# Patient Record
Sex: MALE | Race: BLACK OR AFRICAN AMERICAN | NOT HISPANIC OR LATINO | Employment: OTHER | URBAN - METROPOLITAN AREA
[De-identification: names, ages, dates, MRNs, and addresses within clinical notes are randomized per-mention and may not be internally consistent; named-entity substitution may affect disease eponyms.]

---

## 2017-02-07 ENCOUNTER — OFFICE VISIT (OUTPATIENT)
Dept: INTERNAL MEDICINE | Facility: CLINIC | Age: 62
End: 2017-02-07

## 2017-02-07 VITALS
DIASTOLIC BLOOD PRESSURE: 80 MMHG | SYSTOLIC BLOOD PRESSURE: 148 MMHG | HEART RATE: 65 BPM | HEIGHT: 72 IN | BODY MASS INDEX: 25.87 KG/M2 | WEIGHT: 191 LBS | OXYGEN SATURATION: 100 %

## 2017-02-07 DIAGNOSIS — I10 ESSENTIAL HYPERTENSION: Primary | ICD-10-CM

## 2017-02-07 DIAGNOSIS — Z00.00 ANNUAL PHYSICAL EXAM: ICD-10-CM

## 2017-02-07 DIAGNOSIS — F43.21 REACTION, ADJUSTMENT, WITH DEPRESSED MOOD, PROLONGED: ICD-10-CM

## 2017-02-07 PROCEDURE — 99214 OFFICE O/P EST MOD 30 MIN: CPT | Performed by: HOSPITALIST

## 2017-02-07 NOTE — PROGRESS NOTES
Subjective   Sandro Junior is a 61 y.o. male. Essential hypertension; Chronic pain syndrome; Primary osteoarthritis of knee, unspecified laterality; Reaction, adjustment, with depressed mood, prolonged; H/O colon cancer, stage II; and Med Refill         HPI Comments: He is doing well since his right knee replacement. He has plans to do the left knee in the near future. He has gained 1 0lbs and is slightly overweight. He had a salty breakfast right before his visit today. His BP is up as a result. He is taking Celebrex for arthritis pain. He reports he has been compliant with his blood pressure meds. His mood is good. He wants to continue Lexapro.      The following portions of the patient's history were reviewed and updated as appropriate: allergies, current medications, past family history, past medical history, past social history, past surgical history and problem list.    Review of Systems   Constitutional: Negative for activity change and appetite change.   HENT: Negative for congestion and dental problem.    Respiratory: Negative for chest tightness and shortness of breath.    Cardiovascular: Negative for chest pain.   Gastrointestinal: Negative for abdominal distention and abdominal pain.   Endocrine: Negative for polydipsia and polyphagia.   Genitourinary: Negative for difficulty urinating and dysuria.   Musculoskeletal: Positive for arthralgias and joint swelling.        Knee replacement on the right with mild swelling, the left knee hurts   Skin: Negative for color change and pallor.   Neurological: Negative for dizziness and facial asymmetry.   Hematological: Negative for adenopathy. Does not bruise/bleed easily.   Psychiatric/Behavioral: Negative for agitation and behavioral problems.       Objective   Vitals:    02/07/17 1120   BP: 148/80   Pulse: 65   SpO2: 100%       Physical Exam   Constitutional: He is oriented to person, place, and time. He appears well-developed and well-nourished.   HENT:    Head: Normocephalic and atraumatic.   Eyes: Conjunctivae and EOM are normal. Pupils are equal, round, and reactive to light.   Neck: Normal range of motion. Neck supple.   Cardiovascular: Normal rate, regular rhythm and normal heart sounds.    Pulmonary/Chest: Effort normal and breath sounds normal.   Abdominal: Soft. Bowel sounds are normal.   Musculoskeletal: Normal range of motion.   Neurological: He is alert and oriented to person, place, and time. He has normal reflexes.   Skin: Skin is warm and dry.   Psychiatric: He has a normal mood and affect. His behavior is normal. Thought content normal.       Assessment/Plan   Sandro was seen today for essential hypertension, chronic pain syndrome, primary osteoarthritis of knee, unspecified laterality, reaction, adjustment, with depressed mood, prolonged, h/o colon cancer, stage ii and med refill.    Diagnoses and all orders for this visit:    Essential hypertension  -     Comprehensive Metabolic Panel; Future  -     Lipid Panel; Future    Reaction, adjustment, with depressed mood, prolonged    Annual physical exam  -     TSH; Future  -     CBC (No Diff); Future    he was advised to decrease salt intake. Increase exercise activity and lose 10 lbs. He will repeat his BP in 2 weeks. F/u in 3 mo for a physical.    Results for orders placed or performed in visit on 12/06/16   Tissue Exam   Result Value Ref Range    Case Report       Surgical Pathology Report                         Case: [id number]                                  Authorizing Provider:  Mario Allen MD    Collected:           12/06/2016 07:40 AM          Pathologist:           Virgilio Montoya MD          Received:            12/06/2016 09:36 AM          Specimen:    Large Intestine, Transverse Colon                                                          Clinical Information       The working history is colon cancer.       Final Diagnosis       TRANSVERSE COLON POLYP:  Tubular adenoma; no high  grade dysplasia identified.     DGD/mbc       Gross Description       Received in formalin labeled transverse colon polyp consists of two 2 mm tan fragments with additional yellow flecks of material; submitted in toto. B/[facility] [place]       Microscopic Description       Sections of the colonic fragments display tightly packed tubular crypt profiles in which the epithelium shows adenomatous change consisting of nuclear basophilic hyperchromasia and pseudostratification with mild dysplasia; high-grade epithelial dysplasia is absent. DGD/mbc       Embedded Images

## 2017-08-24 ENCOUNTER — OFFICE VISIT (OUTPATIENT)
Dept: INTERNAL MEDICINE | Facility: CLINIC | Age: 62
End: 2017-08-24

## 2017-08-24 VITALS
OXYGEN SATURATION: 99 % | WEIGHT: 178 LBS | HEART RATE: 68 BPM | DIASTOLIC BLOOD PRESSURE: 70 MMHG | SYSTOLIC BLOOD PRESSURE: 142 MMHG | BODY MASS INDEX: 24.14 KG/M2

## 2017-08-24 DIAGNOSIS — I10 ESSENTIAL HYPERTENSION: Primary | ICD-10-CM

## 2017-08-24 DIAGNOSIS — F43.21 REACTION, ADJUSTMENT, WITH DEPRESSED MOOD, PROLONGED: ICD-10-CM

## 2017-08-24 DIAGNOSIS — B35.1 ONYCHOMYCOSIS OF TOENAIL: ICD-10-CM

## 2017-08-24 PROCEDURE — 90471 IMMUNIZATION ADMIN: CPT | Performed by: PHYSICIAN ASSISTANT

## 2017-08-24 PROCEDURE — 99213 OFFICE O/P EST LOW 20 MIN: CPT | Performed by: PHYSICIAN ASSISTANT

## 2017-08-24 PROCEDURE — 90686 IIV4 VACC NO PRSV 0.5 ML IM: CPT | Performed by: PHYSICIAN ASSISTANT

## 2017-08-24 RX ORDER — ESCITALOPRAM OXALATE 10 MG/1
10 TABLET ORAL DAILY
Qty: 30 TABLET | Refills: 5 | Status: SHIPPED | OUTPATIENT
Start: 2017-08-24 | End: 2018-01-25 | Stop reason: SDUPTHER

## 2017-08-24 RX ORDER — DILTIAZEM HYDROCHLORIDE 240 MG/1
240 CAPSULE, EXTENDED RELEASE ORAL DAILY
Qty: 90 CAPSULE | Refills: 3 | Status: SHIPPED | OUTPATIENT
Start: 2017-08-24 | End: 2018-01-25 | Stop reason: SDUPTHER

## 2017-08-24 NOTE — PROGRESS NOTES
Chief Complaint   Patient presents with   • Follow-up     hypertension       Subjective   Sandro Junior is a 62 y.o. male.       History of Present Illness     Pt was previously pt of Dr Cruz's. He has several specialists, Dr Prado, orthopedist; Dr Allen, gastroenterology; Dr Chaney did his colon resection- for benign colon growth.    He is due for follow up with Dr Allen due to some abnormalities on his colonoscopy 7-8 months ago.    Dr Vieyra follows him for Hodgkin's lymphoma that is now in remission. Diagnosed in 1979.    Dr Cruz managed his hypertension. Does check his BP occasionally at home. Runs around 135-140 systolic typically.    He has had TKR on right and planning to have his left knee done soon. He is planning to have it done this fall.     Takes Lexapro 10 mg daily to help manage his depression. He has been out of it for 2-3 weeks.     Has had recurrence of toenail fungus, thinks he took a pill to treat it in the past- not sure if it worked.      Current Outpatient Prescriptions:   •  celecoxib (CeleBREX) 200 MG capsule, , Disp: , Rfl: 0  •  cyclobenzaprine (FLEXERIL) 10 MG tablet, Take  by mouth., Disp: , Rfl:   •  dexlansoprazole (DEXILANT) 60 MG capsule, Take  by mouth., Disp: , Rfl:   •  diclofenac (VOLTAREN) 1 % gel gel, Place  on the skin., Disp: , Rfl:   •  diltiazem XR (DILT-XR) 240 MG 24 hr capsule, Take 1 capsule by mouth Daily., Disp: 90 capsule, Rfl: 3  •  escitalopram (LEXAPRO) 10 MG tablet, Take 1 tablet by mouth Daily., Disp: 30 tablet, Rfl: 5  •  gabapentin (NEURONTIN) 100 MG capsule, take 1 capsule by mouth twice a day, Disp: , Rfl: 0  •  latanoprost (XALATAN) 0.005 % ophthalmic solution, , Disp: , Rfl: 0  •  traMADol (ULTRAM) 50 MG tablet, take 1 tablet three times a day, Disp: , Rfl: 0     PMFSH  The following portions of the patient's history were reviewed and updated as appropriate: allergies, current medications, past family history, past medical history, past social  history, past surgical history and problem list.    Review of Systems   Constitutional: Negative for chills, fever and unexpected weight change.   HENT: Negative.    Eyes: Negative for pain and visual disturbance.   Respiratory: Negative for chest tightness and shortness of breath.    Cardiovascular: Negative for chest pain.   Gastrointestinal: Negative for abdominal pain and blood in stool.   Endocrine: Negative.    Genitourinary: Negative.    Musculoskeletal: Negative for joint swelling.   Skin: Negative for color change, rash and wound.   Allergic/Immunologic: Negative.    Neurological: Negative for syncope and speech difficulty.   Hematological: Negative for adenopathy.   Psychiatric/Behavioral: Positive for decreased concentration. Negative for confusion, hallucinations and suicidal ideas. The patient is nervous/anxious.        Objective   /70  Pulse 68  Wt 178 lb (80.7 kg)  SpO2 99%  BMI 24.14 kg/m2    Physical Exam   Constitutional: He appears well-developed and well-nourished.   HENT:   Head: Normocephalic.   Right Ear: Hearing, tympanic membrane, external ear and ear canal normal.   Left Ear: Hearing, tympanic membrane, external ear and ear canal normal.   Nose: Nose normal.   Mouth/Throat: Oropharynx is clear and moist.   Eyes: Conjunctivae are normal. Pupils are equal, round, and reactive to light.   Neck: Normal range of motion.   Cardiovascular: Normal rate, regular rhythm and normal heart sounds.    Pulmonary/Chest: Effort normal and breath sounds normal. He has no decreased breath sounds. He has no wheezes. He has no rhonchi. He has no rales.   Musculoskeletal: Normal range of motion.   Neurological: He is alert.   Skin: Skin is warm and dry.   Bilateral thickened, yellowed nails   Psychiatric: He has a normal mood and affect. His behavior is normal.   Nursing note and vitals reviewed.      ASSESSMENT/PLAN    Problem List Items Addressed This Visit        Cardiovascular and Mediastinum     Hypertension - Primary     Hypertension is unchanged.  Continue current treatment regimen.  Blood pressure will be reassessed at the next regular appointment.         Relevant Medications    diltiazem XR (DILT-XR) 240 MG 24 hr capsule       Musculoskeletal and Integument    Onychomycosis of toenail    Relevant Orders    Ambulatory Referral to Podiatry       Other    Reaction, adjustment, with depressed mood, prolonged     Psychological condition is unchanged.  Continue current treatment regimen.  Psychological condition  will be reassessed at the next regular appointment.         Relevant Medications    escitalopram (LEXAPRO) 10 MG tablet               Return for Annual physical.

## 2017-12-05 ENCOUNTER — LAB REQUISITION (OUTPATIENT)
Dept: LAB | Facility: HOSPITAL | Age: 62
End: 2017-12-05

## 2017-12-05 DIAGNOSIS — Z86.010 HISTORY OF COLONIC POLYPS: ICD-10-CM

## 2017-12-05 DIAGNOSIS — Z12.11 ENCOUNTER FOR SCREENING FOR MALIGNANT NEOPLASM OF COLON: ICD-10-CM

## 2017-12-05 DIAGNOSIS — Z85.038 PERSONAL HISTORY OF OTHER MALIGNANT NEOPLASM OF LARGE INTESTINE (CODE): ICD-10-CM

## 2017-12-05 PROCEDURE — 88305 TISSUE EXAM BY PATHOLOGIST: CPT | Performed by: INTERNAL MEDICINE

## 2017-12-07 LAB
CYTO UR: NORMAL
LAB AP CASE REPORT: NORMAL
LAB AP CLINICAL INFORMATION: NORMAL
Lab: NORMAL
PATH REPORT.FINAL DX SPEC: NORMAL
PATH REPORT.GROSS SPEC: NORMAL

## 2018-01-25 ENCOUNTER — OFFICE VISIT (OUTPATIENT)
Dept: INTERNAL MEDICINE | Facility: CLINIC | Age: 63
End: 2018-01-25

## 2018-01-25 VITALS
DIASTOLIC BLOOD PRESSURE: 82 MMHG | BODY MASS INDEX: 25.06 KG/M2 | SYSTOLIC BLOOD PRESSURE: 146 MMHG | HEIGHT: 72 IN | WEIGHT: 185 LBS | HEART RATE: 78 BPM | OXYGEN SATURATION: 98 %

## 2018-01-25 DIAGNOSIS — Z11.59 NEED FOR HEPATITIS C SCREENING TEST: ICD-10-CM

## 2018-01-25 DIAGNOSIS — Z00.00 HEALTHCARE MAINTENANCE: ICD-10-CM

## 2018-01-25 DIAGNOSIS — F43.21 REACTION, ADJUSTMENT, WITH DEPRESSED MOOD, PROLONGED: ICD-10-CM

## 2018-01-25 DIAGNOSIS — I10 ESSENTIAL HYPERTENSION: Primary | ICD-10-CM

## 2018-01-25 PROCEDURE — 99214 OFFICE O/P EST MOD 30 MIN: CPT | Performed by: NURSE PRACTITIONER

## 2018-01-25 RX ORDER — DILTIAZEM HYDROCHLORIDE 240 MG/1
240 CAPSULE, EXTENDED RELEASE ORAL DAILY
Qty: 90 CAPSULE | Refills: 3 | Status: SHIPPED | OUTPATIENT
Start: 2018-01-25 | End: 2018-01-25 | Stop reason: DRUGHIGH

## 2018-01-25 RX ORDER — ESCITALOPRAM OXALATE 10 MG/1
10 TABLET ORAL DAILY
Qty: 30 TABLET | Refills: 5 | Status: SHIPPED | OUTPATIENT
Start: 2018-01-25 | End: 2018-01-25 | Stop reason: SDUPTHER

## 2018-01-25 RX ORDER — ESCITALOPRAM OXALATE 10 MG/1
10 TABLET ORAL DAILY
Qty: 30 TABLET | Refills: 5 | Status: SHIPPED | OUTPATIENT
Start: 2018-01-25 | End: 2018-04-26 | Stop reason: SDUPTHER

## 2018-01-25 RX ORDER — DILTIAZEM HYDROCHLORIDE 300 MG/1
300 CAPSULE, EXTENDED RELEASE ORAL DAILY
Qty: 30 CAPSULE | Refills: 5 | Status: SHIPPED | OUTPATIENT
Start: 2018-01-25 | End: 2018-04-26 | Stop reason: SDUPTHER

## 2018-04-26 ENCOUNTER — OFFICE VISIT (OUTPATIENT)
Dept: INTERNAL MEDICINE | Facility: CLINIC | Age: 63
End: 2018-04-26

## 2018-04-26 VITALS
WEIGHT: 186 LBS | DIASTOLIC BLOOD PRESSURE: 70 MMHG | OXYGEN SATURATION: 98 % | SYSTOLIC BLOOD PRESSURE: 124 MMHG | BODY MASS INDEX: 25.23 KG/M2 | HEART RATE: 86 BPM

## 2018-04-26 DIAGNOSIS — I10 ESSENTIAL HYPERTENSION: ICD-10-CM

## 2018-04-26 DIAGNOSIS — F43.21 REACTION, ADJUSTMENT, WITH DEPRESSED MOOD, PROLONGED: ICD-10-CM

## 2018-04-26 DIAGNOSIS — Z11.59 NEED FOR HEPATITIS C SCREENING TEST: Primary | ICD-10-CM

## 2018-04-26 LAB
ALBUMIN SERPL-MCNC: 4.1 G/DL (ref 3.2–4.8)
ALBUMIN/GLOB SERPL: 1.4 G/DL (ref 1.5–2.5)
ALP SERPL-CCNC: 74 U/L (ref 25–100)
ALT SERPL W P-5'-P-CCNC: 41 U/L (ref 7–40)
ANION GAP SERPL CALCULATED.3IONS-SCNC: 4 MMOL/L (ref 3–11)
AST SERPL-CCNC: 26 U/L (ref 0–33)
BILIRUB SERPL-MCNC: 0.8 MG/DL (ref 0.3–1.2)
BUN BLD-MCNC: 12 MG/DL (ref 9–23)
BUN/CREAT SERPL: 15 (ref 7–25)
CALCIUM SPEC-SCNC: 9.4 MG/DL (ref 8.7–10.4)
CHLORIDE SERPL-SCNC: 103 MMOL/L (ref 99–109)
CO2 SERPL-SCNC: 32 MMOL/L (ref 20–31)
CREAT BLD-MCNC: 0.8 MG/DL (ref 0.6–1.3)
GFR SERPL CREATININE-BSD FRML MDRD: 119 ML/MIN/1.73
GLOBULIN UR ELPH-MCNC: 2.9 GM/DL
GLUCOSE BLD-MCNC: 92 MG/DL (ref 70–100)
HCV AB SER DONR QL: NORMAL
POTASSIUM BLD-SCNC: 4.8 MMOL/L (ref 3.5–5.5)
PROT SERPL-MCNC: 7 G/DL (ref 5.7–8.2)
SODIUM BLD-SCNC: 139 MMOL/L (ref 132–146)

## 2018-04-26 PROCEDURE — 86803 HEPATITIS C AB TEST: CPT | Performed by: PHYSICIAN ASSISTANT

## 2018-04-26 PROCEDURE — 90471 IMMUNIZATION ADMIN: CPT | Performed by: PHYSICIAN ASSISTANT

## 2018-04-26 PROCEDURE — 90715 TDAP VACCINE 7 YRS/> IM: CPT | Performed by: PHYSICIAN ASSISTANT

## 2018-04-26 PROCEDURE — 99214 OFFICE O/P EST MOD 30 MIN: CPT | Performed by: PHYSICIAN ASSISTANT

## 2018-04-26 PROCEDURE — 80053 COMPREHEN METABOLIC PANEL: CPT | Performed by: PHYSICIAN ASSISTANT

## 2018-04-26 RX ORDER — DILTIAZEM HYDROCHLORIDE 300 MG/1
300 CAPSULE, EXTENDED RELEASE ORAL DAILY
Qty: 30 CAPSULE | Refills: 5 | Status: SHIPPED | OUTPATIENT
Start: 2018-04-26 | End: 2018-12-03 | Stop reason: SDUPTHER

## 2018-04-26 RX ORDER — ESCITALOPRAM OXALATE 10 MG/1
10 TABLET ORAL DAILY
Qty: 30 TABLET | Refills: 5 | Status: SHIPPED | OUTPATIENT
Start: 2018-04-26 | End: 2019-07-16 | Stop reason: SDUPTHER

## 2018-04-26 RX ORDER — CYCLOBENZAPRINE HCL 10 MG
10 TABLET ORAL 3 TIMES DAILY PRN
Qty: 30 TABLET | Refills: 3 | Status: SHIPPED | OUTPATIENT
Start: 2018-04-26 | End: 2019-07-16

## 2018-04-27 NOTE — ASSESSMENT & PLAN NOTE
Hypertension is improving with treatment.  Continue current treatment regimen.  Dietary sodium restriction.  Regular aerobic exercise.  Continue current medications.  Ambulatory blood pressure monitoring.  Blood pressure will be reassessed at the next regular appointment.

## 2018-11-26 ENCOUNTER — FLU SHOT (OUTPATIENT)
Dept: INTERNAL MEDICINE | Facility: CLINIC | Age: 63
End: 2018-11-26

## 2018-11-26 PROCEDURE — G0008 ADMIN INFLUENZA VIRUS VAC: HCPCS | Performed by: PHYSICIAN ASSISTANT

## 2018-11-26 PROCEDURE — 90674 CCIIV4 VAC NO PRSV 0.5 ML IM: CPT | Performed by: PHYSICIAN ASSISTANT

## 2018-12-03 ENCOUNTER — OFFICE VISIT (OUTPATIENT)
Dept: INTERNAL MEDICINE | Facility: CLINIC | Age: 63
End: 2018-12-03

## 2018-12-03 VITALS
WEIGHT: 183.8 LBS | HEART RATE: 68 BPM | SYSTOLIC BLOOD PRESSURE: 130 MMHG | OXYGEN SATURATION: 98 % | BODY MASS INDEX: 24.93 KG/M2 | DIASTOLIC BLOOD PRESSURE: 78 MMHG

## 2018-12-03 DIAGNOSIS — I10 ESSENTIAL HYPERTENSION: ICD-10-CM

## 2018-12-03 DIAGNOSIS — R21 RASH OF FACE: Primary | ICD-10-CM

## 2018-12-03 LAB
CRP SERPL-MCNC: 0.38 MG/DL (ref 0–1)
ERYTHROCYTE [SEDIMENTATION RATE] IN BLOOD: 9 MM/HR (ref 0–20)
RHEUMATOID FACT SERPL-ACNC: NEGATIVE [IU]/ML

## 2018-12-03 PROCEDURE — 90632 HEPA VACCINE ADULT IM: CPT | Performed by: PHYSICIAN ASSISTANT

## 2018-12-03 PROCEDURE — 86430 RHEUMATOID FACTOR TEST QUAL: CPT | Performed by: PHYSICIAN ASSISTANT

## 2018-12-03 PROCEDURE — 90471 IMMUNIZATION ADMIN: CPT | Performed by: PHYSICIAN ASSISTANT

## 2018-12-03 PROCEDURE — 86038 ANTINUCLEAR ANTIBODIES: CPT | Performed by: PHYSICIAN ASSISTANT

## 2018-12-03 PROCEDURE — 86140 C-REACTIVE PROTEIN: CPT | Performed by: PHYSICIAN ASSISTANT

## 2018-12-03 PROCEDURE — 99213 OFFICE O/P EST LOW 20 MIN: CPT | Performed by: PHYSICIAN ASSISTANT

## 2018-12-03 RX ORDER — DILTIAZEM HYDROCHLORIDE 300 MG/1
300 CAPSULE, EXTENDED RELEASE ORAL DAILY
Qty: 30 CAPSULE | Refills: 5 | Status: SHIPPED | OUTPATIENT
Start: 2018-12-03 | End: 2019-02-20 | Stop reason: SDUPTHER

## 2018-12-03 NOTE — PROGRESS NOTES
Chief Complaint   Patient presents with   • Rash     On face patient states it comes and go        Subjective   Sandro Junior is a 63 y.o. male.       History of Present Illness     Over the last couple months, ptt has started getting a recurrent rash on his face. Has some redness that occurs along his forehead, both sides of his nose, around his mouth. No itching. Does get flaky. No palpable bumps. Has been using aquaphor to keep skin moisturized.    Also request refill of gabapentin-has not had rx in several months. Not sure why he was taking it. Does not think it was helping his pain.      Current Outpatient Medications:   •  cyclobenzaprine (FLEXERIL) 10 MG tablet, Take 1 tablet by mouth 3 (Three) Times a Day As Needed for Muscle Spasms., Disp: 30 tablet, Rfl: 3  •  diltiaZEM (TIAZAC) 300 MG 24 hr capsule, Take 1 capsule by mouth Daily., Disp: 30 capsule, Rfl: 5  •  escitalopram (LEXAPRO) 10 MG tablet, Take 1 tablet by mouth Daily., Disp: 30 tablet, Rfl: 5  •  gabapentin (NEURONTIN) 100 MG capsule, take 1 capsule by mouth twice a day, Disp: , Rfl: 0  •  latanoprost (XALATAN) 0.005 % ophthalmic solution, , Disp: , Rfl: 0     PMFSH  The following portions of the patient's history were reviewed and updated as appropriate: allergies, current medications, past family history, past medical history, past social history, past surgical history and problem list.    Review of Systems   Constitutional: Negative for activity change, appetite change, fatigue, fever and unexpected weight change.   HENT: Negative for facial swelling, mouth sores and trouble swallowing.    Eyes: Negative for pain, discharge, itching and visual disturbance.   Respiratory: Negative for chest tightness, shortness of breath and wheezing.    Cardiovascular: Negative for chest pain and palpitations.   Gastrointestinal: Negative for abdominal pain, diarrhea, nausea and vomiting.   Endocrine: Negative.    Genitourinary: Negative.    Musculoskeletal:  Negative for joint swelling.   Skin: Positive for rash.   Allergic/Immunologic: Negative.    Neurological: Negative for seizures and syncope.   Hematological: Does not bruise/bleed easily.   Psychiatric/Behavioral: Negative.        Objective   /78   Pulse 68   Wt 83.4 kg (183 lb 12.8 oz)   SpO2 98%   BMI 24.93 kg/m²     Physical Exam   Constitutional: He is oriented to person, place, and time. He appears well-developed and well-nourished. No distress.   HENT:   Head: Normocephalic and atraumatic.   Right Ear: External ear normal.   Left Ear: External ear normal.   Eyes: Conjunctivae and EOM are normal. Pupils are equal, round, and reactive to light. Right eye exhibits no discharge. Left eye exhibits no discharge. No scleral icterus.   Neck: Normal range of motion. Neck supple.   Pulmonary/Chest: Effort normal.   Musculoskeletal: Normal range of motion.   Neurological: He is alert and oriented to person, place, and time. He exhibits normal muscle tone.   Skin: Skin is warm and dry. Rash noted. Rash is macular (scattered across face, erythematous, flaky around nose). He is not diaphoretic.   Psychiatric: He has a normal mood and affect. His behavior is normal. Judgment and thought content normal.   Nursing note and vitals reviewed.      Results for orders placed or performed in visit on 12/03/18   C-reactive Protein   Result Value Ref Range    C-Reactive Protein 0.38 0.00 - 1.00 mg/dL   Rheumatoid Factor   Result Value Ref Range    Rheumatoid Factor Qualitative Negative Negative   Sedimentation Rate   Result Value Ref Range    Sed Rate 9 0 - 20 mm/hr        ASSESSMENT/PLAN    Problem List Items Addressed This Visit        Cardiovascular and Mediastinum    Hypertension    Relevant Medications    diltiaZEM (TIAZAC) 300 MG 24 hr capsule      Other Visit Diagnoses     Rash of face    -  Primary    Check labs as ordered. Trial of eucrisa, gave pt sample. Refer to derm for further recommendations.    Relevant  Orders    Ambulatory Referral to Dermatology    Nuclear Antigen Antibody, IFA    C-reactive Protein (Completed)    Rheumatoid Factor (Completed)    Sedimentation Rate (Completed)               Return for Next scheduled follow up.

## 2018-12-04 LAB
ANA SER QL IA: POSITIVE
ANA SPECKLED TITR SER: NORMAL {TITER}
Lab: NORMAL

## 2019-01-15 ENCOUNTER — OFFICE VISIT (OUTPATIENT)
Dept: INTERNAL MEDICINE | Facility: CLINIC | Age: 64
End: 2019-01-15

## 2019-01-15 VITALS
WEIGHT: 185 LBS | OXYGEN SATURATION: 99 % | SYSTOLIC BLOOD PRESSURE: 154 MMHG | HEART RATE: 72 BPM | DIASTOLIC BLOOD PRESSURE: 88 MMHG | HEIGHT: 72 IN | BODY MASS INDEX: 25.06 KG/M2

## 2019-01-15 DIAGNOSIS — F52.21 ERECTILE DYSFUNCTION OF NONORGANIC ORIGIN: ICD-10-CM

## 2019-01-15 DIAGNOSIS — I10 ESSENTIAL HYPERTENSION: ICD-10-CM

## 2019-01-15 DIAGNOSIS — Z12.5 SPECIAL SCREENING FOR MALIGNANT NEOPLASM OF PROSTATE: ICD-10-CM

## 2019-01-15 DIAGNOSIS — Z00.00 MEDICARE ANNUAL WELLNESS VISIT, INITIAL: Primary | ICD-10-CM

## 2019-01-15 DIAGNOSIS — Z12.11 COLON CANCER SCREENING: ICD-10-CM

## 2019-01-15 PROBLEM — L21.9 SEBORRHEIC ECZEMA: Status: ACTIVE | Noted: 2019-01-15

## 2019-01-15 PROBLEM — C20 MALIGNANT NEOPLASM OF RECTUM: Status: ACTIVE | Noted: 2019-01-15

## 2019-01-15 PROBLEM — L91.0 KELOID SCAR: Status: ACTIVE | Noted: 2019-01-15

## 2019-01-15 PROBLEM — M79.604 PAIN OF RIGHT LOWER EXTREMITY: Status: ACTIVE | Noted: 2019-01-15

## 2019-01-15 LAB
ALBUMIN SERPL-MCNC: 4.46 G/DL (ref 3.2–4.8)
ALBUMIN/GLOB SERPL: 1.5 G/DL (ref 1.5–2.5)
ALP SERPL-CCNC: 67 U/L (ref 25–100)
ALT SERPL W P-5'-P-CCNC: 42 U/L (ref 7–40)
ANION GAP SERPL CALCULATED.3IONS-SCNC: 4 MMOL/L (ref 3–11)
ARTICHOKE IGE QN: 76 MG/DL (ref 0–130)
AST SERPL-CCNC: 36 U/L (ref 0–33)
BILIRUB SERPL-MCNC: 1.2 MG/DL (ref 0.3–1.2)
BUN BLD-MCNC: 10 MG/DL (ref 9–23)
BUN/CREAT SERPL: 11.1 (ref 7–25)
CALCIUM SPEC-SCNC: 9.3 MG/DL (ref 8.7–10.4)
CHLORIDE SERPL-SCNC: 106 MMOL/L (ref 99–109)
CHOLEST SERPL-MCNC: 181 MG/DL (ref 0–200)
CO2 SERPL-SCNC: 30 MMOL/L (ref 20–31)
CREAT BLD-MCNC: 0.9 MG/DL (ref 0.6–1.3)
GFR SERPL CREATININE-BSD FRML MDRD: 104 ML/MIN/1.73
GLOBULIN UR ELPH-MCNC: 2.9 GM/DL
GLUCOSE BLD-MCNC: 107 MG/DL (ref 70–100)
HDLC SERPL-MCNC: 87 MG/DL (ref 40–60)
POTASSIUM BLD-SCNC: 4.1 MMOL/L (ref 3.5–5.5)
PROT SERPL-MCNC: 7.4 G/DL (ref 5.7–8.2)
PSA SERPL-MCNC: 0.4 NG/ML (ref 0–4)
SODIUM BLD-SCNC: 140 MMOL/L (ref 132–146)
TRIGL SERPL-MCNC: 110 MG/DL (ref 0–150)

## 2019-01-15 PROCEDURE — 80061 LIPID PANEL: CPT | Performed by: PHYSICIAN ASSISTANT

## 2019-01-15 PROCEDURE — G0103 PSA SCREENING: HCPCS | Performed by: PHYSICIAN ASSISTANT

## 2019-01-15 PROCEDURE — 80053 COMPREHEN METABOLIC PANEL: CPT | Performed by: PHYSICIAN ASSISTANT

## 2019-01-15 PROCEDURE — G0403 EKG FOR INITIAL PREVENT EXAM: HCPCS | Performed by: PHYSICIAN ASSISTANT

## 2019-01-15 PROCEDURE — G0402 INITIAL PREVENTIVE EXAM: HCPCS | Performed by: PHYSICIAN ASSISTANT

## 2019-01-15 RX ORDER — TRIAMCINOLONE ACETONIDE 0.25 MG/G
CREAM TOPICAL 2 TIMES DAILY
Status: ON HOLD | COMMUNITY
End: 2023-03-17 | Stop reason: SDUPTHER

## 2019-01-15 RX ORDER — SILDENAFIL 50 MG/1
50 TABLET, FILM COATED ORAL DAILY PRN
Qty: 10 TABLET | Refills: 1 | Status: SHIPPED | OUTPATIENT
Start: 2019-01-15 | End: 2019-07-16 | Stop reason: SDUPTHER

## 2019-01-15 RX ORDER — KETOCONAZOLE 20 MG/G
CREAM TOPICAL DAILY
COMMUNITY
End: 2019-12-28 | Stop reason: SDUPTHER

## 2019-01-15 NOTE — PROGRESS NOTES
QUICK REFERENCE INFORMATION:  The ABCs of the Annual Wellness Visit    [facility] to Medicare Visit    HEALTH RISK ASSESSMENT    1955    Recent Hospitalizations:  No hospitalization(s) within the last year..      Current Medical Providers:  Patient Care Team:  Dora Loya PA as PCP - General (Internal Medicine)      Smoking Status:  Social History     Tobacco Use   Smoking Status Former Smoker   Smokeless Tobacco Never Used       Alcohol Consumption:  Social History     Substance and Sexual Activity   Alcohol Use Yes    Comment: occasionally       Depression Screen:   PHQ-2/PHQ-9 Depression Screening 1/15/2019   Little interest or pleasure in doing things 0   Feeling down, depressed, or hopeless 0   Total Score 0       Health Habits and Functional and Cognitive Screening:  Functional & Cognitive Status 1/15/2019   Do you have difficulty preparing food and eating? Yes   Do you have difficulty bathing yourself, getting dressed or grooming yourself? No   Do you have difficulty using the toilet? No   Do you have difficulty moving around from place to place? No   Do you have trouble with steps or getting out of a bed or a chair? No   In the past year have you fallen or experienced a near fall? No   Current Diet Well Balanced Diet   Dental Exam Not up to date   Eye Exam Not up to date   Exercise (times per week) 3 times per week   Current Exercise Activities Include Weightlifting   Do you need help using the phone?  No   Are you deaf or do you have serious difficulty hearing?  No   Do you need help with transportation? No   Do you need help shopping? No   Do you need help preparing meals?  No   Do you need help with housework?  No   Do you need help with laundry? No   Do you need help taking your medications? No   Do you need help managing money? No   Do you ever drive or ride in a car without wearing a seat belt? No   Have you felt unusual stress, anger or loneliness in the last month? No   Who do you live with?  Spouse   If you need help, do you have trouble finding someone available to you? No   Have you been bothered in the last four weeks by sexual problems? Yes   Do you have difficulty concentrating, remembering or making decisions? No           Does the patient have evidence of cognitive impairment? No    Aspirin use counseling? Does not need ASA (and currently is not on it)      Recent Lab Results:  CMP:  Lab Results   Component Value Date    BUN 12 04/26/2018    CREATININE 0.80 04/26/2018    EGFRIFAFRI 119 04/26/2018    BCR 15.0 04/26/2018     04/26/2018    K 4.8 04/26/2018    CO2 32.0 (H) 04/26/2018    CALCIUM 9.4 04/26/2018    ALBUMIN 4.10 04/26/2018    BILITOT 0.8 04/26/2018    ALKPHOS 74 04/26/2018    AST 26 04/26/2018    ALT 41 (H) 04/26/2018     Lipid Panel:     HbA1c:       Visual Acuity:  No exam data present    Age-appropriate Screening Schedule:  Refer to the list below for future screening recommendations based on patient's age, sex and/or medical conditions. Orders for these recommended tests are listed in the plan section. The patient has been provided with a written plan.    Health Maintenance   Topic Date Due   • ZOSTER VACCINE (1 of 2) 04/26/2019 (Originally 2/11/2007)   • COLONOSCOPY  01/25/2028   • TDAP/TD VACCINES (2 - Td) 04/26/2028   • INFLUENZA VACCINE  Completed        Subjective   History of Present Illness    Sandro Junior is a 63 y.o. male an established patient presenting for a Welcome to Medicare Visit.     The following portions of the patient's history were reviewed and updated as appropriate: allergies, current medications, past family history, past medical history, past social history, past surgical history and problem list.    Outpatient Medications Prior to Visit   Medication Sig Dispense Refill   • cyclobenzaprine (FLEXERIL) 10 MG tablet Take 1 tablet by mouth 3 (Three) Times a Day As Needed for Muscle Spasms. 30 tablet 3   • diltiaZEM (TIAZAC) 300 MG 24 hr capsule Take 1  capsule by mouth Daily. 30 capsule 5   • gabapentin (NEURONTIN) 100 MG capsule take 1 capsule by mouth twice a day  0   • ketoconazole (NIZORAL) 2 % cream Apply  topically to the appropriate area as directed Daily.     • triamcinolone (KENALOG) 0.025 % cream Apply  topically to the appropriate area as directed 2 (Two) Times a Day.     • escitalopram (LEXAPRO) 10 MG tablet Take 1 tablet by mouth Daily. 30 tablet 5   • latanoprost (XALATAN) 0.005 % ophthalmic solution   0     No facility-administered medications prior to visit.        Patient Active Problem List   Diagnosis   • Hypertension   • Chronic pain   • Gastroesophageal reflux disease   • Osteoarthritis of knee   • Prolonged depressive adjustment reaction   • Acute prostatitis   • Atopic rhinitis   • Erectile dysfunction of nonorganic origin   • Glaucoma suspect   • Onychomycosis of toenail   • Malignant neoplasm of rectum (CMS/HCC)   • Seborrheic eczema   • Keloid scar   • Pain of right lower extremity       Advance Care Planning:  has an advance directive - a copy HAS NOT been provided    Identification of Risk Factors:  Risk factors include: tobacco use and chronic pain.    Review of Systems   Constitutional: Negative for appetite change, fever and unexpected weight change.   HENT: Negative for ear pain, facial swelling and sore throat.    Eyes: Negative for pain and visual disturbance.   Respiratory: Negative for chest tightness, shortness of breath and wheezing.    Cardiovascular: Negative for chest pain and palpitations.   Gastrointestinal: Negative for abdominal pain and blood in stool.   Endocrine: Negative.    Genitourinary: Negative for difficulty urinating and hematuria.   Musculoskeletal: Negative for joint swelling.   Neurological: Negative for tremors, seizures and syncope.   Hematological: Negative for adenopathy.   Psychiatric/Behavioral: Negative.        Compared to one year ago, the patient feels his physical health is the same.  Compared to  one year ago, the patient feels his mental health is the same.    Objective    Physical Exam   Constitutional: He is oriented to person, place, and time. He appears well-developed and well-nourished. No distress.   HENT:   Head: Normocephalic and atraumatic. Hair is normal.   Right Ear: Hearing, tympanic membrane, external ear and ear canal normal.   Left Ear: Hearing, tympanic membrane, external ear and ear canal normal.   Nose: No sinus tenderness or nasal deformity.   Mouth/Throat: Uvula is midline, oropharynx is clear and moist and mucous membranes are normal. No oral lesions. No uvula swelling.   Eyes: Conjunctivae, EOM and lids are normal. Pupils are equal, round, and reactive to light. Right eye exhibits no discharge. Left eye exhibits no discharge. No scleral icterus. Right eye exhibits normal extraocular motion and no nystagmus. Left eye exhibits normal extraocular motion and no nystagmus.   Fundoscopic exam:       The right eye shows red reflex.        The left eye shows red reflex.   Neck: Normal range of motion. Neck supple. No JVD present. No tracheal deviation present. No thyromegaly present.   Cardiovascular: Normal rate, regular rhythm, normal heart sounds, intact distal pulses and normal pulses. Exam reveals no gallop.   No murmur heard.  Pulmonary/Chest: Effort normal and breath sounds normal. No respiratory distress. He has no wheezes. He has no rales. He exhibits no tenderness.   Abdominal: Soft. Bowel sounds are normal. He exhibits no distension and no mass. There is no tenderness. There is no guarding. No hernia.   Genitourinary: Rectum normal and prostate normal. Rectal exam shows no mass, no tenderness and guaiac negative stool.   Musculoskeletal: Normal range of motion. He exhibits no edema, tenderness or deformity.   Lymphadenopathy:     He has no cervical adenopathy.   Neurological: He is alert and oriented to person, place, and time. He has normal reflexes. He displays normal reflexes. No  "cranial nerve deficit. He exhibits normal muscle tone. Coordination normal.   Skin: Skin is warm and dry. No rash noted. He is not diaphoretic.   Psychiatric: He has a normal mood and affect. His behavior is normal. Judgment and thought content normal.   Nursing note and vitals reviewed.      Vitals:    01/15/19 0808   BP: 154/88   Pulse: 72   SpO2: 99%   Weight: 83.9 kg (185 lb)   Height: 182.9 cm (72\")   PainSc:   4       Patient's Body mass index is 25.09 kg/m². BMI is within normal parameters. No follow-up required.      Procedure     ECG 12 Lead  Date/Time: 1/15/2019 8:31 AM  Performed by: Dora Loya PA  Authorized by: Dora Loya PA   Comparison: not compared with previous ECG   Rhythm: sinus rhythm and sinus bradycardia  Rate: bradycardic  BPM: 55  Conduction: conduction normal  ST Segments: ST segments normal  T Waves: T waves normal  QRS axis: normal  Other: no other findings  Clinical impression: normal ECG               Assessment/Plan   Patient Self-Management and Personalized Health Advice  The patient has been provided with information about: diet and exercise and preventive services including:   · Colorectal cancer screening, colonoscopy referral placed, Glaucoma screening recommended, Prostate cancer screening discussed, Screening electrocardiogram, Zostavax vaccine (Herpes Zoster).    Visit Diagnoses:    ICD-10-CM ICD-9-CM   1. Medicare annual wellness visit, initial Z00.00 V70.0   2. Colon cancer screening Z12.11 V76.51   3. Essential hypertension I10 401.9   4. Special screening for malignant neoplasm of prostate Z12.5 V76.44   5. Erectile dysfunction of nonorganic origin F52.21 302.72       Orders Placed This Encounter   Procedures   • PSA Screen   • Comprehensive Metabolic Panel   • Lipid Panel   • Ambulatory Referral For Screening Colonoscopy     Referral Priority:   Routine     Referral Type:   Diagnostic Medical     Referral Reason:   Specialty Services Required     Referred to " Provider:   Mario Allen MD     Number of Visits Requested:   1     Expiration Date:   12/31/2019   • ECG 12 Lead     This order was created via procedure documentation       Outpatient Encounter Medications as of 1/15/2019   Medication Sig Dispense Refill   • cyclobenzaprine (FLEXERIL) 10 MG tablet Take 1 tablet by mouth 3 (Three) Times a Day As Needed for Muscle Spasms. 30 tablet 3   • diltiaZEM (TIAZAC) 300 MG 24 hr capsule Take 1 capsule by mouth Daily. 30 capsule 5   • gabapentin (NEURONTIN) 100 MG capsule take 1 capsule by mouth twice a day  0   • ketoconazole (NIZORAL) 2 % cream Apply  topically to the appropriate area as directed Daily.     • triamcinolone (KENALOG) 0.025 % cream Apply  topically to the appropriate area as directed 2 (Two) Times a Day.     • escitalopram (LEXAPRO) 10 MG tablet Take 1 tablet by mouth Daily. 30 tablet 5   • latanoprost (XALATAN) 0.005 % ophthalmic solution   0   • sildenafil (VIAGRA) 50 MG tablet Take 1 tablet by mouth Daily As Needed for erectile dysfunction. 10 tablet 1     No facility-administered encounter medications on file as of 1/15/2019.        Reviewed use of high risk medication in the elderly: yes  Reviewed for potential of harmful drug interactions in the elderly: yes    Follow Up:  Return in about 6 months (around 7/15/2019) for Recheck, Medicare Wellness in 1 year.     An After Visit Summary and PPPS with all of these plans were given to the patient.

## 2019-01-15 NOTE — PROGRESS NOTES
"QUICK REFERENCE INFORMATION:  The ABCs of the Annual Wellness Visit    Initial Medicare Wellness Visit    HEALTH RISK ASSESSMENT    1955    Recent Hospitalizations:  {Hospitalization history:0805694227::\"No hospitalization(s) within the last year.\"}.        Current Medical Providers:  Patient Care Team:  Dora Loya PA as PCP - General (Internal Medicine)        Smoking Status:  Social History     Tobacco Use   Smoking Status Former Smoker   Smokeless Tobacco Never Used       Alcohol Consumption:  Social History     Substance and Sexual Activity   Alcohol Use Yes    Comment: occasionally       Depression Screen:   PHQ-2/PHQ-9 Depression Screening 1/15/2019   Little interest or pleasure in doing things 0   Feeling down, depressed, or hopeless 0   Total Score 0       Health Habits and Functional and Cognitive Screening:  Functional & Cognitive Status 1/15/2019   Do you have difficulty preparing food and eating? Yes   Do you have difficulty bathing yourself, getting dressed or grooming yourself? No   Do you have difficulty using the toilet? No   Do you have difficulty moving around from place to place? No   Do you have trouble with steps or getting out of a bed or a chair? No   In the past year have you fallen or experienced a near fall? No   Current Diet Well Balanced Diet   Dental Exam Not up to date   Eye Exam Not up to date   Exercise (times per week) 3 times per week   Current Exercise Activities Include Weightlifting   Do you need help using the phone?  No   Are you deaf or do you have serious difficulty hearing?  No   Do you need help with transportation? No   Do you need help shopping? No   Do you need help preparing meals?  No   Do you need help with housework?  No   Do you need help with laundry? No   Do you need help taking your medications? No   Do you need help managing money? No   Do you ever drive or ride in a car without wearing a seat belt? No   Have you felt unusual stress, anger or " "loneliness in the last month? No   Who do you live with? Spouse   If you need help, do you have trouble finding someone available to you? No   Have you been bothered in the last four weeks by sexual problems? Yes   Do you have difficulty concentrating, remembering or making decisions? No           Does the patient have evidence of cognitive impairment? {Yes/No w/ pre-defaulted No:25546::\"No\"}    Asiprin use counseling: {Aspirin :58445}      Recent Lab Results:    Visual Acuity:  No exam data present    Age-appropriate Screening Schedule:  Refer to the list below for future screening recommendations based on patient's age, sex and/or medical conditions. Orders for these recommended tests are listed in the plan section. The patient has been provided with a written plan.    Health Maintenance   Topic Date Due   • ZOSTER VACCINE (1 of 2) 04/26/2019 (Originally 2/11/2007)   • COLONOSCOPY  01/25/2028   • TDAP/TD VACCINES (2 - Td) 04/26/2028   • INFLUENZA VACCINE  Completed        Subjective   History of Present Illness    Sandro Junior is a 63 y.o. male who presents for an Annual Wellness Visit.    The following portions of the patient's history were reviewed and updated as appropriate: {history reviewed:20406::\"allergies\",\"current medications\",\"past family history\",\"past medical history\",\"past social history\",\"past surgical history\",\"problem list\"}.    Outpatient Medications Prior to Visit   Medication Sig Dispense Refill   • cyclobenzaprine (FLEXERIL) 10 MG tablet Take 1 tablet by mouth 3 (Three) Times a Day As Needed for Muscle Spasms. 30 tablet 3   • diltiaZEM (TIAZAC) 300 MG 24 hr capsule Take 1 capsule by mouth Daily. 30 capsule 5   • gabapentin (NEURONTIN) 100 MG capsule take 1 capsule by mouth twice a day  0   • ketoconazole (NIZORAL) 2 % cream Apply  topically to the appropriate area as directed Daily.     • triamcinolone (KENALOG) 0.025 % cream Apply  topically to the appropriate area as directed 2 " "(Two) Times a Day.     • escitalopram (LEXAPRO) 10 MG tablet Take 1 tablet by mouth Daily. 30 tablet 5   • latanoprost (XALATAN) 0.005 % ophthalmic solution   0     No facility-administered medications prior to visit.        Patient Active Problem List   Diagnosis   • Hypertension   • Chronic pain   • Gastroesophageal reflux disease   • Osteoarthritis of knee   • Prolonged depressive adjustment reaction   • Acute prostatitis   • Atopic rhinitis   • Erectile dysfunction of nonorganic origin   • Glaucoma suspect   • Onychomycosis of toenail   • Malignant neoplasm of rectum (CMS/HCC)   • Seborrheic eczema   • Keloid scar   • Pain of right lower extremity       Advance Care Planning:  {Advance Directive Status:09634}    Identification of Risk Factors:  Risk factors include: {MC; WELLNESS RISK FACTORS:42895}.    Review of Systems    Compared to one year ago, the patient feels his physical health is {better worse same:26878}.  Compared to one year ago, the patient feels his mental health is {better worse same:24698}.    Objective     Physical Exam    Vitals:    01/15/19 0808   BP: 154/88   Pulse: 72   SpO2: 99%   Weight: 83.9 kg (185 lb)   Height: 182.9 cm (72\")   PainSc:   4       Patient's Body mass index is 25.09 kg/m². BMI is {BMI range:24880}.      Assessment/Plan   Patient Self-Management and Personalized Health Advice  The patient has been provided with information about: {; PERSONALIZED HEALTH ADVICE:26157} and preventive services including:   · {plan:08742}.    Visit Diagnoses:  No diagnosis found.    No orders of the defined types were placed in this encounter.      Outpatient Encounter Medications as of 1/15/2019   Medication Sig Dispense Refill   • cyclobenzaprine (FLEXERIL) 10 MG tablet Take 1 tablet by mouth 3 (Three) Times a Day As Needed for Muscle Spasms. 30 tablet 3   • diltiaZEM (TIAZAC) 300 MG 24 hr capsule Take 1 capsule by mouth Daily. 30 capsule 5   • gabapentin (NEURONTIN) 100 MG capsule take 1 " capsule by mouth twice a day  0   • ketoconazole (NIZORAL) 2 % cream Apply  topically to the appropriate area as directed Daily.     • triamcinolone (KENALOG) 0.025 % cream Apply  topically to the appropriate area as directed 2 (Two) Times a Day.     • escitalopram (LEXAPRO) 10 MG tablet Take 1 tablet by mouth Daily. 30 tablet 5   • latanoprost (XALATAN) 0.005 % ophthalmic solution   0     No facility-administered encounter medications on file as of 1/15/2019.        Reviewed use of high risk medication in the elderly: {Response; yes/no/na:63}  Reviewed for potential of harmful drug interactions in the elderly: {Response; yes/no/na:63}    Follow Up:  No Follow-up on file.     An After Visit Summary and PPPS with all of these plans were given to the patient.

## 2019-01-30 RX ORDER — SODIUM, POTASSIUM,MAG SULFATES 17.5-3.13G
2 SOLUTION, RECONSTITUTED, ORAL ORAL TAKE AS DIRECTED
Qty: 2 BOTTLE | Refills: 0 | Status: SHIPPED | OUTPATIENT
Start: 2019-01-30 | End: 2019-07-16

## 2019-02-05 ENCOUNTER — OUTSIDE FACILITY SERVICE (OUTPATIENT)
Dept: GASTROENTEROLOGY | Facility: CLINIC | Age: 64
End: 2019-02-05

## 2019-02-05 ENCOUNTER — LAB REQUISITION (OUTPATIENT)
Dept: LAB | Facility: HOSPITAL | Age: 64
End: 2019-02-05

## 2019-02-05 DIAGNOSIS — Z86.010 HISTORY OF COLONIC POLYPS: ICD-10-CM

## 2019-02-05 DIAGNOSIS — Z12.11 ENCOUNTER FOR SCREENING FOR MALIGNANT NEOPLASM OF COLON: ICD-10-CM

## 2019-02-05 DIAGNOSIS — Z85.038 PERSONAL HISTORY OF OTHER MALIGNANT NEOPLASM OF LARGE INTESTINE: ICD-10-CM

## 2019-02-05 PROCEDURE — 88305 TISSUE EXAM BY PATHOLOGIST: CPT | Performed by: INTERNAL MEDICINE

## 2019-02-05 PROCEDURE — 45380 COLONOSCOPY AND BIOPSY: CPT | Performed by: INTERNAL MEDICINE

## 2019-02-06 LAB
CYTO UR: NORMAL
LAB AP CASE REPORT: NORMAL
LAB AP CLINICAL INFORMATION: NORMAL
PATH REPORT.FINAL DX SPEC: NORMAL
PATH REPORT.GROSS SPEC: NORMAL

## 2019-02-20 DIAGNOSIS — I10 ESSENTIAL HYPERTENSION: ICD-10-CM

## 2019-02-20 RX ORDER — DILTIAZEM HYDROCHLORIDE 300 MG/1
300 CAPSULE, EXTENDED RELEASE ORAL DAILY
Qty: 30 CAPSULE | Refills: 5 | Status: SHIPPED | OUTPATIENT
Start: 2019-02-20 | End: 2019-07-16 | Stop reason: SDUPTHER

## 2019-07-16 ENCOUNTER — OFFICE VISIT (OUTPATIENT)
Dept: INTERNAL MEDICINE | Facility: CLINIC | Age: 64
End: 2019-07-16

## 2019-07-16 VITALS
HEART RATE: 58 BPM | SYSTOLIC BLOOD PRESSURE: 150 MMHG | RESPIRATION RATE: 18 BRPM | DIASTOLIC BLOOD PRESSURE: 84 MMHG | WEIGHT: 180.2 LBS | TEMPERATURE: 98 F | OXYGEN SATURATION: 98 % | BODY MASS INDEX: 24.41 KG/M2 | HEIGHT: 72 IN

## 2019-07-16 DIAGNOSIS — I10 ESSENTIAL HYPERTENSION: ICD-10-CM

## 2019-07-16 DIAGNOSIS — F43.21 REACTION, ADJUSTMENT, WITH DEPRESSED MOOD, PROLONGED: ICD-10-CM

## 2019-07-16 DIAGNOSIS — Z00.00 HEALTHCARE MAINTENANCE: Primary | ICD-10-CM

## 2019-07-16 DIAGNOSIS — F52.21 ERECTILE DYSFUNCTION OF NONORGANIC ORIGIN: ICD-10-CM

## 2019-07-16 PROCEDURE — 90632 HEPA VACCINE ADULT IM: CPT | Performed by: INTERNAL MEDICINE

## 2019-07-16 PROCEDURE — 99214 OFFICE O/P EST MOD 30 MIN: CPT | Performed by: INTERNAL MEDICINE

## 2019-07-16 PROCEDURE — 90471 IMMUNIZATION ADMIN: CPT | Performed by: INTERNAL MEDICINE

## 2019-07-16 RX ORDER — DILTIAZEM HYDROCHLORIDE 300 MG/1
300 CAPSULE, EXTENDED RELEASE ORAL DAILY
Qty: 90 CAPSULE | Refills: 3 | Status: SHIPPED | OUTPATIENT
Start: 2019-07-16 | End: 2020-02-07 | Stop reason: SDUPTHER

## 2019-07-16 RX ORDER — ESCITALOPRAM OXALATE 10 MG/1
10 TABLET ORAL DAILY
Qty: 90 TABLET | Refills: 3 | Status: SHIPPED | OUTPATIENT
Start: 2019-07-16 | End: 2020-02-07

## 2019-07-16 RX ORDER — CALCIUM POLYCARBOPHIL 625 MG 625 MG/1
625 TABLET ORAL DAILY
COMMUNITY
End: 2020-02-07

## 2019-07-16 RX ORDER — SILDENAFIL 50 MG/1
50 TABLET, FILM COATED ORAL DAILY PRN
Qty: 10 TABLET | Refills: 1 | Status: SHIPPED | OUTPATIENT
Start: 2019-07-16 | End: 2020-02-07

## 2019-07-16 NOTE — PROGRESS NOTES
Chief Complaint   Patient presents with   • Hypertension     follow up       Subjective   Sandro Junior is a 64 y.o. male.       History of Present Illness     Pt is doing well. He ran out of medication due to a switch of pharmacy, has been out for a few days. Does not monitor BP at home but has cuff to do so.     Needs all of his maintenance meds sent to new pharmacy.     Feeling well. Exercising regularly and eating well. No concerns today.        Current Outpatient Medications:   •  calcium polycarbophil (FIBERCON) 625 MG tablet, Take 625 mg by mouth Daily., Disp: , Rfl:   •  diltiaZEM (TIAZAC) 300 MG 24 hr capsule, Take 1 capsule by mouth Daily., Disp: 90 capsule, Rfl: 3  •  escitalopram (LEXAPRO) 10 MG tablet, Take 1 tablet by mouth Daily., Disp: 90 tablet, Rfl: 3  •  ketoconazole (NIZORAL) 2 % cream, Apply  topically to the appropriate area as directed Daily., Disp: , Rfl:   •  Multiple Vitamin (MULTI-VITAMIN DAILY PO), Take  by mouth., Disp: , Rfl:   •  latanoprost (XALATAN) 0.005 % ophthalmic solution, , Disp: , Rfl: 0  •  sildenafil (VIAGRA) 50 MG tablet, Take 1 tablet by mouth Daily As Needed for erectile dysfunction., Disp: 10 tablet, Rfl: 1  •  triamcinolone (KENALOG) 0.025 % cream, Apply  topically to the appropriate area as directed 2 (Two) Times a Day., Disp: , Rfl:      PMFSH  The following portions of the patient's history were reviewed and updated as appropriate: allergies, current medications, past family history, past medical history, past social history, past surgical history and problem list.    Review of Systems   Constitutional: Negative for activity change, appetite change and fatigue.   HENT: Negative for congestion and rhinorrhea.    Respiratory: Negative for chest tightness and shortness of breath.    Cardiovascular: Negative for chest pain and palpitations.   Gastrointestinal: Negative for abdominal pain.   Genitourinary: Negative for dysuria.   Musculoskeletal: Negative for  "arthralgias and myalgias.   Neurological: Negative for dizziness, weakness, light-headedness and headaches.   Psychiatric/Behavioral: Negative for dysphoric mood. The patient is not nervous/anxious.        Objective   /84   Pulse 58   Temp 98 °F (36.7 °C) (Oral)   Resp 18   Ht 182.9 cm (72\")   Wt 81.7 kg (180 lb 3.2 oz)   SpO2 98%   BMI 24.44 kg/m²     Physical Exam   Constitutional: He appears well-developed and well-nourished.   HENT:   Head: Normocephalic.   Right Ear: Hearing, tympanic membrane, external ear and ear canal normal.   Left Ear: Hearing, tympanic membrane, external ear and ear canal normal.   Nose: Nose normal.   Mouth/Throat: Oropharynx is clear and moist.   Eyes: Conjunctivae are normal. Pupils are equal, round, and reactive to light.   Neck: Normal range of motion.   Cardiovascular: Normal rate, regular rhythm and normal heart sounds.   Pulmonary/Chest: Effort normal and breath sounds normal. He has no decreased breath sounds. He has no wheezes. He has no rhonchi. He has no rales.   Musculoskeletal: Normal range of motion.   Neurological: He is alert.   Skin: Skin is warm and dry.   Psychiatric: He has a normal mood and affect. His behavior is normal.   Nursing note and vitals reviewed.           ASSESSMENT/PLAN    Problem List Items Addressed This Visit        Cardiovascular and Mediastinum    Hypertension     Hypertension is worsening.  Continue current treatment regimen.  Dietary sodium restriction.  Regular aerobic exercise.  Continue current medications.  Ambulatory blood pressure monitoring. Pt will restart regular medication and monitor BP at home. If remaining elevated, call the office and will add medication.  Blood pressure will be reassessed at the next regular appointment.         Relevant Medications    diltiaZEM (TIAZAC) 300 MG 24 hr capsule       Other    Erectile dysfunction of nonorganic origin    Relevant Medications    sildenafil (VIAGRA) 50 MG tablet    " escitalopram (LEXAPRO) 10 MG tablet      Other Visit Diagnoses     Healthcare maintenance    -  Primary    Relevant Orders    Hepatitis A Vaccine Adult IM (Completed)    Reaction, adjustment, with depressed mood, prolonged        Relevant Medications    escitalopram (LEXAPRO) 10 MG tablet               Return in about 6 months (around 1/16/2020) for Medicare Wellness.

## 2019-07-16 NOTE — PROGRESS NOTES
"The ABCs of the Annual Wellness Visit  [place] to Medicare Visit    Chief Complaint   Patient presents with   • Welcome To Medicare       Subjective   History of Present Illness:  Sandro Junior is a 64 y.o. male who presents for a  Welcome to Medicare Visit.    HEALTH RISK ASSESSMENT    Recent Hospitalizations:  {Hospitalization history:8077779706::\"No hospitalization(s) within the last year.\"}    Current Medical Providers:  Patient Care Team:  Dora Loya PA as PCP - General (Internal Medicine)    Smoking Status:  Social History     Tobacco Use   Smoking Status Former Smoker   Smokeless Tobacco Never Used       Alcohol Consumption:  Social History     Substance and Sexual Activity   Alcohol Use Yes   • Alcohol/week: 2.4 - 3.0 oz   • Types: 4 - 5 Glasses of wine per week       Depression Screen:   PHQ-2/PHQ-9 Depression Screening 1/15/2019   Little interest or pleasure in doing things 0   Feeling down, depressed, or hopeless 0   Total Score 0       Fall Risk Screen:  HÉCTOR Fall Risk Assessment was completed, and patient is at LOW risk for falls.Assessment completed on:7/16/2019    Health Habits and Functional and Cognitive Screening:  Functional & Cognitive Status 7/16/2019   Do you have difficulty preparing food and eating? No   Do you have difficulty bathing yourself, getting dressed or grooming yourself? No   Do you have difficulty using the toilet? No   Do you have difficulty moving around from place to place? No   Do you have trouble with steps or getting out of a bed or a chair? No   Current Diet -   Dental Exam -   Eye Exam -   Exercise (times per week) -   Current Exercise Activities Include -   Do you need help using the phone?  No   Are you deaf or do you have serious difficulty hearing?  No   Do you need help with transportation? No   Do you need help shopping? No   Do you need help preparing meals?  No   Do you need help with housework?  No   Do you need help with laundry? No   Do you need help " "taking your medications? No   Do you need help managing money? No   Do you ever drive or ride in a car without wearing a seat belt? No   Have you felt unusual stress, anger or loneliness in the last month? No   Who do you live with? Spouse   If you need help, do you have trouble finding someone available to you? No   Have you been bothered in the last four weeks by sexual problems? No   Do you have difficulty concentrating, remembering or making decisions? No         Does the patient have evidence of cognitive impairment? {Yes/No w/ pre-defaulted No:02964::\"No\"}    Asprin use counseling:{Aspirin :09009}    Visual Acuity:    No exam data present    Age-appropriate Screening Schedule:  Refer to the list below for future screening recommendations based on patient's age, sex and/or medical conditions. Orders for these recommended tests are listed in the plan section. The patient has been provided with a written plan.    Health Maintenance   Topic Date Due   • ZOSTER VACCINE (1 of 2) 07/24/2020 (Originally 2/11/2007)   • INFLUENZA VACCINE  08/01/2019   • TDAP/TD VACCINES (2 - Td) 04/26/2028   • COLONOSCOPY  02/05/2029          The following portions of the patient's history were reviewed and updated as appropriate: {history reviewed:20406::\"allergies\",\"current medications\",\"past family history\",\"past medical history\",\"past social history\",\"past surgical history\",\"problem list\"}.    Outpatient Medications Prior to Visit   Medication Sig Dispense Refill   • calcium polycarbophil (FIBERCON) 625 MG tablet Take 625 mg by mouth Daily.     • diltiaZEM (TIAZAC) 300 MG 24 hr capsule Take 1 capsule by mouth Daily. 30 capsule 5   • escitalopram (LEXAPRO) 10 MG tablet Take 1 tablet by mouth Daily. 30 tablet 5   • ketoconazole (NIZORAL) 2 % cream Apply  topically to the appropriate area as directed Daily.     • Multiple Vitamin (MULTI-VITAMIN DAILY PO) Take  by mouth.     • cyclobenzaprine (FLEXERIL) 10 MG tablet Take 1 tablet " "by mouth 3 (Three) Times a Day As Needed for Muscle Spasms. 30 tablet 3   • gabapentin (NEURONTIN) 100 MG capsule take 1 capsule by mouth twice a day  0   • latanoprost (XALATAN) 0.005 % ophthalmic solution   0   • sildenafil (VIAGRA) 50 MG tablet Take 1 tablet by mouth Daily As Needed for erectile dysfunction. 10 tablet 1   • triamcinolone (KENALOG) 0.025 % cream Apply  topically to the appropriate area as directed 2 (Two) Times a Day.     • sodium-potassium-magnesium sulfates (SUPREP BOWEL PREP KIT) 17.5-3.13-1.6 GM/177ML solution oral solution Take 2 bottles by mouth Take As Directed. Do not eat the day before your procedure. If you didn't receive instructions call [phone]. 2 bottle 0     No facility-administered medications prior to visit.        Patient Active Problem List   Diagnosis   • Hypertension   • Chronic pain   • Gastroesophageal reflux disease   • Osteoarthritis of knee   • Prolonged depressive adjustment reaction   • Acute prostatitis   • Atopic rhinitis   • Erectile dysfunction of nonorganic origin   • Glaucoma suspect   • Onychomycosis of toenail   • Malignant neoplasm of rectum (CMS/HCC)   • Seborrheic eczema   • Keloid scar   • Pain of right lower extremity       Advanced Care Planning:  {Advanced Directive Status:16884}    Review of Systems    Compared to one year ago, the patient feels his physical health is {better worse same:45946}.  Compared to one year ago, the patient feels his mental health is {better worse same:17804}.    Reviewed chart for potential of high risk medication in the elderly: {Response;Yes/No/NA:2248887180::\"yes\"}  Reviewed chart for potential of harmful drug interactions in the elderly:{Response;Yes/No/NA:4302900582::\"yes\"}    Objective         Vitals:    07/16/19 1011   BP: 150/84   Pulse: 58   Resp: 18   Temp: 98 °F (36.7 °C)   TempSrc: Oral   SpO2: 98%   Weight: 81.7 kg (180 lb 3.2 oz)   Height: 182.9 cm (72\")   PainSc: 0-No pain       Body mass index is 24.44 " kg/m².  Discussed the patient's BMI with him. The BMI {[facility]66222}.    Physical Exam          Procedures    Assessment/Plan   Medicare Risks and Personalized Health Plan  CMS Preventative Services Quick Reference  {Medicare Wellness Risk Factors and Personalized Health Plan:69111}    The above risks/problems have been discussed with the patient.  Pertinent information has been shared with the patient in the After Visit Summary.  Follow up plans and orders are seen below in the Assessment/Plan Section.    There are no diagnoses linked to this encounter.    Follow Up:  No Follow-up on file.     An After Visit Summary and PPPS were given to the patient.

## 2019-07-17 NOTE — ASSESSMENT & PLAN NOTE
Hypertension is worsening.  Continue current treatment regimen.  Dietary sodium restriction.  Regular aerobic exercise.  Continue current medications.  Ambulatory blood pressure monitoring. Pt will restart regular medication and monitor BP at home. If remaining elevated, call the office and will add medication.  Blood pressure will be reassessed at the next regular appointment.

## 2019-12-27 ENCOUNTER — CLINICAL SUPPORT (OUTPATIENT)
Dept: INTERNAL MEDICINE | Facility: CLINIC | Age: 64
End: 2019-12-27

## 2019-12-27 DIAGNOSIS — Z23 NEED FOR VACCINATION: Primary | ICD-10-CM

## 2019-12-27 PROCEDURE — G0008 ADMIN INFLUENZA VIRUS VAC: HCPCS | Performed by: PHYSICIAN ASSISTANT

## 2019-12-27 PROCEDURE — 90674 CCIIV4 VAC NO PRSV 0.5 ML IM: CPT | Performed by: PHYSICIAN ASSISTANT

## 2019-12-27 RX ORDER — KETOCONAZOLE 20 MG/G
CREAM TOPICAL DAILY
OUTPATIENT
Start: 2019-12-27

## 2019-12-29 RX ORDER — KETOCONAZOLE 20 MG/G
CREAM TOPICAL DAILY
Qty: 30 G | Refills: 0 | Status: SHIPPED | OUTPATIENT
Start: 2019-12-29 | End: 2020-02-07 | Stop reason: SDUPTHER

## 2020-01-01 NOTE — PROGRESS NOTES
Appointment made for Thursday Chief Complaint   Patient presents with   • Follow-up     hypertension, med refills, discuss hep c screening       Subjective   Sandro Junior is a 63 y.o. male.       History of Present Illness     Pt's diltiazem increase has helped his blood pressure, now better controlled. Needs refill of medications.    He would like to get hepatitis C screening, has been ordered but he has not done it yet.    Has a bothersome lesion on his face, pending dermatology appt.    Notes that he had a colonoscopy since his last appt, had some benign polyps.        Current Outpatient Prescriptions:   •  cyclobenzaprine (FLEXERIL) 10 MG tablet, Take 1 tablet by mouth 3 (Three) Times a Day As Needed for Muscle Spasms., Disp: 30 tablet, Rfl: 3  •  diltiaZEM (TIAZAC) 300 MG 24 hr capsule, Take 1 capsule by mouth Daily., Disp: 30 capsule, Rfl: 5  •  escitalopram (LEXAPRO) 10 MG tablet, Take 1 tablet by mouth Daily., Disp: 30 tablet, Rfl: 5  •  gabapentin (NEURONTIN) 100 MG capsule, take 1 capsule by mouth twice a day, Disp: , Rfl: 0  •  latanoprost (XALATAN) 0.005 % ophthalmic solution, , Disp: , Rfl: 0     PMFSH  The following portions of the patient's history were reviewed and updated as appropriate: allergies, current medications, past family history, past medical history, past social history, past surgical history and problem list.    Review of Systems   Constitutional: Negative for activity change, appetite change and fatigue.   HENT: Negative for congestion and rhinorrhea.    Respiratory: Negative for chest tightness and shortness of breath.    Cardiovascular: Negative for chest pain and palpitations.   Gastrointestinal: Negative for abdominal pain.   Genitourinary: Negative for dysuria.   Musculoskeletal: Negative for arthralgias and myalgias.   Neurological: Negative for dizziness, weakness, light-headedness and headaches.   Psychiatric/Behavioral: Negative for dysphoric mood. The patient is not nervous/anxious.        Objective    /70   Pulse 86   Wt 84.4 kg (186 lb)   SpO2 98%   BMI 25.23 kg/m²     Physical Exam   Constitutional: He appears well-developed and well-nourished.   HENT:   Head: Normocephalic.   Right Ear: Hearing, tympanic membrane, external ear and ear canal normal.   Left Ear: Hearing, tympanic membrane, external ear and ear canal normal.   Nose: Nose normal.   Mouth/Throat: Oropharynx is clear and moist.   Eyes: Conjunctivae are normal. Pupils are equal, round, and reactive to light.   Neck: Normal range of motion.   Cardiovascular: Normal rate, regular rhythm and normal heart sounds.    Pulmonary/Chest: Effort normal and breath sounds normal. He has no decreased breath sounds. He has no wheezes. He has no rhonchi. He has no rales.   Musculoskeletal: Normal range of motion.   Neurological: He is alert.   Skin: Skin is warm and dry.   Psychiatric: He has a normal mood and affect. His behavior is normal.   Nursing note and vitals reviewed.           ASSESSMENT/PLAN    Problem List Items Addressed This Visit        Cardiovascular and Mediastinum    Hypertension     Hypertension is improving with treatment.  Continue current treatment regimen.  Dietary sodium restriction.  Regular aerobic exercise.  Continue current medications.  Ambulatory blood pressure monitoring.  Blood pressure will be reassessed at the next regular appointment.         Relevant Medications    diltiaZEM (TIAZAC) 300 MG 24 hr capsule    Other Relevant Orders    Comprehensive Metabolic Panel (Completed)       Other    Reaction, adjustment, with depressed mood, prolonged     Psychological condition is unchanged.  Continue current treatment regimen.  Psychological condition  will be reassessed at the next regular appointment.         Relevant Medications    escitalopram (LEXAPRO) 10 MG tablet      Other Visit Diagnoses     Need for hepatitis C screening test    -  Primary    Relevant Orders    Hepatitis C Antibody (Completed)               Return in  about 6 months (around 10/26/2018) for Annual physical in 6 months.

## 2020-02-07 ENCOUNTER — OFFICE VISIT (OUTPATIENT)
Dept: INTERNAL MEDICINE | Facility: CLINIC | Age: 65
End: 2020-02-07

## 2020-02-07 VITALS
HEIGHT: 73 IN | BODY MASS INDEX: 24.78 KG/M2 | SYSTOLIC BLOOD PRESSURE: 158 MMHG | HEART RATE: 74 BPM | DIASTOLIC BLOOD PRESSURE: 98 MMHG | OXYGEN SATURATION: 98 % | WEIGHT: 187 LBS

## 2020-02-07 DIAGNOSIS — L21.9 SEBORRHEIC ECZEMA: ICD-10-CM

## 2020-02-07 DIAGNOSIS — Z00.00 ENCOUNTER FOR MEDICARE ANNUAL WELLNESS EXAM: Primary | ICD-10-CM

## 2020-02-07 DIAGNOSIS — Z12.5 PROSTATE CANCER SCREENING: ICD-10-CM

## 2020-02-07 DIAGNOSIS — R13.19 ESOPHAGEAL DYSPHAGIA: ICD-10-CM

## 2020-02-07 DIAGNOSIS — I10 ESSENTIAL HYPERTENSION: ICD-10-CM

## 2020-02-07 PROCEDURE — G0439 PPPS, SUBSEQ VISIT: HCPCS | Performed by: PHYSICIAN ASSISTANT

## 2020-02-07 PROCEDURE — 99213 OFFICE O/P EST LOW 20 MIN: CPT | Performed by: PHYSICIAN ASSISTANT

## 2020-02-07 RX ORDER — DILTIAZEM HYDROCHLORIDE 300 MG/1
300 CAPSULE, EXTENDED RELEASE ORAL DAILY
Qty: 90 CAPSULE | Refills: 3 | Status: SHIPPED | OUTPATIENT
Start: 2020-02-07 | End: 2020-10-20 | Stop reason: SDUPTHER

## 2020-02-07 RX ORDER — KETOCONAZOLE 20 MG/G
CREAM TOPICAL DAILY
Qty: 30 G | Refills: 4 | Status: SHIPPED | OUTPATIENT
Start: 2020-02-07 | End: 2021-02-25 | Stop reason: SDUPTHER

## 2020-02-07 NOTE — ASSESSMENT & PLAN NOTE
Hypertension is unchanged.  Dietary sodium restriction.  Medication changes per orders.  Ambulatory blood pressure monitoring. Restart daily diltiazem. Bring blood pressure cuff to next appointment.  Blood pressure will be reassessed in 4 weeks.

## 2020-02-07 NOTE — PROGRESS NOTES
"The ABCs of the Annual Wellness Visit  Subsequent Medicare Wellness Visit    Chief Complaint   Patient presents with   • Medicare Wellness-subsequent       Subjective   History of Present Illness:  Sandro Junior is a 64 y.o. male who presents for a Subsequent Medicare Wellness Visit.    HEALTH RISK ASSESSMENT    Recent Hospitalizations:  {Hospitalization history:9089153413::\"No hospitalization(s) within the last year.\"}    Current Medical Providers:  Patient Care Team:  Dora Loya PA as PCP - General (Internal Medicine)    Smoking Status:  Social History     Tobacco Use   Smoking Status Former Smoker   Smokeless Tobacco Never Used       Alcohol Consumption:  Social History     Substance and Sexual Activity   Alcohol Use Yes   • Alcohol/week: 4.0 - 5.0 standard drinks   • Types: 4 - 5 Glasses of wine per week       Depression Screen:   PHQ-2/PHQ-9 Depression Screening 2/7/2020   Little interest or pleasure in doing things 0   Feeling down, depressed, or hopeless 0   Total Score 0       Fall Risk Screen:  HÉCTOR Fall Risk Assessment was completed, and patient is at LOW risk for falls.Assessment completed on:2/7/2020    Health Habits and Functional and Cognitive Screening:  Functional & Cognitive Status 2/7/2020   Do you have difficulty preparing food and eating? No   Do you have difficulty bathing yourself, getting dressed or grooming yourself? No   Do you have difficulty using the toilet? No   Do you have difficulty moving around from place to place? No   Do you have trouble with steps or getting out of a bed or a chair? Yes   Current Diet Well Balanced Diet   Dental Exam Up to date   Eye Exam Up to date   Exercise (times per week) 3 times per week   Current Exercise Activities Include Walking   Do you need help using the phone?  No   Are you deaf or do you have serious difficulty hearing?  No   Do you need help with transportation? No   Do you need help shopping? No   Do you need help preparing meals?  No " "  Do you need help with housework?  No   Do you need help with laundry? No   Do you need help taking your medications? No   Do you need help managing money? No   Do you ever drive or ride in a car without wearing a seat belt? No   Have you felt unusual stress, anger or loneliness in the last month? No   Who do you live with? Spouse   If you need help, do you have trouble finding someone available to you? No   Have you been bothered in the last four weeks by sexual problems? Yes   Do you have difficulty concentrating, remembering or making decisions? No         Does the patient have evidence of cognitive impairment? {Yes/No w/ pre-defaulted No:72511::\"No\"}    Asprin use counseling:{Aspirin :04356}    Age-appropriate Screening Schedule:  Refer to the list below for future screening recommendations based on patient's age, sex and/or medical conditions. Orders for these recommended tests are listed in the plan section. The patient has been provided with a written plan.    Health Maintenance   Topic Date Due   • ZOSTER VACCINE (1 of 2) 07/24/2020 (Originally 2/11/2007)   • TDAP/TD VACCINES (2 - Td) 04/26/2028   • COLONOSCOPY  02/05/2029   • INFLUENZA VACCINE  Completed          The following portions of the patient's history were reviewed and updated as appropriate: {history reviewed:20406::\"allergies\",\"current medications\",\"past family history\",\"past medical history\",\"past social history\",\"past surgical history\",\"problem list\"}.    Outpatient Medications Prior to Visit   Medication Sig Dispense Refill   • diltiaZEM (TIAZAC) 300 MG 24 hr capsule Take 1 capsule by mouth Daily. 90 capsule 3   • ketoconazole (NIZORAL) 2 % cream Apply  topically to the appropriate area as directed Daily. 30 g 0   • latanoprost (XALATAN) 0.005 % ophthalmic solution   0   • triamcinolone (KENALOG) 0.025 % cream Apply  topically to the appropriate area as directed 2 (Two) Times a Day.     • calcium polycarbophil (FIBERCON) 625 MG tablet " "Take 625 mg by mouth Daily.     • escitalopram (LEXAPRO) 10 MG tablet Take 1 tablet by mouth Daily. 90 tablet 3   • Multiple Vitamin (MULTI-VITAMIN DAILY PO) Take  by mouth.     • sildenafil (VIAGRA) 50 MG tablet Take 1 tablet by mouth Daily As Needed for erectile dysfunction. 10 tablet 1     No facility-administered medications prior to visit.        Patient Active Problem List   Diagnosis   • Hypertension   • Chronic pain   • Gastroesophageal reflux disease   • Osteoarthritis of knee   • Prolonged depressive adjustment reaction   • Acute prostatitis   • Atopic rhinitis   • Erectile dysfunction of nonorganic origin   • Glaucoma suspect   • Onychomycosis of toenail   • Malignant neoplasm of rectum (CMS/HCC)   • Seborrheic eczema   • Keloid scar   • Pain of right lower extremity       Advanced Care Planning:  {Advanced Directive Status:67820::\"ACP discussion was held with the patient during this visit.\"}    Review of Systems    Compared to one year ago, the patient feels his physical health is {better worse same:49716}.  Compared to one year ago, the patient feels his mental health is {better worse same:37621}.    Reviewed chart for potential of high risk medication in the elderly: {Response;Yes/No/NA:9980062438::\"yes\"}  Reviewed chart for potential of harmful drug interactions in the elderly:{Response;Yes/No/NA:2809159682::\"yes\"}    Objective         Vitals:    02/07/20 0819   BP: 158/98   Pulse: 74   SpO2: 98%   Weight: 84.8 kg (187 lb)   Height: 185.4 cm (73\")   PainSc: 0-No pain       Body mass index is 24.67 kg/m².  Discussed the patient's BMI with him. The BMI {BMI plan ([facility] measure 421):81956}.    Physical Exam          Assessment/Plan   Medicare Risks and Personalized Health Plan  CMS Preventative Services Quick Reference  {Medicare Wellness Risk Factors and Personalized Health Plan:49935}    The above risks/problems have been discussed with the patient.  Pertinent information has been shared with the " patient in the After Visit Summary.  Follow up plans and orders are seen below in the Assessment/Plan Section.    Diagnoses and all orders for this visit:    1. Essential hypertension  -     dilTIAZem (TIAZAC) 300 MG 24 hr capsule; Take 1 capsule by mouth Daily.  Dispense: 90 capsule; Refill: 3    Other orders  -     ketoconazole (NIZORAL) 2 % cream; Apply  topically to the appropriate area as directed Daily.  Dispense: 30 g; Refill: 4      Follow Up:  No follow-ups on file.     An After Visit Summary and PPPS were given to the patient.

## 2020-02-07 NOTE — PROGRESS NOTES
The ABCs of the Annual Wellness Visit  Subsequent Medicare Wellness Visit    Chief Complaint   Patient presents with   • Medicare Wellness-subsequent       Subjective   History of Present Illness:  Sandro Junior is a 65 y.o. male who presents for a Subsequent Medicare Wellness Visit.    Pt checks his blood pressure at home and it usually runs 110/80. He was working to clean off his car with the weather this morning. Also has not been taking his diltiazem    Notes that he had bad headaches with lisinopril in the past.     Pt notes that he is having some issues with dysphagia, sometimes has to put his fingers down his throat to bring the food back up. Seems to happen with meat or bread.    Needs refill of ketoconazole he uses for facial rash. Still works well.        HEALTH RISK ASSESSMENT    Recent Hospitalizations:  No hospitalization(s) within the last year.    Current Medical Providers:  Patient Care Team:  Dora Loya PA as PCP - General (Internal Medicine)    Smoking Status:  Social History     Tobacco Use   Smoking Status Former Smoker   Smokeless Tobacco Never Used       Alcohol Consumption:  Social History     Substance and Sexual Activity   Alcohol Use Yes   • Alcohol/week: 4.0 - 5.0 standard drinks   • Types: 4 - 5 Glasses of wine per week       Depression Screen:   PHQ-2/PHQ-9 Depression Screening 2/7/2020   Little interest or pleasure in doing things 0   Feeling down, depressed, or hopeless 0   Total Score 0       Fall Risk Screen:  HÉCTOR Fall Risk Assessment was completed, and patient is at LOW risk for falls.Assessment completed on:2/7/2020    Health Habits and Functional and Cognitive Screening:  Functional & Cognitive Status 2/7/2020   Do you have difficulty preparing food and eating? No   Do you have difficulty bathing yourself, getting dressed or grooming yourself? No   Do you have difficulty using the toilet? No   Do you have difficulty moving around from place to place? No   Do you have  trouble with steps or getting out of a bed or a chair? Yes   Current Diet Well Balanced Diet   Dental Exam Up to date   Eye Exam Up to date   Exercise (times per week) 3 times per week   Current Exercise Activities Include Walking   Do you need help using the phone?  No   Are you deaf or do you have serious difficulty hearing?  No   Do you need help with transportation? No   Do you need help shopping? No   Do you need help preparing meals?  No   Do you need help with housework?  No   Do you need help with laundry? No   Do you need help taking your medications? No   Do you need help managing money? No   Do you ever drive or ride in a car without wearing a seat belt? No   Have you felt unusual stress, anger or loneliness in the last month? No   Who do you live with? Spouse   If you need help, do you have trouble finding someone available to you? No   Have you been bothered in the last four weeks by sexual problems? Yes   Do you have difficulty concentrating, remembering or making decisions? No         Does the patient have evidence of cognitive impairment? No    Asprin use counseling:Does not need ASA (and currently is not on it)    Age-appropriate Screening Schedule:  Refer to the list below for future screening recommendations based on patient's age, sex and/or medical conditions. Orders for these recommended tests are listed in the plan section. The patient has been provided with a written plan.    Health Maintenance   Topic Date Due   • ZOSTER VACCINE (1 of 2) 07/24/2020 (Originally 2/11/2007)   • COLONOSCOPY  02/05/2024   • TDAP/TD VACCINES (2 - Td) 04/26/2028   • INFLUENZA VACCINE  Completed          The following portions of the patient's history were reviewed and updated as appropriate: allergies, current medications, past family history, past medical history, past social history, past surgical history and problem list.    Outpatient Medications Prior to Visit   Medication Sig Dispense Refill   • latanoprost  (XALATAN) 0.005 % ophthalmic solution   0   • triamcinolone (KENALOG) 0.025 % cream Apply  topically to the appropriate area as directed 2 (Two) Times a Day.     • diltiaZEM (TIAZAC) 300 MG 24 hr capsule Take 1 capsule by mouth Daily. 90 capsule 3   • ketoconazole (NIZORAL) 2 % cream Apply  topically to the appropriate area as directed Daily. 30 g 0   • calcium polycarbophil (FIBERCON) 625 MG tablet Take 625 mg by mouth Daily.     • escitalopram (LEXAPRO) 10 MG tablet Take 1 tablet by mouth Daily. 90 tablet 3   • Multiple Vitamin (MULTI-VITAMIN DAILY PO) Take  by mouth.     • sildenafil (VIAGRA) 50 MG tablet Take 1 tablet by mouth Daily As Needed for erectile dysfunction. 10 tablet 1     No facility-administered medications prior to visit.        Patient Active Problem List   Diagnosis   • Hypertension   • Chronic pain   • Gastroesophageal reflux disease   • Osteoarthritis of knee   • Prolonged depressive adjustment reaction   • Acute prostatitis   • Atopic rhinitis   • Erectile dysfunction of nonorganic origin   • Glaucoma suspect   • Onychomycosis of toenail   • Malignant neoplasm of rectum (CMS/HCC)   • Seborrheic eczema   • Keloid scar   • Pain of right lower extremity       Advanced Care Planning:  ACP discussion was held with the patient during this visit. Patient has an advance directive, copy requested.    Review of Systems   Constitutional: Negative for activity change, appetite change and fatigue.   HENT: Negative for congestion and rhinorrhea.    Respiratory: Negative for chest tightness and shortness of breath.    Cardiovascular: Negative for chest pain and palpitations.   Gastrointestinal: Positive for vomiting. Negative for abdominal pain, constipation, diarrhea and nausea.   Genitourinary: Negative for dysuria.   Musculoskeletal: Negative for arthralgias and myalgias.   Neurological: Negative for dizziness, weakness, light-headedness and headaches.   Psychiatric/Behavioral: Negative for dysphoric  "mood. The patient is not nervous/anxious.        Compared to one year ago, the patient feels his physical health is better.  Compared to one year ago, the patient feels his mental health is the same.    Reviewed chart for potential of high risk medication in the elderly: yes  Reviewed chart for potential of harmful drug interactions in the elderly:yes    Objective         Vitals:    02/07/20 0819   BP: 158/98   Pulse: 74   SpO2: 98%   Weight: 84.8 kg (187 lb)   Height: 185.4 cm (73\")   PainSc: 0-No pain       Body mass index is 24.67 kg/m².  Discussed the patient's BMI with him. The BMI is in the acceptable range.    Physical Exam   Constitutional: He appears well-developed and well-nourished.   HENT:   Head: Normocephalic.   Right Ear: Hearing, tympanic membrane, external ear and ear canal normal.   Left Ear: Hearing, tympanic membrane, external ear and ear canal normal.   Nose: Nose normal.   Mouth/Throat: Oropharynx is clear and moist.   Eyes: Pupils are equal, round, and reactive to light. Conjunctivae are normal.   Neck: Normal range of motion.   Cardiovascular: Normal rate, regular rhythm and normal heart sounds.   Pulmonary/Chest: Effort normal and breath sounds normal. He has no decreased breath sounds. He has no wheezes. He has no rhonchi. He has no rales.   Genitourinary: Rectum normal and prostate normal. Rectal exam shows no external hemorrhoid, no internal hemorrhoid, no mass, no tenderness and guaiac negative stool.   Musculoskeletal: Normal range of motion.   Neurological: He is alert.   Skin: Skin is warm and dry.   Psychiatric: He has a normal mood and affect. His behavior is normal.   Nursing note and vitals reviewed.            Assessment/Plan   Medicare Risks and Personalized Health Plan  CMS Preventative Services Quick Reference  Advance Directive Discussion  Colon Cancer Screening  Prostate Cancer Screening     The above risks/problems have been discussed with the patient.  Pertinent " information has been shared with the patient in the After Visit Summary.  Follow up plans and orders are seen below in the Assessment/Plan Section.    Diagnoses and all orders for this visit:    1. Encounter for Medicare annual wellness exam (Primary)    2. Essential hypertension  Assessment & Plan:  Hypertension is unchanged.  Dietary sodium restriction.  Medication changes per orders.  Ambulatory blood pressure monitoring. Restart daily diltiazem. Bring blood pressure cuff to next appointment.  Blood pressure will be reassessed in 4 weeks.    Orders:  -     dilTIAZem (TIAZAC) 300 MG 24 hr capsule; Take 1 capsule by mouth Daily.  Dispense: 90 capsule; Refill: 3  -     Comprehensive Metabolic Panel; Future  -     Lipid Panel; Future    3. Esophageal dysphagia  Comments:  Refer for EGD.  Orders:  -     Ambulatory referral for Screening EGD    4. Prostate cancer screening  -     PSA Screen; Future    5. Seborrheic eczema  Assessment & Plan:  Refill ketoconazole.      Other orders  -     ketoconazole (NIZORAL) 2 % cream; Apply  topically to the appropriate area as directed Daily.  Dispense: 30 g; Refill: 4    Follow Up:  Return in about 4 weeks (around 3/6/2020) for Recheck.     An After Visit Summary and PPPS were given to the patient.

## 2020-02-07 NOTE — PROGRESS NOTES
"Chief Complaint   Patient presents with   • Medicare Wellness-subsequent       Subjective   Sandro Junior is a 64 y.o. male.       History of Present Illness     Current Outpatient Medications:   •  diltiaZEM (TIAZAC) 300 MG 24 hr capsule, Take 1 capsule by mouth Daily., Disp: 90 capsule, Rfl: 3  •  ketoconazole (NIZORAL) 2 % cream, Apply  topically to the appropriate area as directed Daily., Disp: 30 g, Rfl: 0  •  latanoprost (XALATAN) 0.005 % ophthalmic solution, , Disp: , Rfl: 0  •  triamcinolone (KENALOG) 0.025 % cream, Apply  topically to the appropriate area as directed 2 (Two) Times a Day., Disp: , Rfl:      PMFSH  {Common H&P Review Areas:50464}    Review of Systems    Objective   /98   Pulse 74   Ht 185.4 cm (73\")   Wt 84.8 kg (187 lb)   SpO2 98%   BMI 24.67 kg/m²     Physical Exam    Results for orders placed or performed in visit on 02/05/19   Tissue Pathology Exam   Result Value Ref Range    Case Report       Surgical Pathology Report                         Case: [id number]                                  Authorizing Provider:  Mario Allen MD   Collected:           02/05/2019 09:23 AM          Pathologist:           Virgilio Montoya MD         Received:            02/05/2019 09:23 AM          Specimens:   1) - Colon                                                                                          2) - Large Intestine, Rectum                                                               Clinical Information       The working history is colon cancer screening, personal history of colon polyps, personal history of colon cancer, benign neoplasm of sigmoid and transverse colon and rectal anastomosis.      Final Diagnosis        1. POLYP AT 70 CM:  Colonic mucosa, no adenomatous, hyperplastic or serrated epithelial change identified.   2. RECTOSIGMOID POLYP:  Polyps x2    Adenomatous polyp x1, no high grade dysplasia.    Hyperplastic polyp x1, no dysplasia " identified.    DGD/dlb            Gross Description       Specimen 1 received in formalin labeled as polyp at 70.0 cm is a 1.0 x 0.1 x 0.1 cm aggregate of tan tissue fragments submitted in block 1-A.      Specimen 2 received in formalin labeled as rectal/sigmoid polyp is a 1.3 x 0.3 x 0.2 cm aggregate of pink tissue fragment submitted in block 2-A.  LED/dlb        Microscopic Description       Sections of the tissue submitted as colonic polyp at 70 cm show no evidence of adenomatous, hyperplastic or serrated epithelial changes and no evidence of lymphoid lamina propria nodules that might elevate the mucosa.    Sections of specimen 2 from rectosigmoid, show fragments of colonic tissue in which there are two differing polyp types observed. One of the polyps is a tubular adenoma showing no high grade dysplasia and the second polyp is a hyperplastic polyp with the mucosal surface epithelium displaying a sawtooth configuration; the subjacent crypts lined by the hyperplastic epithelium appear stellate when cut in cross section. No dysplasia is seen.            ASSESSMENT/PLAN    Problem List Items Addressed This Visit        Cardiovascular and Mediastinum    Hypertension               No follow-ups on file.

## 2020-02-19 ENCOUNTER — LAB REQUISITION (OUTPATIENT)
Dept: LAB | Facility: HOSPITAL | Age: 65
End: 2020-02-19

## 2020-02-19 ENCOUNTER — OUTSIDE FACILITY SERVICE (OUTPATIENT)
Dept: GASTROENTEROLOGY | Facility: CLINIC | Age: 65
End: 2020-02-19

## 2020-02-19 DIAGNOSIS — R13.10 DYSPHAGIA, UNSPECIFIED: ICD-10-CM

## 2020-02-19 PROCEDURE — 43239 EGD BIOPSY SINGLE/MULTIPLE: CPT | Performed by: INTERNAL MEDICINE

## 2020-02-19 PROCEDURE — 88305 TISSUE EXAM BY PATHOLOGIST: CPT | Performed by: INTERNAL MEDICINE

## 2020-02-19 PROCEDURE — 43249 ESOPH EGD DILATION <30 MM: CPT | Performed by: INTERNAL MEDICINE

## 2020-03-16 ENCOUNTER — OFFICE VISIT (OUTPATIENT)
Dept: INTERNAL MEDICINE | Facility: CLINIC | Age: 65
End: 2020-03-16

## 2020-03-16 ENCOUNTER — LAB (OUTPATIENT)
Dept: LAB | Facility: HOSPITAL | Age: 65
End: 2020-03-16

## 2020-03-16 VITALS
BODY MASS INDEX: 24.91 KG/M2 | HEART RATE: 75 BPM | DIASTOLIC BLOOD PRESSURE: 94 MMHG | WEIGHT: 188.8 LBS | OXYGEN SATURATION: 98 % | SYSTOLIC BLOOD PRESSURE: 140 MMHG

## 2020-03-16 DIAGNOSIS — I10 ESSENTIAL HYPERTENSION: ICD-10-CM

## 2020-03-16 DIAGNOSIS — K21.00 GASTROESOPHAGEAL REFLUX DISEASE WITH ESOPHAGITIS: Primary | ICD-10-CM

## 2020-03-16 DIAGNOSIS — Z12.5 PROSTATE CANCER SCREENING: ICD-10-CM

## 2020-03-16 LAB
ALBUMIN SERPL-MCNC: 4.3 G/DL (ref 3.5–5.2)
ALBUMIN/GLOB SERPL: 1.5 G/DL
ALP SERPL-CCNC: 60 U/L (ref 39–117)
ALT SERPL W P-5'-P-CCNC: 31 U/L (ref 1–41)
ANION GAP SERPL CALCULATED.3IONS-SCNC: 11.1 MMOL/L (ref 5–15)
AST SERPL-CCNC: 33 U/L (ref 1–40)
BILIRUB SERPL-MCNC: 0.9 MG/DL (ref 0.2–1.2)
BUN BLD-MCNC: 9 MG/DL (ref 8–23)
BUN/CREAT SERPL: 9.6 (ref 7–25)
CALCIUM SPEC-SCNC: 9.5 MG/DL (ref 8.6–10.5)
CHLORIDE SERPL-SCNC: 102 MMOL/L (ref 98–107)
CHOLEST SERPL-MCNC: 176 MG/DL (ref 0–200)
CO2 SERPL-SCNC: 26.9 MMOL/L (ref 22–29)
CREAT BLD-MCNC: 0.94 MG/DL (ref 0.76–1.27)
GFR SERPL CREATININE-BSD FRML MDRD: 98 ML/MIN/1.73
GLOBULIN UR ELPH-MCNC: 2.8 GM/DL
GLUCOSE BLD-MCNC: 128 MG/DL (ref 65–99)
HDLC SERPL-MCNC: 68 MG/DL (ref 40–60)
LDLC SERPL CALC-MCNC: 83 MG/DL (ref 0–100)
LDLC/HDLC SERPL: 1.22 {RATIO}
POTASSIUM BLD-SCNC: 4.4 MMOL/L (ref 3.5–5.2)
PROT SERPL-MCNC: 7.1 G/DL (ref 6–8.5)
PSA SERPL-MCNC: 0.55 NG/ML (ref 0–4)
SODIUM BLD-SCNC: 140 MMOL/L (ref 136–145)
TRIGL SERPL-MCNC: 126 MG/DL (ref 0–150)
VLDLC SERPL-MCNC: 25.2 MG/DL (ref 5–40)

## 2020-03-16 PROCEDURE — G0103 PSA SCREENING: HCPCS | Performed by: PHYSICIAN ASSISTANT

## 2020-03-16 PROCEDURE — 99213 OFFICE O/P EST LOW 20 MIN: CPT | Performed by: PHYSICIAN ASSISTANT

## 2020-03-16 PROCEDURE — 80061 LIPID PANEL: CPT | Performed by: PHYSICIAN ASSISTANT

## 2020-03-16 PROCEDURE — 80053 COMPREHEN METABOLIC PANEL: CPT | Performed by: PHYSICIAN ASSISTANT

## 2020-03-16 RX ORDER — OMEPRAZOLE 40 MG/1
40 CAPSULE, DELAYED RELEASE ORAL DAILY
COMMUNITY
End: 2020-09-21 | Stop reason: SDUPTHER

## 2020-03-16 NOTE — PROGRESS NOTES
Chief Complaint   Patient presents with   • Hypertension     Follow Up       Subjective   Sandro Junior is a 65 y.o. male.       History of Present Illness     Pt has restarted his diltiazem and his blood pressure has been controlled at home when he checks it. Forgot to bring in his cuff to make sure it is reading correctly.     No more swallowing issues since his endoscope with dilation of esophageal stricture. He is taking the omeprazole daily and has tolerated it without problem.      Current Outpatient Medications:   •  dilTIAZem (TIAZAC) 300 MG 24 hr capsule, Take 1 capsule by mouth Daily., Disp: 90 capsule, Rfl: 3  •  ketoconazole (NIZORAL) 2 % cream, Apply  topically to the appropriate area as directed Daily., Disp: 30 g, Rfl: 4  •  latanoprost (XALATAN) 0.005 % ophthalmic solution, , Disp: , Rfl: 0  •  omeprazole (priLOSEC) 40 MG capsule, Take 40 mg by mouth Daily., Disp: , Rfl:   •  triamcinolone (KENALOG) 0.025 % cream, Apply  topically to the appropriate area as directed 2 (Two) Times a Day., Disp: , Rfl:      PMFSH  The following portions of the patient's history were reviewed and updated as appropriate: allergies, current medications, past family history, past medical history, past social history, past surgical history and problem list.    Review of Systems   Constitutional: Negative for activity change, appetite change and fatigue.   HENT: Negative for congestion and rhinorrhea.    Respiratory: Negative for chest tightness and shortness of breath.    Cardiovascular: Negative for chest pain and palpitations.   Gastrointestinal: Negative for abdominal pain.   Genitourinary: Negative for dysuria.   Musculoskeletal: Negative for arthralgias and myalgias.   Neurological: Negative for dizziness, weakness, light-headedness and headaches.   Psychiatric/Behavioral: Negative for dysphoric mood. The patient is not nervous/anxious.        Objective   /94   Pulse 75   Wt 85.6 kg (188 lb 12.8 oz)   SpO2  98%   BMI 24.91 kg/m²     Physical Exam   Constitutional: He appears well-developed and well-nourished.   HENT:   Head: Normocephalic.   Right Ear: Hearing, tympanic membrane, external ear and ear canal normal.   Left Ear: Hearing, tympanic membrane, external ear and ear canal normal.   Nose: Nose normal.   Mouth/Throat: Oropharynx is clear and moist.   Eyes: Pupils are equal, round, and reactive to light. Conjunctivae are normal.   Neck: Normal range of motion.   Cardiovascular: Normal rate, regular rhythm and normal heart sounds.   Pulmonary/Chest: Effort normal and breath sounds normal. He has no decreased breath sounds. He has no wheezes. He has no rhonchi. He has no rales.   Musculoskeletal: Normal range of motion.   Neurological: He is alert.   Skin: Skin is warm and dry.   Psychiatric: He has a normal mood and affect. His behavior is normal.   Nursing note and vitals reviewed.           ASSESSMENT/PLAN    Problem List Items Addressed This Visit        Cardiovascular and Mediastinum    Hypertension     Hypertension is improving with treatment.  Continue current treatment regimen.  Dietary sodium restriction.  Weight loss.  Regular aerobic exercise.  Continue current medications.  Blood pressure will be reassessed at the next regular appointment.            Digestive    Gastroesophageal reflux disease - Primary     Symptomatically improving with omeprazole.         Relevant Medications    omeprazole (priLOSEC) 40 MG capsule               Return in about 6 months (around 9/16/2020) for Follow up, Medicare Wellness in 1 year.

## 2020-09-21 ENCOUNTER — OFFICE VISIT (OUTPATIENT)
Dept: INTERNAL MEDICINE | Facility: CLINIC | Age: 65
End: 2020-09-21

## 2020-09-21 VITALS
TEMPERATURE: 97.2 F | HEART RATE: 81 BPM | WEIGHT: 185.6 LBS | OXYGEN SATURATION: 96 % | SYSTOLIC BLOOD PRESSURE: 150 MMHG | BODY MASS INDEX: 24.49 KG/M2 | DIASTOLIC BLOOD PRESSURE: 90 MMHG

## 2020-09-21 DIAGNOSIS — Z23 FLU VACCINE NEED: Primary | ICD-10-CM

## 2020-09-21 DIAGNOSIS — I10 ESSENTIAL HYPERTENSION: ICD-10-CM

## 2020-09-21 DIAGNOSIS — K21.00 GASTROESOPHAGEAL REFLUX DISEASE WITH ESOPHAGITIS: ICD-10-CM

## 2020-09-21 PROCEDURE — 99213 OFFICE O/P EST LOW 20 MIN: CPT | Performed by: PHYSICIAN ASSISTANT

## 2020-09-21 PROCEDURE — 90686 IIV4 VACC NO PRSV 0.5 ML IM: CPT | Performed by: PHYSICIAN ASSISTANT

## 2020-09-21 PROCEDURE — G0008 ADMIN INFLUENZA VIRUS VAC: HCPCS | Performed by: PHYSICIAN ASSISTANT

## 2020-09-21 RX ORDER — VALSARTAN 40 MG/1
40 TABLET ORAL DAILY
Qty: 30 TABLET | Refills: 3 | Status: SHIPPED | OUTPATIENT
Start: 2020-09-21 | End: 2021-02-18

## 2020-09-21 RX ORDER — OMEPRAZOLE 40 MG/1
40 CAPSULE, DELAYED RELEASE ORAL DAILY
Qty: 30 CAPSULE | Refills: 5 | Status: SHIPPED | OUTPATIENT
Start: 2020-09-21 | End: 2020-10-20 | Stop reason: SDUPTHER

## 2020-09-21 NOTE — PROGRESS NOTES
Chief Complaint   Patient presents with   • Hypertension     Follow Up   • Heartburn       Subjective     Sandro Junior is a 65 y.o. male.        History of Present Illness     Pt is feeling well. He denies headaches, chest pain, shortness of breath. At home his blood pressure is a little high, not has high as it is today. Taking diltiazem regularly.    He has had some return of the dysphagia he experienced prior to esophageal dilation. Never started omeprazole after the procedure. He was instructed to call Dr Allen's office if symptoms returned. Currently not to the degree they were previously- only occurs occasionally.        Current Outpatient Medications:   •  dilTIAZem (TIAZAC) 300 MG 24 hr capsule, Take 1 capsule by mouth Daily., Disp: 90 capsule, Rfl: 3  •  ketoconazole (NIZORAL) 2 % cream, Apply  topically to the appropriate area as directed Daily., Disp: 30 g, Rfl: 4  •  latanoprost (XALATAN) 0.005 % ophthalmic solution, , Disp: , Rfl: 0  •  omeprazole (priLOSEC) 40 MG capsule, Take 1 capsule by mouth Daily., Disp: 30 capsule, Rfl: 5  •  triamcinolone (KENALOG) 0.025 % cream, Apply  topically to the appropriate area as directed 2 (Two) Times a Day., Disp: , Rfl:   •  valsartan (Diovan) 40 MG tablet, Take 1 tablet by mouth Daily., Disp: 30 tablet, Rfl: 3     PMFSH  The following portions of the patient's history were reviewed and updated as appropriate: allergies, current medications, past family history, past medical history, past social history, past surgical history and problem list.    Review of Systems   Constitutional: Negative for activity change, appetite change and fatigue.   HENT: Positive for trouble swallowing. Negative for congestion and rhinorrhea.    Respiratory: Negative for chest tightness and shortness of breath.    Cardiovascular: Negative for chest pain and palpitations.   Gastrointestinal: Negative for abdominal distention, abdominal pain, constipation, nausea and vomiting.    Genitourinary: Negative for dysuria.   Musculoskeletal: Negative for arthralgias and myalgias.   Neurological: Negative for dizziness, weakness, light-headedness and headaches.   Psychiatric/Behavioral: Negative for dysphoric mood. The patient is not nervous/anxious.        Objective   /90   Pulse 81   Temp 97.2 °F (36.2 °C)   Wt 84.2 kg (185 lb 9.6 oz)   SpO2 96%   BMI 24.49 kg/m²     Physical Exam  Vitals signs and nursing note reviewed.   Constitutional:       Appearance: He is well-developed.   HENT:      Head: Normocephalic.      Right Ear: Hearing, tympanic membrane, ear canal and external ear normal.      Left Ear: Hearing, tympanic membrane, ear canal and external ear normal.      Nose: Nose normal.   Eyes:      Conjunctiva/sclera: Conjunctivae normal.      Pupils: Pupils are equal, round, and reactive to light.   Neck:      Musculoskeletal: Normal range of motion.   Cardiovascular:      Rate and Rhythm: Normal rate and regular rhythm.      Heart sounds: Normal heart sounds.   Pulmonary:      Effort: Pulmonary effort is normal.      Breath sounds: Normal breath sounds. No decreased breath sounds, wheezing, rhonchi or rales.   Musculoskeletal: Normal range of motion.   Skin:     General: Skin is warm and dry.   Neurological:      Mental Status: He is alert.   Psychiatric:         Behavior: Behavior normal.              ASSESSMENT/PLAN    Problem List Items Addressed This Visit        Cardiovascular and Mediastinum    Hypertension     Hypertension is worsening.  Dietary sodium restriction.  Weight loss.  Regular aerobic exercise.  Medication changes per orders.  Ambulatory blood pressure monitoring. Start valsartan 40 mg daily.  Blood pressure will be reassessed in 4 weeks.         Relevant Medications    valsartan (Diovan) 40 MG tablet       Digestive    Gastroesophageal reflux disease     Start omeprazole daily. If symptoms progress, he will contact Dr Allen's office.         Relevant  Medications    omeprazole (priLOSEC) 40 MG capsule      Other Visit Diagnoses     Flu vaccine need    -  Primary    Relevant Orders    FluLaval Quad >6 Months (5543-9010) (Completed)               Return in about 4 weeks (around 10/19/2020) for Follow up.

## 2020-09-21 NOTE — ASSESSMENT & PLAN NOTE
Hypertension is worsening.  Dietary sodium restriction.  Weight loss.  Regular aerobic exercise.  Medication changes per orders.  Ambulatory blood pressure monitoring. Start valsartan 40 mg daily.  Blood pressure will be reassessed in 4 weeks.

## 2020-10-20 ENCOUNTER — OFFICE VISIT (OUTPATIENT)
Dept: INTERNAL MEDICINE | Facility: CLINIC | Age: 65
End: 2020-10-20

## 2020-10-20 VITALS
BODY MASS INDEX: 24.12 KG/M2 | HEART RATE: 84 BPM | DIASTOLIC BLOOD PRESSURE: 100 MMHG | OXYGEN SATURATION: 96 % | WEIGHT: 182.8 LBS | SYSTOLIC BLOOD PRESSURE: 158 MMHG

## 2020-10-20 DIAGNOSIS — R73.9 HYPERGLYCEMIA: ICD-10-CM

## 2020-10-20 DIAGNOSIS — I10 ESSENTIAL HYPERTENSION: ICD-10-CM

## 2020-10-20 DIAGNOSIS — R13.14 PHARYNGOESOPHAGEAL DYSPHAGIA: Primary | ICD-10-CM

## 2020-10-20 DIAGNOSIS — K21.00 GASTROESOPHAGEAL REFLUX DISEASE WITH ESOPHAGITIS: ICD-10-CM

## 2020-10-20 PROBLEM — K21.9 ESOPHAGEAL REFLUX: Status: ACTIVE | Noted: 2020-10-20

## 2020-10-20 LAB — HBA1C MFR BLD: 5.2 %

## 2020-10-20 PROCEDURE — 99214 OFFICE O/P EST MOD 30 MIN: CPT | Performed by: PHYSICIAN ASSISTANT

## 2020-10-20 PROCEDURE — 83036 HEMOGLOBIN GLYCOSYLATED A1C: CPT | Performed by: PHYSICIAN ASSISTANT

## 2020-10-20 RX ORDER — OMEPRAZOLE 40 MG/1
40 CAPSULE, DELAYED RELEASE ORAL DAILY
Qty: 30 CAPSULE | Refills: 5 | Status: SHIPPED | OUTPATIENT
Start: 2020-10-20 | End: 2022-01-10

## 2020-10-20 RX ORDER — DILTIAZEM HYDROCHLORIDE 300 MG/1
300 CAPSULE, EXTENDED RELEASE ORAL DAILY
Qty: 90 CAPSULE | Refills: 3 | Status: SHIPPED | OUTPATIENT
Start: 2020-10-20 | End: 2022-01-10

## 2020-10-20 NOTE — ASSESSMENT & PLAN NOTE
Hypertension is unchanged.  Dietary sodium restriction.  Weight loss.  Regular aerobic exercise.  Medication changes per orders.  Ambulatory blood pressure monitoring. Retry valsartan, start with 1/2 tablet daily and increase to whole tablet if tolerated. Continue current dose of diltiazem. Call with any SE from starting new med.  Blood pressure will be reassessed in 4 weeks.

## 2020-10-20 NOTE — ASSESSMENT & PLAN NOTE
Continue omeprazole. Refer back to GI for office visit to discuss whether he needs another esophageal dilation.

## 2020-10-20 NOTE — PROGRESS NOTES
Chief Complaint   Patient presents with   • Heartburn     Follow Up   • Hypertension       Subjective     Sandro Junior is a 65 y.o. male.        History of Present Illness     After last visit pt stopped the diltiazem and started the valsartan and developed a headache. He had a continuous headache. When he stopped the valsartan and went back to diltiazem, the headache went away. He does check his blood pressure at home and it runs about the same as it is here.    Pt restarted the omeprazole but still had days of having to regurgitate when he eats meats and other thick foods. Not as bad as it was prior to dilation but enough that he is primarily eating soft or liquid foods.      Current Outpatient Medications:   •  dilTIAZem (TIAZAC) 300 MG 24 hr capsule, Take 1 capsule by mouth Daily., Disp: 90 capsule, Rfl: 3  •  ketoconazole (NIZORAL) 2 % cream, Apply  topically to the appropriate area as directed Daily., Disp: 30 g, Rfl: 4  •  latanoprost (XALATAN) 0.005 % ophthalmic solution, , Disp: , Rfl: 0  •  omeprazole (priLOSEC) 40 MG capsule, Take 1 capsule by mouth Daily., Disp: 30 capsule, Rfl: 5  •  triamcinolone (KENALOG) 0.025 % cream, Apply  topically to the appropriate area as directed 2 (Two) Times a Day., Disp: , Rfl:   •  valsartan (Diovan) 40 MG tablet, Take 1 tablet by mouth Daily., Disp: 30 tablet, Rfl: 3     PMFSH  The following portions of the patient's history were reviewed and updated as appropriate: allergies, current medications, past family history, past medical history, past social history, past surgical history and problem list.    Review of Systems   Constitutional: Negative for activity change, appetite change and fatigue.   HENT: Positive for trouble swallowing. Negative for congestion and rhinorrhea.    Respiratory: Negative for chest tightness and shortness of breath.    Cardiovascular: Negative for chest pain and palpitations.   Gastrointestinal: Negative for abdominal pain, constipation,  diarrhea and nausea.   Genitourinary: Negative for dysuria.   Musculoskeletal: Negative for arthralgias and myalgias.   Neurological: Negative for dizziness, weakness, light-headedness and headaches.   Psychiatric/Behavioral: Negative for dysphoric mood. The patient is not nervous/anxious.        Objective   /100   Pulse 84   Wt 82.9 kg (182 lb 12.8 oz)   SpO2 96%   BMI 24.12 kg/m²     Physical Exam  Vitals signs and nursing note reviewed.   Constitutional:       Appearance: He is well-developed.   HENT:      Head: Normocephalic.      Right Ear: Hearing, tympanic membrane, ear canal and external ear normal.      Left Ear: Hearing, tympanic membrane, ear canal and external ear normal.      Nose: Nose normal.   Eyes:      Conjunctiva/sclera: Conjunctivae normal.      Pupils: Pupils are equal, round, and reactive to light.   Neck:      Musculoskeletal: Normal range of motion.   Cardiovascular:      Rate and Rhythm: Normal rate and regular rhythm.      Heart sounds: Normal heart sounds.   Pulmonary:      Effort: Pulmonary effort is normal.      Breath sounds: Normal breath sounds. No decreased breath sounds, wheezing, rhonchi or rales.   Musculoskeletal: Normal range of motion.   Skin:     General: Skin is warm and dry.   Neurological:      Mental Status: He is alert.   Psychiatric:         Behavior: Behavior normal.         Results for orders placed or performed in visit on 10/20/20   POC Glycosylated Hemoglobin (Hb A1C)    Specimen: Blood   Result Value Ref Range    Hemoglobin A1C 5.2 %        ASSESSMENT/PLAN    Diagnoses and all orders for this visit:    1. Pharyngoesophageal dysphagia (Primary)  -     Ambulatory Referral to Gastroenterology    2. Essential hypertension  Assessment & Plan:  Hypertension is unchanged.  Dietary sodium restriction.  Weight loss.  Regular aerobic exercise.  Medication changes per orders.  Ambulatory blood pressure monitoring. Retry valsartan, start with 1/2 tablet daily and  increase to whole tablet if tolerated. Continue current dose of diltiazem. Call with any SE from starting new med.  Blood pressure will be reassessed in 4 weeks.    Orders:  -     dilTIAZem (TIAZAC) 300 MG 24 hr capsule; Take 1 capsule by mouth Daily.  Dispense: 90 capsule; Refill: 3    3. Gastroesophageal reflux disease with esophagitis  Assessment & Plan:  Continue omeprazole. Refer back to Dr Allen for office visit to discuss whether it is time for another esophageal dilation.    Orders:  -     omeprazole (priLOSEC) 40 MG capsule; Take 1 capsule by mouth Daily.  Dispense: 30 capsule; Refill: 5  -     Ambulatory Referral to Gastroenterology    4. Hyperglycemia  Comments:  Hgba1c is normal.  Orders:  -     POC Glycosylated Hemoglobin (Hb A1C)           Return in about 4 weeks (around 11/17/2020) for Follow up.

## 2020-10-20 NOTE — ASSESSMENT & PLAN NOTE
Continue omeprazole. Refer back to Dr Allen for office visit to discuss whether it is time for another esophageal dilation.

## 2020-11-01 ENCOUNTER — APPOINTMENT (OUTPATIENT)
Dept: PREADMISSION TESTING | Facility: HOSPITAL | Age: 65
End: 2020-11-01

## 2020-11-01 PROCEDURE — U0004 COV-19 TEST NON-CDC HGH THRU: HCPCS

## 2020-11-01 PROCEDURE — C9803 HOPD COVID-19 SPEC COLLECT: HCPCS

## 2020-11-02 LAB — SARS-COV-2 RNA RESP QL NAA+PROBE: NOT DETECTED

## 2020-11-04 ENCOUNTER — OUTSIDE FACILITY SERVICE (OUTPATIENT)
Dept: GASTROENTEROLOGY | Facility: CLINIC | Age: 65
End: 2020-11-04

## 2020-11-04 PROCEDURE — 43249 ESOPH EGD DILATION <30 MM: CPT | Performed by: INTERNAL MEDICINE

## 2020-11-19 ENCOUNTER — OFFICE VISIT (OUTPATIENT)
Dept: INTERNAL MEDICINE | Facility: CLINIC | Age: 65
End: 2020-11-19

## 2020-11-19 ENCOUNTER — LAB (OUTPATIENT)
Dept: LAB | Facility: HOSPITAL | Age: 65
End: 2020-11-19

## 2020-11-19 VITALS
BODY MASS INDEX: 24.33 KG/M2 | SYSTOLIC BLOOD PRESSURE: 120 MMHG | HEART RATE: 69 BPM | OXYGEN SATURATION: 98 % | WEIGHT: 184.4 LBS | DIASTOLIC BLOOD PRESSURE: 70 MMHG

## 2020-11-19 DIAGNOSIS — I10 ESSENTIAL HYPERTENSION: ICD-10-CM

## 2020-11-19 DIAGNOSIS — I10 ESSENTIAL HYPERTENSION: Primary | ICD-10-CM

## 2020-11-19 LAB
ANION GAP SERPL CALCULATED.3IONS-SCNC: 9.8 MMOL/L (ref 5–15)
BUN SERPL-MCNC: 10 MG/DL (ref 8–23)
BUN/CREAT SERPL: 12.8 (ref 7–25)
CALCIUM SPEC-SCNC: 9 MG/DL (ref 8.6–10.5)
CHLORIDE SERPL-SCNC: 104 MMOL/L (ref 98–107)
CO2 SERPL-SCNC: 26.2 MMOL/L (ref 22–29)
CREAT SERPL-MCNC: 0.78 MG/DL (ref 0.76–1.27)
GFR SERPL CREATININE-BSD FRML MDRD: 122 ML/MIN/1.73
GLUCOSE SERPL-MCNC: 85 MG/DL (ref 65–99)
POTASSIUM SERPL-SCNC: 4.3 MMOL/L (ref 3.5–5.2)
SODIUM SERPL-SCNC: 140 MMOL/L (ref 136–145)

## 2020-11-19 PROCEDURE — 80048 BASIC METABOLIC PNL TOTAL CA: CPT

## 2020-11-19 PROCEDURE — 99213 OFFICE O/P EST LOW 20 MIN: CPT | Performed by: PHYSICIAN ASSISTANT

## 2020-11-19 NOTE — PROGRESS NOTES
Chief Complaint   Patient presents with   • Hypertension     Follow Up       Subjective     Sandro Junior is a 65 y.o. male.        History of Present Illness     Pt has started the valsartan and has done well with it. Blood pressure is well controlled at home. Denies chest pain, shortness of breath or palpitations.    He saw Dr Allen and is going to have another dilation. Symptoms are relatively well controlled.        Current Outpatient Medications:   •  dilTIAZem (TIAZAC) 300 MG 24 hr capsule, Take 1 capsule by mouth Daily., Disp: 90 capsule, Rfl: 3  •  ketoconazole (NIZORAL) 2 % cream, Apply  topically to the appropriate area as directed Daily., Disp: 30 g, Rfl: 4  •  latanoprost (XALATAN) 0.005 % ophthalmic solution, , Disp: , Rfl: 0  •  omeprazole (priLOSEC) 40 MG capsule, Take 1 capsule by mouth Daily., Disp: 30 capsule, Rfl: 5  •  triamcinolone (KENALOG) 0.025 % cream, Apply  topically to the appropriate area as directed 2 (Two) Times a Day., Disp: , Rfl:   •  valsartan (Diovan) 40 MG tablet, Take 1 tablet by mouth Daily., Disp: 30 tablet, Rfl: 3     PMFSH  The following portions of the patient's history were reviewed and updated as appropriate: allergies, current medications, past family history, past medical history, past social history, past surgical history and problem list.    Review of Systems   Constitutional: Negative for activity change, appetite change and fatigue.   HENT: Negative for congestion and rhinorrhea.    Respiratory: Negative for chest tightness and shortness of breath.    Cardiovascular: Negative for chest pain and palpitations.   Gastrointestinal: Negative for abdominal pain.   Genitourinary: Negative for dysuria.   Musculoskeletal: Negative for arthralgias and myalgias.   Neurological: Negative for dizziness, weakness, light-headedness and headaches.   Psychiatric/Behavioral: Negative for dysphoric mood. The patient is not nervous/anxious.        Objective   /70   Pulse 69    Wt 83.6 kg (184 lb 6.4 oz)   SpO2 98%   BMI 24.33 kg/m²     Physical Exam  Constitutional:       Appearance: He is well-developed.   HENT:      Head: Normocephalic and atraumatic.      Right Ear: External ear normal.      Left Ear: External ear normal.   Eyes:      Conjunctiva/sclera: Conjunctivae normal.   Neck:      Musculoskeletal: Normal range of motion.   Cardiovascular:      Rate and Rhythm: Normal rate and regular rhythm.   Pulmonary:      Effort: Pulmonary effort is normal.      Breath sounds: Normal breath sounds.   Musculoskeletal: Normal range of motion.   Skin:     General: Skin is warm and dry.   Psychiatric:         Behavior: Behavior normal.              ASSESSMENT/PLAN    Diagnoses and all orders for this visit:    1. Essential hypertension (Primary)  Assessment & Plan:  Hypertension is improving with treatment.  Continue current treatment regimen.  Dietary sodium restriction.  Weight loss.  Regular aerobic exercise.  Continue current medications.  Blood pressure will be reassessed at the next regular appointment.    Orders:  -     Basic Metabolic Panel; Future           Return for Next scheduled follow up.

## 2020-11-29 ENCOUNTER — APPOINTMENT (OUTPATIENT)
Dept: PREADMISSION TESTING | Facility: HOSPITAL | Age: 65
End: 2020-11-29

## 2020-11-29 PROCEDURE — C9803 HOPD COVID-19 SPEC COLLECT: HCPCS

## 2020-11-29 PROCEDURE — U0004 COV-19 TEST NON-CDC HGH THRU: HCPCS

## 2020-11-30 LAB — SARS-COV-2 RNA RESP QL NAA+PROBE: NOT DETECTED

## 2020-12-01 ENCOUNTER — OUTSIDE FACILITY SERVICE (OUTPATIENT)
Dept: GASTROENTEROLOGY | Facility: CLINIC | Age: 65
End: 2020-12-01

## 2020-12-01 PROCEDURE — 43239 EGD BIOPSY SINGLE/MULTIPLE: CPT | Performed by: INTERNAL MEDICINE

## 2020-12-01 PROCEDURE — 43249 ESOPH EGD DILATION <30 MM: CPT | Performed by: INTERNAL MEDICINE

## 2020-12-01 PROCEDURE — 88305 TISSUE EXAM BY PATHOLOGIST: CPT | Performed by: INTERNAL MEDICINE

## 2020-12-02 ENCOUNTER — LAB REQUISITION (OUTPATIENT)
Dept: LAB | Facility: HOSPITAL | Age: 65
End: 2020-12-02

## 2020-12-02 DIAGNOSIS — R13.10 DYSPHAGIA, UNSPECIFIED: ICD-10-CM

## 2020-12-13 ENCOUNTER — APPOINTMENT (OUTPATIENT)
Dept: PREADMISSION TESTING | Facility: HOSPITAL | Age: 65
End: 2020-12-13

## 2020-12-13 LAB — SARS-COV-2 RNA RESP QL NAA+PROBE: NOT DETECTED

## 2020-12-13 PROCEDURE — C9803 HOPD COVID-19 SPEC COLLECT: HCPCS

## 2020-12-13 PROCEDURE — U0004 COV-19 TEST NON-CDC HGH THRU: HCPCS

## 2020-12-15 ENCOUNTER — OUTSIDE FACILITY SERVICE (OUTPATIENT)
Dept: GASTROENTEROLOGY | Facility: CLINIC | Age: 65
End: 2020-12-15

## 2020-12-15 PROCEDURE — 43249 ESOPH EGD DILATION <30 MM: CPT | Performed by: INTERNAL MEDICINE

## 2021-02-18 DIAGNOSIS — I10 ESSENTIAL HYPERTENSION: ICD-10-CM

## 2021-02-18 RX ORDER — VALSARTAN 40 MG/1
TABLET ORAL
Qty: 30 TABLET | Refills: 1 | Status: SHIPPED | OUTPATIENT
Start: 2021-02-18 | End: 2021-03-15

## 2021-02-18 NOTE — TELEPHONE ENCOUNTER
Last Office Visit: 11/19/20  Next Office Visit:3/22/21    Labs completed in past 6 months? yes  Labs completed in past year? yes    Last Refill Date:9/21/20  Quantity:30  Refills:3    Pharmacy:

## 2021-02-25 RX ORDER — KETOCONAZOLE 20 MG/G
CREAM TOPICAL DAILY
Qty: 30 G | Refills: 4 | Status: SHIPPED | OUTPATIENT
Start: 2021-02-25 | End: 2022-06-01

## 2021-02-25 RX ORDER — LATANOPROST 50 UG/ML
1 SOLUTION/ DROPS OPHTHALMIC DAILY
Qty: 7.5 ML | Refills: 5 | OUTPATIENT
Start: 2021-02-25

## 2021-03-15 DIAGNOSIS — I10 ESSENTIAL HYPERTENSION: ICD-10-CM

## 2021-03-15 RX ORDER — VALSARTAN 40 MG/1
TABLET ORAL
Qty: 90 TABLET | Refills: 3 | Status: SHIPPED | OUTPATIENT
Start: 2021-03-15 | End: 2021-11-05 | Stop reason: ALTCHOICE

## 2021-03-22 ENCOUNTER — LAB (OUTPATIENT)
Dept: LAB | Facility: HOSPITAL | Age: 66
End: 2021-03-22

## 2021-03-22 ENCOUNTER — DOCUMENTATION (OUTPATIENT)
Dept: GASTROENTEROLOGY | Facility: CLINIC | Age: 66
End: 2021-03-22

## 2021-03-22 ENCOUNTER — OFFICE VISIT (OUTPATIENT)
Dept: INTERNAL MEDICINE | Facility: CLINIC | Age: 66
End: 2021-03-22

## 2021-03-22 VITALS
BODY MASS INDEX: 24.14 KG/M2 | HEART RATE: 66 BPM | OXYGEN SATURATION: 98 % | TEMPERATURE: 97.7 F | WEIGHT: 183 LBS | SYSTOLIC BLOOD PRESSURE: 138 MMHG | DIASTOLIC BLOOD PRESSURE: 82 MMHG

## 2021-03-22 DIAGNOSIS — C20 MALIGNANT NEOPLASM OF RECTUM (HCC): ICD-10-CM

## 2021-03-22 DIAGNOSIS — K21.9 GASTROESOPHAGEAL REFLUX DISEASE, UNSPECIFIED WHETHER ESOPHAGITIS PRESENT: ICD-10-CM

## 2021-03-22 DIAGNOSIS — Z00.00 ENCOUNTER FOR MEDICARE ANNUAL WELLNESS EXAM: Primary | ICD-10-CM

## 2021-03-22 DIAGNOSIS — Z12.5 PROSTATE CANCER SCREENING: ICD-10-CM

## 2021-03-22 DIAGNOSIS — I10 ESSENTIAL HYPERTENSION: ICD-10-CM

## 2021-03-22 LAB
ALBUMIN SERPL-MCNC: 4.4 G/DL (ref 3.5–5.2)
ALBUMIN/GLOB SERPL: 1.3 G/DL
ALP SERPL-CCNC: 68 U/L (ref 39–117)
ALT SERPL W P-5'-P-CCNC: 70 U/L (ref 1–41)
ANION GAP SERPL CALCULATED.3IONS-SCNC: 8.6 MMOL/L (ref 5–15)
AST SERPL-CCNC: 55 U/L (ref 1–40)
BASOPHILS # BLD AUTO: 0.02 10*3/MM3 (ref 0–0.2)
BASOPHILS NFR BLD AUTO: 0.6 % (ref 0–1.5)
BILIRUB SERPL-MCNC: 0.9 MG/DL (ref 0–1.2)
BUN SERPL-MCNC: 10 MG/DL (ref 8–23)
BUN/CREAT SERPL: 10.6 (ref 7–25)
CALCIUM SPEC-SCNC: 9.7 MG/DL (ref 8.6–10.5)
CHLORIDE SERPL-SCNC: 102 MMOL/L (ref 98–107)
CHOLEST SERPL-MCNC: 181 MG/DL (ref 0–200)
CO2 SERPL-SCNC: 28.4 MMOL/L (ref 22–29)
CREAT SERPL-MCNC: 0.94 MG/DL (ref 0.76–1.27)
DEPRECATED RDW RBC AUTO: 45.7 FL (ref 37–54)
EOSINOPHIL # BLD AUTO: 0.05 10*3/MM3 (ref 0–0.4)
EOSINOPHIL NFR BLD AUTO: 1.6 % (ref 0.3–6.2)
ERYTHROCYTE [DISTWIDTH] IN BLOOD BY AUTOMATED COUNT: 13 % (ref 12.3–15.4)
GFR SERPL CREATININE-BSD FRML MDRD: 98 ML/MIN/1.73
GLOBULIN UR ELPH-MCNC: 3.3 GM/DL
GLUCOSE SERPL-MCNC: 110 MG/DL (ref 65–99)
HCT VFR BLD AUTO: 43.1 % (ref 37.5–51)
HDLC SERPL-MCNC: 89 MG/DL (ref 40–60)
HGB BLD-MCNC: 15 G/DL (ref 13–17.7)
IMM GRANULOCYTES # BLD AUTO: 0.01 10*3/MM3 (ref 0–0.05)
IMM GRANULOCYTES NFR BLD AUTO: 0.3 % (ref 0–0.5)
LDLC SERPL CALC-MCNC: 79 MG/DL (ref 0–100)
LDLC/HDLC SERPL: 0.87 {RATIO}
LYMPHOCYTES # BLD AUTO: 1.45 10*3/MM3 (ref 0.7–3.1)
LYMPHOCYTES NFR BLD AUTO: 45.3 % (ref 19.6–45.3)
MCH RBC QN AUTO: 33 PG (ref 26.6–33)
MCHC RBC AUTO-ENTMCNC: 34.8 G/DL (ref 31.5–35.7)
MCV RBC AUTO: 94.9 FL (ref 79–97)
MONOCYTES # BLD AUTO: 0.53 10*3/MM3 (ref 0.1–0.9)
MONOCYTES NFR BLD AUTO: 16.6 % (ref 5–12)
NEUTROPHILS NFR BLD AUTO: 1.14 10*3/MM3 (ref 1.7–7)
NEUTROPHILS NFR BLD AUTO: 35.6 % (ref 42.7–76)
NRBC BLD AUTO-RTO: 0.3 /100 WBC (ref 0–0.2)
PLATELET # BLD AUTO: 175 10*3/MM3 (ref 140–450)
PMV BLD AUTO: 10.4 FL (ref 6–12)
POTASSIUM SERPL-SCNC: 4.7 MMOL/L (ref 3.5–5.2)
PROT SERPL-MCNC: 7.7 G/DL (ref 6–8.5)
PSA SERPL-MCNC: 0.61 NG/ML (ref 0–4)
RBC # BLD AUTO: 4.54 10*6/MM3 (ref 4.14–5.8)
SODIUM SERPL-SCNC: 139 MMOL/L (ref 136–145)
TRIGL SERPL-MCNC: 71 MG/DL (ref 0–150)
VLDLC SERPL-MCNC: 13 MG/DL (ref 5–40)
WBC # BLD AUTO: 3.2 10*3/MM3 (ref 3.4–10.8)

## 2021-03-22 PROCEDURE — 36415 COLL VENOUS BLD VENIPUNCTURE: CPT

## 2021-03-22 PROCEDURE — 80053 COMPREHEN METABOLIC PANEL: CPT

## 2021-03-22 PROCEDURE — 80061 LIPID PANEL: CPT

## 2021-03-22 PROCEDURE — 85025 COMPLETE CBC W/AUTO DIFF WBC: CPT

## 2021-03-22 PROCEDURE — G0103 PSA SCREENING: HCPCS

## 2021-03-22 PROCEDURE — G0439 PPPS, SUBSEQ VISIT: HCPCS | Performed by: PHYSICIAN ASSISTANT

## 2021-03-22 NOTE — PROGRESS NOTES
The ABCs of the Annual Wellness Visit  Subsequent Medicare Wellness Visit    Chief Complaint   Patient presents with   • Medicare Wellness-subsequent       Subjective   History of Present Illness:  Sandro Junior is a 66 y.o. male who presents for a Subsequent Medicare Wellness Visit.    HEALTH RISK ASSESSMENT    Recent Hospitalizations:  No hospitalization(s) within the last year.    Current Medical Providers:  Patient Care Team:  Dora Loya PA as PCP - General (Internal Medicine)    Smoking Status:  Social History     Tobacco Use   Smoking Status Former Smoker   Smokeless Tobacco Never Used       Alcohol Consumption:  Social History     Substance and Sexual Activity   Alcohol Use Yes   • Alcohol/week: 4.0 - 5.0 standard drinks   • Types: 4 - 5 Glasses of wine per week       Depression Screen:   PHQ-2/PHQ-9 Depression Screening 3/22/2021   Little interest or pleasure in doing things 0   Feeling down, depressed, or hopeless 0   Total Score 0       Fall Risk Screen:  HÉCTOR Fall Risk Assessment was completed, and patient is at LOW risk for falls.Assessment completed on:3/22/2021    Health Habits and Functional and Cognitive Screening:  Functional & Cognitive Status 3/22/2021   Do you have difficulty preparing food and eating? No   Do you have difficulty bathing yourself, getting dressed or grooming yourself? No   Do you have difficulty using the toilet? No   Do you have difficulty moving around from place to place? No   Do you have trouble with steps or getting out of a bed or a chair? No   Current Diet Well Balanced Diet   Dental Exam Up to date   Eye Exam Up to date   Exercise (times per week) 7 times per week   Current Exercise Activities Include Walking   Do you need help using the phone?  No   Are you deaf or do you have serious difficulty hearing?  No   Do you need help with transportation? No   Do you need help shopping? No   Do you need help preparing meals?  No   Do you need help with housework?  No    Do you need help with laundry? No   Do you need help taking your medications? No   Do you need help managing money? No   Do you ever drive or ride in a car without wearing a seat belt? No   Have you felt unusual stress, anger or loneliness in the last month? No   Who do you live with? Spouse   If you need help, do you have trouble finding someone available to you? No   Have you been bothered in the last four weeks by sexual problems? No   Do you have difficulty concentrating, remembering or making decisions? No         Does the patient have evidence of cognitive impairment? No    Asprin use counseling:Does not need ASA (and currently is not on it)    Age-appropriate Screening Schedule:  Refer to the list below for future screening recommendations based on patient's age, sex and/or medical conditions. Orders for these recommended tests are listed in the plan section. The patient has been provided with a written plan.    Health Maintenance   Topic Date Due   • ZOSTER VACCINE (1 of 2) Never done   • COLONOSCOPY  02/05/2024   • TDAP/TD VACCINES (2 - Td) 04/26/2028   • INFLUENZA VACCINE  Completed          The following portions of the patient's history were reviewed and updated as appropriate: allergies, current medications, past family history, past medical history, past social history, past surgical history and problem list.    Outpatient Medications Prior to Visit   Medication Sig Dispense Refill   • dilTIAZem (TIAZAC) 300 MG 24 hr capsule Take 1 capsule by mouth Daily. 90 capsule 3   • ketoconazole (NIZORAL) 2 % cream Apply  topically to the appropriate area as directed Daily. 30 g 4   • latanoprost (XALATAN) 0.005 % ophthalmic solution   0   • omeprazole (priLOSEC) 40 MG capsule Take 1 capsule by mouth Daily. 30 capsule 5   • triamcinolone (KENALOG) 0.025 % cream Apply  topically to the appropriate area as directed 2 (Two) Times a Day.     • valsartan (DIOVAN) 40 MG tablet TAKE 1 TABLET BY MOUTH EVERY DAY 90  tablet 3     No facility-administered medications prior to visit.       Patient Active Problem List   Diagnosis   • Hypertension   • Chronic pain   • Osteoarthritis of knee   • Prolonged depressive adjustment reaction   • Acute prostatitis   • Atopic rhinitis   • Erectile dysfunction of nonorganic origin   • Glaucoma suspect   • Onychomycosis of toenail   • Malignant neoplasm of rectum (CMS/HCC)   • Seborrheic eczema   • Keloid scar   • Pain of right lower extremity   • Esophageal reflux       Advanced Care Planning:  ACP discussion was held with the patient during this visit. Patient does not have an advance directive, information provided.    Review of Systems   Constitutional: Negative for appetite change, fever and unexpected weight change.   HENT: Negative for ear pain, facial swelling and sore throat.    Eyes: Negative for pain and visual disturbance.   Respiratory: Negative for chest tightness, shortness of breath and wheezing.    Cardiovascular: Negative for chest pain and palpitations.   Gastrointestinal: Negative for abdominal pain and blood in stool.   Endocrine: Negative.    Genitourinary: Negative for difficulty urinating and hematuria.   Musculoskeletal: Negative for joint swelling.   Neurological: Negative for dizziness, tremors, seizures, syncope and headaches.   Hematological: Negative for adenopathy.   Psychiatric/Behavioral: Negative.        Compared to one year ago, the patient feels his physical health is the same.  Compared to one year ago, the patient feels his mental health is the same.    Reviewed chart for potential of high risk medication in the elderly: yes  Reviewed chart for potential of harmful drug interactions in the elderly:yes    Objective         Vitals:    03/22/21 0829   BP: 138/82   Pulse: 66   Temp: 97.7 °F (36.5 °C)   SpO2: 98%   Weight: 83 kg (183 lb)   PainSc: 0-No pain       Body mass index is 24.14 kg/m².  Discussed the patient's BMI with him. The BMI is above average;  BMI management plan is completed.    Physical Exam  Vitals and nursing note reviewed.   Constitutional:       General: He is not in acute distress.     Appearance: He is well-developed. He is not diaphoretic.   HENT:      Head: Normocephalic and atraumatic. Hair is normal.      Right Ear: Hearing, tympanic membrane, ear canal and external ear normal.      Left Ear: Hearing, tympanic membrane, ear canal and external ear normal.      Nose: No nasal deformity.      Mouth/Throat:      Mouth: No oral lesions.      Pharynx: Uvula midline. No uvula swelling.   Eyes:      General: Lids are normal. No scleral icterus.        Right eye: No discharge.         Left eye: No discharge.      Extraocular Movements:      Right eye: Normal extraocular motion and no nystagmus.      Left eye: Normal extraocular motion and no nystagmus.      Conjunctiva/sclera: Conjunctivae normal.      Pupils: Pupils are equal, round, and reactive to light.   Neck:      Thyroid: No thyromegaly.      Vascular: No JVD.      Trachea: No tracheal deviation.   Cardiovascular:      Rate and Rhythm: Normal rate and regular rhythm.      Pulses: Normal pulses.      Heart sounds: Normal heart sounds. No murmur heard.   No gallop.    Pulmonary:      Effort: Pulmonary effort is normal. No respiratory distress.      Breath sounds: Normal breath sounds. No wheezing or rales.   Chest:      Chest wall: No tenderness.   Abdominal:      General: Bowel sounds are normal. There is no distension.      Palpations: Abdomen is soft. There is no mass.      Tenderness: There is no abdominal tenderness. There is no guarding.      Hernia: No hernia is present.   Genitourinary:     Penis: Normal.       Testes: Normal.      Prostate: Normal.      Rectum: Normal. Guaiac result negative.      Comments: Declined chaperone for  exam  Musculoskeletal:         General: No tenderness or deformity. Normal range of motion.      Cervical back: Normal range of motion and neck supple.    Lymphadenopathy:      Cervical: No cervical adenopathy.   Skin:     General: Skin is warm and dry.      Findings: No rash.   Neurological:      Mental Status: He is alert and oriented to person, place, and time.      Cranial Nerves: No cranial nerve deficit.      Motor: No abnormal muscle tone.      Coordination: Coordination normal.      Deep Tendon Reflexes: Reflexes are normal and symmetric. Reflexes normal.   Psychiatric:         Behavior: Behavior normal.         Thought Content: Thought content normal.         Judgment: Judgment normal.               Assessment/Plan   Medicare Risks and Personalized Health Plan  CMS Preventative Services Quick Reference  Advance Directive Discussion  Colon Cancer Screening  Immunizations Discussed/Encouraged (specific immunizations; Shingrix )  Prostate Cancer Screening     The above risks/problems have been discussed with the patient.  Pertinent information has been shared with the patient in the After Visit Summary.  Follow up plans and orders are seen below in the Assessment/Plan Section.    Diagnoses and all orders for this visit:    1. Encounter for Medicare annual wellness exam (Primary)    2. Essential hypertension  Assessment & Plan:  Hypertension is improving with treatment.  Continue current treatment regimen.  Dietary sodium restriction.  Regular aerobic exercise.  Continue current medications.  Blood pressure will be reassessed at the next regular appointment.    Orders:  -     Comprehensive Metabolic Panel; Future  -     Lipid Panel; Future    3. Prostate cancer screening  -     Cancel: PSA Screen  -     PSA Screen; Future    4. Gastroesophageal reflux disease, unspecified whether esophagitis present  -     CBC & Differential; Future    5. Malignant neoplasm of rectum (CMS/HCC)  Assessment & Plan:  Colonoscopy records indicate pt is due for repeat screening 2420-0698. Pt would like to have it done sooner and will contact Dr Allen to discuss this.      Other  orders  -     Cancel: POC Glucose  -     Cancel: POC Glycosylated Hemoglobin (Hb A1C)  -     POC Glucose  -     POC Glycosylated Hemoglobin (Hb A1C)    Follow Up:  Return in about 1 year (around 3/22/2022) for Medicare Wellness.     An After Visit Summary and PPPS were given to the patient.

## 2021-03-22 NOTE — ASSESSMENT & PLAN NOTE
Colonoscopy records indicate pt is due for repeat screening 6520-1262. Pt would like to have it done sooner and will contact Dr Allen to discuss this.

## 2021-10-25 ENCOUNTER — CLINICAL SUPPORT (OUTPATIENT)
Dept: INTERNAL MEDICINE | Facility: CLINIC | Age: 66
End: 2021-10-25

## 2021-10-25 DIAGNOSIS — Z23 NEED FOR INFLUENZA VACCINATION: Primary | ICD-10-CM

## 2021-10-25 PROCEDURE — 90686 IIV4 VACC NO PRSV 0.5 ML IM: CPT | Performed by: PHYSICIAN ASSISTANT

## 2021-10-25 PROCEDURE — G0008 ADMIN INFLUENZA VIRUS VAC: HCPCS | Performed by: PHYSICIAN ASSISTANT

## 2021-11-04 ENCOUNTER — TELEPHONE (OUTPATIENT)
Dept: INTERNAL MEDICINE | Facility: CLINIC | Age: 66
End: 2021-11-04

## 2021-11-04 DIAGNOSIS — I10 ESSENTIAL HYPERTENSION: ICD-10-CM

## 2021-11-04 NOTE — TELEPHONE ENCOUNTER
PT STOPPED BY THE OFFICE WITH THE FOLLOWING MESSAGE    FOR ANNUAL VISIT:  1. CALCIUM CHANNEL BLOCKER IS CAUSING GINGIVAL OVERGROWTH.  SWITCHING DRUG CLASS WILL BE BENEFICIAL FOR PATIENT DENTAL TREATMENT.    2. HIGH BP READINGS 156/100 CONSISTENTLY

## 2021-11-05 RX ORDER — VALSARTAN AND HYDROCHLOROTHIAZIDE 160; 12.5 MG/1; MG/1
1 TABLET, FILM COATED ORAL DAILY
Qty: 30 TABLET | Refills: 1 | Status: SHIPPED | OUTPATIENT
Start: 2021-11-05 | End: 2021-11-28

## 2021-11-05 NOTE — TELEPHONE ENCOUNTER
He can stop the diltiazem and I will send in a higher dose of valsartan combined with a small water pill: valsartan hydrochlorothiazide 160/12.5 mg. He should follow up in about 4 weeks.

## 2021-11-07 DIAGNOSIS — I10 ESSENTIAL HYPERTENSION: ICD-10-CM

## 2021-11-07 RX ORDER — DILTIAZEM HYDROCHLORIDE 300 MG/1
CAPSULE, EXTENDED RELEASE ORAL
Qty: 90 CAPSULE | Refills: 3 | OUTPATIENT
Start: 2021-11-07

## 2021-11-07 NOTE — TELEPHONE ENCOUNTER
Last Office Visit: 03/22/21  Next Office Visit:12/0+9/21    Labs completed in past 6 months? no  Labs completed in past year? yes    Last Refill Date:   Quantity:  Refills:    Pharmacy:     Please review pended refill request for any changes needed on refills or quantities. Thank you!

## 2021-11-13 ENCOUNTER — APPOINTMENT (OUTPATIENT)
Dept: CT IMAGING | Facility: HOSPITAL | Age: 66
End: 2021-11-13

## 2021-11-13 ENCOUNTER — HOSPITAL ENCOUNTER (EMERGENCY)
Facility: HOSPITAL | Age: 66
Discharge: HOME | End: 2021-11-14
Attending: STUDENT IN AN ORGANIZED HEALTH CARE EDUCATION/TRAINING PROGRAM | Admitting: STUDENT IN AN ORGANIZED HEALTH CARE EDUCATION/TRAINING PROGRAM

## 2021-11-13 DIAGNOSIS — S01.01XA LACERATION OF SCALP, INITIAL ENCOUNTER: Primary | ICD-10-CM

## 2021-11-13 DIAGNOSIS — F10.920 ALCOHOLIC INTOXICATION WITHOUT COMPLICATION (HCC): ICD-10-CM

## 2021-11-13 DIAGNOSIS — S00.03XA CONTUSION OF SCALP, INITIAL ENCOUNTER: ICD-10-CM

## 2021-11-13 PROCEDURE — 99283 EMERGENCY DEPT VISIT LOW MDM: CPT

## 2021-11-13 PROCEDURE — 70450 CT HEAD/BRAIN W/O DYE: CPT

## 2021-11-14 VITALS
HEART RATE: 68 BPM | RESPIRATION RATE: 18 BRPM | DIASTOLIC BLOOD PRESSURE: 64 MMHG | TEMPERATURE: 97.5 F | HEIGHT: 73 IN | OXYGEN SATURATION: 97 % | WEIGHT: 187 LBS | BODY MASS INDEX: 24.78 KG/M2 | SYSTOLIC BLOOD PRESSURE: 104 MMHG

## 2021-11-14 LAB
ALBUMIN SERPL-MCNC: 4.1 G/DL (ref 3.5–5.2)
ALBUMIN/GLOB SERPL: 1.3 G/DL
ALP SERPL-CCNC: 87 U/L (ref 39–117)
ALT SERPL W P-5'-P-CCNC: 43 U/L (ref 1–41)
ANION GAP SERPL CALCULATED.3IONS-SCNC: 16 MMOL/L (ref 5–15)
AST SERPL-CCNC: 55 U/L (ref 1–40)
BASOPHILS # BLD AUTO: 0.03 10*3/MM3 (ref 0–0.2)
BASOPHILS NFR BLD AUTO: 0.8 % (ref 0–1.5)
BILIRUB SERPL-MCNC: 0.5 MG/DL (ref 0–1.2)
BILIRUB UR QL STRIP: NEGATIVE
BUN SERPL-MCNC: 10 MG/DL (ref 8–23)
BUN/CREAT SERPL: 13.3 (ref 7–25)
CALCIUM SPEC-SCNC: 8.8 MG/DL (ref 8.6–10.5)
CHLORIDE SERPL-SCNC: 104 MMOL/L (ref 98–107)
CLARITY UR: CLEAR
CO2 SERPL-SCNC: 20 MMOL/L (ref 22–29)
COLOR UR: YELLOW
CREAT SERPL-MCNC: 0.75 MG/DL (ref 0.76–1.27)
DEPRECATED RDW RBC AUTO: 50.9 FL (ref 37–54)
EOSINOPHIL # BLD AUTO: 0.04 10*3/MM3 (ref 0–0.4)
EOSINOPHIL NFR BLD AUTO: 1 % (ref 0.3–6.2)
ERYTHROCYTE [DISTWIDTH] IN BLOOD BY AUTOMATED COUNT: 13.6 % (ref 12.3–15.4)
GFR SERPL CREATININE-BSD FRML MDRD: 127 ML/MIN/1.73
GLOBULIN UR ELPH-MCNC: 3.2 GM/DL
GLUCOSE SERPL-MCNC: 98 MG/DL (ref 65–99)
GLUCOSE UR STRIP-MCNC: NEGATIVE MG/DL
HCT VFR BLD AUTO: 44.5 % (ref 37.5–51)
HGB BLD-MCNC: 15.4 G/DL (ref 13–17.7)
HGB UR QL STRIP.AUTO: NEGATIVE
IMM GRANULOCYTES # BLD AUTO: 0.01 10*3/MM3 (ref 0–0.05)
IMM GRANULOCYTES NFR BLD AUTO: 0.3 % (ref 0–0.5)
KETONES UR QL STRIP: NEGATIVE
LEUKOCYTE ESTERASE UR QL STRIP.AUTO: NEGATIVE
LIPASE SERPL-CCNC: 36 U/L (ref 13–60)
LYMPHOCYTES # BLD AUTO: 1.94 10*3/MM3 (ref 0.7–3.1)
LYMPHOCYTES NFR BLD AUTO: 48.6 % (ref 19.6–45.3)
MCH RBC QN AUTO: 34.7 PG (ref 26.6–33)
MCHC RBC AUTO-ENTMCNC: 34.6 G/DL (ref 31.5–35.7)
MCV RBC AUTO: 100.2 FL (ref 79–97)
MONOCYTES # BLD AUTO: 0.68 10*3/MM3 (ref 0.1–0.9)
MONOCYTES NFR BLD AUTO: 17 % (ref 5–12)
NEUTROPHILS NFR BLD AUTO: 1.29 10*3/MM3 (ref 1.7–7)
NEUTROPHILS NFR BLD AUTO: 32.3 % (ref 42.7–76)
NITRITE UR QL STRIP: NEGATIVE
NRBC BLD AUTO-RTO: 0 /100 WBC (ref 0–0.2)
PH UR STRIP.AUTO: <=5 [PH] (ref 5–8)
PLATELET # BLD AUTO: 189 10*3/MM3 (ref 140–450)
PMV BLD AUTO: 9.1 FL (ref 6–12)
POTASSIUM SERPL-SCNC: 3.5 MMOL/L (ref 3.5–5.2)
PROT SERPL-MCNC: 7.3 G/DL (ref 6–8.5)
PROT UR QL STRIP: NEGATIVE
RBC # BLD AUTO: 4.44 10*6/MM3 (ref 4.14–5.8)
SODIUM SERPL-SCNC: 140 MMOL/L (ref 136–145)
SP GR UR STRIP: <=1.005 (ref 1–1.03)
UROBILINOGEN UR QL STRIP: NORMAL
WBC # BLD AUTO: 3.99 10*3/MM3 (ref 3.4–10.8)

## 2021-11-14 PROCEDURE — 81003 URINALYSIS AUTO W/O SCOPE: CPT | Performed by: NURSE PRACTITIONER

## 2021-11-14 PROCEDURE — 83690 ASSAY OF LIPASE: CPT | Performed by: NURSE PRACTITIONER

## 2021-11-14 PROCEDURE — 36415 COLL VENOUS BLD VENIPUNCTURE: CPT

## 2021-11-14 PROCEDURE — 85025 COMPLETE CBC W/AUTO DIFF WBC: CPT | Performed by: NURSE PRACTITIONER

## 2021-11-14 PROCEDURE — 80053 COMPREHEN METABOLIC PANEL: CPT | Performed by: NURSE PRACTITIONER

## 2021-11-14 RX ORDER — LIDOCAINE HYDROCHLORIDE AND EPINEPHRINE 10; 10 MG/ML; UG/ML
10 INJECTION, SOLUTION INFILTRATION; PERINEURAL ONCE
Status: COMPLETED | OUTPATIENT
Start: 2021-11-14 | End: 2021-11-14

## 2021-11-14 RX ADMIN — LIDOCAINE HYDROCHLORIDE,EPINEPHRINE BITARTRATE 10 ML: 10; .01 INJECTION, SOLUTION INFILTRATION; PERINEURAL at 00:19

## 2021-11-14 NOTE — DISCHARGE INSTRUCTIONS
Home to rest.  Maintain your very best hydration and nutrition.  You can cleanse your hair as usual but be gentle around the 5 staples.  Avoid aspirin-containing products for the next 3 days.  Cool compresses can be helpful for any additional swelling.  Tylenol 1000 mg every 4 hours as needed for discomfort.  See your primary care provider in 7 days for staple removal.

## 2021-11-14 NOTE — ED PROVIDER NOTES
EMERGENCY DEPARTMENT ENCOUNTER    Pt Name: Sandro Junior  MRN: [id number]  Pt :   1955  Room Number:    Date of encounter:  2021  PCP: Dora Loya PA  ED Provider: DENIS Maurer    Historian: patient      HPI:  Chief Complaint: patient and Wife, Mirela        Context: Sandro Junior is a 66 y.o. male who presents to the ED after sustaining a fall while going up some stairs just prior to arrival.  Patient and patient's family agree that Mr. Junior had an excessive amount of alcohol this evening while watching football with friends.  His gait was unsteady associated with alcohol intoxication.  He had no loss of consciousness.  He has had no altered mental status or vomiting since sustaining this head contusion with subsequent scalp laceration.  Bleeding is controlled prior to arrival.  Mr. Junior denies any additional injury.    Review of systems is negative for fever chills or recent illness.  Negative for chest pain or cough or shortness of breath.  Negative for vomiting or diarrhea or abdominal pain.  No additional injury is suspected musculoskeletal complaints of been made.      PAST MEDICAL HISTORY  Past Medical History:   Diagnosis Date   • Epidermal inclusion cyst    • Esophageal candidiasis (CMS/HCC)    • Keloid scar    • Rectal cancer (CMS/HCC)    • Seborrheic dermatitis          PAST SURGICAL HISTORY  Past Surgical History:   Procedure Laterality Date   • COLECTOMY PARTIAL / TOTAL     • KNEE SURGERY      Left knee meniscal tear repair   • TOOTH EXTRACTION  2016 teeth   • TOTAL KNEE ARTHROPLASTY Right 2016         FAMILY HISTORY  No family history on file.      SOCIAL HISTORY  Social History     Socioeconomic History   • Marital status:    Tobacco Use   • Smoking status: Former Smoker   • Smokeless tobacco: Never Used   Vaping Use   • Vaping Use: Never used   Substance and Sexual Activity   • Alcohol use: Yes     Alcohol/week: 4.0 - 5.0 standard drinks      Types: 4 - 5 Glasses of wine per week   • Drug use: Defer   • Sexual activity: Defer         ALLERGIES  Patient has no known allergies.        REVIEW OF SYSTEMS  Review of Systems     All systems reviewed and negative except for those discussed in HPI.       PHYSICAL EXAM    I have reviewed the triage vital signs and nursing notes.    ED Triage Vitals [11/13/21 2322]   Temp Heart Rate Resp BP SpO2   97.5 °F (36.4 °C) 83 18 154/99 100 %      Temp src Heart Rate Source Patient Position BP Location FiO2 (%)   Oral Monitor Sitting Right arm --       Physical Exam  GENERAL:   Appears asleep but easily arouses with verbal stimuli.  His wife, Mirlea is a better historian on his behalf.  He is cooperative but intoxicated.  During his ED course, he has become more conversational and socially appropriate.  His vital signs are normal.  He is oxygenating well.  HENT: Nares patent.  In the back of his scalp there is a stellate laceration total 4 cm in length and partial-thickness to his scalp.  There is no local hematoma.  Neck: Patient demonstrates full range of motion without limitation or tenderness to palpation of the cervical spine.  EYES: No scleral icterus.  CV: Regular rhythm, regular rate.  No tachycardia.  No peripheral edema  RESPIRATORY: Normal effort.  No audible wheezes, rales or rhonchi.  Chest wall is nontender   ABDOMEN: Soft, nontender  MUSCULOSKELETAL: No deformities.  Pelvis is stable.  Moves all 4 extremities well.  Passive range of motion to weightbearing joints does not elicit a pain response.  NEURO: Alert, moves all extremities, follows commands.  SKIN: Warm, dry, no rash visualized.        LAB RESULTS  Recent Results (from the past 24 hour(s))   CBC Auto Differential    Collection Time: 11/14/21 12:20 AM    Specimen: Blood   Result Value Ref Range    WBC 3.99 3.40 - 10.80 10*3/mm3    RBC 4.44 4.14 - 5.80 10*6/mm3    Hemoglobin 15.4 13.0 - 17.7 g/dL    Hematocrit 44.5 37.5 - 51.0 %    .2 (H)  79.0 - 97.0 fL    MCH 34.7 (H) 26.6 - 33.0 pg    MCHC 34.6 31.5 - 35.7 g/dL    RDW 13.6 12.3 - 15.4 %    RDW-SD 50.9 37.0 - 54.0 fl    MPV 9.1 6.0 - 12.0 fL    Platelets 189 140 - 450 10*3/mm3    Neutrophil % 32.3 (L) 42.7 - 76.0 %    Lymphocyte % 48.6 (H) 19.6 - 45.3 %    Monocyte % 17.0 (H) 5.0 - 12.0 %    Eosinophil % 1.0 0.3 - 6.2 %    Basophil % 0.8 0.0 - 1.5 %    Immature Grans % 0.3 0.0 - 0.5 %    Neutrophils, Absolute 1.29 (L) 1.70 - 7.00 10*3/mm3    Lymphocytes, Absolute 1.94 0.70 - 3.10 10*3/mm3    Monocytes, Absolute 0.68 0.10 - 0.90 10*3/mm3    Eosinophils, Absolute 0.04 0.00 - 0.40 10*3/mm3    Basophils, Absolute 0.03 0.00 - 0.20 10*3/mm3    Immature Grans, Absolute 0.01 0.00 - 0.05 10*3/mm3    nRBC 0.0 0.0 - 0.2 /100 WBC   Urinalysis With Microscopic If Indicated (No Culture) - Urine, Clean Catch    Collection Time: 11/14/21 12:51 AM    Specimen: Urine, Clean Catch   Result Value Ref Range    Color, UA Yellow Yellow, Straw    Appearance, UA Clear Clear    pH, UA <=5.0 5.0 - 8.0    Specific Gravity, UA <=1.005 1.001 - 1.030    Glucose, UA Negative Negative    Ketones, UA Negative Negative    Bilirubin, UA Negative Negative    Blood, UA Negative Negative    Protein, UA Negative Negative    Leuk Esterase, UA Negative Negative    Nitrite, UA Negative Negative    Urobilinogen, UA 0.2 E.U./dL 0.2 - 1.0 E.U./dL   Comprehensive Metabolic Panel    Collection Time: 11/14/21 12:55 AM    Specimen: Blood   Result Value Ref Range    Glucose 98 65 - 99 mg/dL    BUN 10 8 - 23 mg/dL    Creatinine 0.75 (L) 0.76 - 1.27 mg/dL    Sodium 140 136 - 145 mmol/L    Potassium 3.5 3.5 - 5.2 mmol/L    Chloride 104 98 - 107 mmol/L    CO2 20.0 (L) 22.0 - 29.0 mmol/L    Calcium 8.8 8.6 - 10.5 mg/dL    Total Protein 7.3 6.0 - 8.5 g/dL    Albumin 4.10 3.50 - 5.20 g/dL    ALT (SGPT) 43 (H) 1 - 41 U/L    AST (SGOT) 55 (H) 1 - 40 U/L    Alkaline Phosphatase 87 39 - 117 U/L    Total Bilirubin 0.5 0.0 - 1.2 mg/dL    eGFR  African Amer 127  >60 mL/min/1.73    Globulin 3.2 gm/dL    A/G Ratio 1.3 g/dL    BUN/Creatinine Ratio 13.3 7.0 - 25.0    Anion Gap 16.0 (H) 5.0 - 15.0 mmol/L   Lipase    Collection Time: 11/14/21 12:55 AM    Specimen: Blood   Result Value Ref Range    Lipase 36 13 - 60 U/L       If labs were ordered, I independently reviewed the results.        RADIOLOGY  CT Head Without Contrast    Result Date: 11/13/2021  CT Head WO HISTORY: Fell and hit back of head. Laceration and dizziness. TECHNIQUE: Axial unenhanced head CT with multiplanar reformats. Radiation dose reduction techniques included automated exposure control or exposure modulation based on body size. Count of known CT and cardiac nuc med studies performed in previous 12 months: 0. COMPARISON: None. FINDINGS: Ventricular size and configuration are normal. No acute infarct or hemorrhage is identified. There are no masses. There is no skull fracture.  Posterior scalp laceration is noted.     Posterior scalp laceration, otherwise negative. Signer Name: Mingo Rush MD  Signed: 11/13/2021 11:50 PM  Workstation Name: [facility]  [facility]      PROCEDURES    Laceration Repair    Date/Time: 11/14/2021 1:23 AM  Performed by: Naila Colon APRN  Authorized by: Anand Eaton MD     Consent:     Consent obtained:  Verbal    Consent given by:  Patient and spouse    Risks discussed:  Poor cosmetic result and infection  Anesthesia (see MAR for exact dosages):     Anesthesia method:  Local infiltration    Local anesthetic:  Lidocaine 1% WITH epi  Laceration details:     Location:  Scalp    Scalp location:  Crown    Wound length (cm): 4.  Repair type:     Repair type:  Simple  Exploration:     Hemostasis achieved with:  Direct pressure    Contaminated: no    Treatment:     Wound cleansed with: Chlorhexidine and saline.    Amount of cleaning:  Extensive    Irrigation solution:  Sterile saline    Visualized foreign bodies/material removed: no    Skin  repair:     Repair method:  Staples    Number of staples:  5  Approximation:     Approximation:  Close  Post-procedure details:     Dressing:  Open (no dressing)    Patient tolerance of procedure:  Tolerated well, no immediate complications        No orders to display       MEDICATIONS GIVEN IN ER    Medications   lidocaine 1% - EPINEPHrine 1:751505 (XYLOCAINE W/EPI) 1 %-1:498412 injection 10 mL (10 mL Injection Given 11/14/21 0019)           ED Course as of 11/14/21 0136   Sun Nov 14, 2021   0135 Throughout his ED course, Mr. Junior has become more socially appropriate and more verbose and conversational.  His wife is comfortable taking him home.  His vital signs been stable throughout his ED course.  He tolerated suture repair very well.  We discussed parameters for concern that would warrant return to the emergency department.  Patient and his wife Mirela understand and concur with outpatient plan of care close follow-up [MS]      ED Course User Index  [MS] Naila Colon APRN             AS OF 01:36 EST VITALS:    BP - 103/72  HR - 66  TEMP - 97.5 °F (36.4 °C) (Oral)  O2 SATS - 96%        DIAGNOSIS  Final diagnoses:   Laceration of scalp, initial encounter   Contusion of scalp, initial encounter   Alcoholic intoxication without complication (HCC)         DISPOSITION    DISCHARGE    Patient discharged in stable condition.    Reviewed implications of results, diagnosis, meds, responsibility to follow up, warning signs and symptoms of possible worsening, potential complications and reasons to return to ER.    Patient/Family voiced understanding of above instructions.    Discussed plan for discharge, as there is no emergent indication for admission.  Pt/family is agreeable and understands need for follow up and possible repeat testing.  Pt/family is aware that discharge does not mean that nothing is wrong but that it indicates no emergency is currently present that requires admission and they must continue  care with follow-up as given below or with a physician of their choice.     FOLLOW-UP  Dora Loya PA  [address] [place] [id number] [phone]    Go in 1 week  For suture removal         Medication List      No changes were made to your prescriptions during this visit.                  Naila Colon, APRN  11/14/21 0136

## 2021-11-24 ENCOUNTER — CLINICAL SUPPORT (OUTPATIENT)
Dept: INTERNAL MEDICINE | Facility: CLINIC | Age: 66
End: 2021-11-24

## 2021-11-24 DIAGNOSIS — Z48.02 ENCOUNTER FOR STAPLE REMOVAL: ICD-10-CM

## 2021-11-28 RX ORDER — VALSARTAN AND HYDROCHLOROTHIAZIDE 160; 12.5 MG/1; MG/1
TABLET, FILM COATED ORAL
Qty: 30 TABLET | Refills: 1 | Status: SHIPPED | OUTPATIENT
Start: 2021-11-28 | End: 2021-12-09

## 2021-12-09 ENCOUNTER — OFFICE VISIT (OUTPATIENT)
Dept: INTERNAL MEDICINE | Facility: CLINIC | Age: 66
End: 2021-12-09

## 2021-12-09 VITALS
BODY MASS INDEX: 23.17 KG/M2 | HEART RATE: 72 BPM | DIASTOLIC BLOOD PRESSURE: 62 MMHG | WEIGHT: 175.6 LBS | OXYGEN SATURATION: 98 % | SYSTOLIC BLOOD PRESSURE: 108 MMHG

## 2021-12-09 DIAGNOSIS — I10 ESSENTIAL HYPERTENSION: Primary | ICD-10-CM

## 2021-12-09 PROCEDURE — 99213 OFFICE O/P EST LOW 20 MIN: CPT | Performed by: PHYSICIAN ASSISTANT

## 2021-12-09 RX ORDER — VALSARTAN 80 MG/1
80 TABLET ORAL DAILY
Qty: 30 TABLET | Refills: 1 | Status: SHIPPED | OUTPATIENT
Start: 2021-12-09 | End: 2022-01-04

## 2021-12-09 NOTE — PROGRESS NOTES
Chief Complaint   Patient presents with   • Hypertension     Follow Up   • Heartburn       Subjective     Sandro Junior is a 66 y.o. male.        History of Present Illness     Pt called to say that his dentist thought he needed to go off of the diltiazem. He was switched to valsartan/hctz 160/12.5. He did not like the way he felt and was going to bathroom frequently. He switched to taking valsartan 160 mg, using his old 40 mg tablets. It is working well and well tolerated.        Current Outpatient Medications:   •  dilTIAZem (TIAZAC) 300 MG 24 hr capsule, Take 1 capsule by mouth Daily., Disp: 90 capsule, Rfl: 3  •  ketoconazole (NIZORAL) 2 % cream, Apply  topically to the appropriate area as directed Daily., Disp: 30 g, Rfl: 4  •  latanoprost (XALATAN) 0.005 % ophthalmic solution, , Disp: , Rfl: 0  •  omeprazole (priLOSEC) 40 MG capsule, Take 1 capsule by mouth Daily., Disp: 30 capsule, Rfl: 5  •  triamcinolone (KENALOG) 0.025 % cream, Apply  topically to the appropriate area as directed 2 (Two) Times a Day., Disp: , Rfl:   •  valsartan (DIOVAN) 80 MG tablet, Take 1 tablet by mouth Daily., Disp: 30 tablet, Rfl: 1     PMFSH  The following portions of the patient's history were reviewed and updated as appropriate: allergies, current medications, past family history, past medical history, past social history, past surgical history and problem list.    Review of Systems   Constitutional: Negative for activity change, appetite change and fatigue.   HENT: Negative for congestion and rhinorrhea.    Respiratory: Negative for chest tightness and shortness of breath.    Cardiovascular: Negative for chest pain and palpitations.   Gastrointestinal: Negative for abdominal pain.   Genitourinary: Negative for dysuria.   Musculoskeletal: Negative for arthralgias and myalgias.   Neurological: Negative for dizziness, weakness, light-headedness and headaches.   Psychiatric/Behavioral: Negative for dysphoric mood. The patient is  not nervous/anxious.        Objective   /62   Pulse 72   Wt 79.7 kg (175 lb 9.6 oz)   SpO2 98%   BMI 23.17 kg/m²     Physical Exam  Constitutional:       Appearance: He is well-developed.   HENT:      Head: Normocephalic and atraumatic.      Right Ear: External ear normal.      Left Ear: External ear normal.   Eyes:      Conjunctiva/sclera: Conjunctivae normal.   Cardiovascular:      Rate and Rhythm: Normal rate and regular rhythm.   Pulmonary:      Effort: Pulmonary effort is normal.      Breath sounds: Normal breath sounds.   Musculoskeletal:         General: Normal range of motion.      Cervical back: Normal range of motion.   Skin:     General: Skin is warm and dry.   Psychiatric:         Behavior: Behavior normal.              ASSESSMENT/PLAN    Diagnoses and all orders for this visit:    1. Essential hypertension (Primary)  Assessment & Plan:  Hypertension is improving with treatment.  Dietary sodium restriction.  Regular aerobic exercise.  Medication changes per orders.  Ambulatory blood pressure monitoring. Start 80 mg of valsartan daily.   Blood pressure will be reassessed in 4 weeks.    Orders:  -     valsartan (DIOVAN) 80 MG tablet; Take 1 tablet by mouth Daily.  Dispense: 30 tablet; Refill: 1           Return in about 4 weeks (around 1/6/2022) for Follow up.

## 2021-12-11 NOTE — ASSESSMENT & PLAN NOTE
Hypertension is improving with treatment.  Dietary sodium restriction.  Regular aerobic exercise.  Medication changes per orders.  Ambulatory blood pressure monitoring. Start 80 mg of valsartan daily.   Blood pressure will be reassessed in 4 weeks.

## 2021-12-20 RX ORDER — VALSARTAN AND HYDROCHLOROTHIAZIDE 160; 12.5 MG/1; MG/1
TABLET, FILM COATED ORAL
Qty: 30 TABLET | Refills: 1 | OUTPATIENT
Start: 2021-12-20

## 2022-01-02 DIAGNOSIS — I10 ESSENTIAL HYPERTENSION: ICD-10-CM

## 2022-01-04 RX ORDER — VALSARTAN 80 MG/1
TABLET ORAL
Qty: 90 TABLET | Refills: 1 | Status: SHIPPED | OUTPATIENT
Start: 2022-01-04 | End: 2022-01-10 | Stop reason: SDUPTHER

## 2022-01-10 ENCOUNTER — OFFICE VISIT (OUTPATIENT)
Dept: INTERNAL MEDICINE | Facility: CLINIC | Age: 67
End: 2022-01-10

## 2022-01-10 ENCOUNTER — LAB (OUTPATIENT)
Dept: LAB | Facility: HOSPITAL | Age: 67
End: 2022-01-10

## 2022-01-10 VITALS
SYSTOLIC BLOOD PRESSURE: 148 MMHG | OXYGEN SATURATION: 97 % | DIASTOLIC BLOOD PRESSURE: 82 MMHG | HEART RATE: 77 BPM | BODY MASS INDEX: 23.33 KG/M2 | WEIGHT: 176.8 LBS

## 2022-01-10 DIAGNOSIS — I10 ESSENTIAL HYPERTENSION: ICD-10-CM

## 2022-01-10 LAB
ANION GAP SERPL CALCULATED.3IONS-SCNC: 6.1 MMOL/L (ref 5–15)
BUN SERPL-MCNC: 7 MG/DL (ref 8–23)
BUN/CREAT SERPL: 8.1 (ref 7–25)
CALCIUM SPEC-SCNC: 10.3 MG/DL (ref 8.6–10.5)
CHLORIDE SERPL-SCNC: 103 MMOL/L (ref 98–107)
CO2 SERPL-SCNC: 27.9 MMOL/L (ref 22–29)
CREAT SERPL-MCNC: 0.86 MG/DL (ref 0.76–1.27)
GFR SERPL CREATININE-BSD FRML MDRD: 109 ML/MIN/1.73
GLUCOSE SERPL-MCNC: 93 MG/DL (ref 65–99)
POTASSIUM SERPL-SCNC: 5 MMOL/L (ref 3.5–5.2)
SODIUM SERPL-SCNC: 137 MMOL/L (ref 136–145)

## 2022-01-10 PROCEDURE — 99213 OFFICE O/P EST LOW 20 MIN: CPT | Performed by: PHYSICIAN ASSISTANT

## 2022-01-10 PROCEDURE — 80048 BASIC METABOLIC PNL TOTAL CA: CPT

## 2022-01-10 RX ORDER — VALSARTAN 80 MG/1
80 TABLET ORAL DAILY
Qty: 90 TABLET | Refills: 1 | Status: SHIPPED | OUTPATIENT
Start: 2022-01-10 | End: 2022-10-03

## 2022-01-10 NOTE — PROGRESS NOTES
Chief Complaint   Patient presents with   • Hypertension     Follow Up       Subjective     Sandro Junior is a 66 y.o. male.        History of Present Illness     Pt has been monitoring his BP at home and has running 120-130/70-80. No dizziness or lightheadedness. He is feeling well.     No longer taking omeprazole, does not have any reflux symptoms.      Current Outpatient Medications:   •  ketoconazole (NIZORAL) 2 % cream, Apply  topically to the appropriate area as directed Daily., Disp: 30 g, Rfl: 4  •  latanoprost (XALATAN) 0.005 % ophthalmic solution, , Disp: , Rfl: 0  •  triamcinolone (KENALOG) 0.025 % cream, Apply  topically to the appropriate area as directed 2 (Two) Times a Day., Disp: , Rfl:   •  valsartan (DIOVAN) 80 MG tablet, Take 1 tablet by mouth Daily., Disp: 90 tablet, Rfl: 1     PMFSH  The following portions of the patient's history were reviewed and updated as appropriate: allergies, current medications, past family history, past medical history, past social history, past surgical history and problem list.    Review of Systems   Constitutional: Negative for activity change, appetite change and fatigue.   HENT: Negative for congestion and rhinorrhea.    Respiratory: Negative for chest tightness and shortness of breath.    Cardiovascular: Negative for chest pain and palpitations.   Gastrointestinal: Negative for abdominal pain.   Genitourinary: Negative for dysuria.   Musculoskeletal: Negative for arthralgias and myalgias.   Neurological: Negative for dizziness, weakness, light-headedness and headaches.   Psychiatric/Behavioral: Negative for dysphoric mood. The patient is not nervous/anxious.        Objective   /82   Pulse 77   Wt 80.2 kg (176 lb 12.8 oz)   SpO2 97%   BMI 23.33 kg/m²     Physical Exam  Constitutional:       Appearance: He is well-developed.   HENT:      Head: Normocephalic and atraumatic.      Right Ear: External ear normal.      Left Ear: External ear normal.   Eyes:       Conjunctiva/sclera: Conjunctivae normal.   Cardiovascular:      Rate and Rhythm: Normal rate and regular rhythm.   Pulmonary:      Effort: Pulmonary effort is normal.      Breath sounds: Normal breath sounds.   Musculoskeletal:         General: Normal range of motion.      Cervical back: Normal range of motion.   Skin:     General: Skin is warm and dry.   Psychiatric:         Behavior: Behavior normal.              ASSESSMENT/PLAN    Diagnoses and all orders for this visit:    1. Essential hypertension  Assessment & Plan:  Hypertension is improving with treatment.  Continue current treatment regimen.  Dietary sodium restriction.  Weight loss.  Regular aerobic exercise.  Continue current medications.  Ambulatory blood pressure monitoring.  Blood pressure will be reassessed at the next regular appointment.    Orders:  -     valsartan (DIOVAN) 80 MG tablet; Take 1 tablet by mouth Daily.  Dispense: 90 tablet; Refill: 1  -     Basic Metabolic Panel; Future           Return for Next scheduled follow up.

## 2022-04-05 ENCOUNTER — LAB (OUTPATIENT)
Dept: LAB | Facility: HOSPITAL | Age: 67
End: 2022-04-05

## 2022-04-05 ENCOUNTER — OFFICE VISIT (OUTPATIENT)
Dept: INTERNAL MEDICINE | Facility: CLINIC | Age: 67
End: 2022-04-05

## 2022-04-05 VITALS
OXYGEN SATURATION: 98 % | BODY MASS INDEX: 23.43 KG/M2 | HEART RATE: 66 BPM | WEIGHT: 177.6 LBS | DIASTOLIC BLOOD PRESSURE: 86 MMHG | SYSTOLIC BLOOD PRESSURE: 144 MMHG

## 2022-04-05 DIAGNOSIS — K21.9 GASTROESOPHAGEAL REFLUX DISEASE, UNSPECIFIED WHETHER ESOPHAGITIS PRESENT: ICD-10-CM

## 2022-04-05 DIAGNOSIS — N52.9 ERECTILE DYSFUNCTION, UNSPECIFIED ERECTILE DYSFUNCTION TYPE: ICD-10-CM

## 2022-04-05 DIAGNOSIS — L98.9 FACIAL LESION: ICD-10-CM

## 2022-04-05 DIAGNOSIS — I10 ESSENTIAL HYPERTENSION: ICD-10-CM

## 2022-04-05 DIAGNOSIS — Z00.00 ENCOUNTER FOR MEDICARE ANNUAL WELLNESS EXAM: Primary | ICD-10-CM

## 2022-04-05 DIAGNOSIS — Z23 NEED FOR PNEUMOCOCCAL VACCINATION: ICD-10-CM

## 2022-04-05 DIAGNOSIS — Z12.5 PROSTATE CANCER SCREENING: ICD-10-CM

## 2022-04-05 LAB
ALBUMIN SERPL-MCNC: 4.5 G/DL (ref 3.5–5.2)
ALBUMIN/GLOB SERPL: 1.6 G/DL
ALP SERPL-CCNC: 73 U/L (ref 39–117)
ALT SERPL W P-5'-P-CCNC: 17 U/L (ref 1–41)
ANION GAP SERPL CALCULATED.3IONS-SCNC: 10.2 MMOL/L (ref 5–15)
AST SERPL-CCNC: 26 U/L (ref 1–40)
BASOPHILS # BLD AUTO: 0.02 10*3/MM3 (ref 0–0.2)
BASOPHILS NFR BLD AUTO: 0.6 % (ref 0–1.5)
BILIRUB SERPL-MCNC: 0.9 MG/DL (ref 0–1.2)
BUN SERPL-MCNC: 8 MG/DL (ref 8–23)
BUN/CREAT SERPL: 9.3 (ref 7–25)
CALCIUM SPEC-SCNC: 9.8 MG/DL (ref 8.6–10.5)
CHLORIDE SERPL-SCNC: 102 MMOL/L (ref 98–107)
CHOLEST SERPL-MCNC: 161 MG/DL (ref 0–200)
CO2 SERPL-SCNC: 26.8 MMOL/L (ref 22–29)
CREAT SERPL-MCNC: 0.86 MG/DL (ref 0.76–1.27)
DEPRECATED RDW RBC AUTO: 51.8 FL (ref 37–54)
EGFRCR SERPLBLD CKD-EPI 2021: 96.1 ML/MIN/1.73
EOSINOPHIL # BLD AUTO: 0.06 10*3/MM3 (ref 0–0.4)
EOSINOPHIL NFR BLD AUTO: 1.7 % (ref 0.3–6.2)
ERYTHROCYTE [DISTWIDTH] IN BLOOD BY AUTOMATED COUNT: 14.9 % (ref 12.3–15.4)
GLOBULIN UR ELPH-MCNC: 2.8 GM/DL
GLUCOSE SERPL-MCNC: 110 MG/DL (ref 65–99)
HCT VFR BLD AUTO: 45 % (ref 37.5–51)
HDLC SERPL-MCNC: 81 MG/DL (ref 40–60)
HGB BLD-MCNC: 14.8 G/DL (ref 13–17.7)
IMM GRANULOCYTES # BLD AUTO: 0 10*3/MM3 (ref 0–0.05)
IMM GRANULOCYTES NFR BLD AUTO: 0 % (ref 0–0.5)
LDLC SERPL CALC-MCNC: 65 MG/DL (ref 0–100)
LDLC/HDLC SERPL: 0.79 {RATIO}
LYMPHOCYTES # BLD AUTO: 1.51 10*3/MM3 (ref 0.7–3.1)
LYMPHOCYTES NFR BLD AUTO: 41.6 % (ref 19.6–45.3)
MCH RBC QN AUTO: 31.1 PG (ref 26.6–33)
MCHC RBC AUTO-ENTMCNC: 32.9 G/DL (ref 31.5–35.7)
MCV RBC AUTO: 94.5 FL (ref 79–97)
MONOCYTES # BLD AUTO: 0.59 10*3/MM3 (ref 0.1–0.9)
MONOCYTES NFR BLD AUTO: 16.3 % (ref 5–12)
NEUTROPHILS NFR BLD AUTO: 1.45 10*3/MM3 (ref 1.7–7)
NEUTROPHILS NFR BLD AUTO: 39.8 % (ref 42.7–76)
NRBC BLD AUTO-RTO: 0 /100 WBC (ref 0–0.2)
PLATELET # BLD AUTO: 213 10*3/MM3 (ref 140–450)
PMV BLD AUTO: 10.2 FL (ref 6–12)
POTASSIUM SERPL-SCNC: 4.2 MMOL/L (ref 3.5–5.2)
PROT SERPL-MCNC: 7.3 G/DL (ref 6–8.5)
PSA SERPL-MCNC: 0.67 NG/ML (ref 0–4)
RBC # BLD AUTO: 4.76 10*6/MM3 (ref 4.14–5.8)
SODIUM SERPL-SCNC: 139 MMOL/L (ref 136–145)
TESTOST SERPL-MCNC: 764 NG/DL (ref 193–740)
TRIGL SERPL-MCNC: 80 MG/DL (ref 0–150)
VLDLC SERPL-MCNC: 15 MG/DL (ref 5–40)
WBC NRBC COR # BLD: 3.63 10*3/MM3 (ref 3.4–10.8)

## 2022-04-05 PROCEDURE — 99213 OFFICE O/P EST LOW 20 MIN: CPT | Performed by: PHYSICIAN ASSISTANT

## 2022-04-05 PROCEDURE — 1160F RVW MEDS BY RX/DR IN RCRD: CPT | Performed by: PHYSICIAN ASSISTANT

## 2022-04-05 PROCEDURE — G0009 ADMIN PNEUMOCOCCAL VACCINE: HCPCS | Performed by: PHYSICIAN ASSISTANT

## 2022-04-05 PROCEDURE — 36415 COLL VENOUS BLD VENIPUNCTURE: CPT

## 2022-04-05 PROCEDURE — 80053 COMPREHEN METABOLIC PANEL: CPT

## 2022-04-05 PROCEDURE — 1170F FXNL STATUS ASSESSED: CPT | Performed by: PHYSICIAN ASSISTANT

## 2022-04-05 PROCEDURE — 85025 COMPLETE CBC W/AUTO DIFF WBC: CPT

## 2022-04-05 PROCEDURE — G0439 PPPS, SUBSEQ VISIT: HCPCS | Performed by: PHYSICIAN ASSISTANT

## 2022-04-05 PROCEDURE — 84403 ASSAY OF TOTAL TESTOSTERONE: CPT

## 2022-04-05 PROCEDURE — 80061 LIPID PANEL: CPT

## 2022-04-05 PROCEDURE — G0103 PSA SCREENING: HCPCS

## 2022-04-05 PROCEDURE — 90732 PPSV23 VACC 2 YRS+ SUBQ/IM: CPT | Performed by: PHYSICIAN ASSISTANT

## 2022-04-05 PROCEDURE — 1126F AMNT PAIN NOTED NONE PRSNT: CPT | Performed by: PHYSICIAN ASSISTANT

## 2022-04-05 RX ORDER — SILDENAFIL 50 MG/1
50 TABLET, FILM COATED ORAL DAILY PRN
Qty: 30 TABLET | Refills: 1 | Status: ON HOLD | OUTPATIENT
Start: 2022-04-05

## 2022-04-05 NOTE — PROGRESS NOTES
Immunization  Immunization performed in left deltoid by Tahira Martin MA. Patient tolerated the procedure well without complications.  04/05/22   Tahira Martin MA

## 2022-04-05 NOTE — PROGRESS NOTES
The ABCs of the Annual Wellness Visit  Subsequent Medicare Wellness Visit    Chief Complaint   Patient presents with   • Medicare Wellness-subsequent   • Heartburn   • Hypertension      Subjective    History of Present Illness:  Sandro Junior is a 67 y.o. male who presents for a Subsequent Medicare Wellness Visit.    Pt has a lesion on the right side of his face that he would like to have removed. It is bothersome to him and is irritated when he wears his mask.    His blood pressure has been well controlled when he checks it at home. Also normal when he has been going for dental procedures recently.    The following portions of the patient's history were reviewed and   updated as appropriate: allergies, current medications, past family history, past medical history, past social history, past surgical history and problem list.    Compared to one year ago, the patient feels his physical   health is better.    Compared to one year ago, the patient feels his mental   health is the same.    Recent Hospitalizations:  He was not admitted to the hospital during the last year.       Current Medical Providers:  Patient Care Team:  Dora Loya PA as PCP - General (Internal Medicine)    Outpatient Medications Prior to Visit   Medication Sig Dispense Refill   • ketoconazole (NIZORAL) 2 % cream Apply  topically to the appropriate area as directed Daily. 30 g 4   • latanoprost (XALATAN) 0.005 % ophthalmic solution   0   • triamcinolone (KENALOG) 0.025 % cream Apply  topically to the appropriate area as directed 2 (Two) Times a Day.     • valsartan (DIOVAN) 80 MG tablet Take 1 tablet by mouth Daily. 90 tablet 1     No facility-administered medications prior to visit.       No opioid medication identified on active medication list. I have reviewed chart for other potential  high risk medication/s and harmful drug interactions in the elderly.          Aspirin is not on active medication list.  Aspirin use is not indicated  based on review of current medical condition/s. Risk of harm outweighs potential benefits.  .    Patient Active Problem List   Diagnosis   • Essential hypertension   • Chronic pain   • Osteoarthritis of knee   • Prolonged depressive adjustment reaction   • Acute prostatitis   • Atopic rhinitis   • Erectile dysfunction of nonorganic origin   • Glaucoma suspect   • Onychomycosis of toenail   • Malignant neoplasm of rectum (HCC)   • Seborrheic eczema   • Keloid scar   • Pain of right lower extremity   • Esophageal reflux     Advance Care Planning  Advance Directive is not on file.  ACP discussion was held with the patient during this visit. Patient does not have an advance directive, information provided.    Review of Systems   Constitutional: Negative for activity change, appetite change, diaphoresis and fatigue.   HENT: Negative for congestion, postnasal drip, rhinorrhea and sneezing.    Respiratory: Negative for chest tightness, shortness of breath and wheezing.    Cardiovascular: Negative for chest pain and palpitations.   Gastrointestinal: Negative for constipation, diarrhea and nausea.   Genitourinary: Negative for dysuria.   Musculoskeletal: Negative for arthralgias and myalgias.   Neurological: Negative for dizziness, syncope, weakness and light-headedness.   Psychiatric/Behavioral: Negative for dysphoric mood. The patient is not nervous/anxious.         Objective    Vitals:    04/05/22 0833   BP: 144/86   Pulse: 66   SpO2: 98%   Weight: 80.6 kg (177 lb 9.6 oz)   PainSc: 0-No pain     BMI Readings from Last 1 Encounters:   04/05/22 23.43 kg/m²   BMI is within normal parameters. No follow-up required.    Does the patient have evidence of cognitive impairment? No    Physical Exam  Vitals and nursing note reviewed.   Constitutional:       Appearance: He is well-developed.   HENT:      Head: Normocephalic.      Right Ear: Hearing, tympanic membrane, ear canal and external ear normal.      Left Ear: Hearing,  tympanic membrane, ear canal and external ear normal.      Nose: Nose normal.   Eyes:      Conjunctiva/sclera: Conjunctivae normal.      Pupils: Pupils are equal, round, and reactive to light.   Cardiovascular:      Rate and Rhythm: Normal rate and regular rhythm.      Heart sounds: Normal heart sounds.   Pulmonary:      Effort: Pulmonary effort is normal.      Breath sounds: Normal breath sounds. No decreased breath sounds, wheezing, rhonchi or rales.   Musculoskeletal:         General: Normal range of motion.      Cervical back: Normal range of motion.   Skin:     General: Skin is warm and dry.      Findings: Lesion (protruding dark lesion on right cheek) present.   Neurological:      Mental Status: He is alert.   Psychiatric:         Behavior: Behavior normal.       Lab Results   Component Value Date    TRIG 80 04/05/2022    HDL 81 (H) 04/05/2022    LDL 65 04/05/2022    VLDL 15 04/05/2022            HEALTH RISK ASSESSMENT    Smoking Status:  Social History     Tobacco Use   Smoking Status Former Smoker   • Types: Cigars   • Quit date: 1/1/2019   • Years since quitting: 3.2   Smokeless Tobacco Never Used   Tobacco Comment    2 cigars a week     Alcohol Consumption:  Social History     Substance and Sexual Activity   Alcohol Use Yes   • Alcohol/week: 4.0 - 5.0 standard drinks   • Types: 4 - 5 Glasses of wine per week     Fall Risk Screen:    AYANNAADI Fall Risk Assessment was completed, and patient is at HIGH risk for falls. Assessment completed on:4/5/2022    Depression Screening:  PHQ-2/PHQ-9 Depression Screening 4/5/2022   Retired Total Score -   Little Interest or Pleasure in Doing Things 0-->not at all   Feeling Down, Depressed or Hopeless 0-->not at all   PHQ-9: Brief Depression Severity Measure Score 0       Health Habits and Functional and Cognitive Screening:  Functional & Cognitive Status 4/5/2022   Do you have difficulty preparing food and eating? No   Do you have difficulty bathing yourself, getting  dressed or grooming yourself? No   Do you have difficulty using the toilet? No   Do you have difficulty moving around from place to place? No   Do you have trouble with steps or getting out of a bed or a chair? No   Current Diet Well Balanced Diet   Dental Exam Up to date        Dental Exam Comment last seen 3/24/22   Eye Exam Up to date   Exercise (times per week) 4 times per week   Current Exercises Include Walking   Current Exercise Activities Include -   Do you need help using the phone?  No   Are you deaf or do you have serious difficulty hearing?  No   Do you need help with transportation? No   Do you need help shopping? No   Do you need help preparing meals?  No   Do you need help with housework?  No   Do you need help with laundry? No   Do you need help taking your medications? No   Do you need help managing money? No   Do you ever drive or ride in a car without wearing a seat belt? No   Have you felt unusual stress, anger or loneliness in the last month? No   Who do you live with? Spouse   If you need help, do you have trouble finding someone available to you? No   Have you been bothered in the last four weeks by sexual problems? Yes   Do you have difficulty concentrating, remembering or making decisions? No       Age-appropriate Screening Schedule:  Refer to the list below for future screening recommendations based on patient's age, sex and/or medical conditions. Orders for these recommended tests are listed in the plan section. The patient has been provided with a written plan.    Health Maintenance   Topic Date Due   • ZOSTER VACCINE (1 of 2) Never done   • INFLUENZA VACCINE  08/01/2022   • COLONOSCOPY  02/05/2024   • TDAP/TD VACCINES (2 - Td or Tdap) 04/26/2028              Assessment/Plan   CMS Preventative Services Quick Reference  Risk Factors Identified During Encounter  Immunizations Discussed/Encouraged (specific Immunizations; Pneumococcal 23 and Shingrix  The above risks/problems have been  discussed with the patient.  Follow up actions/plans if indicated are seen below in the Assessment/Plan Section.  Pertinent information has been shared with the patient in the After Visit Summary.    Diagnoses and all orders for this visit:    1. Encounter for Medicare annual wellness exam (Primary)    2. Facial lesion  -     Ambulatory Referral to Dermatology    3. Gastroesophageal reflux disease, unspecified whether esophagitis present  -     CBC & Differential; Future    4. Essential hypertension  Assessment & Plan:  Hypertension is improving with treatment.  Continue current treatment regimen.  Dietary sodium restriction.  Weight loss.  Regular aerobic exercise.  Continue current medications.  Ambulatory blood pressure monitoring.  Blood pressure will be reassessed at the next regular appointment.    Orders:  -     Comprehensive Metabolic Panel; Future  -     Lipid Panel; Future    5. Prostate cancer screening  -     PSA Screen; Future    6. Erectile dysfunction, unspecified erectile dysfunction type  Comments:  Check testosterone level. Trial of viagra as ordered.  Orders:  -     Testosterone; Future  -     sildenafil (VIAGRA) 50 MG tablet; Take 1 tablet by mouth Daily As Needed for Erectile Dysfunction.  Dispense: 30 tablet; Refill: 1    7. Need for pneumococcal vaccination  -     Pneumococcal Polysaccharide Vaccine 23-Valent (PPSV23) Greater Than or Equal To 3yo Subcutaneous / IM      Follow Up:   Return in about 1 year (around 4/5/2023) for Medicare Wellness.     An After Visit Summary and PPPS were made available to the patient.

## 2022-04-15 RX ORDER — PEG-3350, SODIUM SULFATE, SODIUM CHLORIDE, POTASSIUM CHLORIDE, SODIUM ASCORBATE AND ASCORBIC ACID 7.5-2.691G
KIT ORAL
Qty: 1 EACH | Refills: 0 | Status: ON HOLD | OUTPATIENT
Start: 2022-04-15 | End: 2023-03-17

## 2022-04-25 ENCOUNTER — TELEPHONE (OUTPATIENT)
Dept: GASTROENTEROLOGY | Facility: CLINIC | Age: 67
End: 2022-04-25

## 2022-04-25 NOTE — TELEPHONE ENCOUNTER
PATIENT CALLED THE HOSPITAL AND TOLD THEM HE WAS HAVING ISSUES WITH HIS PREP AT THE PHARMACY. I SPOKE TO SOMEONE AND THEY STATED IT WASN'T COVERED SO WE'RE CHANGING THE PRESCRIPTION TO [facility].     SPOKE TO : [facility]   [phone] SHE CONFIRMED AND HAS CHANGED THE PRESCRIPTION FOR HIM. SHE STATED IT WOULD BE A $2 COPAY FOR THE PATIENT AND THEY COULD PICK IT UP TOMORROW.    CALLED PATIENT BACK, INFORMED HIM THAT I HAD CHANGED HIS PRESCRIPTION AND THEY WILL HAVE IT READY SOMETIME TOMORROW, IF HE HAS ANY FURTHER ISSUES TO CALL US BACK. I ALSO INFORMED HIM THAT STONE SAID IT WOULD BE $2 COPAY. HE IS OKAY WITH THIS PRESCRIPTION.

## 2022-04-26 NOTE — TELEPHONE ENCOUNTER
"PATIENT CALLED ME, AND SAID THAT HE IS THERE TO  HIS PREP AND THE PHARMACY SAYS IT WILL BE $50.     I CALLED THE PHARMACY MULTIPLE TIMES, NO ANSWER ON THE PROVIDER LINE TO \"SPEAK TO A PHARMACY STAFF MEMBER\" IT WENT TO VOICEMAIL.     I SWITCHED BACK OVER TO MR. PIERCE AND ASKED HIM TO LET ME SPEAK WITH THE PHARMACIST, SHE TOLD HIM THAT SHE CANNOT TAKE WORD OVER THE PHONE LIKE THAT.     I CALLED THREE MORE TIMES TO THE [facility] BEFORE MARYAN ELIZONDO THE PHARMACIST ON TODAY'S SHIFT ANSWERED. I ASKED HER WHY THE PATIENT'S COPAY WAS $50 AND WHAT PREP THAT THEY WERE TRYING TO GIVE HIM. SHE INFORMED ME THAT THEY'RE GIVING HIM SUPREP.     I TOLD HER ABOUT MY ENCOUNTER WITH STONE YESTERDAY- STONE HAD STATED THAT THE [facility] WOULD BE IN FOR TODAY AND THAT HIS COPAY WOULD BE $2.     MARYAN- TOLD ME THAT [facility] WAS ON BACK ORDER RIGHT NOW AND HAD BEEN FOR OVER A YEAR. ALSO, [facility] WAS A PHARMACY FLOAT, AND SHE DOESN'T USUALLY WORK THERE AT THAT STORE SO, SHE SHOULDN'T HAVE TOLD ME THAT.     I ASKED MARYAN WHAT SHE WAS GOING TO DO TO FIX THIS PROBLEM BECAUSE OUR PATIENT'S COLONOSCOPY IS TOMORROW AND SHE TOLD ME THERE IS NOTHING SHE CAN DO. I INFORMED HER IT WAS VERY UNETHICAL FOR OUR OFFICE AND OUR PATIENT TO BE TOLD HE WOULD HAVE A COPAY OF $2 AND THEN HE GETS THERE AND THE COPAY IS $50, THAT HE HAS NOT ANTICIPATED FOR.     SHE TOLD ME THERE IS NOTHING SHE CAN DO- I ASKED HER IF THERE WAS SOMEONE HIGHER UP IN MANAGEMENT THAT I COULD SPEAK WITH AND SHE TOLD ME NO ONE IS THERE THAT'S A HIGHER RANK THAN HER.     I OFFERED OUR PATIENT, MR. PIERCE A SAMPLE PREP OF SUTAB HERE IN THE OFFICE, HE SAID HE CAN'T KEEP RUNNING ALL OVER [place]. HE WAS JUST GOING TO PAY THE PRICE AND SEE US TOMORROW.     I APOLOGIZED NUMEROUSLY TO MR. PIERCE, HE SAID IT WAS OKAY HE WOULD SEE US TOMORROW.   "

## 2022-04-27 ENCOUNTER — OUTSIDE FACILITY SERVICE (OUTPATIENT)
Dept: GASTROENTEROLOGY | Facility: CLINIC | Age: 67
End: 2022-04-27

## 2022-04-27 PROCEDURE — 88305 TISSUE EXAM BY PATHOLOGIST: CPT | Performed by: INTERNAL MEDICINE

## 2022-04-27 PROCEDURE — 45380 COLONOSCOPY AND BIOPSY: CPT | Performed by: INTERNAL MEDICINE

## 2022-04-27 PROCEDURE — 99213 OFFICE O/P EST LOW 20 MIN: CPT | Performed by: INTERNAL MEDICINE

## 2022-04-27 PROCEDURE — 45385 COLONOSCOPY W/LESION REMOVAL: CPT | Performed by: INTERNAL MEDICINE

## 2022-04-28 ENCOUNTER — LAB REQUISITION (OUTPATIENT)
Dept: LAB | Facility: HOSPITAL | Age: 67
End: 2022-04-28

## 2022-04-28 DIAGNOSIS — Z86.010 PERSONAL HISTORY OF COLONIC POLYPS: ICD-10-CM

## 2022-04-28 DIAGNOSIS — D12.3 BENIGN NEOPLASM OF TRANSVERSE COLON: ICD-10-CM

## 2022-04-28 DIAGNOSIS — D12.2 BENIGN NEOPLASM OF ASCENDING COLON: ICD-10-CM

## 2022-05-02 DIAGNOSIS — Z01.812 ENCOUNTER FOR PREPROCEDURE SCREENING LABORATORY TESTING FOR COVID-19: Primary | ICD-10-CM

## 2022-05-02 DIAGNOSIS — Z20.822 ENCOUNTER FOR PREPROCEDURE SCREENING LABORATORY TESTING FOR COVID-19: Primary | ICD-10-CM

## 2022-05-08 ENCOUNTER — CLINICAL SUPPORT NO REQUIREMENTS (OUTPATIENT)
Dept: PREADMISSION TESTING | Facility: HOSPITAL | Age: 67
End: 2022-05-08

## 2022-05-08 DIAGNOSIS — Z01.812 ENCOUNTER FOR PREPROCEDURE SCREENING LABORATORY TESTING FOR COVID-19: ICD-10-CM

## 2022-05-08 DIAGNOSIS — Z20.822 ENCOUNTER FOR PREPROCEDURE SCREENING LABORATORY TESTING FOR COVID-19: ICD-10-CM

## 2022-05-08 LAB — SARS-COV-2 RNA PNL SPEC NAA+PROBE: NOT DETECTED

## 2022-05-08 PROCEDURE — U0004 COV-19 TEST NON-CDC HGH THRU: HCPCS

## 2022-05-08 PROCEDURE — C9803 HOPD COVID-19 SPEC COLLECT: HCPCS

## 2022-05-11 ENCOUNTER — OUTSIDE FACILITY SERVICE (OUTPATIENT)
Dept: GASTROENTEROLOGY | Facility: CLINIC | Age: 67
End: 2022-05-11

## 2022-05-11 PROCEDURE — 43202 ESOPHAGOSCOPY FLEX BIOPSY: CPT | Performed by: INTERNAL MEDICINE

## 2022-05-11 PROCEDURE — 88305 TISSUE EXAM BY PATHOLOGIST: CPT | Performed by: INTERNAL MEDICINE

## 2022-05-11 PROCEDURE — 43220 ESOPHAGOSCOPY BALLOON <30MM: CPT | Performed by: INTERNAL MEDICINE

## 2022-05-12 ENCOUNTER — TELEPHONE (OUTPATIENT)
Dept: GASTROENTEROLOGY | Facility: CLINIC | Age: 67
End: 2022-05-12

## 2022-05-12 ENCOUNTER — LAB REQUISITION (OUTPATIENT)
Dept: LAB | Facility: HOSPITAL | Age: 67
End: 2022-05-12

## 2022-05-12 DIAGNOSIS — K20.90 ESOPHAGITIS, UNSPECIFIED WITHOUT BLEEDING: ICD-10-CM

## 2022-05-12 DIAGNOSIS — K21.9 GASTRO-ESOPHAGEAL REFLUX DISEASE WITHOUT ESOPHAGITIS: ICD-10-CM

## 2022-05-12 DIAGNOSIS — R13.10 DYSPHAGIA, UNSPECIFIED: ICD-10-CM

## 2022-05-12 DIAGNOSIS — K22.2 ESOPHAGEAL OBSTRUCTION: ICD-10-CM

## 2022-05-12 NOTE — TELEPHONE ENCOUNTER
CALLED PATIENT TO SET UP A REPEAT EGD PROCEDURE 2-3 WEEKS FROM DATE OF SERVICE 5/11/2022. HE DID NOT ANSWER AND HIS MAILBOX IS FULL SO I COULD NOT LEAVE A MESSAGE. WILL TRY TO CALL AGAIN TOMORROW.

## 2022-05-16 NOTE — TELEPHONE ENCOUNTER
CALLED PATIENT TO SET UP A REPEAT EGD PROCEDURE 2-3 WEEKS FROM DATE OF SERVICE 5/11/2022. HE DID NOT ANSWER, BUT I LEFT HIM A VOICEMAIL TO CALL US BACK TO SCHEDULE

## 2022-05-19 DIAGNOSIS — Z20.822 ENCOUNTER FOR PREPROCEDURE SCREENING LABORATORY TESTING FOR COVID-19: Primary | ICD-10-CM

## 2022-05-19 DIAGNOSIS — Z01.812 ENCOUNTER FOR PREPROCEDURE SCREENING LABORATORY TESTING FOR COVID-19: Primary | ICD-10-CM

## 2022-05-29 ENCOUNTER — CLINICAL SUPPORT NO REQUIREMENTS (OUTPATIENT)
Dept: PREADMISSION TESTING | Facility: HOSPITAL | Age: 67
End: 2022-05-29

## 2022-05-29 DIAGNOSIS — Z01.812 ENCOUNTER FOR PREPROCEDURE SCREENING LABORATORY TESTING FOR COVID-19: ICD-10-CM

## 2022-05-29 DIAGNOSIS — Z20.822 ENCOUNTER FOR PREPROCEDURE SCREENING LABORATORY TESTING FOR COVID-19: ICD-10-CM

## 2022-05-29 LAB — SARS-COV-2 RNA PNL SPEC NAA+PROBE: NOT DETECTED

## 2022-05-29 PROCEDURE — U0004 COV-19 TEST NON-CDC HGH THRU: HCPCS

## 2022-05-29 PROCEDURE — C9803 HOPD COVID-19 SPEC COLLECT: HCPCS

## 2022-06-01 ENCOUNTER — OUTSIDE FACILITY SERVICE (OUTPATIENT)
Dept: GASTROENTEROLOGY | Facility: CLINIC | Age: 67
End: 2022-06-01

## 2022-06-01 PROCEDURE — 43249 ESOPH EGD DILATION <30 MM: CPT | Performed by: INTERNAL MEDICINE

## 2022-06-01 RX ORDER — KETOCONAZOLE 20 MG/G
CREAM TOPICAL DAILY
Qty: 30 G | Refills: 4 | Status: SHIPPED | OUTPATIENT
Start: 2022-06-01 | End: 2023-02-27 | Stop reason: SDUPTHER

## 2022-06-07 ENCOUNTER — TELEPHONE (OUTPATIENT)
Dept: GASTROENTEROLOGY | Facility: CLINIC | Age: 67
End: 2022-06-07

## 2022-06-07 DIAGNOSIS — Z01.812 ENCOUNTER FOR PREPROCEDURE SCREENING LABORATORY TESTING FOR COVID-19: Primary | ICD-10-CM

## 2022-06-07 DIAGNOSIS — Z20.822 ENCOUNTER FOR PREPROCEDURE SCREENING LABORATORY TESTING FOR COVID-19: Primary | ICD-10-CM

## 2022-06-07 NOTE — TELEPHONE ENCOUNTER
PATIENT NEEDS ANOTHER EGD REPEAT IN 2-3 WEEKS FROM 6/1/2022. CALLED HIM TO SET THIS UP, BUT THERE WAS NO ANSWER AND THE MAILBOX IS FULL. WILL TRY AGAIN LATER ON.     WENT AHEAD AND PLACED HIS PROCEDURE FOR 6/15/2022 AS A PLACE HERNANDEZ UNTIL I SPEAK WITH HIM.

## 2022-06-12 ENCOUNTER — CLINICAL SUPPORT NO REQUIREMENTS (OUTPATIENT)
Dept: PREADMISSION TESTING | Facility: HOSPITAL | Age: 67
End: 2022-06-12

## 2022-06-12 DIAGNOSIS — Z01.812 ENCOUNTER FOR PREPROCEDURE SCREENING LABORATORY TESTING FOR COVID-19: ICD-10-CM

## 2022-06-12 DIAGNOSIS — Z20.822 ENCOUNTER FOR PREPROCEDURE SCREENING LABORATORY TESTING FOR COVID-19: ICD-10-CM

## 2022-06-12 LAB — SARS-COV-2 RNA PNL SPEC NAA+PROBE: NOT DETECTED

## 2022-06-12 PROCEDURE — U0004 COV-19 TEST NON-CDC HGH THRU: HCPCS

## 2022-06-12 PROCEDURE — C9803 HOPD COVID-19 SPEC COLLECT: HCPCS

## 2022-06-15 ENCOUNTER — OUTSIDE FACILITY SERVICE (OUTPATIENT)
Dept: GASTROENTEROLOGY | Facility: CLINIC | Age: 67
End: 2022-06-15

## 2022-06-15 PROCEDURE — 43249 ESOPH EGD DILATION <30 MM: CPT | Performed by: INTERNAL MEDICINE

## 2022-10-03 DIAGNOSIS — I10 ESSENTIAL HYPERTENSION: ICD-10-CM

## 2022-10-03 RX ORDER — VALSARTAN 80 MG/1
TABLET ORAL
Qty: 90 TABLET | Refills: 1 | Status: ON HOLD | OUTPATIENT
Start: 2022-10-03 | End: 2023-04-03

## 2022-11-03 ENCOUNTER — FLU SHOT (OUTPATIENT)
Dept: INTERNAL MEDICINE | Facility: CLINIC | Age: 67
End: 2022-11-03

## 2022-11-03 DIAGNOSIS — Z23 NEED FOR INFLUENZA VACCINATION: Primary | ICD-10-CM

## 2022-11-03 PROCEDURE — 90662 IIV NO PRSV INCREASED AG IM: CPT | Performed by: PHYSICIAN ASSISTANT

## 2022-11-03 PROCEDURE — G0008 ADMIN INFLUENZA VIRUS VAC: HCPCS | Performed by: PHYSICIAN ASSISTANT

## 2022-12-07 RX ORDER — OMEPRAZOLE 40 MG/1
CAPSULE, DELAYED RELEASE ORAL
Qty: 180 CAPSULE | Refills: 1 | Status: ON HOLD | OUTPATIENT
Start: 2022-12-07

## 2023-02-23 NOTE — TELEPHONE ENCOUNTER
Caller: TauntonSandro    Relationship: Self    Best call back number: [phone]  Requested Prescriptions:   Requested Prescriptions     Pending Prescriptions Disp Refills   • latanoprost (XALATAN) 0.005 % ophthalmic solution  0        Pharmacy where request should be sent: [facility]/PHARMACY #[id number] - [place], [place] - [address] - [phone]  - [phone] FX     Does the patient have less than a 3 day supply:  [x] Yes  [] No    Would you like a call back once the refill request has been completed: [x] Yes [] No    If the office needs to give you a call back, can they leave a voicemail: [x] Yes [] No    Esequiel Baker Rep   02/23/23 15:03 EST

## 2023-02-24 RX ORDER — LATANOPROST 50 UG/ML
SOLUTION/ DROPS OPHTHALMIC
Refills: 0 | OUTPATIENT
Start: 2023-02-24

## 2023-02-27 RX ORDER — KETOCONAZOLE 20 MG/G
CREAM TOPICAL DAILY
Qty: 30 G | Refills: 4 | Status: ON HOLD | OUTPATIENT
Start: 2023-02-27 | End: 2023-04-04 | Stop reason: SDUPTHER

## 2023-02-27 NOTE — TELEPHONE ENCOUNTER
Caller: Michael Junioriram BARRIENTOS    Relationship: Self    Best call back number:[phone]    Requested Prescriptions:   Requested Prescriptions     Pending Prescriptions Disp Refills   • ketoconazole (NIZORAL) 2 % cream 30 g 4     Sig: Apply  topically to the appropriate area as directed Daily.        Pharmacy where request should be sent: [facility]/PHARMACY #[id number] - [place], [place] - [address] - [phone]  - [phone] FX     Additional details provided by patient: PATIENT IS COMPLETELY OUT . REQUEST TODAY    Does the patient have less than a 3 day supply:  [x] Yes  [] No    Would you like a call back once the refill request has been completed: [] Yes [x] No    If the office needs to give you a call back, can they leave a voicemail: [] Yes [x] No    Esequiel Robertson Rep   02/27/23 14:31 EST

## 2023-03-16 ENCOUNTER — HOSPITAL ENCOUNTER (INPATIENT)
Facility: HOSPITAL | Age: 68
LOS: 26 days | Discharge: HOME | DRG: 4 | End: 2023-04-11
Attending: STUDENT IN AN ORGANIZED HEALTH CARE EDUCATION/TRAINING PROGRAM | Admitting: HOSPITALIST
Payer: MEDICARE

## 2023-03-16 ENCOUNTER — APPOINTMENT (OUTPATIENT)
Dept: GENERAL RADIOLOGY | Facility: HOSPITAL | Age: 68
DRG: 4 | End: 2023-03-16
Payer: MEDICARE

## 2023-03-16 ENCOUNTER — APPOINTMENT (OUTPATIENT)
Dept: CT IMAGING | Facility: HOSPITAL | Age: 68
DRG: 4 | End: 2023-03-16
Payer: MEDICARE

## 2023-03-16 DIAGNOSIS — J05.11 ACUTE EPIGLOTTITIS WITH AIRWAY OBSTRUCTION: ICD-10-CM

## 2023-03-16 DIAGNOSIS — R49.9 VOICE DISTURBANCE: ICD-10-CM

## 2023-03-16 DIAGNOSIS — R13.12 OROPHARYNGEAL DYSPHAGIA: ICD-10-CM

## 2023-03-16 DIAGNOSIS — Z43.0 TRACHEOSTOMY CARE: ICD-10-CM

## 2023-03-16 DIAGNOSIS — R06.03 RESPIRATORY DISTRESS: Primary | ICD-10-CM

## 2023-03-16 DIAGNOSIS — R00.0 TACHYCARDIA: ICD-10-CM

## 2023-03-16 DIAGNOSIS — I10 ESSENTIAL HYPERTENSION: ICD-10-CM

## 2023-03-16 DIAGNOSIS — J98.8 AIRWAY COMPROMISE: ICD-10-CM

## 2023-03-16 DIAGNOSIS — N52.9 ERECTILE DYSFUNCTION, UNSPECIFIED ERECTILE DYSFUNCTION TYPE: ICD-10-CM

## 2023-03-16 PROBLEM — J96.00 ACUTE RESPIRATORY FAILURE: Status: ACTIVE | Noted: 2023-03-16

## 2023-03-16 LAB
ALBUMIN SERPL-MCNC: 4.3 G/DL (ref 3.5–5.2)
ALBUMIN/GLOB SERPL: 1.1 G/DL
ALP SERPL-CCNC: 70 U/L (ref 39–117)
ALT SERPL W P-5'-P-CCNC: 14 U/L (ref 1–41)
ANION GAP SERPL CALCULATED.3IONS-SCNC: 15 MMOL/L (ref 5–15)
ARTERIAL PATENCY WRIST A: ABNORMAL
AST SERPL-CCNC: 26 U/L (ref 1–40)
ATMOSPHERIC PRESS: ABNORMAL MM[HG]
B PARAPERT DNA SPEC QL NAA+PROBE: NOT DETECTED
B PERT DNA SPEC QL NAA+PROBE: NOT DETECTED
BACTERIA UR QL AUTO: NORMAL /HPF
BASE EXCESS BLDA CALC-SCNC: 0.4 MMOL/L (ref 0–2)
BASOPHILS # BLD AUTO: 0.04 10*3/MM3 (ref 0–0.2)
BASOPHILS NFR BLD AUTO: 0.3 % (ref 0–1.5)
BDY SITE: ABNORMAL
BILIRUB SERPL-MCNC: 1.3 MG/DL (ref 0–1.2)
BILIRUB UR QL STRIP: NEGATIVE
BODY TEMPERATURE: 37 C
BUN SERPL-MCNC: 10 MG/DL (ref 8–23)
BUN/CREAT SERPL: 12.7 (ref 7–25)
C PNEUM DNA NPH QL NAA+NON-PROBE: NOT DETECTED
CALCIUM SPEC-SCNC: 9.7 MG/DL (ref 8.6–10.5)
CHLORIDE SERPL-SCNC: 100 MMOL/L (ref 98–107)
CLARITY UR: CLEAR
CO2 BLDA-SCNC: 26.4 MMOL/L (ref 22–33)
CO2 SERPL-SCNC: 24 MMOL/L (ref 22–29)
COHGB MFR BLD: 1 % (ref 0–2)
COLOR UR: YELLOW
CREAT SERPL-MCNC: 0.79 MG/DL (ref 0.76–1.27)
CRP SERPL-MCNC: 2.26 MG/DL (ref 0–0.5)
D DIMER PPP FEU-MCNC: 1.19 MCGFEU/ML (ref 0–0.66)
D-LACTATE SERPL-SCNC: 1.8 MMOL/L (ref 0.5–2)
D-LACTATE SERPL-SCNC: 3.1 MMOL/L (ref 0.5–2)
DEPRECATED RDW RBC AUTO: 50 FL (ref 37–54)
EGFRCR SERPLBLD CKD-EPI 2021: 98 ML/MIN/1.73
EOSINOPHIL # BLD AUTO: 0 10*3/MM3 (ref 0–0.4)
EOSINOPHIL NFR BLD AUTO: 0 % (ref 0.3–6.2)
EPAP: 0
ERYTHROCYTE [DISTWIDTH] IN BLOOD BY AUTOMATED COUNT: 13.5 % (ref 12.3–15.4)
FLUAV SUBTYP SPEC NAA+PROBE: NOT DETECTED
FLUBV RNA ISLT QL NAA+PROBE: NOT DETECTED
GLOBULIN UR ELPH-MCNC: 3.8 GM/DL
GLUCOSE SERPL-MCNC: 146 MG/DL (ref 65–99)
GLUCOSE UR STRIP-MCNC: NEGATIVE MG/DL
HADV DNA SPEC NAA+PROBE: NOT DETECTED
HCO3 BLDA-SCNC: 25.2 MMOL/L (ref 20–26)
HCOV 229E RNA SPEC QL NAA+PROBE: NOT DETECTED
HCOV HKU1 RNA SPEC QL NAA+PROBE: NOT DETECTED
HCOV NL63 RNA SPEC QL NAA+PROBE: NOT DETECTED
HCOV OC43 RNA SPEC QL NAA+PROBE: NOT DETECTED
HCT VFR BLD AUTO: 46.6 % (ref 37.5–51)
HCT VFR BLD CALC: 43.6 % (ref 38–51)
HGB BLD-MCNC: 15.4 G/DL (ref 13–17.7)
HGB BLDA-MCNC: 14.2 G/DL (ref 13.5–17.5)
HGB UR QL STRIP.AUTO: NEGATIVE
HMPV RNA NPH QL NAA+NON-PROBE: NOT DETECTED
HOLD SPECIMEN: NORMAL
HOLD SPECIMEN: NORMAL
HPIV1 RNA ISLT QL NAA+PROBE: NOT DETECTED
HPIV2 RNA SPEC QL NAA+PROBE: NOT DETECTED
HPIV3 RNA NPH QL NAA+PROBE: NOT DETECTED
HPIV4 P GENE NPH QL NAA+PROBE: NOT DETECTED
HYALINE CASTS UR QL AUTO: NORMAL /LPF
IMM GRANULOCYTES # BLD AUTO: 0.09 10*3/MM3 (ref 0–0.05)
IMM GRANULOCYTES NFR BLD AUTO: 0.6 % (ref 0–0.5)
INHALED O2 CONCENTRATION: 40 %
IPAP: 0
KETONES UR QL STRIP: ABNORMAL
LEUKOCYTE ESTERASE UR QL STRIP.AUTO: NEGATIVE
LIPASE SERPL-CCNC: 17 U/L (ref 13–60)
LYMPHOCYTES # BLD AUTO: 1.5 10*3/MM3 (ref 0.7–3.1)
LYMPHOCYTES NFR BLD AUTO: 10.1 % (ref 19.6–45.3)
M PNEUMO IGG SER IA-ACNC: NOT DETECTED
MAGNESIUM SERPL-MCNC: 1.4 MG/DL (ref 1.6–2.4)
MCH RBC QN AUTO: 33.1 PG (ref 26.6–33)
MCHC RBC AUTO-ENTMCNC: 33 G/DL (ref 31.5–35.7)
MCV RBC AUTO: 100.2 FL (ref 79–97)
METHGB BLD QL: 0.5 % (ref 0–1.5)
MODALITY: ABNORMAL
MONOCYTES # BLD AUTO: 1.39 10*3/MM3 (ref 0.1–0.9)
MONOCYTES NFR BLD AUTO: 9.4 % (ref 5–12)
NEUTROPHILS NFR BLD AUTO: 11.84 10*3/MM3 (ref 1.7–7)
NEUTROPHILS NFR BLD AUTO: 79.6 % (ref 42.7–76)
NITRITE UR QL STRIP: NEGATIVE
NOTE: ABNORMAL
NRBC BLD AUTO-RTO: 0 /100 WBC (ref 0–0.2)
NT-PROBNP SERPL-MCNC: 165.7 PG/ML (ref 0–900)
OXYHGB MFR BLDV: 99 % (ref 94–99)
PAW @ PEAK INSP FLOW SETTING VENT: 0 CMH2O
PCO2 BLDA: 40.3 MM HG (ref 35–45)
PCO2 TEMP ADJ BLD: 40.3 MM HG (ref 35–48)
PH BLDA: 7.41 PH UNITS (ref 7.35–7.45)
PH UR STRIP.AUTO: 7 [PH] (ref 5–8)
PH, TEMP CORRECTED: 7.41 PH UNITS
PHOSPHATE SERPL-MCNC: 3.7 MG/DL (ref 2.5–4.5)
PLATELET # BLD AUTO: 175 10*3/MM3 (ref 140–450)
PMV BLD AUTO: 9.3 FL (ref 6–12)
PO2 BLDA: 360 MM HG (ref 83–108)
PO2 TEMP ADJ BLD: 360 MM HG (ref 83–108)
POTASSIUM SERPL-SCNC: 3.6 MMOL/L (ref 3.5–5.2)
PROT SERPL-MCNC: 8.1 G/DL (ref 6–8.5)
PROT UR QL STRIP: ABNORMAL
RBC # BLD AUTO: 4.65 10*6/MM3 (ref 4.14–5.8)
RBC # UR STRIP: NORMAL /HPF
REF LAB TEST METHOD: NORMAL
RHINOVIRUS RNA SPEC NAA+PROBE: NOT DETECTED
RSV RNA NPH QL NAA+NON-PROBE: NOT DETECTED
SARS-COV-2 RNA NPH QL NAA+NON-PROBE: NOT DETECTED
SODIUM SERPL-SCNC: 139 MMOL/L (ref 136–145)
SP GR UR STRIP: 1.04 (ref 1–1.03)
SQUAMOUS #/AREA URNS HPF: NORMAL /HPF
T4 FREE SERPL-MCNC: 1.11 NG/DL (ref 0.93–1.7)
TOTAL RATE: 0 BREATHS/MINUTE
TROPONIN T SERPL HS-MCNC: 7 NG/L
TSH SERPL DL<=0.05 MIU/L-ACNC: 0.26 UIU/ML (ref 0.27–4.2)
UROBILINOGEN UR QL STRIP: ABNORMAL
WBC # UR STRIP: NORMAL /HPF
WBC NRBC COR # BLD: 14.86 10*3/MM3 (ref 3.4–10.8)
WHOLE BLOOD HOLD COAG: NORMAL
WHOLE BLOOD HOLD SPECIMEN: NORMAL

## 2023-03-16 PROCEDURE — 85379 FIBRIN DEGRADATION QUANT: CPT | Performed by: STUDENT IN AN ORGANIZED HEALTH CARE EDUCATION/TRAINING PROGRAM

## 2023-03-16 PROCEDURE — 81001 URINALYSIS AUTO W/SCOPE: CPT | Performed by: STUDENT IN AN ORGANIZED HEALTH CARE EDUCATION/TRAINING PROGRAM

## 2023-03-16 PROCEDURE — 36415 COLL VENOUS BLD VENIPUNCTURE: CPT

## 2023-03-16 PROCEDURE — 84443 ASSAY THYROID STIM HORMONE: CPT | Performed by: STUDENT IN AN ORGANIZED HEALTH CARE EDUCATION/TRAINING PROGRAM

## 2023-03-16 PROCEDURE — 5A1955Z RESPIRATORY VENTILATION, GREATER THAN 96 CONSECUTIVE HOURS: ICD-10-PCS | Performed by: STUDENT IN AN ORGANIZED HEALTH CARE EDUCATION/TRAINING PROGRAM

## 2023-03-16 PROCEDURE — 71275 CT ANGIOGRAPHY CHEST: CPT

## 2023-03-16 PROCEDURE — 82375 ASSAY CARBOXYHB QUANT: CPT

## 2023-03-16 PROCEDURE — 0DH63UZ INSERTION OF FEEDING DEVICE INTO STOMACH, PERCUTANEOUS APPROACH: ICD-10-PCS | Performed by: SURGERY

## 2023-03-16 PROCEDURE — 25010000002 EPINEPHRINE (ANAPHYLAXIS) 1 MG/ML SOLUTION: Performed by: STUDENT IN AN ORGANIZED HEALTH CARE EDUCATION/TRAINING PROGRAM

## 2023-03-16 PROCEDURE — 84484 ASSAY OF TROPONIN QUANT: CPT

## 2023-03-16 PROCEDURE — 94799 UNLISTED PULMONARY SVC/PX: CPT

## 2023-03-16 PROCEDURE — 83735 ASSAY OF MAGNESIUM: CPT | Performed by: STUDENT IN AN ORGANIZED HEALTH CARE EDUCATION/TRAINING PROGRAM

## 2023-03-16 PROCEDURE — 87040 BLOOD CULTURE FOR BACTERIA: CPT | Performed by: STUDENT IN AN ORGANIZED HEALTH CARE EDUCATION/TRAINING PROGRAM

## 2023-03-16 PROCEDURE — 25010000002 ONDANSETRON PER 1 MG

## 2023-03-16 PROCEDURE — 85025 COMPLETE CBC W/AUTO DIFF WBC: CPT

## 2023-03-16 PROCEDURE — 84100 ASSAY OF PHOSPHORUS: CPT | Performed by: STUDENT IN AN ORGANIZED HEALTH CARE EDUCATION/TRAINING PROGRAM

## 2023-03-16 PROCEDURE — 99291 CRITICAL CARE FIRST HOUR: CPT | Performed by: INTERNAL MEDICINE

## 2023-03-16 PROCEDURE — 71045 X-RAY EXAM CHEST 1 VIEW: CPT

## 2023-03-16 PROCEDURE — 0BH17EZ INSERTION OF ENDOTRACHEAL AIRWAY INTO TRACHEA, VIA NATURAL OR ARTIFICIAL OPENING: ICD-10-PCS | Performed by: STUDENT IN AN ORGANIZED HEALTH CARE EDUCATION/TRAINING PROGRAM

## 2023-03-16 PROCEDURE — 74018 RADEX ABDOMEN 1 VIEW: CPT

## 2023-03-16 PROCEDURE — 70491 CT SOFT TISSUE NECK W/DYE: CPT

## 2023-03-16 PROCEDURE — 87186 SC STD MICRODIL/AGAR DIL: CPT | Performed by: STUDENT IN AN ORGANIZED HEALTH CARE EDUCATION/TRAINING PROGRAM

## 2023-03-16 PROCEDURE — 25010000002 VANCOMYCIN 10 G RECONSTITUTED SOLUTION: Performed by: STUDENT IN AN ORGANIZED HEALTH CARE EDUCATION/TRAINING PROGRAM

## 2023-03-16 PROCEDURE — 83605 ASSAY OF LACTIC ACID: CPT | Performed by: STUDENT IN AN ORGANIZED HEALTH CARE EDUCATION/TRAINING PROGRAM

## 2023-03-16 PROCEDURE — 83880 ASSAY OF NATRIURETIC PEPTIDE: CPT

## 2023-03-16 PROCEDURE — 25010000002 PROPOFOL 1000 MG/100ML EMULSION

## 2023-03-16 PROCEDURE — 0B110F4 BYPASS TRACHEA TO CUTANEOUS WITH TRACHEOSTOMY DEVICE, OPEN APPROACH: ICD-10-PCS | Performed by: SURGERY

## 2023-03-16 PROCEDURE — 84439 ASSAY OF FREE THYROXINE: CPT | Performed by: STUDENT IN AN ORGANIZED HEALTH CARE EDUCATION/TRAINING PROGRAM

## 2023-03-16 PROCEDURE — 82805 BLOOD GASES W/O2 SATURATION: CPT

## 2023-03-16 PROCEDURE — 25510000001 IOPAMIDOL PER 1 ML: Performed by: STUDENT IN AN ORGANIZED HEALTH CARE EDUCATION/TRAINING PROGRAM

## 2023-03-16 PROCEDURE — 0202U NFCT DS 22 TRGT SARS-COV-2: CPT | Performed by: STUDENT IN AN ORGANIZED HEALTH CARE EDUCATION/TRAINING PROGRAM

## 2023-03-16 PROCEDURE — 83690 ASSAY OF LIPASE: CPT | Performed by: STUDENT IN AN ORGANIZED HEALTH CARE EDUCATION/TRAINING PROGRAM

## 2023-03-16 PROCEDURE — P9612 CATHETERIZE FOR URINE SPEC: HCPCS

## 2023-03-16 PROCEDURE — 99285 EMERGENCY DEPT VISIT HI MDM: CPT

## 2023-03-16 PROCEDURE — 86140 C-REACTIVE PROTEIN: CPT | Performed by: STUDENT IN AN ORGANIZED HEALTH CARE EDUCATION/TRAINING PROGRAM

## 2023-03-16 PROCEDURE — 80053 COMPREHEN METABOLIC PANEL: CPT

## 2023-03-16 PROCEDURE — 87147 CULTURE TYPE IMMUNOLOGIC: CPT | Performed by: STUDENT IN AN ORGANIZED HEALTH CARE EDUCATION/TRAINING PROGRAM

## 2023-03-16 PROCEDURE — 83050 HGB METHEMOGLOBIN QUAN: CPT

## 2023-03-16 PROCEDURE — 87150 DNA/RNA AMPLIFIED PROBE: CPT | Performed by: STUDENT IN AN ORGANIZED HEALTH CARE EDUCATION/TRAINING PROGRAM

## 2023-03-16 PROCEDURE — 25010000002 METHYLPREDNISOLONE PER 125 MG: Performed by: STUDENT IN AN ORGANIZED HEALTH CARE EDUCATION/TRAINING PROGRAM

## 2023-03-16 PROCEDURE — 94002 VENT MGMT INPAT INIT DAY: CPT

## 2023-03-16 PROCEDURE — 36600 WITHDRAWAL OF ARTERIAL BLOOD: CPT

## 2023-03-16 PROCEDURE — 93005 ELECTROCARDIOGRAM TRACING: CPT

## 2023-03-16 PROCEDURE — 25010000002 DIPHENHYDRAMINE PER 50 MG: Performed by: STUDENT IN AN ORGANIZED HEALTH CARE EDUCATION/TRAINING PROGRAM

## 2023-03-16 RX ORDER — ACETAMINOPHEN 650 MG/1
650 SUPPOSITORY RECTAL EVERY 4 HOURS PRN
Status: DISCONTINUED | OUTPATIENT
Start: 2023-03-16 | End: 2023-03-20

## 2023-03-16 RX ORDER — CHLORHEXIDINE GLUCONATE 0.12 MG/ML
15 RINSE ORAL EVERY 12 HOURS SCHEDULED
Status: DISCONTINUED | OUTPATIENT
Start: 2023-03-17 | End: 2023-04-11 | Stop reason: HOSPADM

## 2023-03-16 RX ORDER — FAMOTIDINE 10 MG/ML
20 INJECTION, SOLUTION INTRAVENOUS 2 TIMES DAILY
Status: DISCONTINUED | OUTPATIENT
Start: 2023-03-17 | End: 2023-03-17

## 2023-03-16 RX ORDER — IPRATROPIUM BROMIDE AND ALBUTEROL SULFATE 2.5; .5 MG/3ML; MG/3ML
3 SOLUTION RESPIRATORY (INHALATION) ONCE
Status: DISCONTINUED | OUTPATIENT
Start: 2023-03-16 | End: 2023-03-16

## 2023-03-16 RX ORDER — SODIUM CHLORIDE 0.9 % (FLUSH) 0.9 %
10 SYRINGE (ML) INJECTION AS NEEDED
Status: DISCONTINUED | OUTPATIENT
Start: 2023-03-16 | End: 2023-03-29

## 2023-03-16 RX ORDER — DIPHENHYDRAMINE HYDROCHLORIDE 50 MG/ML
50 INJECTION INTRAMUSCULAR; INTRAVENOUS ONCE
Status: COMPLETED | OUTPATIENT
Start: 2023-03-16 | End: 2023-03-16

## 2023-03-16 RX ORDER — SODIUM CHLORIDE 9 MG/ML
125 INJECTION, SOLUTION INTRAVENOUS CONTINUOUS
Status: DISCONTINUED | OUTPATIENT
Start: 2023-03-17 | End: 2023-03-21

## 2023-03-16 RX ORDER — ACETAMINOPHEN 325 MG/1
650 TABLET ORAL EVERY 4 HOURS PRN
Status: DISCONTINUED | OUTPATIENT
Start: 2023-03-16 | End: 2023-03-20

## 2023-03-16 RX ORDER — HEPARIN SODIUM 5000 [USP'U]/ML
5000 INJECTION, SOLUTION INTRAVENOUS; SUBCUTANEOUS EVERY 8 HOURS SCHEDULED
Status: DISCONTINUED | OUTPATIENT
Start: 2023-03-17 | End: 2023-04-11 | Stop reason: HOSPADM

## 2023-03-16 RX ORDER — PROPOFOL 10 MG/ML
INJECTION, EMULSION INTRAVENOUS
Status: COMPLETED
Start: 2023-03-16 | End: 2023-03-16

## 2023-03-16 RX ORDER — EPINEPHRINE 1 MG/ML
0.3 INJECTION, SOLUTION INTRAMUSCULAR; SUBCUTANEOUS ONCE
Status: COMPLETED | OUTPATIENT
Start: 2023-03-16 | End: 2023-03-16

## 2023-03-16 RX ORDER — ONDANSETRON 2 MG/ML
4 INJECTION INTRAMUSCULAR; INTRAVENOUS ONCE
Status: COMPLETED | OUTPATIENT
Start: 2023-03-16 | End: 2023-03-16

## 2023-03-16 RX ORDER — METHYLPREDNISOLONE SODIUM SUCCINATE 125 MG/2ML
125 INJECTION, POWDER, LYOPHILIZED, FOR SOLUTION INTRAMUSCULAR; INTRAVENOUS ONCE
Status: COMPLETED | OUTPATIENT
Start: 2023-03-16 | End: 2023-03-16

## 2023-03-16 RX ORDER — ONDANSETRON 2 MG/ML
INJECTION INTRAMUSCULAR; INTRAVENOUS
Status: COMPLETED
Start: 2023-03-16 | End: 2023-03-16

## 2023-03-16 RX ORDER — KETAMINE HYDROCHLORIDE 100 MG/ML
INJECTION INTRAMUSCULAR; INTRAVENOUS
Status: COMPLETED | OUTPATIENT
Start: 2023-03-16 | End: 2023-03-16

## 2023-03-16 RX ORDER — ROCURONIUM BROMIDE 10 MG/ML
INJECTION, SOLUTION INTRAVENOUS
Status: COMPLETED | OUTPATIENT
Start: 2023-03-16 | End: 2023-03-16

## 2023-03-16 RX ORDER — LATANOPROST 50 UG/ML
1 SOLUTION/ DROPS OPHTHALMIC DAILY
Status: DISCONTINUED | OUTPATIENT
Start: 2023-03-17 | End: 2023-04-11 | Stop reason: HOSPADM

## 2023-03-16 RX ADMIN — ONDANSETRON 4 MG: 2 INJECTION INTRAMUSCULAR; INTRAVENOUS at 21:12

## 2023-03-16 RX ADMIN — PROPOFOL 5 MCG/KG/MIN: 10 INJECTION, EMULSION INTRAVENOUS at 21:28

## 2023-03-16 RX ADMIN — EPINEPHRINE 0.3 MG: 1 INJECTION INTRAMUSCULAR; INTRAVENOUS; SUBCUTANEOUS at 20:32

## 2023-03-16 RX ADMIN — ROCURONIUM BROMIDE 100 MG: 10 INJECTION, SOLUTION INTRAVENOUS at 21:20

## 2023-03-16 RX ADMIN — DILTIAZEM HYDROCHLORIDE 5 MG/HR: 5 INJECTION INTRAVENOUS at 22:10

## 2023-03-16 RX ADMIN — METHYLPREDNISOLONE SODIUM SUCCINATE 125 MG: 125 INJECTION, POWDER, FOR SOLUTION INTRAMUSCULAR; INTRAVENOUS at 20:33

## 2023-03-16 RX ADMIN — DIPHENHYDRAMINE HYDROCHLORIDE 50 MG: 50 INJECTION INTRAMUSCULAR; INTRAVENOUS at 20:35

## 2023-03-16 RX ADMIN — SODIUM CHLORIDE 500 ML: 9 INJECTION, SOLUTION INTRAVENOUS at 23:59

## 2023-03-16 RX ADMIN — IOPAMIDOL 75 ML: 755 INJECTION, SOLUTION INTRAVENOUS at 22:01

## 2023-03-16 RX ADMIN — VANCOMYCIN HYDROCHLORIDE 1500 MG: 10 INJECTION, POWDER, LYOPHILIZED, FOR SOLUTION INTRAVENOUS at 23:23

## 2023-03-16 RX ADMIN — SODIUM CHLORIDE 125 ML/HR: 9 INJECTION, SOLUTION INTRAVENOUS at 23:59

## 2023-03-16 RX ADMIN — SODIUM CHLORIDE, POTASSIUM CHLORIDE, SODIUM LACTATE AND CALCIUM CHLORIDE 1000 ML: 600; 310; 30; 20 INJECTION, SOLUTION INTRAVENOUS at 20:35

## 2023-03-16 RX ADMIN — KETAMINE HYDROCHLORIDE 150 MG: 100 INJECTION, SOLUTION, CONCENTRATE INTRAMUSCULAR; INTRAVENOUS at 21:18

## 2023-03-17 ENCOUNTER — APPOINTMENT (OUTPATIENT)
Dept: GENERAL RADIOLOGY | Facility: HOSPITAL | Age: 68
DRG: 4 | End: 2023-03-17
Payer: MEDICARE

## 2023-03-17 LAB
ALBUMIN SERPL-MCNC: 3.2 G/DL (ref 3.5–5.2)
ALBUMIN/GLOB SERPL: 1 G/DL
ALP SERPL-CCNC: 65 U/L (ref 39–117)
ALT SERPL W P-5'-P-CCNC: 12 U/L (ref 1–41)
ANION GAP SERPL CALCULATED.3IONS-SCNC: 9 MMOL/L (ref 5–15)
ARTERIAL PATENCY WRIST A: ABNORMAL
ARTERIAL PATENCY WRIST A: ABNORMAL
AST SERPL-CCNC: 22 U/L (ref 1–40)
ATMOSPHERIC PRESS: ABNORMAL MM[HG]
ATMOSPHERIC PRESS: ABNORMAL MM[HG]
BACTERIA BLD CULT: ABNORMAL
BASE EXCESS BLDA CALC-SCNC: 0.9 MMOL/L (ref 0–2)
BASE EXCESS BLDA CALC-SCNC: 1.6 MMOL/L (ref 0–2)
BASOPHILS # BLD MANUAL: 0 10*3/MM3 (ref 0–0.2)
BASOPHILS NFR BLD MANUAL: 0 % (ref 0–1.5)
BDY SITE: ABNORMAL
BDY SITE: ABNORMAL
BILIRUB SERPL-MCNC: 0.6 MG/DL (ref 0–1.2)
BODY TEMPERATURE: 37 C
BODY TEMPERATURE: 37 C
BOTTLE TYPE: ABNORMAL
BUN SERPL-MCNC: 9 MG/DL (ref 8–23)
BUN/CREAT SERPL: 11.8 (ref 7–25)
CALCIUM SPEC-SCNC: 8 MG/DL (ref 8.6–10.5)
CHLORIDE SERPL-SCNC: 102 MMOL/L (ref 98–107)
CO2 BLDA-SCNC: 27.3 MMOL/L (ref 22–33)
CO2 BLDA-SCNC: 27.7 MMOL/L (ref 22–33)
CO2 SERPL-SCNC: 24 MMOL/L (ref 22–29)
COHGB MFR BLD: 1.1 % (ref 0–2)
COHGB MFR BLD: 1.1 % (ref 0–2)
CREAT SERPL-MCNC: 0.76 MG/DL (ref 0.76–1.27)
DEPRECATED RDW RBC AUTO: 50.5 FL (ref 37–54)
EGFRCR SERPLBLD CKD-EPI 2021: 99.1 ML/MIN/1.73
EOSINOPHIL # BLD MANUAL: 0.16 10*3/MM3 (ref 0–0.4)
EOSINOPHIL NFR BLD MANUAL: 1 % (ref 0.3–6.2)
EPAP: 0
EPAP: 0
ERYTHROCYTE [DISTWIDTH] IN BLOOD BY AUTOMATED COUNT: 13.6 % (ref 12.3–15.4)
GEN 5 2HR TROPONIN T REFLEX: 15 NG/L
GLOBULIN UR ELPH-MCNC: 3.2 GM/DL
GLUCOSE BLDC GLUCOMTR-MCNC: 104 MG/DL (ref 70–130)
GLUCOSE SERPL-MCNC: 159 MG/DL (ref 65–99)
HCO3 BLDA-SCNC: 26 MMOL/L (ref 20–26)
HCO3 BLDA-SCNC: 26.5 MMOL/L (ref 20–26)
HCT VFR BLD AUTO: 38.4 % (ref 37.5–51)
HCT VFR BLD CALC: 38.9 % (ref 38–51)
HCT VFR BLD CALC: 42.4 % (ref 38–51)
HGB BLD-MCNC: 12.5 G/DL (ref 13–17.7)
HGB BLDA-MCNC: 12.7 G/DL (ref 13.5–17.5)
HGB BLDA-MCNC: 13.8 G/DL (ref 13.5–17.5)
HOLD SPECIMEN: NORMAL
INHALED O2 CONCENTRATION: 40 %
INHALED O2 CONCENTRATION: 40 %
IPAP: 0
IPAP: 0
LYMPHOCYTES # BLD MANUAL: 0.16 10*3/MM3 (ref 0.7–3.1)
LYMPHOCYTES NFR BLD MANUAL: 2 % (ref 5–12)
MACROCYTES BLD QL SMEAR: ABNORMAL
MAGNESIUM SERPL-MCNC: 1.4 MG/DL (ref 1.6–2.4)
MCH RBC QN AUTO: 32.8 PG (ref 26.6–33)
MCHC RBC AUTO-ENTMCNC: 32.6 G/DL (ref 31.5–35.7)
MCV RBC AUTO: 100.8 FL (ref 79–97)
METAMYELOCYTES NFR BLD MANUAL: 1 % (ref 0–0)
METHGB BLD QL: 0.2 % (ref 0–1.5)
METHGB BLD QL: 0.3 % (ref 0–1.5)
MODALITY: ABNORMAL
MODALITY: ABNORMAL
MONOCYTES # BLD: 0.32 10*3/MM3 (ref 0.1–0.9)
MRSA DNA SPEC QL NAA+PROBE: NEGATIVE
NEUTROPHILS # BLD AUTO: 15.14 10*3/MM3 (ref 1.7–7)
NEUTROPHILS NFR BLD MANUAL: 52 % (ref 42.7–76)
NEUTS BAND NFR BLD MANUAL: 43 % (ref 0–5)
NOTE: ABNORMAL
NOTE: ABNORMAL
OXYHGB MFR BLDV: 98.4 % (ref 94–99)
OXYHGB MFR BLDV: 98.4 % (ref 94–99)
PAW @ PEAK INSP FLOW SETTING VENT: 0 CMH2O
PAW @ PEAK INSP FLOW SETTING VENT: 0 CMH2O
PCO2 BLDA: 41.8 MM HG (ref 35–45)
PCO2 BLDA: 42.4 MM HG (ref 35–45)
PCO2 TEMP ADJ BLD: 41.8 MM HG (ref 35–48)
PCO2 TEMP ADJ BLD: 42.4 MM HG (ref 35–48)
PEEP RESPIRATORY: 5 CM[H2O]
PEEP RESPIRATORY: 5 CM[H2O]
PH BLDA: 7.39 PH UNITS (ref 7.35–7.45)
PH BLDA: 7.41 PH UNITS (ref 7.35–7.45)
PH, TEMP CORRECTED: 7.39 PH UNITS
PH, TEMP CORRECTED: 7.41 PH UNITS
PHOSPHATE SERPL-MCNC: 2.6 MG/DL (ref 2.5–4.5)
PLAT MORPH BLD: NORMAL
PLATELET # BLD AUTO: 149 10*3/MM3 (ref 140–450)
PMV BLD AUTO: 9.1 FL (ref 6–12)
PO2 BLDA: 158 MM HG (ref 83–108)
PO2 BLDA: 159 MM HG (ref 83–108)
PO2 TEMP ADJ BLD: 158 MM HG (ref 83–108)
PO2 TEMP ADJ BLD: 159 MM HG (ref 83–108)
POTASSIUM SERPL-SCNC: 3.6 MMOL/L (ref 3.5–5.2)
POTASSIUM SERPL-SCNC: 4.5 MMOL/L (ref 3.5–5.2)
PROT SERPL-MCNC: 6.4 G/DL (ref 6–8.5)
RBC # BLD AUTO: 3.81 10*6/MM3 (ref 4.14–5.8)
SODIUM SERPL-SCNC: 135 MMOL/L (ref 136–145)
TOTAL RATE: 18 BREATHS/MINUTE
TOTAL RATE: 21 BREATHS/MINUTE
TOXIC GRANULATION: ABNORMAL
TROPONIN T DELTA: 1 NG/L
TROPONIN T SERPL HS-MCNC: 14 NG/L
VARIANT LYMPHS NFR BLD MANUAL: 1 % (ref 19.6–45.3)
VENTILATOR MODE: ABNORMAL
VENTILATOR MODE: ABNORMAL
VT ON VENT VENT: 0.4 ML
VT ON VENT VENT: 0.54 ML
WBC NRBC COR # BLD: 15.94 10*3/MM3 (ref 3.4–10.8)

## 2023-03-17 PROCEDURE — 94003 VENT MGMT INPAT SUBQ DAY: CPT

## 2023-03-17 PROCEDURE — 84484 ASSAY OF TROPONIN QUANT: CPT | Performed by: STUDENT IN AN ORGANIZED HEALTH CARE EDUCATION/TRAINING PROGRAM

## 2023-03-17 PROCEDURE — 36600 WITHDRAWAL OF ARTERIAL BLOOD: CPT

## 2023-03-17 PROCEDURE — 83735 ASSAY OF MAGNESIUM: CPT | Performed by: INTERNAL MEDICINE

## 2023-03-17 PROCEDURE — 85025 COMPLETE CBC W/AUTO DIFF WBC: CPT | Performed by: INTERNAL MEDICINE

## 2023-03-17 PROCEDURE — 25010000002 HEPARIN (PORCINE) PER 1000 UNITS: Performed by: INTERNAL MEDICINE

## 2023-03-17 PROCEDURE — 25010000002 VANCOMYCIN PER 500 MG

## 2023-03-17 PROCEDURE — 99291 CRITICAL CARE FIRST HOUR: CPT | Performed by: INTERNAL MEDICINE

## 2023-03-17 PROCEDURE — 25010000002 FENTANYL 10 MCG/1 ML NS: Performed by: NURSE PRACTITIONER

## 2023-03-17 PROCEDURE — 80053 COMPREHEN METABOLIC PANEL: CPT | Performed by: INTERNAL MEDICINE

## 2023-03-17 PROCEDURE — 84100 ASSAY OF PHOSPHORUS: CPT | Performed by: INTERNAL MEDICINE

## 2023-03-17 PROCEDURE — 25010000002 CEFTRIAXONE PER 250 MG: Performed by: INTERNAL MEDICINE

## 2023-03-17 PROCEDURE — 82375 ASSAY CARBOXYHB QUANT: CPT

## 2023-03-17 PROCEDURE — 71045 X-RAY EXAM CHEST 1 VIEW: CPT

## 2023-03-17 PROCEDURE — 25010000002 PHENYLEPHRINE 10 MG/ML SOLUTION 5 ML VIAL: Performed by: INTERNAL MEDICINE

## 2023-03-17 PROCEDURE — 82962 GLUCOSE BLOOD TEST: CPT

## 2023-03-17 PROCEDURE — 83050 HGB METHEMOGLOBIN QUAN: CPT

## 2023-03-17 PROCEDURE — 87641 MR-STAPH DNA AMP PROBE: CPT

## 2023-03-17 PROCEDURE — 94799 UNLISTED PULMONARY SVC/PX: CPT

## 2023-03-17 PROCEDURE — 25010000002 PROPOFOL 10 MG/ML EMULSION: Performed by: STUDENT IN AN ORGANIZED HEALTH CARE EDUCATION/TRAINING PROGRAM

## 2023-03-17 PROCEDURE — 85007 BL SMEAR W/DIFF WBC COUNT: CPT | Performed by: INTERNAL MEDICINE

## 2023-03-17 PROCEDURE — 84132 ASSAY OF SERUM POTASSIUM: CPT | Performed by: INTERNAL MEDICINE

## 2023-03-17 PROCEDURE — 25010000002 MAGNESIUM SULFATE 2 GM/50ML SOLUTION: Performed by: INTERNAL MEDICINE

## 2023-03-17 PROCEDURE — 82805 BLOOD GASES W/O2 SATURATION: CPT

## 2023-03-17 RX ORDER — MAGNESIUM SULFATE HEPTAHYDRATE 40 MG/ML
2 INJECTION, SOLUTION INTRAVENOUS
Status: COMPLETED | OUTPATIENT
Start: 2023-03-17 | End: 2023-03-17

## 2023-03-17 RX ORDER — PANTOPRAZOLE SODIUM 40 MG/10ML
40 INJECTION, POWDER, LYOPHILIZED, FOR SOLUTION INTRAVENOUS
Status: DISCONTINUED | OUTPATIENT
Start: 2023-03-17 | End: 2023-03-30

## 2023-03-17 RX ORDER — VANCOMYCIN HYDROCHLORIDE 1 G/200ML
15 INJECTION, SOLUTION INTRAVENOUS EVERY 12 HOURS
Status: DISCONTINUED | OUTPATIENT
Start: 2023-03-17 | End: 2023-03-18

## 2023-03-17 RX ORDER — POTASSIUM CHLORIDE 1.5 G/1.77G
40 POWDER, FOR SOLUTION ORAL EVERY 4 HOURS
Status: COMPLETED | OUTPATIENT
Start: 2023-03-17 | End: 2023-03-17

## 2023-03-17 RX ORDER — LIDOCAINE HYDROCHLORIDE 20 MG/ML
JELLY TOPICAL AS NEEDED
Status: COMPLETED | OUTPATIENT
Start: 2023-03-17 | End: 2023-03-17

## 2023-03-17 RX ADMIN — CHLORHEXIDINE GLUCONATE 0.12% ORAL RINSE 15 ML: 1.2 LIQUID ORAL at 08:03

## 2023-03-17 RX ADMIN — Medication 200 MCG/HR: at 23:08

## 2023-03-17 RX ADMIN — HEPARIN SODIUM 5000 UNITS: 5000 INJECTION, SOLUTION INTRAVENOUS; SUBCUTANEOUS at 22:52

## 2023-03-17 RX ADMIN — FAMOTIDINE 20 MG: 10 INJECTION INTRAVENOUS at 00:18

## 2023-03-17 RX ADMIN — PROPOFOL 30 MCG/KG/MIN: 10 INJECTION, EMULSION INTRAVENOUS at 17:19

## 2023-03-17 RX ADMIN — FAMOTIDINE 20 MG: 10 INJECTION INTRAVENOUS at 08:13

## 2023-03-17 RX ADMIN — PROPOFOL 30 MCG/KG/MIN: 10 INJECTION, EMULSION INTRAVENOUS at 22:46

## 2023-03-17 RX ADMIN — MAGNESIUM SULFATE IN WATER 2 G: 40 INJECTION, SOLUTION INTRAVENOUS at 10:13

## 2023-03-17 RX ADMIN — SODIUM CHLORIDE 125 ML/HR: 9 INJECTION, SOLUTION INTRAVENOUS at 12:14

## 2023-03-17 RX ADMIN — PHENYLEPHRINE HYDROCHLORIDE 0.5 MCG/KG/MIN: 10 INJECTION INTRAVENOUS at 13:46

## 2023-03-17 RX ADMIN — Medication 50 MCG/HR: at 01:23

## 2023-03-17 RX ADMIN — PROPOFOL 40 MCG/KG/MIN: 10 INJECTION, EMULSION INTRAVENOUS at 02:50

## 2023-03-17 RX ADMIN — MAGNESIUM SULFATE IN WATER 2 G: 40 INJECTION, SOLUTION INTRAVENOUS at 06:07

## 2023-03-17 RX ADMIN — POTASSIUM CHLORIDE 40 MEQ: 1.5 POWDER, FOR SOLUTION ORAL at 10:14

## 2023-03-17 RX ADMIN — LIDOCAINE HYDROCHLORIDE: 20 JELLY TOPICAL at 09:15

## 2023-03-17 RX ADMIN — SODIUM CHLORIDE 2 G: 900 INJECTION INTRAVENOUS at 20:06

## 2023-03-17 RX ADMIN — CHLORHEXIDINE GLUCONATE 0.12% ORAL RINSE 15 ML: 1.2 LIQUID ORAL at 00:18

## 2023-03-17 RX ADMIN — POTASSIUM CHLORIDE 40 MEQ: 1.5 POWDER, FOR SOLUTION ORAL at 06:07

## 2023-03-17 RX ADMIN — SODIUM CHLORIDE 125 ML/HR: 9 INJECTION, SOLUTION INTRAVENOUS at 04:36

## 2023-03-17 RX ADMIN — HEPARIN SODIUM 5000 UNITS: 5000 INJECTION, SOLUTION INTRAVENOUS; SUBCUTANEOUS at 06:07

## 2023-03-17 RX ADMIN — SODIUM CHLORIDE 2 G: 900 INJECTION INTRAVENOUS at 00:29

## 2023-03-17 RX ADMIN — VANCOMYCIN HYDROCHLORIDE 1000 MG: 1 INJECTION, SOLUTION INTRAVENOUS at 12:09

## 2023-03-17 RX ADMIN — PROPOFOL 30 MCG/KG/MIN: 10 INJECTION, EMULSION INTRAVENOUS at 08:10

## 2023-03-17 RX ADMIN — CHLORHEXIDINE GLUCONATE 0.12% ORAL RINSE 15 ML: 1.2 LIQUID ORAL at 20:06

## 2023-03-17 RX ADMIN — MAGNESIUM SULFATE IN WATER 2 G: 40 INJECTION, SOLUTION INTRAVENOUS at 08:11

## 2023-03-17 RX ADMIN — HEPARIN SODIUM 5000 UNITS: 5000 INJECTION, SOLUTION INTRAVENOUS; SUBCUTANEOUS at 14:04

## 2023-03-17 RX ADMIN — SODIUM CHLORIDE 125 ML/HR: 9 INJECTION, SOLUTION INTRAVENOUS at 20:22

## 2023-03-17 NOTE — PROGRESS NOTES
"Pharmacy Consult-Vancomycin Dosing  Sandro Junior is a  68 y.o. male receiving vancomycin therapy.     Indication: URTI  Consulting Provider: intensivist  ID Consult:     Goal AUC: 400 - 600 mg/L*hr    Current Antimicrobial Therapy  Anti-Infectives (From admission, onward)      Ordered     Dose/Rate Route Frequency Start Stop    03/17/23 0131  vancomycin (VANCOCIN) 1000 mg/200 mL dextrose 5% IVPB        Ordering Provider: Mario Ruiz, PharmD    15 mg/kg × 74.5 kg  over 60 Minutes Intravenous Every 12 Hours 03/17/23 1200 03/22/23 1159    03/16/23 2347  cefTRIAXone (ROCEPHIN) 2 g/100 mL 0.9% NS IVPB (MBP)        Ordering Provider: Vikram Zamora MD    2 g  over 30 Minutes Intravenous Every 24 Hours Scheduled 03/17/23 0000 03/21/23 2059 03/16/23 2347  Pharmacy to dose vancomycin        Ordering Provider: Vikram Zamora MD     Does not apply Continuous PRN 03/16/23 2346 03/21/23 2345    03/16/23 2151  vancomycin 1500 mg/500 mL 0.9% NS IVPB ([facility])        Ordering Provider: Anand Eaton MD    20 mg/kg × 79.4 kg Intravenous Once 03/16/23 2153 03/16/23 2323            Allergies  Allergies as of 03/16/2023    (No Known Allergies)       Labs    Results from last 7 days   Lab Units 03/16/23 2023   BUN mg/dL 10   CREATININE mg/dL 0.79       Results from last 7 days   Lab Units 03/16/23 2023   WBC 10*3/mm3 14.86*       Evaluation of Dosing     Last Dose Received in the ED/Outside Facility: 1500mg  Is Patient on Dialysis or Renal Replacement: no    Ht - 185.4 cm (72.99\")  Wt - 74.5 kg (164 lb 3.9 oz)    Estimated Creatinine Clearance: 96.9 mL/min (by C-G formula based on SCr of 0.79 mg/dL).    Intake & Output (last 3 days)         03/14 0701  03/15 0700 03/15 0701  03/16 0700 03/16 0701 03/17 0700    IV Piggyback   1000    Total Intake(mL/kg)   1000 (13.4)    Net   +1000                   Microbiology and Radiology  Microbiology Results (last 10 days)       Procedure Component Value - " Date/Time    COVID PRE-OP / PRE-PROCEDURE SCREENING ORDER (NO ISOLATION) - Swab, Nasopharynx [[id number]]  (Normal) Collected: 03/16/23 2108    Lab Status: Final result Specimen: Swab from Nasopharynx Updated: 03/16/23 2234    Narrative:      The following orders were created for panel order COVID PRE-OP / PRE-PROCEDURE SCREENING ORDER (NO ISOLATION) - Swab, Nasopharynx.  Procedure                               Abnormality         Status                     ---------                               -----------         ------                     Respiratory Panel PCR w/...[[id number]]  Normal              Final result                 Please view results for these tests on the individual orders.    Respiratory Panel PCR w/COVID-19(SARS-CoV-2) [facility]/WILLIAM/RAMONITA/[facility] In-House, NP Swab in UTM/VTM, 3-4 HR TAT - Swab, Nasopharynx [[id number]]  (Normal) Collected: 03/16/23 2108    Lab Status: Final result Specimen: Swab from Nasopharynx Updated: 03/16/23 2234     ADENOVIRUS, PCR Not Detected     Coronavirus 229E Not Detected     Coronavirus HKU1 Not Detected     Coronavirus NL63 Not Detected     Coronavirus OC43 Not Detected     COVID19 Not Detected     Human Metapneumovirus Not Detected     Human Rhinovirus/Enterovirus Not Detected     Influenza A PCR Not Detected     Influenza B PCR Not Detected     Parainfluenza Virus 1 Not Detected     Parainfluenza Virus 2 Not Detected     Parainfluenza Virus 3 Not Detected     Parainfluenza Virus 4 Not Detected     RSV, PCR Not Detected     Bordetella pertussis pcr Not Detected     Bordetella parapertussis PCR Not Detected     Chlamydophila pneumoniae PCR Not Detected     Mycoplasma pneumo by PCR Not Detected    Narrative:      In the setting of a positive respiratory panel with a viral infection PLUS a negative procalcitonin without other underlying concern for bacterial infection, consider observing off antibiotics or discontinuation of antibiotics and continue supportive care. If  the respiratory panel is positive for atypical bacterial infection (Bordetella pertussis, Chlamydophila pneumoniae, or Mycoplasma pneumoniae), consider antibiotic de-escalation to target atypical bacterial infection.            Reported Vancomycin Levels                         [facility] AUC Calculation:    Current AUC:   0 mg/L*hr    Predicted Steady State AUC :   519 mg/L*hr    Assessment/Plan: The patient will be started on a bolus of 20mg/kg for a dose of 1500mg . Will initiate maintenance dose at 1000 mg IV every 12 hours.  Plan for trough as patient approaches steady state, prior to the 4th dose.  Due to infection severity, will target an AUC of 400-600.      Pharmacy will continue to follow the patient’s culture results and clinical progress daily.    Mario Ruiz, LosD

## 2023-03-17 NOTE — PLAN OF CARE
Goal Outcome Evaluation:  Plan of Care Reviewed With: patient, family        Progress: no change  Outcome Evaluation: .    Pt admitted to the unit from ED around 2315 last night.   - Propofol and Fentanyl for sedation and pain control   - Pt opening eyes to voice and following all commands. Resting comfortably between care  - NSR replaced ST around 0000 after fluid bolus and infusion . Cardizem titrated off.   - BP remains stable   - Remains on vent, PEEP 5 FiO2 40%  - No UOP since arrival to floor, bladder scan at 0420 showed 330mL - continuing to monitor   - Mg and K replacement protocol initiated this AM

## 2023-03-17 NOTE — ED PROVIDER NOTES
EMERGENCY DEPARTMENT ENCOUNTER    Pt Name: Sandro Junior  MRN: [id number]  Pt :   1955  Room Number:  N209/1  Date of encounter:  3/16/2023  PCP: Dora Loya PA  ED Provider: Anand Eaton MD    Historian: Patient, wife      HPI:  Chief Complaint: Difficulty breathing, sore throat        Context: Sandro Junior is a 68-year-old man who presents the emergency department for evaluation of shortness of breath and sore throat that has been progressively worsening throughout the day.  He says he first noticed it around noon it feels similar to when he has an esophageal stricture that Dr. Montanez he has to do a balloon dilation for but he feels like it is also causing him to be short of breath.  He became very short of breath prior to arrival he feels like since arriving here his shortness of breath has improved somewhat but he still feels very short of breath.  No appreciated fevers or systemic symptoms.  No other complaints at this time.       PAST MEDICAL HISTORY  Past Medical History:   Diagnosis Date   • Epidermal inclusion cyst    • Esophageal candidiasis (HCC)    • Keloid scar    • Rectal cancer (HCC)    • Seborrheic dermatitis          PAST SURGICAL HISTORY  Past Surgical History:   Procedure Laterality Date   • COLECTOMY PARTIAL / TOTAL     • KNEE SURGERY      Left knee meniscal tear repair   • TOOTH EXTRACTION  2016 teeth   • TOTAL KNEE ARTHROPLASTY Right 2016         FAMILY HISTORY  History reviewed. No pertinent family history.      SOCIAL HISTORY  Social History     Socioeconomic History   • Marital status:    Tobacco Use   • Smoking status: Former     Types: Cigars     Quit date: 2019     Years since quittin.2   • Smokeless tobacco: Never   • Tobacco comments:     2 cigars a week   Vaping Use   • Vaping Use: Never used   Substance and Sexual Activity   • Alcohol use: Yes     Alcohol/week: 4.0 - 5.0 standard drinks     Types: 4 - 5 Glasses of wine per week   •  Drug use: Defer   • Sexual activity: Defer         ALLERGIES  Patient has no known allergies.        REVIEW OF SYSTEMS  Review of Systems       All systems reviewed and negative except for those discussed in HPI.       PHYSICAL EXAM    I have reviewed the triage vital signs and nursing notes.    ED Triage Vitals   Temp Heart Rate Resp BP SpO2   03/16/23 2139 03/16/23 2023 03/16/23 2028 03/16/23 2023 03/16/23 2023   99.6 °F (37.6 °C) (!) 134 24 (!) 179/117 96 %      Temp src Heart Rate Source Patient Position BP Location FiO2 (%)   03/16/23 2139 03/16/23 2028 03/16/23 2028 03/17/23 0000 03/16/23 2125   Axillary Monitor Sitting Left arm 40 %       Physical Exam  GENERAL:   Appears ill, uncomfortable, obvious increased work of breathing  HENT: Nares patent.  Clear oropharynx with mild cobblestoning but no significant swelling or edema  EYES: No scleral icterus.  CV: Regular tachycardia without appreciated murmurs rubs or gallops  RESPIRATORY: Tachypnea and rhonchorous breath sounds worse on the right than left concerning for possible aspiration, no stridor  ABDOMEN: Soft, nontender  MUSCULOSKELETAL: No deformities.   NEURO: Alert, moves all extremities, follows commands.  SKIN: Warm, dry, no rash visualized.      LAB RESULTS  Recent Results (from the past 24 hour(s))   Comprehensive Metabolic Panel    Collection Time: 03/16/23  8:23 PM    Specimen: Blood   Result Value Ref Range    Glucose 146 (H) 65 - 99 mg/dL    BUN 10 8 - 23 mg/dL    Creatinine 0.79 0.76 - 1.27 mg/dL    Sodium 139 136 - 145 mmol/L    Potassium 3.6 3.5 - 5.2 mmol/L    Chloride 100 98 - 107 mmol/L    CO2 24.0 22.0 - 29.0 mmol/L    Calcium 9.7 8.6 - 10.5 mg/dL    Total Protein 8.1 6.0 - 8.5 g/dL    Albumin 4.3 3.5 - 5.2 g/dL    ALT (SGPT) 14 1 - 41 U/L    AST (SGOT) 26 1 - 40 U/L    Alkaline Phosphatase 70 39 - 117 U/L    Total Bilirubin 1.3 (H) 0.0 - 1.2 mg/dL    Globulin 3.8 gm/dL    A/G Ratio 1.1 g/dL    BUN/Creatinine Ratio 12.7 7.0 - 25.0    Anion  Gap 15.0 5.0 - 15.0 mmol/L    eGFR 98.0 >60.0 mL/min/1.73   BNP    Collection Time: 03/16/23  8:23 PM    Specimen: Blood   Result Value Ref Range    proBNP 165.7 0.0 - 900.0 pg/mL   Single High Sensitivity Troponin T    Collection Time: 03/16/23  8:23 PM    Specimen: Blood   Result Value Ref Range    HS Troponin T 7 <15 ng/L   Green Top (Gel)    Collection Time: 03/16/23  8:23 PM   Result Value Ref Range    Extra Tube Hold for add-ons.    Lavender Top    Collection Time: 03/16/23  8:23 PM   Result Value Ref Range    Extra Tube hold for add-on    Gold Top - SST    Collection Time: 03/16/23  8:23 PM   Result Value Ref Range    Extra Tube Hold for add-ons.    Gray Top    Collection Time: 03/16/23  8:23 PM   Result Value Ref Range    Extra Tube Hold for add-ons.    Light Blue Top    Collection Time: 03/16/23  8:23 PM   Result Value Ref Range    Extra Tube Hold for add-ons.    CBC Auto Differential    Collection Time: 03/16/23  8:23 PM    Specimen: Blood   Result Value Ref Range    WBC 14.86 (H) 3.40 - 10.80 10*3/mm3    RBC 4.65 4.14 - 5.80 10*6/mm3    Hemoglobin 15.4 13.0 - 17.7 g/dL    Hematocrit 46.6 37.5 - 51.0 %    .2 (H) 79.0 - 97.0 fL    MCH 33.1 (H) 26.6 - 33.0 pg    MCHC 33.0 31.5 - 35.7 g/dL    RDW 13.5 12.3 - 15.4 %    RDW-SD 50.0 37.0 - 54.0 fl    MPV 9.3 6.0 - 12.0 fL    Platelets 175 140 - 450 10*3/mm3    Neutrophil % 79.6 (H) 42.7 - 76.0 %    Lymphocyte % 10.1 (L) 19.6 - 45.3 %    Monocyte % 9.4 5.0 - 12.0 %    Eosinophil % 0.0 (L) 0.3 - 6.2 %    Basophil % 0.3 0.0 - 1.5 %    Immature Grans % 0.6 (H) 0.0 - 0.5 %    Neutrophils, Absolute 11.84 (H) 1.70 - 7.00 10*3/mm3    Lymphocytes, Absolute 1.50 0.70 - 3.10 10*3/mm3    Monocytes, Absolute 1.39 (H) 0.10 - 0.90 10*3/mm3    Eosinophils, Absolute 0.00 0.00 - 0.40 10*3/mm3    Basophils, Absolute 0.04 0.00 - 0.20 10*3/mm3    Immature Grans, Absolute 0.09 (H) 0.00 - 0.05 10*3/mm3    nRBC 0.0 0.0 - 0.2 /100 WBC   Lipase    Collection Time: 03/16/23  8:23  PM    Specimen: Blood   Result Value Ref Range    Lipase 17 13 - 60 U/L   D-dimer, Quantitative    Collection Time: 03/16/23  8:23 PM    Specimen: Blood   Result Value Ref Range    D-Dimer, Quantitative 1.19 (H) 0.00 - 0.66 MCGFEU/mL   Lactic Acid, Plasma    Collection Time: 03/16/23  8:23 PM    Specimen: Blood   Result Value Ref Range    Lactate 3.1 (C) 0.5 - 2.0 mmol/L   C-reactive Protein    Collection Time: 03/16/23  8:23 PM    Specimen: Blood   Result Value Ref Range    C-Reactive Protein 2.26 (H) 0.00 - 0.50 mg/dL   Magnesium    Collection Time: 03/16/23  8:23 PM    Specimen: Blood   Result Value Ref Range    Magnesium 1.4 (L) 1.6 - 2.4 mg/dL   Phosphorus    Collection Time: 03/16/23  8:23 PM    Specimen: Blood   Result Value Ref Range    Phosphorus 3.7 2.5 - 4.5 mg/dL   TSH    Collection Time: 03/16/23  8:23 PM    Specimen: Blood   Result Value Ref Range    TSH 0.262 (L) 0.270 - 4.200 uIU/mL   T4, Free    Collection Time: 03/16/23  8:23 PM    Specimen: Blood   Result Value Ref Range    Free T4 1.11 0.93 - 1.70 ng/dL   ECG 12 Lead ED Triage Standing Order; SOA    Collection Time: 03/16/23  8:23 PM   Result Value Ref Range    QT Interval 354 ms    QTC Interval 524 ms   Blood Gas, Arterial With Co-Ox    Collection Time: 03/16/23  8:47 PM    Specimen: Arterial Blood   Result Value Ref Range    Site Right Radial     Jordan's Test N/A     pH, Arterial 7.405 7.350 - 7.450 pH units    pCO2, Arterial 40.3 35.0 - 45.0 mm Hg    pO2, Arterial 360.0 (H) 83.0 - 108.0 mm Hg    HCO3, Arterial 25.2 20.0 - 26.0 mmol/L    Base Excess, Arterial 0.4 0.0 - 2.0 mmol/L    Hemoglobin, Blood Gas 14.2 13.5 - 17.5 g/dL    Hematocrit, Blood Gas 43.6 38.0 - 51.0 %    Oxyhemoglobin 99.0 94 - 99 %    Methemoglobin 0.50 0.00 - 1.50 %    Carboxyhemoglobin 1.0 0 - 2 %    CO2 Content 26.4 22 - 33 mmol/L    Temperature 37.0 C    Barometric Pressure for Blood Gas      Modality NRB     FIO2 40 %    Rate 0 Breaths/minute    PIP 0 cmH2O    IPAP 0      EPAP 0     Note      pH, Temp Corrected 7.405 pH Units    pCO2, Temperature Corrected 40.3 35 - 48 mm Hg    pO2, Temperature Corrected 360 (H) 83 - 108 mm Hg   Respiratory Panel PCR w/COVID-19(SARS-CoV-2) [facility] In-House, NP Swab in UTM/VTM, 3-4 HR TAT - Swab, Nasopharynx    Collection Time: 03/16/23  9:08 PM    Specimen: Nasopharynx; Swab   Result Value Ref Range    ADENOVIRUS, PCR Not Detected Not Detected    Coronavirus 229E Not Detected Not Detected    Coronavirus HKU1 Not Detected Not Detected    Coronavirus NL63 Not Detected Not Detected    Coronavirus OC43 Not Detected Not Detected    COVID19 Not Detected Not Detected - Ref. Range    Human Metapneumovirus Not Detected Not Detected    Human Rhinovirus/Enterovirus Not Detected Not Detected    Influenza A PCR Not Detected Not Detected    Influenza B PCR Not Detected Not Detected    Parainfluenza Virus 1 Not Detected Not Detected    Parainfluenza Virus 2 Not Detected Not Detected    Parainfluenza Virus 3 Not Detected Not Detected    Parainfluenza Virus 4 Not Detected Not Detected    RSV, PCR Not Detected Not Detected    Bordetella pertussis pcr Not Detected Not Detected    Bordetella parapertussis PCR Not Detected Not Detected    Chlamydophila pneumoniae PCR Not Detected Not Detected    Mycoplasma pneumo by PCR Not Detected Not Detected   Urinalysis With Microscopic If Indicated (No Culture) - Straight Cath    Collection Time: 03/16/23 10:27 PM    Specimen: Straight Cath; Urine   Result Value Ref Range    Color, UA Yellow Yellow, Straw    Appearance, UA Clear Clear    pH, UA 7.0 5.0 - 8.0    Specific Gravity, UA 1.036 (H) 1.001 - 1.030    Glucose, UA Negative Negative    Ketones, UA Trace (A) Negative    Bilirubin, UA Negative Negative    Blood, UA Negative Negative    Protein, UA 30 mg/dL (1+) (A) Negative    Leuk Esterase, UA Negative Negative    Nitrite, UA Negative Negative    Urobilinogen, UA 1.0 E.U./dL 0.2 - 1.0 E.U./dL   Urinalysis,  Microscopic Only - Straight Cath    Collection Time: 03/16/23 10:27 PM    Specimen: Straight Cath; Urine   Result Value Ref Range    RBC, UA 0-2 None Seen, 0-2 /HPF    WBC, UA None Seen None Seen, 0-2 /HPF    Bacteria, UA None Seen None Seen, Trace /HPF    Squamous Epithelial Cells, UA 0-2 None Seen, 0-2 /HPF    Hyaline Casts, UA 0-6 0 - 6 /LPF    Methodology Automated Microscopy    STAT Lactic Acid, Reflex    Collection Time: 03/16/23 11:28 PM    Specimen: Blood   Result Value Ref Range    Lactate 1.8 0.5 - 2.0 mmol/L   Blood Gas, Arterial With Co-Ox    Collection Time: 03/17/23 12:05 AM    Specimen: Arterial Blood   Result Value Ref Range    Site Left Radial     Jordan's Test N/A     pH, Arterial 7.410 7.350 - 7.450 pH units    pCO2, Arterial 41.8 35.0 - 45.0 mm Hg    pO2, Arterial 158.0 (H) 83.0 - 108.0 mm Hg    HCO3, Arterial 26.5 (H) 20.0 - 26.0 mmol/L    Base Excess, Arterial 1.6 0.0 - 2.0 mmol/L    Hemoglobin, Blood Gas 13.8 13.5 - 17.5 g/dL    Hematocrit, Blood Gas 42.4 38.0 - 51.0 %    Oxyhemoglobin 98.4 94 - 99 %    Methemoglobin 0.30 0.00 - 1.50 %    Carboxyhemoglobin 1.1 0 - 2 %    CO2 Content 27.7 22 - 33 mmol/L    Temperature 37.0 C    Barometric Pressure for Blood Gas      Modality Ventilator     FIO2 40 %    Ventilator Mode VC+/AC     Set Tidal Volume 0.40     Rate 18 Breaths/minute    PEEP 5.0     PIP 0 cmH2O    IPAP 0     EPAP 0     Note      pH, Temp Corrected 7.410 pH Units    pCO2, Temperature Corrected 41.8 35 - 48 mm Hg    pO2, Temperature Corrected 158 (H) 83 - 108 mm Hg   High Sensitivity Troponin T    Collection Time: 03/17/23 12:28 AM    Specimen: Arm, Right; Blood   Result Value Ref Range    HS Troponin T 14 <15 ng/L       If labs were ordered, I independently reviewed the results and considered them in treating the patient.        RADIOLOGY  CT Soft Tissue Neck With Contrast    Result Date: 3/16/2023  CT SOFT TISSUE NECK W CONTRAST, CT ANGIOGRAM CHEST Date of Exam: 3/16/2023 9:56 PM EDT  Indication: acute respiratory distress, possible aspiration event, sense of throat swelling. Comparison: None available. Technique: Axial CT images were obtained of the neck and chest after the uneventful intravenous administration of 75 cc of Isovue-370 .  Reconstructed coronal and sagittal images were also obtained. Automated exposure control and iterative construction methods were used. Findings: CT neck: The patient is intubated. There is debris within the nasopharynx and oropharynx which could relate to the intubation. The soft tissues around the supraglottic laryngeal airway appear boggy and somewhat edematous. This may be due to an inflammatory process within this area. These changes extend out to the paralaryngeal soft tissues and to the thyroid cartilage.There are no discrete pathologic-appearing lymph nodes. The thyroid does not appear unusual. The uvula appears thickened. It may be swollen. CT CHEST: The tracheal tube extends to above the radha. There is some debris adjacent to the distal tracheal tube in the airway. The pulmonary arteries and thoracic aorta do not appear unusual. There are bibasilar areas of atelectasis versus infiltrates. Is there any concern the patient may have aspirated?     Impression: 1.Moderate amount of debris is noted in the nasopharynx and oropharynx that may relate to intubation. 2.Abnormal appearance to the soft tissues of the laryngeal area that appear boggy/edematous and could relate to nonspecific inflammation in this area. 3.There is thickening of the uvula may be swollen. 4.Bibasilar areas of density that may relate to atelectasis or infiltrates in possibly could be secondary to aspiration 5.There is some debris within the tracheal airway adjacent to the distal tracheal tube. Electronically Signed: Boris Clayton  3/16/2023 10:27 PM EDT  Workstation ID: [id number]    XR Chest 1 View    Result Date: 3/16/2023  XR CHEST 1 VW Date of Exam: 3/16/2023 10:16 PM EDT Indication:  Post Intubation . Comparison: 3/16/2023. Findings: Endotracheal tube tip is 5.2 cm above the radha. There is no pneumothorax, pleural effusion or focal airspace consolidation. Heart size and pulmonary vasculature appear within normal limits. Regional bones appear intact.     Impression: ET tube tip is 5.2 cm above the radha. No acute findings in the lungs. Electronically Signed: Mark James  3/16/2023 10:24 PM EDT  Workstation ID: [id number]    XR Chest 1 View    Result Date: 3/16/2023  XR CHEST 1 VW Date of Exam: 3/16/2023 8:35 PM EDT Indication: SOA triage protocol. Comparison: CT chest November 9, 2015 Findings: The heart is not enlarged. The lungs seem clear. There are no pleural effusions. The visualized osseous structures do not appear unusual.     Impression: 1.An acute pulmonary process is not apparent. Electronically Signed: Boris Clayton  3/16/2023 8:46 PM EDT  Workstation ID: [id number]    CT Angiogram Chest    Result Date: 3/16/2023  CT SOFT TISSUE NECK W CONTRAST, CT ANGIOGRAM CHEST Date of Exam: 3/16/2023 9:56 PM EDT Indication: acute respiratory distress, possible aspiration event, sense of throat swelling. Comparison: None available. Technique: Axial CT images were obtained of the neck and chest after the uneventful intravenous administration of 75 cc of Isovue-370 .  Reconstructed coronal and sagittal images were also obtained. Automated exposure control and iterative construction methods were used. Findings: CT neck: The patient is intubated. There is debris within the nasopharynx and oropharynx which could relate to the intubation. The soft tissues around the supraglottic laryngeal airway appear boggy and somewhat edematous. This may be due to an inflammatory process within this area. These changes extend out to the paralaryngeal soft tissues and to the thyroid cartilage.There are no discrete pathologic-appearing lymph nodes. The thyroid does not appear unusual. The uvula appears thickened. It may  be swollen. CT CHEST: The tracheal tube extends to above the radha. There is some debris adjacent to the distal tracheal tube in the airway. The pulmonary arteries and thoracic aorta do not appear unusual. There are bibasilar areas of atelectasis versus infiltrates. Is there any concern the patient may have aspirated?     Impression: 1.Moderate amount of debris is noted in the nasopharynx and oropharynx that may relate to intubation. 2.Abnormal appearance to the soft tissues of the laryngeal area that appear boggy/edematous and could relate to nonspecific inflammation in this area. 3.There is thickening of the uvula may be swollen. 4.Bibasilar areas of density that may relate to atelectasis or infiltrates in possibly could be secondary to aspiration 5.There is some debris within the tracheal airway adjacent to the distal tracheal tube. Electronically Signed: Boris Clayton  3/16/2023 10:27 PM EDT  Workstation ID: [id number]    XR Abdomen KUB    Result Date: 3/17/2023  EXAM:  XR ABDOMEN KUB   DATE: 3/16/2023 11:23 PM HISTORY:   OG placement COMPARISON:  None FINDINGS and     1.  Single  enteric tube looped in the upper stomach. 2.  Evaluation limited due to overlying tubes and lines. 3.  Gas-filled bowel loops, prominent stool volume. Query constipation. Electronically signed by:  Donna Baez M.D.  3/16/2023 10:23 PM Mountain Time      I ordered and independently reviewed the above noted radiographic studies.      I viewed images of initial chest x-ray which is clear.  Repeat chest x-ray which shows endotracheal tube in place.  CTA of the chest and CT soft tissue neck which does show inflammation around the epiglottis and arytenoids no appreciated deep infection or other abnormality.    See radiologist's dictation for official interpretation.        PROCEDURES    Intubation    Date/Time: 3/17/2023 3:50 AM  Performed by: Anand Eaton MD  Authorized by: Vikram Zamora MD     Consent:     Consent  obtained:  Verbal and emergent situation    Consent given by:  Patient and spouse    Risks discussed:  Death and hypoxia  Universal protocol:     Patient identity confirmed:  Verbally with patient and hospital-assigned identification number  Pre-procedure details:     Indication: failure to protect airway, failure to ventilate and predicted clinical deterioration      Patient status:  Awake    Look externally: no concerns      Obstruction: edema      Neck mobility: normal      Pharmacologic strategy: RSI      Induction agents:  Ketamine    Paralytics:  Rocuronium  Procedure details:     Preoxygenation:  Nonrebreather mask    CPR in progress: no      Intubation method:  Oral    Intubation technique: video assisted      Laryngoscope blade:  Hypercurved    Grade view: II      Tube size (mm):  7.5    Tube type:  Cuffed    Number of attempts:  1    Tube visualized through cords: yes    Placement assessment:     ETT at teeth/gumline (cm):  24    Tube secured with:  ETT hansen    Breath sounds:  Equal    Placement verification: chest rise, CXR verification, esophageal detector, tube exhalation and waveform ETCO2      CXR findings:  Appropriate position  Post-procedure details:     Procedure completion:  Tolerated well, no immediate complications        ECG 12 Lead ED Triage Standing Order; SOA   Preliminary Result   Test Reason : ED Triage Standing Order~   Blood Pressure :   */*   mmHG   Vent. Rate : 132 BPM     Atrial Rate : 133 BPM      P-R Int :   * ms          QRS Dur :  92 ms       QT Int : 354 ms       P-R-T Axes :   *  26  46 degrees      QTc Int : 524 ms      Supraventricular tachycardia   Nonspecific ST abnormality   Abnormal ECG   When compared with ECG of 02-MAR-2013 05:44,   Vent. rate has increased BY  54 BPM   Criteria for Inferior infarct are no longer present      Referred By: EDMD           Confirmed By:       ECG 12 Lead Chest Pain    (Results Pending)       MEDICATIONS GIVEN IN ER    Medications    sodium chloride 0.9 % flush 10 mL (has no administration in time range)   propofol (DIPRIVAN) infusion 10 mg/mL 100 mL (40 mcg/kg/min × 79.4 kg Intravenous New Bag 3/17/23 0250)   dilTIAZem (CARDIZEM) 125 mg in sodium chloride 0.9 % 125 mL infusion (0 mg/hr Intravenous Stopped 3/17/23 0051)   chlorhexidine (PERIDEX) 0.12 % solution 15 mL (15 mL Mouth/Throat Given 3/17/23 0018)   famotidine (PEPCID) injection 20 mg (20 mg Intravenous Given 3/17/23 0018)   heparin (porcine) 5000 UNIT/ML injection 5,000 Units (has no administration in time range)   sodium chloride 0.9 % infusion (125 mL/hr Intravenous New Bag 3/16/23 2359)   acetaminophen (TYLENOL) tablet 650 mg (has no administration in time range)     Or   acetaminophen (TYLENOL) suppository 650 mg (has no administration in time range)   Pharmacy to dose vancomycin (has no administration in time range)   cefTRIAXone (ROCEPHIN) 2 g/100 mL 0.9% NS IVPB (MBP) (2 g Intravenous New Bag 3/17/23 0029)   latanoprost (XALATAN) 0.005 % ophthalmic solution 1 drop (has no administration in time range)   fentaNYL 2500 mcg/250 mL NS infusion ( Intravenous Canceled Entry 3/17/23 0136)   vancomycin (VANCOCIN) 1000 mg/200 mL dextrose 5% IVPB (has no administration in time range)   !vanc trough 3/18@1130, do not hang dose until lab drawn and pharmacy reviewed (has no administration in time range)   EPINEPHrine (Anaphylaxis) (ADRENALIN) injection 0.3 mg (0.3 mg Intramuscular Given 3/16/23 2032)   lactated ringers bolus 1,000 mL (0 mL Intravenous Stopped 3/16/23 2212)   methylPREDNISolone sodium succinate (SOLU-Medrol) injection 125 mg (125 mg Intravenous Given 3/16/23 2033)   diphenhydrAMINE (BENADRYL) injection 50 mg (50 mg Intravenous Given 3/16/23 2035)   ondansetron (ZOFRAN) injection 4 mg (4 mg Intravenous Given 3/16/23 2112)   ketamine (KETALAR) injection (150 mg Intravenous Given 3/16/23 2118)   rocuronium (ZEMURON) injection (100 mg Intravenous Given 3/16/23 2120)    vancomycin 1500 mg/500 mL 0.9% NS IVPB ([facility]) (1,500 mg Intravenous New Bag 3/16/23 2323)   iopamidol (ISOVUE-370) 76 % injection 100 mL (75 mL Intravenous Given 3/16/23 2201)   sodium chloride 0.9 % bolus 500 mL (500 mL Intravenous New Bag 3/16/23 3469)         MEDICAL DECISION MAKING, PROGRESS, and CONSULTS    All labs have been independently reviewed by me.  All radiology studies have been reviewed by me and the radiologist dictating the report.  All EKG's have been independently viewed and interpreted by me.      Discussion below represents my analysis of pertinent findings related to patient's condition, differential diagnosis, treatment plan and final disposition.      Differential diagnosis:    Anaphylaxis, pneumonia, aspiration, pneumothorax, pulmonary embolism, angioedema epiglottitis, retropharyngeal abscess, sepsis, anemia, electrolyte abnormality      Additional sources:    - Discussed/ obtained information from independent historians: Wife    - External (non-ED) record review: PCP notes with Dora Ceballos    - Chronic or social conditions impacting care:      - Shared decision making: Patient is having worsening respiratory distress I discussed with both the patient and his wife that I think we need to proceed with endotracheal intubation for airway protection which they are agreeable with they want me to talk with her daughter over the phone who I also spoke with and is agreeable to intubation.      Orders placed during this visit:  Orders Placed This Encounter   Procedures   • COVID PRE-OP / PRE-PROCEDURE SCREENING ORDER (NO ISOLATION) - Swab, Nasopharynx   • Respiratory Panel PCR w/COVID-19(SARS-CoV-2) [facility] In-House, NP Swab in UTM/VTM, 3-4 HR TAT - Swab, Nasopharynx   • Blood Culture - Blood,   • Blood Culture - Blood,   • XR Chest 1 View   • CT Angiogram Chest   • CT Soft Tissue Neck With Contrast   • XR Chest 1 View   • XR Abdomen KUB   • XR Chest 1 View   • [place] Draw   •  Comprehensive Metabolic Panel   • BNP   • Single High Sensitivity Troponin T   • CBC Auto Differential   • Lipase   • Urinalysis With Microscopic If Indicated (No Culture) - Urine, Clean Catch   • Blood Gas, Arterial -With Co-Ox Panel: Yes   • D-dimer, Quantitative   • High Sensitivity Troponin T   • Lactic Acid, Plasma   • C-reactive Protein   • Magnesium   • Phosphorus   • TSH   • T4, Free   • Blood Gas, Arterial With Co-Ox   • STAT Lactic Acid, Reflex   • Urinalysis, Microscopic Only - Urine, Clean Catch   • Blood Gas, Arterial -Obtain on: Current Settings; With Co-Ox Panel: Yes   • Blood Gas, Arterial -With Co-Ox Panel: Yes   • Comprehensive Metabolic Panel   • Magnesium   • Phosphorus   • CBC Auto Differential   • Blood Gas, Arterial With Co-Ox   • High Sensitivity Troponin T 2Hr   • Vancomycin, Trough   • NPO Diet NPO Type: Strict NPO   • Undress & Gown   • Vital Signs   • Orogastric Tube Insertion   • Elevate HOB   • Oral care & teeth brushing for intubated patients   • Subglottic Suctioning Must Be Done Every 6 Hours   • Spontaneous Awakening Trial   • RN May Place Order For ABG As Needed for Respiratory Distress   • Vital Signs Every Hour and Per Hospital Policy Based on Patient Condition   • Cardiac Monitoring   • Continuous Pulse Oximetry   • Height & Weight   • Daily Weights   • Intake & Output   • Strict Bed Rest   • Code Status and Medical Interventions:   • Ventilator - AC/VC+; Other; 16; SpO2 >/= 90%; 5; mL; 400   • ECG 12 Lead ED Triage Standing Order; SOA   • ECG 12 Lead Chest Pain   • Insert Peripheral IV   • Inpatient Admission   • ICU / CCU Bed Request ([facility] / [facility] ONLY)   • Restraints Non-Violent or Non-Self Destructive   • CBC & Differential   • Green Top (Gel)   • Lavender Top   • Gold Top - SST   • Gray Top   • Light Blue Top   • CBC & Differential         Additional orders considered but not ordered:      ED Course:    Consultants:      ED Course as of 03/17/23 0350   Thu Mar 16, 2023   2035  This is a 68-year-old man who presents the emergency department for evaluation of shortness of breath and sore throat that has been progressively worsening throughout the day.  He says he first noticed it around noon it feels similar to when he has an esophageal stricture that Dr. Montanez he has to do a balloon dilation for but he feels like it is also causing him to be short of breath.  He became very short of breath prior to arrival he feels like since arriving here his shortness of breath has improved somewhat but he still feels very short of breath.  No appreciated fevers or systemic symptoms.  No other complaints at this time. [CC]   2049 Spoke with his wife she said this all started after he did ask for some chicken broth for his sore throat she is concerned he may have had a choking episode. [CC]      ED Course User Index  [CC] Anand Eaton MD     Patient arrived tachypneic, tachycardic I was not immediately asked by the triage nurse to evaluate the patient.  He is complaining of sore throat but oropharynx shows may be trace cobblestoning but no significant edema.  He is soft underneath the tongue.  No tongue or lip swelling that I can appreciate.  He does have rhonchorous breath sounds worse on the right than the left concern for possible aspiration.  He does not have a rash and denies abdominal pain nausea vomiting but I am concerned this may be an allergic reaction treated empirically with intramuscular epinephrine, Solu-Medrol, IV fluids, diphenhydramine, and initially after medications he felt like his breathing was improving somewhat.  He is on a nonrebreather mask right now.  Have ordered CT scan of the chest and neck to evaluate for deep space infection.  Starting antibiotics.  Labs show leukocytosis.  He has an elevated D-dimer CTA is already pending.  CMP only positive for hyperglycemia.  He does have lactic acidosis at 3.1 and hypomagnesemia.  I was notified by nursing that patient's  breathing was worsening upon reevaluation he is tripoding, drooling, stridorous.  At this point I discussed with both the patient and his wife that I think we need to secure his airway I am concerned for worsening airway edema.  They were agreeable to this.  He was intubated fortunately on the first attempt but he does have significant inflammation around the arytenoids and epiglottis, that was later confirmed on CT scan.  I used ketamine as my induction agent which caused significant tachycardia starting on a diltiazem drip he is hypertensive and using propofol for sedation.  This point I still do not know what triggered his airway edema or inflammation but I think he needs further work-up in the intensive care unit.  Discussed with Dr. Zamora in the ICU.    60 minutes of critical care provided. This time excludes other billable procedures. Time does include preparation of documents, medical consultations, review of old records, and direct bedside care. Patient is at high risk for life-threatening deterioration due to acute respiratory distress with airway edema requiring endotracheal intubation.              AS OF 03:50 EDT VITALS:    BP - 116/74  HR - 70  TEMP - 99.2 °F (37.3 °C)  O2 SATS - 99%                  DIAGNOSIS  Final diagnoses:   Respiratory distress   Tachycardia   Airway compromise   Acute epiglottitis with airway obstruction - probable   Essential hypertension         DISPOSITION  Intensive care unit      Please note that portions of this document were completed with voice recognition software.        Anand Eaton MD  03/17/23 0352

## 2023-03-17 NOTE — PLAN OF CARE
Pt remains sedated on Fentanyl & Propofol & intubated, Peep 5, Fi02 40%.   FC, [place]; unable to maintain adequate sedation and maintain MAP >65.  Jamaal gtt started. Mag and K+ replaced. Bladder scanned, I/O cath - 675. BM - none. TF started. Family at bedside and updated throughout shift.      Problem: Adult Inpatient Plan of Care  Goal: Plan of Care Review  Outcome: Ongoing, Progressing   Goal Outcome Evaluation:

## 2023-03-17 NOTE — PROGRESS NOTES
Clinical Nutrition     Nutrition Support Assessment  Reason for Visit: ERICK, EDEN      Patient Name: Sandro Junior  YOB: 1955  MRN: [id number]  Date of Encounter: 03/17/23 13:23 EDT  Admission date: 3/16/2023    Comments:    Initiate Peptamen AF @ 25 ml/hr and advance by 20 ml/hr Q 12 hours to goal rate 65 ml/hr. Water flush @ 30 ml Q 2 hours.  At goal rate this regimen will provide 1300 ml formula, 1560 calories (87% est needs), 99 g protein (94% est needs), 8 g fiber, 1054 ml free water from formula, 1354 ml total water from formula/flushes.    With current propofol rate providing 252 calories, pt will receive total of 1812 calories (100% est needs).      Nutrition Assessment   Admission Diagnosis:  Acute respiratory failure (HCC) [J96.00]      Problem List:    Acute epiglottitis with airway obstruction - probable    Essential hypertension    Acute respiratory failure (HCC)        PMH:   He  has a past medical history of Epidermal inclusion cyst, Esophageal candidiasis (HCC), Keloid scar, Rectal cancer (HCC), and Seborrheic dermatitis.    PSH:  He  has a past surgical history that includes Knee surgery; Colectomy partial / total; Total knee arthroplasty (Right, 04/11/2016); and Tooth extraction (2016).      Applicable Nutrition Concerns:   Skin:  Oral:  GI:      Applicable Interval History:   (3/16) intubated, large bore NG tube placed      Reported/Observed/Food/Nutrition Related History:   Patient intubated and has propofol running. No pressor support at this time. RN reports intensivist would like to begin EN today. Wife at bedside reports patient has NKFA. Patient awake and moving around at time of visit.      Labs    Labs Reviewed: Yes     Results from last 7 days   Lab Units 03/17/23  0350 03/16/23 2023   GLUCOSE mg/dL 159* 146*   BUN mg/dL 9 10   CREATININE mg/dL 0.76 0.79   SODIUM mmol/L 135* 139   CHLORIDE mmol/L 102 100   POTASSIUM mmol/L 3.6 3.6   PHOSPHORUS mg/dL 2.6  "3.7   MAGNESIUM mg/dL 1.4* 1.4*   ALT (SGPT) U/L 12 14       Results from last 7 days   Lab Units 03/17/23  0350 03/16/23 2023   ALBUMIN g/dL 3.2* 4.3   CRP mg/dL  --  2.26*           Lab Results   Lab Value Date/Time    HGBA1C 5.2 10/20/2020 0841         Results from last 7 days   Lab Units 03/16/23 2023   PROBNP pg/mL 165.7         Medications    Medications Reviewed: Yes  Pertinent: antibiotic, protonix, KCl  Infusion: fentanyl, propofol (@ 9.53 ml/hr = 252 calories from lipid)  PRN:     Intake/Ouptut 24 hrs (0701 - 0700)   I&O's Reviewed: Yes     No BM this admission    Anthropometrics     Flowsheet Rows    Flowsheet Row First Filed Value   Admission Height 185.4 cm (73\") Documented at 03/16/2023 2104   Admission Weight 79.4 kg (175 lb) Documented at 03/16/2023 2104          Height: Height: 185.4 cm (72.99\")  Last Filed Weight: Weight: 77.9 kg (171 lb 11.8 oz) (03/17/23 0600)  Method: Weight Method: Bed scale  BMI: BMI (Calculated): 22.7  BMI classification: Normal: 18.5-24.9kg/m2  IBW:  184 lbs    UBW:  Last 15 Recorded Weights  View Complete Flowsheet  Weight Weight (kg) Weight (lbs) Weight Method VISIT REPORT   3/17/2023 77.9 kg 171 lb 11.8 oz - -   3/16/2023 74.5 kg 164 lb 3.9 oz Bed scale -   3/16/2023 79.379 kg 175 lb Estimated -   4/5/2022 80.559 kg 177 lb 9.6 oz - Report   1/10/2022 80.196 kg 176 lb 12.8 oz - Report   12/9/2021 79.652 kg 175 lb 9.6 oz - Report   11/13/2021 84.823 kg 187 lb Stated -   3/22/2021 83.008 kg 183 lb - Report   11/19/2020 83.643 kg 184 lb 6.4 oz - Report   10/20/2020 82.918 kg 182 lb 12.8 oz - Report   9/21/2020 84.188 kg 185 lb 9.6 oz - Report   3/16/2020 85.639 kg 188 lb 12.8 oz - Report   2/7/2020 84.823 kg 187 lb - Report   7/16/2019 81.738 kg 180 lb 3.2 oz - Report   1/15/2019 83.915 kg 185 lb - Report       Nutrition Focused Physical Exam     Date:   Unable to perform exam due to: Defer pending indication    Needs Assessment   Date: 3/17    Height used:Height: 185.4 cm " "(72.99\")  Weights used: 171 lbs/77.7 kg (actual wt)      Estimated Calorie needs: ~1800 calories daily (initially)  Method:  PSU (Ve: 6.31, Tmax: 37.6) = 1809  Method: 25-27 Kcals/KG actual wt = 3790-9355    Estimated Protein needs: ~105 g protein daily  Method: 1.2-1.5 g/Kg actual wt =     Estimated Fluid needs:  Per clinical status      Current Nutrition Prescription     PO: NPO Diet NPO Type: Strict NPO    Intake: N/A      Nutrition Diagnosis     Date:  Updated:   Problem Inadequate oral intake   Etiology Mechanical ventilation   Signs/Symptoms NPO   Status:     Goal:   General: Nutrition support treatment  PO: Advace diet as medically feasible/appropriate  EN/PN: Initiate EN, Establish EN tolerance, Tolerate EN at goal    Nutrition Intervention      Follow treatment progress, Care plan reviewed, Supplement offered/refused    Initiate Peptamen AF @ 25 ml/hr and advance by 20 ml/hr Q 12 hours to goal rate 65 ml/hr. Water flush @ 30 ml Q 2 hours.  At goal rate this regimen will provide 1300 ml formula, 1560 calories (87% est needs), 99 g protein (94% est needs), 8 g fiber, 1054 ml free water from formula, 1354 ml total water from formula/flushes.    With current propofol rate providing 252 calories, pt will receive total of 1812 calories (100% est needs).      Monitoring/Evaluation:   Per protocol, I&O, Pertinent labs, EN delivery/tolerance, Weight, GI status, Symptoms, Swallow function, Hemodynamic stability      Beatrice Brewster RD  Time Spent: 45 min  "

## 2023-03-17 NOTE — H&P
Chief complaint: Shortness of breath    Subjective     68-year-old male who presented to the ER this evening with shortness of breath and sore throat.  History obtained is from the chart and my discussion with Dr. Eaton patient is now intubated and sedated.  Apparently, he first noticed a sore throat around noon.  He became increasingly symptomatic throughout the day before presenting to the ER.  He was given epinephrine, steroids, and H2 blockers.  Despite this he began tripoding and drooling and had audible stridor.  Per Dr. Eaton, the intubation was difficult due to edema and erythema.    Imaging reveals abnormal appearance of the soft tissues of the laryngeal area with some edema and swelling of the uvula.  There is no evidence of a fluid collection or abscess.  Bibasilar infiltrates were present possibly related to atelectasis or retained secretions but there was no evidence of consolidative pneumonia.  He has been given Zosyn and vancomycin empirically and, as stated, has received steroids.  Because of tachycardia he was started on a Cardizem drip and a propofol drip was initiated.      History  Past Medical History:   Diagnosis Date   • Epidermal inclusion cyst    • Esophageal candidiasis (HCC)    • Keloid scar    • Rectal cancer (HCC)    • Seborrheic dermatitis      Past Surgical History:   Procedure Laterality Date   • COLECTOMY PARTIAL / TOTAL     • KNEE SURGERY      Left knee meniscal tear repair   • TOOTH EXTRACTION      7 teeth   • TOTAL KNEE ARTHROPLASTY Right 2016     History reviewed. No pertinent family history.  Social History     Tobacco Use   • Smoking status: Former     Types: Cigars     Quit date: 2019     Years since quittin.2   • Smokeless tobacco: Never   • Tobacco comments:     2 cigars a week   Vaping Use   • Vaping Use: Never used   Substance Use Topics   • Alcohol use: Yes     Alcohol/week: 4.0 - 5.0 standard drinks     Types: 4 - 5 Glasses of wine per week   •  "Drug use: Defer       Review of Systems   Review of systems could not be obtained due to   patient intubated.      Objective     Vital Signs  Temp:  [99.6 °F (37.6 °C)-100.1 °F (37.8 °C)] 100.1 °F (37.8 °C)  Heart Rate:  [120-172] 120  Resp:  [24] 24  BP: (133-217)/() 144/92  FiO2 (%):  [40 %] 40 %    Physical Exam:    Objective:  General Appearance:  Ill-appearing.    Vital signs: (most recent): Blood pressure 144/92, pulse 120, temperature 100.1 °F (37.8 °C), temperature source Axillary, resp. rate 24, height 185.4 cm (73\"), weight 74.5 kg (164 lb 3.9 oz), SpO2 97 %.    HEENT: (Oral ET tube)    Lungs:  No rales, wheezes or rhonchi.    Heart: Normal rate.  Regular rhythm.  S1 normal and S2 normal.  No murmur, gallop or friction rub.   Chest: Symmetric chest wall expansion.   Abdomen: Abdomen is soft and non-distended.  Bowel sounds are normal.   There is no abdominal tenderness.   There is no mass. There is no splenomegaly. There is no hepatomegaly.   Extremities: There is no deformity or dependent edema.    Neurological: (Sedated).    Pupils:  Pupils are equal, round, and reactive to light.    Skin:  Warm and dry.              Results Review:    I reviewed the patient's new clinical results.  I reviewed the patient's new imaging results and agree with the interpretation.  I reviewed the patient's other test results and agree with the interpretation    Assessment & Plan     Assessment:    Active Hospital Problems    Diagnosis    • **Acute epiglottitis with airway obstruction - probable    • Acute respiratory failure (HCC)    • Essential hypertension        68-year-old male who presents with what appears to be acute epiglottitis.  He developed a sore throat around noon and symptoms progressed to the day.  Despite appropriate treatment in the ER his stridor and respiratory distress became worse and he underwent intubation which revealed erythematous and swollen airways.  CT of the neck reveals no fluid " collection/abscess but swollen soft tissues.  He has arrived in the ICU intubated with acceptable oxygen saturations and blood pressure and with sinus tachycardia    Plan:    1. ICU admission  2. Maintain intubation with sedation and physical restraints if needed so his airway is not dislodged  3. Empiric antimicrobial therapy with vancomycin and Rocephin per standard recommendations for epiglottitis  4. Additional fluid resuscitation due to tachycardia but this could be related to infection and administration of IM epinephrine in the ER.  Wean Cardizem.  5. Viral respiratory panel is negative  6. Will obtain swabs for Streptococcus  7. Anticipate the need for mechanical ventilation for several days until airway swelling improves  8. No indication for ongoing steroids for what is likely acute epiglottitis    I discussed the patients findings and my recommendations with nursing staff and Dr. Eaton.     Critical Care time spent in direct patient care: 45 minutes (excluding procedure time, if applicable) including high complexity decision making to assess, manipulate, and support vital organ system failure in this individual who has impairment of one or more vital organ systems such that there is a high probability of imminent or life threatening deterioration in the patient’s condition.      Vikram Zamora MD  Pulmonary and Critical Care Medicine  03/16/23  23:30 EDT

## 2023-03-17 NOTE — CASE MANAGEMENT/SOCIAL WORK
Discharge Planning Assessment  [facility]     Patient Name: Sandro Junior  MRN: [id number]  Today's Date: 3/17/2023    Admit Date: 3/16/2023    Plan: ongoing   Discharge Needs Assessment     Row Name 03/17/23 1128       Living Environment    People in Home spouse    Current Living Arrangements home    Potentially Unsafe Housing Conditions none    Primary Care Provided by self    Provides Primary Care For no one    Family Caregiver if Needed none    Quality of Family Relationships supportive       Resource/Environmental Concerns    Resource/Environmental Concerns none    Transportation Concerns none       Transition Planning    Patient/Family Anticipates Transition to home with family    Transportation Anticipated family or friend will provide       Discharge Needs Assessment    Readmission Within the Last 30 Days no previous admission in last 30 days    Equipment Currently Used at Home none    Concerns to be Addressed no discharge needs identified    Anticipated Changes Related to Illness none               Discharge Plan     Row Name 03/17/23 1131       Plan    Plan ongoing    Patient/Family in Agreement with Plan yes    Plan Comments Spoke with family at the bedside. Patient currently sedated on vent. Patient lives in [facility] with [place]. Patient able to perform ADL's independently prior to this admission. He does not have any medical equipment at home and does not utilize home health. PCP is Dora Loya and has [employer] Medicare replacement insurance. Family denies any needs at this time. CM will continue to follow.              Continued Care and Services - Admitted Since 3/16/2023    Coordination has not been started for this encounter.       Expected Discharge Date and Time     Expected Discharge Date Expected Discharge Time    Mar 24, 2023          Demographic Summary     Row Name 03/17/23 1128       General Information    Admission Type inpatient    Arrived From home    Reason for Consult discharge  planning    Preferred Language English       Contact Information    Permission Granted to Share Info With                Functional Status    No documentation.                Psychosocial    No documentation.                Abuse/Neglect    No documentation.                Legal    No documentation.                Substance Abuse    No documentation.                Patient Forms    No documentation.                   Pamela Dyer RN

## 2023-03-17 NOTE — PROGRESS NOTES
Intensive Care Follow-up     Hospital:  LOS: 1 day   Mr. Sandro Junior, 68 y.o. male is followed for:   Acute epiglottitis with airway obstruction            History of present illness:   68-year-old male who presented to the ER this evening with shortness of breath and sore throat.  History obtained is from the chart and my discussion with Dr. Eaton patient is now intubated and sedated.  Apparently, he first noticed a sore throat around noon.  He became increasingly symptomatic throughout the day before presenting to the ER.  He was given epinephrine, steroids, and H2 blockers.  Despite this he began tripoding and drooling and had audible stridor.  Per Dr. Eaton, the intubation was difficult due to edema and erythema.     Imaging reveals abnormal appearance of the soft tissues of the laryngeal area with some edema and swelling of the uvula.  There is no evidence of a fluid collection or abscess.  Bibasilar infiltrates were present possibly related to atelectasis or retained secretions but there was no evidence of consolidative pneumonia.  He has been given Zosyn and vancomycin empirically and, as stated, has received steroids.  Because of tachycardia he was started on a Cardizem drip and a propofol drip was initiated.      Subjective   Interval History:  Patient is stable this morning on mechanical ventilation.  Blood pressure is low although likely sedation effect.  CT of the neck reviewed, there is signs of epiglottitis with likely food in the posterior pharynx.  CT of the chest was unremarkable.  No other acute issues.             The patient's past medical, surgical and social history were reviewed and updated in Epic as appropriate.       Objective     Infusions:  fentanyl 10 mcg/mL,  mcg/hr, Last Rate: 50 mcg/hr (03/17/23 1101)  Pharmacy to dose vancomycin,   phenylephrine, 0.5-3 mcg/kg/min  propofol, 5-50 mcg/kg/min, Last Rate: 20 mcg/kg/min (03/17/23 1046)  sodium chloride, 125 mL/hr, Last Rate:  125 mL/hr (03/17/23 1214)      Medications:  [START ON 3/18/2023] Pharmacy Consult, , Does not apply, Once  cefTRIAXone, 2 g, Intravenous, Q24H  chlorhexidine, 15 mL, Mouth/Throat, Q12H  heparin (porcine), 5,000 Units, Subcutaneous, Q8H  latanoprost, 1 drop, Both Eyes, Daily  pantoprazole, 40 mg, Intravenous, Q AM  vancomycin, 15 mg/kg, Intravenous, Q12H      I reviewed the patient's medications.    Vital Sign Min/Max for last 24 hours  Temp  Min: 97.3 °F (36.3 °C)  Max: 100.1 °F (37.8 °C)   BP  Min: 85/59  Max: 217/133   Pulse  Min: 55  Max: 172   Resp  Min: 16  Max: 25   SpO2  Min: 96 %  Max: 100 %   Flow (L/min)  Min: 15  Max: 40       Input/Output for last 24 hour shift  03/16 0701 - 03/17 0700  In: 3098.6 [I.V.:1374.2]  Out: -    FiO2 (%):  [40 %] 40 %  S RR:  [16] 16  PEEP/CPAP (cm H2O):  [5 cm H20] 5 cm H20  MAP (cm H2O):  [7.4] 7.4  GENERAL : Sedated, lying in bed, NAD  RESPIRATORY/THORAX : ET tube in place, Coarse breath sounds bilaterally  CARDIOVASCULAR : Normal S1/S2, RRR. 1+ lower ext edema.  GASTROINTESTINAL : Soft, NT/ND. BS x 4 normoactive. No hepatosplenomegaly.  MUSCULOSKELETAL : No cyanosis, clubbing, or ischemia  NEUROLOGICAL: Sedated, Moving all ext.    Results from last 7 days   Lab Units 03/17/23  0350 03/16/23 2023   WBC 10*3/mm3 15.94* 14.86*   HEMOGLOBIN g/dL 12.5* 15.4   PLATELETS 10*3/mm3 149 175     Results from last 7 days   Lab Units 03/17/23  0350 03/16/23 2023   SODIUM mmol/L 135* 139   POTASSIUM mmol/L 3.6 3.6   CO2 mmol/L 24.0 24.0   BUN mg/dL 9 10   CREATININE mg/dL 0.76 0.79   MAGNESIUM mg/dL 1.4* 1.4*   PHOSPHORUS mg/dL 2.6 3.7   GLUCOSE mg/dL 159* 146*     Estimated Creatinine Clearance: 105.3 mL/min (by C-G formula based on SCr of 0.76 mg/dL).    Results from last 7 days   Lab Units 03/17/23  0412   PH, ARTERIAL pH units 7.395   PCO2, ARTERIAL mm Hg 42.4   PO2 ART mm Hg 159.0*       I reviewed the patient's new clinical results.  I reviewed the patient's new imaging  results/reports including actual images and agree with reports.       Imaging Results (Last 24 Hours)     Procedure Component Value Units Date/Time    XR Chest 1 View [[id number]] Collected: 03/17/23 0938     Updated: 03/17/23 0942    Narrative:      XR CHEST 1 VW    Date of Exam: 3/17/2023 4:59 AM EDT    Indication: Respiratory failure.    Comparison: CT One day prior    Findings:  A nasogastric tube is noted entering the stomach. Endotracheal tube projects unchanged. There is no new focal airspace opacity. No effusion or pneumothorax. Unchanged heart and mediastinal contours.      Impression:      Impression:  A nasogastric tube is noted entering the stomach. Endotracheal tube projects unchanged. There is no new focal airspace opacity. No effusion or pneumothorax. Unchanged heart and mediastinal contours.    Electronically Signed: Jason Carlin    3/17/2023 9:39 AM EDT    Workstation ID: [id number]    XR Abdomen KUB [[id number]] Collected: 03/16/23 2221     Updated: 03/17/23 0024    Narrative:      EXAM:  XR ABDOMEN KUB     DATE: 3/16/2023 11:23 PM    HISTORY:   OG placement     COMPARISON:  None    FINDINGS and     Impression:      1.  Single  enteric tube looped in the upper stomach.  2.  Evaluation limited due to overlying tubes and lines.  3.  Gas-filled bowel loops, prominent stool volume. Query constipation.    Electronically signed by:  Donna Baez M.D.    3/16/2023 10:23 PM Mountain Time    CT Angiogram Chest [[id number]] Collected: 03/16/23 2215     Updated: 03/16/23 2230    Narrative:        CT SOFT TISSUE NECK W CONTRAST, CT ANGIOGRAM CHEST    Date of Exam: 3/16/2023 9:56 PM EDT    Indication: acute respiratory distress, possible aspiration event, sense of throat swelling.    Comparison: None available.    Technique: Axial CT images were obtained of the neck and chest after the uneventful intravenous administration of 75 cc of Isovue-370 .  Reconstructed coronal and sagittal images were also obtained.  Automated exposure control and iterative construction   methods were used.    Findings:  CT neck: The patient is intubated. There is debris within the nasopharynx and oropharynx which could relate to the intubation. The soft tissues around the supraglottic laryngeal airway appear boggy and somewhat edematous. This may be due to an   inflammatory process within this area. These changes extend out to the paralaryngeal soft tissues and to the thyroid cartilage.There are no discrete pathologic-appearing lymph nodes. The thyroid does not appear unusual. The uvula appears thickened. It   may be swollen.    CT CHEST: The tracheal tube extends to above the radha. There is some debris adjacent to the distal tracheal tube in the airway. The pulmonary arteries and thoracic aorta do not appear unusual. There are bibasilar areas of atelectasis versus   infiltrates. Is there any concern the patient may have aspirated?      Impression:      Impression:  1.Moderate amount of debris is noted in the nasopharynx and oropharynx that may relate to intubation.  2.Abnormal appearance to the soft tissues of the laryngeal area that appear boggy/edematous and could relate to nonspecific inflammation in this area.  3.There is thickening of the uvula may be swollen.  4.Bibasilar areas of density that may relate to atelectasis or infiltrates in possibly could be secondary to aspiration  5.There is some debris within the tracheal airway adjacent to the distal tracheal tube.    Electronically Signed: Boris Clayton    3/16/2023 10:27 PM EDT    Workstation ID: [id number]    CT Soft Tissue Neck With Contrast [[id number]] Collected: 03/16/23 2215     Updated: 03/16/23 2230    Narrative:        CT SOFT TISSUE NECK W CONTRAST, CT ANGIOGRAM CHEST    Date of Exam: 3/16/2023 9:56 PM EDT    Indication: acute respiratory distress, possible aspiration event, sense of throat swelling.    Comparison: None available.    Technique: Axial CT images were obtained of  the neck and chest after the uneventful intravenous administration of 75 cc of Isovue-370 .  Reconstructed coronal and sagittal images were also obtained. Automated exposure control and iterative construction   methods were used.    Findings:  CT neck: The patient is intubated. There is debris within the nasopharynx and oropharynx which could relate to the intubation. The soft tissues around the supraglottic laryngeal airway appear boggy and somewhat edematous. This may be due to an   inflammatory process within this area. These changes extend out to the paralaryngeal soft tissues and to the thyroid cartilage.There are no discrete pathologic-appearing lymph nodes. The thyroid does not appear unusual. The uvula appears thickened. It   may be swollen.    CT CHEST: The tracheal tube extends to above the radha. There is some debris adjacent to the distal tracheal tube in the airway. The pulmonary arteries and thoracic aorta do not appear unusual. There are bibasilar areas of atelectasis versus   infiltrates. Is there any concern the patient may have aspirated?      Impression:      Impression:  1.Moderate amount of debris is noted in the nasopharynx and oropharynx that may relate to intubation.  2.Abnormal appearance to the soft tissues of the laryngeal area that appear boggy/edematous and could relate to nonspecific inflammation in this area.  3.There is thickening of the uvula may be swollen.  4.Bibasilar areas of density that may relate to atelectasis or infiltrates in possibly could be secondary to aspiration  5.There is some debris within the tracheal airway adjacent to the distal tracheal tube.    Electronically Signed: Boris Clayton    3/16/2023 10:27 PM EDT    Workstation ID: [id number]    XR Chest 1 View [[id number]] Collected: 03/16/23 2222     Updated: 03/16/23 2227    Narrative:      XR CHEST 1 VW    Date of Exam: 3/16/2023 10:16 PM EDT    Indication: Post Intubation .    Comparison:  3/16/2023.    Findings:  Endotracheal tube tip is 5.2 cm above the radha. There is no pneumothorax, pleural effusion or focal airspace consolidation. Heart size and pulmonary vasculature appear within normal limits. Regional bones appear intact.      Impression:      Impression:  ET tube tip is 5.2 cm above the radha. No acute findings in the lungs.    Electronically Signed: Mark James    3/16/2023 10:24 PM EDT    Workstation ID: [id number]    XR Chest 1 View [[id number]] Collected: 03/16/23 2045     Updated: 03/16/23 2049    Narrative:      XR CHEST 1 VW    Date of Exam: 3/16/2023 8:35 PM EDT    Indication: SOA triage protocol.    Comparison: CT chest November 9, 2015    Findings:  The heart is not enlarged. The lungs seem clear. There are no pleural effusions. The visualized osseous structures do not appear unusual.      Impression:      Impression:  1.An acute pulmonary process is not apparent.    Electronically Signed: Boris Clayton    3/16/2023 8:46 PM EDT    Workstation ID: [id number]          Assessment & Plan   Impression        Acute epiglottitis with airway obstruction - probable    Essential hypertension    Acute respiratory failure (HCC)       Plan        68-year-old male with a past medical history significant for dysphagia requiring multiple dilations by Dr. Allen, hypertension, and GERD.  Who presents to [facility] ICU on 3/17/2023 with acute hypoxic respiratory failure secondary to what appears to be epiglottitis, possibly complicated by some level of aspiration.  CT of the neck on admission showed a moderate amount of debris in the naso and oropharynx, with abnormal appearance of the soft tissue in the laryngeal area concerning for epiglottitis.  CT of the chest showed no obvious signs of pneumonia, although there was some debris in the upper trachea above the endotracheal balloon.    -Continue mechanical ventilation  -Propofol and fentanyl for sedation  -Patient will need repeat  evaluation prior to extubation to be sure that the epiglottitis and postpharyngeal swelling has improved.  -Patient has a history of esophageal strictures, not clear that this is the cause of his current set of issues though I informed the family that we would try to address this after he has recovered from his current state.  -NG is in place with plans to start tube feeds  -Continue IV hydration  -Continue antibiotics with vancomycin and ceftriaxone and follow-up cultures  -Pressors as needed to maintain a MAP greater than 65  -Protonix for history of GERD  -DVT prophylaxis  -AM labs    I conducted multidisciplinary rounds in the plan of care was discussed with the multidisciplinary team at that time. In attendance at multidisciplinary rounds was clinical pharmacist, dietitian, nursing staff, and case management.    I discussed the patient's findings and my recommendations with family and nursing staff     Critical Care time spent in direct patient care: 35 minutes (excluding procedure time, if applicable) including high complexity decision making to assess, manipulate, and support vital organ system failure in this individual who has impairment of one or more vital organ systems such that there is a high probability of imminent or life threatening deterioration in the patient's condition.      Kristine Frank, DO  Pulmonary, Critical care and Sleep Medicine

## 2023-03-18 LAB
ALBUMIN SERPL-MCNC: 3.2 G/DL (ref 3.5–5.2)
ALP SERPL-CCNC: 46 U/L (ref 39–117)
ALT SERPL W P-5'-P-CCNC: 13 U/L (ref 1–41)
ANION GAP SERPL CALCULATED.3IONS-SCNC: 5 MMOL/L (ref 5–15)
ARTERIAL PATENCY WRIST A: ABNORMAL
AST SERPL-CCNC: 20 U/L (ref 1–40)
ATMOSPHERIC PRESS: ABNORMAL MM[HG]
BASE EXCESS BLDA CALC-SCNC: -0.6 MMOL/L (ref 0–2)
BASOPHILS # BLD AUTO: 0.03 10*3/MM3 (ref 0–0.2)
BASOPHILS NFR BLD AUTO: 0.2 % (ref 0–1.5)
BDY SITE: ABNORMAL
BILIRUB CONJ SERPL-MCNC: 0.2 MG/DL (ref 0–0.3)
BILIRUB INDIRECT SERPL-MCNC: 0.2 MG/DL
BILIRUB SERPL-MCNC: 0.4 MG/DL (ref 0–1.2)
BODY TEMPERATURE: 37 C
BUN SERPL-MCNC: 12 MG/DL (ref 8–23)
BUN/CREAT SERPL: 17.4 (ref 7–25)
CALCIUM SPEC-SCNC: 7.9 MG/DL (ref 8.6–10.5)
CHLORIDE SERPL-SCNC: 108 MMOL/L (ref 98–107)
CO2 BLDA-SCNC: 26.2 MMOL/L (ref 22–33)
CO2 SERPL-SCNC: 24 MMOL/L (ref 22–29)
COHGB MFR BLD: 1.2 % (ref 0–2)
CREAT SERPL-MCNC: 0.69 MG/DL (ref 0.76–1.27)
DEPRECATED RDW RBC AUTO: 55.3 FL (ref 37–54)
EGFRCR SERPLBLD CKD-EPI 2021: 102.1 ML/MIN/1.73
EOSINOPHIL # BLD AUTO: 0.01 10*3/MM3 (ref 0–0.4)
EOSINOPHIL NFR BLD AUTO: 0.1 % (ref 0.3–6.2)
EPAP: 0
ERYTHROCYTE [DISTWIDTH] IN BLOOD BY AUTOMATED COUNT: 14.3 % (ref 12.3–15.4)
GLUCOSE BLDC GLUCOMTR-MCNC: 105 MG/DL (ref 70–130)
GLUCOSE BLDC GLUCOMTR-MCNC: 106 MG/DL (ref 70–130)
GLUCOSE BLDC GLUCOMTR-MCNC: 110 MG/DL (ref 70–130)
GLUCOSE BLDC GLUCOMTR-MCNC: 125 MG/DL (ref 70–130)
GLUCOSE BLDC GLUCOMTR-MCNC: 94 MG/DL (ref 70–130)
GLUCOSE SERPL-MCNC: 110 MG/DL (ref 65–99)
HCO3 BLDA-SCNC: 24.9 MMOL/L (ref 20–26)
HCT VFR BLD AUTO: 40.2 % (ref 37.5–51)
HCT VFR BLD CALC: 37 % (ref 38–51)
HGB BLD-MCNC: 12.4 G/DL (ref 13–17.7)
HGB BLDA-MCNC: 12.1 G/DL (ref 13.5–17.5)
IMM GRANULOCYTES # BLD AUTO: 0.72 10*3/MM3 (ref 0–0.05)
IMM GRANULOCYTES NFR BLD AUTO: 4.8 % (ref 0–0.5)
INHALED O2 CONCENTRATION: 30 %
IPAP: 0
LYMPHOCYTES # BLD AUTO: 1.4 10*3/MM3 (ref 0.7–3.1)
LYMPHOCYTES NFR BLD AUTO: 9.3 % (ref 19.6–45.3)
MAGNESIUM SERPL-MCNC: 3 MG/DL (ref 1.6–2.4)
MCH RBC QN AUTO: 32.3 PG (ref 26.6–33)
MCHC RBC AUTO-ENTMCNC: 30.8 G/DL (ref 31.5–35.7)
MCV RBC AUTO: 104.7 FL (ref 79–97)
METHGB BLD QL: 0 % (ref 0–1.5)
MODALITY: ABNORMAL
MONOCYTES # BLD AUTO: 0.78 10*3/MM3 (ref 0.1–0.9)
MONOCYTES NFR BLD AUTO: 5.2 % (ref 5–12)
NEUTROPHILS NFR BLD AUTO: 12.08 10*3/MM3 (ref 1.7–7)
NEUTROPHILS NFR BLD AUTO: 80.4 % (ref 42.7–76)
NOTE: ABNORMAL
NRBC BLD AUTO-RTO: 0 /100 WBC (ref 0–0.2)
OXYHGB MFR BLDV: 96.9 % (ref 94–99)
PAW @ PEAK INSP FLOW SETTING VENT: 0 CMH2O
PCO2 BLDA: 43.3 MM HG (ref 35–45)
PCO2 TEMP ADJ BLD: 43.3 MM HG (ref 35–48)
PEEP RESPIRATORY: 5 CM[H2O]
PH BLDA: 7.37 PH UNITS (ref 7.35–7.45)
PH, TEMP CORRECTED: 7.37 PH UNITS
PHOSPHATE SERPL-MCNC: 1.4 MG/DL (ref 2.5–4.5)
PHOSPHATE SERPL-MCNC: 1.6 MG/DL (ref 2.5–4.5)
PHOSPHATE SERPL-MCNC: 2.1 MG/DL (ref 2.5–4.5)
PLATELET # BLD AUTO: 153 10*3/MM3 (ref 140–450)
PMV BLD AUTO: 9.6 FL (ref 6–12)
PO2 BLDA: 99.3 MM HG (ref 83–108)
PO2 TEMP ADJ BLD: 99.3 MM HG (ref 83–108)
POTASSIUM SERPL-SCNC: 4.5 MMOL/L (ref 3.5–5.2)
PROT SERPL-MCNC: 6.5 G/DL (ref 6–8.5)
RBC # BLD AUTO: 3.84 10*6/MM3 (ref 4.14–5.8)
SODIUM SERPL-SCNC: 137 MMOL/L (ref 136–145)
TOTAL RATE: 17 BREATHS/MINUTE
VANCOMYCIN TROUGH SERPL-MCNC: 7.6 MCG/ML (ref 5–20)
VENTILATOR MODE: ABNORMAL
VT ON VENT VENT: 0.4 ML
WBC NRBC COR # BLD: 15.02 10*3/MM3 (ref 3.4–10.8)

## 2023-03-18 PROCEDURE — 25010000002 PROPOFOL 10 MG/ML EMULSION: Performed by: STUDENT IN AN ORGANIZED HEALTH CARE EDUCATION/TRAINING PROGRAM

## 2023-03-18 PROCEDURE — 25010000002 HEPARIN (PORCINE) PER 1000 UNITS: Performed by: INTERNAL MEDICINE

## 2023-03-18 PROCEDURE — 82962 GLUCOSE BLOOD TEST: CPT

## 2023-03-18 PROCEDURE — 82375 ASSAY CARBOXYHB QUANT: CPT

## 2023-03-18 PROCEDURE — 99291 CRITICAL CARE FIRST HOUR: CPT | Performed by: INTERNAL MEDICINE

## 2023-03-18 PROCEDURE — 84100 ASSAY OF PHOSPHORUS: CPT | Performed by: INTERNAL MEDICINE

## 2023-03-18 PROCEDURE — 94799 UNLISTED PULMONARY SVC/PX: CPT

## 2023-03-18 PROCEDURE — 25010000002 CEFTRIAXONE PER 250 MG: Performed by: INTERNAL MEDICINE

## 2023-03-18 PROCEDURE — 36600 WITHDRAWAL OF ARTERIAL BLOOD: CPT

## 2023-03-18 PROCEDURE — 84100 ASSAY OF PHOSPHORUS: CPT | Performed by: NURSE PRACTITIONER

## 2023-03-18 PROCEDURE — 25010000002 MIDAZOLAM PER 1 MG: Performed by: INTERNAL MEDICINE

## 2023-03-18 PROCEDURE — 80048 BASIC METABOLIC PNL TOTAL CA: CPT | Performed by: INTERNAL MEDICINE

## 2023-03-18 PROCEDURE — 80076 HEPATIC FUNCTION PANEL: CPT | Performed by: INTERNAL MEDICINE

## 2023-03-18 PROCEDURE — 25010000002 FENTANYL 10 MCG/1 ML NS: Performed by: NURSE PRACTITIONER

## 2023-03-18 PROCEDURE — 83735 ASSAY OF MAGNESIUM: CPT | Performed by: INTERNAL MEDICINE

## 2023-03-18 PROCEDURE — 94003 VENT MGMT INPAT SUBQ DAY: CPT

## 2023-03-18 PROCEDURE — 83050 HGB METHEMOGLOBIN QUAN: CPT

## 2023-03-18 PROCEDURE — 80202 ASSAY OF VANCOMYCIN: CPT

## 2023-03-18 PROCEDURE — 85025 COMPLETE CBC W/AUTO DIFF WBC: CPT | Performed by: INTERNAL MEDICINE

## 2023-03-18 PROCEDURE — 94761 N-INVAS EAR/PLS OXIMETRY MLT: CPT

## 2023-03-18 PROCEDURE — 82805 BLOOD GASES W/O2 SATURATION: CPT

## 2023-03-18 PROCEDURE — 25010000002 VANCOMYCIN PER 500 MG

## 2023-03-18 RX ORDER — MIDAZOLAM HYDROCHLORIDE 1 MG/ML
2 INJECTION INTRAMUSCULAR; INTRAVENOUS
Status: DISCONTINUED | OUTPATIENT
Start: 2023-03-18 | End: 2023-03-28

## 2023-03-18 RX ADMIN — CHLORHEXIDINE GLUCONATE 0.12% ORAL RINSE 15 ML: 1.2 LIQUID ORAL at 20:03

## 2023-03-18 RX ADMIN — HEPARIN SODIUM 5000 UNITS: 5000 INJECTION, SOLUTION INTRAVENOUS; SUBCUTANEOUS at 14:42

## 2023-03-18 RX ADMIN — MIDAZOLAM HYDROCHLORIDE 2 MG: 1 INJECTION, SOLUTION INTRAMUSCULAR; INTRAVENOUS at 18:29

## 2023-03-18 RX ADMIN — SODIUM PHOSPHATE, MONOBASIC, MONOHYDRATE AND SODIUM PHOSPHATE, DIBASIC, ANHYDROUS 15 MMOL: 142; 276 INJECTION, SOLUTION INTRAVENOUS at 06:47

## 2023-03-18 RX ADMIN — SODIUM CHLORIDE 2 G: 900 INJECTION INTRAVENOUS at 20:03

## 2023-03-18 RX ADMIN — SODIUM PHOSPHATE, MONOBASIC, MONOHYDRATE AND SODIUM PHOSPHATE, DIBASIC, ANHYDROUS 15 MMOL: 276; 142 INJECTION, SOLUTION INTRAVENOUS at 22:09

## 2023-03-18 RX ADMIN — PROPOFOL 20 MCG/KG/MIN: 10 INJECTION, EMULSION INTRAVENOUS at 16:57

## 2023-03-18 RX ADMIN — Medication 300 MCG/HR: at 18:25

## 2023-03-18 RX ADMIN — MIDAZOLAM HYDROCHLORIDE 2 MG: 1 INJECTION, SOLUTION INTRAMUSCULAR; INTRAVENOUS at 14:43

## 2023-03-18 RX ADMIN — MIDAZOLAM HYDROCHLORIDE 2 MG: 1 INJECTION, SOLUTION INTRAMUSCULAR; INTRAVENOUS at 16:34

## 2023-03-18 RX ADMIN — SODIUM CHLORIDE 125 ML/HR: 9 INJECTION, SOLUTION INTRAVENOUS at 21:00

## 2023-03-18 RX ADMIN — PROPOFOL 30 MCG/KG/MIN: 10 INJECTION, EMULSION INTRAVENOUS at 06:47

## 2023-03-18 RX ADMIN — PANTOPRAZOLE SODIUM 40 MG: 40 INJECTION, POWDER, LYOPHILIZED, FOR SOLUTION INTRAVENOUS at 05:22

## 2023-03-18 RX ADMIN — MIDAZOLAM HYDROCHLORIDE 2 MG: 1 INJECTION, SOLUTION INTRAMUSCULAR; INTRAVENOUS at 10:18

## 2023-03-18 RX ADMIN — MIDAZOLAM HYDROCHLORIDE 2 MG: 1 INJECTION, SOLUTION INTRAMUSCULAR; INTRAVENOUS at 12:16

## 2023-03-18 RX ADMIN — LATANOPROST 1 DROP: 50 SOLUTION OPHTHALMIC at 10:27

## 2023-03-18 RX ADMIN — SODIUM CHLORIDE 125 ML/HR: 9 INJECTION, SOLUTION INTRAVENOUS at 13:20

## 2023-03-18 RX ADMIN — SODIUM PHOSPHATE, MONOBASIC, MONOHYDRATE AND SODIUM PHOSPHATE, DIBASIC, ANHYDROUS 15 MMOL: 142; 276 INJECTION, SOLUTION INTRAVENOUS at 09:53

## 2023-03-18 RX ADMIN — VANCOMYCIN HYDROCHLORIDE 1000 MG: 1 INJECTION, SOLUTION INTRAVENOUS at 00:02

## 2023-03-18 RX ADMIN — HEPARIN SODIUM 5000 UNITS: 5000 INJECTION, SOLUTION INTRAVENOUS; SUBCUTANEOUS at 05:22

## 2023-03-18 RX ADMIN — CHLORHEXIDINE GLUCONATE 0.12% ORAL RINSE 15 ML: 1.2 LIQUID ORAL at 08:46

## 2023-03-18 RX ADMIN — HEPARIN SODIUM 5000 UNITS: 5000 INJECTION, SOLUTION INTRAVENOUS; SUBCUTANEOUS at 22:09

## 2023-03-18 RX ADMIN — VANCOMYCIN HYDROCHLORIDE 1000 MG: 1 INJECTION, SOLUTION INTRAVENOUS at 13:20

## 2023-03-18 NOTE — PROGRESS NOTES
"Pharmacy Consult-Vancomycin Dosing  Sandro Junior is a  68 y.o. male receiving vancomycin therapy.     Indication: URTI  Consulting Provider: intensivist  ID Consult:     Goal AUC: 400 - 600 mg/L*hr    Current Antimicrobial Therapy  Anti-Infectives (From admission, onward)      Ordered     Dose/Rate Route Frequency Start Stop    03/18/23 1359  vancomycin 1250 mg/250 mL 0.9% NS IVPB ([facility])        Ordering Provider: Yinka Hill PharmD    1,250 mg  over 60 Minutes Intravenous Every 12 Hours 03/19/23 0000 03/22/23 1159    03/16/23 2347  cefTRIAXone (ROCEPHIN) 2 g/100 mL 0.9% NS IVPB ([facility])        Ordering Provider: Vikram Zamora MD    2 g  over 30 Minutes Intravenous Every 24 Hours Scheduled 03/17/23 0000 03/21/23 2059 03/16/23 2347  Pharmacy to dose vancomycin        Ordering Provider: Vikram Zamora MD     Does not apply Continuous PRN 03/16/23 2346 03/21/23 2345    03/16/23 2151  vancomycin 1500 mg/500 mL 0.9% NS IVPB ([facility])        Ordering Provider: Anand Eaton MD    20 mg/kg × 79.4 kg Intravenous Once 03/16/23 2153 03/16/23 2323            Allergies  Allergies as of 03/16/2023    (No Known Allergies)       Labs    Results from last 7 days   Lab Units 03/18/23  0412 03/17/23  0350 03/16/23 2023   BUN mg/dL 12 9 10   CREATININE mg/dL 0.69* 0.76 0.79       Results from last 7 days   Lab Units 03/18/23  0412 03/17/23  0350 03/16/23 2023   WBC 10*3/mm3 15.02* 15.94* 14.86*       Evaluation of Dosing     Last Dose Received in the ED/Outside Facility: 1500mg  Is Patient on Dialysis or Renal Replacement: no    Ht - 185.4 cm (72.99\")  Wt - 77.9 kg (171 lb 11.8 oz)    Estimated Creatinine Clearance: 116 mL/min (A) (by C-G formula based on SCr of 0.69 mg/dL (L)).    Intake & Output (last 3 days)         03/15 0701 03/16 0700 03/16 0701  03/17 0700 03/17 0701 03/18 0700 03/18 0701 03/19 0700    I.V. (mL/kg)  1374.2 (17.6) 3949.7 (50.7) 1155.1 (14.8)    Other   30 60    NG/GT  50 " 409 280    IV Piggyback  1674.4 499.8 469    Total Intake(mL/kg)  3098.6 (39.8) 4888.5 (62.8) 1964.1 (25.2)    Urine (mL/kg/hr)   1920 (1) 1150 (2.1)    Total Output   1920 1150    Net  +3098.6 +2968.5 +814.1                    Microbiology and Radiology  Microbiology Results (last 10 days)       Procedure Component Value - Date/Time    MRSA Screen, PCR (Inpatient) - Swab, Nares [[id number]]  (Normal) Collected: 03/17/23 0933    Lab Status: Final result Specimen: Swab from Nares Updated: 03/17/23 1200     MRSA PCR Negative    Narrative:      The negative predictive value of this diagnostic test is high and should only be used to consider de-escalating anti-MRSA therapy. A positive result may indicate colonization with MRSA and must be correlated clinically.  MRSA Negative    Blood Culture - Blood, Arm, Left [[id number]]  (Abnormal) Collected: 03/16/23 2230    Lab Status: Preliminary result Specimen: Blood from Arm, Left Updated: 03/18/23 0712     Blood Culture Streptococcus pyogenes (Group A)     Isolated from Anaerobic Bottle     Gram Stain Anaerobic Bottle Gram positive cocci in pairs and chains    Blood Culture ID, PCR - Blood, Arm, Left [[id number]]  (Abnormal) Collected: 03/16/23 2230    Lab Status: Final result Specimen: Blood from Arm, Left Updated: 03/17/23 1439     BCID, PCR Streptococcus pyogenes (Group A). Identification by BCID2 PCR.     BOTTLE TYPE Anaerobic Bottle    Blood Culture - Blood, Hand, Right [[id number]]  (Normal) Collected: 03/16/23 2225    Lab Status: Preliminary result Specimen: Blood from Hand, Right Updated: 03/17/23 2246     Blood Culture No growth at 24 hours    COVID PRE-OP / PRE-PROCEDURE SCREENING ORDER (NO ISOLATION) - Swab, Nasopharynx [[id number]]  (Normal) Collected: 03/16/23 2108    Lab Status: Final result Specimen: Swab from Nasopharynx Updated: 03/16/23 2234    Narrative:      The following orders were created for panel order COVID PRE-OP / PRE-PROCEDURE SCREENING ORDER (NO  ISOLATION) - Swab, Nasopharynx.  Procedure                               Abnormality         Status                     ---------                               -----------         ------                     Respiratory Panel PCR w/...[[id number]]  Normal              Final result                 Please view results for these tests on the individual orders.    Respiratory Panel PCR w/COVID-19(SARS-CoV-2) [facility]ON[facility] In-House, NP Swab in UTM/VTM, 3-4 HR TAT - Swab, Nasopharynx [[id number]]  (Normal) Collected: 03/16/23 2108    Lab Status: Final result Specimen: Swab from Nasopharynx Updated: 03/16/23 2234     ADENOVIRUS, PCR Not Detected     Coronavirus 229E Not Detected     Coronavirus HKU1 Not Detected     Coronavirus NL63 Not Detected     Coronavirus OC43 Not Detected     COVID19 Not Detected     Human Metapneumovirus Not Detected     Human Rhinovirus/Enterovirus Not Detected     Influenza A PCR Not Detected     Influenza B PCR Not Detected     Parainfluenza Virus 1 Not Detected     Parainfluenza Virus 2 Not Detected     Parainfluenza Virus 3 Not Detected     Parainfluenza Virus 4 Not Detected     RSV, PCR Not Detected     Bordetella pertussis pcr Not Detected     Bordetella parapertussis PCR Not Detected     Chlamydophila pneumoniae PCR Not Detected     Mycoplasma pneumo by PCR Not Detected    Narrative:      In the setting of a positive respiratory panel with a viral infection PLUS a negative procalcitonin without other underlying concern for bacterial infection, consider observing off antibiotics or discontinuation of antibiotics and continue supportive care. If the respiratory panel is positive for atypical bacterial infection (Bordetella pertussis, Chlamydophila pneumoniae, or Mycoplasma pneumoniae), consider antibiotic de-escalation to target atypical bacterial infection.            Reported Vancomycin Levels                Results from last 7 days   Lab Units 03/18/23  1226   VANCOMYCIN TR  mcg/mL 7.60          [employer] AUC Calculation:    Current AUC:   371 mg/L*hr    Predicted Steady State AUC :   463 mg/L*hr (with change in dose)    Assessment/Plan:   Increase vancomycin slightly to 1250mg IV q12h.  This will obtain goal AUC.  Level check in 3-5 days if still on therapy.  Pharmacy will continue to follow and adjust dose based on renal function, drug levels, and clinical status.    Thanks,  Yinka Hill, PharmD, BCPS, BCCCP  03/18/23  14:00 EDT

## 2023-03-18 NOTE — PROGRESS NOTES
INTENSIVIST   PROGRESS NOTE     Hospital:  LOS: 2 days     Mr. Sandro Junior, 68 y.o. male is followed for a Chief Complaint of: Acute epiglottitis with airway obstruction      Subjective   S     Interval History:  No acute events overnight. Cuff leak present this AM.        The patient's relevant past medical, surgical and social history were reviewed and updated in Epic as appropriate.      ROS: Unable to obtain secondary to sedation and mechanical ventilation.     Objective   O     Vitals:  Temp  Min: 97.3 °F (36.3 °C)  Max: 98.6 °F (37 °C)  BP  Min: 82/59  Max: 149/83  Pulse  Min: 49  Max: 75  Resp  Min: 16  Max: 24  SpO2  Min: 93 %  Max: 100 % Flow (L/min)  Min: 30  Max: 30    Intake/Ouptut 24 hrs (7:00AM - 6:59 AM)  Intake & Output (last 3 days)       03/15 0701  03/16 0700 03/16 0701  03/17 0700 03/17 0701  03/18 0700 03/18 0701  03/19 0700    I.V. (mL/kg)  1374.2 (17.6) 3949.7 (50.7) 380.1 (4.9)    Other   30 0    NG/GT  50 409 94    IV Piggyback  1674.4 499.8 121    Total Intake(mL/kg)  3098.6 (39.8) 4888.5 (62.8) 595.1 (7.6)    Urine (mL/kg/hr)   1920 (1) 800 (2.3)    Total Output   1920 800    Net  +3098.6 +2968.5 -205                  Medications (drips):  fentanyl 10 mcg/mL, Last Rate: 300 mcg/hr (03/18/23 0957)  Pharmacy to dose vancomycin  phenylephrine, Last Rate: 0.5 mcg/kg/min (03/18/23 1035)  propofol, Last Rate: 20 mcg/kg/min (03/18/23 1019)  sodium chloride, Last Rate: 125 mL/hr (03/17/23 2022)        Mechanical Ventilator Settings:          Resp Rate (Set): 16     FiO2 (%): 30 %  PEEP/CPAP (cm H2O): 5 cm H20    Minute Ventilation (L/min) (Obs): 6.22 L/min  Resp Rate (Observed) Vent: 16  I:E Ratio (Set): 1:3.93  I:E Ratio (Obs): 1:3.9    PIP Observed (cm H2O): 16 cm H2O  Plateau Pressure (cm H2O): (S) 14 cm H2O    Physical Examination  Telemetry:  Normal sinus rhythm.    Constitutional:  No acute distress.  ETT in place on mechanical ventilation.    Eyes: No scleral icterus.   PERRL, EOM  intact.    Neck:  Supple, FROM   Cardiovascular: Normal rate, regular and rhythm. Normal heart sounds.  No murmurs, gallop or rub.   Respiratory: No respiratory distress. Normal respiratory effort.  Diminished bilaterally. No wheezing.    Abdominal:  Soft. No masses. Nontender. No distension. No HSM.   Extremities: No digital cyanosis. No clubbing.  No peripheral edema.   Skin: No rashes, lesions or ulcers   Neurological:   Sedated. Opens eyes to stimulation.               Results from last 7 days   Lab Units 03/18/23 0412 03/17/23 0350 03/16/23 2023   WBC 10*3/mm3 15.02* 15.94* 14.86*   HEMOGLOBIN g/dL 12.4* 12.5* 15.4   MCV fL 104.7* 100.8* 100.2*   PLATELETS 10*3/mm3 153 149 175     Results from last 7 days   Lab Units 03/18/23 0412 03/17/23  1558 03/17/23 0350 03/16/23 2023   SODIUM mmol/L 137  --  135* 139   POTASSIUM mmol/L 4.5 4.5 3.6 3.6   CO2 mmol/L 24.0  --  24.0 24.0   CREATININE mg/dL 0.69*  --  0.76 0.79   GLUCOSE mg/dL 110*  --  159* 146*   MAGNESIUM mg/dL 3.0*  --  1.4* 1.4*   PHOSPHORUS mg/dL 1.6*  --  2.6 3.7     Estimated Creatinine Clearance: 116 mL/min (A) (by C-G formula based on SCr of 0.69 mg/dL (L)).  Results from last 7 days   Lab Units 03/18/23 0412 03/17/23 0350 03/16/23 2023   ALK PHOS U/L 46 65 70   BILIRUBIN mg/dL 0.4 0.6 1.3*   BILIRUBIN DIRECT mg/dL 0.2  --   --    ALT (SGPT) U/L 13 12 14   AST (SGOT) U/L 20 22 26       Results from last 7 days   Lab Units 03/18/23  0359 03/17/23 0412 03/17/23  0005 03/16/23 2047   PH, ARTERIAL pH units 7.368 7.395 7.410 7.405   PCO2, ARTERIAL mm Hg 43.3 42.4 41.8 40.3   PO2 ART mm Hg 99.3 159.0* 158.0* 360.0*   FIO2 % 30 40 40 40       Images:  Imaging Results (Last 24 Hours)     ** No results found for the last 24 hours. **            Results: Reviewed.  I reviewed the patient's new laboratory and imaging results.  I independently reviewed the patient's new images.    Medications: Reviewed.    Assessment & Plan   A / P     68-year-old  male who presented to the ER this evening with shortness of breath and sore throat.  History obtained is from the chart and my discussion with Dr. Eaton patient is now intubated and sedated.  Apparently, he first noticed a sore throat around noon.  He became increasingly symptomatic throughout the day before presenting to the ER.  He was given epinephrine, steroids, and H2 blockers.  Despite this he began tripoding and drooling and had audible stridor.  Per Dr. Eaton, the intubation was difficult due to edema and erythema.     Imaging reveals abnormal appearance of the soft tissues of the laryngeal area with some edema and swelling of the uvula.  There is no evidence of a fluid collection or abscess.  Bibasilar infiltrates were present possibly related to atelectasis or retained secretions but there was no evidence of consolidative pneumonia.  He has been given Zosyn and vancomycin empirically and, as stated, has received steroids.       Nutrition:   NPO Diet NPO Type: Strict NPO  Advance Directives:   Code Status and Medical Interventions:   Ordered at: 03/16/23 3700     Code Status (Patient has no pulse and is not breathing):    CPR (Attempt to Resuscitate)     Medical Interventions (Patient has pulse or is breathing):    Full Support       Active Hospital Problems    Diagnosis    • **Acute epiglottitis with airway obstruction - probable    • Acute respiratory failure (HCC)    • Essential hypertension        Assessment / Plan:    Continue mechanical ventilation. He does have a cuff leak present.   Continue Propofol and Fentanyl. Add Versed pushes.   Continue IV fluids  Titrate neosynephrine.   Continue empiric antibiotics. Blood cultures with Group A strep.   DVT ppx.  AM labs and CXR    Remains critically ill secondary to mechanical ventilation and vasopressor titration.     High level of risk due to:  severe exacerbation of chronic illness and illness with threat to life or bodily function.    Plan of care and  goals reviewed during interdisciplinary rounds.  I discussed the patient's findings and my recommendations with family and nursing staff    Critical Care Time: was greater than 32 minutes.(This excludes time spent performing separately reportable procedures and services). including high complexity decision making to assess, manipulate, and support vital organ system failure in this individual who has impairment of one or more vital organ systems such that there is a high probability of imminent or life threatening deterioration in the patient’s condition.      Keila Case, DO    Intensive Care Medicine and Pulmonary Medicine

## 2023-03-18 NOTE — PLAN OF CARE
Goal Outcome Evaluation:   Patient remains intubated. FIO2 30% peep 5. Sats >95%. Jamaal off this evening.  Propofol remains, but at low dose as it seems to makes patient bradycardic. Fentanyl is at 300 and Versed pushes ordered. UOP adequate. No BM. Tmax 100.3. Family sems to stimulate the patient, so family has been encouraged to decrease stimulation while at bedside to improve patient comfort. Fluids continue. Phos replaced, recheck in.

## 2023-03-18 NOTE — PLAN OF CARE
Goal Outcome Evaluation:  Plan of Care Reviewed With: patient        Progress: improving  Outcome Evaluation: .    - Neuro status remains unchanged. Pt easily stimulated. Follows all commands and moves all extremities.   - Sinus bradycardia in the mid to high 50s for most of the shift  - BP remains stable on low dose Jamaal gtt. Jamaal was able to be titrated off, but d/t increase sedation needs, jamaal was restarted this AM  - Remains intubated. FiO2 30%, PEEP 5  - Bladder scan yesterday evening showed 600mL, per nursing communication order, wahsington was placed with immediate 800mL output. About 1.5L total UOP this shift  - Tube feed increased by 20mL/hr per orders this AM, pt tolerating well and showing no signs of intolerance  - Fentanyl and Prop continued for sedation and pain control, titrated to pt condition. Pt restless and easily stimulated. Family at bedside educated on importance of decreased stimulation and rest for the pt. Pt currently resting well between care.

## 2023-03-19 ENCOUNTER — APPOINTMENT (OUTPATIENT)
Dept: CARDIOLOGY | Facility: HOSPITAL | Age: 68
DRG: 4 | End: 2023-03-19
Payer: MEDICARE

## 2023-03-19 LAB
ANION GAP SERPL CALCULATED.3IONS-SCNC: 8 MMOL/L (ref 5–15)
ARTERIAL PATENCY WRIST A: ABNORMAL
ATMOSPHERIC PRESS: ABNORMAL MM[HG]
BACTERIA SPEC AEROBE CULT: ABNORMAL
BASE EXCESS BLDA CALC-SCNC: -1.9 MMOL/L (ref 0–2)
BDY SITE: ABNORMAL
BH CV ECHO MEAS - AO MAX PG: 8.2 MMHG
BH CV ECHO MEAS - AO MEAN PG: 3.5 MMHG
BH CV ECHO MEAS - AO ROOT DIAM: 3.2 CM
BH CV ECHO MEAS - AO V2 MAX: 143.3 CM/SEC
BH CV ECHO MEAS - AO V2 VTI: 37.1 CM
BH CV ECHO MEAS - AVA(I,D): 2.2 CM2
BH CV ECHO MEAS - EDV(CUBED): 116.8 ML
BH CV ECHO MEAS - EDV(MOD-SP2): 69.8 ML
BH CV ECHO MEAS - EDV(MOD-SP4): 124 ML
BH CV ECHO MEAS - EF(MOD-BP): 54.5 %
BH CV ECHO MEAS - EF(MOD-SP2): 53.3 %
BH CV ECHO MEAS - EF(MOD-SP4): 57.6 %
BH CV ECHO MEAS - EF_3D-VOL: 56 %
BH CV ECHO MEAS - ESV(CUBED): 47 ML
BH CV ECHO MEAS - ESV(MOD-SP2): 32.6 ML
BH CV ECHO MEAS - ESV(MOD-SP4): 52.6 ML
BH CV ECHO MEAS - FS: 26.2 %
BH CV ECHO MEAS - IVS/LVPW: 0.84 CM
BH CV ECHO MEAS - IVSD: 0.9 CM
BH CV ECHO MEAS - LA DIMENSION: 3.3 CM
BH CV ECHO MEAS - LAT PEAK E' VEL: 12.6 CM/SEC
BH CV ECHO MEAS - LV MASS(C)D: 171.4 GRAMS
BH CV ECHO MEAS - LV MAX PG: 4.7 MMHG
BH CV ECHO MEAS - LV MEAN PG: 2.6 MMHG
BH CV ECHO MEAS - LV V1 MAX: 108.8 CM/SEC
BH CV ECHO MEAS - LV V1 VTI: 27.2 CM
BH CV ECHO MEAS - LVIDD: 4.9 CM
BH CV ECHO MEAS - LVIDS: 3.6 CM
BH CV ECHO MEAS - LVOT AREA: 3 CM2
BH CV ECHO MEAS - LVOT DIAM: 1.96 CM
BH CV ECHO MEAS - LVPWD: 1.07 CM
BH CV ECHO MEAS - MED PEAK E' VEL: 9.7 CM/SEC
BH CV ECHO MEAS - MR MAX PG: 102.4 MMHG
BH CV ECHO MEAS - MR MAX VEL: 506 CM/SEC
BH CV ECHO MEAS - MR MEAN PG: 60.5 MMHG
BH CV ECHO MEAS - MR MEAN VEL: 350.9 CM/SEC
BH CV ECHO MEAS - MR VTI: 182.9 CM
BH CV ECHO MEAS - MV A MAX VEL: 51 CM/SEC
BH CV ECHO MEAS - MV DEC SLOPE: 246 CM/SEC2
BH CV ECHO MEAS - MV DEC TIME: 0.33 MSEC
BH CV ECHO MEAS - MV E MAX VEL: 81.4 CM/SEC
BH CV ECHO MEAS - MV E/A: 1.6
BH CV ECHO MEAS - MV MAX PG: 3.1 MMHG
BH CV ECHO MEAS - MV MEAN PG: 0.91 MMHG
BH CV ECHO MEAS - MV V2 VTI: 37.2 CM
BH CV ECHO MEAS - MVA(VTI): 2.2 CM2
BH CV ECHO MEAS - PA ACC TIME: 0.14 SEC
BH CV ECHO MEAS - PA PR(ACCEL): 15.6 MMHG
BH CV ECHO MEAS - PA V2 MAX: 75.5 CM/SEC
BH CV ECHO MEAS - RAP SYSTOLE: 8 MMHG
BH CV ECHO MEAS - RVSP: 31 MMHG
BH CV ECHO MEAS - SV(LVOT): 81.7 ML
BH CV ECHO MEAS - SV(MOD-SP2): 37.2 ML
BH CV ECHO MEAS - SV(MOD-SP4): 71.4 ML
BH CV ECHO MEAS - TAPSE (>1.6): 2.8 CM
BH CV ECHO MEAS - TR MAX PG: 23 MMHG
BH CV ECHO MEAS - TR MAX VEL: 242 CM/SEC
BH CV ECHO MEASUREMENTS AVERAGE E/E' RATIO: 7.3
BH CV XLRA - RV BASE: 3.2 CM
BH CV XLRA - RV LENGTH: 6.8 CM
BH CV XLRA - RV MID: 3.1 CM
BH CV XLRA - TDI S': 9.5 CM/SEC
BODY TEMPERATURE: 37 C
BUN SERPL-MCNC: 13 MG/DL (ref 8–23)
BUN/CREAT SERPL: 22.4 (ref 7–25)
CALCIUM SPEC-SCNC: 7.4 MG/DL (ref 8.6–10.5)
CHLORIDE SERPL-SCNC: 114 MMOL/L (ref 98–107)
CO2 BLDA-SCNC: 24.1 MMOL/L (ref 22–33)
CO2 SERPL-SCNC: 21 MMOL/L (ref 22–29)
COHGB MFR BLD: 1.3 % (ref 0–2)
CREAT SERPL-MCNC: 0.58 MG/DL (ref 0.76–1.27)
DEPRECATED RDW RBC AUTO: 57.1 FL (ref 37–54)
EGFRCR SERPLBLD CKD-EPI 2021: 107.6 ML/MIN/1.73
EPAP: 0
ERYTHROCYTE [DISTWIDTH] IN BLOOD BY AUTOMATED COUNT: 14.5 % (ref 12.3–15.4)
GLUCOSE BLDC GLUCOMTR-MCNC: 100 MG/DL (ref 70–130)
GLUCOSE BLDC GLUCOMTR-MCNC: 111 MG/DL (ref 70–130)
GLUCOSE BLDC GLUCOMTR-MCNC: 82 MG/DL (ref 70–130)
GLUCOSE BLDC GLUCOMTR-MCNC: 95 MG/DL (ref 70–130)
GLUCOSE SERPL-MCNC: 106 MG/DL (ref 65–99)
GRAM STN SPEC: ABNORMAL
HCO3 BLDA-SCNC: 23 MMOL/L (ref 20–26)
HCT VFR BLD AUTO: 34.2 % (ref 37.5–51)
HCT VFR BLD CALC: 32.6 % (ref 38–51)
HGB BLD-MCNC: 10.6 G/DL (ref 13–17.7)
HGB BLDA-MCNC: 10.6 G/DL (ref 13.5–17.5)
INHALED O2 CONCENTRATION: 30 %
IPAP: 0
ISOLATED FROM: ABNORMAL
LEFT ATRIUM VOLUME INDEX: 23.7 ML/M2
MAGNESIUM SERPL-MCNC: 2.5 MG/DL (ref 1.6–2.4)
MAXIMAL PREDICTED HEART RATE: 154 BPM
MCH RBC QN AUTO: 32.9 PG (ref 26.6–33)
MCHC RBC AUTO-ENTMCNC: 31 G/DL (ref 31.5–35.7)
MCV RBC AUTO: 106.2 FL (ref 79–97)
METHGB BLD QL: 0.1 % (ref 0–1.5)
MODALITY: ABNORMAL
NOTE: ABNORMAL
OXYHGB MFR BLDV: 98.1 % (ref 94–99)
PAW @ PEAK INSP FLOW SETTING VENT: 0 CMH2O
PCO2 BLDA: 38.4 MM HG (ref 35–45)
PCO2 TEMP ADJ BLD: 38.4 MM HG (ref 35–48)
PEEP RESPIRATORY: 5 CM[H2O]
PH BLDA: 7.38 PH UNITS (ref 7.35–7.45)
PH, TEMP CORRECTED: 7.38 PH UNITS
PHOSPHATE SERPL-MCNC: 1.9 MG/DL (ref 2.5–4.5)
PHOSPHATE SERPL-MCNC: 2.1 MG/DL (ref 2.5–4.5)
PLATELET # BLD AUTO: 143 10*3/MM3 (ref 140–450)
PMV BLD AUTO: 9.8 FL (ref 6–12)
PO2 BLDA: 121 MM HG (ref 83–108)
PO2 TEMP ADJ BLD: 121 MM HG (ref 83–108)
POTASSIUM SERPL-SCNC: 3.9 MMOL/L (ref 3.5–5.2)
RBC # BLD AUTO: 3.22 10*6/MM3 (ref 4.14–5.8)
SODIUM SERPL-SCNC: 143 MMOL/L (ref 136–145)
STRESS TARGET HR: 131 BPM
TOTAL RATE: 16 BREATHS/MINUTE
VENTILATOR MODE: ABNORMAL
VT ON VENT VENT: 0.39 ML
WBC NRBC COR # BLD: 8.3 10*3/MM3 (ref 3.4–10.8)

## 2023-03-19 PROCEDURE — 84100 ASSAY OF PHOSPHORUS: CPT | Performed by: INTERNAL MEDICINE

## 2023-03-19 PROCEDURE — 85027 COMPLETE CBC AUTOMATED: CPT | Performed by: INTERNAL MEDICINE

## 2023-03-19 PROCEDURE — 82962 GLUCOSE BLOOD TEST: CPT

## 2023-03-19 PROCEDURE — 25010000002 PROPOFOL 10 MG/ML EMULSION: Performed by: STUDENT IN AN ORGANIZED HEALTH CARE EDUCATION/TRAINING PROGRAM

## 2023-03-19 PROCEDURE — 25010000002 VANCOMYCIN 10 G RECONSTITUTED SOLUTION

## 2023-03-19 PROCEDURE — 94761 N-INVAS EAR/PLS OXIMETRY MLT: CPT

## 2023-03-19 PROCEDURE — 83050 HGB METHEMOGLOBIN QUAN: CPT

## 2023-03-19 PROCEDURE — 82805 BLOOD GASES W/O2 SATURATION: CPT

## 2023-03-19 PROCEDURE — 99291 CRITICAL CARE FIRST HOUR: CPT | Performed by: INTERNAL MEDICINE

## 2023-03-19 PROCEDURE — 94003 VENT MGMT INPAT SUBQ DAY: CPT

## 2023-03-19 PROCEDURE — 36600 WITHDRAWAL OF ARTERIAL BLOOD: CPT

## 2023-03-19 PROCEDURE — 25010000002 PENICILLIN G POTASSIUM PER 600000 UNITS: Performed by: INTERNAL MEDICINE

## 2023-03-19 PROCEDURE — 93306 TTE W/DOPPLER COMPLETE: CPT

## 2023-03-19 PROCEDURE — 82375 ASSAY CARBOXYHB QUANT: CPT

## 2023-03-19 PROCEDURE — 93306 TTE W/DOPPLER COMPLETE: CPT | Performed by: INTERNAL MEDICINE

## 2023-03-19 PROCEDURE — 25010000002 FENTANYL 10 MCG/1 ML NS: Performed by: NURSE PRACTITIONER

## 2023-03-19 PROCEDURE — 94799 UNLISTED PULMONARY SVC/PX: CPT

## 2023-03-19 PROCEDURE — 25010000002 MIDAZOLAM PER 1 MG: Performed by: INTERNAL MEDICINE

## 2023-03-19 PROCEDURE — 25010000002 HEPARIN (PORCINE) PER 1000 UNITS: Performed by: INTERNAL MEDICINE

## 2023-03-19 PROCEDURE — 83735 ASSAY OF MAGNESIUM: CPT | Performed by: INTERNAL MEDICINE

## 2023-03-19 PROCEDURE — 80048 BASIC METABOLIC PNL TOTAL CA: CPT | Performed by: INTERNAL MEDICINE

## 2023-03-19 RX ADMIN — MIDAZOLAM HYDROCHLORIDE 2 MG: 1 INJECTION, SOLUTION INTRAMUSCULAR; INTRAVENOUS at 20:20

## 2023-03-19 RX ADMIN — SODIUM CHLORIDE 4 MILLION UNITS: 9 INJECTION, SOLUTION INTRAVENOUS at 18:18

## 2023-03-19 RX ADMIN — MIDAZOLAM HYDROCHLORIDE 2 MG: 1 INJECTION, SOLUTION INTRAMUSCULAR; INTRAVENOUS at 01:04

## 2023-03-19 RX ADMIN — MIDAZOLAM HYDROCHLORIDE 2 MG: 1 INJECTION, SOLUTION INTRAMUSCULAR; INTRAVENOUS at 16:07

## 2023-03-19 RX ADMIN — LATANOPROST 1 DROP: 50 SOLUTION OPHTHALMIC at 09:52

## 2023-03-19 RX ADMIN — ACETAMINOPHEN 325MG 650 MG: 325 TABLET ORAL at 23:01

## 2023-03-19 RX ADMIN — VANCOMYCIN HYDROCHLORIDE 1250 MG: 10 INJECTION, POWDER, LYOPHILIZED, FOR SOLUTION INTRAVENOUS at 03:06

## 2023-03-19 RX ADMIN — MIDAZOLAM HYDROCHLORIDE 2 MG: 1 INJECTION, SOLUTION INTRAMUSCULAR; INTRAVENOUS at 04:33

## 2023-03-19 RX ADMIN — HEPARIN SODIUM 5000 UNITS: 5000 INJECTION, SOLUTION INTRAVENOUS; SUBCUTANEOUS at 21:45

## 2023-03-19 RX ADMIN — Medication 300 MCG/HR: at 09:52

## 2023-03-19 RX ADMIN — SODIUM CHLORIDE 4 MILLION UNITS: 9 INJECTION, SOLUTION INTRAVENOUS at 21:45

## 2023-03-19 RX ADMIN — PANTOPRAZOLE SODIUM 40 MG: 40 INJECTION, POWDER, LYOPHILIZED, FOR SOLUTION INTRAVENOUS at 06:38

## 2023-03-19 RX ADMIN — HEPARIN SODIUM 5000 UNITS: 5000 INJECTION, SOLUTION INTRAVENOUS; SUBCUTANEOUS at 06:38

## 2023-03-19 RX ADMIN — PROPOFOL 20 MCG/KG/MIN: 10 INJECTION, EMULSION INTRAVENOUS at 15:52

## 2023-03-19 RX ADMIN — MIDAZOLAM HYDROCHLORIDE 2 MG: 1 INJECTION, SOLUTION INTRAMUSCULAR; INTRAVENOUS at 23:01

## 2023-03-19 RX ADMIN — MIDAZOLAM HYDROCHLORIDE 2 MG: 1 INJECTION, SOLUTION INTRAMUSCULAR; INTRAVENOUS at 18:15

## 2023-03-19 RX ADMIN — SODIUM CHLORIDE 4 MILLION UNITS: 9 INJECTION, SOLUTION INTRAVENOUS at 10:39

## 2023-03-19 RX ADMIN — Medication 300 MCG/HR: at 17:35

## 2023-03-19 RX ADMIN — CHLORHEXIDINE GLUCONATE 0.12% ORAL RINSE 15 ML: 1.2 LIQUID ORAL at 09:52

## 2023-03-19 RX ADMIN — MIDAZOLAM HYDROCHLORIDE 2 MG: 1 INJECTION, SOLUTION INTRAMUSCULAR; INTRAVENOUS at 14:00

## 2023-03-19 RX ADMIN — PROPOFOL 30 MCG/KG/MIN: 10 INJECTION, EMULSION INTRAVENOUS at 00:06

## 2023-03-19 RX ADMIN — SODIUM CHLORIDE 125 ML/HR: 9 INJECTION, SOLUTION INTRAVENOUS at 05:26

## 2023-03-19 RX ADMIN — PROPOFOL 30 MCG/KG/MIN: 10 INJECTION, EMULSION INTRAVENOUS at 21:39

## 2023-03-19 RX ADMIN — HEPARIN SODIUM 5000 UNITS: 5000 INJECTION, SOLUTION INTRAVENOUS; SUBCUTANEOUS at 13:59

## 2023-03-19 RX ADMIN — POTASSIUM & SODIUM PHOSPHATES POWDER PACK 280-160-250 MG 2 PACKET: 280-160-250 PACK at 11:41

## 2023-03-19 RX ADMIN — MIDAZOLAM HYDROCHLORIDE 2 MG: 1 INJECTION, SOLUTION INTRAMUSCULAR; INTRAVENOUS at 09:39

## 2023-03-19 RX ADMIN — SODIUM CHLORIDE 125 ML/HR: 9 INJECTION, SOLUTION INTRAVENOUS at 21:45

## 2023-03-19 RX ADMIN — CHLORHEXIDINE GLUCONATE 0.12% ORAL RINSE 15 ML: 1.2 LIQUID ORAL at 20:20

## 2023-03-19 RX ADMIN — POTASSIUM & SODIUM PHOSPHATES POWDER PACK 280-160-250 MG 2 PACKET: 280-160-250 PACK at 23:01

## 2023-03-19 RX ADMIN — Medication 300 MCG/HR: at 02:26

## 2023-03-19 RX ADMIN — SODIUM CHLORIDE 4 MILLION UNITS: 9 INJECTION, SOLUTION INTRAVENOUS at 13:59

## 2023-03-19 NOTE — PLAN OF CARE
Goal Outcome Evaluation:  Plan of Care Reviewed With: patient        Progress: improving  Outcome Evaluation: .    - Neuro status remains unchanged, still following commands and nodding appropriately to questions  - Remains on the vent at FiO2 30%, PEEP 5  - Tube feed increased to goal rate at beginning of shift. Pt tolerating well, bowel sounds more active, but no BM yet  - Adequate UOP  - Fentanyl and propofol currently infusing for sedation and pain control. Prn Versed given x 2   - Phos replaced   - Family updated at bedside

## 2023-03-19 NOTE — PROGRESS NOTES
INTENSIVIST   PROGRESS NOTE     Hospital:  LOS: 3 days     Mr. Sandro Junior, 68 y.o. male is followed for a Chief Complaint of: Acute epiglottitis with airway obstruction      Subjective   S     Interval History:  No acute events overnight.        The patient's relevant past medical, surgical and social history were reviewed and updated in Epic as appropriate.      ROS: Unable to obtain secondary to sedation and mechanical ventilation.     Objective   O     Vitals:  Temp  Min: 99.4 °F (37.4 °C)  Max: 101 °F (38.3 °C)  BP  Min: 83/53  Max: 128/72  Pulse  Min: 51  Max: 80  Resp  Min: 16  Max: 16  SpO2  Min: 93 %  Max: 100 % Flow (L/min)  Min: 30  Max: 30    Intake/Ouptut 24 hrs (7:00AM - 6:59 AM)  Intake & Output (last 3 days)       03/16 0701  03/17 0700 03/17 0701  03/18 0700 03/18 0701  03/19 0700 03/19 0701 03/20 0700    I.V. (mL/kg) 1374.2 (17.6) 3949.7 (50.7) 4230 (54.3)     Other  30 240     NG/GT 50 409 1131     IV Piggyback 1674.4 499.8 1219.8     Total Intake(mL/kg) 3098.6 (39.8) 4888.5 (62.8) 6820.8 (87.6)     Urine (mL/kg/hr)  1920 (1) 3155 (1.7)     Total Output  1920 3155     Net +3098.6 +2968.5 +3665.8                   Medications (drips):  fentanyl 10 mcg/mL, Last Rate: 300 mcg/hr (03/19/23 0952)  phenylephrine, Last Rate: Stopped (03/18/23 1635)  propofol, Last Rate: 20 mcg/kg/min (03/19/23 0651)  sodium chloride, Last Rate: 125 mL/hr (03/19/23 0526)        Mechanical Ventilator Settings:          Resp Rate (Set): 16     FiO2 (%): 30 %  PEEP/CPAP (cm H2O): 5 cm H20    Minute Ventilation (L/min) (Obs): 6.11 L/min  Resp Rate (Observed) Vent: 16  I:E Ratio (Set): 1:3.93  I:E Ratio (Obs): 1:3.9    PIP Observed (cm H2O): 26 cm H2O  Plateau Pressure (cm H2O): (S) 15 cm H2O    Physical Examination  Telemetry:  Normal sinus rhythm.    Constitutional:  No acute distress.  ETT in place on mechanical ventilation.    Eyes: No scleral icterus.   PERRL, EOM intact.    Neck:  Supple, FROM   Cardiovascular:  Normal rate, regular and rhythm. Normal heart sounds.  No murmurs, gallop or rub.   Respiratory: No respiratory distress. Normal respiratory effort.  Diminished bilaterally. No wheezing.    Abdominal:  Soft. No masses. Nontender. No distension. No HSM.   Extremities: No digital cyanosis. No clubbing.  No peripheral edema.   Skin: No rashes, lesions or ulcers   Neurological:   Sedated. Opens eyes to stimulation.        Physical exam reviewed and there are no significant changes.       Results from last 7 days   Lab Units 03/19/23 0439 03/18/23 0412 03/17/23  0350   WBC 10*3/mm3 8.30 15.02* 15.94*   HEMOGLOBIN g/dL 10.6* 12.4* 12.5*   MCV fL 106.2* 104.7* 100.8*   PLATELETS 10*3/mm3 143 153 149     Results from last 7 days   Lab Units 03/19/23  0439 03/18/23  1906 03/18/23  1801 03/18/23 0412 03/17/23  1558 03/17/23  0350   SODIUM mmol/L 143  --   --  137  --  135*   POTASSIUM mmol/L 3.9  --   --  4.5 4.5 3.6   CO2 mmol/L 21.0*  --   --  24.0  --  24.0   CREATININE mg/dL 0.58*  --   --  0.69*  --  0.76   GLUCOSE mg/dL 106*  --   --  110*  --  159*   MAGNESIUM mg/dL 2.5*  --   --  3.0*  --  1.4*   PHOSPHORUS mg/dL 2.1* 2.1* 1.4* 1.6*  --  2.6     Estimated Creatinine Clearance: 138 mL/min (A) (by C-G formula based on SCr of 0.58 mg/dL (L)).  Results from last 7 days   Lab Units 03/18/23 0412 03/17/23 0350 03/16/23 2023   ALK PHOS U/L 46 65 70   BILIRUBIN mg/dL 0.4 0.6 1.3*   BILIRUBIN DIRECT mg/dL 0.2  --   --    ALT (SGPT) U/L 13 12 14   AST (SGOT) U/L 20 22 26       Results from last 7 days   Lab Units 03/19/23  0814 03/18/23 0359 03/17/23  0412 03/17/23  0005 03/16/23 2047   PH, ARTERIAL pH units 7.385 7.368 7.395 7.410 7.405   PCO2, ARTERIAL mm Hg 38.4 43.3 42.4 41.8 40.3   PO2 ART mm Hg 121.0* 99.3 159.0* 158.0* 360.0*   FIO2 % 30 30 40 40 40       Images:  Imaging Results (Last 24 Hours)     ** No results found for the last 24 hours. **            Results: Reviewed.  I reviewed the patient's new  laboratory and imaging results.  I independently reviewed the patient's new images.    Medications: Reviewed.    Assessment & Plan   A / P     68-year-old male who presented to the ER this evening with shortness of breath and sore throat.  History obtained is from the chart and my discussion with Dr. Eaton patient is now intubated and sedated.  Apparently, he first noticed a sore throat around noon.  He became increasingly symptomatic throughout the day before presenting to the ER.  He was given epinephrine, steroids, and H2 blockers.  Despite this he began tripoding and drooling and had audible stridor.  Per Dr. Eaton, the intubation was difficult due to edema and erythema.     Imaging reveals abnormal appearance of the soft tissues of the laryngeal area with some edema and swelling of the uvula.  There is no evidence of a fluid collection or abscess.  Bibasilar infiltrates were present possibly related to atelectasis or retained secretions but there was no evidence of consolidative pneumonia.  He has been given Zosyn and vancomycin empirically and, as stated, has received steroids.     Blood cultures are growing Strep pyogenes.       Nutrition:   NPO Diet NPO Type: Strict NPO  Advance Directives:   Code Status and Medical Interventions:   Ordered at: 03/16/23 9279     Code Status (Patient has no pulse and is not breathing):    CPR (Attempt to Resuscitate)     Medical Interventions (Patient has pulse or is breathing):    Full Support       Active Hospital Problems    Diagnosis    • **Acute epiglottitis with airway obstruction - probable    • Acute respiratory failure (HCC)    • Essential hypertension        Assessment / Plan:    Continue mechanical ventilation. He does have a cuff leak present. PST today as tolerated. I would like to perform an upper airway exam tomorrow prior to a trial of extubation.   Continue Propofol and Fentanyl. Versed pushes as needed.   Continue IV fluids  Titrate neosynephrine.   Change  antibiotics to Penicillin for Group A Strep.    Check echocardiogram  DVT ppx.  AM labs    Remains critically ill secondary to mechanical ventilation and vasopressor titration.     High level of risk due to:  severe exacerbation of chronic illness and illness with threat to life or bodily function.    Plan of care and goals reviewed during interdisciplinary rounds.  I discussed the patient's findings and my recommendations with family and nursing staff    Critical Care Time: was greater than 31 minutes.(This excludes time spent performing separately reportable procedures and services). including high complexity decision making to assess, manipulate, and support vital organ system failure in this individual who has impairment of one or more vital organ systems such that there is a high probability of imminent or life threatening deterioration in the patient’s condition.      Keila Case, DO    Intensive Care Medicine and Pulmonary Medicine

## 2023-03-19 NOTE — PLAN OF CARE
Goal Outcome Evaluation:   No significant changes. Vent settings remain the same. UOP adequate. No BM. Phos replaced. Gtts remain the same. Afebrile. Wife at bedside and frequently stimulating the patient.  Educated family on the need to decrease stimulation to promote rest.

## 2023-03-20 LAB
ALBUMIN SERPL-MCNC: 2.5 G/DL (ref 3.5–5.2)
ALBUMIN/GLOB SERPL: 0.8 G/DL
ALP SERPL-CCNC: 43 U/L (ref 39–117)
ALT SERPL W P-5'-P-CCNC: 8 U/L (ref 1–41)
ANION GAP SERPL CALCULATED.3IONS-SCNC: 4 MMOL/L (ref 5–15)
ANION GAP SERPL CALCULATED.3IONS-SCNC: 4 MMOL/L (ref 5–15)
AST SERPL-CCNC: 17 U/L (ref 1–40)
BASOPHILS # BLD AUTO: 0.02 10*3/MM3 (ref 0–0.2)
BASOPHILS NFR BLD AUTO: 0.4 % (ref 0–1.5)
BILIRUB SERPL-MCNC: 0.8 MG/DL (ref 0–1.2)
BUN SERPL-MCNC: 7 MG/DL (ref 8–23)
BUN SERPL-MCNC: 7 MG/DL (ref 8–23)
BUN/CREAT SERPL: 13 (ref 7–25)
BUN/CREAT SERPL: 13 (ref 7–25)
CALCIUM SPEC-SCNC: 7.8 MG/DL (ref 8.6–10.5)
CALCIUM SPEC-SCNC: 7.8 MG/DL (ref 8.6–10.5)
CHLORIDE SERPL-SCNC: 110 MMOL/L (ref 98–107)
CHLORIDE SERPL-SCNC: 110 MMOL/L (ref 98–107)
CHOLEST SERPL-MCNC: 112 MG/DL (ref 0–200)
CO2 SERPL-SCNC: 24 MMOL/L (ref 22–29)
CO2 SERPL-SCNC: 24 MMOL/L (ref 22–29)
CREAT SERPL-MCNC: 0.54 MG/DL (ref 0.76–1.27)
CREAT SERPL-MCNC: 0.54 MG/DL (ref 0.76–1.27)
D-LACTATE SERPL-SCNC: 0.6 MMOL/L (ref 0.5–2)
DEPRECATED RDW RBC AUTO: 55.5 FL (ref 37–54)
EGFRCR SERPLBLD CKD-EPI 2021: 109.9 ML/MIN/1.73
EGFRCR SERPLBLD CKD-EPI 2021: 109.9 ML/MIN/1.73
EOSINOPHIL # BLD AUTO: 0.09 10*3/MM3 (ref 0–0.4)
EOSINOPHIL NFR BLD AUTO: 1.6 % (ref 0.3–6.2)
ERYTHROCYTE [DISTWIDTH] IN BLOOD BY AUTOMATED COUNT: 14 % (ref 12.3–15.4)
GLOBULIN UR ELPH-MCNC: 3.2 GM/DL
GLUCOSE BLDC GLUCOMTR-MCNC: 100 MG/DL (ref 70–130)
GLUCOSE BLDC GLUCOMTR-MCNC: 104 MG/DL (ref 70–130)
GLUCOSE BLDC GLUCOMTR-MCNC: 91 MG/DL (ref 70–130)
GLUCOSE BLDC GLUCOMTR-MCNC: 92 MG/DL (ref 70–130)
GLUCOSE SERPL-MCNC: 124 MG/DL (ref 65–99)
GLUCOSE SERPL-MCNC: 124 MG/DL (ref 65–99)
HCT VFR BLD AUTO: 35.9 % (ref 37.5–51)
HGB BLD-MCNC: 11.1 G/DL (ref 13–17.7)
IMM GRANULOCYTES # BLD AUTO: 0.06 10*3/MM3 (ref 0–0.05)
IMM GRANULOCYTES NFR BLD AUTO: 1.1 % (ref 0–0.5)
LYMPHOCYTES # BLD AUTO: 0.91 10*3/MM3 (ref 0.7–3.1)
LYMPHOCYTES NFR BLD AUTO: 16.6 % (ref 19.6–45.3)
MAGNESIUM SERPL-MCNC: 1.9 MG/DL (ref 1.6–2.4)
MAGNESIUM SERPL-MCNC: 1.9 MG/DL (ref 1.6–2.4)
MCH RBC QN AUTO: 32.7 PG (ref 26.6–33)
MCHC RBC AUTO-ENTMCNC: 30.9 G/DL (ref 31.5–35.7)
MCV RBC AUTO: 105.9 FL (ref 79–97)
MONOCYTES # BLD AUTO: 0.62 10*3/MM3 (ref 0.1–0.9)
MONOCYTES NFR BLD AUTO: 11.3 % (ref 5–12)
NEUTROPHILS NFR BLD AUTO: 3.79 10*3/MM3 (ref 1.7–7)
NEUTROPHILS NFR BLD AUTO: 69 % (ref 42.7–76)
NRBC BLD AUTO-RTO: 0 /100 WBC (ref 0–0.2)
PHOSPHATE SERPL-MCNC: 2 MG/DL (ref 2.5–4.5)
PHOSPHATE SERPL-MCNC: 2 MG/DL (ref 2.5–4.5)
PHOSPHATE SERPL-MCNC: 2.1 MG/DL (ref 2.5–4.5)
PLATELET # BLD AUTO: 160 10*3/MM3 (ref 140–450)
PMV BLD AUTO: 9.7 FL (ref 6–12)
POTASSIUM SERPL-SCNC: 4 MMOL/L (ref 3.5–5.2)
POTASSIUM SERPL-SCNC: 4 MMOL/L (ref 3.5–5.2)
PROT SERPL-MCNC: 5.7 G/DL (ref 6–8.5)
RBC # BLD AUTO: 3.39 10*6/MM3 (ref 4.14–5.8)
SODIUM SERPL-SCNC: 138 MMOL/L (ref 136–145)
SODIUM SERPL-SCNC: 138 MMOL/L (ref 136–145)
TRIGL SERPL-MCNC: 144 MG/DL (ref 0–150)
WBC NRBC COR # BLD: 5.49 10*3/MM3 (ref 3.4–10.8)

## 2023-03-20 PROCEDURE — 25010000002 PENICILLIN G POTASSIUM PER 600000 UNITS: Performed by: INTERNAL MEDICINE

## 2023-03-20 PROCEDURE — 83735 ASSAY OF MAGNESIUM: CPT | Performed by: INTERNAL MEDICINE

## 2023-03-20 PROCEDURE — 83735 ASSAY OF MAGNESIUM: CPT

## 2023-03-20 PROCEDURE — 99291 CRITICAL CARE FIRST HOUR: CPT | Performed by: INTERNAL MEDICINE

## 2023-03-20 PROCEDURE — 82962 GLUCOSE BLOOD TEST: CPT

## 2023-03-20 PROCEDURE — 0 MIDAZOLAM 1 MG/ML 100ML NS 100 MG/100ML SOLUTION: Performed by: INTERNAL MEDICINE

## 2023-03-20 PROCEDURE — 83605 ASSAY OF LACTIC ACID: CPT | Performed by: INTERNAL MEDICINE

## 2023-03-20 PROCEDURE — C1751 CATH, INF, PER/CENT/MIDLINE: HCPCS

## 2023-03-20 PROCEDURE — 87040 BLOOD CULTURE FOR BACTERIA: CPT | Performed by: INTERNAL MEDICINE

## 2023-03-20 PROCEDURE — 02HV33Z INSERTION OF INFUSION DEVICE INTO SUPERIOR VENA CAVA, PERCUTANEOUS APPROACH: ICD-10-PCS | Performed by: HOSPITALIST

## 2023-03-20 PROCEDURE — 94799 UNLISTED PULMONARY SVC/PX: CPT

## 2023-03-20 PROCEDURE — 25010000002 PROPOFOL 10 MG/ML EMULSION: Performed by: STUDENT IN AN ORGANIZED HEALTH CARE EDUCATION/TRAINING PROGRAM

## 2023-03-20 PROCEDURE — 25010000002 HEPARIN (PORCINE) PER 1000 UNITS: Performed by: INTERNAL MEDICINE

## 2023-03-20 PROCEDURE — 25010000002 MIDAZOLAM PER 1 MG: Performed by: INTERNAL MEDICINE

## 2023-03-20 PROCEDURE — 82465 ASSAY BLD/SERUM CHOLESTEROL: CPT

## 2023-03-20 PROCEDURE — C1894 INTRO/SHEATH, NON-LASER: HCPCS

## 2023-03-20 PROCEDURE — 31622 DX BRONCHOSCOPE/WASH: CPT | Performed by: INTERNAL MEDICINE

## 2023-03-20 PROCEDURE — 84478 ASSAY OF TRIGLYCERIDES: CPT

## 2023-03-20 PROCEDURE — 84100 ASSAY OF PHOSPHORUS: CPT | Performed by: INTERNAL MEDICINE

## 2023-03-20 PROCEDURE — 0 MAGNESIUM SULFATE 4 GM/100ML SOLUTION: Performed by: INTERNAL MEDICINE

## 2023-03-20 PROCEDURE — 94761 N-INVAS EAR/PLS OXIMETRY MLT: CPT

## 2023-03-20 PROCEDURE — 94003 VENT MGMT INPAT SUBQ DAY: CPT

## 2023-03-20 PROCEDURE — 25010000002 FENTANYL 10 MCG/1 ML NS: Performed by: NURSE PRACTITIONER

## 2023-03-20 PROCEDURE — 80053 COMPREHEN METABOLIC PANEL: CPT

## 2023-03-20 PROCEDURE — 84100 ASSAY OF PHOSPHORUS: CPT

## 2023-03-20 PROCEDURE — 85025 COMPLETE CBC W/AUTO DIFF WBC: CPT | Performed by: INTERNAL MEDICINE

## 2023-03-20 PROCEDURE — 25010000002 HYDRALAZINE PER 20 MG

## 2023-03-20 PROCEDURE — 0CJY4ZZ INSPECTION OF MOUTH AND THROAT, PERCUTANEOUS ENDOSCOPIC APPROACH: ICD-10-PCS | Performed by: INTERNAL MEDICINE

## 2023-03-20 RX ORDER — BISACODYL 10 MG
10 SUPPOSITORY, RECTAL RECTAL DAILY PRN
Status: DISCONTINUED | OUTPATIENT
Start: 2023-03-20 | End: 2023-03-20

## 2023-03-20 RX ORDER — POLYETHYLENE GLYCOL 3350 17 G/17G
17 POWDER, FOR SOLUTION ORAL DAILY PRN
Status: DISCONTINUED | OUTPATIENT
Start: 2023-03-20 | End: 2023-03-24

## 2023-03-20 RX ORDER — AMOXICILLIN 250 MG
2 CAPSULE ORAL 2 TIMES DAILY
Status: DISCONTINUED | OUTPATIENT
Start: 2023-03-20 | End: 2023-03-24

## 2023-03-20 RX ORDER — HYDRALAZINE HYDROCHLORIDE 20 MG/ML
10 INJECTION INTRAMUSCULAR; INTRAVENOUS EVERY 4 HOURS PRN
Status: DISCONTINUED | OUTPATIENT
Start: 2023-03-20 | End: 2023-03-21

## 2023-03-20 RX ORDER — SODIUM CHLORIDE 0.9 % (FLUSH) 0.9 %
10 SYRINGE (ML) INJECTION AS NEEDED
Status: DISCONTINUED | OUTPATIENT
Start: 2023-03-20 | End: 2023-04-11 | Stop reason: HOSPADM

## 2023-03-20 RX ORDER — BISACODYL 10 MG
10 SUPPOSITORY, RECTAL RECTAL DAILY PRN
Status: DISCONTINUED | OUTPATIENT
Start: 2023-03-20 | End: 2023-03-24

## 2023-03-20 RX ORDER — AMINO ACIDS/PROTEIN HYDROLYS 11G-40/45
30 LIQUID IN PACKET (ML) ORAL DAILY
Status: DISCONTINUED | OUTPATIENT
Start: 2023-03-20 | End: 2023-03-24

## 2023-03-20 RX ORDER — SODIUM CHLORIDE 0.9 % (FLUSH) 0.9 %
10 SYRINGE (ML) INJECTION EVERY 12 HOURS SCHEDULED
Status: DISCONTINUED | OUTPATIENT
Start: 2023-03-20 | End: 2023-04-11 | Stop reason: HOSPADM

## 2023-03-20 RX ORDER — MIDAZOLAM IN NACL,ISO-OSMOT/PF 50 MG/50ML
1-10 INFUSION BOTTLE (ML) INTRAVENOUS
Status: DISCONTINUED | OUTPATIENT
Start: 2023-03-20 | End: 2023-03-24

## 2023-03-20 RX ORDER — METRONIDAZOLE 500 MG/100ML
500 INJECTION, SOLUTION INTRAVENOUS EVERY 6 HOURS
Status: DISCONTINUED | OUTPATIENT
Start: 2023-03-20 | End: 2023-03-31

## 2023-03-20 RX ORDER — MAGNESIUM SULFATE HEPTAHYDRATE 40 MG/ML
4 INJECTION, SOLUTION INTRAVENOUS ONCE
Status: COMPLETED | OUTPATIENT
Start: 2023-03-20 | End: 2023-03-20

## 2023-03-20 RX ORDER — ACETAMINOPHEN 325 MG/1
650 TABLET ORAL EVERY 4 HOURS PRN
Status: DISCONTINUED | OUTPATIENT
Start: 2023-03-20 | End: 2023-03-21

## 2023-03-20 RX ORDER — POLYETHYLENE GLYCOL 3350 17 G/17G
17 POWDER, FOR SOLUTION ORAL DAILY PRN
Status: DISCONTINUED | OUTPATIENT
Start: 2023-03-20 | End: 2023-03-20

## 2023-03-20 RX ORDER — FENTANYL/ROPIVACAINE/NS/PF 2-625MCG/1
15 PLASTIC BAG, INJECTION (ML) EPIDURAL ONCE
Status: COMPLETED | OUTPATIENT
Start: 2023-03-20 | End: 2023-03-20

## 2023-03-20 RX ORDER — SODIUM CHLORIDE 0.9 % (FLUSH) 0.9 %
20 SYRINGE (ML) INJECTION AS NEEDED
Status: DISCONTINUED | OUTPATIENT
Start: 2023-03-20 | End: 2023-04-11 | Stop reason: HOSPADM

## 2023-03-20 RX ORDER — AMOXICILLIN 250 MG
2 CAPSULE ORAL 2 TIMES DAILY
Status: DISCONTINUED | OUTPATIENT
Start: 2023-03-20 | End: 2023-03-20

## 2023-03-20 RX ORDER — ACETAMINOPHEN 650 MG/1
650 SUPPOSITORY RECTAL EVERY 4 HOURS PRN
Status: DISCONTINUED | OUTPATIENT
Start: 2023-03-20 | End: 2023-04-11 | Stop reason: HOSPADM

## 2023-03-20 RX ORDER — BISACODYL 5 MG/1
5 TABLET, DELAYED RELEASE ORAL DAILY PRN
Status: DISCONTINUED | OUTPATIENT
Start: 2023-03-20 | End: 2023-03-20

## 2023-03-20 RX ADMIN — CHLORHEXIDINE GLUCONATE 0.12% ORAL RINSE 15 ML: 1.2 LIQUID ORAL at 21:16

## 2023-03-20 RX ADMIN — MIDAZOLAM HYDROCHLORIDE 2 MG: 1 INJECTION, SOLUTION INTRAMUSCULAR; INTRAVENOUS at 10:23

## 2023-03-20 RX ADMIN — POTASSIUM PHOSPHATE, MONOBASIC POTASSIUM PHOSPHATE, DIBASIC 15 MMOL: 224; 236 INJECTION, SOLUTION, CONCENTRATE INTRAVENOUS at 11:10

## 2023-03-20 RX ADMIN — SODIUM CHLORIDE 4 MILLION UNITS: 9 INJECTION, SOLUTION INTRAVENOUS at 10:16

## 2023-03-20 RX ADMIN — PROPOFOL 30 MCG/KG/MIN: 10 INJECTION, EMULSION INTRAVENOUS at 04:11

## 2023-03-20 RX ADMIN — HEPARIN SODIUM 5000 UNITS: 5000 INJECTION, SOLUTION INTRAVENOUS; SUBCUTANEOUS at 21:16

## 2023-03-20 RX ADMIN — MIDAZOLAM HYDROCHLORIDE 2 MG: 1 INJECTION, SOLUTION INTRAMUSCULAR; INTRAVENOUS at 01:45

## 2023-03-20 RX ADMIN — SODIUM CHLORIDE 4 MILLION UNITS: 9 INJECTION, SOLUTION INTRAVENOUS at 14:45

## 2023-03-20 RX ADMIN — PROPOFOL 30 MCG/KG/MIN: 10 INJECTION, EMULSION INTRAVENOUS at 15:01

## 2023-03-20 RX ADMIN — PANTOPRAZOLE SODIUM 40 MG: 40 INJECTION, POWDER, LYOPHILIZED, FOR SOLUTION INTRAVENOUS at 05:35

## 2023-03-20 RX ADMIN — Medication 300 MCG/HR: at 17:40

## 2023-03-20 RX ADMIN — SODIUM CHLORIDE 4 MILLION UNITS: 9 INJECTION, SOLUTION INTRAVENOUS at 05:47

## 2023-03-20 RX ADMIN — HEPARIN SODIUM 5000 UNITS: 5000 INJECTION, SOLUTION INTRAVENOUS; SUBCUTANEOUS at 05:35

## 2023-03-20 RX ADMIN — HEPARIN SODIUM 5000 UNITS: 5000 INJECTION, SOLUTION INTRAVENOUS; SUBCUTANEOUS at 15:08

## 2023-03-20 RX ADMIN — SODIUM CHLORIDE 125 ML/HR: 9 INJECTION, SOLUTION INTRAVENOUS at 05:35

## 2023-03-20 RX ADMIN — SENNOSIDES AND DOCUSATE SODIUM 2 TABLET: 8.6; 5 TABLET ORAL at 21:17

## 2023-03-20 RX ADMIN — HYDRALAZINE HYDROCHLORIDE 10 MG: 20 INJECTION INTRAMUSCULAR; INTRAVENOUS at 17:47

## 2023-03-20 RX ADMIN — PROPOFOL 30 MCG/KG/MIN: 10 INJECTION, EMULSION INTRAVENOUS at 11:47

## 2023-03-20 RX ADMIN — HYDRALAZINE HYDROCHLORIDE 10 MG: 20 INJECTION INTRAMUSCULAR; INTRAVENOUS at 23:23

## 2023-03-20 RX ADMIN — SODIUM CHLORIDE 125 ML/HR: 9 INJECTION, SOLUTION INTRAVENOUS at 13:08

## 2023-03-20 RX ADMIN — MIDAZOLAM HYDROCHLORIDE 3 MG/HR: 1 INJECTION, SOLUTION INTRAVENOUS at 15:01

## 2023-03-20 RX ADMIN — LATANOPROST 1 DROP: 50 SOLUTION OPHTHALMIC at 09:21

## 2023-03-20 RX ADMIN — Medication 300 MCG/HR: at 09:38

## 2023-03-20 RX ADMIN — MAGNESIUM SULFATE HEPTAHYDRATE 4 G: 40 INJECTION, SOLUTION INTRAVENOUS at 09:21

## 2023-03-20 RX ADMIN — CHLORHEXIDINE GLUCONATE 0.12% ORAL RINSE 15 ML: 1.2 LIQUID ORAL at 09:21

## 2023-03-20 RX ADMIN — PROPOFOL 20 MCG/KG/MIN: 10 INJECTION, EMULSION INTRAVENOUS at 23:51

## 2023-03-20 RX ADMIN — SODIUM CHLORIDE 125 ML/HR: 9 INJECTION, SOLUTION INTRAVENOUS at 21:17

## 2023-03-20 RX ADMIN — Medication 10 ML: at 21:31

## 2023-03-20 RX ADMIN — Medication 30 ML: at 17:50

## 2023-03-20 RX ADMIN — METRONIDAZOLE 500 MG: 500 INJECTION, SOLUTION INTRAVENOUS at 21:17

## 2023-03-20 RX ADMIN — POTASSIUM & SODIUM PHOSPHATES POWDER PACK 280-160-250 MG 2 PACKET: 280-160-250 PACK at 21:16

## 2023-03-20 RX ADMIN — METRONIDAZOLE 500 MG: 500 INJECTION, SOLUTION INTRAVENOUS at 15:14

## 2023-03-20 RX ADMIN — SODIUM CHLORIDE 12 MILLION UNITS: 9 INJECTION, SOLUTION INTRAVENOUS at 17:51

## 2023-03-20 RX ADMIN — MIDAZOLAM HYDROCHLORIDE 1 MG/HR: 1 INJECTION, SOLUTION INTRAVENOUS at 10:50

## 2023-03-20 RX ADMIN — SODIUM CHLORIDE 4 MILLION UNITS: 9 INJECTION, SOLUTION INTRAVENOUS at 01:47

## 2023-03-20 RX ADMIN — SENNOSIDES AND DOCUSATE SODIUM 2 TABLET: 50; 8.6 TABLET ORAL at 09:21

## 2023-03-20 RX ADMIN — Medication 300 MCG/HR: at 01:05

## 2023-03-20 NOTE — PROGRESS NOTES
INTENSIVIST   PROGRESS NOTE     Hospital:  LOS: 4 days     Mr. Sandro Junior, 68 y.o. male is followed for a Chief Complaint of: Acute epiglottitis with airway obstruction      Subjective   S     Interval History:  No acute events overnight. Bronchoscopy performed this AM.        The patient's relevant past medical, surgical and social history were reviewed and updated in Epic as appropriate.      ROS: Unable to obtain secondary to sedation and mechanical ventilation.     Objective   O     Vitals:  Temp  Min: 98.1 °F (36.7 °C)  Max: 100.9 °F (38.3 °C)  BP  Min: 107/68  Max: 183/110  Pulse  Min: 51  Max: 85  Resp  Min: 16  Max: 17  SpO2  Min: 86 %  Max: 100 % Flow (L/min)  Min: 30  Max: 30    Intake/Ouptut 24 hrs (7:00AM - 6:59 AM)  Intake & Output (last 3 days)       03/17 0701  03/18 0700 03/18 0701  03/19 0700 03/19 0701  03/20 0700 03/20 0701 03/21 0700    I.V. (mL/kg) 3949.7 (50.7) 4230 (54.3) 4030 (51.7) 1703.2 (21.9)    Other 30 240 210 90    NG/ 1131 1123 526    IV Piggyback 499.8 1219.8 225     Total Intake(mL/kg) 4888.5 (62.8) 6820.8 (87.6) 5588 (71.7) 2319.2 (29.8)    Urine (mL/kg/hr) 1920 (1) 3155 (1.7) 1770 (0.9) 995 (2.2)    Total Output 1920 3155 1770 995    Net +2968.5 +3665.8 +3818 +1324.2                  Medications (drips):  fentanyl 10 mcg/mL, Last Rate: 300 mcg/hr (03/20/23 0938)  midazolam, Last Rate: 3 mg/hr (03/20/23 1105)  phenylephrine, Last Rate: Stopped (03/18/23 1635)  propofol, Last Rate: 30 mcg/kg/min (03/20/23 1147)  sodium chloride, Last Rate: 125 mL/hr (03/20/23 0535)        Mechanical Ventilator Settings:          Resp Rate (Set): 16     FiO2 (%): (S) 30 %  PEEP/CPAP (cm H2O): 5 cm H20    Minute Ventilation (L/min) (Obs): 6.27 L/min  Resp Rate (Observed) Vent: 16  I:E Ratio (Set): 1:3.17  I:E Ratio (Obs): 1:3.2    PIP Observed (cm H2O): 20 cm H2O  Plateau Pressure (cm H2O): 18 cm H2O    Physical Examination  Telemetry:  Normal sinus rhythm.    Constitutional:  No acute  distress.  ETT in place on mechanical ventilation.    Eyes: No scleral icterus.   PERRL, EOM intact.    Neck:  Supple, FROM   Cardiovascular: Normal rate, regular and rhythm. Normal heart sounds.  No murmurs, gallop or rub.   Respiratory: No respiratory distress. Normal respiratory effort.  Diminished bilaterally. No wheezing.    Abdominal:  Soft. No masses. Nontender. No distension. No HSM.   Extremities: No digital cyanosis. No clubbing.  No peripheral edema.   Skin: No rashes, lesions or ulcers   Neurological:   Sedated. Opens eyes to stimulation. Follows some basic commands.              Results from last 7 days   Lab Units 03/20/23 0545 03/19/23 0439 03/18/23 0412   WBC 10*3/mm3 5.49 8.30 15.02*   HEMOGLOBIN g/dL 11.1* 10.6* 12.4*   MCV fL 105.9* 106.2* 104.7*   PLATELETS 10*3/mm3 160 143 153     Results from last 7 days   Lab Units 03/20/23  0545 03/19/23  2051 03/19/23 0439 03/18/23  1801 03/18/23 0412   SODIUM mmol/L 138  138  --  143  --  137   POTASSIUM mmol/L 4.0  4.0  --  3.9  --  4.5   CO2 mmol/L 24.0  24.0  --  21.0*  --  24.0   CREATININE mg/dL 0.54*  0.54*  --  0.58*  --  0.69*   GLUCOSE mg/dL 124*  124*  --  106*  --  110*   MAGNESIUM mg/dL 1.9  1.9  --  2.5*  --  3.0*   PHOSPHORUS mg/dL 2.0*  2.0* 1.9* 2.1*   < > 1.6*    < > = values in this interval not displayed.     Estimated Creatinine Clearance: 148.3 mL/min (A) (by C-G formula based on SCr of 0.54 mg/dL (L)).  Results from last 7 days   Lab Units 03/20/23 0545 03/18/23 0412 03/17/23  0350   ALK PHOS U/L 43 46 65   BILIRUBIN mg/dL 0.8 0.4 0.6   BILIRUBIN DIRECT mg/dL  --  0.2  --    ALT (SGPT) U/L 8 13 12   AST (SGOT) U/L 17 20 22       Results from last 7 days   Lab Units 03/19/23  0814 03/18/23  0359 03/17/23  0412 03/17/23  0005 03/16/23 2047   PH, ARTERIAL pH units 7.385 7.368 7.395 7.410 7.405   PCO2, ARTERIAL mm Hg 38.4 43.3 42.4 41.8 40.3   PO2 ART mm Hg 121.0* 99.3 159.0* 158.0* 360.0*   FIO2 % 30 30 40 40 40        Images:  Imaging Results (Last 24 Hours)     ** No results found for the last 24 hours. **            Results: Reviewed.  I reviewed the patient's new laboratory and imaging results.  I independently reviewed the patient's new images.    Medications: Reviewed.    Assessment & Plan   A / P     68-year-old male who presented to the ER this evening with shortness of breath and sore throat.  History obtained is from the chart and my discussion with Dr. Eaton patient is now intubated and sedated.  Apparently, he first noticed a sore throat around noon.  He became increasingly symptomatic throughout the day before presenting to the ER.  He was given epinephrine, steroids, and H2 blockers.  Despite this he began tripoding and drooling and had audible stridor.  Per Dr. Eaton, the intubation was difficult due to edema and erythema.     Imaging reveals abnormal appearance of the soft tissues of the laryngeal area with some edema and swelling of the uvula.  There is no evidence of a fluid collection or abscess.  Bibasilar infiltrates were present possibly related to atelectasis or retained secretions but there was no evidence of consolidative pneumonia.  He has been given Zosyn and vancomycin empirically and, as stated, has received steroids.     Blood cultures are growing Strep pyogenes.     Bronchoscopy this AM with continued edematous changes in the posterior oropharynx with copious mucopurulent secretions.       Nutrition:   NPO Diet NPO Type: Strict NPO  Advance Directives:   Code Status and Medical Interventions:   Ordered at: 03/16/23 0663     Code Status (Patient has no pulse and is not breathing):    CPR (Attempt to Resuscitate)     Medical Interventions (Patient has pulse or is breathing):    Full Support       Active Hospital Problems    Diagnosis    • **Acute epiglottitis with airway obstruction - probable    • Acute respiratory failure (HCC)    • Essential hypertension        Assessment / Plan:    Continue  mechanical ventilation. Will continue and reassess continued airway edema on Thursday. Discussed with the family that he may need a tracheostomy.    Continue Propofol and Fentanyl. Add versed drip.   Continue IV fluids  Titrate neosynephrine.   Consult ID for Group A Strep bacteremia.    Echocardiogram negative for vegetations.   Repeat blood cultures today to ensure clearing.  PICC line for access.   DVT ppx.  AM labs    Remains critically ill secondary to mechanical ventilation and vasopressor titration.     High level of risk due to:  severe exacerbation of chronic illness and illness with threat to life or bodily function.    Plan of care and goals reviewed during interdisciplinary rounds.  I discussed the patient's findings and my recommendations with family and nursing staff    Critical Care Time: was greater than 32 minutes.(This excludes time spent performing separately reportable procedures and services). including high complexity decision making to assess, manipulate, and support vital organ system failure in this individual who has impairment of one or more vital organ systems such that there is a high probability of imminent or life threatening deterioration in the patient’s condition.      Keila Negron, DO    Intensive Care Medicine and Pulmonary Medicine

## 2023-03-20 NOTE — CONSULTS
Sandro BARRIENTOS [place]  1955  [id number]  3/20/2023      Referring Provider: Keila Negron DO pulmonary critical care medicine  Reason for Consultation: Adult epiglottitis with severe sepsis and GPC bacteremia      Subjective   History of present illness: 68-year-old gentleman.  [facility] admit 3/16.  Acute presentation with severe sore throat and shortness of breath.  Drooling in the emergency department with accessory muscle use and stridor at the base of the neck.  Peripheral leukocyte count 14.8 with 80% segmented neutrophils and 1 band form.  Lactic acidosis at 3.1.  Difficult intubation due to airway edema and erythema.  Empirically started on  vancomycin and piperacillin/tazobactam.  Status post intravenous corticosteroids.  CT scan of the neck with debris in the posterior pharynx and upper airways.  No obvious retropharyngeal abscess.  Edematous uvula with laryngeal edema.  Admission blood cultures with group A streptococci.  Penicillin EDNA less than 0.06 mcg.  Bio fire respiratory pathogen's panel unremarkable.  Persistent fever despite appropriate antimicrobial therapy.  Asked to assist in this critically ill patient.    Past Medical History:   Diagnosis Date   • Epidermal inclusion cyst    • Esophageal candidiasis (HCC)    • Keloid scar    • Rectal cancer (HCC)    • Seborrheic dermatitis        Past Surgical History:   Procedure Laterality Date   • COLECTOMY PARTIAL / TOTAL     • KNEE SURGERY      Left knee meniscal tear repair   • TOOTH EXTRACTION  2016    7 teeth   • TOTAL KNEE ARTHROPLASTY Right 04/11/2016       Pediatric History   Patient Parents   • Not on file     Other Topics Concern   • Not on file   Social History Narrative   • Not on file       family history is not on file.    No Known Allergies    Medication: MAR reviewed.  Antibiotics: See below.  Anti-Infectives (From admission, onward)    Ordered     Dose/Rate Route Frequency Start Stop    03/19/23 0912  penicillin G potassium 4 Million  "Units in sodium chloride 0.9 % 100 mL IVPB        Ordering Provider: Keila Negron DO    4 Million Units  over 30 Minutes Intravenous Every 4 Hours 03/19/23 1000 03/26/23 0959    03/16/23 2151  vancomycin 1500 mg/500 mL 0.9% NS IVPB ([facility])        Ordering Provider: Anand Eaton MD    20 mg/kg × 79.4 kg Intravenous Once 03/16/23 2153 03/16/23 4305          Please refer to the medical record for a full medication list    Review of Systems  Pertinent items are noted in HPI, all other systems reviewed and negative    Objective     Physical Exam: See below.  Vital Signs   Temp:  [98.1 °F (36.7 °C)-100.9 °F (38.3 °C)] 98.1 °F (36.7 °C)  Heart Rate:  [51-85] 53  Resp:  [16-17] 17  BP: (107-183)/() 117/66  FiO2 (%):  [30 %-35 %] 35 %    Blood pressure 117/66, pulse 53, temperature 98.1 °F (36.7 °C), temperature source Bladder, resp. rate 17, height 185.4 cm (72.99\"), weight 77.9 kg (171 lb 11.8 oz), SpO2 98 %.  GENERAL: Intubated.  Sedated.  Mechanically ventilated.  Remains on Jamaal-Synephrine gtt.  HEENT: Oropharynx without thrush. Sinuses nontender. No cervical adenopathy. No carotid bruits/ jugular venous distention.  I do not appreciate stridor at the base of the neck.  No crepitant gas palpable in the soft tissues of the neck.  EYES: PERRL. No conjunctival injection. No icterus. EOMI.  LYMPHATICS: No lymphadenopathy of the neck or axillary or inguinal regions.   HEART: No murmur, gallop, or pericardial friction rub.  No central access.  2 peripheral IVs in right hand and forearm.  LUNGS: Clear to auscultation anteriorly. No percussion dullness.   ABDOMEN: Soft, nontender, nondistended. No appreciable HSM.  No peritoneal findings of rebound or guarding.  SKIN: Warm and dry without cutaneous eruptions. No embolic stigmata.  Evidence of right knee arthrotomy.  PSYCHIATRIC: Incomplete assessment..       Results Review:   I reviewed the patient's new clinical results.  I reviewed the patient's new imaging " results and agree with the interpretation.    Lab Results   Component Value Date    WBC 5.49 03/20/2023    HGB 11.1 (L) 03/20/2023    HCT 35.9 (L) 03/20/2023    .9 (H) 03/20/2023     03/20/2023       Lab Results   Component Value Date    GLUCOSE 124 (H) 03/20/2023    GLUCOSE 124 (H) 03/20/2023    BUN 7 (L) 03/20/2023    BUN 7 (L) 03/20/2023    CREATININE 0.54 (L) 03/20/2023    CREATININE 0.54 (L) 03/20/2023    EGFRIFAFRI 109 01/10/2022    BCR 13.0 03/20/2023    BCR 13.0 03/20/2023    CO2 24.0 03/20/2023    CO2 24.0 03/20/2023    CALCIUM 7.8 (L) 03/20/2023    CALCIUM 7.8 (L) 03/20/2023    ALBUMIN 2.5 (L) 03/20/2023    AST 17 03/20/2023    ALT 8 03/20/2023       Estimated Creatinine Clearance: 148.3 mL/min (A) (by C-G formula based on SCr of 0.54 mg/dL (L)).      Microbiology: Admission blood culture bottles with gram-positive cocci in pairs and short chains.  Bio fire multiplex PCR with genomic sequences of group A beta-hemolytic streptococci/penicillin and ceftriaxone EDNA's of less than 0.06 and 0.12 mcg, respectively.  Viral respiratory pathogen's panel negative including influenza A/B and COVID-19.  MRSA nasopharyngeal screen unremarkable.      Radiology:  Imaging Results (Last 72 Hours)     ** No results found for the last 72 hours. **          ASSESSMENT AND PLAN: Adult epiglottitis.  Intubated for airway support. Severe sepsis.  Lactic acidosis/3.1.  Group A beta-hemolytic streptococcal bacteremia.  Leukocytosis (15.9).  Macrocytosis/.  Hypophosphatemia.  Maximum temperature over 24 hours 100.9 degrees.  Currently 98.1.  Pulse 53, respiratory rate 17, blood pressure 129/72 mmHg with an O2 saturation of 98% on an FiO2 of 0.3 with 5 cm of PEEP.  BUN/creatinine 7/0.54.  Last peripheral leukocyte count 5.5 with 69% segmented neutrophils and 1 band form.  1 of 2 admission blood culture bottles with group A beta-hemolytic streptococci/bio fire multiplex PCR confirmation.  Favorable EDNA's to both  ceftriaxone and penicillin.  Chest x-ray reviewed.  Borderline cardiomegaly.  The pulmonary parenchyma appears normal.  There is no dense alveolar consolidation or large volume pleural effusion.  Endotracheal tube is noted.  CT scan of the soft tissues of the neck on admission negative for retropharyngeal abscess.  De-escalated from vancomycin and piperacillin/tazobactam to every 4 hours penicillin.  Consider change to penicillin 24,000,000 units by continuous infusion every 24 hours.  This produces favorable PK/PD with a peak to EDNA ratio in excess of 20:1 and an AUC of greater than 400.  Given the patient's persistent fever, I would advise the addition of anaerobic coverage particularly for pigmented Bacteroides species and Fusobacterium.  Unfortunately, intravenous clindamycin is in nationwide short supply.  500 mg IV metronidazole every 6 hours should suffice.  Favor the addition of probiotics when extubated to minimize the risk of C. difficile colitis.  Check B12 and folate levels.  Adult epiglottitis is a relatively unusual entity in otherwise healthy adults.  From a microbiologic differential diagnostic standpoint, Streptococcus pneumoniae, group A beta-hemolytic streptococci, and Haemophilus influenza predominate.  The latter organism occurs rarely since widespread institution of pediatric vaccination with HIB and resultant herd immunity.  Significant risk factors include neoplastic disease, cytotoxic chemotherapy, prolonged neutropenia, high-dose corticosteroids, and acute myelogenous leukemia.  Critically ill.  Prognosis guarded.  Greater than 1 hour spent in data review, radiographic assessment, culture review, medical decision making, alteration of antimicrobial regimen, bedside examination, and dictation time.    I discussed the patient's findings and my recommendations with patient    Thank you for this consult.  Our group would be pleased to follow this patient over the course of their hospitalization  and assist with outpatient antimicrobial therapy, as indicated.     Zana Johnson MD  3/20/2023

## 2023-03-20 NOTE — PROCEDURES
Bronchoscopy    Date/Time: 3/20/2023 12:49 PM  Performed by: Keila Negron DO  Authorized by: Keila Negron DO   Consent: Verbal consent obtained.  Consent given by: spouse  Patient tolerance: patient tolerated the procedure well with no immediate complications  Comments: The bronchoscope was introduced through the right nare. Copious mucopurulent secretions were encountered. The tissues of the posterior oropharynx remain swollen and edematous and the ET tube could not be visualized well to see the surrounding edema.

## 2023-03-20 NOTE — CASE MANAGEMENT/SOCIAL WORK
Continued Stay Note  [facility]     Patient Name: Sandro Junior  MRN: [id number]  Today's Date: 3/20/2023    Admit Date: 3/16/2023    Plan: TBD   Discharge Plan     Row Name 03/20/23 1111       Plan    Plan TBD    Plan Comments patient sedated on vent. ID following, and [facility] is scheduled today to assess swelling. CM will continue to follow.               Discharge Codes    No documentation.               Expected Discharge Date and Time     Expected Discharge Date Expected Discharge Time    Mar 24, 2023             Pamela Dyer RN

## 2023-03-20 NOTE — PROGRESS NOTES
Clinical Nutrition     Nutrition Support Assessment  Reason for Visit: MDR, Follow-up protocol, EN      Patient Name: Sandro Junior  YOB: 1955  MRN: [id number]  Date of Encounter: 03/20/23 14:46 EDT  Admission date: 3/16/2023    Comments:  -Peptamen AF @ 60 ml/hr. Water flush @ 30 ml Q 2 hours. Prosource No Carb packet 1x daily.  At goal rate this regimen will provide 1200 ml formula, 1500 calories (83% est needs), 106 g protein (101% est needs), 7 g fiber, 927 ml free water from formula, 1227 ml total water from formula/flushes.    -With current propofol rate providing 354 calories, pt will receive total of 1854 calories (103% est needs).      Nutrition Assessment   Admission Diagnosis:  Acute respiratory failure (HCC) [J96.00]      Problem List:    Acute epiglottitis with airway obstruction - probable    Essential hypertension    Acute respiratory failure (HCC)        PMH:   He  has a past medical history of Epidermal inclusion cyst, Esophageal candidiasis (HCC), Keloid scar, Rectal cancer (HCC), and Seborrheic dermatitis.    PSH:  He  has a past surgical history that includes Knee surgery; Colectomy partial / total; Total knee arthroplasty (Right, 04/11/2016); and Tooth extraction (2016).      Applicable Nutrition Concerns:   Skin:  Oral:  GI:      Applicable Interval History:   (3/16) intubated, large bore NG tube placed  (3/17) EN initiated  (3/20) s/p bronchoscopy - airway edema      Reported/Observed/Food/Nutrition Related History:   3/20  Patient intubated and sedated. Tolerating EN at goal rate without difficulty. Bowel regimen to be added due to no BM yet this admission. S/p bronchoscopy today. Plan to continue MV per intensivist. Phos replaced. Propofol @ 19.06 ml/hr this morning prior to MDR - advised RN to decrease EN rate from 65 ml/hr to 55 ml/hr. During time of visit patient's family at bedside, including wife. Wife reports patient with no changes in appetite/PO  "intake prior to admission and no recent unintentional weight loss prior to admission. Does not feel like patient's appearance has changed at all. Patient does not eat \"hard foods\" (unsure of examples), eats mostly soft foods due to h/o esophageal strictures requiring dilations. Does not drink any ONS at home.    3/17  Patient intubated and has propofol running. No pressor support at this time. RN reports intensivist would like to begin EN today. Wife at bedside reports patient has NKFA. Patient awake and moving around at time of visit.      Labs    Labs Reviewed: Yes     Results from last 7 days   Lab Units 03/20/23  0545 03/19/23  2051 03/19/23  0439 03/18/23  1801 03/18/23  0412 03/17/23  1558 03/17/23  0350   GLUCOSE mg/dL 124*  124*  --  106*  --  110*  --  159*   BUN mg/dL 7*  7*  --  13  --  12  --  9   CREATININE mg/dL 0.54*  0.54*  --  0.58*  --  0.69*  --  0.76   SODIUM mmol/L 138  138  --  143  --  137  --  135*   CHLORIDE mmol/L 110*  110*  --  114*  --  108*  --  102   POTASSIUM mmol/L 4.0  4.0  --  3.9  --  4.5   < > 3.6   PHOSPHORUS mg/dL 2.0*  2.0* 1.9* 2.1*   < > 1.6*  --  2.6   MAGNESIUM mg/dL 1.9  1.9  --  2.5*  --  3.0*  --  1.4*   ALT (SGPT) U/L 8  --   --   --  13  --  12    < > = values in this interval not displayed.       Results from last 7 days   Lab Units 03/20/23  0545 03/18/23  0412 03/17/23  0350 03/16/23 2023   ALBUMIN g/dL 2.5* 3.2* 3.2* 4.3   CRP mg/dL  --   --   --  2.26*   CHOLESTEROL mg/dL 112  --   --   --    TRIGLYCERIDES mg/dL 144  --   --   --        Results from last 7 days   Lab Units 03/20/23  1156 03/20/23  0510 03/19/23  2317 03/19/23  1731 03/19/23  1131 03/19/23  0505   GLUCOSE mg/dL 104 91 82 111 100 95     Lab Results   Lab Value Date/Time    HGBA1C 5.2 10/20/2020 0841         Results from last 7 days   Lab Units 03/16/23 2023   PROBNP pg/mL 165.7         Medications    Medications Reviewed: Yes  Pertinent: antibiotic, protonix, KCl, pericolace  Infusion: " "fentanyl, propofol (@ 13.4 ml/hr = 354 calories from lipid), versed, NS @ 125 ml/hr  PRN:     Intake/Ouptut 24 hrs (0701 - 0700)   I&O's Reviewed: Yes     No BM this admission    Anthropometrics     Flowsheet Rows    Flowsheet Row First Filed Value   Admission Height 185.4 cm (73\") Documented at 03/16/2023 2104   Admission Weight 79.4 kg (175 lb) Documented at 03/16/2023 2104          Height: Height: 185.4 cm (72.99\")  Last Filed Weight: Weight: 77.9 kg (171 lb 11.8 oz) (03/17/23 0600)  Method: Weight Method: Bed scale  BMI: BMI (Calculated): 22.7  BMI classification: Normal: 18.5-24.9kg/m2  IBW:  184 lbs    UBW:  Last 15 Recorded Weights  View Complete Flowsheet  Weight Weight (kg) Weight (lbs) Weight Method VISIT REPORT   3/17/2023 77.9 kg 171 lb 11.8 oz - -   3/16/2023 74.5 kg 164 lb 3.9 oz Bed scale -   3/16/2023 79.379 kg 175 lb Estimated -   4/5/2022 80.559 kg 177 lb 9.6 oz - Report   1/10/2022 80.196 kg 176 lb 12.8 oz - Report   12/9/2021 79.652 kg 175 lb 9.6 oz - Report   11/13/2021 84.823 kg 187 lb Stated -   3/22/2021 83.008 kg 183 lb - Report   11/19/2020 83.643 kg 184 lb 6.4 oz - Report   10/20/2020 82.918 kg 182 lb 12.8 oz - Report   9/21/2020 84.188 kg 185 lb 9.6 oz - Report   3/16/2020 85.639 kg 188 lb 12.8 oz - Report   2/7/2020 84.823 kg 187 lb - Report   7/16/2019 81.738 kg 180 lb 3.2 oz - Report   1/15/2019 83.915 kg 185 lb - Report       Nutrition Focused Physical Exam     Date:   Unable to perform exam due to: Defer pending indication    Needs Assessment   Date: 3/17    Height used:Height: 185.4 cm (72.99\")  Weights used: 171 lbs/77.7 kg (actual wt)      Estimated Calorie needs: ~1800 calories daily (initially)  Method:  PSU (Ve: 6.31, Tmax: 37.6) = 1809  Method: 25-27 Kcals/KG actual wt = 0883-2198    Estimated Protein needs: ~105 g protein daily  Method: 1.2-1.5 g/Kg actual wt =     Estimated Fluid needs:  Per clinical status      Current Nutrition Prescription     PO: NPO Diet NPO Type: " Strict NPO    Intake: N/A      EN: Peptamen AF  Goal Rate: 65 ml/hr Water Flushes: 30 ml Q 2 hours  Modular: None  Route: NG  Tube: Large bore    At goal over: 20Hrs/day  Rx will supply:   Goal Volume 1300 mL/day     Flush Volume 300 ML/day     Energy 1560 Kcal/day 87 % Est Need   Protein 99 G/day 94 % Est Need   Fiber 8 G/day     Water in  EN 1054 mL     Total Water 1354 mL     Meet DRI Yes        --------------------------------------------------------------------------  Product/Rate verified at bedside: No - per nursing  Infusing Rate at time of visit: 65 ml/hr    Average Delivery from [facility] Days:  Volume 1350 mL/day 90  % Goal Vol.   Flush Volume 620 mL/day     Energy 1620 Kcal/day 90 % Est Need   Protein 103 g/day 98 % Est Need   Fiber 8 g/day     Water in  EN 1095 mL     Total Water 1715 mL     Meet DRI Yes            Nutrition Diagnosis     Date: 3/17 Updated:   Problem Inadequate oral intake   Etiology Mechanical ventilation   Signs/Symptoms NPO   Status: ongoing - EN initiated (3/17)      Goal:   General: Nutrition support treatment  PO: Advace diet as medically feasible/appropriate  EN/PN: Tolerate EN at goal    Nutrition Intervention      Follow treatment progress, Care plan reviewed, Nutrition support order placed    -Peptamen AF @ 60 ml/hr. Water flush @ 30 ml Q 2 hours. Prosource No Carb packet 1x daily.  At goal rate this regimen will provide 1200 ml formula, 1500 calories (83% est needs), 106 g protein (101% est needs), 7 g fiber, 927 ml free water from formula, 1227 ml total water from formula/flushes.    -With current propofol rate providing 354 calories, pt will receive total of 1854 calories (103% est needs).      Monitoring/Evaluation:   Per protocol, I&O, Pertinent labs, EN delivery/tolerance, Weight, GI status, Symptoms, Swallow function, Hemodynamic stability      Beatrice Brewster RD  Time Spent: 45 min

## 2023-03-20 NOTE — PLAN OF CARE
Goal Outcome Evaluation:  Plan of Care Reviewed With: spouse        Progress: no change  Outcome Evaluation: Patient remains sedated and on the ventilator with minimal settings, MD unable to visualize ETT or much of pt. airway during exam therefore patient will remain intubated and will be re-evaluated for extubation in a few days, versed gtt added for increased restlessness, PICC line inserted, ID consulted for further antibiotic management, Mg and Phos replaced, BP trending up - PRN hydralazine added, UOP >2L, no BM - bowel regimen added.

## 2023-03-20 NOTE — PLAN OF CARE
Goal Outcome Evaluation:           Progress: improving  Outcome Evaluation: VSS. Patient is alert and following commands. Patient aggitated and restless at times PRN medication given. PRN Tylenol given x1. Propofol @300, Fentanyl @300, NS @125. UOP adeqaute, no BM. TF infusing at goal. Wife updated at bedside.

## 2023-03-21 ENCOUNTER — APPOINTMENT (OUTPATIENT)
Dept: CT IMAGING | Facility: HOSPITAL | Age: 68
DRG: 4 | End: 2023-03-21
Payer: MEDICARE

## 2023-03-21 LAB
ALBUMIN SERPL-MCNC: 2.2 G/DL (ref 3.5–5.2)
ALP SERPL-CCNC: 68 U/L (ref 39–117)
ALT SERPL W P-5'-P-CCNC: 14 U/L (ref 1–41)
ANION GAP SERPL CALCULATED.3IONS-SCNC: 6 MMOL/L (ref 5–15)
AST SERPL-CCNC: 22 U/L (ref 1–40)
BACTERIA SPEC AEROBE CULT: NORMAL
BILIRUB CONJ SERPL-MCNC: 0.5 MG/DL (ref 0–0.3)
BILIRUB INDIRECT SERPL-MCNC: 0.2 MG/DL
BILIRUB SERPL-MCNC: 0.7 MG/DL (ref 0–1.2)
BUN SERPL-MCNC: 7 MG/DL (ref 8–23)
BUN/CREAT SERPL: 10.6 (ref 7–25)
CALCIUM SPEC-SCNC: 7.7 MG/DL (ref 8.6–10.5)
CHLORIDE SERPL-SCNC: 107 MMOL/L (ref 98–107)
CO2 SERPL-SCNC: 24 MMOL/L (ref 22–29)
CREAT SERPL-MCNC: 0.66 MG/DL (ref 0.76–1.27)
EGFRCR SERPLBLD CKD-EPI 2021: 103.4 ML/MIN/1.73
GLUCOSE BLDC GLUCOMTR-MCNC: 102 MG/DL (ref 70–130)
GLUCOSE BLDC GLUCOMTR-MCNC: 140 MG/DL (ref 70–130)
GLUCOSE BLDC GLUCOMTR-MCNC: 62 MG/DL (ref 70–130)
GLUCOSE BLDC GLUCOMTR-MCNC: 85 MG/DL (ref 70–130)
GLUCOSE BLDC GLUCOMTR-MCNC: 96 MG/DL (ref 70–130)
GLUCOSE SERPL-MCNC: 114 MG/DL (ref 65–99)
MAGNESIUM SERPL-MCNC: 1.7 MG/DL (ref 1.6–2.4)
PHOSPHATE SERPL-MCNC: 2.1 MG/DL (ref 2.5–4.5)
PHOSPHATE SERPL-MCNC: 2.6 MG/DL (ref 2.5–4.5)
POTASSIUM SERPL-SCNC: 4 MMOL/L (ref 3.5–5.2)
PROT SERPL-MCNC: 5.6 G/DL (ref 6–8.5)
QT INTERVAL: 354 MS
QTC INTERVAL: 524 MS
SODIUM SERPL-SCNC: 137 MMOL/L (ref 136–145)

## 2023-03-21 PROCEDURE — 70492 CT SFT TSUE NCK W/O & W/DYE: CPT

## 2023-03-21 PROCEDURE — 25010000002 PROPOFOL 10 MG/ML EMULSION: Performed by: STUDENT IN AN ORGANIZED HEALTH CARE EDUCATION/TRAINING PROGRAM

## 2023-03-21 PROCEDURE — 83735 ASSAY OF MAGNESIUM: CPT | Performed by: INTERNAL MEDICINE

## 2023-03-21 PROCEDURE — 99291 CRITICAL CARE FIRST HOUR: CPT | Performed by: INTERNAL MEDICINE

## 2023-03-21 PROCEDURE — 82962 GLUCOSE BLOOD TEST: CPT

## 2023-03-21 PROCEDURE — 94761 N-INVAS EAR/PLS OXIMETRY MLT: CPT

## 2023-03-21 PROCEDURE — 25010000002 HYDRALAZINE PER 20 MG: Performed by: NURSE PRACTITIONER

## 2023-03-21 PROCEDURE — 94799 UNLISTED PULMONARY SVC/PX: CPT

## 2023-03-21 PROCEDURE — 25010000002 HEPARIN (PORCINE) PER 1000 UNITS: Performed by: INTERNAL MEDICINE

## 2023-03-21 PROCEDURE — 84100 ASSAY OF PHOSPHORUS: CPT | Performed by: INTERNAL MEDICINE

## 2023-03-21 PROCEDURE — 80048 BASIC METABOLIC PNL TOTAL CA: CPT | Performed by: INTERNAL MEDICINE

## 2023-03-21 PROCEDURE — 94003 VENT MGMT INPAT SUBQ DAY: CPT

## 2023-03-21 PROCEDURE — 80076 HEPATIC FUNCTION PANEL: CPT | Performed by: INTERNAL MEDICINE

## 2023-03-21 PROCEDURE — 25510000001 IOPAMIDOL 61 % SOLUTION: Performed by: INTERNAL MEDICINE

## 2023-03-21 PROCEDURE — 0 MIDAZOLAM 1 MG/ML 100ML NS 100 MG/100ML SOLUTION: Performed by: INTERNAL MEDICINE

## 2023-03-21 PROCEDURE — 25010000002 PENICILLIN G POTASSIUM PER 600000 UNITS: Performed by: INTERNAL MEDICINE

## 2023-03-21 PROCEDURE — 0 MAGNESIUM SULFATE 4 GM/100ML SOLUTION: Performed by: INTERNAL MEDICINE

## 2023-03-21 PROCEDURE — 25010000002 FENTANYL 10 MCG/1 ML NS: Performed by: NURSE PRACTITIONER

## 2023-03-21 PROCEDURE — 25010000002 FUROSEMIDE PER 20 MG

## 2023-03-21 RX ORDER — HYDRALAZINE HYDROCHLORIDE 20 MG/ML
20 INJECTION INTRAMUSCULAR; INTRAVENOUS EVERY 4 HOURS PRN
Status: DISCONTINUED | OUTPATIENT
Start: 2023-03-21 | End: 2023-04-11 | Stop reason: HOSPADM

## 2023-03-21 RX ORDER — FUROSEMIDE 10 MG/ML
40 INJECTION INTRAMUSCULAR; INTRAVENOUS ONCE
Status: COMPLETED | OUTPATIENT
Start: 2023-03-21 | End: 2023-03-21

## 2023-03-21 RX ORDER — ACETAMINOPHEN 160 MG/5ML
650 SOLUTION ORAL EVERY 6 HOURS PRN
Status: DISCONTINUED | OUTPATIENT
Start: 2023-03-21 | End: 2023-03-24

## 2023-03-21 RX ORDER — HYDRALAZINE HYDROCHLORIDE 20 MG/ML
10 INJECTION INTRAMUSCULAR; INTRAVENOUS ONCE
Status: COMPLETED | OUTPATIENT
Start: 2023-03-21 | End: 2023-03-21

## 2023-03-21 RX ORDER — DEXTROSE MONOHYDRATE 25 G/50ML
25 INJECTION, SOLUTION INTRAVENOUS
Status: DISCONTINUED | OUTPATIENT
Start: 2023-03-21 | End: 2023-03-29

## 2023-03-21 RX ORDER — MAGNESIUM SULFATE HEPTAHYDRATE 40 MG/ML
4 INJECTION, SOLUTION INTRAVENOUS ONCE
Status: COMPLETED | OUTPATIENT
Start: 2023-03-21 | End: 2023-03-21

## 2023-03-21 RX ORDER — NICOTINE POLACRILEX 4 MG
15 LOZENGE BUCCAL
Status: DISCONTINUED | OUTPATIENT
Start: 2023-03-21 | End: 2023-03-26

## 2023-03-21 RX ORDER — IBUPROFEN 600 MG/1
1 TABLET ORAL
Status: DISCONTINUED | OUTPATIENT
Start: 2023-03-21 | End: 2023-03-29

## 2023-03-21 RX ADMIN — HEPARIN SODIUM 5000 UNITS: 5000 INJECTION, SOLUTION INTRAVENOUS; SUBCUTANEOUS at 14:25

## 2023-03-21 RX ADMIN — PROPOFOL 20 MCG/KG/MIN: 10 INJECTION, EMULSION INTRAVENOUS at 06:39

## 2023-03-21 RX ADMIN — HEPARIN SODIUM 5000 UNITS: 5000 INJECTION, SOLUTION INTRAVENOUS; SUBCUTANEOUS at 05:43

## 2023-03-21 RX ADMIN — SENNOSIDES AND DOCUSATE SODIUM 2 TABLET: 8.6; 5 TABLET ORAL at 20:15

## 2023-03-21 RX ADMIN — Medication 300 MCG/HR: at 10:07

## 2023-03-21 RX ADMIN — CHLORHEXIDINE GLUCONATE 0.12% ORAL RINSE 15 ML: 1.2 LIQUID ORAL at 08:55

## 2023-03-21 RX ADMIN — FUROSEMIDE 40 MG: 10 INJECTION, SOLUTION INTRAMUSCULAR; INTRAVENOUS at 16:55

## 2023-03-21 RX ADMIN — HYDRALAZINE HYDROCHLORIDE 10 MG: 20 INJECTION INTRAMUSCULAR; INTRAVENOUS at 01:16

## 2023-03-21 RX ADMIN — CHLORHEXIDINE GLUCONATE 0.12% ORAL RINSE 15 ML: 1.2 LIQUID ORAL at 20:15

## 2023-03-21 RX ADMIN — SENNOSIDES AND DOCUSATE SODIUM 2 TABLET: 8.6; 5 TABLET ORAL at 08:55

## 2023-03-21 RX ADMIN — SODIUM CHLORIDE 125 ML/HR: 9 INJECTION, SOLUTION INTRAVENOUS at 04:44

## 2023-03-21 RX ADMIN — Medication 10 ML: at 09:02

## 2023-03-21 RX ADMIN — MAGNESIUM SULFATE HEPTAHYDRATE 4 G: 40 INJECTION, SOLUTION INTRAVENOUS at 08:54

## 2023-03-21 RX ADMIN — DEXTROSE MONOHYDRATE 25 G: 25 INJECTION, SOLUTION INTRAVENOUS at 11:53

## 2023-03-21 RX ADMIN — HEPARIN SODIUM 5000 UNITS: 5000 INJECTION, SOLUTION INTRAVENOUS; SUBCUTANEOUS at 22:04

## 2023-03-21 RX ADMIN — Medication 300 MCG/HR: at 17:35

## 2023-03-21 RX ADMIN — IOPAMIDOL 75 ML: 612 INJECTION, SOLUTION INTRAVENOUS at 13:57

## 2023-03-21 RX ADMIN — POTASSIUM PHOSPHATE, MONOBASIC POTASSIUM PHOSPHATE, DIBASIC 15 MMOL: 224; 236 INJECTION, SOLUTION, CONCENTRATE INTRAVENOUS at 08:54

## 2023-03-21 RX ADMIN — ACETAMINOPHEN 325MG 650 MG: 325 TABLET ORAL at 02:53

## 2023-03-21 RX ADMIN — METRONIDAZOLE 500 MG: 500 INJECTION, SOLUTION INTRAVENOUS at 08:54

## 2023-03-21 RX ADMIN — MIDAZOLAM HYDROCHLORIDE 7 MG/HR: 1 INJECTION, SOLUTION INTRAVENOUS at 08:52

## 2023-03-21 RX ADMIN — METRONIDAZOLE 500 MG: 500 INJECTION, SOLUTION INTRAVENOUS at 20:16

## 2023-03-21 RX ADMIN — PROPOFOL 40 MCG/KG/MIN: 10 INJECTION, EMULSION INTRAVENOUS at 10:08

## 2023-03-21 RX ADMIN — METRONIDAZOLE 500 MG: 500 INJECTION, SOLUTION INTRAVENOUS at 14:25

## 2023-03-21 RX ADMIN — METRONIDAZOLE 500 MG: 500 INJECTION, SOLUTION INTRAVENOUS at 01:53

## 2023-03-21 RX ADMIN — Medication 30 ML: at 08:55

## 2023-03-21 RX ADMIN — PROPOFOL 40 MCG/KG/MIN: 10 INJECTION, EMULSION INTRAVENOUS at 16:19

## 2023-03-21 RX ADMIN — SODIUM CHLORIDE 12 MILLION UNITS: 9 INJECTION, SOLUTION INTRAVENOUS at 17:29

## 2023-03-21 RX ADMIN — SODIUM CHLORIDE 12 MILLION UNITS: 9 INJECTION, SOLUTION INTRAVENOUS at 05:43

## 2023-03-21 RX ADMIN — Medication 300 MCG/HR: at 01:36

## 2023-03-21 RX ADMIN — PANTOPRAZOLE SODIUM 40 MG: 40 INJECTION, POWDER, LYOPHILIZED, FOR SOLUTION INTRAVENOUS at 05:43

## 2023-03-21 RX ADMIN — MIDAZOLAM HYDROCHLORIDE 7 MG/HR: 1 INJECTION, SOLUTION INTRAVENOUS at 20:16

## 2023-03-21 RX ADMIN — Medication 10 ML: at 20:16

## 2023-03-21 RX ADMIN — LATANOPROST 1 DROP: 50 SOLUTION OPHTHALMIC at 08:55

## 2023-03-21 NOTE — PLAN OF CARE
Goal Outcome Evaluation:           Progress: no change  Outcome Evaluation: Patient remains sedated and on the ventilator, PEEP of 5 FiO2@ 30%. VSS, PRN tylenol given for fever. UOP adequate, no BM, bowel regiement continued. PRN Hydralazine given for HTN. Versed gtt @7, Fentanyl @300, Propofol @ 40, Penicillin infusion, NS infusion continues. Wife updated at bedside.

## 2023-03-21 NOTE — PROGRESS NOTES
1       Sandro Junior  1955  [id number]  3/21/2023    CC: Adult epiglottitis    Sandro Junior is a 68 y.o. male here for severe sepsis, intubated for airway support, group A streptococcal bacteremia and persistent fever.      Past medical history:  Past Medical History:   Diagnosis Date   • Epidermal inclusion cyst    • Esophageal candidiasis (HCC)    • Keloid scar    • Rectal cancer (HCC)    • Seborrheic dermatitis        Medications:   Current Facility-Administered Medications:   •  acetaminophen (TYLENOL) tablet 650 mg, 650 mg, Nasogastric, Q4H PRN, 650 mg at 03/21/23 0253 **OR** acetaminophen (TYLENOL) suppository 650 mg, 650 mg, Rectal, Q4H PRN, Case, Keila V., DO  •  sennosides-docusate (PERICOLACE) 8.6-50 MG per tablet 2 tablet, 2 tablet, Nasogastric, BID, 2 tablet at 03/20/23 2117 **AND** polyethylene glycol (MIRALAX) packet 17 g, 17 g, Nasogastric, Daily PRN **AND** [DISCONTINUED] bisacodyl (DULCOLAX) EC tablet 5 mg, 5 mg, Oral, Daily PRN **AND** bisacodyl (DULCOLAX) suppository 10 mg, 10 mg, Rectal, Daily PRN, Jamil, Keila V., DO  •  chlorhexidine (PERIDEX) 0.12 % solution 15 mL, 15 mL, Mouth/Throat, Q12H, Vikram Zamora MD, 15 mL at 03/20/23 2116  •  fentaNYL 2500 mcg/250 mL NS infusion,  mcg/hr, Intravenous, Titrated, Guerda Freire APRN, Last Rate: 30 mL/hr at 03/21/23 0136, 300 mcg/hr at 03/21/23 0136  •  heparin (porcine) 5000 UNIT/ML injection 5,000 Units, 5,000 Units, Subcutaneous, Q8H, Vikram Zamora MD, 5,000 Units at 03/21/23 0543  •  hydrALAZINE (APRESOLINE) injection 20 mg, 20 mg, Intravenous, Q4H PRN, Florez, Gabriela N, DNP, APRN  •  latanoprost (XALATAN) 0.005 % ophthalmic solution 1 drop, 1 drop, Both Eyes, Daily, Vikram Zamora MD, 1 drop at 03/20/23 0921  •  Magnesium Standard Dose Replacement - Initiate Nurse / BPA Driven Protocol, , Does not apply, PRN, Guerda Freire, APRN  •  metroNIDAZOLE (FLAGYL) IVPB 500 mg, 500 mg, Intravenous, Q6H,  Zana Johnson MD, 500 mg at 03/21/23 0153  •  midazolam (VERSED) 100 mg in 100mL NS infusion, 1-10 mg/hr, Intravenous, Titrated, Case, Keila V., DO, Last Rate: 7 mL/hr at 03/21/23 0249, 7 mg/hr at 03/21/23 0249  •  midazolam (VERSED) bolus from bag 1 mg/mL 1 mg, 1 mg, Intravenous, Q30 Min PRN, Case, Keila V., DO  •  midazolam (VERSED) injection 2 mg, 2 mg, Intravenous, Q2H PRN, Case, Keila V., DO, 2 mg at 03/20/23 1023  •  pantoprazole (PROTONIX) injection 40 mg, 40 mg, Intravenous, Q AM, Negar Amezcua APRN, 40 mg at 03/21/23 0543  •  penicillin G potassium 12 Million Units in sodium chloride 0.9 % 250 mL IVPB, 12 Million Units, Intravenous, Q12H, Zana Johnson MD, Last Rate: 20.8 mL/hr at 03/21/23 0543, 12 Million Units at 03/21/23 0543  •  phenylephrine (REYNA-SYNEPHRINE) 50 mg in sodium chloride 0.9 % 250 mL infusion, 0.5-3 mcg/kg/min, Intravenous, Titrated, Anand Frank, , Stopped at 03/18/23 1635  •  Phosphorus Replacement - Initiate Nurse / BPA Driven Protocol, , Does not apply, PRN, Guerda Freire, APRN  •  Potassium Replacement - Initiate Nurse / BPA Driven Protocol, , Does not apply, Tani KELLER Macy L APRN  •  propofol (DIPRIVAN) infusion 10 mg/mL 100 mL, 5-50 mcg/kg/min, Intravenous, Titrated, Anand Eaton MD, Last Rate: 9.53 mL/hr at 03/21/23 0639, 20 mcg/kg/min at 03/21/23 0639  •  ProSource No Carb oral solution 30 mL, 30 mL, Nasogastric, Daily, Satterly, Beatrice FARIAS RD, 30 mL at 03/20/23 1750  •  sodium chloride 0.9 % flush 10 mL, 10 mL, Intravenous, PRN, Emergency, Triage Protocol, MD  •  sodium chloride 0.9 % flush 10 mL, 10 mL, Intravenous, Q12H, Case, Keila V., DO, 10 mL at 03/20/23 2131  •  sodium chloride 0.9 % flush 10 mL, 10 mL, Intravenous, PRN, Case, Keila V., DO  •  sodium chloride 0.9 % flush 20 mL, 20 mL, Intravenous, PRN, Case, Keila V., DO  •  sodium chloride 0.9 % infusion, 125 mL/hr, Intravenous, Continuous, Vikram Zamora,  "MD, Last Rate: 125 mL/hr at 03/21/23 0444, 125 mL/hr at 03/21/23 0444  Antibiotics:  Anti-Infectives (From admission, onward)    Ordered     Dose/Rate Route Frequency Start Stop    03/20/23 1440  penicillin G potassium 12 Million Units in sodium chloride 0.9 % 250 mL IVPB        Ordering Provider: Zana Johnson MD    12 Million Units  20.8 mL/hr over 12 Hours Intravenous Every 12 Hours 03/20/23 1800 03/26/23 1759    03/20/23 1241  metroNIDAZOLE (FLAGYL) IVPB 500 mg        Ordering Provider: Zana Johnson MD    500 mg  over 30 Minutes Intravenous Every 6 Hours 03/20/23 1400 03/25/23 1359    03/16/23 2151  vancomycin 1500 mg/500 mL 0.9% NS IVPB ([facility])        Ordering Provider: Anand Eaton MD    20 mg/kg × 79.4 kg Intravenous Once 03/16/23 2153 03/16/23 2323          Allergies:  has No Known Allergies.    Review of Systems: All other reviewed and negative except as per HPI    Blood pressure 121/68, pulse 71, temperature (!) 102 °F (38.9 °C), resp. rate 16, height 185.4 cm (72.99\"), weight 77.9 kg (171 lb 11.8 oz), SpO2 99 %.  GENERAL: Intubated.  Sedated.  Mechanically ventilated.  No response to questions..   HEENT: Oropharynx without thrush. Sinuses nontender. No cervical adenopathy. No carotid bruits/ jugular venous distention.   EYES: PERRL. No conjunctival injection. No icterus. EOMI.  LYMPHATICS: No lymphadenopathy of the neck or axillary or inguinal regions.   HEART: No murmur, gallop, or pericardial friction rub.  PICC exit site without phlebitis.  LUNGS: Clear to auscultation anteriorly. No percussion dullness.   ABDOMEN: Soft, nontender, nondistended. No appreciable HSM.  No peritoneal findings of rebound or guarding.  SKIN: Warm and dry without cutaneous eruptions. No embolic stigmata.   PSYCHIATRIC: Incomplete assessment.       DIAGNOSTICS:  Lab Results   Component Value Date    WBC 5.49 03/20/2023    HGB 11.1 (L) 03/20/2023    HCT 35.9 (L) 03/20/2023     03/20/2023     Lab " Results   Component Value Date    CRP 2.26 (H) 03/16/2023     Lab Results   Component Value Date    SEDRATE 9 12/03/2018     Lab Results   Component Value Date    GLUCOSE 114 (H) 03/21/2023    BUN 7 (L) 03/21/2023    CREATININE 0.66 (L) 03/21/2023    EGFRIFAFRI 109 01/10/2022    BCR 10.6 03/21/2023    CO2 24.0 03/21/2023    CALCIUM 7.7 (L) 03/21/2023    ALBUMIN 2.2 (L) 03/21/2023    AST 22 03/21/2023    ALT 14 03/21/2023       Microbiology: Bio fire respiratory pathogen's panel negative for COVID-19 and influenza a/B.  1 of 2 admission blood cultures with group A streptococci.  Ceftriaxone EDNA less than 0.12 mcg.  Penicillin EDNA less than 0.06 mcg.    RADIOLOGY:  Imaging Results (Last 72 Hours)     ** No results found for the last 72 hours. **          Assessment and Plan: Adult epiglottitis.  Intubated for airway support/mechanically ventilated.  Severe sepsis.  Group A streptococcal bacteremia.  Lactic acidosis.  Leukocytosis.  Macrocytic anemia.  Hypophosphatemia.  Persistent fever.  Maximum temperature over 24 hours 97. 7.  Currently 102.  Pulse 71, respiratory 16, blood pressure 121/68 mmHg with an O2 saturation of 99% on an FiO2 of 0.3 with 5 cm of PEEP.  BUN and creatinine 7/0.66.  Liver function studies within normal limits.  Lactate down to 0.6.  Yesterday's peripheral leukocyte count 5.49 with 69% segmented neutrophils and 1 band form.  No new culture data.  No new radiographic imaging studies.  Currently on continuous infusion penicillin 24,000,000 units every 24 hours and intravenous metronidazole.  Beta-lactam drug fever a possibility but currently without drug eruption, eosinophilia or decline in GFR that would suggest a coexisting AIN.  Concern for hematogenous seeding of distant anatomic site with resultant metastatic abscess formation.  If fever persists tomorrow morning, will switch to ceftriaxone 2 g IV every 24 hours and continue metronidazole for Bacteroides and Fusobacterium concerns.  35  minutes spent in data review, microbiology assessment, medical decision making, bedside examination, and dictation time.      Zana Johnson MD  3/21/2023

## 2023-03-21 NOTE — PROGRESS NOTES
INTENSIVIST   PROGRESS NOTE     Hospital:  LOS: 5 days     Mr. Sandro Junior, 68 y.o. male is followed for a Chief Complaint of: Acute epiglottitis with airway obstruction      Subjective   S     Interval History:  Febrile to 102 this AM.        The patient's relevant past medical, surgical and social history were reviewed and updated in Epic as appropriate.      ROS: Unable to obtain secondary to sedation and mechanical ventilation.     Objective   O     Vitals:  Temp  Min: 97.7 °F (36.5 °C)  Max: 102 °F (38.9 °C)  BP  Min: 102/57  Max: 183/104  Pulse  Min: 50  Max: 117  Resp  Min: 16  Max: 16  SpO2  Min: 92 %  Max: 99 % Flow (L/min)  Min: 30  Max: 30    Intake/Ouptut 24 hrs (7:00AM - 6:59 AM)  Intake & Output (last 3 days)       03/18 0701  03/19 0700 03/19 0701  03/20 0700 03/20 0701  03/21 0700 03/21 0701 03/22 0700    I.V. (mL/kg) 4230 (54.3) 4030 (51.7) 4843.8 (62.2) 1368.5 (17.6)    Other 240 210 300 90    NG/GT 1131 1123 1607 465    IV Piggyback 1219.8 225 218.5 150    Total Intake(mL/kg) 6820.8 (87.6) 5588 (71.7) 6969.3 (89.5) 2073.5 (26.6)    Urine (mL/kg/hr) 3155 (1.7) 1770 (0.9) 4445 (2.4) 1250 (2.2)    Total Output 3155 1770 4445 1250    Net +3665.8 +3818 +2524.3 +823.5                  Medications (drips):  fentanyl 10 mcg/mL, Last Rate: 300 mcg/hr (03/21/23 1007)  midazolam, Last Rate: 7 mg/hr (03/21/23 0852)  phenylephrine, Last Rate: Stopped (03/18/23 1635)  propofol, Last Rate: 40 mcg/kg/min (03/21/23 1008)        Mechanical Ventilator Settings:          Resp Rate (Set): 16     FiO2 (%): 30 %  PEEP/CPAP (cm H2O): 5 cm H20    Minute Ventilation (L/min) (Obs): 6.21 L/min  Resp Rate (Observed) Vent: 16  I:E Ratio (Set): 1:2.75  I:E Ratio (Obs): 1:2.8    PIP Observed (cm H2O): 18 cm H2O  Plateau Pressure (cm H2O): 17 cm H2O    Physical Examination  Telemetry:  Normal sinus rhythm.    Constitutional:  No acute distress.  ETT in place on mechanical ventilation.    Eyes: No scleral icterus.   PERRL,  EOM intact.    Neck:  Supple, FROM   Cardiovascular: Normal rate, regular and rhythm. Normal heart sounds.  No murmurs, gallop or rub.   Respiratory: No respiratory distress. Normal respiratory effort.  Diminished bilaterally. No wheezing.    Abdominal:  Soft. No masses. Nontender. No distension. No HSM.   Extremities: No digital cyanosis. No clubbing.  2+ peripheral edema.   Skin: No rashes, lesions or ulcers   Neurological:   Sedated. Opens eyes to stimulation.             Results from last 7 days   Lab Units 03/20/23  0545 03/19/23  0439 03/18/23 0412   WBC 10*3/mm3 5.49 8.30 15.02*   HEMOGLOBIN g/dL 11.1* 10.6* 12.4*   MCV fL 105.9* 106.2* 104.7*   PLATELETS 10*3/mm3 160 143 153     Results from last 7 days   Lab Units 03/21/23  0502 03/20/23  1438 03/20/23  0545 03/19/23 2051 03/19/23 0439   SODIUM mmol/L 137  --  138  138  --  143   POTASSIUM mmol/L 4.0  --  4.0  4.0  --  3.9   CO2 mmol/L 24.0  --  24.0  24.0  --  21.0*   CREATININE mg/dL 0.66*  --  0.54*  0.54*  --  0.58*   GLUCOSE mg/dL 114*  --  124*  124*  --  106*   MAGNESIUM mg/dL 1.7  --  1.9  1.9  --  2.5*   PHOSPHORUS mg/dL 2.1* 2.1* 2.0*  2.0*   < > 2.1*    < > = values in this interval not displayed.     Estimated Creatinine Clearance: 121.3 mL/min (A) (by C-G formula based on SCr of 0.66 mg/dL (L)).  Results from last 7 days   Lab Units 03/21/23  0502 03/20/23  0545 03/18/23  0412   ALK PHOS U/L 68 43 46   BILIRUBIN mg/dL 0.7 0.8 0.4   BILIRUBIN DIRECT mg/dL 0.5*  --  0.2   ALT (SGPT) U/L 14 8 13   AST (SGOT) U/L 22 17 20       Results from last 7 days   Lab Units 03/19/23  0814 03/18/23  0359 03/17/23  0412 03/17/23  0005 03/16/23 2047   PH, ARTERIAL pH units 7.385 7.368 7.395 7.410 7.405   PCO2, ARTERIAL mm Hg 38.4 43.3 42.4 41.8 40.3   PO2 ART mm Hg 121.0* 99.3 159.0* 158.0* 360.0*   FIO2 % 30 30 40 40 40       Images:  Imaging Results (Last 24 Hours)     Procedure Component Value Units Date/Time    CT Soft Tissue Neck With & Without  Contrast [[id number]] Resulted: 03/21/23 1339     Updated: 03/21/23 1352            Results: Reviewed.  I reviewed the patient's new laboratory and imaging results.  I independently reviewed the patient's new images.    Medications: Reviewed.    Assessment & Plan   A / P     68-year-old male who presented to the ER this evening with shortness of breath and sore throat.  History obtained is from the chart and my discussion with Dr. Eaton patient is now intubated and sedated.  Apparently, he first noticed a sore throat around noon.  He became increasingly symptomatic throughout the day before presenting to the ER.  He was given epinephrine, steroids, and H2 blockers.  Despite this he began tripoding and drooling and had audible stridor.  Per Dr. Eaton, the intubation was difficult due to edema and erythema.     Imaging reveals abnormal appearance of the soft tissues of the laryngeal area with some edema and swelling of the uvula.  There is no evidence of a fluid collection or abscess.  Bibasilar infiltrates were present possibly related to atelectasis or retained secretions but there was no evidence of consolidative pneumonia.  He has been given Zosyn and vancomycin empirically and, as stated, has received steroids.     Blood cultures are growing Strep pyogenes.     Bronchoscopy on 3/20/23 with continued edematous changes in the posterior oropharynx with copious mucopurulent secretions.     ID is following and changed Penicillin to a continuous infusion.       Nutrition:   NPO Diet NPO Type: Strict NPO  Advance Directives:   Code Status and Medical Interventions:   Ordered at: 03/16/23 2321     Code Status (Patient has no pulse and is not breathing):    CPR (Attempt to Resuscitate)     Medical Interventions (Patient has pulse or is breathing):    Full Support       Active Hospital Problems    Diagnosis    • **Acute epiglottitis with airway obstruction - probable    • Acute respiratory failure (HCC)    • Essential  hypertension        Assessment / Plan:    Continue mechanical ventilation. Cannot extubate secondary to continued airway swelling.     Continue Propofol, Fentanyl and versed drip.   Stop IV fluids. Consider lasix if blood pressure remains stable.   ID following for Group A Strep bacteremia.   Repeat CT neck today to assess for abscess secondary to ongoing fevers.    Echocardiogram negative for vegetations.   Follow up repeat blood cultures.   Continue tube feeds.   DVT ppx.  AM labs    Remains critically ill secondary to mechanical ventilation and unstable airway.     High level of risk due to:  severe exacerbation of chronic illness and illness with threat to life or bodily function.    Plan of care and goals reviewed during interdisciplinary rounds.  I discussed the patient's findings and my recommendations with family and nursing staff    Critical Care Time: was greater than 31 minutes.(This excludes time spent performing separately reportable procedures and services). including high complexity decision making to assess, manipulate, and support vital organ system failure in this individual who has impairment of one or more vital organ systems such that there is a high probability of imminent or life threatening deterioration in the patient’s condition.      Keila Negron, DO    Intensive Care Medicine and Pulmonary Medicine

## 2023-03-21 NOTE — PLAN OF CARE
Goal Outcome Evaluation:  Plan of Care Reviewed With: spouse        Progress: improving  Outcome Evaluation: Patient remains sedated and on the ventilator with minimal settings, CT of head and neck negative for abscess, tongue swollen and protruding, large amount of PO secretions and secretions from ETT, hands edematous, IVFs D/C'd and lasix given x1 for fluid overload (14L positive), no BM, restraints continued.

## 2023-03-22 ENCOUNTER — APPOINTMENT (OUTPATIENT)
Dept: GENERAL RADIOLOGY | Facility: HOSPITAL | Age: 68
DRG: 4 | End: 2023-03-22
Payer: MEDICARE

## 2023-03-22 LAB
ANION GAP SERPL CALCULATED.3IONS-SCNC: 8 MMOL/L (ref 5–15)
BASOPHILS # BLD AUTO: 0.07 10*3/MM3 (ref 0–0.2)
BASOPHILS NFR BLD AUTO: 1.6 % (ref 0–1.5)
BUN SERPL-MCNC: 10 MG/DL (ref 8–23)
BUN/CREAT SERPL: 18.5 (ref 7–25)
CALCIUM SPEC-SCNC: 8.5 MG/DL (ref 8.6–10.5)
CHLORIDE SERPL-SCNC: 105 MMOL/L (ref 98–107)
CO2 SERPL-SCNC: 26 MMOL/L (ref 22–29)
CREAT SERPL-MCNC: 0.54 MG/DL (ref 0.76–1.27)
DEPRECATED RDW RBC AUTO: 51.3 FL (ref 37–54)
EGFRCR SERPLBLD CKD-EPI 2021: 109.9 ML/MIN/1.73
EOSINOPHIL # BLD AUTO: 0.16 10*3/MM3 (ref 0–0.4)
EOSINOPHIL NFR BLD AUTO: 3.6 % (ref 0.3–6.2)
ERYTHROCYTE [DISTWIDTH] IN BLOOD BY AUTOMATED COUNT: 13.6 % (ref 12.3–15.4)
GLUCOSE BLDC GLUCOMTR-MCNC: 105 MG/DL (ref 70–130)
GLUCOSE BLDC GLUCOMTR-MCNC: 120 MG/DL (ref 70–130)
GLUCOSE BLDC GLUCOMTR-MCNC: 131 MG/DL (ref 70–130)
GLUCOSE BLDC GLUCOMTR-MCNC: 156 MG/DL (ref 70–130)
GLUCOSE SERPL-MCNC: 126 MG/DL (ref 65–99)
HCT VFR BLD AUTO: 41.9 % (ref 37.5–51)
HGB BLD-MCNC: 13.2 G/DL (ref 13–17.7)
IMM GRANULOCYTES # BLD AUTO: 0.21 10*3/MM3 (ref 0–0.05)
IMM GRANULOCYTES NFR BLD AUTO: 4.7 % (ref 0–0.5)
LYMPHOCYTES # BLD AUTO: 0.97 10*3/MM3 (ref 0.7–3.1)
LYMPHOCYTES NFR BLD AUTO: 21.9 % (ref 19.6–45.3)
MAGNESIUM SERPL-MCNC: 1.7 MG/DL (ref 1.6–2.4)
MCH RBC QN AUTO: 32 PG (ref 26.6–33)
MCHC RBC AUTO-ENTMCNC: 31.5 G/DL (ref 31.5–35.7)
MCV RBC AUTO: 101.5 FL (ref 79–97)
MONOCYTES # BLD AUTO: 0.67 10*3/MM3 (ref 0.1–0.9)
MONOCYTES NFR BLD AUTO: 15.1 % (ref 5–12)
NEUTROPHILS NFR BLD AUTO: 2.35 10*3/MM3 (ref 1.7–7)
NEUTROPHILS NFR BLD AUTO: 53.1 % (ref 42.7–76)
NRBC BLD AUTO-RTO: 0 /100 WBC (ref 0–0.2)
PHOSPHATE SERPL-MCNC: 2.7 MG/DL (ref 2.5–4.5)
PLATELET # BLD AUTO: 170 10*3/MM3 (ref 140–450)
PMV BLD AUTO: 9.2 FL (ref 6–12)
POTASSIUM SERPL-SCNC: 4.1 MMOL/L (ref 3.5–5.2)
RBC # BLD AUTO: 4.13 10*6/MM3 (ref 4.14–5.8)
SODIUM SERPL-SCNC: 139 MMOL/L (ref 136–145)
WBC NRBC COR # BLD: 4.43 10*3/MM3 (ref 3.4–10.8)

## 2023-03-22 PROCEDURE — 25010000002 HEPARIN (PORCINE) PER 1000 UNITS: Performed by: INTERNAL MEDICINE

## 2023-03-22 PROCEDURE — 99291 CRITICAL CARE FIRST HOUR: CPT | Performed by: INTERNAL MEDICINE

## 2023-03-22 PROCEDURE — 0 MIDAZOLAM 1 MG/ML 100ML NS 100 MG/100ML SOLUTION: Performed by: INTERNAL MEDICINE

## 2023-03-22 PROCEDURE — 80048 BASIC METABOLIC PNL TOTAL CA: CPT | Performed by: INTERNAL MEDICINE

## 2023-03-22 PROCEDURE — 94761 N-INVAS EAR/PLS OXIMETRY MLT: CPT

## 2023-03-22 PROCEDURE — 83735 ASSAY OF MAGNESIUM: CPT | Performed by: INTERNAL MEDICINE

## 2023-03-22 PROCEDURE — 85025 COMPLETE CBC W/AUTO DIFF WBC: CPT | Performed by: INTERNAL MEDICINE

## 2023-03-22 PROCEDURE — 0 MAGNESIUM SULFATE 4 GM/100ML SOLUTION: Performed by: INTERNAL MEDICINE

## 2023-03-22 PROCEDURE — 71045 X-RAY EXAM CHEST 1 VIEW: CPT

## 2023-03-22 PROCEDURE — 25010000002 PROPOFOL 10 MG/ML EMULSION: Performed by: STUDENT IN AN ORGANIZED HEALTH CARE EDUCATION/TRAINING PROGRAM

## 2023-03-22 PROCEDURE — 25010000002 FUROSEMIDE PER 20 MG: Performed by: INTERNAL MEDICINE

## 2023-03-22 PROCEDURE — 94799 UNLISTED PULMONARY SVC/PX: CPT

## 2023-03-22 PROCEDURE — 82962 GLUCOSE BLOOD TEST: CPT

## 2023-03-22 PROCEDURE — 94003 VENT MGMT INPAT SUBQ DAY: CPT

## 2023-03-22 PROCEDURE — 84100 ASSAY OF PHOSPHORUS: CPT | Performed by: INTERNAL MEDICINE

## 2023-03-22 PROCEDURE — 25010000002 PENICILLIN G POTASSIUM PER 600000 UNITS: Performed by: INTERNAL MEDICINE

## 2023-03-22 PROCEDURE — 25010000002 FENTANYL 10 MCG/1 ML NS: Performed by: NURSE PRACTITIONER

## 2023-03-22 RX ORDER — MAGNESIUM SULFATE HEPTAHYDRATE 40 MG/ML
4 INJECTION, SOLUTION INTRAVENOUS ONCE
Status: COMPLETED | OUTPATIENT
Start: 2023-03-22 | End: 2023-03-22

## 2023-03-22 RX ORDER — FUROSEMIDE 10 MG/ML
40 INJECTION INTRAMUSCULAR; INTRAVENOUS ONCE
Status: COMPLETED | OUTPATIENT
Start: 2023-03-22 | End: 2023-03-22

## 2023-03-22 RX ADMIN — SENNOSIDES AND DOCUSATE SODIUM 2 TABLET: 8.6; 5 TABLET ORAL at 08:46

## 2023-03-22 RX ADMIN — PROPOFOL 10 MCG/KG/MIN: 10 INJECTION, EMULSION INTRAVENOUS at 15:12

## 2023-03-22 RX ADMIN — HEPARIN SODIUM 5000 UNITS: 5000 INJECTION, SOLUTION INTRAVENOUS; SUBCUTANEOUS at 22:26

## 2023-03-22 RX ADMIN — METRONIDAZOLE 500 MG: 500 INJECTION, SOLUTION INTRAVENOUS at 08:46

## 2023-03-22 RX ADMIN — METRONIDAZOLE 500 MG: 500 INJECTION, SOLUTION INTRAVENOUS at 20:14

## 2023-03-22 RX ADMIN — CHLORHEXIDINE GLUCONATE 0.12% ORAL RINSE 15 ML: 1.2 LIQUID ORAL at 08:46

## 2023-03-22 RX ADMIN — Medication 30 ML: at 08:46

## 2023-03-22 RX ADMIN — SENNOSIDES AND DOCUSATE SODIUM 2 TABLET: 8.6; 5 TABLET ORAL at 20:14

## 2023-03-22 RX ADMIN — Medication 10 ML: at 20:15

## 2023-03-22 RX ADMIN — SODIUM CHLORIDE 12 MILLION UNITS: 9 INJECTION, SOLUTION INTRAVENOUS at 05:45

## 2023-03-22 RX ADMIN — HEPARIN SODIUM 5000 UNITS: 5000 INJECTION, SOLUTION INTRAVENOUS; SUBCUTANEOUS at 05:45

## 2023-03-22 RX ADMIN — PROPOFOL 10 MCG/KG/MIN: 10 INJECTION, EMULSION INTRAVENOUS at 00:34

## 2023-03-22 RX ADMIN — Medication 300 MCG/HR: at 10:02

## 2023-03-22 RX ADMIN — CHLORHEXIDINE GLUCONATE 0.12% ORAL RINSE 15 ML: 1.2 LIQUID ORAL at 20:14

## 2023-03-22 RX ADMIN — FUROSEMIDE 40 MG: 10 INJECTION, SOLUTION INTRAMUSCULAR; INTRAVENOUS at 09:13

## 2023-03-22 RX ADMIN — MAGNESIUM SULFATE HEPTAHYDRATE 4 G: 40 INJECTION, SOLUTION INTRAVENOUS at 06:42

## 2023-03-22 RX ADMIN — MIDAZOLAM HYDROCHLORIDE 7 MG/HR: 1 INJECTION, SOLUTION INTRAVENOUS at 10:01

## 2023-03-22 RX ADMIN — PANTOPRAZOLE SODIUM 40 MG: 40 INJECTION, POWDER, LYOPHILIZED, FOR SOLUTION INTRAVENOUS at 05:44

## 2023-03-22 RX ADMIN — Medication 300 MCG/HR: at 02:11

## 2023-03-22 RX ADMIN — SODIUM CHLORIDE 12 MILLION UNITS: 9 INJECTION, SOLUTION INTRAVENOUS at 19:35

## 2023-03-22 RX ADMIN — METRONIDAZOLE 500 MG: 500 INJECTION, SOLUTION INTRAVENOUS at 15:12

## 2023-03-22 RX ADMIN — HEPARIN SODIUM 5000 UNITS: 5000 INJECTION, SOLUTION INTRAVENOUS; SUBCUTANEOUS at 15:12

## 2023-03-22 RX ADMIN — Medication 10 ML: at 08:46

## 2023-03-22 RX ADMIN — LATANOPROST 1 DROP: 50 SOLUTION OPHTHALMIC at 08:47

## 2023-03-22 RX ADMIN — POLYETHYLENE GLYCOL 3350 17 G: 17 POWDER, FOR SOLUTION ORAL at 15:12

## 2023-03-22 RX ADMIN — METRONIDAZOLE 500 MG: 500 INJECTION, SOLUTION INTRAVENOUS at 03:50

## 2023-03-22 RX ADMIN — Medication 300 MCG/HR: at 18:19

## 2023-03-22 NOTE — CASE MANAGEMENT/SOCIAL WORK
Continued Stay Note  [facility]     Patient Name: Sandro Junior  MRN: [id number]  Today's Date: 3/22/2023    Admit Date: 3/16/2023    Plan: TBD   Discharge Plan     Row Name 03/22/23 1404       Plan    Plan TBD    Plan Comments Patient sedated on vent.  MD met with family for Plan of care.  Discharge plan at this time to be determined.  CM will continue to follow.               Discharge Codes    No documentation.               Expected Discharge Date and Time     Expected Discharge Date Expected Discharge Time    Mar 24, 2023             Malaika Olivo RN

## 2023-03-22 NOTE — PLAN OF CARE
Goal Outcome Evaluation:  Plan of Care Reviewed With: patient, spouse        Progress: no change    Pt remains on fentanyl,versed and propofol. Remains intubated, lasix x 1 administered, UOP 2.8 L, tolerating TF, senna and prn miralax administered, checked for impaction, tolerating TF, general surgery consulted, trach and peg in am, hold TF at midnight, spouse at bedside and updated, BUE restraint remains, mag replaced.

## 2023-03-22 NOTE — PROGRESS NOTES
Clinical Nutrition     Nutrition Support Assessment  Reason for Visit: MDR, Follow-up protocol, EN      Patient Name: Sandro Junior  YOB: 1955  MRN: [id number]  Date of Encounter: 03/22/23 13:38 EDT  Admission date: 3/16/2023    Nutrition Assessment     Problem List:    Acute epiglottitis with airway obstruction - probable    Essential hypertension    Acute respiratory failure (HCC)    ARF/Intubated 3-16-23      PMH:   He  has a past medical history of Epidermal inclusion cyst, Esophageal candidiasis (HCC), Keloid scar, Rectal cancer (HCC), and Seborrheic dermatitis.    PSH:  He  has a past surgical history that includes Knee surgery; Colectomy partial / total; Total knee arthroplasty (Right, 04/11/2016); and Tooth extraction (2016).      Applicable Nutrition Concerns:   Skin:wnl  GI:abdomen round, nondistended, no bm      Applicable Interval History:   (3/16) intubated, large bore NG tube placed  (3/17) EN initiated  (3/20) s/p bronchoscopy - airway edema      Reported/Observed/Food/Nutrition Related History:     Pt intubated, sedated + fentanyl, versed, propofol 4.5ml    Per RN: pt tolerating TF    Labs    Labs Reviewed: Yes     Results from last 7 days   Lab Units 03/22/23  0413 03/21/23  1648 03/21/23  0502 03/20/23  1438 03/20/23  0545 03/18/23  1801 03/18/23  0412   GLUCOSE mg/dL 126*  --  114*  --  124*  124*   < > 110*   BUN mg/dL 10  --  7*  --  7*  7*   < > 12   CREATININE mg/dL 0.54*  --  0.66*  --  0.54*  0.54*   < > 0.69*   SODIUM mmol/L 139  --  137  --  138  138   < > 137   CHLORIDE mmol/L 105  --  107  --  110*  110*   < > 108*   POTASSIUM mmol/L 4.1  --  4.0  --  4.0  4.0   < > 4.5   PHOSPHORUS mg/dL 2.7 2.6 2.1*   < > 2.0*  2.0*   < > 1.6*   MAGNESIUM mg/dL 1.7  --  1.7  --  1.9  1.9   < > 3.0*   ALT (SGPT) U/L  --   --  14  --  8  --  13    < > = values in this interval not displayed.       Results from last 7 days   Lab Units 03/21/23  1740  03/20/23  0545 03/18/23  0412 03/17/23  0350 03/16/23 2023   ALBUMIN g/dL 2.2* 2.5* 3.2*   < > 4.3   CRP mg/dL  --   --   --   --  2.26*   CHOLESTEROL mg/dL  --  112  --   --   --    TRIGLYCERIDES mg/dL  --  144  --   --   --     < > = values in this interval not displayed.       Results from last 7 days   Lab Units 03/22/23  1205 03/22/23  0511 03/21/23  2345 03/21/23  1823 03/21/23  1229 03/21/23  1141   GLUCOSE mg/dL 120 105 102 85 140* 62*     Lab Results   Lab Value Date/Time    HGBA1C 5.2 10/20/2020 0841         Results from last 7 days   Lab Units 03/16/23 2023   PROBNP pg/mL 165.7         Medications    Medications Reviewed: Yes  Abx, lasix, heparin, flagyl, protonix, bowel regimen  Infusion: versed, propofol, fentanyl    Intake/Ouptut 24 hrs (0701 - 0700)   I&O's Reviewed: Yes   0847/[id number]    Anthropometrics       Height: 73in  Weight: 171lb bedscale  BMI: 22.6  BMI classification: Normal: 18.5-24.9kg/m2      UBW:  Last 15 Recorded Weights  View Complete Flowsheet  Weight Weight (kg) Weight (lbs) Weight Method VISIT REPORT   3/17/2023 77.9 kg 171 lb 11.8 oz - -   3/16/2023 74.5 kg 164 lb 3.9 oz Bed scale -   3/16/2023 79.379 kg 175 lb Estimated -   4/5/2022 80.559 kg 177 lb 9.6 oz - Report   1/10/2022 80.196 kg 176 lb 12.8 oz - Report   12/9/2021 79.652 kg 175 lb 9.6 oz - Report   11/13/2021 84.823 kg 187 lb Stated -   3/22/2021 83.008 kg 183 lb - Report   11/19/2020 83.643 kg 184 lb 6.4 oz - Report   10/20/2020 82.918 kg 182 lb 12.8 oz - Report   9/21/2020 84.188 kg 185 lb 9.6 oz - Report   3/16/2020 85.639 kg 188 lb 12.8 oz - Report   2/7/2020 84.823 kg 187 lb - Report   7/16/2019 81.738 kg 180 lb 3.2 oz - Report   1/15/2019 83.915 kg 185 lb - Report       Needs Assessment   Date: 3/22    Height used:73in  Weights used:171lb    Estimated Calorie needs: ~1800 calories   Method:  PSU:1773kcal  Method: MSJ x 1,2: 1937kcal  Method: 25 Kcals/KG actual wt = 1943kcal    Estimated Protein needs: ~117 g protein  daily  Method: 1.2-1.5 g/Kg actual wt =       Current Nutrition Prescription     PO: NPO Diet NPO Type: Strict NPO    EN: Peptamen AF @60ml/hr, Prosource 1x/day, free water 30ml Q 2 hours    Route: ngt    EN Intake: 1416ml, 1759kcal,98% kcal needs,  123g protein, 105% protein needs,  8g fiber, 1536ml free water including flush    Propofol intake: 305ml, 336kcal    Total intake: 2095kcal, 123g protein    TF at goal volume will provide 1200ml, 1500kcal, 83% kcal needs, 106g protein, 91% protein needs,  7g protein, 1332ml free water including flush    Nutrition Diagnosis     Date: 3/17 Updated: 3-22  Problem Inadequate energy intake   Etiology Mechanical ventilation   Signs/Symptoms NPO   Status: - EN initiated (3/17)      Goal:   General: Nutrition support treatment    EN/PN: Adjust EN    Nutrition Intervention      Follow treatment progress, Care plan reviewed, Nutrition support order placed    As pt on lower dose of propofol now, will increase TF to goal rate @70ml/hr, maintain Prosource 1x/.day, free water 30ml Q 2 hours    TF at goal volume will provide 1400ml, 1740kcal, 97% kcal needs,  121g protein, 103% protein needs, 8g fiber, 1494ml free water including flush    Propofol @4.5ml will provide an additional  119kcal    Monitoring/Evaluation:   Per protocol, I&O, Pertinent labs, EN delivery/tolerance, Weight, GI status, Symptoms, Swallow function, Hemodynamic stability      Yessy Duncan, JAIRO  Time Spent: 60 min

## 2023-03-22 NOTE — CASE MANAGEMENT/SOCIAL WORK
Continued Stay Note  [facility]     Patient Name: Sandro Junior  MRN: [id number]  Today's Date: 3/22/2023    Admit Date: 3/16/2023    Plan: Meal card   Discharge Plan     Row Name 03/22/23 1417       Plan    Plan Meal card    Plan Comments MSW contacted reporting Pt’s family in need to discount meal card. MSW delivery meal cards to room. MSW available.    Final Discharge Disposition Code 30 - still a patient    Row Name 03/22/23 1404                                Discharge Codes    No documentation.               Expected Discharge Date and Time     Expected Discharge Date Expected Discharge Time    Mar 24, 2023             PHILLIP Haq

## 2023-03-22 NOTE — PROGRESS NOTES
1       Sandro Junior  1955  [id number]  3/22/2023    CC: Adult epiglottitis    Sandro Junior is a 68 y.o. male here for leukocytosis, airway compromise, and severe sepsis with group A beta-hemolytic streptococcal bacteremia.      Past medical history:  Past Medical History:   Diagnosis Date   • Epidermal inclusion cyst    • Esophageal candidiasis (HCC)    • Keloid scar    • Rectal cancer (HCC)    • Seborrheic dermatitis        Medications:   Current Facility-Administered Medications:   •  acetaminophen (TYLENOL) 160 MG/5ML solution 650 mg, 650 mg, Nasogastric, Q6H PRN, Case, Keila V., DO  •  [DISCONTINUED] acetaminophen (TYLENOL) tablet 650 mg, 650 mg, Nasogastric, Q4H PRN, 650 mg at 03/21/23 0253 **OR** acetaminophen (TYLENOL) suppository 650 mg, 650 mg, Rectal, Q4H PRN, Case, Keila V., DO  •  sennosides-docusate (PERICOLACE) 8.6-50 MG per tablet 2 tablet, 2 tablet, Nasogastric, BID, 2 tablet at 03/21/23 2015 **AND** polyethylene glycol (MIRALAX) packet 17 g, 17 g, Nasogastric, Daily PRN **AND** [DISCONTINUED] bisacodyl (DULCOLAX) EC tablet 5 mg, 5 mg, Oral, Daily PRN **AND** bisacodyl (DULCOLAX) suppository 10 mg, 10 mg, Rectal, Daily PRN, Case, Keila V., DO  •  chlorhexidine (PERIDEX) 0.12 % solution 15 mL, 15 mL, Mouth/Throat, Q12H, Vikram Zamora MD, 15 mL at 03/21/23 2015  •  dextrose (D50W) (25 g/50 mL) IV injection 25 g, 25 g, Intravenous, Q15 Min PRN, Ana Ruiz APRN, 25 g at 03/21/23 1153  •  dextrose (GLUTOSE) oral gel 15 g, 15 g, Oral, Q15 Min PRN, Ana Ruiz, APRN  •  fentaNYL 2500 mcg/250 mL NS infusion,  mcg/hr, Intravenous, Titrated, Young, Guerda L, APRN, Last Rate: 30 mL/hr at 03/22/23 0211, 300 mcg/hr at 03/22/23 0211  •  glucagon (GLUCAGEN) injection 1 mg, 1 mg, Intramuscular, Q15 Min PRN, Ana Ruiz, DENIS  •  heparin (porcine) 5000 UNIT/ML injection 5,000 Units, 5,000 Units, Subcutaneous, Q8H, Vikram Zamora MD, 5,000 Units at  03/22/23 0545  •  hydrALAZINE (APRESOLINE) injection 20 mg, 20 mg, Intravenous, Q4H PRN, Gabriela Florez, PRASHANTH, APRN  •  latanoprost (XALATAN) 0.005 % ophthalmic solution 1 drop, 1 drop, Both Eyes, Daily, Vikram Zamora MD, 1 drop at 03/21/23 0855  •  Magnesium Standard Dose Replacement - Initiate Nurse / BPA Driven Protocol, , Does not apply, PRN, Guerda Freire, APRN  •  magnesium sulfate 4g/100mL (PREMIX) infusion, 4 g, Intravenous, Once, Case, Keila V., DO, 4 g at 03/22/23 0642  •  metroNIDAZOLE (FLAGYL) IVPB 500 mg, 500 mg, Intravenous, Q6H, Zana Johnson MD, 500 mg at 03/22/23 0350  •  midazolam (VERSED) 100 mg in 100mL NS infusion, 1-10 mg/hr, Intravenous, Titrated, Case, Keila V., DO, Last Rate: 7 mL/hr at 03/21/23 2016, 7 mg/hr at 03/21/23 2016  •  midazolam (VERSED) bolus from bag 1 mg/mL 1 mg, 1 mg, Intravenous, Q30 Min PRN, Case, Keila V., DO  •  midazolam (VERSED) injection 2 mg, 2 mg, Intravenous, Q2H PRN, Case, Keila V., DO, 2 mg at 03/20/23 1023  •  pantoprazole (PROTONIX) injection 40 mg, 40 mg, Intravenous, Q AM, Negar Amezcua, APRN, 40 mg at 03/22/23 0544  •  penicillin G potassium 12 Million Units in sodium chloride 0.9 % 250 mL IVPB, 12 Million Units, Intravenous, Q12H, Zana Johnson MD, Last Rate: 20.8 mL/hr at 03/22/23 0545, 12 Million Units at 03/22/23 0545  •  phenylephrine (REYNA-SYNEPHRINE) 50 mg in sodium chloride 0.9 % 250 mL infusion, 0.5-3 mcg/kg/min, Intravenous, Titrated, Anand Frank, DO, Stopped at 03/18/23 1635  •  Phosphorus Replacement - Initiate Nurse / BPA Driven Protocol, , Does not apply, ARIANNA, Guerda Freire, DENIS  •  Potassium Replacement - Initiate Nurse / BPA Driven Protocol, , Does not apply, ARIANNA, Guerda Freire, DENIS  •  propofol (DIPRIVAN) infusion 10 mg/mL 100 mL, 5-50 mcg/kg/min, Intravenous, Titrated, Anand Eaton MD, Last Rate: 4.76 mL/hr at 03/22/23 0034, 10 mcg/kg/min at 03/22/23 0034  •  ProSource No Carb oral  "solution 30 mL, 30 mL, Nasogastric, Daily, Satterly, Beatrice FARIAS, RD, 30 mL at 03/21/23 0855  •  sodium chloride 0.9 % flush 10 mL, 10 mL, Intravenous, PRN, Emergency, Triage Protocol, MD  •  sodium chloride 0.9 % flush 10 mL, 10 mL, Intravenous, Q12H, Case, Keila V., DO, 10 mL at 03/21/23 2016  •  sodium chloride 0.9 % flush 10 mL, 10 mL, Intravenous, PRN, Case, Keila V., DO  •  sodium chloride 0.9 % flush 20 mL, 20 mL, Intravenous, PRN, Case, Keila V., DO  Antibiotics:  Anti-Infectives (From admission, onward)    Ordered     Dose/Rate Route Frequency Start Stop    03/20/23 1440  penicillin G potassium 12 Million Units in sodium chloride 0.9 % 250 mL IVPB        Ordering Provider: Zana Johnson MD    12 Million Units  20.8 mL/hr over 12 Hours Intravenous Every 12 Hours 03/20/23 1800 03/26/23 1759    03/20/23 1241  metroNIDAZOLE (FLAGYL) IVPB 500 mg        Ordering Provider: Zana Johnson MD    500 mg  over 30 Minutes Intravenous Every 6 Hours 03/20/23 1400 03/25/23 1359    03/16/23 2151  vancomycin 1500 mg/500 mL 0.9% NS IVPB ([facility])        Ordering Provider: Anand Eaton MD    20 mg/kg × 79.4 kg Intravenous Once 03/16/23 2153 03/16/23 2323          Allergies:  has No Known Allergies.    Review of Systems: All other reviewed and negative except as per HPI    Blood pressure 159/90, pulse 61, temperature 99.5 °F (37.5 °C), temperature source Bladder, resp. rate 16, height 185.4 cm (72.99\"), weight 77.9 kg (171 lb 11.8 oz), SpO2 96 %.  GENERAL: Awake and alert, in no acute distress.   HEENT: Oropharynx without thrush. Sinuses nontender. Dentition in good repair. No cervical adenopathy. No carotid bruits/ jugular venous distention.   EYES: PERRL. No conjunctival injection. No icterus. EOMI.  LYMPHATICS: No lymphadenopathy of the neck or axillary or inguinal regions.   HEART: No murmur, gallop, or pericardial friction rub.   LUNGS: Clear to auscultation anteriorly. No percussion dullness. "   ABDOMEN: Soft, nontender, nondistended. No appreciable HSM.    SKIN: Warm and dry without cutaneous eruptions. No embolic stigmata.   PSYCHIATRIC: Mental status lucid. Cranial nerve function intact.       DIAGNOSTICS:  Lab Results   Component Value Date    WBC 4.43 03/22/2023    HGB 13.2 03/22/2023    HCT 41.9 03/22/2023     03/22/2023     Lab Results   Component Value Date    CRP 2.26 (H) 03/16/2023     Lab Results   Component Value Date    SEDRATE 9 12/03/2018     Lab Results   Component Value Date    GLUCOSE 126 (H) 03/22/2023    BUN 10 03/22/2023    CREATININE 0.54 (L) 03/22/2023    EGFRIFAFRI 109 01/10/2022    BCR 18.5 03/22/2023    CO2 26.0 03/22/2023    CALCIUM 8.5 (L) 03/22/2023    ALBUMIN 2.2 (L) 03/21/2023    AST 22 03/21/2023    ALT 14 03/21/2023       Microbiology: 1 of 2 admission blood culture bottles with group A streptococci.  Bio fire confirmation.  Favorable penicillin and ceftriaxone EDNA's.  Bio fire viral pulmonary pathogen's panel unremarkable.    RADIOLOGY:  Imaging Results (Last 72 Hours)     Procedure Component Value Units Date/Time    XR Chest 1 View [[id number]] Resulted: 03/22/23 0408     Updated: 03/22/23 0408    CT Soft Tissue Neck With & Without Contrast [[id number]] Collected: 03/21/23 1423     Updated: 03/21/23 1433    Narrative:      CT SOFT TISSUE NECK W WO CONTRAST    Date of Exam: 3/21/2023 1:38 PM EDT    Indication: Epiglottitis or tonsillitis suspected  Soft tissue swelling, infection suspected, neck xray done.    Comparison: 3/16/2023 CT neck and chest    Technique: Axial CT images were obtained of the neck before and after the uneventful intravenous administration of 75 mL Isovue-300.  Reconstructed coronal and sagittal images were also obtained. Automated exposure control and iterative construction   methods were used.    Findings:  Limited intracranial evaluation demonstrates no acute findings. The orbits appear normal. There is some mucosal thickening of the nasal  passages and ethmoid air cells, with trace layering fluid seen in the frontal sinuses bilaterally, nonspecific in this   intubated patient. The lung apices demonstrate moderate large bilateral pleural effusions, with some adjacent mild volume loss noted. Vascular structures appear grossly patent. The parotid and submandibular glands are normal in appearance bilaterally.   Nonspecific, mildly prominent but nonenlarged cervical lymph nodes are noted, potentially reactive. Evaluation of the aerodigestive tract structures as significantly limited by intubation. There appears to be diffuse edema of the oral tongue,   nonspecific. There is also mild circumferential edema of the nasopharyngeal and oropharyngeal soft tissues. No definite focal fluid collection is present concerning for abscess. The thyroid appears homogeneous. The osseous structures demonstrate   spondylosis changes, otherwise without evidence of fracture or aggressive osseous lesion.      Impression:      Impression:  Circumferential and somewhat diffuse edema of the oral tongue, nasopharynx and oropharynx is present, entirely nonspecific in this intubated patient, with adjacent numerous small cervical lymph nodes present, potentially reactive in the setting of   suspected infection. There is no definite focal fluid collection present concerning for abscess.    Incidentally noted large bilateral pleural effusions are seen at the lung apices, with some mild adjacent atelectasis. These findings were not apparent on CT of the chest performed 3/16/2023.    Electronically Signed: Jason Carlin    3/21/2023 2:30 PM EDT    Workstation ID: [id number]          Assessment and Plan: Adult epiglottitis.  Airway compromise.  Severe sepsis with group A beta-hemolytic streptococci in blood cultures.  Leukocytosis.  Lactic acidosis.  Macrocytic anemia.  Hypophosphatemia.  Persistent fever.  Maximum temperature over 24 hours 99.9.  Currently 99.5.  Pulse 61, respiratory 16,  blood pressure 159/90 mmHg with an O2 saturation of 96%/mechanically ventilated.  BUN/creatinine 10/0.54.  Peripheral leukocyte count 4.4 with 53% segmented neutrophils and 5 band forms.  Phosphate up to 2.7.  No new culture data.  Follow-up CT scan of the soft tissues of the neck yesterday in light of persistent hectic fever to rule out retropharyngeal abscess revealing circumferential and diffuse edema at the base of the tongue and oropharynx/reviewed.  No definitive rim-enhancing abscess.  New development of large volume pleural effusions which dissect to the lung apices and were not present on a previous study of 3/16 (not particularly evident on plain films of the chest today/reviewed).  Continuous infusion penicillin and IV metronidazole ongoing.  The last 24 hours are the first time during the patient's hospitalization when he has not had an episode of hectic fever.  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  Remains critically ill.  Guarded prognosis.  Persistent fever may simply reflect extensive soft tissue inflammatory response in the retropharyngeal soft tissues, uvula, and epiglottis.  40 minutes spent in data review, radiographic assessment, medical decision making, bedside examination, and discussion with adult daughter.  I had spoken with Dr. Negron of pulmonary/critical care medicine at length yesterday afternoon.      Zana Johnson MD  3/22/2023

## 2023-03-22 NOTE — CONSULTS
General Surgery Consultation Note    Date of Service: 3/22/2023  Sandro Junior  [id number]  1955      Referring Provider: Keila Negron DO    Location of Consult: Intensive care unit     Reason for Consultation: Request for tracheostomy and PEG placement       History of Present Illness:  I am seeing, Sandro Junior, in consultation at request of Keila Negron DO regarding request for tracheostomy and PEG placement.  He is a 68-year-old gentleman with history of rectal cancer status post resection who was admitted to [facility] on March 16 after presenting to our emergency department with concern for epiglottitis and airway edema.  He required intubation for respiratory failure shortly after arrival.  He has been treated in the intensive care unit and followed by the intensivist team as well as infectious disease.  He is being treated for a strep bacteremia.  Bronchoscopy on March 20 demonstrated significant airway edema.  He has not required significant support from a ventilatory standpoint, however has required continued intubation to maintain a safe and patent airway given his upper airway and oropharynx edema.  I was therefore consulted for evaluation for tracheostomy placement.  History is obtained from chart review and discussion with other providers as well as discussion with his wife on the telephone.  They report that he had some sort of colon surgery in the past.  They are unaware of any prior head or neck surgery.  I have reviewed Dr. Acharya's prior EGD reports that described benign-appearing esophageal stenosis that required dilation as well as a history of rectal cancer    Problems Addressed this Visit        Cardiac and Vasculature    Essential hypertension       ENT    * (Principal) Acute epiglottitis with airway obstruction - probable    Relevant Orders    Bronchoscopy (Completed)   Other Visit Diagnoses     Respiratory distress    -  Primary    Tachycardia        Airway  compromise          Diagnoses       Codes Comments    Respiratory distress    -  Primary ICD-10-CM: R06.03  ICD-9-CM: 786.09     Tachycardia     ICD-10-CM: R00.0  ICD-9-CM: 785.0     Airway compromise     ICD-10-CM: J98.8  ICD-9-CM: 519.8     Acute epiglottitis with airway obstruction - probable     ICD-10-CM: J05.11  ICD-9-CM: 464.31     Essential hypertension     ICD-10-CM: I10  ICD-9-CM: 401.9           PMHx:  Past Medical History:   Diagnosis Date   • Epidermal inclusion cyst    • Esophageal candidiasis (HCC)    • Keloid scar    • Rectal cancer (HCC)    • Seborrheic dermatitis        Past Surgical History:  Past Surgical History:   • COLECTOMY PARTIAL / TOTAL   • KNEE SURGERY    Left knee meniscal tear repair   • TOOTH EXTRACTION    7 teeth   • TOTAL KNEE ARTHROPLASTY       Allergies:  No Known Allergies    Medications:  No current facility-administered medications on file prior to encounter.     Current Outpatient Medications on File Prior to Encounter   Medication Sig Dispense Refill   • ketoconazole (NIZORAL) 2 % cream Apply  topically to the appropriate area as directed Daily. 30 g 4   • latanoprost (XALATAN) 0.005 % ophthalmic solution   0   • omeprazole (priLOSEC) 40 MG capsule TAKE 1 CAPSULE BY MOUTH TWICE A  capsule 1   • sildenafil (VIAGRA) 50 MG tablet Take 1 tablet by mouth Daily As Needed for Erectile Dysfunction. 30 tablet 1   • valsartan (DIOVAN) 80 MG tablet TAKE 1 TABLET BY MOUTH EVERY DAY 90 tablet 1         Current Facility-Administered Medications:   •  acetaminophen (TYLENOL) 160 MG/5ML solution 650 mg, 650 mg, Nasogastric, Q6H PRN, Case, Keila V., DO  •  [DISCONTINUED] acetaminophen (TYLENOL) tablet 650 mg, 650 mg, Nasogastric, Q4H PRN, 650 mg at 03/21/23 0253 **OR** acetaminophen (TYLENOL) suppository 650 mg, 650 mg, Rectal, Q4H PRN, Case, Keila V., DO  •  sennosides-docusate (PERICOLACE) 8.6-50 MG per tablet 2 tablet, 2 tablet, Nasogastric, BID, 2 tablet at 03/22/23 0846 **AND**  polyethylene glycol (MIRALAX) packet 17 g, 17 g, Nasogastric, Daily PRN **AND** [DISCONTINUED] bisacodyl (DULCOLAX) EC tablet 5 mg, 5 mg, Oral, Daily PRN **AND** bisacodyl (DULCOLAX) suppository 10 mg, 10 mg, Rectal, Daily PRN, Case, Keila V., DO  •  chlorhexidine (PERIDEX) 0.12 % solution 15 mL, 15 mL, Mouth/Throat, Q12H, Vikram Zamora MD, 15 mL at 03/22/23 0846  •  dextrose (D50W) (25 g/50 mL) IV injection 25 g, 25 g, Intravenous, Q15 Min PRN, Ana Ruiz APRN, 25 g at 03/21/23 1153  •  dextrose (GLUTOSE) oral gel 15 g, 15 g, Oral, Q15 Min PRN, Ana Ruiz APRN  •  fentaNYL 2500 mcg/250 mL NS infusion,  mcg/hr, Intravenous, Titrated, Guerda Freire APRN, Last Rate: 30 mL/hr at 03/22/23 1002, 300 mcg/hr at 03/22/23 1002  •  glucagon (GLUCAGEN) injection 1 mg, 1 mg, Intramuscular, Q15 Min PRN, Ana Ruiz APRN  •  heparin (porcine) 5000 UNIT/ML injection 5,000 Units, 5,000 Units, Subcutaneous, Q8H, Vikram Zamora MD, 5,000 Units at 03/22/23 0545  •  hydrALAZINE (APRESOLINE) injection 20 mg, 20 mg, Intravenous, Q4H PRN, Gabriela Florez DNP, APRN  •  latanoprost (XALATAN) 0.005 % ophthalmic solution 1 drop, 1 drop, Both Eyes, Daily, Vikram Zamora MD, 1 drop at 03/22/23 0847  •  Magnesium Standard Dose Replacement - Initiate Nurse / BPA Driven Protocol, , Does not apply, PRN, Guerda Freire APRN  •  metroNIDAZOLE (FLAGYL) IVPB 500 mg, 500 mg, Intravenous, Q6H, Zana Johnson MD, 500 mg at 03/22/23 0846  •  midazolam (VERSED) 100 mg in 100mL NS infusion, 1-10 mg/hr, Intravenous, Titrated, Case, Keila V., DO, Last Rate: 7 mL/hr at 03/22/23 1001, 7 mg/hr at 03/22/23 1001  •  midazolam (VERSED) bolus from bag 1 mg/mL 1 mg, 1 mg, Intravenous, Q30 Min PRN, Case, Keila V., DO  •  midazolam (VERSED) injection 2 mg, 2 mg, Intravenous, Q2H PRN, Case, Keila V., DO, 2 mg at 03/20/23 1023  •  pantoprazole (PROTONIX) injection 40 mg, 40 mg, Intravenous, Q  "AM, Negar Amezcua, APRLALO, 40 mg at 03/22/23 0544  •  penicillin G potassium 12 Million Units in sodium chloride 0.9 % 250 mL IVPB, 12 Million Units, Intravenous, Q12H, Zana Johnson MD, Last Rate: 20.8 mL/hr at 03/22/23 0545, 12 Million Units at 03/22/23 0545  •  phenylephrine (REYNA-SYNEPHRINE) 50 mg in sodium chloride 0.9 % 250 mL infusion, 0.5-3 mcg/kg/min, Intravenous, Titrated, Anand Frank, , Stopped at 03/18/23 1635  •  Phosphorus Replacement - Initiate Nurse / BPA Driven Protocol, , Does not apply, PRLALO, Guerda Freire APRN  •  Potassium Replacement - Initiate Nurse / BPA Driven Protocol, , Does not apply, ARIANNA, Guerda Freire APRN  •  propofol (DIPRIVAN) infusion 10 mg/mL 100 mL, 5-50 mcg/kg/min, Intravenous, Titrated, Anand Eaton MD, Last Rate: 4.76 mL/hr at 03/22/23 0034, 10 mcg/kg/min at 03/22/23 0034  •  ProSource No Carb oral solution 30 mL, 30 mL, Nasogastric, Daily, Satterly, Beatrice FARIAS, RD, 30 mL at 03/22/23 0846  •  sodium chloride 0.9 % flush 10 mL, 10 mL, Intravenous, PRN, Emergency, Triage Protocol, MD  •  sodium chloride 0.9 % flush 10 mL, 10 mL, Intravenous, Q12H, Case, Keila V., DO, 10 mL at 03/22/23 0846  •  sodium chloride 0.9 % flush 10 mL, 10 mL, Intravenous, PRN, Case, Keila V., DO  •  sodium chloride 0.9 % flush 20 mL, 20 mL, Intravenous, PRN, Case, Keila V., DO      Family History:  History reviewed. No pertinent family history.    Social History: Unable to obtain due to the patient's mental status      Review of Systems:   Unabls to obtain due to the patient's mental status    BP 90/60   Pulse 67   Temp 99 °F (37.2 °C) (Bladder)   Resp 16   Ht 185.4 cm (73\")   Wt 77.9 kg (171 lb 11.8 oz)   SpO2 94%   BMI 22.66 kg/m²   Body mass index is 22.66 kg/m².    Gen: Intubated, sedated, resting in bed, no obvious distress, critically ill  Head: Normocephalic, atraumatic.  There does appear to be some generalized edema within the mouth, lips, and upper " neck  Eyes: Pupils equal, round, react to light and accommodation.   Mouth: Oral mucosa without lesions, oral ET tube in place,  Neck: No masses, lymphadenopathy or carotid bruits bilaterally, no obvious scars, trachea is palpable in the midline  CV: Rhythm and rate regular, no murmurs, rubs or gallops  Lungs: Symmetric expansion, mechanically ventilated  Abdomen: Soft, no distress to palpation, lower midline scar, not distended  Groin : No obvious hernias bilaterally   Extremities:  No cyanosis, clubbing or edema bilaterally  Lymphatics: No abnormal lymphadenopathy appreciated   Neurologic: No gross deficits, otherwise assessment is limited by sedation        CBC  Results from last 7 days   Lab Units 03/22/23  0413   WBC 10*3/mm3 4.43   HEMOGLOBIN g/dL 13.2   HEMATOCRIT % 41.9   PLATELETS 10*3/mm3 170       CMP  Results from last 7 days   Lab Units 03/22/23 0413 03/21/23  0502   SODIUM mmol/L 139 137   POTASSIUM mmol/L 4.1 4.0   CHLORIDE mmol/L 105 107   CO2 mmol/L 26.0 24.0   BUN mg/dL 10 7*   CREATININE mg/dL 0.54* 0.66*   CALCIUM mg/dL 8.5* 7.7*   BILIRUBIN mg/dL  --  0.7   ALK PHOS U/L  --  68   ALT (SGPT) U/L  --  14   AST (SGOT) U/L  --  22   GLUCOSE mg/dL 126* 114*       Radiology  Imaging Results (Last 72 Hours)     Procedure Component Value Units Date/Time    XR Chest 1 View [[id number]] Collected: 03/22/23 0805     Updated: 03/22/23 0809    Narrative:      XR CHEST 1 VW    Date of Exam: 3/22/2023 4:07 AM EDT    Indication: respiratory failure, intubated.    Comparison: Portable chest radiograph 3/17/2023    Findings:  ET tube stable above the radha. Esophagogastric tube below the diaphragm coiled in the stomach with the tip overlying the gastric fundus. Placement of a right upper extremity PICC with the tip at the cavoatrial junction. No pneumothorax. Increasing   bibasilar airspace disease likely atelectasis with small layering pleural effusions.      Impression:      Impression:  1. Placement of a  right upper extremity PICC with the tip at the cavoatrial junction. No pneumothorax.  2. Stable ET tube and esophagogastric tube.  3. Increased bibasilar airspace disease likely atelectasis with small layering bilateral pleural effusions.    Electronically Signed: Dhaval Apolonia    3/22/2023 8:06 AM EDT    Workstation ID: [id number]    CT Soft Tissue Neck With & Without Contrast [[id number]] Collected: 03/21/23 1423     Updated: 03/21/23 1433    Narrative:      CT SOFT TISSUE NECK W WO CONTRAST    Date of Exam: 3/21/2023 1:38 PM EDT    Indication: Epiglottitis or tonsillitis suspected  Soft tissue swelling, infection suspected, neck xray done.    Comparison: 3/16/2023 CT neck and chest    Technique: Axial CT images were obtained of the neck before and after the uneventful intravenous administration of 75 mL Isovue-300.  Reconstructed coronal and sagittal images were also obtained. Automated exposure control and iterative construction   methods were used.    Findings:  Limited intracranial evaluation demonstrates no acute findings. The orbits appear normal. There is some mucosal thickening of the nasal passages and ethmoid air cells, with trace layering fluid seen in the frontal sinuses bilaterally, nonspecific in this   intubated patient. The lung apices demonstrate moderate large bilateral pleural effusions, with some adjacent mild volume loss noted. Vascular structures appear grossly patent. The parotid and submandibular glands are normal in appearance bilaterally.   Nonspecific, mildly prominent but nonenlarged cervical lymph nodes are noted, potentially reactive. Evaluation of the aerodigestive tract structures as significantly limited by intubation. There appears to be diffuse edema of the oral tongue,   nonspecific. There is also mild circumferential edema of the nasopharyngeal and oropharyngeal soft tissues. No definite focal fluid collection is present concerning for abscess. The thyroid appears homogeneous. The  osseous structures demonstrate   spondylosis changes, otherwise without evidence of fracture or aggressive osseous lesion.      Impression:      Impression:  Circumferential and somewhat diffuse edema of the oral tongue, nasopharynx and oropharynx is present, entirely nonspecific in this intubated patient, with adjacent numerous small cervical lymph nodes present, potentially reactive in the setting of   suspected infection. There is no definite focal fluid collection present concerning for abscess.    Incidentally noted large bilateral pleural effusions are seen at the lung apices, with some mild adjacent atelectasis. These findings were not apparent on CT of the chest performed 3/16/2023.    Electronically Signed: Jason Carlin    3/21/2023 2:30 PM EDT    Workstation ID: [id number]              Results Review: I have personally reviewed all of the recent lab and imaging results available at this time.     Vital signs currently stable not on vasopressors    Labs demonstrate a white blood cell count of 4.  Hemoglobin 13.  Platelets 170.    I have personally reviewed a CT scan of the neck from yesterday as well as a CT scan of the chest from March 16.  These do not show any obvious anatomy that would be prohibitive to tracheostomy or PEG placement    Assessment:  Mr. Junior is a 68-year-old gentleman with history of rectal cancer status post resection and hypertension who I been asked to evaluate for tracheostomy and PEG placement.  He is not on anticoagulation.  He is mechanically ventilated on FiO2 of 30% and a PEEP of 5.  I had a long discussion with his wife on the telephone regarding the risk, benefits, and alternatives to procedure including specifically discussing the risks of bleeding, infection, damage to adjacent structures, possible need for further procedures, and risks of anesthesia and sedation.  She understands and wishes to proceed    Plan:  -Discussed with Dr. Negron.  Agree with plans for  tracheostomy and PEG placement for secure airway and feeding access  -Hold tube feeds after midnight  -Tentative plan to proceed forward with tracheostomy and PEG placement tomorrow       I discussed the patient's findings and my recommendations with the patient and/or family, as well as the primary team     Néstor Jerome MD  03/22/23  14:50 EDT      Part of this note may be an electronic transcription/translation of spoken language to printed text using the Dragon Dictation System.

## 2023-03-22 NOTE — PROGRESS NOTES
INTENSIVIST   PROGRESS NOTE     Hospital:  LOS: 6 days     Mr. Sandro Junior, 68 y.o. male is followed for a Chief Complaint of: Acute epiglottitis with airway obstruction      Subjective   S     Interval History:  No acute events. Continues to have airway edema.        The patient's relevant past medical, surgical and social history were reviewed and updated in Epic as appropriate.      ROS: Unable to obtain secondary to sedation and mechanical ventilation.     Objective   O     Vitals:  Temp  Min: 98.2 °F (36.8 °C)  Max: 99.9 °F (37.7 °C)  BP  Min: 76/57  Max: 162/89  Pulse  Min: 56  Max: 92  Resp  Min: 16  Max: 16  SpO2  Min: 93 %  Max: 99 % Flow (L/min)  Min: 30  Max: 30    Intake/Ouptut 24 hrs (7:00AM - 6:59 AM)  Intake & Output (last 3 days)       03/19 0701  03/20 0700 03/20 0701  03/21 0700 03/21 0701  03/22 0700 03/22 0701  03/23 0700    I.V. (mL/kg) 4030 (51.7) 4843.8 (62.2) 2516.6 (32.3) 443.3 (5.7)    Other 210 300 300 160    NG/GT 1123 1607 1506 482    IV Piggyback 225 218.5 413.3 152    Total Intake(mL/kg) 5588 (71.7) 6969.3 (89.5) 4735.9 (60.8) 1237.3 (15.9)    Urine (mL/kg/hr) 1770 (0.9) 4445 (2.4) 5975 (3.2) 2550 (4.4)    Total Output 1770 4445 5975 2550    Net +3818 +2524.3 -1239.1 -1312.7                  Medications (drips):  fentanyl 10 mcg/mL, Last Rate: 300 mcg/hr (03/22/23 1002)  midazolam, Last Rate: 7 mg/hr (03/22/23 1001)  phenylephrine, Last Rate: Stopped (03/18/23 1635)  propofol, Last Rate: 10 mcg/kg/min (03/22/23 0034)        Mechanical Ventilator Settings:          Resp Rate (Set): 16     FiO2 (%): 30 %  PEEP/CPAP (cm H2O): 5 cm H20    Minute Ventilation (L/min) (Obs): 6.2 L/min  Resp Rate (Observed) Vent: 16  I:E Ratio (Set): 1:2.75  I:E Ratio (Obs): 1:2.8    PIP Observed (cm H2O): 17 cm H2O  Plateau Pressure (cm H2O): (S) 17 cm H2O    Physical Examination  Telemetry:  Normal sinus rhythm.    Constitutional:  No acute distress.  ETT in place on mechanical ventilation.    Eyes: No  scleral icterus.   PERRL, EOM intact.    Neck:  Supple, FROM   Cardiovascular: Normal rate, regular and rhythm. Normal heart sounds.  No murmurs, gallop or rub.   Respiratory: No respiratory distress. Normal respiratory effort.  Diminished bilaterally. No wheezing.    Abdominal:  Soft. No masses. Nontender. No distension. No HSM.   Extremities: No digital cyanosis. No clubbing.  1+ peripheral edema.   Skin: No rashes, lesions or ulcers   Neurological:   Sedated. Opens eyes to stimulation.             Results from last 7 days   Lab Units 03/22/23 0413 03/20/23  0545 03/19/23  0439   WBC 10*3/mm3 4.43 5.49 8.30   HEMOGLOBIN g/dL 13.2 11.1* 10.6*   MCV fL 101.5* 105.9* 106.2*   PLATELETS 10*3/mm3 170 160 143     Results from last 7 days   Lab Units 03/22/23  0413 03/21/23  1648 03/21/23  0502 03/20/23  1438 03/20/23  0545   SODIUM mmol/L 139  --  137  --  138  138   POTASSIUM mmol/L 4.1  --  4.0  --  4.0  4.0   CO2 mmol/L 26.0  --  24.0  --  24.0  24.0   CREATININE mg/dL 0.54*  --  0.66*  --  0.54*  0.54*   GLUCOSE mg/dL 126*  --  114*  --  124*  124*   MAGNESIUM mg/dL 1.7  --  1.7  --  1.9  1.9   PHOSPHORUS mg/dL 2.7 2.6 2.1*   < > 2.0*  2.0*    < > = values in this interval not displayed.     Estimated Creatinine Clearance: 148.3 mL/min (A) (by C-G formula based on SCr of 0.54 mg/dL (L)).  Results from last 7 days   Lab Units 03/21/23  0502 03/20/23  0545 03/18/23  0412   ALK PHOS U/L 68 43 46   BILIRUBIN mg/dL 0.7 0.8 0.4   BILIRUBIN DIRECT mg/dL 0.5*  --  0.2   ALT (SGPT) U/L 14 8 13   AST (SGOT) U/L 22 17 20       Results from last 7 days   Lab Units 03/19/23  0814 03/18/23  0359 03/17/23  0412 03/17/23  0005 03/16/23 2047   PH, ARTERIAL pH units 7.385 7.368 7.395 7.410 7.405   PCO2, ARTERIAL mm Hg 38.4 43.3 42.4 41.8 40.3   PO2 ART mm Hg 121.0* 99.3 159.0* 158.0* 360.0*   FIO2 % 30 30 40 40 40       Images:  Imaging Results (Last 24 Hours)     Procedure Component Value Units Date/Time    XR Chest 1 View  [[id number]] Collected: 03/22/23 0805     Updated: 03/22/23 0809    Narrative:      XR CHEST 1 VW    Date of Exam: 3/22/2023 4:07 AM EDT    Indication: respiratory failure, intubated.    Comparison: Portable chest radiograph 3/17/2023    Findings:  ET tube stable above the radha. Esophagogastric tube below the diaphragm coiled in the stomach with the tip overlying the gastric fundus. Placement of a right upper extremity PICC with the tip at the cavoatrial junction. No pneumothorax. Increasing   bibasilar airspace disease likely atelectasis with small layering pleural effusions.      Impression:      Impression:  1. Placement of a right upper extremity PICC with the tip at the cavoatrial junction. No pneumothorax.  2. Stable ET tube and esophagogastric tube.  3. Increased bibasilar airspace disease likely atelectasis with small layering bilateral pleural effusions.    Electronically Signed: Dhaval Barksdale    3/22/2023 8:06 AM EDT    Workstation ID: [id number]    CT Soft Tissue Neck With & Without Contrast [[id number]] Collected: 03/21/23 1423     Updated: 03/21/23 1433    Narrative:      CT SOFT TISSUE NECK W WO CONTRAST    Date of Exam: 3/21/2023 1:38 PM EDT    Indication: Epiglottitis or tonsillitis suspected  Soft tissue swelling, infection suspected, neck xray done.    Comparison: 3/16/2023 CT neck and chest    Technique: Axial CT images were obtained of the neck before and after the uneventful intravenous administration of 75 mL Isovue-300.  Reconstructed coronal and sagittal images were also obtained. Automated exposure control and iterative construction   methods were used.    Findings:  Limited intracranial evaluation demonstrates no acute findings. The orbits appear normal. There is some mucosal thickening of the nasal passages and ethmoid air cells, with trace layering fluid seen in the frontal sinuses bilaterally, nonspecific in this   intubated patient. The lung apices demonstrate moderate large bilateral  pleural effusions, with some adjacent mild volume loss noted. Vascular structures appear grossly patent. The parotid and submandibular glands are normal in appearance bilaterally.   Nonspecific, mildly prominent but nonenlarged cervical lymph nodes are noted, potentially reactive. Evaluation of the aerodigestive tract structures as significantly limited by intubation. There appears to be diffuse edema of the oral tongue,   nonspecific. There is also mild circumferential edema of the nasopharyngeal and oropharyngeal soft tissues. No definite focal fluid collection is present concerning for abscess. The thyroid appears homogeneous. The osseous structures demonstrate   spondylosis changes, otherwise without evidence of fracture or aggressive osseous lesion.      Impression:      Impression:  Circumferential and somewhat diffuse edema of the oral tongue, nasopharynx and oropharynx is present, entirely nonspecific in this intubated patient, with adjacent numerous small cervical lymph nodes present, potentially reactive in the setting of   suspected infection. There is no definite focal fluid collection present concerning for abscess.    Incidentally noted large bilateral pleural effusions are seen at the lung apices, with some mild adjacent atelectasis. These findings were not apparent on CT of the chest performed 3/16/2023.    Electronically Signed: Jason Carlin    3/21/2023 2:30 PM EDT    Workstation ID: [id number]            Results: Reviewed.  I reviewed the patient's new laboratory and imaging results.  I independently reviewed the patient's new images.    Medications: Reviewed.    Assessment & Plan   A / P     68-year-old male who presented to the ER this evening with shortness of breath and sore throat.  History obtained is from the chart and my discussion with Dr. Eaton patient is now intubated and sedated.  Apparently, he first noticed a sore throat around noon.  He became increasingly symptomatic throughout the  day before presenting to the ER.  He was given epinephrine, steroids, and H2 blockers.  Despite this he began tripoding and drooling and had audible stridor.  Per Dr. Eaton, the intubation was difficult due to edema and erythema.     Imaging reveals abnormal appearance of the soft tissues of the laryngeal area with some edema and swelling of the uvula.  There is no evidence of a fluid collection or abscess.  Bibasilar infiltrates were present possibly related to atelectasis or retained secretions but there was no evidence of consolidative pneumonia.  He has been given Zosyn and vancomycin empirically and, as stated, has received steroids.     Blood cultures are growing Strep pyogenes.     Bronchoscopy on 3/20/23 with continued edematous changes in the posterior oropharynx with copious mucopurulent secretions.     ID is following and changed Penicillin to a continuous infusion.     Repeat CT neck on 3/21/23 showed worsening edema but no signs of abscess.       Nutrition:   NPO Diet NPO Type: Strict NPO  Advance Directives:   Code Status and Medical Interventions:   Ordered at: 03/16/23 4772     Code Status (Patient has no pulse and is not breathing):    CPR (Attempt to Resuscitate)     Medical Interventions (Patient has pulse or is breathing):    Full Support       Active Hospital Problems    Diagnosis    • **Acute epiglottitis with airway obstruction - probable    • Acute respiratory failure (HCC)    • Essential hypertension        Assessment / Plan:    Continue mechanical ventilation. Discussed with the patient's family that I would recommend tracheostomy for airway management as he currently has an unsecure airway in the setting of diffuse airway edema and that he is at high risk of demise if his ET tube were to be inadvertently removed.  They are agreeable to tracheostomy.   Continue Propofol, Fentanyl and versed drip.   Repeat Lasix again today.   ID following for Group A Strep bacteremia.     Echocardiogram  negative for vegetations.   Repeat blood cultures remain negative.   Continue tube feeds. NPO after MN. He will also need PEG placement as we are unable to pass a tube nasogastrically.   DVT ppx.  AM labs    Remains critically ill secondary to mechanical ventilation and unstable airway.     I have consulted with Dr. Jerome from General Surgery who will perform a tracheostomy and PEG tube placement tomorrow in order to have a secure airway while the patient recovers from his epiglottitis.     High level of risk due to:  severe exacerbation of chronic illness and illness with threat to life or bodily function.    Plan of care and goals reviewed during interdisciplinary rounds.  I discussed the patient's findings and my recommendations with family, nursing staff and consulting provider    Critical Care Time: was greater than 33 minutes.(This excludes time spent performing separately reportable procedures and services). including high complexity decision making to assess, manipulate, and support vital organ system failure in this individual who has impairment of one or more vital organ systems such that there is a high probability of imminent or life threatening deterioration in the patient’s condition.      Keila Negron, DO    Intensive Care Medicine and Pulmonary Medicine

## 2023-03-23 ENCOUNTER — APPOINTMENT (OUTPATIENT)
Dept: GENERAL RADIOLOGY | Facility: HOSPITAL | Age: 68
DRG: 4 | End: 2023-03-23
Payer: MEDICARE

## 2023-03-23 ENCOUNTER — ANESTHESIA EVENT (OUTPATIENT)
Dept: PERIOP | Facility: HOSPITAL | Age: 68
DRG: 4 | End: 2023-03-23
Payer: MEDICARE

## 2023-03-23 ENCOUNTER — ANESTHESIA (OUTPATIENT)
Dept: PERIOP | Facility: HOSPITAL | Age: 68
DRG: 4 | End: 2023-03-23
Payer: MEDICARE

## 2023-03-23 ENCOUNTER — APPOINTMENT (OUTPATIENT)
Dept: CARDIOLOGY | Facility: HOSPITAL | Age: 68
DRG: 4 | End: 2023-03-23
Payer: MEDICARE

## 2023-03-23 LAB
ANION GAP SERPL CALCULATED.3IONS-SCNC: 4 MMOL/L (ref 5–15)
BH CV LOWER VASCULAR LEFT COMMON FEMORAL AUGMENT: NORMAL
BH CV LOWER VASCULAR LEFT COMMON FEMORAL COMPRESS: NORMAL
BH CV LOWER VASCULAR LEFT COMMON FEMORAL PHASIC: NORMAL
BH CV LOWER VASCULAR LEFT COMMON FEMORAL SPONT: NORMAL
BH CV LOWER VASCULAR LEFT DISTAL FEMORAL COMPRESS: NORMAL
BH CV LOWER VASCULAR LEFT GASTRONEMIUS COMPRESS: NORMAL
BH CV LOWER VASCULAR LEFT GREATER SAPH AK COMPRESS: NORMAL
BH CV LOWER VASCULAR LEFT GREATER SAPH BK COMPRESS: NORMAL
BH CV LOWER VASCULAR LEFT LESSER SAPH COMPRESS: NORMAL
BH CV LOWER VASCULAR LEFT MID FEMORAL AUGMENT: NORMAL
BH CV LOWER VASCULAR LEFT MID FEMORAL COMPRESS: NORMAL
BH CV LOWER VASCULAR LEFT MID FEMORAL PHASIC: NORMAL
BH CV LOWER VASCULAR LEFT MID FEMORAL SPONT: NORMAL
BH CV LOWER VASCULAR LEFT PERONEAL COMPRESS: NORMAL
BH CV LOWER VASCULAR LEFT POPLITEAL AUGMENT: NORMAL
BH CV LOWER VASCULAR LEFT POPLITEAL COMPETENT: NORMAL
BH CV LOWER VASCULAR LEFT POPLITEAL COMPRESS: NORMAL
BH CV LOWER VASCULAR LEFT POPLITEAL PHASIC: NORMAL
BH CV LOWER VASCULAR LEFT POPLITEAL SPONT: NORMAL
BH CV LOWER VASCULAR LEFT POSTERIOR TIBIAL COMPRESS: NORMAL
BH CV LOWER VASCULAR LEFT PROFUNDA FEMORAL COMPRESS: NORMAL
BH CV LOWER VASCULAR LEFT PROXIMAL FEMORAL COMPRESS: NORMAL
BH CV LOWER VASCULAR LEFT SAPHENOFEMORAL JUNCTION COMPRESS: NORMAL
BH CV LOWER VASCULAR RIGHT COMMON FEMORAL AUGMENT: NORMAL
BH CV LOWER VASCULAR RIGHT COMMON FEMORAL COMPRESS: NORMAL
BH CV LOWER VASCULAR RIGHT COMMON FEMORAL PHASIC: NORMAL
BH CV LOWER VASCULAR RIGHT COMMON FEMORAL SPONT: NORMAL
BUN SERPL-MCNC: 12 MG/DL (ref 8–23)
BUN/CREAT SERPL: 16.7 (ref 7–25)
CALCIUM SPEC-SCNC: 8.8 MG/DL (ref 8.6–10.5)
CHLORIDE SERPL-SCNC: 101 MMOL/L (ref 98–107)
CO2 SERPL-SCNC: 31 MMOL/L (ref 22–29)
CREAT SERPL-MCNC: 0.72 MG/DL (ref 0.76–1.27)
DEPRECATED RDW RBC AUTO: 52.4 FL (ref 37–54)
EGFRCR SERPLBLD CKD-EPI 2021: 100.8 ML/MIN/1.73
ERYTHROCYTE [DISTWIDTH] IN BLOOD BY AUTOMATED COUNT: 13.7 % (ref 12.3–15.4)
GLUCOSE BLDC GLUCOMTR-MCNC: 128 MG/DL (ref 70–130)
GLUCOSE BLDC GLUCOMTR-MCNC: 131 MG/DL (ref 70–130)
GLUCOSE BLDC GLUCOMTR-MCNC: 84 MG/DL (ref 70–130)
GLUCOSE BLDC GLUCOMTR-MCNC: 96 MG/DL (ref 70–130)
GLUCOSE SERPL-MCNC: 117 MG/DL (ref 65–99)
HCT VFR BLD AUTO: 41.6 % (ref 37.5–51)
HGB BLD-MCNC: 13.3 G/DL (ref 13–17.7)
MAGNESIUM SERPL-MCNC: 1.8 MG/DL (ref 1.6–2.4)
MAXIMAL PREDICTED HEART RATE: 154 BPM
MCH RBC QN AUTO: 32.6 PG (ref 26.6–33)
MCHC RBC AUTO-ENTMCNC: 32 G/DL (ref 31.5–35.7)
MCV RBC AUTO: 102 FL (ref 79–97)
PHOSPHATE SERPL-MCNC: 3.1 MG/DL (ref 2.5–4.5)
PLATELET # BLD AUTO: 207 10*3/MM3 (ref 140–450)
PMV BLD AUTO: 9.2 FL (ref 6–12)
POTASSIUM SERPL-SCNC: 4.6 MMOL/L (ref 3.5–5.2)
RBC # BLD AUTO: 4.08 10*6/MM3 (ref 4.14–5.8)
SODIUM SERPL-SCNC: 136 MMOL/L (ref 136–145)
STRESS TARGET HR: 131 BPM
WBC NRBC COR # BLD: 7.97 10*3/MM3 (ref 3.4–10.8)

## 2023-03-23 PROCEDURE — 93971 EXTREMITY STUDY: CPT | Performed by: INTERNAL MEDICINE

## 2023-03-23 PROCEDURE — 94799 UNLISTED PULMONARY SVC/PX: CPT

## 2023-03-23 PROCEDURE — 25010000002 FENTANYL CITRATE-NACL 2.5-0.9 MG/250ML-% SOLUTION: Performed by: SURGERY

## 2023-03-23 PROCEDURE — 25010000002 PHENYLEPHRINE 10 MG/ML SOLUTION 1 ML VIAL

## 2023-03-23 PROCEDURE — 0 MIDAZOLAM 1 MG/ML 100ML NS 100 MG/100ML SOLUTION: Performed by: INTERNAL MEDICINE

## 2023-03-23 PROCEDURE — 85027 COMPLETE CBC AUTOMATED: CPT | Performed by: INTERNAL MEDICINE

## 2023-03-23 PROCEDURE — 25010000002 FENTANYL 10 MCG/1 ML NS: Performed by: NURSE PRACTITIONER

## 2023-03-23 PROCEDURE — 25010000002 PROPOFOL 10 MG/ML EMULSION

## 2023-03-23 PROCEDURE — 99291 CRITICAL CARE FIRST HOUR: CPT | Performed by: INTERNAL MEDICINE

## 2023-03-23 PROCEDURE — 25010000002 FUROSEMIDE PER 20 MG: Performed by: SURGERY

## 2023-03-23 PROCEDURE — 25010000002 PENICILLIN G POTASSIUM PER 600000 UNITS: Performed by: SURGERY

## 2023-03-23 PROCEDURE — 25010000002 PENICILLIN G POTASSIUM PER 600000 UNITS: Performed by: INTERNAL MEDICINE

## 2023-03-23 PROCEDURE — 25010000002 PHENYLEPHRINE 10 MG/ML SOLUTION 5 ML VIAL: Performed by: INTERNAL MEDICINE

## 2023-03-23 PROCEDURE — 0 MAGNESIUM SULFATE 4 GM/100ML SOLUTION: Performed by: NURSE PRACTITIONER

## 2023-03-23 PROCEDURE — 80048 BASIC METABOLIC PNL TOTAL CA: CPT | Performed by: INTERNAL MEDICINE

## 2023-03-23 PROCEDURE — 25010000002 PROPOFOL 10 MG/ML EMULSION: Performed by: STUDENT IN AN ORGANIZED HEALTH CARE EDUCATION/TRAINING PROGRAM

## 2023-03-23 PROCEDURE — 71045 X-RAY EXAM CHEST 1 VIEW: CPT

## 2023-03-23 PROCEDURE — 25010000002 ALBUMIN HUMAN 25% PER 50 ML: Performed by: NURSE PRACTITIONER

## 2023-03-23 PROCEDURE — P9047 ALBUMIN (HUMAN), 25%, 50ML: HCPCS | Performed by: NURSE PRACTITIONER

## 2023-03-23 PROCEDURE — 93971 EXTREMITY STUDY: CPT

## 2023-03-23 PROCEDURE — 94761 N-INVAS EAR/PLS OXIMETRY MLT: CPT

## 2023-03-23 PROCEDURE — 83735 ASSAY OF MAGNESIUM: CPT | Performed by: INTERNAL MEDICINE

## 2023-03-23 PROCEDURE — C1769 GUIDE WIRE: HCPCS | Performed by: SURGERY

## 2023-03-23 PROCEDURE — 82962 GLUCOSE BLOOD TEST: CPT

## 2023-03-23 PROCEDURE — 94003 VENT MGMT INPAT SUBQ DAY: CPT

## 2023-03-23 PROCEDURE — 84100 ASSAY OF PHOSPHORUS: CPT | Performed by: INTERNAL MEDICINE

## 2023-03-23 PROCEDURE — 25010000002 HEPARIN (PORCINE) PER 1000 UNITS: Performed by: SURGERY

## 2023-03-23 RX ORDER — MAGNESIUM SULFATE HEPTAHYDRATE 40 MG/ML
4 INJECTION, SOLUTION INTRAVENOUS ONCE
Status: COMPLETED | OUTPATIENT
Start: 2023-03-23 | End: 2023-03-23

## 2023-03-23 RX ORDER — MAGNESIUM HYDROXIDE 1200 MG/15ML
LIQUID ORAL AS NEEDED
Status: DISCONTINUED | OUTPATIENT
Start: 2023-03-23 | End: 2023-03-23 | Stop reason: HOSPADM

## 2023-03-23 RX ORDER — ALBUMIN (HUMAN) 12.5 G/50ML
50 SOLUTION INTRAVENOUS ONCE
Status: COMPLETED | OUTPATIENT
Start: 2023-03-24 | End: 2023-03-23

## 2023-03-23 RX ORDER — ROCURONIUM BROMIDE 10 MG/ML
INJECTION, SOLUTION INTRAVENOUS AS NEEDED
Status: DISCONTINUED | OUTPATIENT
Start: 2023-03-23 | End: 2023-03-23 | Stop reason: SURG

## 2023-03-23 RX ORDER — FUROSEMIDE 10 MG/ML
40 INJECTION INTRAMUSCULAR; INTRAVENOUS ONCE
Status: COMPLETED | OUTPATIENT
Start: 2023-03-23 | End: 2023-03-23

## 2023-03-23 RX ORDER — BUPIVACAINE HYDROCHLORIDE AND EPINEPHRINE 2.5; 5 MG/ML; UG/ML
INJECTION, SOLUTION EPIDURAL; INFILTRATION; INTRACAUDAL; PERINEURAL AS NEEDED
Status: DISCONTINUED | OUTPATIENT
Start: 2023-03-23 | End: 2023-03-23 | Stop reason: HOSPADM

## 2023-03-23 RX ORDER — FENTANYL CITRATE-0.9 % NACL/PF 10 MCG/ML
50-300 PLASTIC BAG, INJECTION (ML) INTRAVENOUS
Status: DISCONTINUED | OUTPATIENT
Start: 2023-03-23 | End: 2023-03-27

## 2023-03-23 RX ORDER — SODIUM CHLORIDE 9 MG/ML
INJECTION, SOLUTION INTRAVENOUS CONTINUOUS PRN
Status: DISCONTINUED | OUTPATIENT
Start: 2023-03-23 | End: 2023-03-23 | Stop reason: SURG

## 2023-03-23 RX ADMIN — Medication 30 ML: at 08:44

## 2023-03-23 RX ADMIN — Medication 300 MCG/HR: at 10:13

## 2023-03-23 RX ADMIN — SENNOSIDES AND DOCUSATE SODIUM 2 TABLET: 8.6; 5 TABLET ORAL at 20:34

## 2023-03-23 RX ADMIN — Medication 10 ML: at 20:35

## 2023-03-23 RX ADMIN — PANTOPRAZOLE SODIUM 40 MG: 40 INJECTION, POWDER, LYOPHILIZED, FOR SOLUTION INTRAVENOUS at 05:16

## 2023-03-23 RX ADMIN — MIDAZOLAM HYDROCHLORIDE 7 MG/HR: 1 INJECTION, SOLUTION INTRAVENOUS at 01:34

## 2023-03-23 RX ADMIN — SODIUM CHLORIDE 12 MILLION UNITS: 9 INJECTION, SOLUTION INTRAVENOUS at 06:23

## 2023-03-23 RX ADMIN — PHENYLEPHRINE HYDROCHLORIDE 0.2 MG: 10 INJECTION INTRAVENOUS at 13:40

## 2023-03-23 RX ADMIN — PROPOFOL 10 MCG/KG/MIN: 10 INJECTION, EMULSION INTRAVENOUS at 05:18

## 2023-03-23 RX ADMIN — PHENYLEPHRINE HYDROCHLORIDE 0.4 MG: 10 INJECTION INTRAVENOUS at 13:53

## 2023-03-23 RX ADMIN — PHENYLEPHRINE HYDROCHLORIDE 0.5 MCG/KG/MIN: 10 INJECTION INTRAVENOUS at 10:13

## 2023-03-23 RX ADMIN — ALBUMIN (HUMAN) 50 G: 0.25 INJECTION, SOLUTION INTRAVENOUS at 23:32

## 2023-03-23 RX ADMIN — Medication 10 ML: at 08:43

## 2023-03-23 RX ADMIN — Medication 300 MCG/HR: at 01:35

## 2023-03-23 RX ADMIN — METRONIDAZOLE 500 MG: 500 INJECTION, SOLUTION INTRAVENOUS at 20:34

## 2023-03-23 RX ADMIN — ROCURONIUM BROMIDE 50 MG: 10 INJECTION INTRAVENOUS at 13:47

## 2023-03-23 RX ADMIN — CHLORHEXIDINE GLUCONATE 0.12% ORAL RINSE 15 ML: 1.2 LIQUID ORAL at 20:34

## 2023-03-23 RX ADMIN — MAGNESIUM SULFATE HEPTAHYDRATE 4 G: 40 INJECTION, SOLUTION INTRAVENOUS at 06:58

## 2023-03-23 RX ADMIN — LATANOPROST 1 DROP: 50 SOLUTION OPHTHALMIC at 08:43

## 2023-03-23 RX ADMIN — CHLORHEXIDINE GLUCONATE 0.12% ORAL RINSE 15 ML: 1.2 LIQUID ORAL at 08:43

## 2023-03-23 RX ADMIN — FUROSEMIDE 40 MG: 10 INJECTION, SOLUTION INTRAMUSCULAR; INTRAVENOUS at 17:59

## 2023-03-23 RX ADMIN — Medication 300 MCG/HR: at 19:01

## 2023-03-23 RX ADMIN — METRONIDAZOLE 500 MG: 500 INJECTION, SOLUTION INTRAVENOUS at 15:05

## 2023-03-23 RX ADMIN — METRONIDAZOLE 500 MG: 500 INJECTION, SOLUTION INTRAVENOUS at 01:38

## 2023-03-23 RX ADMIN — ACETAMINOPHEN 650 MG: 650 SOLUTION ORAL at 22:33

## 2023-03-23 RX ADMIN — SODIUM CHLORIDE: 9 INJECTION, SOLUTION INTRAVENOUS at 13:40

## 2023-03-23 RX ADMIN — HEPARIN SODIUM 5000 UNITS: 5000 INJECTION, SOLUTION INTRAVENOUS; SUBCUTANEOUS at 21:09

## 2023-03-23 RX ADMIN — SODIUM CHLORIDE 12 MILLION UNITS: 9 INJECTION, SOLUTION INTRAVENOUS at 13:40

## 2023-03-23 RX ADMIN — MIDAZOLAM HYDROCHLORIDE 7 MG/HR: 1 INJECTION, SOLUTION INTRAVENOUS at 13:17

## 2023-03-23 RX ADMIN — PROPOFOL 75 MCG/KG/MIN: 10 INJECTION, EMULSION INTRAVENOUS at 13:40

## 2023-03-23 RX ADMIN — METRONIDAZOLE 500 MG: 500 INJECTION, SOLUTION INTRAVENOUS at 08:43

## 2023-03-23 RX ADMIN — SODIUM CHLORIDE 12 MILLION UNITS: 9 INJECTION, SOLUTION INTRAVENOUS at 21:47

## 2023-03-23 RX ADMIN — ROCURONIUM BROMIDE 50 MG: 10 INJECTION INTRAVENOUS at 13:40

## 2023-03-23 RX ADMIN — SENNOSIDES AND DOCUSATE SODIUM 2 TABLET: 8.6; 5 TABLET ORAL at 08:43

## 2023-03-23 RX ADMIN — SUGAMMADEX 300 MG: 100 INJECTION, SOLUTION INTRAVENOUS at 14:40

## 2023-03-23 NOTE — PLAN OF CARE
Goal Outcome Evaluation:              Outcome Evaluation: No acute events overnight. Remains intubated, minimal settings. NPO since MN for planned trach/PEG. Fentanyl, Versed, and Propofol continue. Mg replaced.

## 2023-03-23 NOTE — PROGRESS NOTES
INTENSIVIST   PROGRESS NOTE     Hospital:  LOS: 7 days     Mr. Sandro Junior, 68 y.o. male is followed for a Chief Complaint of: Acute epiglottitis with airway obstruction      Subjective   S     Interval History:  No acute events. Going for trach/PEG this afternoon.        The patient's relevant past medical, surgical and social history were reviewed and updated in Epic as appropriate.      ROS: Unable to obtain secondary to sedation and mechanical ventilation.     Objective   O     Vitals:  Temp  Min: 98.4 °F (36.9 °C)  Max: 99.5 °F (37.5 °C)  BP  Min: 70/46  Max: 220/129  Pulse  Min: 60  Max: 80  Resp  Min: 16  Max: 16  SpO2  Min: 93 %  Max: 99 % Flow (L/min)  Min: 30  Max: 30    Intake/Ouptut 24 hrs (7:00AM - 6:59 AM)  Intake & Output (last 3 days)       03/20 0701  03/21 0700 03/21 0701 03/22 0700 03/22 0701  03/23 0700 03/23 0701 03/24 0700    I.V. (mL/kg) 4843.8 (62.2) 2516.6 (32.3) 1397.1 (17.9) 433 (5.6)    Other 300 300 410 100    NG/GT 1607 1506 1359     IV Piggyback 218.5 413.3 733.1 113.8    Total Intake(mL/kg) 6969.3 (89.5) 4735.9 (60.8) 3899.2 (50.1) 646.8 (8.3)    Urine (mL/kg/hr) 4445 (2.4) 5975 (3.2) 4205 (2.2) 600 (1.1)    Total Output 4445 5975 4205 600    Net +2524.3 -1239.1 -305.8 +46.8                  Medications (drips):  fentanyl 10 mcg/mL, Last Rate: 300 mcg/hr (03/23/23 1013)  midazolam, Last Rate: 7 mg/hr (03/23/23 1317)  phenylephrine, Last Rate: 0.2 mcg/kg/min (03/23/23 1127)  propofol, Last Rate: 10 mcg/kg/min (03/23/23 0518)        Mechanical Ventilator Settings:          Resp Rate (Set): 16     FiO2 (%): 30 %  PEEP/CPAP (cm H2O): 5 cm H20    Minute Ventilation (L/min) (Obs): 6.22 L/min  Resp Rate (Observed) Vent: 16  I:E Ratio (Set): 1:2.41  I:E Ratio (Obs): 1:2.4    PIP Observed (cm H2O): 17 cm H2O  Plateau Pressure (cm H2O): (S) 16 cm H2O    Physical Examination  Telemetry:  Normal sinus rhythm.    Constitutional:  No acute distress. Tongue swelling  ETT in place on  mechanical ventilation.    Eyes: No scleral icterus.   PERRL, EOM intact.    Neck:  Supple, FROM   Cardiovascular: Normal rate, regular and rhythm. Normal heart sounds.  No murmurs, gallop or rub.   Respiratory: No respiratory distress. Normal respiratory effort.  Diminished bilaterally. No wheezing.    Abdominal:  Soft. No masses. Nontender. No distension. No HSM.   Extremities: No digital cyanosis. No clubbing.  1+ peripheral edema. Left lower extremity more swollen   Skin: No rashes, lesions or ulcers   Neurological:   Sedated. Opens eyes to stimulation.             Results from last 7 days   Lab Units 03/23/23  0507 03/22/23  0413 03/20/23  0545   WBC 10*3/mm3 7.97 4.43 5.49   HEMOGLOBIN g/dL 13.3 13.2 11.1*   MCV fL 102.0* 101.5* 105.9*   PLATELETS 10*3/mm3 207 170 160     Results from last 7 days   Lab Units 03/23/23  0507 03/22/23  0413 03/21/23  1648 03/21/23  0502   SODIUM mmol/L 136 139  --  137   POTASSIUM mmol/L 4.6 4.1  --  4.0   CO2 mmol/L 31.0* 26.0  --  24.0   CREATININE mg/dL 0.72* 0.54*  --  0.66*   GLUCOSE mg/dL 117* 126*  --  114*   MAGNESIUM mg/dL 1.8 1.7  --  1.7   PHOSPHORUS mg/dL 3.1 2.7 2.6 2.1*     Estimated Creatinine Clearance: 111.2 mL/min (A) (by C-G formula based on SCr of 0.72 mg/dL (L)).  Results from last 7 days   Lab Units 03/21/23  0502 03/20/23  0545 03/18/23  0412   ALK PHOS U/L 68 43 46   BILIRUBIN mg/dL 0.7 0.8 0.4   BILIRUBIN DIRECT mg/dL 0.5*  --  0.2   ALT (SGPT) U/L 14 8 13   AST (SGOT) U/L 22 17 20       Results from last 7 days   Lab Units 03/19/23  0814 03/18/23  0359 03/17/23  0412 03/17/23  0005 03/16/23 2047   PH, ARTERIAL pH units 7.385 7.368 7.395 7.410 7.405   PCO2, ARTERIAL mm Hg 38.4 43.3 42.4 41.8 40.3   PO2 ART mm Hg 121.0* 99.3 159.0* 158.0* 360.0*   FIO2 % 30 30 40 40 40       Images:  Imaging Results (Last 24 Hours)     Procedure Component Value Units Date/Time    XR Chest 1 View [[id number]] Collected: 03/23/23 0844     Updated: 03/23/23 0848    Narrative:       XR CHEST 1 VW    Date of Exam: 3/23/2023 3:00 AM EDT    Indication: Respiratory failure, edema.    Comparison: 3/22/2023    Findings:  Support harbor projects unchanged. There is stable trace effusion on the left with some mild bibasilar atelectasis. There is no distinct pneumothorax. Unchanged heart and mediastinal contours.      Impression:      Impression:  No significant interval change.    Electronically Signed: Jason Carlin    3/23/2023 8:45 AM EDT    Workstation ID: [id number]            Results: Reviewed.  I reviewed the patient's new laboratory and imaging results.  I independently reviewed the patient's new images.    Medications: Reviewed.    Assessment & Plan   A / P     68-year-old male who presented to the ER this evening with shortness of breath and sore throat.  History obtained is from the chart and my discussion with Dr. Eaton patient is now intubated and sedated.  Apparently, he first noticed a sore throat around noon.  He became increasingly symptomatic throughout the day before presenting to the ER.  He was given epinephrine, steroids, and H2 blockers.  Despite this he began tripoding and drooling and had audible stridor.  Per Dr. Eaton, the intubation was difficult due to edema and erythema.     Imaging reveals abnormal appearance of the soft tissues of the laryngeal area with some edema and swelling of the uvula.  There is no evidence of a fluid collection or abscess.  Bibasilar infiltrates were present possibly related to atelectasis or retained secretions but there was no evidence of consolidative pneumonia.  He has been given Zosyn and vancomycin empirically and, as stated, has received steroids.     Blood cultures are growing Strep pyogenes.     Bronchoscopy on 3/20/23 with continued edematous changes in the posterior oropharynx with copious mucopurulent secretions.     ID is following and changed Penicillin to a continuous infusion.     Repeat CT neck on 3/21/23 showed worsening  edema but no signs of abscess.     Surgery has been consulted and is planning for tracheostomy and PEG tube placement today.       Nutrition:   NPO Diet NPO Type: Strict NPO  Advance Directives:   Code Status and Medical Interventions:   Ordered at: 03/16/23 9997     Code Status (Patient has no pulse and is not breathing):    CPR (Attempt to Resuscitate)     Medical Interventions (Patient has pulse or is breathing):    Full Support       Active Hospital Problems    Diagnosis    • **Acute epiglottitis with airway obstruction - probable    • Acute respiratory failure (HCC)    • Essential hypertension        Assessment / Plan:    Continue mechanical ventilation. Tracheostomy today. PST in AM.  Continue Propofol, Fentanyl and versed drip. Can use Fentanyl alone after tracheostomy placement.  Repeat Lasix again today if BP allows..   ID following for Group A Strep bacteremia.     Echocardiogram negative for vegetations.   Repeat blood cultures remain negative.   Resume tube feeds after PEG tube placement.  Check left lower extremity duplex.   Replace magnesium.    DVT ppx.  AM labs    Remains critically ill secondary to mechanical ventilation and unstable airway.       High level of risk due to:  severe exacerbation of chronic illness and illness with threat to life or bodily function.    Plan of care and goals reviewed during interdisciplinary rounds.  I discussed the patient's findings and my recommendations with family and nursing staff    Critical Care Time: was greater than 31 minutes.(This excludes time spent performing separately reportable procedures and services). including high complexity decision making to assess, manipulate, and support vital organ system failure in this individual who has impairment of one or more vital organ systems such that there is a high probability of imminent or life threatening deterioration in the patient’s condition.      Keila Case, DO    Intensive Care Medicine and Pulmonary  Medicine

## 2023-03-23 NOTE — OP NOTE
"Operative Report    Patient Name:  Sandro Junior  YOB: 1955  [id number]    3/23/2023        PREOPERATIVE DIAGNOSIS:    1) Respiratory Failure    2) Feeding difficulty       POSTOPERATIVE DIAGNOSIS: Same      PROCEDURE:     1) Tracheostomy    2) EGD with PEG placement       SURGEON: Néstor Jerome MD       ASSISTANT: None       ANESTHESIA: General      SPECIMENS: None      FINDINGS:      1) # 8 Shiley Cuffed Tracheostomy tube placed between the second and third tracheal ring   2) 20 [place] PEG tube placed at the 3 cm level      INDICATIONS:      The patient is a 68 y.o. year old male with a history of respiratory failure and feeding difficulty who we have been asked to see for long term respiratory and feeding access. The risks and benefits of tracheostomy and PEG placement were discussed at length with the patient's family and they agreed to proceed.    DESCRIPTION OF PROCEDURE:     After obtaining informed consent, the patient was taken to the operating room and placed in the supine position on the operating room table. General anesthesia was initiated. The neck and chest were prepped and draped in the usual sterile fashion. A time out was properly performed. SCDs were properly placed on the patient and turned on. After infiltrating the skin with local anesthetic, a 2.5cm skin incision was made over the trachea. Electrocautery dissection was carried down in the midline to the level of the thyroid. This was split in the midline and stay sutures placed bilaterally to control bleeding. At this point the anesthetist advanced the endotracheal tube distally and the balloon was deflated. The trachea was then entered between the second and third tracheal ring using a \"T\" shaped incision. Stay sutures of Prolene were placed on each side. The anesthetist then withdrew the endotracheal tube under direct visualization, and a # 8 Shiley cuffed tracheostomy tube was placed through the defect into the " "trachea. The cuff was inflated, and the tracheostomy attached to the ventilator circuit. Excellent tidal volumes were noted as well as end-tidal CO2. The tracheostomy was then affixed using a tracheostomy collar. It was dressed in standard sterile fashion. The Prolene stay sutures were then affixed to the anterior chest wall, to aid in the event of need for future reintubation.  A piece of Surgicel was left in place within the wound bed     Attention was then turned to the gastrostomy portion of the procedure. A bite block was placed into the patient's mouth. The endoscope was advanced into the mouth and into the esophagus without difficulty.  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  The endoscope was able to be advanced past the site without difficulty.  It was advanced into the stomach, and into the duodenum, which was normal . The scope was then returned to the stomach, and the stomach was maximally insufflated. An appropriate site for PEG placement was then determined by one-to-one palpation, transillumination, and the \"safe track\" needle technique. This area was prepped and draped in standard sterile fashion, and after infiltrating the skin with local anesthetic, a 7mm skin incision was made in this location. A needle was advanced through this incision and into the stomach under endoscopic guidance. A wire was then placed through the needle, grasped with the endoscope, and brought out through the mouth. At this point, using a pull-through technique, a 20 Swiss PEG tube was brought through the abdominal wall in this location. The endoscope was again advanced through the mouth and esophagus and into the stomach, where the PEG bumper could be seen abutting the anterior gastric wall in standard fashion without bleeding. The endoscope was then used to " desufflate the stomach, and removed from the patient's mouth without difficulty. The PEG tube was secured with the bumper at the 3 cm level. It was sutured to the skin, dressed in standard sterile fashion, and placed to gravity drainage. The patient was then transported back to the ICU in stable, yet critical, condition. All needle, instrument, and sponge counts were correct at the completion of the case. There were no immediate complications. I was present for the entire procedure.       Néstor Jerome MD  3/23/2023  14:21 EDT

## 2023-03-23 NOTE — PROGRESS NOTES
1       Sandro Junior  1955  [id number]  3/23/2023    CC: Severe sore throat and fever    Sandro Junior is a 68 y.o. male here for adult epiglottitis, airway compromise, leukocytosis, lactic acidosis, and persistent fever in the setting of group A beta-hemolytic streptococcal bacteremia.      Past medical history:  Past Medical History:   Diagnosis Date   • Epidermal inclusion cyst    • Esophageal candidiasis (HCC)    • Keloid scar    • Rectal cancer (HCC)    • Seborrheic dermatitis        Medications:   Current Facility-Administered Medications:   •  acetaminophen (TYLENOL) 160 MG/5ML solution 650 mg, 650 mg, Nasogastric, Q6H PRN, Case, Keila V., DO  •  [DISCONTINUED] acetaminophen (TYLENOL) tablet 650 mg, 650 mg, Nasogastric, Q4H PRN, 650 mg at 03/21/23 0253 **OR** acetaminophen (TYLENOL) suppository 650 mg, 650 mg, Rectal, Q4H PRN, Case, Keila V., DO  •  sennosides-docusate (PERICOLACE) 8.6-50 MG per tablet 2 tablet, 2 tablet, Nasogastric, BID, 2 tablet at 03/22/23 2014 **AND** polyethylene glycol (MIRALAX) packet 17 g, 17 g, Nasogastric, Daily PRN, 17 g at 03/22/23 1512 **AND** [DISCONTINUED] bisacodyl (DULCOLAX) EC tablet 5 mg, 5 mg, Oral, Daily PRN **AND** bisacodyl (DULCOLAX) suppository 10 mg, 10 mg, Rectal, Daily PRN, Case, Keila V., DO  •  chlorhexidine (PERIDEX) 0.12 % solution 15 mL, 15 mL, Mouth/Throat, Q12H, Vikram Zamora MD, 15 mL at 03/22/23 2014  •  dextrose (D50W) (25 g/50 mL) IV injection 25 g, 25 g, Intravenous, Q15 Min PRN, Ana Ruiz, APRN, 25 g at 03/21/23 1153  •  dextrose (GLUTOSE) oral gel 15 g, 15 g, Oral, Q15 Min PRN, Ana Ruiz APRN  •  fentaNYL 2500 mcg/250 mL NS infusion,  mcg/hr, Intravenous, Titrated, Guerda Freire APRN, Last Rate: 30 mL/hr at 03/23/23 0135, 300 mcg/hr at 03/23/23 0135  •  glucagon (GLUCAGEN) injection 1 mg, 1 mg, Intramuscular, Q15 Min PRN, Ana Ruiz APRN  •  heparin (porcine) 5000 UNIT/ML injection  5,000 Units, 5,000 Units, Subcutaneous, Q8H, Vikram Zamora MD, 5,000 Units at 03/22/23 2226  •  hydrALAZINE (APRESOLINE) injection 20 mg, 20 mg, Intravenous, Q4H PRN, Gabriela Florez DNP, APRN  •  latanoprost (XALATAN) 0.005 % ophthalmic solution 1 drop, 1 drop, Both Eyes, Daily, Vikram Zamora MD, 1 drop at 03/22/23 0847  •  Magnesium Standard Dose Replacement - Initiate Nurse / BPA Driven Protocol, , Does not apply, PRN, Guerda Freire, APRN  •  magnesium sulfate 4g/100mL (PREMIX) infusion, 4 g, Intravenous, Once, Gabriela Florez DNP, APRN, 4 g at 03/23/23 0658  •  metroNIDAZOLE (FLAGYL) IVPB 500 mg, 500 mg, Intravenous, Q6H, Zana Johnson MD, 500 mg at 03/23/23 0138  •  midazolam (VERSED) 100 mg in 100mL NS infusion, 1-10 mg/hr, Intravenous, Titrated, Case, Keila V., DO, Last Rate: 7 mL/hr at 03/23/23 0134, 7 mg/hr at 03/23/23 0134  •  midazolam (VERSED) bolus from bag 1 mg/mL 1 mg, 1 mg, Intravenous, Q30 Min PRN, Case, Keila V., DO  •  midazolam (VERSED) injection 2 mg, 2 mg, Intravenous, Q2H PRN, Case, Keila V., DO, 2 mg at 03/20/23 1023  •  pantoprazole (PROTONIX) injection 40 mg, 40 mg, Intravenous, Q AM, Negar Amezcua, APRN, 40 mg at 03/23/23 0516  •  penicillin G potassium 12 Million Units in sodium chloride 0.9 % 250 mL IVPB, 12 Million Units, Intravenous, Q12H, Zana Johnson MD, Last Rate: 20.8 mL/hr at 03/23/23 0623, 12 Million Units at 03/23/23 0623  •  phenylephrine (REYNA-SYNEPHRINE) 50 mg in sodium chloride 0.9 % 250 mL infusion, 0.5-3 mcg/kg/min, Intravenous, Titrated, Anand Frank, , Stopped at 03/18/23 4105  •  Phosphorus Replacement - Initiate Nurse / BPA Driven Protocol, , Does not apply, PRN, Guerda Freire, APRN  •  Potassium Replacement - Initiate Nurse / BPA Driven Protocol, , Does not apply, PRN, Guerda Freire, APRN  •  propofol (DIPRIVAN) infusion 10 mg/mL 100 mL, 5-50 mcg/kg/min, Intravenous, Titrated, Anand Eaton MD, Last  "Rate: 4.76 mL/hr at 03/23/23 0518, 10 mcg/kg/min at 03/23/23 0518  •  ProSource No Carb oral solution 30 mL, 30 mL, Nasogastric, Daily, Satterly, Beatrice FARIAS, RD, 30 mL at 03/22/23 0846  •  sodium chloride 0.9 % flush 10 mL, 10 mL, Intravenous, PRN, Emergency, Triage Protocol, MD  •  sodium chloride 0.9 % flush 10 mL, 10 mL, Intravenous, Q12H, Case, Keila V., DO, 10 mL at 03/22/23 2015  •  sodium chloride 0.9 % flush 10 mL, 10 mL, Intravenous, PRN, Case, Keila V., DO  •  sodium chloride 0.9 % flush 20 mL, 20 mL, Intravenous, PRN, Case, Keila V., DO  Antibiotics:  Anti-Infectives (From admission, onward)    Ordered     Dose/Rate Route Frequency Start Stop    03/20/23 1440  penicillin G potassium 12 Million Units in sodium chloride 0.9 % 250 mL IVPB        Ordering Provider: Zana Johnson MD    12 Million Units  20.8 mL/hr over 12 Hours Intravenous Every 12 Hours 03/20/23 1800 03/26/23 1759    03/20/23 1241  metroNIDAZOLE (FLAGYL) IVPB 500 mg        Ordering Provider: Zana Johnson MD    500 mg  over 30 Minutes Intravenous Every 6 Hours 03/20/23 1400 03/25/23 1359    03/16/23 2151  vancomycin 1500 mg/500 mL 0.9% NS IVPB ([facility])        Ordering Provider: Anand Eaton MD    20 mg/kg × 79.4 kg Intravenous Once 03/16/23 2153 03/16/23 2323          Allergies:  has No Known Allergies.    Review of Systems: All other reviewed and negative except as per HPI    Blood pressure 121/76, pulse 69, temperature 99.1 °F (37.3 °C), temperature source Bladder, resp. rate 16, height 185.4 cm (73\"), weight 77.9 kg (171 lb 11.8 oz), SpO2 97 %.  GENERAL: Intubated.  Sedated.  Mechanically ventilated.  HEENT: Oropharynx without thrush. Sinuses nontender. No cervical adenopathy. No carotid bruits/ jugular venous distention.  No stridor at the base of the neck.  EYES: PERRL. No conjunctival injection. No icterus. EOMI.  LYMPHATICS: No lymphadenopathy of the neck or axillary or inguinal regions.   HEART: No murmur, " gallop, or pericardial friction rub.  Right antecubital PICC exit site benign.  LUNGS: Clear to auscultation anteriorly. No percussion dullness.   ABDOMEN: Soft, nontender, nondistended. No appreciable HSM.  No peritoneal findings rebound or guarding.  SKIN: Warm and dry without cutaneous eruptions. No embolic stigmata.  Lower extremities somewhat cool to touch.  PSYCHIATRIC: Incomplete assessment.       DIAGNOSTICS:  Lab Results   Component Value Date    WBC 7.97 03/23/2023    HGB 13.3 03/23/2023    HCT 41.6 03/23/2023     03/23/2023     Lab Results   Component Value Date    CRP 2.26 (H) 03/16/2023     Lab Results   Component Value Date    SEDRATE 9 12/03/2018     Lab Results   Component Value Date    GLUCOSE 117 (H) 03/23/2023    BUN 12 03/23/2023    CREATININE 0.72 (L) 03/23/2023    EGFRIFAFRI 109 01/10/2022    BCR 16.7 03/23/2023    CO2 31.0 (H) 03/23/2023    CALCIUM 8.8 03/23/2023    ALBUMIN 2.2 (L) 03/21/2023    AST 22 03/21/2023    ALT 14 03/21/2023       Microbiology: Bio fire viral respiratory pathogen's panel unremarkable.  Blood cultures x2 with group A streptococci/favorable EDNA's to penicillin G and ceftriaxone.  MRSA nasopharyngeal screen negative.  Follow-up blood cultures from 3/20 with clearance of GIS.    RADIOLOGY:  Imaging Results (Last 72 Hours)     Procedure Component Value Units Date/Time    XR Chest 1 View [[id number]] Resulted: 03/23/23 0325     Updated: 03/23/23 0326    XR Chest 1 View [[id number]] Collected: 03/22/23 0805     Updated: 03/22/23 0809    Narrative:      XR CHEST 1 VW    Date of Exam: 3/22/2023 4:07 AM EDT    Indication: respiratory failure, intubated.    Comparison: Portable chest radiograph 3/17/2023    Findings:  ET tube stable above the radha. Esophagogastric tube below the diaphragm coiled in the stomach with the tip overlying the gastric fundus. Placement of a right upper extremity PICC with the tip at the cavoatrial junction. No pneumothorax. Increasing    bibasilar airspace disease likely atelectasis with small layering pleural effusions.      Impression:      Impression:  1. Placement of a right upper extremity PICC with the tip at the cavoatrial junction. No pneumothorax.  2. Stable ET tube and esophagogastric tube.  3. Increased bibasilar airspace disease likely atelectasis with small layering bilateral pleural effusions.    Electronically Signed: Dhaval Barksdale    3/22/2023 8:06 AM EDT    Workstation ID: [id number]    CT Soft Tissue Neck With & Without Contrast [[id number]] Collected: 03/21/23 1423     Updated: 03/21/23 1433    Narrative:      CT SOFT TISSUE NECK W WO CONTRAST    Date of Exam: 3/21/2023 1:38 PM EDT    Indication: Epiglottitis or tonsillitis suspected  Soft tissue swelling, infection suspected, neck xray done.    Comparison: 3/16/2023 CT neck and chest    Technique: Axial CT images were obtained of the neck before and after the uneventful intravenous administration of 75 mL Isovue-300.  Reconstructed coronal and sagittal images were also obtained. Automated exposure control and iterative construction   methods were used.    Findings:  Limited intracranial evaluation demonstrates no acute findings. The orbits appear normal. There is some mucosal thickening of the nasal passages and ethmoid air cells, with trace layering fluid seen in the frontal sinuses bilaterally, nonspecific in this   intubated patient. The lung apices demonstrate moderate large bilateral pleural effusions, with some adjacent mild volume loss noted. Vascular structures appear grossly patent. The parotid and submandibular glands are normal in appearance bilaterally.   Nonspecific, mildly prominent but nonenlarged cervical lymph nodes are noted, potentially reactive. Evaluation of the aerodigestive tract structures as significantly limited by intubation. There appears to be diffuse edema of the oral tongue,   nonspecific. There is also mild circumferential edema of the nasopharyngeal  and oropharyngeal soft tissues. No definite focal fluid collection is present concerning for abscess. The thyroid appears homogeneous. The osseous structures demonstrate   spondylosis changes, otherwise without evidence of fracture or aggressive osseous lesion.      Impression:      Impression:  Circumferential and somewhat diffuse edema of the oral tongue, nasopharynx and oropharynx is present, entirely nonspecific in this intubated patient, with adjacent numerous small cervical lymph nodes present, potentially reactive in the setting of   suspected infection. There is no definite focal fluid collection present concerning for abscess.    Incidentally noted large bilateral pleural effusions are seen at the lung apices, with some mild adjacent atelectasis. These findings were not apparent on CT of the chest performed 3/16/2023.    Electronically Signed: Jason Carlin    3/21/2023 2:30 PM EDT    Workstation ID: [id number]          Assessment and Plan: Severe sepsis.  Group A beta-hemolytic streptococcal bacteremia.  Adult epiglottitis.  Compromised airway.  Leukocytosis.  Lactic acidosis.  Macrocytic anemia.  Hypophosphatemia.  Persistent fever/now resolved.  Maximum temperature over 24 hours 99.5.  Currently 99.1.  Pulse 69, respiratory 16, blood pressure 121/76 mmHg with an O2 saturation of 97% on an FiO2 of 0.3 with 5 cm of PEEP.  BUN/creatinine 12/0.72.  Peripheral leukocyte count 7.97.  No new culture data.  Today's chest x-ray reviewed.  Endotracheal tube 6 cm above the radha.  Nasogastric tube.  Right antecubital PICC.  Some haziness in the left base with mild blunting of the costophrenic sulcus.  Plans for tracheostomy and PEG placement in the operating room today.  Continuous infusion penicillin 24,000,000 units every 24 hours and intravenous metronidazole continue.  I am out of town tomorrow.  JUDE FISHER will cover.      Zana Johnson MD  3/23/2023

## 2023-03-23 NOTE — PLAN OF CARE
Goal Outcome Evaluation:  Plan of Care Reviewed With: patient        Progress: improving     Patient on and off jody, trach and peg per Dr. Menendez, weaned off propofol and versed, on fentanyl, awake to voice, lasix x 1 this pm when  s , UOP 2.9 L, mag replaced, continue pcn, BUE restraints continues, hypothermic after surgery, lyudmila dash applied, family at bedside and updated, TF restarted at 1900.

## 2023-03-23 NOTE — PROGRESS NOTES
"Patient Name:  Sandro Junior  YOB: 1955  [id number]    Surgery Progress Note    Date of visit: 3/23/2023      Subjective: No significant events overnight.  Remains intubated and mechanically ventilated.  FiO2 of 30% PEEP of 5.  Tube feeds have been held since midnight.  Hemodynamically stable not requiring vasopressor support          Objective:     /76   Pulse 69   Temp 99.1 °F (37.3 °C) (Bladder)   Resp 16   Ht 185.4 cm (73\")   Wt 77.9 kg (171 lb 11.8 oz)   SpO2 97%   BMI 22.66 kg/m²     Intake/Output Summary (Last 24 hours) at 3/23/2023 0712  Last data filed at 3/23/2023 0620  Gross per 24 hour   Intake 3899.23 ml   Output 4205 ml   Net -305.77 ml       GEN:   Critically ill, sedated, intubated, resting in bed  CV:   Regular rate and rhythm  L:  Symmetric expansion, mechanically ventilated  Abd:  Soft, no distress to palpation, not obviously distended, lower midline scar  Ext:  No cyanosis, clubbing, or edema    Recent labs that are back at this time have been reviewed.           Assessment/ Plan:    Mr. Junior is a 68-year-old gentleman with history of rectal cancer status post resection and hypertension who I been asked to evaluate for tracheostomy and PEG placement.  He is not on anticoagulation.    #Respiratory failure, feeding difficulty  -Tentative plan to proceed to the operating room for tracheostomy and PEG placement today  -Please continue to hold tube feeds      Néstor Jerome MD  3/23/2023  07:12 EDT      "

## 2023-03-23 NOTE — ANESTHESIA POSTPROCEDURE EVALUATION
Patient: Sandro Junior    Procedure Summary     Date: 03/23/23 Room / Location:  [facility] / [facility]    Anesthesia Start: 1334 Anesthesia Stop: 1442    Procedure: TRACHEOSTOMY AND PERCUTANEOUS ENDOSCOPIC GASTROSTOMY TUBE INSERTION (Neck) Diagnosis:     Surgeons: Néstor Jerome MD Provider: Sinan Mayorga MD    Anesthesia Type: general ASA Status: 4          Anesthesia Type: general    Vitals  Vitals Value Taken Time   BP     Temp     Pulse 61 03/23/23 1447   Resp     SpO2 96 % 03/23/23 1447   Vitals shown include unvalidated device data.        Post Anesthesia Care and Evaluation    Patient location during evaluation: ICU  Patient participation: complete - patient participated  Post-procedure mental status: Sedated.  Pain score: 0  Pain management: adequate    Airway patency: patent  Anesthetic complications: No anesthetic complications  PONV Status: none  Cardiovascular status: hemodynamically stable and acceptable  Respiratory status: acceptable, trach and ventilator  Hydration status: acceptable    Comments: Pt transferred to the ICU, Hemodynamics stable, Reversed pt with 300 Sugammadex in the ICU. No complications during transport.

## 2023-03-23 NOTE — ANESTHESIA PREPROCEDURE EVALUATION
Anesthesia Evaluation     Patient summary reviewed and Nursing notes reviewed   no history of anesthetic complications:  NPO Solid Status: > 8 hours  NPO Liquid Status: > 2 hours           Airway   Comment: INTUBATED  Dental      Pulmonary    (+) a smoker Former Abstained day of surgery, decreased breath sounds,     ROS comment: ACUTE RESPIRATORY FAILURE  Cardiovascular   Exercise tolerance: poor (<4 METS)    ECG reviewed  Rhythm: regular  Rate: normal    (+) hypertension well controlled 2 medications or greater,     ROS comment: Interpretation Summary       •  Left ventricular systolic function is normal. Calculated left ventricular 3D EF = 56% Left ventricular ejection fraction appears to be 51 - 55%.  •  Left ventricular diastolic function was normal.  •  Estimated right ventricular systolic pressure from tricuspid regurgitation is normal (<35 mmHg).         Neuro/Psych  (+) psychiatric history,    GI/Hepatic/Renal/Endo    (+)  GERD well controlled,      Musculoskeletal     Abdominal   (-) obese   Substance History      OB/GYN          Other   arthritis,    history of cancer                  Anesthesia Plan    ASA 4     general     intravenous induction     Anesthetic plan, risks, benefits, and alternatives have been provided, discussed and informed consent has been obtained with: patient.    Plan discussed with CRNA.        CODE STATUS:    Code Status (Patient has no pulse and is not breathing): CPR (Attempt to Resuscitate)  Medical Interventions (Patient has pulse or is breathing): Full Support

## 2023-03-24 ENCOUNTER — APPOINTMENT (OUTPATIENT)
Dept: GENERAL RADIOLOGY | Facility: HOSPITAL | Age: 68
DRG: 4 | End: 2023-03-24
Payer: MEDICARE

## 2023-03-24 LAB
ANION GAP SERPL CALCULATED.3IONS-SCNC: 12 MMOL/L (ref 5–15)
BUN SERPL-MCNC: 17 MG/DL (ref 8–23)
BUN/CREAT SERPL: 21.3 (ref 7–25)
CALCIUM SPEC-SCNC: 8.8 MG/DL (ref 8.6–10.5)
CHLORIDE SERPL-SCNC: 98 MMOL/L (ref 98–107)
CO2 SERPL-SCNC: 25 MMOL/L (ref 22–29)
CREAT SERPL-MCNC: 0.8 MG/DL (ref 0.76–1.27)
DEPRECATED RDW RBC AUTO: 48.9 FL (ref 37–54)
EGFRCR SERPLBLD CKD-EPI 2021: 97.6 ML/MIN/1.73
ERYTHROCYTE [DISTWIDTH] IN BLOOD BY AUTOMATED COUNT: 13.4 % (ref 12.3–15.4)
GLUCOSE BLDC GLUCOMTR-MCNC: 202 MG/DL (ref 70–130)
GLUCOSE BLDC GLUCOMTR-MCNC: 209 MG/DL (ref 70–130)
GLUCOSE BLDC GLUCOMTR-MCNC: 214 MG/DL (ref 70–130)
GLUCOSE BLDC GLUCOMTR-MCNC: 227 MG/DL (ref 70–130)
GLUCOSE SERPL-MCNC: 230 MG/DL (ref 65–99)
HCT VFR BLD AUTO: 38.7 % (ref 37.5–51)
HGB BLD-MCNC: 12.5 G/DL (ref 13–17.7)
MAGNESIUM SERPL-MCNC: 2.1 MG/DL (ref 1.6–2.4)
MCH RBC QN AUTO: 32.1 PG (ref 26.6–33)
MCHC RBC AUTO-ENTMCNC: 32.3 G/DL (ref 31.5–35.7)
MCV RBC AUTO: 99.2 FL (ref 79–97)
PHOSPHATE SERPL-MCNC: 3.1 MG/DL (ref 2.5–4.5)
PLATELET # BLD AUTO: 211 10*3/MM3 (ref 140–450)
PMV BLD AUTO: 9.4 FL (ref 6–12)
POTASSIUM SERPL-SCNC: 3.8 MMOL/L (ref 3.5–5.2)
RBC # BLD AUTO: 3.9 10*6/MM3 (ref 4.14–5.8)
SODIUM SERPL-SCNC: 135 MMOL/L (ref 136–145)
WBC NRBC COR # BLD: 9.87 10*3/MM3 (ref 3.4–10.8)

## 2023-03-24 PROCEDURE — 25010000002 HEPARIN (PORCINE) PER 1000 UNITS: Performed by: SURGERY

## 2023-03-24 PROCEDURE — 82962 GLUCOSE BLOOD TEST: CPT

## 2023-03-24 PROCEDURE — 94799 UNLISTED PULMONARY SVC/PX: CPT

## 2023-03-24 PROCEDURE — 80048 BASIC METABOLIC PNL TOTAL CA: CPT | Performed by: INTERNAL MEDICINE

## 2023-03-24 PROCEDURE — 84100 ASSAY OF PHOSPHORUS: CPT | Performed by: SURGERY

## 2023-03-24 PROCEDURE — 85027 COMPLETE CBC AUTOMATED: CPT | Performed by: SURGERY

## 2023-03-24 PROCEDURE — 71045 X-RAY EXAM CHEST 1 VIEW: CPT

## 2023-03-24 PROCEDURE — 25010000002 HYDRALAZINE PER 20 MG: Performed by: SURGERY

## 2023-03-24 PROCEDURE — 99233 SBSQ HOSP IP/OBS HIGH 50: CPT | Performed by: INTERNAL MEDICINE

## 2023-03-24 PROCEDURE — 83735 ASSAY OF MAGNESIUM: CPT | Performed by: SURGERY

## 2023-03-24 PROCEDURE — 94003 VENT MGMT INPAT SUBQ DAY: CPT

## 2023-03-24 PROCEDURE — 94761 N-INVAS EAR/PLS OXIMETRY MLT: CPT

## 2023-03-24 PROCEDURE — 25010000002 FENTANYL CITRATE-NACL 2.5-0.9 MG/250ML-% SOLUTION: Performed by: SURGERY

## 2023-03-24 PROCEDURE — 25010000002 PENICILLIN G POTASSIUM PER 600000 UNITS

## 2023-03-24 PROCEDURE — 25010000002 FUROSEMIDE PER 20 MG: Performed by: INTERNAL MEDICINE

## 2023-03-24 RX ORDER — FUROSEMIDE 10 MG/ML
40 INJECTION INTRAMUSCULAR; INTRAVENOUS ONCE
Status: COMPLETED | OUTPATIENT
Start: 2023-03-24 | End: 2023-03-24

## 2023-03-24 RX ORDER — AMINO ACIDS/PROTEIN HYDROLYS 11G-40/45
30 LIQUID IN PACKET (ML) ORAL DAILY
Status: DISCONTINUED | OUTPATIENT
Start: 2023-03-25 | End: 2023-03-30

## 2023-03-24 RX ORDER — DEXMEDETOMIDINE HYDROCHLORIDE 4 UG/ML
.2-1.5 INJECTION, SOLUTION INTRAVENOUS
Status: DISCONTINUED | OUTPATIENT
Start: 2023-03-24 | End: 2023-03-27

## 2023-03-24 RX ORDER — ACETAMINOPHEN 160 MG/5ML
650 SOLUTION ORAL EVERY 6 HOURS PRN
Status: DISCONTINUED | OUTPATIENT
Start: 2023-03-24 | End: 2023-04-11 | Stop reason: HOSPADM

## 2023-03-24 RX ORDER — AMOXICILLIN 250 MG
2 CAPSULE ORAL 2 TIMES DAILY
Status: DISCONTINUED | OUTPATIENT
Start: 2023-03-24 | End: 2023-04-06

## 2023-03-24 RX ORDER — POLYETHYLENE GLYCOL 3350 17 G/17G
17 POWDER, FOR SOLUTION ORAL DAILY PRN
Status: DISCONTINUED | OUTPATIENT
Start: 2023-03-24 | End: 2023-04-06

## 2023-03-24 RX ORDER — BISACODYL 10 MG
10 SUPPOSITORY, RECTAL RECTAL DAILY PRN
Status: DISCONTINUED | OUTPATIENT
Start: 2023-03-24 | End: 2023-04-06

## 2023-03-24 RX ADMIN — SENNOSIDES AND DOCUSATE SODIUM 2 TABLET: 8.6; 5 TABLET ORAL at 21:09

## 2023-03-24 RX ADMIN — SODIUM CHLORIDE 12 MILLION UNITS: 9 INJECTION, SOLUTION INTRAVENOUS at 09:48

## 2023-03-24 RX ADMIN — Medication 10 ML: at 09:08

## 2023-03-24 RX ADMIN — Medication 200 MCG/HR: at 10:33

## 2023-03-24 RX ADMIN — HEPARIN SODIUM 5000 UNITS: 5000 INJECTION, SOLUTION INTRAVENOUS; SUBCUTANEOUS at 14:06

## 2023-03-24 RX ADMIN — METRONIDAZOLE 500 MG: 500 INJECTION, SOLUTION INTRAVENOUS at 08:40

## 2023-03-24 RX ADMIN — METRONIDAZOLE 500 MG: 500 INJECTION, SOLUTION INTRAVENOUS at 21:09

## 2023-03-24 RX ADMIN — DEXMEDETOMIDINE HYDROCHLORIDE 0.6 MCG/KG/HR: 4 INJECTION, SOLUTION INTRAVENOUS at 18:37

## 2023-03-24 RX ADMIN — PANTOPRAZOLE SODIUM 40 MG: 40 INJECTION, POWDER, LYOPHILIZED, FOR SOLUTION INTRAVENOUS at 05:43

## 2023-03-24 RX ADMIN — HEPARIN SODIUM 5000 UNITS: 5000 INJECTION, SOLUTION INTRAVENOUS; SUBCUTANEOUS at 05:43

## 2023-03-24 RX ADMIN — SENNOSIDES AND DOCUSATE SODIUM 2 TABLET: 8.6; 5 TABLET ORAL at 09:09

## 2023-03-24 RX ADMIN — Medication 300 MCG/HR: at 02:58

## 2023-03-24 RX ADMIN — Medication 10 ML: at 21:10

## 2023-03-24 RX ADMIN — Medication 30 ML: at 09:15

## 2023-03-24 RX ADMIN — Medication 200 MCG/HR: at 23:30

## 2023-03-24 RX ADMIN — SODIUM CHLORIDE 12 MILLION UNITS: 9 INJECTION, SOLUTION INTRAVENOUS at 23:03

## 2023-03-24 RX ADMIN — DEXMEDETOMIDINE HYDROCHLORIDE 0.2 MCG/KG/HR: 4 INJECTION, SOLUTION INTRAVENOUS at 09:04

## 2023-03-24 RX ADMIN — METRONIDAZOLE 500 MG: 500 INJECTION, SOLUTION INTRAVENOUS at 01:32

## 2023-03-24 RX ADMIN — HEPARIN SODIUM 5000 UNITS: 5000 INJECTION, SOLUTION INTRAVENOUS; SUBCUTANEOUS at 21:09

## 2023-03-24 RX ADMIN — METRONIDAZOLE 500 MG: 500 INJECTION, SOLUTION INTRAVENOUS at 14:06

## 2023-03-24 RX ADMIN — LATANOPROST 1 DROP: 50 SOLUTION OPHTHALMIC at 09:09

## 2023-03-24 RX ADMIN — CHLORHEXIDINE GLUCONATE 0.12% ORAL RINSE 15 ML: 1.2 LIQUID ORAL at 09:08

## 2023-03-24 RX ADMIN — HYDRALAZINE HYDROCHLORIDE 20 MG: 20 INJECTION INTRAMUSCULAR; INTRAVENOUS at 21:09

## 2023-03-24 RX ADMIN — HYDRALAZINE HYDROCHLORIDE 20 MG: 20 INJECTION INTRAMUSCULAR; INTRAVENOUS at 03:09

## 2023-03-24 RX ADMIN — FUROSEMIDE 40 MG: 10 INJECTION, SOLUTION INTRAMUSCULAR; INTRAVENOUS at 09:08

## 2023-03-24 RX ADMIN — CHLORHEXIDINE GLUCONATE 0.12% ORAL RINSE 15 ML: 1.2 LIQUID ORAL at 21:09

## 2023-03-24 NOTE — PLAN OF CARE
Problem: Adult Inpatient Plan of Care  Goal: Plan of Care Review  Outcome: Ongoing, Progressing  Flowsheets  Taken 3/24/2023 0631 by Montserrat Temple RN  Progress: no change  Outcome Evaluation: Patient trached on minimal vent settings, FiO2 30%, PEEP 5. Fentanyl cont. unable to wean due to agitation, arousals to voice. Follows commands at times. Tylenol given x1 for fever. Albumin given x1 for low BP. Tolerating TF at goal rate. PCN gtt infusing.  Taken 3/23/2023 1937 by Alicia Sterling RN  Plan of Care Reviewed With: patient  Goal: Patient-Specific Goal (Individualized)  Outcome: Ongoing, Progressing  Goal: Absence of Hospital-Acquired Illness or Injury  Outcome: Ongoing, Progressing  Intervention: Identify and Manage Fall Risk  Recent Flowsheet Documentation  Taken 3/24/2023 0625 by Montserrat Temple RN  Safety Promotion/Fall Prevention:   activity supervised   assistive device/personal items within reach   clutter free environment maintained   fall prevention program maintained   nonskid shoes/slippers when out of bed   room organization consistent   safety round/check completed   toileting scheduled  Taken 3/24/2023 0430 by Montserrat Temple RN  Safety Promotion/Fall Prevention:   activity supervised   assistive device/personal items within reach   clutter free environment maintained   fall prevention program maintained   nonskid shoes/slippers when out of bed   safety round/check completed   room organization consistent   toileting scheduled  Taken 3/24/2023 0200 by Montserrat Temple RN  Safety Promotion/Fall Prevention:   activity supervised   assistive device/personal items within reach   clutter free environment maintained   fall prevention program maintained   nonskid shoes/slippers when out of bed   room organization consistent   safety round/check completed   toileting scheduled  Taken 3/24/2023 0000 by Montserrat Temple, RN  Safety Promotion/Fall Prevention:   activity supervised   assistive  device/personal items within reach   clutter free environment maintained   fall prevention program maintained   nonskid shoes/slippers when out of bed   room organization consistent   safety round/check completed   toileting scheduled  Taken 3/23/2023 2200 by Montserrat Temple RN  Safety Promotion/Fall Prevention:   activity supervised   clutter free environment maintained   assistive device/personal items within reach   fall prevention program maintained   nonskid shoes/slippers when out of bed   room organization consistent   safety round/check completed   toileting scheduled  Taken 3/23/2023 2030 by Montserrat Temple RN  Safety Promotion/Fall Prevention:   activity supervised   assistive device/personal items within reach   clutter free environment maintained   fall prevention program maintained   nonskid shoes/slippers when out of bed   room organization consistent   safety round/check completed   toileting scheduled  Intervention: Prevent Skin Injury  Recent Flowsheet Documentation  Taken 3/24/2023 0625 by Montserrat Temple RN  Body Position:   turned   right   upper extremity elevated   lower extremity elevated  Skin Protection:   adhesive use limited   incontinence pads utilized   tubing/devices free from skin contact   transparent dressing maintained   skin-to-skin areas padded   skin-to-device areas padded  Taken 3/24/2023 0430 by Montserrat Temple RN  Body Position:   left   turned   lower extremity elevated   upper extremity elevated  Skin Protection:   adhesive use limited   incontinence pads utilized   tubing/devices free from skin contact   transparent dressing maintained   skin-to-skin areas padded   skin-to-device areas padded  Taken 3/24/2023 0200 by Montserrat Temple RN  Body Position:   weight shifting   supine   upper extremity elevated   lower extremity elevated  Skin Protection:   adhesive use limited   incontinence pads utilized   tubing/devices free from skin contact   transparent dressing  maintained   skin-to-skin areas padded   skin-to-device areas padded  Taken 3/24/2023 0000 by Montserrat Temple RN  Body Position:   right   turned   lower extremity elevated   upper extremity elevated  Skin Protection:   adhesive use limited   incontinence pads utilized   tubing/devices free from skin contact   transparent dressing maintained   skin-to-skin areas padded   skin-to-device areas padded  Taken 3/23/2023 2200 by Montserrat Temple RN  Body Position:   left   turned   lower extremity elevated   upper extremity elevated  Skin Protection:   adhesive use limited   incontinence pads utilized   tubing/devices free from skin contact   transparent dressing maintained   skin-to-skin areas padded   skin-to-device areas padded  Taken 3/23/2023 2030 by Montserrat eTmple RN  Body Position:   right   turned   lower extremity elevated   upper extremity elevated  Skin Protection:   adhesive use limited   incontinence pads utilized   tubing/devices free from skin contact   transparent dressing maintained   skin-to-skin areas padded   skin-to-device areas padded  Intervention: Prevent and Manage VTE (Venous Thromboembolism) Risk  Recent Flowsheet Documentation  Taken 3/24/2023 0625 by Montserrat Temple RN  Activity Management: bedrest  Taken 3/24/2023 0430 by Montserrat Temple RN  Activity Management: bedrest  VTE Prevention/Management:   bilateral   sequential compression devices off  Taken 3/24/2023 0200 by Montserrat Temple RN  Activity Management: bedrest  Range of Motion:   active ROM (range of motion) encouraged   ROM (range of motion) performed  Taken 3/24/2023 0000 by Montserrat Temple RN  Activity Management: bedrest  VTE Prevention/Management:   bilateral   sequential compression devices off  Taken 3/23/2023 2200 by Montserrat Temple RN  Activity Management: bedrest  VTE Prevention/Management:   bilateral   sequential compression devices off  Taken 3/23/2023 2030 by Montserrat Temple RN  Activity Management: bedrest  VTE  Prevention/Management:   bilateral   sequential compression devices on  Intervention: Prevent Infection  Recent Flowsheet Documentation  Taken 3/24/2023 0625 by Montserrat Temple RN  Infection Prevention:   environmental surveillance performed   hand hygiene promoted   visitors restricted/screened   single patient room provided  Taken 3/24/2023 0430 by Montserrat Temple RN  Infection Prevention:   environmental surveillance performed   hand hygiene promoted   single patient room provided   visitors restricted/screened  Taken 3/24/2023 0200 by Montserrat Temple RN  Infection Prevention:   environmental surveillance performed   hand hygiene promoted   single patient room provided   visitors restricted/screened  Taken 3/24/2023 0000 by Montserrat Temple RN  Infection Prevention:   environmental surveillance performed   hand hygiene promoted   single patient room provided   visitors restricted/screened  Taken 3/23/2023 2200 by Montserrat Temple RN  Infection Prevention:   environmental surveillance performed   hand hygiene promoted   single patient room provided   visitors restricted/screened  Taken 3/23/2023 2030 by Montserrat Temple RN  Infection Prevention:   environmental surveillance performed   hand hygiene promoted   single patient room provided   visitors restricted/screened  Goal: Optimal Comfort and Wellbeing  Outcome: Ongoing, Progressing  Intervention: Monitor Pain and Promote Comfort  Recent Flowsheet Documentation  Taken 3/24/2023 0430 by Montserrat Temple RN  Pain Management Interventions: around-the-clock dosing utilized  Taken 3/24/2023 0200 by Montserrat Temple RN  Pain Management Interventions: around-the-clock dosing utilized  Taken 3/23/2023 2030 by Montserrat Temple RN  Pain Management Interventions:   around-the-clock dosing utilized   pillow support provided   position adjusted  Intervention: Provide Person-Centered Care  Recent Flowsheet Documentation  Taken 3/24/2023 0625 by Montserrat Temple RN  Trust  Relationship/Rapport:   care explained   reassurance provided  Taken 3/24/2023 0430 by Montserrat Temple RN  Trust Relationship/Rapport:   care explained   reassurance provided  Taken 3/24/2023 0200 by Montserrat Temple RN  Trust Relationship/Rapport:   care explained   reassurance provided  Taken 3/24/2023 0000 by Montserrat Temple RN  Trust Relationship/Rapport:   care explained   reassurance provided  Taken 3/23/2023 2200 by Montserrat Temple RN  Trust Relationship/Rapport:   care explained   reassurance provided  Taken 3/23/2023 2030 by Montserrat Temple RN  Trust Relationship/Rapport:   care explained   reassurance provided  Goal: Readiness for Transition of Care  Outcome: Ongoing, Progressing     Problem: Skin Injury Risk Increased  Goal: Skin Health and Integrity  Outcome: Ongoing, Progressing  Intervention: Optimize Skin Protection  Recent Flowsheet Documentation  Taken 3/24/2023 0625 by Montserrat Temple RN  Pressure Reduction Techniques:   weight shift assistance provided   heels elevated off bed   positioned off wounds   pressure points protected   rest period provided between sit times  Head of Bed (HOB) Positioning: HOB at 30-45 degrees  Pressure Reduction Devices:   specialty bed utilized   pressure-redistributing mattress utilized   positioning supports utilized   heel offloading device utilized  Skin Protection:   adhesive use limited   incontinence pads utilized   tubing/devices free from skin contact   transparent dressing maintained   skin-to-skin areas padded   skin-to-device areas padded  Taken 3/24/2023 0430 by Montserrat Temple RN  Pressure Reduction Techniques:   weight shift assistance provided   heels elevated off bed   positioned off wounds   pressure points protected   rest period provided between sit times  Head of Bed (HOB) Positioning: HOB at 30-45 degrees  Pressure Reduction Devices:   specialty bed utilized   pressure-redistributing mattress utilized   positioning supports utilized   heel  offloading device utilized  Skin Protection:   adhesive use limited   incontinence pads utilized   tubing/devices free from skin contact   transparent dressing maintained   skin-to-skin areas padded   skin-to-device areas padded  Taken 3/24/2023 0200 by Montserrat Temple RN  Pressure Reduction Techniques:   weight shift assistance provided   heels elevated off bed   positioned off wounds   rest period provided between sit times  Head of Bed (HOB) Positioning: HOB at 30-45 degrees  Pressure Reduction Devices:   specialty bed utilized   pressure-redistributing mattress utilized   positioning supports utilized   heel offloading device utilized  Skin Protection:   adhesive use limited   incontinence pads utilized   tubing/devices free from skin contact   transparent dressing maintained   skin-to-skin areas padded   skin-to-device areas padded  Taken 3/24/2023 0000 by Montserrat Temple RN  Pressure Reduction Techniques:   weight shift assistance provided   positioned off wounds   heels elevated off bed   rest period provided between sit times   pressure points protected  Head of Bed (HOB) Positioning: HOB at 30-45 degrees  Pressure Reduction Devices:   specialty bed utilized   pressure-redistributing mattress utilized   positioning supports utilized   heel offloading device utilized  Skin Protection:   adhesive use limited   incontinence pads utilized   tubing/devices free from skin contact   transparent dressing maintained   skin-to-skin areas padded   skin-to-device areas padded  Taken 3/23/2023 2200 by Montserrat Temple RN  Pressure Reduction Techniques:   weight shift assistance provided   heels elevated off bed   positioned off wounds   pressure points protected   rest period provided between sit times  Head of Bed (HOB) Positioning: HOB at 30 degrees  Pressure Reduction Devices:   specialty bed utilized   pressure-redistributing mattress utilized   positioning supports utilized   heel offloading device utilized  Skin  Protection:   adhesive use limited   incontinence pads utilized   tubing/devices free from skin contact   transparent dressing maintained   skin-to-skin areas padded   skin-to-device areas padded  Taken 3/23/2023 2030 by Montserrat Temple RN  Pressure Reduction Techniques:   weight shift assistance provided   heels elevated off bed   positioned off wounds   pressure points protected   rest period provided between sit times  Head of Bed (HOB) Positioning: HOB at 30 degrees  Pressure Reduction Devices:   specialty bed utilized   pressure-redistributing mattress utilized   positioning supports utilized   heel offloading device utilized  Skin Protection:   adhesive use limited   incontinence pads utilized   tubing/devices free from skin contact   transparent dressing maintained   skin-to-skin areas padded   skin-to-device areas padded     Problem: Restraint, Nonviolent  Goal: Absence of Harm or Injury  Outcome: Ongoing, Progressing  Intervention: Implement Least Restrictive Safety Strategies  Recent Flowsheet Documentation  Taken 3/24/2023 0625 by Montserrat Temple RN  Medical Device Protection: tubing secured  Diversional Activities: television  Taken 3/24/2023 0600 by Montserrat Temple RN  Medical Device Protection: tubing secured  Less Restrictive Alternative:   bed alarm in use   calming techniques promoted   sensory stimulation limited  De-Escalation Techniques:   increased round frequency   medication administered   quiet time facilitated   reoriented   stimulation decreased   verbally redirected  Diversional Activities: television  Taken 3/24/2023 0430 by Montserrat Temple RN  Medical Device Protection: tubing secured  Diversional Activities: television  Taken 3/24/2023 0400 by Montserrat Temple RN  Medical Device Protection: tubing secured  Less Restrictive Alternative:   bed alarm in use   calming techniques promoted   sensory stimulation limited  De-Escalation Techniques:   increased round frequency   medication  administered   quiet time facilitated   reoriented   verbally redirected   stimulation decreased  Diversional Activities: television  Taken 3/24/2023 0200 by Montserrat Temple RN  Medical Device Protection: tubing secured  Less Restrictive Alternative:   bed alarm in use   calming techniques promoted   sensory stimulation limited  De-Escalation Techniques:   increased round frequency   medication administered   reoriented   stimulation decreased   verbally redirected  Diversional Activities: television  Taken 3/24/2023 0035 by Montserrat Temple RN  Medical Device Protection: tubing secured  Less Restrictive Alternative:   bed alarm in use   calming techniques promoted   sensory stimulation limited  De-Escalation Techniques:   medication administered   quiet time facilitated   reoriented   stimulation decreased   verbally redirected  Diversional Activities: television  Taken 3/24/2023 0000 by Montserrat Temple RN  Medical Device Protection: tubing secured  Less Restrictive Alternative:   bed alarm in use   calming techniques promoted   sensory stimulation limited  De-Escalation Techniques:   medication administered   quiet time facilitated   reoriented   verbally redirected   stimulation decreased  Diversional Activities: television  Taken 3/23/2023 2200 by Montserrat Temple RN  Medical Device Protection: tubing secured  Less Restrictive Alternative:   bed alarm in use   calming techniques promoted   sensory stimulation limited  De-Escalation Techniques:   quiet time facilitated   reoriented   medication administered   verbally redirected   stimulation decreased  Diversional Activities: television  Taken 3/23/2023 2030 by Montserrat Temple RN  Medical Device Protection: tubing secured  Diversional Activities: television  Taken 3/23/2023 2000 by Montserrat Temple RN  Medical Device Protection: tubing secured  Less Restrictive Alternative:   bed alarm in use   calming techniques promoted   sensory stimulation  limited  De-Escalation Techniques:   quiet time facilitated   reoriented   stimulation decreased   verbally redirected  Diversional Activities: television  Intervention: Protect Dignity, Rights, and Personal Wellbeing  Recent Flowsheet Documentation  Taken 3/24/2023 0625 by Montserrat Temple RN  Trust Relationship/Rapport:   care explained   reassurance provided  Taken 3/24/2023 0430 by Montserrat Temple RN  Trust Relationship/Rapport:   care explained   reassurance provided  Taken 3/24/2023 0200 by Montserrat Temple RN  Trust Relationship/Rapport:   care explained   reassurance provided  Taken 3/24/2023 0000 by Montserrat Temple RN  Trust Relationship/Rapport:   care explained   reassurance provided  Taken 3/23/2023 2200 by Montserrat Temple RN  Trust Relationship/Rapport:   care explained   reassurance provided  Taken 3/23/2023 2030 by Montserrat Temple RN  Trust Relationship/Rapport:   care explained   reassurance provided  Intervention: Protect Skin and Joint Integrity  Recent Flowsheet Documentation  Taken 3/24/2023 0625 by Montserrat Temple RN  Body Position:   turned   right   upper extremity elevated   lower extremity elevated  Taken 3/24/2023 0430 by Montserrat Temple RN  Body Position:   left   turned   lower extremity elevated   upper extremity elevated  Taken 3/24/2023 0200 by Montserrat Temple RN  Body Position:   weight shifting   supine   upper extremity elevated   lower extremity elevated  Range of Motion:   active ROM (range of motion) encouraged   ROM (range of motion) performed  Taken 3/24/2023 0000 by Montserrat Temple RN  Body Position:   right   turned   lower extremity elevated   upper extremity elevated  Taken 3/23/2023 2200 by Montserrat Temple RN  Body Position:   left   turned   lower extremity elevated   upper extremity elevated  Taken 3/23/2023 2030 by Montserrat Temple RN  Body Position:   right   turned   lower extremity elevated   upper extremity elevated     Problem: Fall Injury Risk  Goal:  Absence of Fall and Fall-Related Injury  Outcome: Ongoing, Progressing  Intervention: Identify and Manage Contributors  Recent Flowsheet Documentation  Taken 3/24/2023 0625 by Montserrat Temple RN  Medication Review/Management: medications reviewed  Taken 3/24/2023 0430 by Montserrat Temple RN  Medication Review/Management: medications reviewed  Taken 3/24/2023 0200 by Montserrat Temple RN  Medication Review/Management: medications reviewed  Taken 3/24/2023 0000 by Montserrat Temple RN  Medication Review/Management: medications reviewed  Taken 3/23/2023 2200 by Montserrat Temple RN  Medication Review/Management: medications reviewed  Taken 3/23/2023 2030 by Montserrat Temple RN  Medication Review/Management: medications reviewed  Intervention: Promote Injury-Free Environment  Recent Flowsheet Documentation  Taken 3/24/2023 0625 by Montserrat Temple RN  Safety Promotion/Fall Prevention:   activity supervised   assistive device/personal items within reach   clutter free environment maintained   fall prevention program maintained   nonskid shoes/slippers when out of bed   room organization consistent   safety round/check completed   toileting scheduled  Taken 3/24/2023 0430 by Montserrat Temple RN  Safety Promotion/Fall Prevention:   activity supervised   assistive device/personal items within reach   clutter free environment maintained   fall prevention program maintained   nonskid shoes/slippers when out of bed   safety round/check completed   room organization consistent   toileting scheduled  Taken 3/24/2023 0200 by Montserrat Temple RN  Safety Promotion/Fall Prevention:   activity supervised   assistive device/personal items within reach   clutter free environment maintained   fall prevention program maintained   nonskid shoes/slippers when out of bed   room organization consistent   safety round/check completed   toileting scheduled  Taken 3/24/2023 0000 by Montserrat Temple RN  Safety Promotion/Fall Prevention:   activity  supervised   assistive device/personal items within reach   clutter free environment maintained   fall prevention program maintained   nonskid shoes/slippers when out of bed   room organization consistent   safety round/check completed   toileting scheduled  Taken 3/23/2023 2200 by Montserrat Temple RN  Safety Promotion/Fall Prevention:   activity supervised   clutter free environment maintained   assistive device/personal items within reach   fall prevention program maintained   nonskid shoes/slippers when out of bed   room organization consistent   safety round/check completed   toileting scheduled  Taken 3/23/2023 2030 by Montserrat Temple RN  Safety Promotion/Fall Prevention:   activity supervised   assistive device/personal items within reach   clutter free environment maintained   fall prevention program maintained   nonskid shoes/slippers when out of bed   room organization consistent   safety round/check completed   toileting scheduled   Goal Outcome Evaluation:           Progress: no change  Outcome Evaluation: Patient trached on minimal vent settings, FiO2 30%, PEEP 5. Fentanyl cont. unable to wean due to agitation, arousals to voice. Follows commands at times. Tylenol given x1 for fever. Albumin given x1 for low BP. Tolerating TF at goal rate. PCN gtt infusing.

## 2023-03-24 NOTE — PROGRESS NOTES
"Patient Name:  Sandro Junior  YOB: 1955  [id number]    Surgery Progress Note    Date of visit: 3/24/2023      Subjective: No acute events.  Doing well.  FiO2 of 35% and PEEP of 5.  Tolerating tube feeds.  More awake and alert          Objective:     /63   Pulse 108   Temp 99.9 °F (37.7 °C) (Bladder)   Resp 16   Ht 185.4 cm (72.99\")   Wt 77.9 kg (171 lb 11.8 oz)   SpO2 93%   BMI 22.66 kg/m²     Intake/Output Summary (Last 24 hours) at 3/24/2023 0690  Last data filed at 3/24/2023 0535  Gross per 24 hour   Intake 2719.88 ml   Output 4552 ml   Net -1832.12 ml       GEN:   Awake, sitting up in bed, no obvious distress, sedated  CV:   Regular rate and rhythm  L:  Symmetric expansion, mechanically ventilated, tracheostomy site is appropriate with scant drainage surrounding the site  Abd:  Soft, not distended, PEG in place in left upper quadrant with bolster at 3 cm at the skin, appropriate mild tenderness around the site  Ext:  No cyanosis, clubbing, or edema    Recent labs that are back at this time have been reviewed.           Assessment/ Plan:    Mr. Junior is a 68-year-old gentleman with history of rectal cancer status post resection and hypertension who I been asked to evaluate for tracheostomy and PEG placement.  He is not on anticoagulation.     #Respiratory failure, feeding difficulty  -Postoperative day 1 after Tracheostomy and PEG placement. Bolster at 3 cm at the skin  -Post procedure chest x-ray with trach in appropriate position  -Ok to use PEG for medications and feeds   -Please ensure patient has an abdominal binder on at all times and frequent neuro checks to ensure that PEG cannot be dislodged. Use restraints if necessary   -Do not place gauze or pads between the bolster and the skin. Do not tighten the bolster  -Please ensure that PEG is flushed twice daily with 60 mL of water to maintain patency now and continue at discharge  -Ok to remove sutures around the " tracheostomy and PEG 10 days after the procedure   -I will sign off      Néstor Jerome MD  3/24/2023  06:47 EDT

## 2023-03-24 NOTE — PLAN OF CARE
Goal Outcome Evaluation:  Plan of Care Reviewed With: patient           Outcome Evaluation: vss. fentanyl weaned some and precedex started.  tolerated spontaneous trial for 3hrs.  tolerating tube feeds.  still no BM.

## 2023-03-24 NOTE — PROGRESS NOTES
Clinical Nutrition     Nutrition Support Assessment  Reason for Visit: MDR, Follow-up protocol, EN      Patient Name: Sandro Junior  YOB: 1955  MRN: [id number]  Date of Encounter: 03/24/23 12:32 EDT  Admission date: 3/16/2023    Nutrition Assessment     Problem List:    Acute epiglottitis with airway obstruction - probable    Essential hypertension    Acute respiratory failure (HCC)    ARF/Intubated 3-16-23      PMH:   He  has a past medical history of Epidermal inclusion cyst, Esophageal candidiasis (HCC), Keloid scar, Rectal cancer (HCC), and Seborrheic dermatitis.    PSH:  He  has a past surgical history that includes Knee surgery; Colectomy partial / total; Total knee arthroplasty (Right, 04/11/2016); Tooth extraction (2016); and tracheostomy and peg tube insertion (N/A, 3/23/2023).      Applicable Nutrition Concerns:   Skin: surgical sites  GI:abdomen taut, no bm      Applicable Interval History:   (3/16) intubated, large bore NG tube placed  (3/17) EN initiated  (3/20) s/p bronchoscopy - airway edema  (3-23) s/p Trach, PEG      Reported/Observed/Food/Nutrition Related History:     Pt alert, intubated, family at bedside   + fentanyl, precedex    Per RN: pt tolerating TF    Labs    Labs Reviewed: Yes     Results from last 7 days   Lab Units 03/24/23  0347 03/23/23  0507 03/22/23  0413 03/21/23  1648 03/21/23  0502 03/20/23  1438 03/20/23  0545 03/18/23  1801 03/18/23  0412   GLUCOSE mg/dL 230* 117* 126*  --  114*  --  124*  124*   < > 110*   BUN mg/dL 17 12 10  --  7*  --  7*  7*   < > 12   CREATININE mg/dL 0.80 0.72* 0.54*  --  0.66*  --  0.54*  0.54*   < > 0.69*   SODIUM mmol/L 135* 136 139  --  137  --  138  138   < > 137   CHLORIDE mmol/L 98 101 105  --  107  --  110*  110*   < > 108*   POTASSIUM mmol/L 3.8 4.6 4.1  --  4.0  --  4.0  4.0   < > 4.5   PHOSPHORUS mg/dL 3.1 3.1 2.7   < > 2.1*   < > 2.0*  2.0*   < > 1.6*   MAGNESIUM mg/dL 2.1 1.8 1.7  --  1.7  --  1.9   1.9   < > 3.0*   ALT (SGPT) U/L  --   --   --   --  14  --  8  --  13    < > = values in this interval not displayed.       Results from last 7 days   Lab Units 03/21/23  0502 03/20/23  0545 03/18/23  0412   ALBUMIN g/dL 2.2* 2.5* 3.2*   CHOLESTEROL mg/dL  --  112  --    TRIGLYCERIDES mg/dL  --  144  --        Results from last 7 days   Lab Units 03/24/23  1128 03/24/23  0702 03/24/23  0537 03/23/23  2336 03/23/23  1738 03/23/23  1130   GLUCOSE mg/dL 227* 214* 202* 131* 84 96     Lab Results   Lab Value Date/Time    HGBA1C 5.2 10/20/2020 0841                 Medications    Medications Reviewed: Yes  Abx, lasix, heparin, flagyl, protonix, bowel regimen, albumin  Infusion: precedex, fentanyl    Intake/Ouptut 24 hrs (0701 - 0700)   I&O's Reviewed: Yes     8319/ 8617    Anthropometrics       Height: 73in  Weight: 171lb bedscale  BMI: 22.6  BMI classification: Normal: 18.5-24.9kg/m2      UBW:  Last 15 Recorded Weights  View Complete Flowsheet  Weight Weight (kg) Weight (lbs) Weight Method VISIT REPORT   3/17/2023 77.9 kg 171 lb 11.8 oz - -   3/16/2023 74.5 kg 164 lb 3.9 oz Bed scale -   3/16/2023 79.379 kg 175 lb Estimated -   4/5/2022 80.559 kg 177 lb 9.6 oz - Report   1/10/2022 80.196 kg 176 lb 12.8 oz - Report   12/9/2021 79.652 kg 175 lb 9.6 oz - Report   11/13/2021 84.823 kg 187 lb Stated -   3/22/2021 83.008 kg 183 lb - Report   11/19/2020 83.643 kg 184 lb 6.4 oz - Report   10/20/2020 82.918 kg 182 lb 12.8 oz - Report   9/21/2020 84.188 kg 185 lb 9.6 oz - Report   3/16/2020 85.639 kg 188 lb 12.8 oz - Report   2/7/2020 84.823 kg 187 lb - Report   7/16/2019 81.738 kg 180 lb 3.2 oz - Report   1/15/2019 83.915 kg 185 lb - Report       Needs Assessment   Date: 3/22    Height used:73in  Weights used:171lb    Estimated Calorie needs: ~1800 calories   Method:  PSU:1773kcal  Method: MSJ x 1,2: 1937kcal  Method: 25 Kcals/KG actual wt = 1943kcal    Estimated Protein needs: ~117 g protein daily  Method: 1.2-1.5 g/Kg actual wt  =       Current Nutrition Prescription     PO: NPO Diet NPO Type: Strict NPO    EN: Peptamen AF @70ml/hr, Prosource 1x/day, free water 30ml Q 2 hours    Route: PEG    Intake: 409ml, 551kcal, 46g protein, 2g fiber,521ml free water    TF at goal volume will provide 1400ml, 1740kcal, 97% kcal needs,  121g protein, 103% protein needs, 8g fiber, 1494ml free water including flush    Nutrition Diagnosis     Date: 3/17 Updated: 3-24  Problem Inadequate energy intake   Etiology Mechanical ventilation/ Surgery   Signs/Symptoms 29% goal volume EN   Status: - EN initiated (3/17)      Goal:   General: Nutrition support treatment    EN/PN: Adjust EN    Nutrition Intervention      Follow treatment progress, Care plan reviewed, Nutrition support order placed    As pt now off propofol, will increase TF to goal rate @75ml/hr, maintain Prosource 1x/day, free water 30ml Q 2 hours    TF at goal volume will provide 1500ml, 1860kcal, 103% kcal needs, 129g protein, 110% protein needs, 9g fiber, 1575ml free water including flush      Monitoring/Evaluation:   Per protocol, I&O, Pertinent labs, EN delivery/tolerance, Weight, GI status, Symptoms, Swallow function, Hemodynamic stability      Yessy Duncan RD  Time Spent: 30min

## 2023-03-24 NOTE — CASE MANAGEMENT/SOCIAL WORK
Continued Stay Note  [facility]     Patient Name: Sandro Junior  MRN: [id number]  Today's Date: 3/24/2023    Admit Date: 3/16/2023    Plan: ONGOING   Discharge Plan     Row Name 03/24/23 1059       Plan    Plan ONGOING    Patient/Family in Agreement with Plan yes    Plan Comments S/P trach and PEG.  Still on vent. FiO2 30%.  CM will cont to follow hospital course and assist with any DC needs as indicated.    Final Discharge Disposition Code 30 - still a patient               Discharge Codes    No documentation.               Expected Discharge Date and Time     Expected Discharge Date Expected Discharge Time    Mar 29, 2023             Hazel Bright RN

## 2023-03-24 NOTE — PROGRESS NOTES
"[facility] Progress Note    Date of Admission: 3/16/2023      Antibiotics: PCN and flagyl      CC:   Chief Complaint   Patient presents with   • Shortness of Breath       S: Patient remains in ICU sedated on mechanical ventilation trach with some intermittent low-grade fevers but down to 30% FiO2 hemodynamically stable    O:  /62   Pulse 93   Temp 99.9 °F (37.7 °C) (Bladder)   Resp 17   Ht 185.4 cm (73\")   Wt 77.9 kg (171 lb 11.8 oz)   SpO2 95%   BMI 22.66 kg/m²   Temp (24hrs), Av.7 °F (37.1 °C), Min:96.3 °F (35.7 °C), Max:100.8 °F (38.2 °C)      PE:   GEN: Awake sitting up in bed remains on the vent  HEENT: NCAT.  EOMI. No conjunctival injection. No icterus. Oropharynx clear without evidence of thrush or exudate.   NECK: Supple without nuchal rigidity,Trach site with some drainage  HEART: RRR; No murmur, rubs, gallops.   LUNGS: Decreased breath sounds in bases   ABDOMEN: Soft, nontender, nondistended. Positive bowel sounds. No rebound or guarding.   EXT:  No cyanosis, clubbing or edema  : Normal appearing genitalia without Damon catheter.  MSK: FROM without joint effusions noted    SKIN: Warm and dry without cutaneous eruptions.  Trach site with some drainage  NEURO: No focal deficits.     Laboratory Data    Results from last 7 days   Lab Units 23  0347 23  0507 23  0413   WBC 10*3/mm3 9.87 7.97 4.43   HEMOGLOBIN g/dL 12.5* 13.3 13.2   HEMATOCRIT % 38.7 41.6 41.9   PLATELETS 10*3/mm3 211 207 170     Results from last 7 days   Lab Units 23  0347   SODIUM mmol/L 135*   POTASSIUM mmol/L 3.8   CHLORIDE mmol/L 98   CO2 mmol/L 25.0   BUN mg/dL 17   CREATININE mg/dL 0.80   GLUCOSE mg/dL 230*   CALCIUM mg/dL 8.8     Results from last 7 days   Lab Units 23  0502   ALK PHOS U/L 68   BILIRUBIN mg/dL 0.7   BILIRUBIN DIRECT mg/dL 0.5*   ALT (SGPT) U/L 14   AST (SGOT) U/L 22               Estimated Creatinine Clearance: 100.1 mL/min (by C-G formula based on SCr of 0.8 " mg/dL).      Microbiology:      Radiology:  Imaging Results (Last 24 Hours)     Procedure Component Value Units Date/Time    XR Chest 1 View [[id number]] Collected: 03/24/23 0742     Updated: 03/24/23 0746    Narrative:      XR CHEST 1 VW    Date of Exam: 3/24/2023 3:50 AM EDT    Indication: f/u pleural effusion..    Comparison: Chest radiograph 3/23/2023    Findings:  Tracheostomy tube above the radha. Right upper extremity PICC tip is at the proximal right atrium. Heart size normal. No pneumothorax. Persistent bibasilar airspace disease that may relate to atelectasis and/or pneumonia. Suspect small bilateral pleural   effusions. Osseous structures appear intact.      Impression:      Impression:  1. Stable ET tube and right upper extremity PICC.  2. No pneumothorax.  3. Persistent bibasilar airspace disease that may relate to atelectasis and/or pneumonia with suspected small bilateral pleural effusions.    Electronically Signed: Dhaval Barksdale    3/24/2023 7:43 AM EDT    Workstation ID: [id number]    XR Chest 1 View [[id number]] Collected: 03/23/23 1526     Updated: 03/23/23 1531    Narrative:      XR CHEST 1 VW    Date of Exam: 3/23/2023 2:53 PM EDT    Indication: Status post trach.    Comparison: 6 3/23/2023    Findings:  Tracheostomy tube is now present. ET tube and esophagogastric tube removed. Trach tube is present with tip 5.4 cm from the radha. Right upper extremity PICC is unchanged. There are low lung volumes. Cardiomegaly is present. Small bilateral pleural   effusions are again noted. Mild bibasilar atelectasis. Aortic vascular calcification is noted.      Impression:      Impression:    1. Interval placement of trach tube with tip approximately 5.4 cm from the radha.  2. Small bilateral effusions with overlying consolidation suggesting compressive atelectasis.    Electronically Signed: Darrin Bee    3/23/2023 3:28 PM EDT    Workstation ID: [id number]          PROBLEM LIST:   Severe sepsis with group A  beta-hemolytic strep bacteremia  Acute epiglottitis with compromised airway with acute hypoxemic respiratory failure status post trach  Lactic acidosis  Macrocytic anemia  Fevers  Elevated glucose    ASSESSMENT:  Patient with severe sepsis with severe epiglottitis with airway compromise respiratory failure now status post trach who has blood cultures with group A strep now still with low-grade fevers but white blood cell count improved down to 30% FiO2 remains on high-dose penicillin and intravenous metronidazole treating for possible bilateral pneumonia as well.    PLAN:  Continue high-dose IV penicillin  Continue metronidazole  Any worsening fevers neck swelling or require repeat imaging to ensure no abscess developing  Complex case    ICU evaluation has been greater than 35 minutes on his case today with more than 50% time in counseling/coordination of care review of hospital events discussion with Dr. Johnson ongoing treatment for severe sepsis with acute epiglottitis d/w family at bedside.     Ishmael Ortega MD  3/24/2023

## 2023-03-24 NOTE — PROGRESS NOTES
INTENSIVIST   PROGRESS NOTE     Hospital:  LOS: 8 days     Mr. Sandro Junior, 68 y.o. male is followed for a Chief Complaint of: Acute epiglottitis with airway obstruction      Subjective   S     Interval History:  Trach and PEG placement yesterday. Remains on Fentanyl infusion this AM.        The patient's relevant past medical, surgical and social history were reviewed and updated in Epic as appropriate.      ROS: Unable to obtain secondary to sedation and mechanical ventilation.     Objective   O     Vitals:  Temp  Min: 96.3 °F (35.7 °C)  Max: 100.8 °F (38.2 °C)  BP  Min: 66/56  Max: 189/102  Pulse  Min: 50  Max: 151  Resp  Min: 14  Max: 19  SpO2  Min: 90 %  Max: 100 % Flow (L/min)  Min: 30  Max: 30    Intake/Ouptut 24 hrs (7:00AM - 6:59 AM)  Intake & Output (last 3 days)       03/21 0701  03/22 0700 03/22 0701  03/23 0700 03/23 0701  03/24 0700 03/24 0701  03/25 0700    I.V. (mL/kg) 2516.6 (32.3) 1397.1 (17.9) 1242.4 (15.9) 227.7 (2.9)    Other 300 410 190 90    NG/GT 1506 1359 409 523    IV Piggyback 413.3 733.1 878.5 241.5    Total Intake(mL/kg) 4735.9 (60.8) 3899.2 (50.1) 2719.9 (34.9) 1082.2 (13.9)    Urine (mL/kg/hr) 5975 (3.2) 4205 (2.2) 4200 (2.2) 1050 (2.1)    Emesis/NG output   350     Blood   2     Total Output 5975 4205 4552 1050    Net -1239.1 -305.8 -1832.1 +32.2                  Medications (drips):  dexmedetomidine, Last Rate: 0.4 mcg/kg/hr (03/24/23 1000)  fentanyl 10 mcg/mL, Last Rate: 200 mcg/hr (03/24/23 1033)  midazolam, Last Rate: Stopped (03/24/23 0736)  phenylephrine, Last Rate: Stopped (03/23/23 1230)  propofol, Last Rate: Stopped (03/23/23 1715)        Mechanical Ventilator Settings:          Resp Rate (Set): 16  Pressure Support (cm H2O): 10 cm H20  FiO2 (%): 30 %  PEEP/CPAP (cm H2O): 5 cm H20    Minute Ventilation (L/min) (Obs): 7.5 L/min  Resp Rate (Observed) Vent: 12  I:E Ratio (Set): 1:2.41  I:E Ratio (Obs): 1:2.1    PIP Observed (cm H2O): 18 cm H2O  Plateau Pressure (cm H2O):  "(S) 13 cm H2O    Physical Examination  Telemetry:  Normal sinus rhythm.    Constitutional:  No acute distress.   Trach in place on mechanical ventilation.    Eyes: No scleral icterus.   PERRL, EOM intact.    Neck:  Supple, FROM   Cardiovascular: Normal rate, regular and rhythm. Normal heart sounds.  No murmurs, gallop or rub.   Respiratory: No respiratory distress. Normal respiratory effort.  Diminished bilaterally. No wheezing.    Abdominal:  Soft. No masses. Nontender. No distension. No HSM.   Extremities: No digital cyanosis. No clubbing.  1+ peripheral edema. Left lower extremity more swollen   Skin: No rashes, lesions or ulcers   Neurological:   Opens eyes to stimulation. Will follow some commands but has a \"glazed over\" look.             Results from last 7 days   Lab Units 03/24/23  0347 03/23/23  0507 03/22/23  0413   WBC 10*3/mm3 9.87 7.97 4.43   HEMOGLOBIN g/dL 12.5* 13.3 13.2   MCV fL 99.2* 102.0* 101.5*   PLATELETS 10*3/mm3 211 207 170     Results from last 7 days   Lab Units 03/24/23  0347 03/23/23  0507 03/22/23  0413   SODIUM mmol/L 135* 136 139   POTASSIUM mmol/L 3.8 4.6 4.1   CO2 mmol/L 25.0 31.0* 26.0   CREATININE mg/dL 0.80 0.72* 0.54*   GLUCOSE mg/dL 230* 117* 126*   MAGNESIUM mg/dL 2.1 1.8 1.7   PHOSPHORUS mg/dL 3.1 3.1 2.7     Estimated Creatinine Clearance: 100.1 mL/min (by C-G formula based on SCr of 0.8 mg/dL).  Results from last 7 days   Lab Units 03/21/23  0502 03/20/23  0545 03/18/23  0412   ALK PHOS U/L 68 43 46   BILIRUBIN mg/dL 0.7 0.8 0.4   BILIRUBIN DIRECT mg/dL 0.5*  --  0.2   ALT (SGPT) U/L 14 8 13   AST (SGOT) U/L 22 17 20       Results from last 7 days   Lab Units 03/19/23  0814 03/18/23  0359   PH, ARTERIAL pH units 7.385 7.368   PCO2, ARTERIAL mm Hg 38.4 43.3   PO2 ART mm Hg 121.0* 99.3   FIO2 % 30 30       Images:  Imaging Results (Last 24 Hours)     Procedure Component Value Units Date/Time    XR Chest 1 View [[id number]] Collected: 03/24/23 0742     Updated: 03/24/23 0746    " Narrative:      XR CHEST 1 VW    Date of Exam: 3/24/2023 3:50 AM EDT    Indication: f/u pleural effusion..    Comparison: Chest radiograph 3/23/2023    Findings:  Tracheostomy tube above the radha. Right upper extremity PICC tip is at the proximal right atrium. Heart size normal. No pneumothorax. Persistent bibasilar airspace disease that may relate to atelectasis and/or pneumonia. Suspect small bilateral pleural   effusions. Osseous structures appear intact.      Impression:      Impression:  1. Stable ET tube and right upper extremity PICC.  2. No pneumothorax.  3. Persistent bibasilar airspace disease that may relate to atelectasis and/or pneumonia with suspected small bilateral pleural effusions.    Electronically Signed: Dhaval Apolonia    3/24/2023 7:43 AM EDT    Workstation ID: [id number]    XR Chest 1 View [[id number]] Collected: 03/23/23 1526     Updated: 03/23/23 1531    Narrative:      XR CHEST 1 VW    Date of Exam: 3/23/2023 2:53 PM EDT    Indication: Status post trach.    Comparison: 6 3/23/2023    Findings:  Tracheostomy tube is now present. ET tube and esophagogastric tube removed. Trach tube is present with tip 5.4 cm from the radha. Right upper extremity PICC is unchanged. There are low lung volumes. Cardiomegaly is present. Small bilateral pleural   effusions are again noted. Mild bibasilar atelectasis. Aortic vascular calcification is noted.      Impression:      Impression:    1. Interval placement of trach tube with tip approximately 5.4 cm from the radha.  2. Small bilateral effusions with overlying consolidation suggesting compressive atelectasis.    Electronically Signed: Darrin Bee    3/23/2023 3:28 PM EDT    Workstation ID: [id number]            Results: Reviewed.  I reviewed the patient's new laboratory and imaging results.  I independently reviewed the patient's new images.    Medications: Reviewed.    Assessment & Plan   A / P     68-year-old male who presented to the ER this evening with  shortness of breath and sore throat.  History obtained is from the chart and my discussion with Dr. Eaton patient is now intubated and sedated.  Apparently, he first noticed a sore throat around noon.  He became increasingly symptomatic throughout the day before presenting to the ER.  He was given epinephrine, steroids, and H2 blockers.  Despite this he began tripoding and drooling and had audible stridor.  Per Dr. Eaton, the intubation was difficult due to edema and erythema.     Imaging reveals abnormal appearance of the soft tissues of the laryngeal area with some edema and swelling of the uvula.  There is no evidence of a fluid collection or abscess.  Bibasilar infiltrates were present possibly related to atelectasis or retained secretions but there was no evidence of consolidative pneumonia.  He has been given Zosyn and vancomycin empirically and, as stated, has received steroids.     Blood cultures are growing Strep pyogenes.     Bronchoscopy on 3/20/23 with continued edematous changes in the posterior oropharynx with copious mucopurulent secretions.     ID is following and changed Penicillin to a continuous infusion.     Repeat CT neck on 3/21/23 showed worsening edema but no signs of abscess.     Trach and PEG placed on 3/23/23 by Dr. Jerome.        Nutrition:   NPO Diet NPO Type: Strict NPO  Advance Directives:   Code Status and Medical Interventions:   Ordered at: 03/16/23 2274     Code Status (Patient has no pulse and is not breathing):    CPR (Attempt to Resuscitate)     Medical Interventions (Patient has pulse or is breathing):    Full Support       Active Hospital Problems    Diagnosis    • **Acute epiglottitis with airway obstruction - probable    • Acute respiratory failure (HCC)    • Essential hypertension        Assessment / Plan:    Continue mechanical ventilation. PST today. Can hopefully attempt trach collar tomorrow.  Add Precedex as needed. Wean off Fentanyl.  Lasix 40mg IV x 1.   ID  following for Group A Strep bacteremia.     Echocardiogram negative for vegetations.   Continue tube feeds.  LLE duplex negative for DVT.  DVT ppx.  AM labs    High risk secondary to manipulation of mechanical ventilation.     High level of risk due to:  severe exacerbation of chronic illness and illness with threat to life or bodily function.    Plan of care and goals reviewed during interdisciplinary rounds.  I discussed the patient's findings and my recommendations with family and nursing staff      Keila Negron,     Intensive Care Medicine and Pulmonary Medicine

## 2023-03-25 ENCOUNTER — APPOINTMENT (OUTPATIENT)
Dept: GENERAL RADIOLOGY | Facility: HOSPITAL | Age: 68
DRG: 4 | End: 2023-03-25
Payer: MEDICARE

## 2023-03-25 LAB
ALBUMIN SERPL-MCNC: 3.2 G/DL (ref 3.5–5.2)
ALBUMIN/GLOB SERPL: 0.8 G/DL
ALP SERPL-CCNC: 51 U/L (ref 39–117)
ALT SERPL W P-5'-P-CCNC: 22 U/L (ref 1–41)
ANION GAP SERPL CALCULATED.3IONS-SCNC: 6 MMOL/L (ref 5–15)
AST SERPL-CCNC: 27 U/L (ref 1–40)
BACTERIA SPEC AEROBE CULT: NORMAL
BASOPHILS # BLD AUTO: 0.02 10*3/MM3 (ref 0–0.2)
BASOPHILS NFR BLD AUTO: 0.3 % (ref 0–1.5)
BILIRUB SERPL-MCNC: 0.4 MG/DL (ref 0–1.2)
BUN SERPL-MCNC: 19 MG/DL (ref 8–23)
BUN/CREAT SERPL: 33.9 (ref 7–25)
CALCIUM SPEC-SCNC: 8.6 MG/DL (ref 8.6–10.5)
CHLORIDE SERPL-SCNC: 102 MMOL/L (ref 98–107)
CO2 SERPL-SCNC: 28 MMOL/L (ref 22–29)
CREAT SERPL-MCNC: 0.56 MG/DL (ref 0.76–1.27)
CRP SERPL-MCNC: 2.86 MG/DL (ref 0–0.5)
DEPRECATED RDW RBC AUTO: 51.2 FL (ref 37–54)
EGFRCR SERPLBLD CKD-EPI 2021: 108.7 ML/MIN/1.73
EOSINOPHIL # BLD AUTO: 0.03 10*3/MM3 (ref 0–0.4)
EOSINOPHIL NFR BLD AUTO: 0.4 % (ref 0.3–6.2)
ERYTHROCYTE [DISTWIDTH] IN BLOOD BY AUTOMATED COUNT: 13.7 % (ref 12.3–15.4)
GLOBULIN UR ELPH-MCNC: 4.1 GM/DL
GLUCOSE BLDC GLUCOMTR-MCNC: 146 MG/DL (ref 70–130)
GLUCOSE BLDC GLUCOMTR-MCNC: 160 MG/DL (ref 70–130)
GLUCOSE BLDC GLUCOMTR-MCNC: 170 MG/DL (ref 70–130)
GLUCOSE BLDC GLUCOMTR-MCNC: 171 MG/DL (ref 70–130)
GLUCOSE BLDC GLUCOMTR-MCNC: 198 MG/DL (ref 70–130)
GLUCOSE SERPL-MCNC: 194 MG/DL (ref 65–99)
HCT VFR BLD AUTO: 34.4 % (ref 37.5–51)
HGB BLD-MCNC: 11 G/DL (ref 13–17.7)
IMM GRANULOCYTES # BLD AUTO: 0.09 10*3/MM3 (ref 0–0.05)
IMM GRANULOCYTES NFR BLD AUTO: 1.1 % (ref 0–0.5)
LYMPHOCYTES # BLD AUTO: 1.24 10*3/MM3 (ref 0.7–3.1)
LYMPHOCYTES NFR BLD AUTO: 15.5 % (ref 19.6–45.3)
MAGNESIUM SERPL-MCNC: 1.8 MG/DL (ref 1.6–2.4)
MCH RBC QN AUTO: 32.2 PG (ref 26.6–33)
MCHC RBC AUTO-ENTMCNC: 32 G/DL (ref 31.5–35.7)
MCV RBC AUTO: 100.6 FL (ref 79–97)
MONOCYTES # BLD AUTO: 0.85 10*3/MM3 (ref 0.1–0.9)
MONOCYTES NFR BLD AUTO: 10.6 % (ref 5–12)
NEUTROPHILS NFR BLD AUTO: 5.77 10*3/MM3 (ref 1.7–7)
NEUTROPHILS NFR BLD AUTO: 72.1 % (ref 42.7–76)
NRBC BLD AUTO-RTO: 0 /100 WBC (ref 0–0.2)
PHOSPHATE SERPL-MCNC: 2.4 MG/DL (ref 2.5–4.5)
PLATELET # BLD AUTO: 207 10*3/MM3 (ref 140–450)
PMV BLD AUTO: 9.6 FL (ref 6–12)
POTASSIUM SERPL-SCNC: 4.3 MMOL/L (ref 3.5–5.2)
PROT SERPL-MCNC: 7.3 G/DL (ref 6–8.5)
RBC # BLD AUTO: 3.42 10*6/MM3 (ref 4.14–5.8)
SODIUM SERPL-SCNC: 136 MMOL/L (ref 136–145)
WBC NRBC COR # BLD: 8 10*3/MM3 (ref 3.4–10.8)

## 2023-03-25 PROCEDURE — 25010000002 HYDRALAZINE PER 20 MG: Performed by: SURGERY

## 2023-03-25 PROCEDURE — 80053 COMPREHEN METABOLIC PANEL: CPT | Performed by: INTERNAL MEDICINE

## 2023-03-25 PROCEDURE — 83735 ASSAY OF MAGNESIUM: CPT | Performed by: INTERNAL MEDICINE

## 2023-03-25 PROCEDURE — 25010000002 PENICILLIN G POTASSIUM PER 600000 UNITS

## 2023-03-25 PROCEDURE — 71045 X-RAY EXAM CHEST 1 VIEW: CPT

## 2023-03-25 PROCEDURE — 85025 COMPLETE CBC W/AUTO DIFF WBC: CPT | Performed by: INTERNAL MEDICINE

## 2023-03-25 PROCEDURE — 25010000002 HEPARIN (PORCINE) PER 1000 UNITS: Performed by: SURGERY

## 2023-03-25 PROCEDURE — 94761 N-INVAS EAR/PLS OXIMETRY MLT: CPT

## 2023-03-25 PROCEDURE — 94799 UNLISTED PULMONARY SVC/PX: CPT

## 2023-03-25 PROCEDURE — 0 MAGNESIUM SULFATE 4 GM/100ML SOLUTION: Performed by: INTERNAL MEDICINE

## 2023-03-25 PROCEDURE — 84100 ASSAY OF PHOSPHORUS: CPT | Performed by: INTERNAL MEDICINE

## 2023-03-25 PROCEDURE — 86140 C-REACTIVE PROTEIN: CPT | Performed by: INTERNAL MEDICINE

## 2023-03-25 PROCEDURE — 99291 CRITICAL CARE FIRST HOUR: CPT | Performed by: INTERNAL MEDICINE

## 2023-03-25 PROCEDURE — 25010000002 FENTANYL CITRATE-NACL 2.5-0.9 MG/250ML-% SOLUTION: Performed by: SURGERY

## 2023-03-25 PROCEDURE — 25010000002 MIDAZOLAM PER 1 MG: Performed by: SURGERY

## 2023-03-25 PROCEDURE — 94003 VENT MGMT INPAT SUBQ DAY: CPT

## 2023-03-25 PROCEDURE — 82962 GLUCOSE BLOOD TEST: CPT

## 2023-03-25 RX ORDER — MAGNESIUM SULFATE HEPTAHYDRATE 40 MG/ML
4 INJECTION, SOLUTION INTRAVENOUS ONCE
Status: COMPLETED | OUTPATIENT
Start: 2023-03-25 | End: 2023-03-25

## 2023-03-25 RX ORDER — POLYETHYLENE GLYCOL 3350 17 G/17G
17 POWDER, FOR SOLUTION ORAL DAILY
Status: DISCONTINUED | OUTPATIENT
Start: 2023-03-25 | End: 2023-03-26

## 2023-03-25 RX ADMIN — CHLORHEXIDINE GLUCONATE 0.12% ORAL RINSE 15 ML: 1.2 LIQUID ORAL at 08:23

## 2023-03-25 RX ADMIN — METRONIDAZOLE 500 MG: 500 INJECTION, SOLUTION INTRAVENOUS at 14:26

## 2023-03-25 RX ADMIN — DEXMEDETOMIDINE HYDROCHLORIDE 1 MCG/KG/HR: 4 INJECTION, SOLUTION INTRAVENOUS at 22:12

## 2023-03-25 RX ADMIN — MAGNESIUM SULFATE HEPTAHYDRATE 4 G: 40 INJECTION, SOLUTION INTRAVENOUS at 05:44

## 2023-03-25 RX ADMIN — HEPARIN SODIUM 5000 UNITS: 5000 INJECTION, SOLUTION INTRAVENOUS; SUBCUTANEOUS at 05:27

## 2023-03-25 RX ADMIN — DEXMEDETOMIDINE HYDROCHLORIDE 0.8 MCG/KG/HR: 4 INJECTION, SOLUTION INTRAVENOUS at 01:10

## 2023-03-25 RX ADMIN — SODIUM CHLORIDE 12 MILLION UNITS: 9 INJECTION, SOLUTION INTRAVENOUS at 21:57

## 2023-03-25 RX ADMIN — CHLORHEXIDINE GLUCONATE 0.12% ORAL RINSE 15 ML: 1.2 LIQUID ORAL at 20:19

## 2023-03-25 RX ADMIN — Medication 150 MCG/HR: at 13:13

## 2023-03-25 RX ADMIN — Medication 10 ML: at 20:20

## 2023-03-25 RX ADMIN — METRONIDAZOLE 500 MG: 500 INJECTION, SOLUTION INTRAVENOUS at 09:54

## 2023-03-25 RX ADMIN — SENNOSIDES AND DOCUSATE SODIUM 2 TABLET: 8.6; 5 TABLET ORAL at 20:19

## 2023-03-25 RX ADMIN — MIDAZOLAM HYDROCHLORIDE 2 MG: 1 INJECTION, SOLUTION INTRAMUSCULAR; INTRAVENOUS at 12:22

## 2023-03-25 RX ADMIN — POLYETHYLENE GLYCOL 3350 17 G: 17 POWDER, FOR SOLUTION ORAL at 16:48

## 2023-03-25 RX ADMIN — LATANOPROST 1 DROP: 50 SOLUTION OPHTHALMIC at 08:35

## 2023-03-25 RX ADMIN — DEXMEDETOMIDINE HYDROCHLORIDE 1 MCG/KG/HR: 4 INJECTION, SOLUTION INTRAVENOUS at 16:48

## 2023-03-25 RX ADMIN — MIDAZOLAM HYDROCHLORIDE 2 MG: 1 INJECTION, SOLUTION INTRAMUSCULAR; INTRAVENOUS at 15:27

## 2023-03-25 RX ADMIN — HEPARIN SODIUM 5000 UNITS: 5000 INJECTION, SOLUTION INTRAVENOUS; SUBCUTANEOUS at 13:13

## 2023-03-25 RX ADMIN — HYDRALAZINE HYDROCHLORIDE 20 MG: 20 INJECTION INTRAMUSCULAR; INTRAVENOUS at 14:21

## 2023-03-25 RX ADMIN — METRONIDAZOLE 500 MG: 500 INJECTION, SOLUTION INTRAVENOUS at 01:11

## 2023-03-25 RX ADMIN — PANTOPRAZOLE SODIUM 40 MG: 40 INJECTION, POWDER, LYOPHILIZED, FOR SOLUTION INTRAVENOUS at 05:27

## 2023-03-25 RX ADMIN — SENNOSIDES AND DOCUSATE SODIUM 2 TABLET: 8.6; 5 TABLET ORAL at 08:23

## 2023-03-25 RX ADMIN — DEXMEDETOMIDINE HYDROCHLORIDE 0.6 MCG/KG/HR: 4 INJECTION, SOLUTION INTRAVENOUS at 09:52

## 2023-03-25 RX ADMIN — Medication 30 ML: at 08:42

## 2023-03-25 RX ADMIN — SODIUM CHLORIDE 12 MILLION UNITS: 9 INJECTION, SOLUTION INTRAVENOUS at 09:52

## 2023-03-25 RX ADMIN — HEPARIN SODIUM 5000 UNITS: 5000 INJECTION, SOLUTION INTRAVENOUS; SUBCUTANEOUS at 21:18

## 2023-03-25 RX ADMIN — METRONIDAZOLE 500 MG: 500 INJECTION, SOLUTION INTRAVENOUS at 20:19

## 2023-03-25 RX ADMIN — Medication 10 ML: at 08:36

## 2023-03-25 NOTE — PLAN OF CARE
Problem: Adult Inpatient Plan of Care  Goal: Plan of Care Review  Outcome: Ongoing, Progressing  Flowsheets (Taken 3/25/2023 0613)  Progress: improving  Plan of Care Reviewed With:   patient   family  Outcome Evaluation: VSS. Afebrile. NSR on monitor. Trached on minimal vent settings. FiO2 30%, PEEP 5. Fentanyl and Precedex continued. Follows commands. Tolerating TF at goal rate. Adequate UOP, no BM. PRN Hydralizine given x1 for SBP >150. Mag 1.8, replacement initiated per protocol. Family updated at bedside.  Goal: Patient-Specific Goal (Individualized)  Outcome: Ongoing, Progressing  Goal: Absence of Hospital-Acquired Illness or Injury  Outcome: Ongoing, Progressing  Intervention: Identify and Manage Fall Risk  Recent Flowsheet Documentation  Taken 3/25/2023 0600 by Montserrat Temple RN  Safety Promotion/Fall Prevention:   activity supervised   clutter free environment maintained   assistive device/personal items within reach   fall prevention program maintained   nonskid shoes/slippers when out of bed   room organization consistent   safety round/check completed   toileting scheduled  Taken 3/25/2023 0427 by Montserrat Temple, RN  Safety Promotion/Fall Prevention:   activity supervised   assistive device/personal items within reach   clutter free environment maintained   fall prevention program maintained   nonskid shoes/slippers when out of bed   room organization consistent   safety round/check completed   toileting scheduled  Taken 3/25/2023 0214 by Monsterrat Temple, RN  Safety Promotion/Fall Prevention:   activity supervised   assistive device/personal items within reach   clutter free environment maintained   fall prevention program maintained   nonskid shoes/slippers when out of bed   room organization consistent   safety round/check completed   toileting scheduled  Taken 3/25/2023 0000 by Montserrat Temple, RN  Safety Promotion/Fall Prevention:   activity supervised   assistive device/personal items within  reach   fall prevention program maintained   clutter free environment maintained   nonskid shoes/slippers when out of bed   room organization consistent   safety round/check completed   toileting scheduled  Taken 3/24/2023 2218 by Montserrat Temple RN  Safety Promotion/Fall Prevention:   activity supervised   assistive device/personal items within reach   clutter free environment maintained   fall prevention program maintained   nonskid shoes/slippers when out of bed   room organization consistent   safety round/check completed   toileting scheduled  Taken 3/24/2023 2030 by Montserrat Temple RN  Safety Promotion/Fall Prevention:   activity supervised   assistive device/personal items within reach   clutter free environment maintained   fall prevention program maintained   nonskid shoes/slippers when out of bed   room organization consistent   safety round/check completed   toileting scheduled  Intervention: Prevent Skin Injury  Recent Flowsheet Documentation  Taken 3/25/2023 0600 by Montserrat Temple RN  Body Position: patient/family refused  Skin Protection:   adhesive use limited   incontinence pads utilized   tubing/devices free from skin contact   transparent dressing maintained   skin-to-skin areas padded   skin-to-device areas padded  Taken 3/25/2023 0427 by Montserrat Temple RN  Body Position:   tilted   left   lower extremity elevated   upper extremity elevated  Skin Protection:   adhesive use limited   incontinence pads utilized   tubing/devices free from skin contact   transparent dressing maintained   skin-to-skin areas padded   skin-to-device areas padded  Taken 3/25/2023 0214 by Montserrat Temple RN  Body Position:   patient/family refused   supine   upper extremity elevated   lower extremity elevated  Skin Protection:   adhesive use limited   incontinence pads utilized   tubing/devices free from skin contact   transparent dressing maintained   skin-to-skin areas padded   skin-to-device areas padded  Taken  3/25/2023 0000 by Montserrat Temple RN  Body Position:   weight shifting   supine   upper extremity elevated   lower extremity elevated  Skin Protection:   adhesive use limited   incontinence pads utilized   tubing/devices free from skin contact   transparent dressing maintained   skin-to-skin areas padded   skin-to-device areas padded  Taken 3/24/2023 2218 by Montserrat Temple RN  Body Position:   weight shifting   turned   right   lower extremity elevated   upper extremity elevated  Skin Protection:   adhesive use limited   incontinence pads utilized   tubing/devices free from skin contact   transparent dressing maintained   skin-to-skin areas padded   skin-to-device areas padded  Taken 3/24/2023 2030 by Montserrat Temple RN  Body Position:   weight shifting   turned   left   lower extremity elevated   upper extremity elevated  Skin Protection:   adhesive use limited   incontinence pads utilized   tubing/devices free from skin contact   transparent dressing maintained   skin-to-skin areas padded   skin-to-device areas padded  Intervention: Prevent and Manage VTE (Venous Thromboembolism) Risk  Recent Flowsheet Documentation  Taken 3/25/2023 0600 by Montserrat Temple RN  Activity Management: bedrest  Taken 3/25/2023 0427 by Montserrat Temple RN  Activity Management: bedrest  VTE Prevention/Management: (heparin sq)   bilateral   sequential compression devices off  Taken 3/25/2023 0214 by Montserrat Temple RN  Activity Management: bedrest  Taken 3/25/2023 0000 by Montsrerat Temple RN  Activity Management: bedrest  VTE Prevention/Management: (heparin sq)   bilateral   sequential compression devices off  Taken 3/24/2023 2218 by Montserrat Temple RN  Activity Management: bedrest  Taken 3/24/2023 2030 by Montserrat Temple RN  Activity Management: bedrest  VTE Prevention/Management: (hep sq)   bilateral   sequential compression devices off  Intervention: Prevent Infection  Recent Flowsheet Documentation  Taken 3/25/2023 0600 by  Montserrat Temple RN  Infection Prevention:   environmental surveillance performed   hand hygiene promoted   visitors restricted/screened   single patient room provided  Taken 3/25/2023 0427 by Montserrat Temple RN  Infection Prevention:   environmental surveillance performed   hand hygiene promoted   single patient room provided   visitors restricted/screened  Taken 3/25/2023 0214 by Montserrat Temple RN  Infection Prevention:   environmental surveillance performed   hand hygiene promoted   single patient room provided   visitors restricted/screened  Taken 3/25/2023 0000 by Montserrat Temple RN  Infection Prevention:   environmental surveillance performed   hand hygiene promoted   single patient room provided   visitors restricted/screened  Taken 3/24/2023 2218 by Montserrat Temple RN  Infection Prevention:   environmental surveillance performed   hand hygiene promoted   single patient room provided   visitors restricted/screened  Taken 3/24/2023 2030 by Montserrat Temple RN  Infection Prevention:   environmental surveillance performed   hand hygiene promoted   single patient room provided   visitors restricted/screened  Goal: Optimal Comfort and Wellbeing  Outcome: Ongoing, Progressing  Intervention: Provide Person-Centered Care  Recent Flowsheet Documentation  Taken 3/25/2023 0600 by Montserrat Temple RN  Trust Relationship/Rapport:   care explained   reassurance provided  Taken 3/25/2023 0427 by Montserrat Temple RN  Trust Relationship/Rapport:   care explained   choices provided   questions encouraged   reassurance provided   thoughts/feelings acknowledged  Taken 3/25/2023 0214 by Montserrat Temple RN  Trust Relationship/Rapport:   care explained   reassurance provided  Taken 3/25/2023 0000 by Montserrat Temple RN  Trust Relationship/Rapport:   care explained   reassurance provided  Taken 3/24/2023 2218 by Montserrat Temple RN  Trust Relationship/Rapport:   care explained   reassurance provided  Taken 3/24/2023 2030 by  Montserrat Temple RN  Trust Relationship/Rapport:   care explained   reassurance provided  Goal: Readiness for Transition of Care  Outcome: Ongoing, Progressing     Problem: Skin Injury Risk Increased  Goal: Skin Health and Integrity  Outcome: Ongoing, Progressing  Intervention: Optimize Skin Protection  Recent Flowsheet Documentation  Taken 3/25/2023 0600 by Montserrat Temple RN  Pressure Reduction Techniques:   weight shift assistance provided   frequent weight shift encouraged   heels elevated off bed   positioned off wounds   pressure points protected  Head of Bed (HOB) Positioning: HOB at 30-45 degrees  Pressure Reduction Devices:   specialty bed utilized   pressure-redistributing mattress utilized   positioning supports utilized   heel offloading device utilized  Skin Protection:   adhesive use limited   incontinence pads utilized   tubing/devices free from skin contact   transparent dressing maintained   skin-to-skin areas padded   skin-to-device areas padded  Taken 3/25/2023 0427 by Montserrat Temple RN  Pressure Reduction Techniques:   weight shift assistance provided   heels elevated off bed   positioned off wounds   pressure points protected   rest period provided between sit times  Head of Bed (HOB) Positioning: HOB at 30-45 degrees  Pressure Reduction Devices:   specialty bed utilized   pressure-redistributing mattress utilized   positioning supports utilized   heel offloading device utilized  Skin Protection:   adhesive use limited   incontinence pads utilized   tubing/devices free from skin contact   transparent dressing maintained   skin-to-skin areas padded   skin-to-device areas padded  Taken 3/25/2023 0214 by Montserrat Temple RN  Pressure Reduction Techniques:   weight shift assistance provided   heels elevated off bed   positioned off wounds   pressure points protected   rest period provided between sit times  Head of Bed (HOB) Positioning: HOB at 30-45 degrees  Pressure Reduction Devices:    specialty bed utilized   pressure-redistributing mattress utilized   positioning supports utilized   heel offloading device utilized  Skin Protection:   adhesive use limited   incontinence pads utilized   tubing/devices free from skin contact   transparent dressing maintained   skin-to-skin areas padded   skin-to-device areas padded  Taken 3/25/2023 0000 by Montserrat Temple RN  Pressure Reduction Techniques:   weight shift assistance provided   heels elevated off bed   positioned off wounds   pressure points protected   rest period provided between sit times  Head of Bed (HOB) Positioning: HOB at 30-45 degrees  Pressure Reduction Devices:   specialty bed utilized   pressure-redistributing mattress utilized   positioning supports utilized   heel offloading device utilized  Skin Protection:   adhesive use limited   incontinence pads utilized   tubing/devices free from skin contact   transparent dressing maintained   skin-to-skin areas padded   skin-to-device areas padded  Taken 3/24/2023 2218 by Montserrat Temple RN  Pressure Reduction Techniques:   weight shift assistance provided   heels elevated off bed   positioned off wounds   pressure points protected   rest period provided between sit times  Head of Bed (HOB) Positioning: HOB at 30-45 degrees  Pressure Reduction Devices:   specialty bed utilized   pressure-redistributing mattress utilized   positioning supports utilized   heel offloading device utilized  Skin Protection:   adhesive use limited   incontinence pads utilized   tubing/devices free from skin contact   transparent dressing maintained   skin-to-skin areas padded   skin-to-device areas padded  Taken 3/24/2023 2030 by Montserrat Temple RN  Pressure Reduction Techniques:   weight shift assistance provided   heels elevated off bed   positioned off wounds   pressure points protected   rest period provided between sit times  Head of Bed (HOB) Positioning:   HOB elevated   HOB at 30-45 degrees  Pressure  Reduction Devices:   specialty bed utilized   pressure-redistributing mattress utilized   positioning supports utilized   heel offloading device utilized  Skin Protection:   adhesive use limited   incontinence pads utilized   tubing/devices free from skin contact   transparent dressing maintained   skin-to-skin areas padded   skin-to-device areas padded     Problem: Restraint, Nonviolent  Goal: Absence of Harm or Injury  Outcome: Ongoing, Progressing  Intervention: Implement Least Restrictive Safety Strategies  Recent Flowsheet Documentation  Taken 3/25/2023 0600 by Montserrat Temple RN  Medical Device Protection:   IV pole/bag removed from visual field   tubing secured  Less Restrictive Alternative:   calming techniques promoted   coping techniques promoted  De-Escalation Techniques:   increased round frequency   medication administered   reoriented   stimulation decreased   verbally redirected  Diversional Activities: television  Taken 3/25/2023 0427 by Montserrat Temple RN  Medical Device Protection:   IV pole/bag removed from visual field   tubing secured  Taken 3/25/2023 0400 by Montserrat Temple RN  Medical Device Protection: IV pole/bag removed from visual field  Less Restrictive Alternative:   calming techniques promoted   coping techniques promoted  De-Escalation Techniques:   increased round frequency   medication administered   quiet time facilitated   reoriented   stimulation decreased   verbally redirected  Diversional Activities: television  Taken 3/25/2023 0214 by Montserrat Temple RN  Medical Device Protection:   IV pole/bag removed from visual field   tubing secured  Taken 3/25/2023 0200 by Montserrat Temple RN  Medical Device Protection:   IV pole/bag removed from visual field   tubing secured  Less Restrictive Alternative:   calming techniques promoted   coping techniques promoted  De-Escalation Techniques:   increased round frequency   medication administered   reoriented   stimulation decreased    verbally redirected  Diversional Activities: television  Taken 3/25/2023 0106 by Montserrat Temple RN  Medical Device Protection:   IV pole/bag removed from visual field   tubing secured  Less Restrictive Alternative:   calming techniques promoted   coping techniques promoted  De-Escalation Techniques:   increased round frequency   medication administered   quiet time facilitated   reoriented   stimulation decreased   verbally redirected  Diversional Activities: television  Taken 3/25/2023 0000 by Montserrat Temple RN  Medical Device Protection:   IV pole/bag removed from visual field   tubing secured  Less Restrictive Alternative:   calming techniques promoted   coping techniques promoted  De-Escalation Techniques:   increased round frequency   reoriented   stimulation decreased   verbally redirected  Diversional Activities: television  Taken 3/24/2023 2218 by Montserrat Temple RN  Medical Device Protection: IV pole/bag removed from visual field  Diversional Activities: television  Taken 3/24/2023 2200 by Montserrat Temple RN  Medical Device Protection:   IV pole/bag removed from visual field   tubing secured  Less Restrictive Alternative:   calming techniques promoted   coping techniques promoted  De-Escalation Techniques:   increased round frequency   medication administered   reoriented   stimulation decreased   verbally redirected  Diversional Activities: television  Taken 3/24/2023 2030 by Montserrat Temple RN  Medical Device Protection:   IV pole/bag removed from visual field   tubing secured  Diversional Activities: television  Taken 3/24/2023 2000 by Montserrat Temple RN  Medical Device Protection:   IV pole/bag removed from visual field   tubing secured  Less Restrictive Alternative:   calming techniques promoted   coping techniques promoted  De-Escalation Techniques:   increased round frequency   medication administered   reoriented   stimulation decreased   verbally redirected  Diversional Activities:  television  Intervention: Protect Dignity, Rights, and Personal Wellbeing  Recent Flowsheet Documentation  Taken 3/25/2023 0600 by Montserrat Temple RN  Trust Relationship/Rapport:   care explained   reassurance provided  Taken 3/25/2023 0427 by Montserrat Temple RN  Trust Relationship/Rapport:   care explained   choices provided   questions encouraged   reassurance provided   thoughts/feelings acknowledged  Taken 3/25/2023 0214 by Montserrat Temple RN  Trust Relationship/Rapport:   care explained   reassurance provided  Taken 3/25/2023 0000 by Montserrat Temple RN  Trust Relationship/Rapport:   care explained   reassurance provided  Taken 3/24/2023 2218 by Montserrat Temple RN  Trust Relationship/Rapport:   care explained   reassurance provided  Taken 3/24/2023 2030 by Montserrat Temple RN  Trust Relationship/Rapport:   care explained   reassurance provided  Intervention: Protect Skin and Joint Integrity  Recent Flowsheet Documentation  Taken 3/25/2023 0600 by Montserrat Temple RN  Body Position: patient/family refused  Taken 3/25/2023 0427 by Montserrat Temple RN  Body Position:   tilted   left   lower extremity elevated   upper extremity elevated  Taken 3/25/2023 0214 by Montserrat Temple RN  Body Position:   patient/family refused   supine   upper extremity elevated   lower extremity elevated  Taken 3/25/2023 0000 by Montserrat Temple RN  Body Position:   weight shifting   supine   upper extremity elevated   lower extremity elevated  Taken 3/24/2023 2218 by Montserrat Temple RN  Body Position:   weight shifting   turned   right   lower extremity elevated   upper extremity elevated  Taken 3/24/2023 2030 by Montserrat Temple RN  Body Position:   weight shifting   turned   left   lower extremity elevated   upper extremity elevated     Problem: Fall Injury Risk  Goal: Absence of Fall and Fall-Related Injury  Outcome: Ongoing, Progressing  Intervention: Identify and Manage Contributors  Recent Flowsheet Documentation  Taken  3/25/2023 0600 by Montserrat Temple RN  Medication Review/Management: medications reviewed  Taken 3/25/2023 0427 by Montserrat Temple RN  Medication Review/Management: medications reviewed  Taken 3/25/2023 0214 by Montserrat Temple RN  Medication Review/Management: medications reviewed  Taken 3/25/2023 0000 by Montserrat Temple RN  Medication Review/Management: medications reviewed  Taken 3/24/2023 2218 by Montserrat Temple RN  Medication Review/Management: medications reviewed  Taken 3/24/2023 2030 by Montserrat Tempel RN  Medication Review/Management: medications reviewed  Intervention: Promote Injury-Free Environment  Recent Flowsheet Documentation  Taken 3/25/2023 0600 by Montserrat Temple RN  Safety Promotion/Fall Prevention:   activity supervised   clutter free environment maintained   assistive device/personal items within reach   fall prevention program maintained   nonskid shoes/slippers when out of bed   room organization consistent   safety round/check completed   toileting scheduled  Taken 3/25/2023 0427 by Montserrat Temple RN  Safety Promotion/Fall Prevention:   activity supervised   assistive device/personal items within reach   clutter free environment maintained   fall prevention program maintained   nonskid shoes/slippers when out of bed   room organization consistent   safety round/check completed   toileting scheduled  Taken 3/25/2023 0214 by Montserrat Temple RN  Safety Promotion/Fall Prevention:   activity supervised   assistive device/personal items within reach   clutter free environment maintained   fall prevention program maintained   nonskid shoes/slippers when out of bed   room organization consistent   safety round/check completed   toileting scheduled  Taken 3/25/2023 0000 by Montserrat Temple RN  Safety Promotion/Fall Prevention:   activity supervised   assistive device/personal items within reach   fall prevention program maintained   clutter free environment maintained   nonskid shoes/slippers  when out of bed   room organization consistent   safety round/check completed   toileting scheduled  Taken 3/24/2023 2218 by Montserrat Temple, RN  Safety Promotion/Fall Prevention:   activity supervised   assistive device/personal items within reach   clutter free environment maintained   fall prevention program maintained   nonskid shoes/slippers when out of bed   room organization consistent   safety round/check completed   toileting scheduled  Taken 3/24/2023 2030 by Montserrat Temple, RN  Safety Promotion/Fall Prevention:   activity supervised   assistive device/personal items within reach   clutter free environment maintained   fall prevention program maintained   nonskid shoes/slippers when out of bed   room organization consistent   safety round/check completed   toileting scheduled   Goal Outcome Evaluation:  Plan of Care Reviewed With: patient, family        Progress: improving  Outcome Evaluation: VSS. Afebrile. NSR on monitor. Trached on minimal vent settings. FiO2 30%, PEEP 5. Fentanyl and Precedex continued. Follows commands. Tolerating TF at goal rate. Adequate UOP, no BM. PRN Hydralizine given x1 for SBP >150. Mag 1.8, replacement initiated per protocol. Family updated at bedside.

## 2023-03-25 NOTE — PROGRESS NOTES
"[facility] Progress Note    Date of Admission: 3/16/2023      Antibiotics: PCN and flagyl      CC:   Chief Complaint   Patient presents with   • Shortness of Breath       S: Patient more alert today with some low grade fevers but HD stable still on %30 FIo2     O:  /75 (BP Location: Left arm, Patient Position: Lying)   Pulse 62   Temp 99.5 °F (37.5 °C) (Bladder)   Resp 24   Ht 185.4 cm (73\")   Wt 87 kg (191 lb 12.8 oz)   SpO2 96%   BMI 25.30 kg/m²   Temp (24hrs), Av.6 °F (37.6 °C), Min:99.3 °F (37.4 °C), Max:100 °F (37.8 °C)      PE:   GEN: Awake sitting up in bed remains on the vent  HEENT: NCAT.  EOMI. No conjunctival injection. No icterus. Oropharynx clear without evidence of thrush or exudate.   NECK: Supple without nuchal rigidity,Trach site with some drainage  HEART: RRR; No murmur, rubs, gallops.   LUNGS: Decreased breath sounds in bases   ABDOMEN: Soft, nontender, nondistended. Positive bowel sounds. No rebound or guarding.   EXT:  No cyanosis, clubbing or edema  : Normal appearing genitalia without Damon catheter.  MSK: FROM without joint effusions noted    SKIN: Warm and dry without cutaneous eruptions.  Trach site with some drainage  NEURO: No focal deficits.     Laboratory Data    Results from last 7 days   Lab Units 23  0425 23  0347 23  0507   WBC 10*3/mm3 8.00 9.87 7.97   HEMOGLOBIN g/dL 11.0* 12.5* 13.3   HEMATOCRIT % 34.4* 38.7 41.6   PLATELETS 10*3/mm3 207 211 207     Results from last 7 days   Lab Units 23  0425   SODIUM mmol/L 136   POTASSIUM mmol/L 4.3   CHLORIDE mmol/L 102   CO2 mmol/L 28.0   BUN mg/dL 19   CREATININE mg/dL 0.56*   GLUCOSE mg/dL 194*   CALCIUM mg/dL 8.6     Results from last 7 days   Lab Units 23  0425 23  0502   ALK PHOS U/L 51 68   BILIRUBIN mg/dL 0.4 0.7   BILIRUBIN DIRECT mg/dL  --  0.5*   ALT (SGPT) U/L 22 14   AST (SGOT) U/L 27 22         Results from last 7 days   Lab Units 23  0425   CRP mg/dL 2.86*       Estimated " Creatinine Clearance: 159.7 mL/min (A) (by C-G formula based on SCr of 0.56 mg/dL (L)).      Microbiology:  Group s strep on blood cx on admission    Radiology:  Imaging Results (Last 24 Hours)     ** No results found for the last 24 hours. **          PROBLEM LIST:   Severe sepsis with group A beta-hemolytic strep bacteremia  Acute epiglottitis with compromised airway with acute hypoxemic respiratory failure status post trach  Lactic acidosis  Macrocytic anemia  Fevers  Elevated glucose    ASSESSMENT:  Patient with severe sepsis with severe epiglottitis with airway compromise respiratory failure now status post trach who has blood cultures with group A strep now still with low-grade fevers but white blood cell count improved down to 30% FiO2 remains on high-dose penicillin and intravenous metronidazole treating for possible bilateral pneumonia as well.    WBC remain normal with crp down but still with some low grade fevers but overall better appearance today.  Working to wean from vent.    PLAN:  Continue high-dose IV penicillin  Continue metronidazole  Any worsening fevers neck swelling or require repeat imaging to ensure no abscess developing  Complex case    ICU evaluation has been greater than 35 minutes on his case today with more than 50% time in counseling/coordination of care review of hospital events  with  ongoing treatment for severe sepsis with acute epiglottitis d/w family at bedside and d/w Dr. Yuan today.     Ishmael Ortega MD  3/25/2023

## 2023-03-25 NOTE — PLAN OF CARE
Goal Outcome Evaluation:   Pt remains on minimal vent settings. Tolerating trach collar this afternoon. Up to chair for a few hours. UOP adequate. Still no BM, added miralax. Tmax 37.5. Precedex and Fentanyl remain. Unable to titrate down as patient coughs excessively and has been given PRN Versed twice, which helped. Hydralazine given once for BP.

## 2023-03-25 NOTE — PROGRESS NOTES
Intensivist Note     3/25/2023  Hospital Day: 9  2 Days Post-Op  ICU Stays Timeline            Hospital Admission: 03/16/23 2021 - Current  ICU stays: 1      In Date/Time Event Department ICU Stay Duration     03/16/23 2021 Admission  [facility]      03/16/23 2258 Transfer In  [facility] 2A ICU 8 days 8 hours 14 minutes     03/23/23 1332 Transfer In  [facility]      03/23/23 1444 Transfer In [facility] 2A ICU              Mr. Sandro Junior, 68 y.o. male is followed for:    Acute strep pyogenes (group A strep) epiglottitis with airway obstruction     Acute respiratory failure (HCC)    Essential hypertension       SUBJECTIVE     68-year-old male who presented to [facility] ER 3/16/2023 c/o dyspnea and sore throat. Apparently, first noticed a sore throat around noon the day of admission and became increasingly symptomatic with dyspnea and stridor throughout the day before presenting to the ER.  He was given epinephrine, steroids, and H2 blockers in the ER but despite this he began tripoding and drooling and had audible stridor.  Per Dr. Eaton (ER physician) intubation was difficult due to edema and erythema. Imaging revealed abnormal appearance of the soft tissues of the laryngeal area with some edema and swelling of the uvula but no evidence of a fluid collection or abscess.  Bibasilar infiltrates were present possibly related to atelectasis or retained secretions but there was no evidence of consolidative pneumonia.  He was given Zosyn and vancomycin empirically as well as steroids.  Blood culture subsequently grew Streptococcus pyogenes.  Patient underwent bronchoscopy 3/20/2023 and was noted to have persistent edematous changes in the posterior oropharynx with copious mucopurulent secretions and repeat CT neck 3/21/2023 revealed worsening edema but no sign of abscess.  Antimicrobial coverage was changed by ID to a continuous penicillin infusion.  Because of persistent findings and inability to safely extubate patient  "underwent tracheostomy and PEG feeding tube 3/23/2023.    Interval history: Uneventful evening.  Hemodynamics adequate with a blood pressure 143/81 and remains in a sinus rhythm with a heart rate of 76 bpm.  UOP adequate with 2.4 L out over the last 24 hours and BUNs/creatinine are 19/0.56.  Tmax in the last 24 hours is 100 and WBC only 8.0.  Present antimicrobial coverage is penicillin G 12,000,000 units every 12 hours plus metronidazole 500 mg every 6 hours.  Patient remains on ventilatory support with assist-control rate of 16, FiO2 of 30, and a PEEP of 5 with excellent oxygen saturations of 99%.  Appears comfortable on fentanyl and Precedex drips.  He is on enteral feeds with Peptamen AF at goal (75 cc an hour).  No problems tolerating enteral feeds except that he vomited twice yesterday when he was suctioned.  No vomiting today.  Denies chest pain or hemoptysis.           ROS: Unable to obtain due to acuity of illness and ventilatory support    The patient's relevant PMH, PSH, FH, and SH were reviewed and updated in Epic as appropriate. Allergies and Medications reviewed.    OBJECTIVE     /67   Pulse 65   Temp 99.5 °F (37.5 °C) (Bladder)   Resp 16   Ht 185.4 cm (73\")   Wt 87 kg (191 lb 12.8 oz)   SpO2 96%   BMI 25.30 kg/m²   Oxygen Concentration (%): 40      Flowsheet Rows    Flowsheet Row First Filed Value   Admission Height 185.4 cm (73\") Documented at 03/16/2023 2104   Admission Weight 79.4 kg (175 lb) Documented at 03/16/2023 2104        Intake & Output (last day)       03/24 0701  03/25 0700 03/25 0701  03/26 0700    I.V. (mL/kg) 898.2 (10.3) 286.8 (3.3)    Other 330 60    NG/GT 1787 569    IV Piggyback 919.8 403    Total Intake(mL/kg) 3935 (45.2) 1318.8 (15.2)    Urine (mL/kg/hr) 2400 (1.1) 425 (0.5)    Emesis/NG output      Blood      Total Output 2400 425    Net +1535 +893.8                Exam:  General Exam:  Well-developed white male propped up in bed in NAD  HEENT: Pupils equal and " reactive. Nose and throat clear.  Neck:                          Supple, no JVD, thyromegaly, or adenopathy.  Trach tube in place.  Lungs: Clear to auscultation and percussion anteriorly and posteriorly.  Cardiovascular: RRR without murmurs or gallops.  HR 76 bpm  Abdomen: Soft nontender without organomegaly or masses.  PEG feeding tube in place.   and rectal: Damon catheter in place.  Extremities: No cyanosis or clubbing but 2+ edema of the dorsum of both hands.  No edema of ankles..  Right arm PICC line in place.  Neurologic:                 Symmetric strength. No focal deficits.  Follows commands.  Very frustrated that we cannot read his lips making communication almost impossible.      Chest X-Ray: No film today.  CXR from 3/24/2023 revealed bibasilar patchy infiltrates but no dense consolidation (unchanged).  Trach tube appeared in place.    INFUSIONS  dexmedetomidine, 0.2-1.5 mcg/kg/hr, Last Rate: 1 mcg/kg/hr (03/25/23 1648)  fentanyl 10 mcg/mL,  mcg/hr, Last Rate: 150 mcg/hr (03/25/23 1313)        Results from last 7 days   Lab Units 03/25/23 0425 03/24/23 0347 03/23/23  0507   WBC 10*3/mm3 8.00 9.87 7.97   HEMOGLOBIN g/dL 11.0* 12.5* 13.3   HEMATOCRIT % 34.4* 38.7 41.6   PLATELETS 10*3/mm3 207 211 207     Results from last 7 days   Lab Units 03/25/23 0425 03/24/23  0347   SODIUM mmol/L 136 135*   POTASSIUM mmol/L 4.3 3.8   CHLORIDE mmol/L 102 98   CO2 mmol/L 28.0 25.0   BUN mg/dL 19 17   CREATININE mg/dL 0.56* 0.80   GLUCOSE mg/dL 194* 230*   CALCIUM mg/dL 8.6 8.8     Results from last 7 days   Lab Units 03/25/23 0425 03/24/23 0347 03/23/23  0507   MAGNESIUM mg/dL 1.8 2.1 1.8   PHOSPHORUS mg/dL 2.4* 3.1 3.1     Results from last 7 days   Lab Units 03/25/23  0425 03/21/23  0502 03/20/23  0545   ALK PHOS U/L 51 68 43   BILIRUBIN mg/dL 0.4 0.7 0.8   BILIRUBIN DIRECT mg/dL  --  0.5*  --    ALT (SGPT) U/L 22 14 8   AST (SGOT) U/L 27 22 17       Lab Results   Component Value Date    SEDRATE 9  12/03/2018       Lab Results   Component Value Date    PROBNP 165.7 03/16/2023         Procalitonin Results:          Covid Tests    Common Labsle 3/16/23   COVID19 Not Detected            COVID LABS:  Results From Last 14 Days   Lab Units 03/25/23  0425 03/20/23  1438 03/20/23  0545 03/17/23  0350 03/17/23  0028 03/16/23  2328 03/16/23 2023   PROBNP pg/mL  --   --   --   --   --   --  165.7   CRP mg/dL 2.86*  --   --   --   --   --  2.26*   D DIMER QUANT MCGFEU/mL  --   --   --   --   --   --  1.19*   LACTATE mmol/L  --  0.6  --   --   --  1.8 3.1*   HSTROP T ng/L  --   --   --  15* 14  --  7   TRIGLYCERIDES mg/dL  --   --  144  --   --   --   --           Lab Results   Component Value Date    TROPONINT 15 (H) 03/17/2023     Lab Results   Component Value Date    TSH 0.262 (L) 03/16/2023     Lab Results   Component Value Date    LACTATE 0.6 03/20/2023     No results found for: CORTISOL    Results from last 7 days   Lab Units 03/19/23  0814   PH, ARTERIAL pH units 7.385   PCO2, ARTERIAL mm Hg 38.4   PO2 ART mm Hg 121.0*   HCO3 ART mmol/L 23.0   FIO2 % 30       Vent Settings:  Assist-control  Resp Rate (Set): 16  FiO2 (%): 30 %   Tidal volume 600 cc  PEEP/CPAP (cm H2O): 5 cm H20    Minute Ventilation (L/min) (Obs): 6.25 L/min  Resp Rate (Observed) Vent: 35  I:E Ratio (Obs): 3.13:1  PIP Observed (cm H2O): 15 cm H2O        I reviewed the patient's results, images and medication.    Assessment & Plan   ASSESSMENT        Acute strep pyogenes (group A strep) epiglottitis with airway obstruction     Acute respiratory failure (HCC)    Essential hypertension      DISCUSSION: Acceptable course.  Is on appropriate antimicrobial coverage and gas exchange adequate for weaning.  We will check respiratory mechanics and proceed with trach collar trials.  Enteral nutrition is adequate at present and will continue same via PEG feeding tube.  Has not been getting out of bed and needs aggressive physical therapy, Occupational Therapy,  and mobilization up in a chair.  I suspect he will wean very easily with trach collar trials.    PLAN     Trach collar trials twice daily and advance as tolerated  Physical therapy/Occupational Therapy  Mobilization up in chair  Continue enteral feeds  Antimicrobial coverage to continue per ID    Plan of care and goals reviewed with multidisciplinary team at daily rounds.    I discussed the patient's findings and my recommendations with patient, nursing staff and consulting provider (discussed with Dr. Ortega)    Patient is critically ill due to upper airway obstruction with respiratory failure requiring tracheostomy and PEG feeding tube.  Still has a high risk of life-threatening decline in condition and requires continuous monitoring and frequent reassessment of condition for adjustment of management in order to lessen risk especially while being weaned.  I visited the patient's bedside and interacted with nurse numerous times today.    Time spent Critical care 35 min (It does not include procedure time).    Electronically signed by Frank Villalta MD, 03/25/23, 7:12 AM EDT.   Pulmonary / Critical care medicine

## 2023-03-26 LAB
ALBUMIN SERPL-MCNC: 3.1 G/DL (ref 3.5–5.2)
ANION GAP SERPL CALCULATED.3IONS-SCNC: 7 MMOL/L (ref 5–15)
ARTERIAL PATENCY WRIST A: ABNORMAL
ARTERIAL PATENCY WRIST A: POSITIVE
ATMOSPHERIC PRESS: ABNORMAL MM[HG]
ATMOSPHERIC PRESS: ABNORMAL MM[HG]
BASE EXCESS BLDA CALC-SCNC: 3.8 MMOL/L (ref 0–2)
BASE EXCESS BLDA CALC-SCNC: 5 MMOL/L (ref 0–2)
BASOPHILS # BLD AUTO: 0.06 10*3/MM3 (ref 0–0.2)
BASOPHILS NFR BLD AUTO: 0.9 % (ref 0–1.5)
BDY SITE: ABNORMAL
BDY SITE: ABNORMAL
BODY TEMPERATURE: 37 C
BODY TEMPERATURE: 37 C
BUN SERPL-MCNC: 19 MG/DL (ref 8–23)
BUN/CREAT SERPL: 34.5 (ref 7–25)
CALCIUM SPEC-SCNC: 8.5 MG/DL (ref 8.6–10.5)
CHLORIDE SERPL-SCNC: 102 MMOL/L (ref 98–107)
CO2 BLDA-SCNC: 28.6 MMOL/L (ref 22–33)
CO2 BLDA-SCNC: 29.6 MMOL/L (ref 22–33)
CO2 SERPL-SCNC: 25 MMOL/L (ref 22–29)
COHGB MFR BLD: 1 % (ref 0–2)
COHGB MFR BLD: 1.1 % (ref 0–2)
CREAT SERPL-MCNC: 0.55 MG/DL (ref 0.76–1.27)
CRP SERPL-MCNC: 1.54 MG/DL (ref 0–0.5)
DEPRECATED RDW RBC AUTO: 50.5 FL (ref 37–54)
EGFRCR SERPLBLD CKD-EPI 2021: 109.3 ML/MIN/1.73
EOSINOPHIL # BLD AUTO: 0.16 10*3/MM3 (ref 0–0.4)
EOSINOPHIL NFR BLD AUTO: 2.3 % (ref 0.3–6.2)
EPAP: 0
EPAP: 0
ERYTHROCYTE [DISTWIDTH] IN BLOOD BY AUTOMATED COUNT: 13.6 % (ref 12.3–15.4)
ERYTHROCYTE [SEDIMENTATION RATE] IN BLOOD: >130 MM/HR (ref 0–20)
GLUCOSE BLDC GLUCOMTR-MCNC: 131 MG/DL (ref 70–130)
GLUCOSE BLDC GLUCOMTR-MCNC: 144 MG/DL (ref 70–130)
GLUCOSE BLDC GLUCOMTR-MCNC: 186 MG/DL (ref 70–130)
GLUCOSE SERPL-MCNC: 174 MG/DL (ref 65–99)
HCO3 BLDA-SCNC: 27.4 MMOL/L (ref 20–26)
HCO3 BLDA-SCNC: 28.5 MMOL/L (ref 20–26)
HCT VFR BLD AUTO: 34.3 % (ref 37.5–51)
HCT VFR BLD CALC: 34.7 % (ref 38–51)
HCT VFR BLD CALC: 37 % (ref 38–51)
HGB BLD-MCNC: 10.9 G/DL (ref 13–17.7)
HGB BLDA-MCNC: 11.3 G/DL (ref 13.5–17.5)
HGB BLDA-MCNC: 12.1 G/DL (ref 13.5–17.5)
IMM GRANULOCYTES # BLD AUTO: 0.04 10*3/MM3 (ref 0–0.05)
IMM GRANULOCYTES NFR BLD AUTO: 0.6 % (ref 0–0.5)
INHALED O2 CONCENTRATION: 30 %
INHALED O2 CONCENTRATION: 40 %
IPAP: 0
IPAP: 0
LDH SERPL-CCNC: 213 U/L (ref 135–225)
LYMPHOCYTES # BLD AUTO: 1.34 10*3/MM3 (ref 0.7–3.1)
LYMPHOCYTES NFR BLD AUTO: 19 % (ref 19.6–45.3)
MAGNESIUM SERPL-MCNC: 1.9 MG/DL (ref 1.6–2.4)
MCH RBC QN AUTO: 32 PG (ref 26.6–33)
MCHC RBC AUTO-ENTMCNC: 31.8 G/DL (ref 31.5–35.7)
MCV RBC AUTO: 100.6 FL (ref 79–97)
METHGB BLD QL: 0.4 % (ref 0–1.5)
METHGB BLD QL: 0.7 % (ref 0–1.5)
MODALITY: ABNORMAL
MODALITY: ABNORMAL
MONOCYTES # BLD AUTO: 0.87 10*3/MM3 (ref 0.1–0.9)
MONOCYTES NFR BLD AUTO: 12.4 % (ref 5–12)
NEUTROPHILS NFR BLD AUTO: 4.57 10*3/MM3 (ref 1.7–7)
NEUTROPHILS NFR BLD AUTO: 64.8 % (ref 42.7–76)
NOTE: ABNORMAL
NOTE: ABNORMAL
NRBC BLD AUTO-RTO: 0 /100 WBC (ref 0–0.2)
OXYHGB MFR BLDV: 96.4 % (ref 94–99)
OXYHGB MFR BLDV: 98.1 % (ref 94–99)
PAW @ PEAK INSP FLOW SETTING VENT: 0 CMH2O
PAW @ PEAK INSP FLOW SETTING VENT: 0 CMH2O
PCO2 BLDA: 37.1 MM HG (ref 35–45)
PCO2 BLDA: 37.1 MM HG (ref 35–45)
PCO2 TEMP ADJ BLD: 37.1 MM HG (ref 35–48)
PCO2 TEMP ADJ BLD: 37.1 MM HG (ref 35–48)
PEEP RESPIRATORY: 6 CM[H2O]
PH BLDA: 7.48 PH UNITS (ref 7.35–7.45)
PH BLDA: 7.49 PH UNITS (ref 7.35–7.45)
PH, TEMP CORRECTED: 7.48 PH UNITS
PH, TEMP CORRECTED: 7.49 PH UNITS
PHOSPHATE SERPL-MCNC: 2 MG/DL (ref 2.5–4.5)
PHOSPHATE SERPL-MCNC: 2 MG/DL (ref 2.5–4.5)
PHOSPHATE SERPL-MCNC: 2.1 MG/DL (ref 2.5–4.5)
PLATELET # BLD AUTO: 221 10*3/MM3 (ref 140–450)
PMV BLD AUTO: 9.2 FL (ref 6–12)
PO2 BLDA: 132 MM HG (ref 83–108)
PO2 BLDA: 90.2 MM HG (ref 83–108)
PO2 TEMP ADJ BLD: 132 MM HG (ref 83–108)
PO2 TEMP ADJ BLD: 90.2 MM HG (ref 83–108)
POTASSIUM SERPL-SCNC: 4.2 MMOL/L (ref 3.5–5.2)
PROCALCITONIN SERPL-MCNC: 0.19 NG/ML (ref 0–0.25)
RBC # BLD AUTO: 3.41 10*6/MM3 (ref 4.14–5.8)
SODIUM SERPL-SCNC: 134 MMOL/L (ref 136–145)
TOTAL RATE: 0 BREATHS/MINUTE
TOTAL RATE: 12 BREATHS/MINUTE
VENTILATOR MODE: ABNORMAL
VT ON VENT VENT: 0.59 ML
WBC NRBC COR # BLD: 7.04 10*3/MM3 (ref 3.4–10.8)

## 2023-03-26 PROCEDURE — 97162 PT EVAL MOD COMPLEX 30 MIN: CPT

## 2023-03-26 PROCEDURE — 94003 VENT MGMT INPAT SUBQ DAY: CPT

## 2023-03-26 PROCEDURE — 82375 ASSAY CARBOXYHB QUANT: CPT

## 2023-03-26 PROCEDURE — 85025 COMPLETE CBC W/AUTO DIFF WBC: CPT | Performed by: INTERNAL MEDICINE

## 2023-03-26 PROCEDURE — 80069 RENAL FUNCTION PANEL: CPT | Performed by: INTERNAL MEDICINE

## 2023-03-26 PROCEDURE — 25010000002 PENICILLIN G POTASSIUM PER 600000 UNITS

## 2023-03-26 PROCEDURE — 36600 WITHDRAWAL OF ARTERIAL BLOOD: CPT

## 2023-03-26 PROCEDURE — 97166 OT EVAL MOD COMPLEX 45 MIN: CPT

## 2023-03-26 PROCEDURE — 84100 ASSAY OF PHOSPHORUS: CPT | Performed by: INTERNAL MEDICINE

## 2023-03-26 PROCEDURE — 82805 BLOOD GASES W/O2 SATURATION: CPT

## 2023-03-26 PROCEDURE — 92597 ORAL SPEECH DEVICE EVAL: CPT

## 2023-03-26 PROCEDURE — 25010000002 FENTANYL CITRATE-NACL 2.5-0.9 MG/250ML-% SOLUTION: Performed by: SURGERY

## 2023-03-26 PROCEDURE — 25010000002 FUROSEMIDE PER 20 MG: Performed by: INTERNAL MEDICINE

## 2023-03-26 PROCEDURE — 84145 PROCALCITONIN (PCT): CPT | Performed by: INTERNAL MEDICINE

## 2023-03-26 PROCEDURE — 83615 LACTATE (LD) (LDH) ENZYME: CPT | Performed by: INTERNAL MEDICINE

## 2023-03-26 PROCEDURE — 94799 UNLISTED PULMONARY SVC/PX: CPT

## 2023-03-26 PROCEDURE — 83050 HGB METHEMOGLOBIN QUAN: CPT

## 2023-03-26 PROCEDURE — 94761 N-INVAS EAR/PLS OXIMETRY MLT: CPT

## 2023-03-26 PROCEDURE — 99291 CRITICAL CARE FIRST HOUR: CPT | Performed by: INTERNAL MEDICINE

## 2023-03-26 PROCEDURE — 82962 GLUCOSE BLOOD TEST: CPT

## 2023-03-26 PROCEDURE — 0 MAGNESIUM SULFATE 4 GM/100ML SOLUTION: Performed by: INTERNAL MEDICINE

## 2023-03-26 PROCEDURE — 25010000002 HYDRALAZINE PER 20 MG: Performed by: SURGERY

## 2023-03-26 PROCEDURE — 97530 THERAPEUTIC ACTIVITIES: CPT

## 2023-03-26 PROCEDURE — L8501 TRACHEOSTOMY SPEAKING VALVE: HCPCS

## 2023-03-26 PROCEDURE — 83735 ASSAY OF MAGNESIUM: CPT | Performed by: INTERNAL MEDICINE

## 2023-03-26 PROCEDURE — 85652 RBC SED RATE AUTOMATED: CPT | Performed by: INTERNAL MEDICINE

## 2023-03-26 PROCEDURE — 86140 C-REACTIVE PROTEIN: CPT | Performed by: INTERNAL MEDICINE

## 2023-03-26 PROCEDURE — 25010000002 HEPARIN (PORCINE) PER 1000 UNITS: Performed by: SURGERY

## 2023-03-26 RX ORDER — FUROSEMIDE 10 MG/ML
60 INJECTION INTRAMUSCULAR; INTRAVENOUS ONCE
Status: COMPLETED | OUTPATIENT
Start: 2023-03-26 | End: 2023-03-26

## 2023-03-26 RX ORDER — POLYETHYLENE GLYCOL 3350 17 G/17G
17 POWDER, FOR SOLUTION ORAL DAILY
Status: DISCONTINUED | OUTPATIENT
Start: 2023-03-27 | End: 2023-04-06

## 2023-03-26 RX ORDER — POTASSIUM CHLORIDE 1.5 G/1.77G
20 POWDER, FOR SOLUTION ORAL ONCE
Status: COMPLETED | OUTPATIENT
Start: 2023-03-26 | End: 2023-03-26

## 2023-03-26 RX ORDER — MAGNESIUM SULFATE HEPTAHYDRATE 40 MG/ML
4 INJECTION, SOLUTION INTRAVENOUS ONCE
Status: COMPLETED | OUTPATIENT
Start: 2023-03-26 | End: 2023-03-26

## 2023-03-26 RX ADMIN — HYDRALAZINE HYDROCHLORIDE 20 MG: 20 INJECTION INTRAMUSCULAR; INTRAVENOUS at 00:38

## 2023-03-26 RX ADMIN — SODIUM CHLORIDE 12 MILLION UNITS: 9 INJECTION, SOLUTION INTRAVENOUS at 10:08

## 2023-03-26 RX ADMIN — DEXMEDETOMIDINE HYDROCHLORIDE 0.6 MCG/KG/HR: 4 INJECTION, SOLUTION INTRAVENOUS at 14:27

## 2023-03-26 RX ADMIN — HEPARIN SODIUM 5000 UNITS: 5000 INJECTION, SOLUTION INTRAVENOUS; SUBCUTANEOUS at 14:12

## 2023-03-26 RX ADMIN — DEXMEDETOMIDINE HYDROCHLORIDE 1 MCG/KG/HR: 4 INJECTION, SOLUTION INTRAVENOUS at 08:40

## 2023-03-26 RX ADMIN — FUROSEMIDE 60 MG: 10 INJECTION, SOLUTION INTRAMUSCULAR; INTRAVENOUS at 17:40

## 2023-03-26 RX ADMIN — POLYETHYLENE GLYCOL 3350 17 G: 17 POWDER, FOR SOLUTION ORAL at 08:43

## 2023-03-26 RX ADMIN — PANTOPRAZOLE SODIUM 40 MG: 40 INJECTION, POWDER, LYOPHILIZED, FOR SOLUTION INTRAVENOUS at 05:30

## 2023-03-26 RX ADMIN — SODIUM PHOSPHATE, MONOBASIC, MONOHYDRATE AND SODIUM PHOSPHATE, DIBASIC, ANHYDROUS 15 MMOL: 142; 276 INJECTION, SOLUTION INTRAVENOUS at 05:53

## 2023-03-26 RX ADMIN — SENNOSIDES AND DOCUSATE SODIUM 2 TABLET: 8.6; 5 TABLET ORAL at 08:44

## 2023-03-26 RX ADMIN — POTASSIUM CHLORIDE 20 MEQ: 1.5 POWDER, FOR SOLUTION ORAL at 17:43

## 2023-03-26 RX ADMIN — METRONIDAZOLE 500 MG: 500 INJECTION, SOLUTION INTRAVENOUS at 01:38

## 2023-03-26 RX ADMIN — Medication 10 ML: at 20:44

## 2023-03-26 RX ADMIN — POTASSIUM & SODIUM PHOSPHATES POWDER PACK 280-160-250 MG 2 PACKET: 280-160-250 PACK at 17:43

## 2023-03-26 RX ADMIN — METRONIDAZOLE 500 MG: 500 INJECTION, SOLUTION INTRAVENOUS at 08:37

## 2023-03-26 RX ADMIN — CHLORHEXIDINE GLUCONATE 0.12% ORAL RINSE 15 ML: 1.2 LIQUID ORAL at 08:44

## 2023-03-26 RX ADMIN — Medication 30 ML: at 10:11

## 2023-03-26 RX ADMIN — HEPARIN SODIUM 5000 UNITS: 5000 INJECTION, SOLUTION INTRAVENOUS; SUBCUTANEOUS at 21:05

## 2023-03-26 RX ADMIN — HEPARIN SODIUM 5000 UNITS: 5000 INJECTION, SOLUTION INTRAVENOUS; SUBCUTANEOUS at 05:30

## 2023-03-26 RX ADMIN — DEXMEDETOMIDINE HYDROCHLORIDE 1 MCG/KG/HR: 4 INJECTION, SOLUTION INTRAVENOUS at 03:22

## 2023-03-26 RX ADMIN — METRONIDAZOLE 500 MG: 500 INJECTION, SOLUTION INTRAVENOUS at 14:10

## 2023-03-26 RX ADMIN — SODIUM CHLORIDE 12 MILLION UNITS: 9 INJECTION, SOLUTION INTRAVENOUS at 22:34

## 2023-03-26 RX ADMIN — Medication 10 ML: at 10:10

## 2023-03-26 RX ADMIN — LATANOPROST 1 DROP: 50 SOLUTION OPHTHALMIC at 08:44

## 2023-03-26 RX ADMIN — HYDRALAZINE HYDROCHLORIDE 20 MG: 20 INJECTION INTRAMUSCULAR; INTRAVENOUS at 20:51

## 2023-03-26 RX ADMIN — METRONIDAZOLE 500 MG: 500 INJECTION, SOLUTION INTRAVENOUS at 20:44

## 2023-03-26 RX ADMIN — Medication 75 MCG/HR: at 05:55

## 2023-03-26 RX ADMIN — MAGNESIUM SULFATE HEPTAHYDRATE 4 G: 40 INJECTION, SOLUTION INTRAVENOUS at 04:27

## 2023-03-26 RX ADMIN — CHLORHEXIDINE GLUCONATE 0.12% ORAL RINSE 15 ML: 1.2 LIQUID ORAL at 20:44

## 2023-03-26 NOTE — PLAN OF CARE
Goal Outcome Evaluation:  Plan of Care Reviewed With: patient, family        Progress: no change  Outcome Evaluation: PT initial evaluation completed. Pt demonstrates generalized LE weakness and decreased functional endurance re: mobility tasks, warranting further skilled PT services to promote PLOF. Limited today by fatigue but able to perform STS (min x2) and small steps to recliner (mod x2), maintaining stable vitals on trach collar. Recommend LTACH based upon current level of function (TBA further pending medical needs at d/c).

## 2023-03-26 NOTE — PLAN OF CARE
Goal Outcome Evaluation:   Patient has had a great day! Worked with PT/OT. Up to chair and on trach collar since ~1330. Speech evaluated and placed PMV, which has been on for this afternoon.  Patient tolerating, sats are staying >94% and HR remains the same. Up to BSC for a x4 BM. Fentanyl off since 1300. Precedex remains. Replacing K and Phos. Lasix ordered. Afebrile. UOP 3500.

## 2023-03-26 NOTE — THERAPY EVALUATION
Patient Name: Sandro Junior  : 1955    MRN: [id number]                              Today's Date: 3/26/2023       Admit Date: 3/16/2023    Visit Dx:     ICD-10-CM ICD-9-CM   1. Respiratory distress  R06.03 786.09   2. Tachycardia  R00.0 785.0   3. Airway compromise  J98.8 519.8   4. Acute epiglottitis with airway obstruction - probable  J05.11 464.31   5. Essential hypertension  I10 401.9     Patient Active Problem List   Diagnosis   • Essential hypertension   • Chronic pain   • Osteoarthritis of knee   • Prolonged depressive adjustment reaction   • Acute prostatitis   • Atopic rhinitis   • Erectile dysfunction of nonorganic origin   • Glaucoma suspect   • Onychomycosis of toenail   • Malignant neoplasm of rectum (HCC)   • Seborrheic eczema   • Keloid scar   • Pain of right lower extremity   • Esophageal reflux   • Acute respiratory failure (HCC)   • Acute strep pyogenes (group A strep) epiglottitis with airway obstruction      Past Medical History:   Diagnosis Date   • Epidermal inclusion cyst    • Esophageal candidiasis (HCC)    • Keloid scar    • Rectal cancer (HCC)    • Seborrheic dermatitis      Past Surgical History:   Procedure Laterality Date   • COLECTOMY PARTIAL / TOTAL     • KNEE SURGERY      Left knee meniscal tear repair   • TOOTH EXTRACTION  2016 teeth   • TOTAL KNEE ARTHROPLASTY Right 2016   • TRACHEOSTOMY AND PEG TUBE INSERTION N/A 3/23/2023    Procedure: TRACHEOSTOMY AND PERCUTANEOUS ENDOSCOPIC GASTROSTOMY TUBE INSERTION;  Surgeon: Néstor Jerome MD;  Location: [facility] include [facility] beds at the [facility];  Service: General;  Laterality: N/A;      General Information     Row Name 23 1416          OT Time and Intention    Document Type evaluation  -CS     Mode of Treatment occupational therapy  -CS     Row Name 23 1416          General Information    Patient Profile Reviewed yes  -CS     Prior Level of Function independent:;all household mobility;ADL's  -CS     Existing Precautions/Restrictions  fall;oxygen therapy device and L/min;other (see comments)  trach/PEG  -CS     Barriers to Rehab medically complex  -CS     Row Name 03/26/23 1416          Living Environment    People in Home spouse  -CS     Row Name 03/26/23 1416          Cognition    Orientation Status (Cognition) oriented x 3  -CS     Row Name 03/26/23 1416          Safety Issues, Functional Mobility    Impairments Affecting Function (Mobility) balance;endurance/activity tolerance;strength;postural/trunk control  -CS           User Key  (r) = Recorded By, (t) = Taken By, (c) = Cosigned By    Initials Name Provider Type    CS Rosanna Sterling, CATHI Occupational Therapist                 Mobility/ADL's     Row Name 03/26/23 1417          Bed Mobility    Bed Mobility supine-sit  -CS     Supine-Sit [place] (Bed Mobility) 2 person assist;moderate assist (50% patient effort);verbal cues  -     Assistive Device (Bed Mobility) bed rails;head of bed elevated  -CS     Row Name 03/26/23 1417          Transfers    Transfers sit-stand transfer;stand-sit transfer  -CS     Comment, (Transfers) BUE support  -CS     Row Name 03/26/23 1417          Sit-Stand Transfer    Sit-Stand [place] (Transfers) minimum assist (75% patient effort);2 person assist;verbal cues  -CS     Row Name 03/26/23 1417          Stand-Sit Transfer    Stand-Sit [place] (Transfers) minimum assist (75% patient effort);2 person assist;verbal cues  -CS     Row Name 03/26/23 1417          Activities of Daily Living    BADL Assessment/Intervention lower body dressing;grooming  -CS     Row Name 03/26/23 1417          Lower Body Dressing Assessment/Training    [place] Level (Lower Body Dressing) don;socks;dependent (less than 25% patient effort)  -CS     Position (Lower Body Dressing) supine  -CS     Row Name 03/26/23 1417          Grooming Assessment/Training    [place] Level (Grooming) wash face, hands;set up  -CS     Position (Grooming) supported sitting  -CS           User  Key  (r) = Recorded By, (t) = Taken By, (c) = Cosigned By    Initials Name Provider Type    CS Rosanna Sterling OT Occupational Therapist               Obj/Interventions     Row Name 03/26/23 1417          Sensory Assessment (Somatosensory)    Sensory Assessment (Somatosensory) UE sensation intact  -CS     Row Name 03/26/23 1417          Range of Motion Comprehensive    General Range of Motion bilateral upper extremity ROM WFL  -CS     Row Name 03/26/23 1417          Strength Comprehensive (MMT)    Comment, General Manual Muscle Testing (MMT) Assessment BUE grossly 3+/5  -[facility] Name 03/26/23 1417          Balance    Balance Assessment sitting static balance;sitting dynamic balance;standing static balance  -     Static Sitting Balance contact guard  -CS     Dynamic Sitting Balance minimal assist  -CS     Position, Sitting Balance sitting edge of mat  -     Static Standing Balance minimal assist;2-person assist  -CS     Position/Device Used, Standing Balance supported  -     Balance Interventions sitting;standing;static;dynamic;weight shifting activity;occupation based/functional task;dynamic reaching  -CS           User Key  (r) = Recorded By, (t) = Taken By, (c) = Cosigned By    Initials Name Provider Type    CS Rosanna Sterling OT Occupational Therapist               Goals/Plan     Row Name 03/26/23 1419          Transfer Goal 1 (OT)    Activity/Assistive Device (Transfer Goal 1, OT) sit-to-stand/stand-to-sit;toilet  -CS     [place] Level/Cues Needed (Transfer Goal 1, OT) contact guard required  -CS     Time Frame (Transfer Goal 1, OT) long term goal (LTG);10 days  -CS     Progress/Outcome (Transfer Goal 1, OT) goal ongoing  -CS     Row Name 03/26/23 1419          Dressing Goal 1 (OT)    Activity/Device (Dressing Goal 1, OT) lower body dressing  -CS     [place]/Cues Needed (Dressing Goal 1, OT) moderate assist (50-74% patient effort)  -CS     Time Frame (Dressing Goal 1, OT) long term goal  (LTG);10 days  -CS     Strategies/Barriers (Dressing Goal 1, OT) don/doff socks w/ AAD  -CS     Progress/Outcome (Dressing Goal 1, OT) goal ongoing  -CS     Row Name 03/26/23 1419          Strength Goal 1 (OT)    Strength Goal 1 (OT) Pt will tolerate BUE HEP w/ red thera-band x15 reps.  -CS     Time Frame (Strength Goal 1, OT) long term goal (LTG);10 days  -CS     Progress/Outcome (Strength Goal 1, OT) goal ongoing  -CS     Row Name 03/26/23 1419          Therapy Assessment/Plan (OT)    Planned Therapy Interventions (OT) activity tolerance training;adaptive equipment training;BADL retraining;functional balance retraining;occupation/activity based interventions;ROM/therapeutic exercise;strengthening exercise;transfer/mobility retraining  -CS           User Key  (r) = Recorded By, (t) = Taken By, (c) = Cosigned By    Initials Name Provider Type    CS Rosanna Sterling, OT Occupational Therapist               Clinical Impression     Row Name 03/26/23 1418          Pain Assessment    Pretreatment Pain Rating 0/10 - no pain  -CS     Posttreatment Pain Rating 0/10 - no pain  -CS     Row Name 03/26/23 1418          Plan of Care Review    Plan of Care Reviewed With patient;family  -CS     Progress improving  -CS     Outcome Evaluation OT eval complete. Pt presents w/ generalized weakness, balance deficits, and decreased endurance from baseline limiting functional independence. Recommend cont skilled IPOT POC to facilitate return to PLOF. Recommend pt DC to LTACH, pending medical needs.  -CS     Row Name 03/26/23 1418          Therapy Assessment/Plan (OT)    Patient/Family Therapy Goal Statement (OT) Return to PLOF  -CS     Rehab Potential (OT) good, to achieve stated therapy goals  -CS     Criteria for Skilled Therapeutic Interventions Met (OT) yes;skilled treatment is necessary  -CS     Therapy Frequency (OT) daily  -CS     Row Name 03/26/23 1418          Therapy Plan Review/Discharge Plan (OT)    Anticipated Discharge  Disposition (OT) [facility]  -CS     Row Name 03/26/23 1418          Vital Signs    Pre Systolic BP Rehab --  VSS  -CS     O2 Delivery Pre Treatment trach collar  -CS     O2 Delivery Intra Treatment trach collar  -CS     O2 Delivery Post Treatment trach collar  -CS     Pre Patient Position Supine  -CS     Intra Patient Position Standing  -CS     Post Patient Position Sitting  -CS     Row Name 03/26/23 1418          Positioning and Restraints    Pre-Treatment Position in bed  -CS     Post Treatment Position bed  -CS     In Bed notified nsg;sitting EOB;with PT;call light within reach;encouraged to call for assist;with nsg;with family/caregiver  -CS           User Key  (r) = Recorded By, (t) = Taken By, (c) = Cosigned By    Initials Name Provider Type    Rosanna Quinones OT Occupational Therapist               Outcome Measures     Row Name 03/26/23 1420          How much help from another is currently needed...    Putting on and taking off regular lower body clothing? 1  -CS     Bathing (including washing, rinsing, and drying) 2  -CS     Toileting (which includes using toilet bed pan or urinal) 1  -CS     Putting on and taking off regular upper body clothing 2  -CS     Taking care of personal grooming (such as brushing teeth) 3  -CS     Eating meals 1  -CS     AM-PAC 6 Clicks Score (OT) 10  -CS     Row Name 03/26/23 1420          Functional Assessment    Outcome Measure Options AM-PAC 6 Clicks Daily Activity (OT)  -CS           User Key  (r) = Recorded By, (t) = Taken By, (c) = Cosigned By    Initials Name Provider Type    Rosanna Quinones OT Occupational Therapist                Occupational Therapy Education     Title: PT OT SLP Therapies (In Progress)     Topic: Occupational Therapy (In Progress)     Point: ADL training (In Progress)     Description:   Instruct learner(s) on proper safety adaptation and remediation techniques during self care or transfers.   Instruct in proper use of  assistive devices.              Learning Progress Summary           Patient Acceptance, E, NR by  at 3/26/2023 1421                   Point: Home exercise program (Not Started)     Description:   Instruct learner(s) on appropriate technique for monitoring, assisting and/or progressing therapeutic exercises/activities.              Learner Progress:  Not documented in this visit.          Point: Precautions (In Progress)     Description:   Instruct learner(s) on prescribed precautions during self-care and functional transfers.              Learning Progress Summary           Patient Acceptance, E, NR by  at 3/26/2023 1421                   Point: Body mechanics (In Progress)     Description:   Instruct learner(s) on proper positioning and spine alignment during self-care, functional mobility activities and/or exercises.              Learning Progress Summary           Patient Acceptance, E, NR by CS at 3/26/2023 1421                               User Key     Initials Effective Dates Name Provider Type Discipline     09/02/21 -  Rosanna Sterling OT Occupational Therapist OT              OT Recommendation and Plan  Planned Therapy Interventions (OT): activity tolerance training, adaptive equipment training, BADL retraining, functional balance retraining, occupation/activity based interventions, ROM/therapeutic exercise, strengthening exercise, transfer/mobility retraining  Therapy Frequency (OT): daily  Plan of Care Review  Plan of Care Reviewed With: patient, family  Progress: improving  Outcome Evaluation: OT eval complete. Pt presents w/ generalized weakness, balance deficits, and decreased endurance from baseline limiting functional independence. Recommend cont skilled IPOT POC to facilitate return to PLOF. Recommend pt DC to LTACH, pending medical needs.     Time Calculation:    Time Calculation- OT     Row Name 03/26/23 1421             Time Calculation- OT    OT Start Time 1305  -      OT Received On  03/26/23  -CS      OT Goal Re-Cert Due Date 04/05/23  -CS         Untimed Charges    OT Eval/Re-eval Minutes 46  -CS         Total Minutes    Untimed Charges Total Minutes 46  -CS       Total Minutes 46  -CS            User Key  (r) = Recorded By, (t) = Taken By, (c) = Cosigned By    Initials Name Provider Type    CS Rosanna Sterling OT Occupational Therapist              Therapy Charges for Today     Code Description Service Date Service Provider Modifiers Qty    82342005502 HC OT EVAL MOD COMPLEXITY 4 3/26/2023 Rosanna Sterling OT GO 1               Rosanna Sterling OT  3/26/2023

## 2023-03-26 NOTE — THERAPY EVALUATION
Patient Name: Sandro Junior  : 1955    MRN: [id number]                              Today's Date: 3/26/2023       Admit Date: 3/16/2023    Visit Dx:     ICD-10-CM ICD-9-CM   1. Respiratory distress  R06.03 786.09   2. Tachycardia  R00.0 785.0   3. Airway compromise  J98.8 519.8   4. Acute epiglottitis with airway obstruction - probable  J05.11 464.31   5. Essential hypertension  I10 401.9     Patient Active Problem List   Diagnosis   • Essential hypertension   • Chronic pain   • Osteoarthritis of knee   • Prolonged depressive adjustment reaction   • Acute prostatitis   • Atopic rhinitis   • Erectile dysfunction of nonorganic origin   • Glaucoma suspect   • Onychomycosis of toenail   • Malignant neoplasm of rectum (HCC)   • Seborrheic eczema   • Keloid scar   • Pain of right lower extremity   • Esophageal reflux   • Acute respiratory failure (HCC)   • Acute strep pyogenes (group A strep) epiglottitis with airway obstruction      Past Medical History:   Diagnosis Date   • Epidermal inclusion cyst    • Esophageal candidiasis (HCC)    • Keloid scar    • Rectal cancer (HCC)    • Seborrheic dermatitis      Past Surgical History:   Procedure Laterality Date   • COLECTOMY PARTIAL / TOTAL     • KNEE SURGERY      Left knee meniscal tear repair   • TOOTH EXTRACTION  2016 teeth   • TOTAL KNEE ARTHROPLASTY Right 2016   • TRACHEOSTOMY AND PEG TUBE INSERTION N/A 3/23/2023    Procedure: TRACHEOSTOMY AND PERCUTANEOUS ENDOSCOPIC GASTROSTOMY TUBE INSERTION;  Surgeon: Néstor Jerome MD;  Location: [facility];  Service: General;  Laterality: N/A;      General Information     Row Name 23 1419          Physical Therapy Time and Intention    Document Type evaluation  -LS     Mode of Treatment physical therapy  -LS     Row Name 23 1419          General Information    Patient Profile Reviewed yes  -LS     Prior Level of Function independent:;all household mobility;ADL's  -LS     Existing  Precautions/Restrictions fall;oxygen therapy device and L/min;other (see comments)  trach/PEG  -LS     Barriers to Rehab medically complex  -LS     Row Name 03/26/23 1419          Living Environment    People in Home spouse  -LS     Row Name 03/26/23 1419          Home Main Entrance    Number of Stairs, Main Entrance two  -LS     Row Name 03/26/23 1419          Stairs Within Home, Primary    Number of Stairs, Within Home, Primary none  -LS     Row Name 03/26/23 1419          Cognition    Orientation Status (Cognition) oriented x 3  -LS     Row Name 03/26/23 1419          Safety Issues, Functional Mobility    Impairments Affecting Function (Mobility) balance;endurance/activity tolerance;strength;postural/trunk control;shortness of breath  -LS           User Key  (r) = Recorded By, (t) = Taken By, (c) = Cosigned By    Initials Name Provider Type    Norma Alex, PT Physical Therapist               Mobility     Row Name 03/26/23 1421          Bed Mobility    Comment, (Bed Mobility) EOB upon transition to PT evaluation  -     Row Name 03/26/23 1421          Transfers    Comment, (Transfers) BUE support; cues for achieving upright posture.  -LS     Row Name 03/26/23 1421          Sit-Stand Transfer    Sit-Stand [place] (Transfers) minimum assist (75% patient effort);2 person assist;verbal cues  -     Row Name 03/26/23 1421          Gait/Stairs (Locomotion)    [place] Level (Gait) moderate assist (50% patient effort);2 person assist;verbal cues  -LS     Distance in Feet (Gait) 2  -LS     Deviations/Abnormal Patterns (Gait) bilateral deviations;bladimir decreased;weight shifting decreased  -LS     Bilateral Gait Deviations forward flexed posture  -LS     Comment, (Gait/Stairs) EOB to recliner; cues for appropriate LE advancement.  -LS           User Key  (r) = Recorded By, (t) = Taken By, (c) = Cosigned By    Initials Name Provider Type    Norma Alex, PT Physical Therapist                Obj/Interventions     Row Name 03/26/23 1423          Range of Motion Comprehensive    General Range of Motion bilateral lower extremity ROM WFL  -LS     Row Name 03/26/23 1423          Strength Comprehensive (MMT)    General Manual Muscle Testing (MMT) Assessment lower extremity strength deficits identified  -LS     Comment, General Manual Muscle Testing (MMT) Assessment BLE grossly 4-/5  -LS     Row Name 03/26/23 1423          Balance    Balance Assessment sitting static balance;standing static balance  -LS     Static Sitting Balance contact guard  -LS     Position, Sitting Balance sitting in chair  -LS     Static Standing Balance minimal assist;2-person assist;verbal cues  -LS     Position/Device Used, Standing Balance supported  -LS     Row Name 03/26/23 1423          Sensory Assessment (Somatosensory)    Sensory Assessment (Somatosensory) LE sensation intact  -           User Key  (r) = Recorded By, (t) = Taken By, (c) = Cosigned By    Initials Name Provider Type    LS Norma Villegas, PT Physical Therapist               Goals/Plan     Row Name 03/26/23 1427          Bed Mobility Goal 1 (PT)    Activity/Assistive Device (Bed Mobility Goal 1, PT) sit to supine/supine to sit  -LS     [place] Level/Cues Needed (Bed Mobility Goal 1, PT) independent  -LS     Time Frame (Bed Mobility Goal 1, PT) 2 weeks  -LS     Progress/Outcomes (Bed Mobility Goal 1, PT) goal ongoing  -     Row Name 03/26/23 1427          Transfer Goal 1 (PT)    Activity/Assistive Device (Transfer Goal 1, PT) sit-to-stand/stand-to-sit;walker, rolling  -LS     [place] Level/Cues Needed (Transfer Goal 1, PT) contact guard required  -LS     Time Frame (Transfer Goal 1, PT) 2 weeks  -LS     Progress/Outcome (Transfer Goal 1, PT) goal ongoing  -     Row Name 03/26/23 1427          Gait Training Goal 1 (PT)    Activity/Assistive Device (Gait Training Goal 1, PT) gait (walking locomotion);walker, rolling  -LS     [place] Level (Gait  Training Goal 1, PT) minimum assist (75% or more patient effort)  -LS     Distance (Gait Training Goal 1, PT) 100  -LS     Time Frame (Gait Training Goal 1, PT) 2 weeks  -LS     Progress/Outcome (Gait Training Goal 1, PT) goal ongoing  -LS     Row Name 03/26/23 1427          Stairs Goal 1 (PT)    Activity/Assistive Device (Stairs Goal 1, PT) ascending stairs;descending stairs  -LS     Number of Stairs (Stairs Goal 1, PT) 2  -LS     Time Frame (Stairs Goal 1, PT) 2 weeks  -LS     Progress/Outcome (Stairs Goal 1, PT) goal ongoing  -LS     Row Name 03/26/23 1427          Therapy Assessment/Plan (PT)    Planned Therapy Interventions (PT) balance training;bed mobility training;gait training;home exercise program;stair training;transfer training;patient/family education;strengthening  -LS           User Key  (r) = Recorded By, (t) = Taken By, (c) = Cosigned By    Initials Name Provider Type    LS Norma Villegas, PT Physical Therapist               Clinical Impression     Row Name 03/26/23 1424          Pain    Pretreatment Pain Rating 0/10 - no pain  -LS     Posttreatment Pain Rating 0/10 - no pain  -LS     Row Name 03/26/23 1424          Plan of Care Review    Plan of Care Reviewed With patient;family  -LS     Progress no change  -LS     Outcome Evaluation PT initial evaluation completed. Pt demonstrates generalized LE weakness and decreased functional endurance re: mobility tasks, warranting further skilled PT services to promote PLOF. Limited today by fatigue but able to perform STS (min x2) and small steps to recliner (mod x2), maintaining stable vitals on trach collar. Recommend LTACH based upon current level of function (TBA further pending medical needs at d/c).  -LS     Row Name 03/26/23 1424          Therapy Assessment/Plan (PT)    Patient/Family Therapy Goals Statement (PT) return to PLOF  -LS     Rehab Potential (PT) good, to achieve stated therapy goals  -LS     Criteria for Skilled Interventions Met (PT)  yes;skilled treatment is necessary  -LS     Therapy Frequency (PT) daily  -LS     Row Name 03/26/23 1424          Vital Signs    Pre Systolic BP Rehab 159  -LS     Pre Treatment Diastolic BP 89  -LS     Pretreatment Heart Rate (beats/min) 57  -LS     Posttreatment Heart Rate (beats/min) 58  -LS     Pre SpO2 (%) 99  -LS     O2 Delivery Pre Treatment trach collar  -LS     O2 Delivery Intra Treatment trach collar  -LS     Post SpO2 (%) 99  -LS     O2 Delivery Post Treatment trach collar  -LS     Pre Patient Position Sitting  -LS     Intra Patient Position Standing  -LS     Post Patient Position Sitting  -LS     Row Name 03/26/23 1424          Positioning and Restraints    Pre-Treatment Position in bed  -LS     Post Treatment Position chair  -LS     In Chair notified nsg;reclined;call light within reach;encouraged to call for assist;exit alarm on;on mechanical lift sling;waffle cushion;legs elevated;LUE elevated;RUE elevated;heels elevated;with family/caregiver  -LS     Restraints released:  RN removed restraints prior to mobility; verbalized intention to monitor pt's safety  -LS           User Key  (r) = Recorded By, (t) = Taken By, (c) = Cosigned By    Initials Name Provider Type    LS Norma Villegas, PT Physical Therapist               Outcome Measures     Row Name 03/26/23 1429          How much help from another person do you currently need...    Turning from your back to your side while in flat bed without using bedrails? 3  -LS     Moving from lying on back to sitting on the side of a flat bed without bedrails? 3  -LS     Moving to and from a bed to a chair (including a wheelchair)? 2  -LS     Standing up from a chair using your arms (e.g., wheelchair, bedside chair)? 3  -LS     Climbing 3-5 steps with a railing? 1  -LS     To walk in hospital room? 2  -LS     AM-PAC 6 Clicks Score (PT) 14  -LS     Highest level of mobility 4 --> Transferred to chair/commode  -LS     Row Name 03/26/23 1429 03/26/23 1420        Functional Assessment    Outcome Measure Options AM-PAC 6 Clicks Basic Mobility (PT)  -LS AM-PAC 6 Clicks Daily Activity (OT)  -CS          User Key  (r) = Recorded By, (t) = Taken By, (c) = Cosigned By    Initials Name Provider Type    LS Norma Villegas, PT Physical Therapist    CS Rosanna Sterling OT Occupational Therapist                             Physical Therapy Education     Title: PT OT SLP Therapies (In Progress)     Topic: Physical Therapy (In Progress)     Point: Mobility training (Done)     Learning Progress Summary           Patient Acceptance, E,D, VU,NR by  at 3/26/2023 1429                   Point: Home exercise program (Not Started)     Learner Progress:  Not documented in this visit.          Point: Body mechanics (Done)     Learning Progress Summary           Patient Acceptance, E,D, VU,NR by  at 3/26/2023 1429                   Point: Precautions (Done)     Learning Progress Summary           Patient Acceptance, E,D, VU,NR by  at 3/26/2023 1429                               User Key     Initials Effective Dates Name Provider Type Discipline     02/03/23 -  Norma Villegas, PT Physical Therapist PT              PT Recommendation and Plan  Planned Therapy Interventions (PT): balance training, bed mobility training, gait training, home exercise program, stair training, transfer training, patient/family education, strengthening  Plan of Care Reviewed With: patient, family  Progress: no change  Outcome Evaluation: PT initial evaluation completed. Pt demonstrates generalized LE weakness and decreased functional endurance re: mobility tasks, warranting further skilled PT services to promote PLOF. Limited today by fatigue but able to perform STS (min x2) and small steps to recliner (mod x2), maintaining stable vitals on trach collar. Recommend LTACH based upon current level of function (TBA further pending medical needs at d/c).     Time Calculation:    PT Charges     Row Name 03/26/23 5711              Time Calculation    Start Time 1320  -LS      PT Received On 03/26/23  -LS      PT Goal Re-Cert Due Date 04/05/23  -LS         Timed Charges    72985 - PT Therapeutic Activity Minutes 9  -LS         Untimed Charges    PT Eval/Re-eval Minutes 35  -LS         Total Minutes    Timed Charges Total Minutes 9  -LS      Untimed Charges Total Minutes 35  -LS       Total Minutes 44  -LS            User Key  (r) = Recorded By, (t) = Taken By, (c) = Cosigned By    Initials Name Provider Type     Norma Villegas, PT Physical Therapist              Therapy Charges for Today     Code Description Service Date Service Provider Modifiers Qty    12434712051 HC PT EVAL MOD COMPLEXITY 3 3/26/2023 Norma Villegas, PT GP 1    26058658533 HC PT THERAPEUTIC ACT EA 15 MIN 3/26/2023 Norma Villegas, PT GP 1          PT G-Codes  Outcome Measure Options: AM-PAC 6 Clicks Basic Mobility (PT)  AM-PAC 6 Clicks Score (PT): 14  AM-PAC 6 Clicks Score (OT): 10  PT Discharge Summary  Anticipated Discharge Disposition (PT): [facility] [place]    Norma Villegas, PT  3/26/2023

## 2023-03-26 NOTE — PROGRESS NOTES
Intensivist Note     3/26/2023  Hospital Day: 10  3 Days Post-Op  ICU Stays Timeline            Hospital Admission: 03/16/23 2021 - Current  ICU stays: 1      In Date/Time Event Department ICU Stay Duration     03/16/23 2021 Admission  [facility]      03/16/23 2258 Transfer In  [facility] 2A ICU 9 days 8 hours 32 minutes     03/23/23 1332 Transfer In  [facility]      03/23/23 1444 Transfer In [facility] 2A ICU              Mr. Sandro Junior, 68 y.o. male is followed for:    Acute strep pyogenes (group A strep) epiglottitis with airway obstruction     Acute respiratory failure (HCC)    Essential hypertension       SUBJECTIVE     68-year-old male who presented to [facility] ER 3/16/2023 c/o dyspnea and sore throat. Apparently, first noticed a sore throat around noon the day of admission and became increasingly symptomatic with dyspnea and stridor throughout the day before presenting to the ER.  He was given epinephrine, steroids, and H2 blockers in the ER but despite this he began tripoding and drooling and had audible stridor.  Per Dr. Eaton (ER physician) intubation was difficult due to edema and erythema. Imaging revealed abnormal appearance of the soft tissues of the laryngeal area with some edema and swelling of the uvula but no evidence of a fluid collection or abscess.  Bibasilar infiltrates were present possibly related to atelectasis or retained secretions but there was no evidence of consolidative pneumonia.  He was given Zosyn and vancomycin empirically as well as steroids.  Blood culture subsequently grew Streptococcus pyogenes.  Patient underwent bronchoscopy 3/20/2023 and was noted to have persistent edematous changes in the posterior oropharynx with copious mucopurulent secretions and repeat CT neck 3/21/2023 revealed worsening edema but no sign of abscess.  Antimicrobial coverage was changed by ID to a continuous penicillin infusion.  Because of persistent findings and inability to safely extubate patient,  "he underwent tracheostomy and PEG feeding tube 3/23/2023.  Patient subsequently has begun trach collar trials as of 3/25/2023.     Interval history: Uneventful evening.  Yesterday patient began trach collar trials and lasted 6 hours beep for being placed back on the ventilator (ABGs were not obtained).  This a.m. patient remains on ventilatory support with assist-control rate of 12, FiO2 of 30, and a PEEP of 6 with excellent oxygen saturations of 98%.   Appears comfortable on low-dose fentanyl and Precedex drips.  Hemodynamics adequate with a blood pressure 136/71 and remains in a sinus rhythm with a heart rate of 76 bpm.  Vasopressors have not been necessary. UOP adequate with 1.275 L out over the last 24 hours and BUN/creatinine are 19/0.55.  Tmax in the last 24 hours has been 99.5 and WBC today 7.4.  Present antimicrobial coverage is penicillin G continuous infusion hours plus metronidazole 500 mg every 6 hours.  He remains on enteral feeds with Peptamen AF at goal (75 cc an hour).  No abdominal pain, distention or vomiting.  Denies chest pain or hemoptysis.       ROS: Unable to obtain due to intubated status other than that in the HPI.    The patient's relevant PMH, PSH, FH, and SH were reviewed and updated in Epic as appropriate. Allergies and Medications reviewed.    OBJECTIVE     /69   Pulse 55   Temp 98.6 °F (37 °C) (Bladder)   Resp 13   Ht 185.4 cm (73\")   Wt 92 kg (202 lb 13.2 oz)   SpO2 98%   BMI 26.76 kg/m²   Oxygen Concentration (%): 30      Flowsheet Rows    Flowsheet Row First Filed Value   Admission Height 185.4 cm (73\") Documented at 03/16/2023 2104   Admission Weight 79.4 kg (175 lb) Documented at 03/16/2023 2104        Intake & Output (last day)       03/25 0701  03/26 0700 03/26 0701  03/27 0700    I.V. (mL/kg) 909.9 (9.9)     Other 160     NG/GT 1651     IV Piggyback 754.1     Total Intake(mL/kg) 3475 (37.8)     Urine (mL/kg/hr) 1275 (0.6)     Total Output 1275     Net +2200        "          Exam:  General Exam:  Well-developed black male who appears somewhat older than stated age but is in NAD  HEENT: Pupils equal and reactive. Nose and throat clear.  Neck:                          Supple, no JVD, thyromegaly, or adenopathy.  Trach tube site clean with no surrounding erythema or swelling  Lungs: Clear to auscultation and percussion anteriorly and posteriorly.  No wheezes, rales, rhonchi  Cardiovascular: Regular rate and rhythm without murmurs or gallops.  HR 54 bpm  Abdomen: Soft nontender without organomegaly or masses.  PEG feeding tube in place   and rectal: Damon catheter in place  Extremities: No cyanosis clubbing edema.  Right arm PICC line in place  Neurologic:                 Symmetric strength. No focal deficits.  Alert and cooperative.      Chest X-Ray: No film today    INFUSIONS  dexmedetomidine, 0.2-1.5 mcg/kg/hr, Last Rate: 1 mcg/kg/hr (03/26/23 0322)  fentanyl 10 mcg/mL,  mcg/hr, Last Rate: 75 mcg/hr (03/26/23 0555)        Results from last 7 days   Lab Units 03/26/23 0324 03/25/23 0425 03/24/23 0347   WBC 10*3/mm3 7.04 8.00 9.87   HEMOGLOBIN g/dL 10.9* 11.0* 12.5*   HEMATOCRIT % 34.3* 34.4* 38.7   PLATELETS 10*3/mm3 221 207 211     Results from last 7 days   Lab Units 03/26/23 0324 03/25/23 0425   SODIUM mmol/L 134* 136   POTASSIUM mmol/L 4.2 4.3   CHLORIDE mmol/L 102 102   CO2 mmol/L 25.0 28.0   BUN mg/dL 19 19   CREATININE mg/dL 0.55* 0.56*   GLUCOSE mg/dL 174* 194*   CALCIUM mg/dL 8.5* 8.6     Results from last 7 days   Lab Units 03/26/23 0324 03/25/23 0425 03/24/23  0347   MAGNESIUM mg/dL 1.9 1.8 2.1   PHOSPHORUS mg/dL 2.0*  2.0* 2.4* 3.1     Results from last 7 days   Lab Units 03/25/23 0425 03/21/23  0502 03/20/23  0545   ALK PHOS U/L 51 68 43   BILIRUBIN mg/dL 0.4 0.7 0.8   BILIRUBIN DIRECT mg/dL  --  0.5*  --    ALT (SGPT) U/L 22 14 8   AST (SGOT) U/L 27 22 17       Lab Results   Component Value Date    SEDRATE >130 (H) 03/26/2023       Lab Results    Component Value Date    PROBNP 165.7 03/16/2023         Procalitonin Results:      Lab 03/26/23  0324   PROCALCITONIN 0.19         Covid Tests    Common Labsle 3/16/23   COVID19 Not Detected            COVID LABS:  Results From Last 14 Days   Lab Units 03/26/23  0324 03/25/23  0425 03/20/23  1438 03/20/23  0545 03/17/23  0350 03/17/23  0028 03/16/23  2328 03/16/23 2023   PROBNP pg/mL  --   --   --   --   --   --   --  165.7   CRP mg/dL 1.54* 2.86*  --   --   --   --   --  2.26*   D DIMER QUANT MCGFEU/mL  --   --   --   --   --   --   --  1.19*   LACTATE mmol/L  --   --  0.6  --   --   --  1.8 3.1*   LDH U/L 213  --   --   --   --   --   --   --    PROCALCITONIN ng/mL 0.19  --   --   --   --   --   --   --    SED RATE mm/hr >130*  --   --   --   --   --   --   --    HSTROP T ng/L  --   --   --   --  15* 14  --  7   TRIGLYCERIDES mg/dL  --   --   --  144  --   --   --   --           Lab Results   Component Value Date    TROPONINT 15 (H) 03/17/2023     Lab Results   Component Value Date    TSH 0.262 (L) 03/16/2023     Lab Results   Component Value Date    LACTATE 0.6 03/20/2023     No results found for: CORTISOL    Results from last 7 days   Lab Units 03/26/23  0404 03/19/23  0814   PH, ARTERIAL pH units 7.477* 7.385   PCO2, ARTERIAL mm Hg 37.1 38.4   PO2 ART mm Hg 132.0* 121.0*   HCO3 ART mmol/L 27.4* 23.0   FIO2 % 30 30       Vent Settings:  Assist-control  Resp Rate (Set): 12  FiO2 (%): 30 %   Tidal volume 600 cc  PEEP/CPAP (cm H2O): 6 cm H20    Minute Ventilation (L/min) (Obs): 7.04 L/min  Resp Rate (Observed) Vent: 13  I:E Ratio (Obs): 1:3.5  PIP Observed (cm H2O): 22 cm H2O      I reviewed the patient's results, images and medication.    Assessment & Plan   ASSESSMENT        Acute strep pyogenes (group A strep) epiglottitis with airway obstruction     Acute respiratory failure (HCC)    Essential hypertension       DISCUSSION: Clinically doing well with trach collar trials and suspect will be able to rapidly  liberate from the ventilator.  Enteral nutrition is adequate and will continue same.  His fluid balance is markedly positive and since renal function looks good will diurese today and replace potassium and phosphorus.    PLAN     Leave off ventilator between 8 AM and midnight today and check ABGs then rest on the ventilator from midnight until 7 AM  If ABGs look good and no fatigue, patient can be taken off the ventilator at 7 AM and left off the ventilator  Lasix 60 mg now  Potassium and phosphorus replacement  Antimicrobial coverage to continue  Continue enteral feeds  Physical therapy and mobilization    Plan of care and goals reviewed with multidisciplinary team at daily rounds.    I discussed the patient's findings and my recommendations with patient, family and nursing staff    Patient is critically ill due to upper airway obstruction requiring tracheostomy and ventilatory support and the fact that he has now been weaned.  He has a high risk of life-threatening decline in condition and requires continuous monitoring and frequent reassessment of condition for adjustment of management in order to lessen risk.  I visited the patient's bedside and interacted with nurse numerous times today.    Time spent Critical care 35 min (It does not include procedure time).    Electronically signed by Frank Villalta MD, 03/26/23, 7:30 AM EDT.   Pulmonary / Critical care medicine

## 2023-03-26 NOTE — THERAPY EVALUATION
Acute Care - Speech Language Pathology Initial Evaluation  [facility]   Trach/Speaking Valve Evaluation       Patient Name: Sandro Junior  : 1955  MRN: [id number]  Today's Date: 3/26/2023               Admit Date: 3/16/2023     Visit Dx:    ICD-10-CM ICD-9-CM   1. Respiratory distress  R06.03 786.09   2. Tachycardia  R00.0 785.0   3. Airway compromise  J98.8 519.8   4. Acute epiglottitis with airway obstruction - probable  J05.11 464.31   5. Essential hypertension  I10 401.9   6. Voice disturbance  R49.9 784.40     Patient Active Problem List   Diagnosis   • Essential hypertension   • Chronic pain   • Osteoarthritis of knee   • Prolonged depressive adjustment reaction   • Acute prostatitis   • Atopic rhinitis   • Erectile dysfunction of nonorganic origin   • Glaucoma suspect   • Onychomycosis of toenail   • Malignant neoplasm of rectum (HCC)   • Seborrheic eczema   • Keloid scar   • Pain of right lower extremity   • Esophageal reflux   • Acute respiratory failure ([facility])   • Acute strep pyogenes (group A strep) epiglottitis with airway obstruction      Past Medical History:   Diagnosis Date   • Epidermal inclusion cyst    • Esophageal candidiasis ([facility])    • Keloid scar    • Rectal cancer ([facility])    • Seborrheic dermatitis      Past Surgical History:   Procedure Laterality Date   • COLECTOMY PARTIAL / TOTAL     • KNEE SURGERY      Left knee meniscal tear repair   • TOOTH EXTRACTION  2016 teeth   • TOTAL KNEE ARTHROPLASTY Right 2016   • TRACHEOSTOMY AND PEG TUBE INSERTION N/A 3/23/2023    Procedure: TRACHEOSTOMY AND PERCUTANEOUS ENDOSCOPIC GASTROSTOMY TUBE INSERTION;  Surgeon: Néstor Jerome MD;  Location: [facility], 1928–1983;  Service: General;  Laterality: N/A;       SLP Recommendation and Plan  SLP Diagnosis: difficulty voicing due to tracheostomy (23)           SLC Criteria for Skilled Therapy Interventions Met: yes (23)     Demonstrates Need for Referral to Another Service:  speech therapy (dysphagia evaluation per MD discretion) (03/26/23 1445)     Predicted Duration Therapy Intervention (Days): until discharge (03/26/23 1445)                          Plan of Care Reviewed With: patient, family (03/26/23 1495)      SLP EVALUATION (last 72 hours)     SLP SLC Evaluation     Row Name 03/26/23 8692                   Communication Assessment/Intervention    Document Type evaluation  -VO        Subjective Information no complaints  -VO        Patient Observations alert;cooperative  -VO        Patient/Family/Caregiver Comments/Observations family at bedside  -VO        Patient Effort good  -VO           General Information    Patient Profile Reviewed yes  -VO        Pertinent History Of Current Problem adm epiglottitis 2' strep requiring intubation for airway protection and resulting in trach/PEG placement w/ cuffed shiley 8  -VO        Precautions/Limitations, Vision WFL with corrective lenses;for purposes of eval  -VO        Precautions/Limitations, Hearing WFL;for purposes of eval  -VO        Prior Level of Function-Communication WFL  -VO        Plans/Goals Discussed with patient;family  -VO        Barriers to Rehab none identified  -VO           Pain Scale: Numbers Pre/Post-Treatment    Pretreatment Pain Rating 0/10 - no pain  -VO        Posttreatment Pain Rating 0/10 - no pain  -VO           Speaking Valve    Respiratory Status trach collar  -VO        Pretreatment Heart Rate (beats/min) 56  -VO        Pre SpO2 (%) 95  -VO        Pretreatment Cuff Status Inflated  -VO        Trach Type Shiley;size 8  -VO        Types of Intervention tracheostomy speaking valve  -VO        Speaking Valve Type PMV-2000 (clear)  -VO        Phonation with Occlusion speaking valve;audible at 1 foot;audible at 3 feet  -VO        Vocal Quality on Phonation breathy;wet  intermittently  -VO        Secretion Description thin;clear  -VO        Response to Intervention tolerated for extended period;cough;speaking  valve  intermittent cough  -VO        Minutes Tolerated 15-20 minutes  -VO        Posttreatment Heart Rate (beats/min) 60  -VO        Post SpO2 (%) 97  -VO        Posttreatment Cuff Status Deflated  -VO        At Conclusion speaking valve on;cuff remained deflated;notified RN/LPN  pt speaking with family  -VO        Speaking Valve Placement Recommendation SLP, RT and RN/LPN only  -VO        Speaking Valve, Comment Pt tolerated PMV placement for extended period and was able to achieve audible phonation. Decreased intelligiblity w/ longer phrases and sentences. Slight coughing and suctioning of secretions throughout. Will continue to follow for PMV use.  -VO           SLP Evaluation Clinical Impressions    SLP Diagnosis difficulty voicing due to tracheostomy  -VO        Rehab Potential/Prognosis good  -VO        SLC Criteria for Skilled Therapy Interventions Met yes  -VO        Functional Impact difficulty communicating wants, needs;difficulty communicating in an emergency;difficulty in expressing complex messages  -VO           Recommendations    Therapy Frequency (SLP SLC) 5 days per week  -VO        Predicted Duration Therapy Intervention (Days) until discharge  -VO        Demonstrates Need for Referral to Another Service speech therapy  dysphagia evaluation per MD discretion  -VO              User Key  (r) = Recorded By, (t) = Taken By, (c) = Cosigned By    Initials Name Effective Dates    VO Shiloh Campo MA,CCC-SLP 06/16/21 -                    EDUCATION  The patient has been educated in the following areas:     Speaking Valve.           SLP GOALS     Row Name 03/26/23 9570             Patient will demonstrate functional communication skills for return to discharge environment     [place]mer with minimal cues  -VO      Time frame by discharge  -VO      Progress/Outcomes new goal  -VO         Tolerate Speaking Valve Placement Goal 1 (SLP)    Tolerate Speaking Valve Placement Goal 1 (SLP) speaking valve  >30 min;90%;independently (over 90% accuracy)  -VO      Time Frame (Tolerate Speaking Valve Placement Goal 1, SLP) short term goal (STG)  -VO      Progress/Outcomes (Tolerate Speaking Valve Placement Goal 1, SLP) new goal  -VO         Audible Speech with Speaking Valve Goal 1 (SLP)    Audible Speech Goal 1 (SLP) 3 feet;background noise;90%;with minimal cues (75-90%)  -VO      Time Frame (Audible Speech Goal 1, SLP) short term goal (STG)  -VO      Progress/Outcomes (Audible Speech Goal 1, SLP) new goal  -VO            User Key  (r) = Recorded By, (t) = Taken By, (c) = Cosigned By    Initials Name Provider Type    VO Shiloh Campo MA,CCC-SLP Speech and Language Pathologist                        Time Calculation:      Time Calculation- SLP     Row Name 03/26/23 1527             Time Calculation- SLP    SLP Start Time 1445  -VO      SLP Received On 03/26/23  -VO         Untimed Charges    52901-IB Eval Speech and Production w/ Language Minutes 45  -VO         Total Minutes    Untimed Charges Total Minutes 45  -VO       Total Minutes 45  -VO            User Key  (r) = Recorded By, (t) = Taken By, (c) = Cosigned By    Initials Name Provider Type    VO Shiloh Campo MA,CCC-SLP Speech and Language Pathologist                Therapy Charges for Today     Code Description Service Date Service Provider Modifiers Qty    88835348021 HC ST EVALUATION FIT VOICE PROSTH 3 3/26/2023 Shiloh Campo MA,CCC-SLP  1    58302563772 HC VALVE TRACH PMV SERIES 3/26/2023 Shiloh Campo MA,CCC-SLP  1                     Shiloh Campo MA,CCC-SLP  3/26/2023

## 2023-03-26 NOTE — PLAN OF CARE
Goal Outcome Evaluation:  Plan of Care Reviewed With: patient, family        Progress: improving  Outcome Evaluation: OT eval complete. Pt presents w/ generalized weakness, balance deficits, and decreased endurance from baseline limiting functional independence. Recommend cont skilled IPOT POC to facilitate return to PLOF. Recommend pt DC to LTACH, pending medical needs.

## 2023-03-26 NOTE — PLAN OF CARE
Goal Outcome Evaluation:  Plan of Care Reviewed With: patient, family            SLP evaluation completed. Will continue to address communication/PMV. Please see note for further details and recommendations.

## 2023-03-26 NOTE — PLAN OF CARE
Goal Outcome Evaluation:  Plan of Care Reviewed With: patient        Progress: improving  Outcome Evaluation: VSS. Afebrile on minimal vent settings. Tolerated being in the chair an trials on trach collar. Fentanyl weaned to 75, precedex cont. Adequate UOP. no BM. Hydralizine x1 for SBP >150. Mag and Phos replaced per protocol.

## 2023-03-26 NOTE — PROGRESS NOTES
"[facility] Progress Note    Date of Admission: 3/16/2023      Antibiotics: PCN and flagyl      CC:   Chief Complaint   Patient presents with   • Shortness of Breath       S: Patient more alert today with some low grade fevers but HD stable still on %30 FIo2 on trach.  Fever curve improving and overall appears better.    O:  /84   Pulse 58   Temp 99 °F (37.2 °C) (Oral)   Resp 16   Ht 185.4 cm (73\")   Wt 92 kg (202 lb 13.2 oz)   SpO2 97%   BMI 26.76 kg/m²   Temp (24hrs), Av.8 °F (37.1 °C), Min:98.6 °F (37 °C), Max:99.3 °F (37.4 °C)      PE:   GEN: Awake sitting up in bed remains on the vent  HEENT: NCAT.  EOMI. No conjunctival injection. No icterus. Oropharynx clear without evidence of thrush or exudate.   NECK: Supple without nuchal rigidity,Trach site c/d/i  HEART: RRR; No murmur, rubs, gallops.   LUNGS: Decreased breath sounds in bases   ABDOMEN: Soft, nontender, nondistended. Positive bowel sounds. PEG site c/d/i  EXT:  No cyanosis, clubbing or edema  : Normal appearing genitalia with Damon catheter.  MSK: FROM without joint effusions noted    SKIN: Warm and dry without cutaneous eruptions.   NEURO: No focal deficits.     Laboratory Data    Results from last 7 days   Lab Units 23  0324 23  0425 23  0347   WBC 10*3/mm3 7.04 8.00 9.87   HEMOGLOBIN g/dL 10.9* 11.0* 12.5*   HEMATOCRIT % 34.3* 34.4* 38.7   PLATELETS 10*3/mm3 221 207 211     Results from last 7 days   Lab Units 23  0324   SODIUM mmol/L 134*   POTASSIUM mmol/L 4.2   CHLORIDE mmol/L 102   CO2 mmol/L 25.0   BUN mg/dL 19   CREATININE mg/dL 0.55*   GLUCOSE mg/dL 174*   CALCIUM mg/dL 8.5*     Results from last 7 days   Lab Units 23  0425 23  0502   ALK PHOS U/L 51 68   BILIRUBIN mg/dL 0.4 0.7   BILIRUBIN DIRECT mg/dL  --  0.5*   ALT (SGPT) U/L 22 14   AST (SGOT) U/L 27 22     Results from last 7 days   Lab Units 23  0324   SED RATE mm/hr >130*     Results from last 7 days   Lab Units 23  0324   CRP " mg/dL 1.54*       Estimated Creatinine Clearance: 171.9 mL/min (A) (by C-G formula based on SCr of 0.55 mg/dL (L)).      Microbiology:  Group s strep on blood cx on admission    Radiology:  Imaging Results (Last 24 Hours)     Procedure Component Value Units Date/Time    XR Chest 1 View [[id number]] Collected: 03/25/23 2200     Updated: 03/25/23 2204    Narrative:      XR CHEST 1 VW    Date of Exam: 3/25/2023 4:54 PM EDT    Indication: Follow-up respiratory failure and pneumonia.    Comparison: 3/24/2023    Findings:  Tracheostomy tube and right upper extremity PICC line are noted. PICC line tip appears to lie at the SVC-right atrial junction. Heart is upper normal size. Diffuse interstitial disease pattern of lungs is again noted, and appears a little increased.   There is focally dense airspace disease in the medial left lung base, and findings may represent mild worsening of pneumonia. No pneumothorax is seen.       Impression:      Impression:  Increasing diffuse pulmonary interstitial disease and focal left basilar airspace disease which may represent worsening pneumonia. No evidence of pneumothorax.    Electronically Signed: Kamran Bruno    3/25/2023 10:01 PM EDT    Workstation ID: [id number]          PROBLEM LIST:   Severe sepsis with group A beta-hemolytic strep bacteremia  Acute epiglottitis with compromised airway with acute hypoxemic respiratory failure status post trach  Lactic acidosis  Macrocytic anemia  Fevers, trending down.  Elevated glucose    ASSESSMENT:  Patient with severe sepsis with severe epiglottitis with airway compromise respiratory failure now status post trach who has blood cultures with group A strep now still with low-grade fevers but white blood cell count improved down to 30% FiO2 remains on high-dose penicillin and intravenous metronidazole treating for possible bilateral pneumonia as well.    WBC remain normal with crp down but still with some low grade fevers but overall better  appearance today.  Working to wean from vent. Overall improving each day.    PLAN:  Continue high-dose IV penicillin  Continue metronidazole  Any worsening fevers neck swelling or require repeat imaging to ensure no abscess developing  Complex case    Ishmael Ortega MD saw and examined patient, verified hx and PE, read all radiographic studies, reviewed labs and micro data, and formulated dx, plan for treatment and all medical decision making.      Suhas Calderón PA-C for MD Ishmael Shaw MD  3/26/2023

## 2023-03-27 LAB
ALBUMIN SERPL-MCNC: 3.2 G/DL (ref 3.5–5.2)
ALBUMIN/GLOB SERPL: 0.7 G/DL
ALP SERPL-CCNC: 65 U/L (ref 39–117)
ALT SERPL W P-5'-P-CCNC: 17 U/L (ref 1–41)
ANION GAP SERPL CALCULATED.3IONS-SCNC: 6 MMOL/L (ref 5–15)
AST SERPL-CCNC: 23 U/L (ref 1–40)
BASOPHILS # BLD AUTO: 0.04 10*3/MM3 (ref 0–0.2)
BASOPHILS NFR BLD AUTO: 0.5 % (ref 0–1.5)
BILIRUB SERPL-MCNC: 0.4 MG/DL (ref 0–1.2)
BUN SERPL-MCNC: 17 MG/DL (ref 8–23)
BUN/CREAT SERPL: 25 (ref 7–25)
CALCIUM SPEC-SCNC: 9.1 MG/DL (ref 8.6–10.5)
CHLORIDE SERPL-SCNC: 100 MMOL/L (ref 98–107)
CHOLEST SERPL-MCNC: 118 MG/DL (ref 0–200)
CO2 SERPL-SCNC: 28 MMOL/L (ref 22–29)
CREAT SERPL-MCNC: 0.68 MG/DL (ref 0.76–1.27)
CRP SERPL-MCNC: 1.09 MG/DL (ref 0–0.5)
DEPRECATED RDW RBC AUTO: 49.9 FL (ref 37–54)
EGFRCR SERPLBLD CKD-EPI 2021: 102.5 ML/MIN/1.73
EOSINOPHIL # BLD AUTO: 0.12 10*3/MM3 (ref 0–0.4)
EOSINOPHIL NFR BLD AUTO: 1.5 % (ref 0.3–6.2)
ERYTHROCYTE [DISTWIDTH] IN BLOOD BY AUTOMATED COUNT: 13.2 % (ref 12.3–15.4)
GLOBULIN UR ELPH-MCNC: 4.6 GM/DL
GLUCOSE BLDC GLUCOMTR-MCNC: 133 MG/DL (ref 70–130)
GLUCOSE BLDC GLUCOMTR-MCNC: 139 MG/DL (ref 70–130)
GLUCOSE BLDC GLUCOMTR-MCNC: 145 MG/DL (ref 70–130)
GLUCOSE BLDC GLUCOMTR-MCNC: 150 MG/DL (ref 70–130)
GLUCOSE SERPL-MCNC: 141 MG/DL (ref 65–99)
HCT VFR BLD AUTO: 37.9 % (ref 37.5–51)
HGB BLD-MCNC: 12.1 G/DL (ref 13–17.7)
IMM GRANULOCYTES # BLD AUTO: 0.06 10*3/MM3 (ref 0–0.05)
IMM GRANULOCYTES NFR BLD AUTO: 0.8 % (ref 0–0.5)
LYMPHOCYTES # BLD AUTO: 1.68 10*3/MM3 (ref 0.7–3.1)
LYMPHOCYTES NFR BLD AUTO: 21.4 % (ref 19.6–45.3)
MAGNESIUM SERPL-MCNC: 1.7 MG/DL (ref 1.6–2.4)
MAGNESIUM SERPL-MCNC: 1.7 MG/DL (ref 1.6–2.4)
MCH RBC QN AUTO: 32.4 PG (ref 26.6–33)
MCHC RBC AUTO-ENTMCNC: 31.9 G/DL (ref 31.5–35.7)
MCV RBC AUTO: 101.3 FL (ref 79–97)
MONOCYTES # BLD AUTO: 0.88 10*3/MM3 (ref 0.1–0.9)
MONOCYTES NFR BLD AUTO: 11.2 % (ref 5–12)
NEUTROPHILS NFR BLD AUTO: 5.07 10*3/MM3 (ref 1.7–7)
NEUTROPHILS NFR BLD AUTO: 64.6 % (ref 42.7–76)
NRBC BLD AUTO-RTO: 0 /100 WBC (ref 0–0.2)
PHOSPHATE SERPL-MCNC: 2.6 MG/DL (ref 2.5–4.5)
PLATELET # BLD AUTO: 271 10*3/MM3 (ref 140–450)
PMV BLD AUTO: 9.5 FL (ref 6–12)
POTASSIUM SERPL-SCNC: 4 MMOL/L (ref 3.5–5.2)
PREALB SERPL-MCNC: 22 MG/DL (ref 20–40)
PROT SERPL-MCNC: 7.8 G/DL (ref 6–8.5)
RBC # BLD AUTO: 3.74 10*6/MM3 (ref 4.14–5.8)
SODIUM SERPL-SCNC: 134 MMOL/L (ref 136–145)
TRIGL SERPL-MCNC: 107 MG/DL (ref 0–150)
WBC NRBC COR # BLD: 7.85 10*3/MM3 (ref 3.4–10.8)

## 2023-03-27 PROCEDURE — 84134 ASSAY OF PREALBUMIN: CPT | Performed by: INTERNAL MEDICINE

## 2023-03-27 PROCEDURE — 84478 ASSAY OF TRIGLYCERIDES: CPT | Performed by: INTERNAL MEDICINE

## 2023-03-27 PROCEDURE — 80053 COMPREHEN METABOLIC PANEL: CPT | Performed by: INTERNAL MEDICINE

## 2023-03-27 PROCEDURE — 99233 SBSQ HOSP IP/OBS HIGH 50: CPT | Performed by: INTERNAL MEDICINE

## 2023-03-27 PROCEDURE — 0 MAGNESIUM SULFATE 4 GM/100ML SOLUTION: Performed by: NURSE PRACTITIONER

## 2023-03-27 PROCEDURE — 85025 COMPLETE CBC W/AUTO DIFF WBC: CPT | Performed by: INTERNAL MEDICINE

## 2023-03-27 PROCEDURE — 25010000002 ONDANSETRON PER 1 MG

## 2023-03-27 PROCEDURE — 94799 UNLISTED PULMONARY SVC/PX: CPT

## 2023-03-27 PROCEDURE — 25010000002 PENICILLIN G POTASSIUM PER 600000 UNITS

## 2023-03-27 PROCEDURE — 83735 ASSAY OF MAGNESIUM: CPT | Performed by: INTERNAL MEDICINE

## 2023-03-27 PROCEDURE — 94003 VENT MGMT INPAT SUBQ DAY: CPT

## 2023-03-27 PROCEDURE — 82465 ASSAY BLD/SERUM CHOLESTEROL: CPT | Performed by: INTERNAL MEDICINE

## 2023-03-27 PROCEDURE — 97530 THERAPEUTIC ACTIVITIES: CPT

## 2023-03-27 PROCEDURE — 25010000002 HEPARIN (PORCINE) PER 1000 UNITS: Performed by: SURGERY

## 2023-03-27 PROCEDURE — 82962 GLUCOSE BLOOD TEST: CPT

## 2023-03-27 PROCEDURE — 86140 C-REACTIVE PROTEIN: CPT | Performed by: INTERNAL MEDICINE

## 2023-03-27 PROCEDURE — 84100 ASSAY OF PHOSPHORUS: CPT | Performed by: INTERNAL MEDICINE

## 2023-03-27 PROCEDURE — 25010000002 HYDRALAZINE PER 20 MG: Performed by: SURGERY

## 2023-03-27 RX ORDER — ONDANSETRON 2 MG/ML
4 INJECTION INTRAMUSCULAR; INTRAVENOUS ONCE
Status: COMPLETED | OUTPATIENT
Start: 2023-03-27 | End: 2023-03-27

## 2023-03-27 RX ORDER — ONDANSETRON 2 MG/ML
INJECTION INTRAMUSCULAR; INTRAVENOUS
Status: COMPLETED
Start: 2023-03-27 | End: 2023-03-27

## 2023-03-27 RX ORDER — VALSARTAN 80 MG/1
80 TABLET ORAL
Status: DISCONTINUED | OUTPATIENT
Start: 2023-03-27 | End: 2023-03-28

## 2023-03-27 RX ORDER — ALPRAZOLAM 0.25 MG/1
0.25 TABLET ORAL 3 TIMES DAILY PRN
Status: DISCONTINUED | OUTPATIENT
Start: 2023-03-27 | End: 2023-03-27

## 2023-03-27 RX ORDER — ALPRAZOLAM 0.25 MG/1
0.25 TABLET ORAL 3 TIMES DAILY PRN
Status: DISCONTINUED | OUTPATIENT
Start: 2023-03-27 | End: 2023-03-29

## 2023-03-27 RX ORDER — MAGNESIUM SULFATE HEPTAHYDRATE 40 MG/ML
4 INJECTION, SOLUTION INTRAVENOUS ONCE
Status: COMPLETED | OUTPATIENT
Start: 2023-03-27 | End: 2023-03-27

## 2023-03-27 RX ORDER — VALSARTAN 80 MG/1
80 TABLET ORAL
Status: DISCONTINUED | OUTPATIENT
Start: 2023-03-27 | End: 2023-03-27

## 2023-03-27 RX ADMIN — HYDRALAZINE HYDROCHLORIDE 20 MG: 20 INJECTION INTRAMUSCULAR; INTRAVENOUS at 13:43

## 2023-03-27 RX ADMIN — METRONIDAZOLE 500 MG: 500 INJECTION, SOLUTION INTRAVENOUS at 03:04

## 2023-03-27 RX ADMIN — HEPARIN SODIUM 5000 UNITS: 5000 INJECTION, SOLUTION INTRAVENOUS; SUBCUTANEOUS at 05:06

## 2023-03-27 RX ADMIN — PANTOPRAZOLE SODIUM 40 MG: 40 INJECTION, POWDER, LYOPHILIZED, FOR SOLUTION INTRAVENOUS at 05:06

## 2023-03-27 RX ADMIN — SODIUM CHLORIDE 12 MILLION UNITS: 9 INJECTION, SOLUTION INTRAVENOUS at 21:03

## 2023-03-27 RX ADMIN — Medication 30 ML: at 09:05

## 2023-03-27 RX ADMIN — HYDRALAZINE HYDROCHLORIDE 20 MG: 20 INJECTION INTRAMUSCULAR; INTRAVENOUS at 17:52

## 2023-03-27 RX ADMIN — ACETAMINOPHEN 650 MG: 650 SOLUTION ORAL at 00:02

## 2023-03-27 RX ADMIN — Medication 10 ML: at 20:41

## 2023-03-27 RX ADMIN — HEPARIN SODIUM 5000 UNITS: 5000 INJECTION, SOLUTION INTRAVENOUS; SUBCUTANEOUS at 21:03

## 2023-03-27 RX ADMIN — METRONIDAZOLE 500 MG: 500 INJECTION, SOLUTION INTRAVENOUS at 08:57

## 2023-03-27 RX ADMIN — ONDANSETRON 4 MG: 2 INJECTION INTRAMUSCULAR; INTRAVENOUS at 18:38

## 2023-03-27 RX ADMIN — CHLORHEXIDINE GLUCONATE 0.12% ORAL RINSE 15 ML: 1.2 LIQUID ORAL at 20:40

## 2023-03-27 RX ADMIN — METRONIDAZOLE 500 MG: 500 INJECTION, SOLUTION INTRAVENOUS at 13:48

## 2023-03-27 RX ADMIN — ACETAMINOPHEN 650 MG: 650 SOLUTION ORAL at 20:54

## 2023-03-27 RX ADMIN — ACETAMINOPHEN 650 MG: 650 SOLUTION ORAL at 09:02

## 2023-03-27 RX ADMIN — HEPARIN SODIUM 5000 UNITS: 5000 INJECTION, SOLUTION INTRAVENOUS; SUBCUTANEOUS at 13:44

## 2023-03-27 RX ADMIN — VALSARTAN 80 MG: 80 TABLET, FILM COATED ORAL at 12:16

## 2023-03-27 RX ADMIN — MAGNESIUM SULFATE HEPTAHYDRATE 4 G: 40 INJECTION, SOLUTION INTRAVENOUS at 06:50

## 2023-03-27 RX ADMIN — CHLORHEXIDINE GLUCONATE 0.12% ORAL RINSE 15 ML: 1.2 LIQUID ORAL at 09:02

## 2023-03-27 RX ADMIN — Medication 10 ML: at 09:02

## 2023-03-27 RX ADMIN — SODIUM CHLORIDE 12 MILLION UNITS: 9 INJECTION, SOLUTION INTRAVENOUS at 11:01

## 2023-03-27 RX ADMIN — ALPRAZOLAM 0.25 MG: 0.25 TABLET ORAL at 15:48

## 2023-03-27 RX ADMIN — METRONIDAZOLE 500 MG: 500 INJECTION, SOLUTION INTRAVENOUS at 20:41

## 2023-03-27 RX ADMIN — DEXMEDETOMIDINE HYDROCHLORIDE 0.3 MCG/KG/HR: 4 INJECTION, SOLUTION INTRAVENOUS at 08:51

## 2023-03-27 NOTE — THERAPY TREATMENT NOTE
Patient Name: Sandro Junior  : 1955    MRN: [id number]                              Today's Date: 3/27/2023       Admit Date: 3/16/2023    Visit Dx:     ICD-10-CM ICD-9-CM   1. Respiratory distress  R06.03 786.09   2. Tachycardia  R00.0 785.0   3. Airway compromise  J98.8 519.8   4. Acute epiglottitis with airway obstruction - probable  J05.11 464.31   5. Essential hypertension  I10 401.9   6. Voice disturbance  R49.9 784.40     Patient Active Problem List   Diagnosis   • Essential hypertension   • Chronic pain   • Osteoarthritis of knee   • Prolonged depressive adjustment reaction   • Acute prostatitis   • Atopic rhinitis   • Erectile dysfunction of nonorganic origin   • Glaucoma suspect   • Onychomycosis of toenail   • Malignant neoplasm of rectum (HCC)   • Seborrheic eczema   • Keloid scar   • Pain of right lower extremity   • Esophageal reflux   • Acute respiratory failure (HCC)   • Acute strep pyogenes (group A strep) epiglottitis with airway obstruction      Past Medical History:   Diagnosis Date   • Epidermal inclusion cyst    • Esophageal candidiasis (HCC)    • Keloid scar    • Rectal cancer (HCC)    • Seborrheic dermatitis      Past Surgical History:   Procedure Laterality Date   • COLECTOMY PARTIAL / TOTAL     • KNEE SURGERY      Left knee meniscal tear repair   • TOOTH EXTRACTION  2016 teeth   • TOTAL KNEE ARTHROPLASTY Right 2016   • TRACHEOSTOMY AND PEG TUBE INSERTION N/A 3/23/2023    Procedure: TRACHEOSTOMY AND PERCUTANEOUS ENDOSCOPIC GASTROSTOMY TUBE INSERTION;  Surgeon: Néstor Jerome MD;  Location: [facility];  Service: General;  Laterality: N/A;      General Information     Row Name 23 1010          Physical Therapy Time and Intention    Document Type therapy note (daily note) (P)   -HT     Mode of Treatment physical therapy (P)   -HT     Row Name 23 1010          General Information    Patient Profile Reviewed yes (P)   -HT     Existing Precautions/Restrictions  fall;oxygen therapy device and L/min;other (see comments) (P)   trach/PEG  -HT     Barriers to Rehab medically complex (P)   -HT     Row Name 03/27/23 1010          Cognition    Orientation Status (Cognition) oriented x 3 (P)   -HT     Row Name 03/27/23 1010          Safety Issues, Functional Mobility    Safety Issues Affecting Function (Mobility) safety precaution awareness;safety precautions follow-through/compliance;insight into deficits/self-awareness;awareness of need for assistance;sequencing abilities (P)   -HT     Impairments Affecting Function (Mobility) balance;endurance/activity tolerance;strength;postural/trunk control;shortness of breath (P)   -HT           User Key  (r) = Recorded By, (t) = Taken By, (c) = Cosigned By    Initials Name Provider Type    HT Chela Barron, PT Student PT Student               Mobility     Row Name 03/27/23 1011          Bed Mobility    Bed Mobility supine-sit (P)   -HT     Supine-Sit Keith (Bed Mobility) minimum assist (75% patient effort);1 person assist;verbal cues (P)   -HT     Assistive Device (Bed Mobility) bed rails;head of bed elevated (P)   -HT     Comment, (Bed Mobility) required cues for sequencing. (P)   -HT     Row Name 03/27/23 1011          Sit-Stand Transfer    Sit-Stand Keith (Transfers) minimum assist (75% patient effort);1 person assist;verbal cues (P)   -HT     Assistive Device (Sit-Stand Transfers) other (see comments) (P)   BUE support  -HT     Comment, (Sit-Stand Transfer) required cues for hand placement (P)   -HT     Row Name 03/27/23 1011          Gait/Stairs (Locomotion)    Keith Level (Gait) moderate assist (50% patient effort);2 person assist;verbal cues (P)   -HT     Assistive Device (Gait) other (see comments) (P)   BUE support  -HT     Distance in Feet (Gait) 3+6 (P)   -HT     Deviations/Abnormal Patterns (Gait) bilateral deviations;bladimir decreased;weight shifting decreased;base of support, narrow (P)   -HT      Bilateral Gait Deviations forward flexed posture (P)   -HT     Comment, (Gait/Stairs) Ambulated from EOB to chair and forward and back from chair. Pt demonstrated decreased bladimir, narrow BROCK, and fwd trunk requiring cues for turning, increased stride length, and to avoid sitting prematurely. Gait limited by low endurance, balance deficits, and generalized weakness. (P)   -HT           User Key  (r) = Recorded By, (t) = Taken By, (c) = Cosigned By    Initials Name Provider Type     Chela Barron, PT Student PT Student               Obj/Interventions     Row Name 03/27/23 1020          Motor Skills    Therapeutic Exercise hip;knee;ankle (P)   -HT     Row Name 03/27/23 1020          Hip (Therapeutic Exercise)    Hip (Therapeutic Exercise) strengthening exercise (P)   -HT     Hip Strengthening (Therapeutic Exercise) bilateral;marching while seated;10 repetitions (P)   -HT     Row Name 03/27/23 1020          Knee (Therapeutic Exercise)    Knee (Therapeutic Exercise) strengthening exercise (P)   -HT     Knee Strengthening (Therapeutic Exercise) bilateral;LAQ (long arc quad);10 repetitions;sitting (P)   -HT     Row Name 03/27/23 1020          Ankle (Therapeutic Exercise)    Ankle (Therapeutic Exercise) strengthening exercise (P)   -HT     Ankle Strengthening (Therapeutic Exercise) dorsiflexion;plantarflexion;15 repititions;sitting (P)   -HT     Row Name 03/27/23 1020          Balance    Balance Assessment sitting static balance;sitting dynamic balance;standing static balance;standing dynamic balance (P)   -HT     Static Sitting Balance contact guard (P)   -HT     Dynamic Sitting Balance contact guard (P)   -HT     Position, Sitting Balance sitting edge of bed (P)   -HT     Static Standing Balance minimal assist;2-person assist (P)   -HT     Dynamic Standing Balance moderate assist;2-person assist (P)   -HT     Position/Device Used, Standing Balance supported (P)   -HT           User Key  (r) = Recorded By, (t) =  Taken By, (c) = Cosigned By    Initials Name Provider Type    HT Chela Barron, PT Student PT Student               Goals/Plan    No documentation.                Clinical Impression     Row Name 03/27/23 1021          Pain    Pretreatment Pain Rating 0/10 - no pain (P)   -HT     Posttreatment Pain Rating 0/10 - no pain (P)   -HT     Additional Documentation Pain Scale: Numbers Pre/Post-Treatment (Group) (P)   -HT     Row Name 03/27/23 1021          Plan of Care Review    Plan of Care Reviewed With patient (P)   -HT     Progress improving (P)   -HT     Outcome Evaluation Pt ambulated up to 6' with BUE support and mod Ax2 showing limitations of balance deficits and generalized weakness. Rec skilled PT to increase ind with mobility and d/c to LTACH. (P)   -HT     Row Name 03/27/23 1021          Therapy Assessment/Plan (PT)    Criteria for Skilled Interventions Met (PT) yes;skilled treatment is necessary;meets criteria (P)   -HT     Row Name 03/27/23 1021          Vital Signs    Pre Systolic BP Rehab 144 (P)   -HT     Pre Treatment Diastolic  (P)   -HT     Post Systolic BP Rehab 167 (P)   -HT     Post Treatment Diastolic BP 83 (P)   -HT     Pretreatment Heart Rate (beats/min) 73 (P)   -HT     Posttreatment Heart Rate (beats/min) 73 (P)   -HT     Pre SpO2 (%) 100 (P)   -HT     O2 Delivery Pre Treatment trach collar (P)   -HT     O2 Delivery Intra Treatment trach collar (P)   -HT     Post SpO2 (%) 98 (P)   -HT     O2 Delivery Post Treatment trach collar (P)   -HT     Pre Patient Position Supine (P)   -HT     Intra Patient Position Standing (P)   -HT     Post Patient Position Sitting (P)   -HT     Row Name 03/27/23 1021          Positioning and Restraints    Pre-Treatment Position in bed (P)   -HT     Post Treatment Position chair (P)   -HT     In Chair notified nsg;reclined;sitting;call light within reach;encouraged to call for assist;exit alarm on;RUE elevated;LUE elevated;legs elevated;on mechanical lift  sling;waffle cushion (P)   -HT           User Key  (r) = Recorded By, (t) = Taken By, (c) = Cosigned By    Initials Name Provider Type    HT Chela Barron, PT Student PT Student               Outcome Measures     Row Name 03/27/23 1025          How much help from another person do you currently need...    Turning from your back to your side while in flat bed without using bedrails? 3 (P)   -HT     Moving from lying on back to sitting on the side of a flat bed without bedrails? 3 (P)   -HT     Moving to and from a bed to a chair (including a wheelchair)? 2 (P)   -HT     Standing up from a chair using your arms (e.g., wheelchair, bedside chair)? 3 (P)   -HT     Climbing 3-5 steps with a railing? 1 (P)   -HT     To walk in hospital room? 2 (P)   -HT     AM-PAC 6 Clicks Score (PT) 14 (P)   -HT     Highest level of mobility 4 --> Transferred to chair/commode (P)   -HT     Row Name 03/27/23 1025          Functional Assessment    Outcome Measure Options AM-PAC 6 Clicks Basic Mobility (PT) (P)   -HT           User Key  (r) = Recorded By, (t) = Taken By, (c) = Cosigned By    Initials Name Provider Type     Chela Barron, PT Student PT Student                             Physical Therapy Education     Title: PT OT SLP Therapies (Done)     Topic: Physical Therapy (Done)     Point: Mobility training (Done)     Learning Progress Summary           Patient Acceptance, E, VU by HT at 3/27/2023 1026    Acceptance, E, VU by EL at 3/26/2023 1707    Acceptance, E,D, VU,NR by  at 3/26/2023 1421                   Point: Home exercise program (Done)     Learning Progress Summary           Patient Acceptance, E, VU by HT at 3/27/2023 1026    Acceptance, E, VU by EL at 3/26/2023 1707                   Point: Body mechanics (Done)     Learning Progress Summary           Patient Acceptance, E, VU by HT at 3/27/2023 1026    Acceptance, E, VU by EL at 3/26/2023 1707    Acceptance, E,D, VU,NR by LS at 3/26/2023 1427                    Point: Precautions (Done)     Learning Progress Summary           Patient Acceptance, E, VU by  at 3/27/2023 1026    Acceptance, E, VU by EL at 3/26/2023 1707    Acceptance, E,D, VU,NR by  at 3/26/2023 1429                               User Key     Initials Effective Dates Name Provider Type Discipline     02/03/23 -  Norma Villegas, PT Physical Therapist PT    EL 06/16/21 -  Giana Yates, RN Registered Nurse Nurse     01/12/23 -  Chela Barron, PT Student PT Student PT              PT Recommendation and Plan     Plan of Care Reviewed With: (P) patient  Progress: (P) improving  Outcome Evaluation: (P) Pt ambulated up to 6' with BUE support and mod Ax2 showing limitations of balance deficits and generalized weakness. Rec skilled PT to increase ind with mobility and d/c to LTACH.     Time Calculation:    PT Charges     Row Name 03/27/23 1027             Time Calculation    Start Time 0804 (P)   -HT      PT Received On 03/27/23 (P)   -         Timed Charges    25090 - PT Therapeutic Exercise Minutes 8 (P)   -HT      79687 - PT Therapeutic Activity Minutes 25 (P)   -HT         Total Minutes    Timed Charges Total Minutes 33 (P)   -HT       Total Minutes 33 (P)   -HT            User Key  (r) = Recorded By, (t) = Taken By, (c) = Cosigned By    Initials Name Provider Type     Chela Barron, PT Student PT Student              Therapy Charges for Today     Code Description Service Date Service Provider Modifiers Qty    81380336416 HC PT THERAPEUTIC ACT EA 15 MIN 3/27/2023 Chela Barron, PT Student GP 2          PT G-Codes  Outcome Measure Options: (P) AM-PAC 6 Clicks Basic Mobility (PT)  AM-PAC 6 Clicks Score (PT): (P) 14  AM-PAC 6 Clicks Score (OT): 10       Chela Barron PT Student  3/27/2023

## 2023-03-27 NOTE — CASE MANAGEMENT/SOCIAL WORK
Continued Stay Note  [facility]     Patient Name: Sandro Junior  MRN: [id number]  Today's Date: 3/27/2023    Admit Date: 3/16/2023    Plan: IPR vs LTACH   Discharge Plan     Row Name 03/27/23 1617       Plan    Plan IPR vs LTACH    Patient/Family in Agreement with Plan yes    Plan Comments I have spoken to Mrs. Junior by phone today regarding the ongoing discharge plan which includes PT/OT recommendations for LTACH.  She is agreeable to submission of referral to [facility] and [facility] if possible.  I have also submitted a referral to [facility].  Mr. Junior is currently on trach collar and continues to require tube feedings.  CM will cont to follow the plan of care and assist with discharge planning as recommendations become available.  I have confirmed with Jenny cali [facility] that she is reviewing the referral.  I will also check with [facility] if pt still on trach collar and not requiring ventilator.    Final Discharge Disposition Code 62 - inpatient rehab facility               Discharge Codes    No documentation.               Expected Discharge Date and Time     Expected Discharge Date Expected Discharge Time    Mar 29, 2023             Janene Goode, RN

## 2023-03-27 NOTE — PROGRESS NOTES
"Critical Care Note     LOS: 11 days   Patient Care Team:  Dora Loya PA as PCP - General (Internal Medicine)    Chief Complaint/Reason for visit:    Chief Complaint   Patient presents with   • Shortness of Breath     Acute strep pyogenes (group A strep) epiglottitis with airway obstruction     Acute respiratory failure (HCC)    Essential hypertension      Subjective     Interval History:     Patient stayed off the ventilator overnight and has been off for over 24 hours.  He is phonating with a Passy-Kacie valve.  Tmax 101.4.  Received diuretics yesterday and had 5.6 L urine output in 24 hours.    Review of Systems:    All systems were reviewed and negative except as noted in subjective.    Medical history, surgical history, social history, family history reviewed    Objective     Intake/Output:    Intake/Output Summary (Last 24 hours) at 3/27/2023 1625  Last data filed at 3/27/2023 1349  Gross per 24 hour   Intake 2204.01 ml   Output 5475 ml   Net -3270.99 ml       Nutrition:  NPO Diet NPO Type: Strict NPO    Infusions:  dexmedetomidine, 0.2-1.5 mcg/kg/hr, Last Rate: Stopped (03/27/23 1045)  fentanyl 10 mcg/mL,  mcg/hr, Last Rate: Stopped (03/26/23 1300)        Mechanical Ventilator Settings:            Resp Rate (Set): 12  Pressure Support (cm H2O): 10 cm H20  FiO2 (%): 30 %  PEEP/CPAP (cm H2O): 6 cm H20    Minute Ventilation (L/min) (Obs): 11.3 L/min  Resp Rate (Observed) Vent: 23  I:E Ratio (Set): 1:3.55  I:E Ratio (Obs): 1:5    PIP Observed (cm H2O): 26 cm H2O  Plateau Pressure (cm H2O): (S) 21 cm H2O    Telemetry: Sinus rhythm, sinus tachycardia             Vital Signs  Blood pressure 173/86, pulse 108, temperature 98.4 °F (36.9 °C), temperature source Axillary, resp. rate 22, height 185.4 cm (73\"), weight 92 kg (202 lb 13.2 oz), SpO2 94 %.    Physical Exam:  General Appearance:   Middle-aged gentleman sitting in the chair   Head:   Atraumatic   Eyes:          No jaundice, conjunctiva pink   Ears:   "   Throat:  No thrush   Neck:  Trachea midline, tracheostomy in place, no palpable thyroid   Back:      Lungs:    Symmetric chest expansion with occasional rhonchi    Heart:   Regular rhythm, S1, S2 auscultated   Abdomen:    PEG tube in place.  Bowel sounds present, soft   Rectal:   Deferred   Extremities:  No pretibial edema   Pulses:  Palpable radial pulses   Skin:  Warm and dry   Lymph nodes:    Neurologic:  Alert, follows commands, moves all extremities symmetrically      Results Review:     I reviewed the patient's new clinical results.   Results from last 7 days   Lab Units 03/27/23  0506 03/26/23  0324 03/25/23  0425 03/22/23  0413 03/21/23  0502   SODIUM mmol/L 134* 134* 136   < > 137   POTASSIUM mmol/L 4.0 4.2 4.3   < > 4.0   CHLORIDE mmol/L 100 102 102   < > 107   CO2 mmol/L 28.0 25.0 28.0   < > 24.0   BUN mg/dL 17 19 19   < > 7*   CREATININE mg/dL 0.68* 0.55* 0.56*   < > 0.66*   CALCIUM mg/dL 9.1 8.5* 8.6   < > 7.7*   BILIRUBIN mg/dL 0.4  --  0.4  --  0.7   ALK PHOS U/L 65  --  51  --  68   ALT (SGPT) U/L 17  --  22  --  14   AST (SGOT) U/L 23  --  27  --  22   GLUCOSE mg/dL 141* 174* 194*   < > 114*    < > = values in this interval not displayed.     Results from last 7 days   Lab Units 03/27/23  0506 03/26/23 0324 03/25/23  0425   WBC 10*3/mm3 7.85 7.04 8.00   HEMOGLOBIN g/dL 12.1* 10.9* 11.0*   HEMATOCRIT % 37.9 34.3* 34.4*   PLATELETS 10*3/mm3 271 221 207     Results from last 7 days   Lab Units 03/26/23  2346   PH, ARTERIAL pH units 7.493*   PO2 ART mm Hg 90.2   PCO2, ARTERIAL mm Hg 37.1   HCO3 ART mmol/L 28.5*     Lab Results   Component Value Date    BLOODCX No growth at 5 days 03/20/2023     No results found for: URINECX    I reviewed the patient's new imaging including images and reports.      All medications reviewed.   chlorhexidine, 15 mL, Mouth/Throat, Q12H  heparin (porcine), 5,000 Units, Subcutaneous, Q8H  latanoprost, 1 drop, Both Eyes, Daily  metroNIDAZOLE, 500 mg, Intravenous,  Q6H  pantoprazole, 40 mg, Intravenous, Q AM  penicillin G 12 million unit continuous infusion, 12 Million Units, Intravenous, Q12H  polyethylene glycol, 17 g, Per PEG Tube, Daily  ProSource No Carb, 30 mL, Per PEG Tube, Daily  senna-docusate sodium, 2 tablet, Per PEG Tube, BID  sodium chloride, 10 mL, Intravenous, Q12H  valsartan, 80 mg, Per PEG Tube, Q24H          Assessment & Plan       Acute strep pyogenes (group A strep) epiglottitis with airway obstruction     Essential hypertension    Acute respiratory failure (HCC)    68-year-old male who presented to [facility] ER 3/16/2023 c/o dyspnea and sore throat. Apparently, first noticed a sore throat around noon the day of admission and became increasingly symptomatic with dyspnea and stridor throughout the day before presenting to the ER.  He was given epinephrine, steroids, and H2 blockers in the ER but despite this he began tripoding and drooling and had audible stridor.  Per Dr. Eaton (ER physician) intubation was difficult due to edema and erythema. Imaging revealed abnormal appearance of the soft tissues of the laryngeal area with some edema and swelling of the uvula but no evidence of a fluid collection or abscess.  Bibasilar infiltrates were present possibly related to atelectasis or retained secretions but there was no evidence of consolidative pneumonia.  He was given Zosyn and vancomycin empirically as well as steroids.  Blood culture subsequently grew Streptococcus pyogenes.  Patient underwent bronchoscopy 3/20/2023 and was noted to have persistent edematous changes in the posterior oropharynx with copious mucopurulent secretions and repeat CT neck 3/21/2023 revealed worsening edema but no sign of abscess.  Antimicrobial coverage was changed by ID to a continuous penicillin infusion.  Because of persistent findings and inability to safely extubate patient, he underwent tracheostomy and PEG feeding tube 3/23/2023.  Patient subsequently has begun trach collar  trials as of 3/25/2023.  He quickly progressed and has been off the ventilator for over 24 hours.  He has a persistent fever on a penicillin infusion, and Flagyl.  White blood cell count is 7.8.  He remains a net +13 L and might benefit from some additional diuretics.      PLAN:    Antibiotics per infectious disease  Monitor off mechanical ventilation  Support with tube feeding  Mobilize with PT, OT  ABG in the morning, 48 hours off ventilator  Restart home dose of Diovan 80 mg daily    VTE Prophylaxis: subcutaneous heparin    Stress Ulcer Prophylaxis: Protonix    Monika Louis MD  03/27/23  16:25 EDT      Time: 30 minutes

## 2023-03-27 NOTE — PLAN OF CARE
Pt up to chair from 0800 - 1530. BP increased at times, Valsartan started, Hydralazine given 2x. Nelson Sanches'd, pt able to void spontaneously -  UOP - 1250. BM - none.  Large amount of oral secretions.     Pt on trach collar trials and tolerating until 1815 - Pt became more agitated, increased Hypertension, tachycardic. PRN meds given, no improvement. APRN notified, came to assess Pt at bedside. Pt placed back on Vent, excessive coughing, incessant vomiting. APRN contacted again, Zofran given.     Dicussed w/ Pt and spouse that Pt needs to have rest, to possible limit visitors the next day or so as Pt does trach collar trials, speaking valve. Pt had multiple visitors today, interacting w/ extensively which RN feels contributed to episode at 1815.           Goal Outcome Evaluation: Pt and family

## 2023-03-27 NOTE — PROGRESS NOTES
1       Sandro Junior  1955  [id number]  3/27/2023    CC: Sore throat and fever    Sandro Junior is a 68 y.o. male here for adult epiglottitis, airway compromise, severe sepsis with leukocytosis, lactic acidosis, and group A beta-hemolytic streptococcal bacteremia.      Past medical history:  Past Medical History:   Diagnosis Date   • Epidermal inclusion cyst    • Esophageal candidiasis (HCC)    • Keloid scar    • Rectal cancer (HCC)    • Seborrheic dermatitis        Medications:   Current Facility-Administered Medications:   •  acetaminophen (TYLENOL) 160 MG/5ML solution 650 mg, 650 mg, Per PEG Tube, Q6H PRN, Yinka Payne [facility], 650 mg at 03/27/23 0002  •  [DISCONTINUED] acetaminophen (TYLENOL) tablet 650 mg, 650 mg, Nasogastric, Q4H PRN, 650 mg at 03/21/23 0253 **OR** acetaminophen (TYLENOL) suppository 650 mg, 650 mg, Rectal, Q4H PRN, Néstor Jerome MD  •  sennosides-docusate (PERICOLACE) 8.6-50 MG per tablet 2 tablet, 2 tablet, Per PEG Tube, BID, 2 tablet at 03/26/23 0844 **AND** polyethylene glycol (MIRALAX) packet 17 g, 17 g, Per PEG Tube, Daily PRN **AND** [DISCONTINUED] bisacodyl (DULCOLAX) EC tablet 5 mg, 5 mg, Oral, Daily PRN **AND** bisacodyl (DULCOLAX) suppository 10 mg, 10 mg, Rectal, Daily PRN, Yinka Payne [facility]  •  chlorhexidine (PERIDEX) 0.12 % solution 15 mL, 15 mL, Mouth/Throat, Q12H, Néstor Jerome MD, 15 mL at 03/26/23 2044  •  dexmedetomidine (PRECEDEX) 400 mcg in 100 mL NS infusion, 0.2-1.5 mcg/kg/hr, Intravenous, Titrated, Case, Keila V., DO, Last Rate: 5.8 mL/hr at 03/26/23 2046, 0.3 mcg/kg/hr at 03/26/23 2046  •  dextrose (D50W) (25 g/50 mL) IV injection 25 g, 25 g, Intravenous, Q15 Min PRN, Néstor Jerome MD, 25 g at 03/21/23 1153  •  fentaNYL 2500 mcg/250 mL NS infusion,  mcg/hr, Intravenous, Titrated, Néstor Jerome MD, Stopped at 03/26/23 1300  •  glucagon (GLUCAGEN) injection 1 mg, 1 mg, Intramuscular, Q15 Min PRN, Néstor Jerome MD  •   heparin (porcine) 5000 UNIT/ML injection 5,000 Units, 5,000 Units, Subcutaneous, Q8H, Néstor Jerome MD, 5,000 Units at 03/27/23 0506  •  hydrALAZINE (APRESOLINE) injection 20 mg, 20 mg, Intravenous, Q4H PRN, Néstor Jerome MD, 20 mg at 03/26/23 2051  •  latanoprost (XALATAN) 0.005 % ophthalmic solution 1 drop, 1 drop, Both Eyes, Daily, Néstor Jerome MD, 1 drop at 03/26/23 0844  •  Magnesium Standard Dose Replacement - Initiate Nurse / BPA Driven Protocol, , Does not apply, PRN, Néstor Jerome MD  •  magnesium sulfate 4g/100mL (PREMIX) infusion, 4 g, Intravenous, Once, Shane Alves, APRN, 4 g at 03/27/23 0650  •  metroNIDAZOLE (FLAGYL) IVPB 500 mg, 500 mg, Intravenous, Q6H, Keila Negron., DO, 500 mg at 03/27/23 0304  •  midazolam (VERSED) bolus from bag 1 mg/mL 1 mg, 1 mg, Intravenous, Q30 Min PRN, Néstor Jerome MD  •  midazolam (VERSED) injection 2 mg, 2 mg, Intravenous, Q2H PRN, Néstor Jerome MD, 2 mg at 03/25/23 1527  •  pantoprazole (PROTONIX) injection 40 mg, 40 mg, Intravenous, Q AM, Néstor Jerome MD, 40 mg at 03/27/23 0506  •  penicillin G potassium 12 Million Units in sodium chloride 0.9 % 250 mL IVPB, 12 Million Units, Intravenous, Q12H, Yinka Payne [facility], Last Rate: 20.8 mL/hr at 03/26/23 2234, 12 Million Units at 03/26/23 2234  •  Phosphorus Replacement - Initiate Nurse / BPA Driven Protocol, , Does not apply, PRN, Néstor Jerome MD  •  polyethylene glycol (MIRALAX) packet 17 g, 17 g, Per PEG Tube, Daily, Trish Callahan, PharmD  •  Potassium Replacement - Initiate Nurse / BPA Driven Protocol, , Does not apply, Justus KELLER Gustavo V, MD  •  ProSource No Carb oral solution 30 mL, 30 mL, Per PEG Tube, Daily, Yinka Payne, [facility], 30 mL at 03/26/23 1011  •  sodium chloride 0.9 % flush 10 mL, 10 mL, Intravenous, PRN, Néstor Jerome MD  •  sodium chloride 0.9 % flush 10 mL, 10 mL, Intravenous, Q12H, Néstor Jerome MD, 10 mL at 03/26/23 2044  •  sodium  "chloride 0.9 % flush 10 mL, 10 mL, Intravenous, PRN, Néstor Jerome MD  •  sodium chloride 0.9 % flush 20 mL, 20 mL, Intravenous, PRNJustus Gustavo V, MD  Antibiotics:  Anti-Infectives (From admission, onward)    Ordered     Dose/Rate Route Frequency Start Stop    03/24/23 0812  penicillin G potassium 12 Million Units in sodium chloride 0.9 % 250 mL IVPB        Ordering Provider: Yinka Payne RPH    12 Million Units  20.8 mL/hr over 12 Hours Intravenous Every 12 Hours 03/24/23 1000 03/28/23 2359    03/20/23 1241  metroNIDAZOLE (FLAGYL) IVPB 500 mg        Ordering Provider: Keila NegronFranchesca, DO    500 mg  over 30 Minutes Intravenous Every 6 Hours 03/20/23 1400 03/28/23 1359    03/16/23 2151  vancomycin 1500 mg/500 mL 0.9% NS IVPB ([facility])        Ordering Provider: Anand Eaton MD    20 mg/kg × 79.4 kg Intravenous Once 03/16/23 2153 03/16/23 2323          Allergies:  has No Known Allergies.    Review of Systems: All other reviewed and negative except as per HPI    Blood pressure 147/67, pulse 86, temperature 100.4 °F (38 °C), temperature source Bladder, resp. rate 18, height 185.4 cm (73\"), weight 92 kg (202 lb 13.2 oz), SpO2 97 %.  GENERAL: Arouses.  Tracheostomy.  Right PICC.  Low-grade fever noted.  Minimal secretions from tracheostomy tube.  HEENT: Oropharynx without thrush. Sinuses nontender. Dentition in good repair. No cervical adenopathy. No carotid bruits/ jugular venous distention.   EYES: PERRL. No conjunctival injection. No icterus. EOMI.  LYMPHATICS: No lymphadenopathy of the neck or axillary or inguinal regions.   HEART: No murmur, gallop, or pericardial friction rub.   LUNGS: Clear to auscultation anteriorly. No percussion dullness.   ABDOMEN: Soft, nontender, nondistended. No appreciable HSM.  PEG exit site benign.  No peritoneal findings of rebound or guarding.  SKIN: Lower extremities somewhat cool to touch.  Warm and dry without cutaneous eruptions. No embolic stigmata. "   PSYCHIATRIC: Incomplete assessment..       DIAGNOSTICS:  Lab Results   Component Value Date    WBC 7.85 03/27/2023    HGB 12.1 (L) 03/27/2023    HCT 37.9 03/27/2023     03/27/2023     Lab Results   Component Value Date    CRP 1.09 (H) 03/27/2023     Lab Results   Component Value Date    SEDRATE >130 (H) 03/26/2023     Lab Results   Component Value Date    GLUCOSE 141 (H) 03/27/2023    BUN 17 03/27/2023    CREATININE 0.68 (L) 03/27/2023    EGFRIFAFRI 109 01/10/2022    BCR 25.0 03/27/2023    CO2 28.0 03/27/2023    CALCIUM 9.1 03/27/2023    ALBUMIN 3.2 (L) 03/27/2023    AST 23 03/27/2023    ALT 17 03/27/2023       Microbiology: Bio fire respiratory pathogens unremarkable.  2 of 2 admission blood cultures with group A beta-hemolytic streptococci/favorable EDNA's to both penicillin and ceftriaxone.  MRSA nasopharyngeal PCR negative.  Follow-up blood cultures with GAS clearance.    RADIOLOGY:  Imaging Results (Last 72 Hours)     Procedure Component Value Units Date/Time    XR Chest 1 View [[id number]] Collected: 03/25/23 2200     Updated: 03/25/23 2204    Narrative:      XR CHEST 1 VW    Date of Exam: 3/25/2023 4:54 PM EDT    Indication: Follow-up respiratory failure and pneumonia.    Comparison: 3/24/2023    Findings:  Tracheostomy tube and right upper extremity PICC line are noted. PICC line tip appears to lie at the SVC-right atrial junction. Heart is upper normal size. Diffuse interstitial disease pattern of lungs is again noted, and appears a little increased.   There is focally dense airspace disease in the medial left lung base, and findings may represent mild worsening of pneumonia. No pneumothorax is seen.       Impression:      Impression:  Increasing diffuse pulmonary interstitial disease and focal left basilar airspace disease which may represent worsening pneumonia. No evidence of pneumothorax.    Electronically Signed: Kamran Bruno    3/25/2023 10:01 PM EDT    Workstation ID: [id number]          Assessment  and Plan: Severe sore throat.  Fever.  Leukocytosis.  Lactic acidosis.  Adult epiglottitis.  Group A beta-hemolytic streptococcal bacteremia.  Mechanical ventilatory support for threatened airway.  Status post tracheostomy and PEG placement.  Maximum temperature over 24 hours 100.9.  Currently 100.4.  Pulse 86, respiratory rate 18, blood pressure 147/67 mmHg with an O2 saturation of 90% on an FiO2 of 0.3 with 6 cm of PEEP.  BUN/creatinine 17/0.68.  Peripheral leukocyte count 7.9 with 65% segmented neutrophils and 1 band form.  C-reactive protein 1.1.  Procalcitonin 0.19.  Erythrocyte sedimentation rate greater than 130.  No new culture data.  Chest x-ray reviewed.  Tracheostomy tube in place.  Right-sided PICC.  Increased density in the left base with obscuration of the left costophrenic sulcus suggesting pleural effusion.  Continues on high-dose IV penicillin in combination with metronidazole.  Has some low-grade fever over 24 hours after having been afebrile for approximately 4 days.      Zana Johnson MD  3/27/2023

## 2023-03-27 NOTE — DISCHARGE PLACEMENT REQUEST
"Case Management  [phone]    Sandro Junior (68 y.o. Male)     Date of Birth   1955    Social Security Number       Address   [address] [address]    Home Phone   [phone]    MRN   [id number]       [facility]    Marital Status                               Admission Date   3/16/23    Admission Type   Emergency    Admitting Provider   Vikram Zamora MD    Attending Provider   Keila Negron DO    Department, Room/Bed   [facility] 2A ICU, N209/1       Discharge Date       Discharge Disposition       Discharge Destination                               Attending Provider: Keila Negron DO    Allergies: No Known Allergies    Isolation: None   Infection: None   Code Status: CPR    Ht: 185.4 cm (73\")   Wt: 92 kg (202 lb 13.2 oz)    Admission Cmt: None   Principal Problem: Acute strep pyogenes (group A strep) epiglottitis with airway obstruction  [J05.11]                 Active Insurance as of 3/16/2023     Primary Coverage     Payor Plan Insurance Group Employer/Plan Group    HUMANA MEDICARE REPLACEMENT HUMANA MEDICARE REPLACEMENT [id number]     Payor Plan Address Payor Plan Phone Number Payor Plan Fax Number Effective Dates    [address] [address] [phone]  2/1/2023 - None Entered    [facility] [id number]       Subscriber Name Subscriber Birth Date Member ID       SANDRO JUNIOR 1955 [id number]                 Emergency Contacts      (Rel.) Home Phone Work Phone Mobile Phone    CAROL JUNIOR (Spouse) [phone] -- [phone]    C,Zohra (Grandchild) -- -- [phone]    C,Hope (Daughter) -- -- [phone]               History & Physical      Vikram Zamora MD at 03/16/23 8921                Chief complaint: Shortness of breath    Subjective      68-year-old male who presented to the ER this evening with shortness of breath and sore throat.  History obtained is from the chart and my discussion with Dr." Angelito patient is now intubated and sedated.  Apparently, he first noticed a sore throat around noon.  He became increasingly symptomatic throughout the day before presenting to the ER.  He was given epinephrine, steroids, and H2 blockers.  Despite this he began tripoding and drooling and had audible stridor.  Per Dr. Eaton, the intubation was difficult due to edema and erythema.    Imaging reveals abnormal appearance of the soft tissues of the laryngeal area with some edema and swelling of the uvula.  There is no evidence of a fluid collection or abscess.  Bibasilar infiltrates were present possibly related to atelectasis or retained secretions but there was no evidence of consolidative pneumonia.  He has been given Zosyn and vancomycin empirically and, as stated, has received steroids.  Because of tachycardia he was started on a Cardizem drip and a propofol drip was initiated.      History  Past Medical History:   Diagnosis Date   • Epidermal inclusion cyst    • Esophageal candidiasis (HCC)    • Keloid scar    • Rectal cancer (HCC)    • Seborrheic dermatitis      Past Surgical History:   Procedure Laterality Date   • COLECTOMY PARTIAL / TOTAL     • KNEE SURGERY      Left knee meniscal tear repair   • TOOTH EXTRACTION      7 teeth   • TOTAL KNEE ARTHROPLASTY Right 2016     History reviewed. No pertinent family history.  Social History     Tobacco Use   • Smoking status: Former     Types: Cigars     Quit date: 2019     Years since quittin.2   • Smokeless tobacco: Never   • Tobacco comments:     2 cigars a week   Vaping Use   • Vaping Use: Never used   Substance Use Topics   • Alcohol use: Yes     Alcohol/week: 4.0 - 5.0 standard drinks     Types: 4 - 5 Glasses of wine per week   • Drug use: Defer       Review of Systems   Review of systems could not be obtained due to   patient intubated.      Objective      Vital Signs  Temp:  [99.6 °F (37.6 °C)-100.1 °F (37.8 °C)] 100.1 °F (37.8 °C)  Heart Rate:  " [120-172] 120  Resp:  [24] 24  BP: (133-217)/() 144/92  FiO2 (%):  [40 %] 40 %    Physical Exam:    Objective:  General Appearance:  Ill-appearing.    Vital signs: (most recent): Blood pressure 144/92, pulse 120, temperature 100.1 °F (37.8 °C), temperature source Axillary, resp. rate 24, height 185.4 cm (73\"), weight 74.5 kg (164 lb 3.9 oz), SpO2 97 %.    HEENT: (Oral ET tube)    Lungs:  No rales, wheezes or rhonchi.    Heart: Normal rate.  Regular rhythm.  S1 normal and S2 normal.  No murmur, gallop or friction rub.   Chest: Symmetric chest wall expansion.   Abdomen: Abdomen is soft and non-distended.  Bowel sounds are normal.   There is no abdominal tenderness.   There is no mass. There is no splenomegaly. There is no hepatomegaly.   Extremities: There is no deformity or dependent edema.    Neurological: (Sedated).    Pupils:  Pupils are equal, round, and reactive to light.    Skin:  Warm and dry.              Results Review:    I reviewed the patient's new clinical results.  I reviewed the patient's new imaging results and agree with the interpretation.  I reviewed the patient's other test results and agree with the interpretation    Assessment & Plan     Assessment:    Active Hospital Problems    Diagnosis    • **Acute epiglottitis with airway obstruction - probable    • Acute respiratory failure (HCC)    • Essential hypertension        68-year-old male who presents with what appears to be acute epiglottitis.  He developed a sore throat around noon and symptoms progressed to the day.  Despite appropriate treatment in the ER his stridor and respiratory distress became worse and he underwent intubation which revealed erythematous and swollen airways.  CT of the neck reveals no fluid collection/abscess but swollen soft tissues.  He has arrived in the ICU intubated with acceptable oxygen saturations and blood pressure and with sinus tachycardia    Plan:    1. ICU admission  2. Maintain intubation with " sedation and physical restraints if needed so his airway is not dislodged  3. Empiric antimicrobial therapy with vancomycin and Rocephin per standard recommendations for epiglottitis  4. Additional fluid resuscitation due to tachycardia but this could be related to infection and administration of IM epinephrine in the ER.  Wean Cardizem.  5. Viral respiratory panel is negative  6. Will obtain swabs for Streptococcus  7. Anticipate the need for mechanical ventilation for several days until airway swelling improves  8. No indication for ongoing steroids for what is likely acute epiglottitis    I discussed the patients findings and my recommendations with nursing staff and Dr. Eaton.     Critical Care time spent in direct patient care: 45 minutes (excluding procedure time, if applicable) including high complexity decision making to assess, manipulate, and support vital organ system failure in this individual who has impairment of one or more vital organ systems such that there is a high probability of imminent or life threatening deterioration in the patient’s condition.      Vikram Zamora MD  Pulmonary and Critical Care Medicine  03/16/23  23:30 EDT            Electronically signed by Vikram Zamora MD at 03/16/23 3682          Physician Progress Notes (most recent note)      Zana Johnson MD at 03/27/23 0819          1       Sandro Junior  1955  [id number]  3/27/2023    CC: Sore throat and fever    Sandro Junior is a 68 y.o. male here for adult epiglottitis, airway compromise, severe sepsis with leukocytosis, lactic acidosis, and group A beta-hemolytic streptococcal bacteremia.      Past medical history:  Past Medical History:   Diagnosis Date   • Epidermal inclusion cyst    • Esophageal candidiasis (HCC)    • Keloid scar    • Rectal cancer (HCC)    • Seborrheic dermatitis        Medications:   Current Facility-Administered Medications:   •  acetaminophen (TYLENOL) 160 MG/5ML  solution 650 mg, 650 mg, Per PEG Tube, Q6H PRN, Yinka Payne, [facility], 650 mg at 03/27/23 0002  •  [DISCONTINUED] acetaminophen (TYLENOL) tablet 650 mg, 650 mg, Nasogastric, Q4H PRN, 650 mg at 03/21/23 0253 **OR** acetaminophen (TYLENOL) suppository 650 mg, 650 mg, Rectal, Q4H PRN, Néstor Jerome MD  •  sennosides-docusate (PERICOLACE) 8.6-50 MG per tablet 2 tablet, 2 tablet, Per PEG Tube, BID, 2 tablet at 03/26/23 0844 **AND** polyethylene glycol (MIRALAX) packet 17 g, 17 g, Per PEG Tube, Daily PRN **AND** [DISCONTINUED] bisacodyl (DULCOLAX) EC tablet 5 mg, 5 mg, Oral, Daily PRN **AND** bisacodyl (DULCOLAX) suppository 10 mg, 10 mg, Rectal, Daily PRN, Yinka Payne, [facility]  •  chlorhexidine (PERIDEX) 0.12 % solution 15 mL, 15 mL, Mouth/Throat, Q12H, Néstor Jerome MD, 15 mL at 03/26/23 2044  •  dexmedetomidine (PRECEDEX) 400 mcg in 100 mL NS infusion, 0.2-1.5 mcg/kg/hr, Intravenous, Titrated, Case, Keila V., DO, Last Rate: 5.8 mL/hr at 03/26/23 2046, 0.3 mcg/kg/hr at 03/26/23 2046  •  dextrose (D50W) (25 g/50 mL) IV injection 25 g, 25 g, Intravenous, Q15 Min PRN, Néstor Jerome MD, 25 g at 03/21/23 1153  •  fentaNYL 2500 mcg/250 mL NS infusion,  mcg/hr, Intravenous, Titrated, Néstor Jerome MD, Stopped at 03/26/23 1300  •  glucagon (GLUCAGEN) injection 1 mg, 1 mg, Intramuscular, Q15 Min PRN, Néstor Jerome MD  •  heparin (porcine) 5000 UNIT/ML injection 5,000 Units, 5,000 Units, Subcutaneous, Q8H, Néstor Jerome MD, 5,000 Units at 03/27/23 0506  •  hydrALAZINE (APRESOLINE) injection 20 mg, 20 mg, Intravenous, Q4H PRN, Néstor Jerome MD, 20 mg at 03/26/23 2051  •  latanoprost (XALATAN) 0.005 % ophthalmic solution 1 drop, 1 drop, Both Eyes, Daily, Néstor Jerome MD, 1 drop at 03/26/23 0833  •  Magnesium Standard Dose Replacement - Initiate Nurse / BPA Driven Protocol, , Does not apply, PRN, Néstor Jerome MD  •  magnesium sulfate 4g/100mL (PREMIX) infusion, 4 g,  Intravenous, Once, Shane Alves, APRN, 4 g at 03/27/23 0650  •  metroNIDAZOLE (FLAGYL) IVPB 500 mg, 500 mg, Intravenous, Q6H, Keila Negron., DO, 500 mg at 03/27/23 0304  •  midazolam (VERSED) bolus from bag 1 mg/mL 1 mg, 1 mg, Intravenous, Q30 Min PRJustus MAY Gustavo V, MD  •  midazolam (VERSED) injection 2 mg, 2 mg, Intravenous, Q2H PRN, Néstor Jerome MD, 2 mg at 03/25/23 1527  •  pantoprazole (PROTONIX) injection 40 mg, 40 mg, Intravenous, Q AM, Néstor Jerome MD, 40 mg at 03/27/23 0506  •  penicillin G potassium 12 Million Units in sodium chloride 0.9 % 250 mL IVPB, 12 Million Units, Intravenous, Q12H, Yinka Payne RPH, Last Rate: 20.8 mL/hr at 03/26/23 2234, 12 Million Units at 03/26/23 2234  •  Phosphorus Replacement - Initiate Nurse / BPA Driven Protocol, , Does not apply, Justus KELLER Gustavo V, MD  •  polyethylene glycol (MIRALAX) packet 17 g, 17 g, Per PEG Tube, Daily, Trish Callahan, PharmD  •  Potassium Replacement - Initiate Nurse / BPA Driven Protocol, , Does not apply, Justus KELLER Gustavo V, MD  •  ProSource No Carb oral solution 30 mL, 30 mL, Per PEG Tube, Daily, Yinka Payne RPH, 30 mL at 03/26/23 1011  •  sodium chloride 0.9 % flush 10 mL, 10 mL, Intravenous, PRNJustus Gustavo V, MD  •  sodium chloride 0.9 % flush 10 mL, 10 mL, Intravenous, Q12H, Néstor Jerome MD, 10 mL at 03/26/23 2044  •  sodium chloride 0.9 % flush 10 mL, 10 mL, Intravenous, Justus KELLER Gustavo V, MD  •  sodium chloride 0.9 % flush 20 mL, 20 mL, Intravenous, Justus KELLER Gustavo V, MD  Antibiotics:  Anti-Infectives (From admission, onward)    Ordered     Dose/Rate Route Frequency Start Stop    03/24/23 0812  penicillin G potassium 12 Million Units in sodium chloride 0.9 % 250 mL IVPB        Ordering Provider: Yinka Payne RPH    12 Million Units  20.8 mL/hr over 12 Hours Intravenous Every 12 Hours 03/24/23 1000 03/28/23 2359    03/20/23 1241  metroNIDAZOLE (FLAGYL) IVPB 500 mg       "  Ordering Provider: Keila Negron., DO    500 mg  over 30 Minutes Intravenous Every 6 Hours 03/20/23 1400 03/28/23 1359    03/16/23 2151  vancomycin 1500 mg/500 mL 0.9% NS IVPB ([facility])        Ordering Provider: Anand Eaton MD    20 mg/kg × 79.4 kg Intravenous Once 03/16/23 2153 03/16/23 2323          Allergies:  has No Known Allergies.    Review of Systems: All other reviewed and negative except as per HPI    Blood pressure 147/67, pulse 86, temperature 100.4 °F (38 °C), temperature source Bladder, resp. rate 18, height 185.4 cm (73\"), weight 92 kg (202 lb 13.2 oz), SpO2 97 %.  GENERAL: Arouses.  Tracheostomy.  Right PICC.  Low-grade fever noted.  Minimal secretions from tracheostomy tube.  HEENT: Oropharynx without thrush. Sinuses nontender. Dentition in good repair. No cervical adenopathy. No carotid bruits/ jugular venous distention.   EYES: PERRL. No conjunctival injection. No icterus. EOMI.  LYMPHATICS: No lymphadenopathy of the neck or axillary or inguinal regions.   HEART: No murmur, gallop, or pericardial friction rub.   LUNGS: Clear to auscultation anteriorly. No percussion dullness.   ABDOMEN: Soft, nontender, nondistended. No appreciable HSM.  PEG exit site benign.  No peritoneal findings of rebound or guarding.  SKIN: Lower extremities somewhat cool to touch.  Warm and dry without cutaneous eruptions. No embolic stigmata.   PSYCHIATRIC: Incomplete assessment..       DIAGNOSTICS:  Lab Results   Component Value Date    WBC 7.85 03/27/2023    HGB 12.1 (L) 03/27/2023    HCT 37.9 03/27/2023     03/27/2023     Lab Results   Component Value Date    CRP 1.09 (H) 03/27/2023     Lab Results   Component Value Date    SEDRATE >130 (H) 03/26/2023     Lab Results   Component Value Date    GLUCOSE 141 (H) 03/27/2023    BUN 17 03/27/2023    CREATININE 0.68 (L) 03/27/2023    EGFRIFAFRI 109 01/10/2022    BCR 25.0 03/27/2023    CO2 28.0 03/27/2023    CALCIUM 9.1 03/27/2023    ALBUMIN 3.2 (L) 03/27/2023 "    AST 23 03/27/2023    ALT 17 03/27/2023       Microbiology: Bio fire respiratory pathogens unremarkable.  2 of 2 admission blood cultures with group A beta-hemolytic streptococci/favorable EDNA's to both penicillin and ceftriaxone.  MRSA nasopharyngeal PCR negative.  Follow-up blood cultures with GAS clearance.    RADIOLOGY:  Imaging Results (Last 72 Hours)     Procedure Component Value Units Date/Time    XR Chest 1 View [[id number]] Collected: 03/25/23 2200     Updated: 03/25/23 2204    Narrative:      XR CHEST 1 VW    Date of Exam: 3/25/2023 4:54 PM EDT    Indication: Follow-up respiratory failure and pneumonia.    Comparison: 3/24/2023    Findings:  Tracheostomy tube and right upper extremity PICC line are noted. PICC line tip appears to lie at the SVC-right atrial junction. Heart is upper normal size. Diffuse interstitial disease pattern of lungs is again noted, and appears a little increased.   There is focally dense airspace disease in the medial left lung base, and findings may represent mild worsening of pneumonia. No pneumothorax is seen.       Impression:      Impression:  Increasing diffuse pulmonary interstitial disease and focal left basilar airspace disease which may represent worsening pneumonia. No evidence of pneumothorax.    Electronically Signed: Kamran Bruno    3/25/2023 10:01 PM EDT    Workstation ID: [id number]          Assessment and Plan: Severe sore throat.  Fever.  Leukocytosis.  Lactic acidosis.  Adult epiglottitis.  Group A beta-hemolytic streptococcal bacteremia.  Mechanical ventilatory support for threatened airway.  Status post tracheostomy and PEG placement.  Maximum temperature over 24 hours 100.9.  Currently 100.4.  Pulse 86, respiratory rate 18, blood pressure 147/67 mmHg with an O2 saturation of 90% on an FiO2 of 0.3 with 6 cm of PEEP.  BUN/creatinine 17/0.68.  Peripheral leukocyte count 7.9 with 65% segmented neutrophils and 1 band form.  C-reactive protein 1.1.  Procalcitonin 0.19.   Erythrocyte sedimentation rate greater than 130.  No new culture data.  Chest x-ray reviewed.  Tracheostomy tube in place.  Right-sided PICC.  Increased density in the left base with obscuration of the left costophrenic sulcus suggesting pleural effusion.  Continues on high-dose IV penicillin in combination with metronidazole.  Has some low-grade fever over 24 hours after having been afebrile for approximately 4 days.      Zana Johnson MD  3/27/2023      Electronically signed by Zana Johnson MD at 23 0830          Physical Therapy Notes (most recent note)      Chela Barron, PT Student at 23 0804  Version 1 of 1    Attestation signed by Judith Freire, PT at 23 1353    Judith Freire PT 3/27/2023 13:53 EDT                  Patient Name: Sandro Junior  : 1955    MRN: [id number]                              Today's Date: 3/27/2023       Admit Date: 3/16/2023    Visit Dx:     ICD-10-CM ICD-9-CM   1. Respiratory distress  R06.03 786.09   2. Tachycardia  R00.0 785.0   3. Airway compromise  J98.8 519.8   4. Acute epiglottitis with airway obstruction - probable  J05.11 464.31   5. Essential hypertension  I10 401.9   6. Voice disturbance  R49.9 784.40     Patient Active Problem List   Diagnosis   • Essential hypertension   • Chronic pain   • Osteoarthritis of knee   • Prolonged depressive adjustment reaction   • Acute prostatitis   • Atopic rhinitis   • Erectile dysfunction of nonorganic origin   • Glaucoma suspect   • Onychomycosis of toenail   • Malignant neoplasm of rectum ([facility])   • Seborrheic eczema   • Keloid scar   • Pain of right lower extremity   • Esophageal reflux   • Acute respiratory failure ([facility])   • Acute strep pyogenes (group A strep) epiglottitis with airway obstruction      Past Medical History:   Diagnosis Date   • Epidermal inclusion cyst    • Esophageal candidiasis ([facility])    • Keloid scar    • Rectal cancer ([facility])    • Seborrheic dermatitis      Past Surgical  History:   Procedure Laterality Date   • COLECTOMY PARTIAL / TOTAL     • KNEE SURGERY      Left knee meniscal tear repair   • TOOTH EXTRACTION  2016 7 teeth   • TOTAL KNEE ARTHROPLASTY Right 04/11/2016   • TRACHEOSTOMY AND PEG TUBE INSERTION N/A 3/23/2023    Procedure: TRACHEOSTOMY AND PERCUTANEOUS ENDOSCOPIC GASTROSTOMY TUBE INSERTION;  Surgeon: Néstor Jerome MD;  [facility];  Service: General;  Laterality: N/A;      General Information     Row Name 03/27/23 1010          Physical Therapy Time and Intention    Document Type therapy note (daily note) (P)   -HT     Mode of Treatment physical therapy (P)   -HT     Row Name 03/27/23 1010          General Information    Patient Profile Reviewed yes (P)   -HT     Existing Precautions/Restrictions fall;oxygen therapy device and L/min;other (see comments) (P)   trach/PEG  -HT     Barriers to Rehab medically complex (P)   -HT     Row Name 03/27/23 1010          Cognition    Orientation Status (Cognition) oriented x 3 (P)   -HT     Row Name 03/27/23 1010          Safety Issues, Functional Mobility    Safety Issues Affecting Function (Mobility) safety precaution awareness;safety precautions follow-through/compliance;insight into deficits/self-awareness;awareness of need for assistance;sequencing abilities (P)   -HT     Impairments Affecting Function (Mobility) balance;endurance/activity tolerance;strength;postural/trunk control;shortness of breath (P)   -HT           User Key  (r) = Recorded By, (t) = Taken By, (c) = Cosigned By    Initials Name Provider Type    HT Chela Barron PT Student PT Student               Mobility     Row Name 03/27/23 1011          Bed Mobility    Bed Mobility supine-sit (P)   -HT     Supine-Sit Spencer (Bed Mobility) minimum assist (75% patient effort);1 person assist;verbal cues (P)   -HT     Assistive Device (Bed Mobility) bed rails;head of bed elevated (P)   -HT     Comment, (Bed Mobility) required cues for sequencing.  (P)   -HT     Row Name 03/27/23 1011          Sit-Stand Transfer    Sit-Stand Taylor (Transfers) minimum assist (75% patient effort);1 person assist;verbal cues (P)   -HT     Assistive Device (Sit-Stand Transfers) other (see comments) (P)   BUE support  -HT     Comment, (Sit-Stand Transfer) required cues for hand placement (P)   -HT     Row Name 03/27/23 1011          Gait/Stairs (Locomotion)    Taylor Level (Gait) moderate assist (50% patient effort);2 person assist;verbal cues (P)   -HT     Assistive Device (Gait) other (see comments) (P)   BUE support  -HT     Distance in Feet (Gait) 3+6 (P)   -HT     Deviations/Abnormal Patterns (Gait) bilateral deviations;bladimir decreased;weight shifting decreased;base of support, narrow (P)   -HT     Bilateral Gait Deviations forward flexed posture (P)   -HT     Comment, (Gait/Stairs) Ambulated from EOB to chair and forward and back from chair. Pt demonstrated decreased bladimir, narrow BROCK, and fwd trunk requiring cues for turning, increased stride length, and to avoid sitting prematurely. Gait limited by low endurance, balance deficits, and generalized weakness. (P)   -HT           User Key  (r) = Recorded By, (t) = Taken By, (c) = Cosigned By    Initials Name Provider Type    HT Chela Barron, PT Student PT Student               Obj/Interventions     Row Name 03/27/23 1020          Motor Skills    Therapeutic Exercise hip;knee;ankle (P)   -HT     Row Name 03/27/23 1020          Hip (Therapeutic Exercise)    Hip (Therapeutic Exercise) strengthening exercise (P)   -HT     Hip Strengthening (Therapeutic Exercise) bilateral;marching while seated;10 repetitions (P)   -HT     Row Name 03/27/23 1020          Knee (Therapeutic Exercise)    Knee (Therapeutic Exercise) strengthening exercise (P)   -HT     Knee Strengthening (Therapeutic Exercise) bilateral;LAQ (long arc quad);10 repetitions;sitting (P)   -HT     Row Name 03/27/23 1020          Ankle (Therapeutic  Exercise)    Ankle (Therapeutic Exercise) strengthening exercise (P)   -HT     Ankle Strengthening (Therapeutic Exercise) dorsiflexion;plantarflexion;15 repititions;sitting (P)   -HT     Row Name 03/27/23 1020          Balance    Balance Assessment sitting static balance;sitting dynamic balance;standing static balance;standing dynamic balance (P)   -HT     Static Sitting Balance contact guard (P)   -HT     Dynamic Sitting Balance contact guard (P)   -HT     Position, Sitting Balance sitting edge of bed (P)   -HT     Static Standing Balance minimal assist;2-person assist (P)   -HT     Dynamic Standing Balance moderate assist;2-person assist (P)   -HT     Position/Device Used, Standing Balance supported (P)   -HT           User Key  (r) = Recorded By, (t) = Taken By, (c) = Cosigned By    Initials Name Provider Type    HT Chela Barron, PT Student PT Student               Goals/Plan    No documentation.                Clinical Impression     Row Name 03/27/23 1021          Pain    Pretreatment Pain Rating 0/10 - no pain (P)   -HT     Posttreatment Pain Rating 0/10 - no pain (P)   -HT     Additional Documentation Pain Scale: Numbers Pre/Post-Treatment (Group) (P)   -HT     Row Name 03/27/23 1021          Plan of Care Review    Plan of Care Reviewed With patient (P)   -HT     Progress improving (P)   -HT     Outcome Evaluation Pt ambulated up to 6' with BUE support and mod Ax2 showing limitations of balance deficits and generalized weakness. Rec skilled PT to increase ind with mobility and d/c to LTACH. (P)   -HT     Row Name 03/27/23 1021          Therapy Assessment/Plan (PT)    Criteria for Skilled Interventions Met (PT) yes;skilled treatment is necessary;meets criteria (P)   -HT     [facility] Name 03/27/23 1021          Vital Signs    Pre Systolic BP Rehab 144 (P)   -HT     Pre Treatment Diastolic  (P)   -HT     Post Systolic BP Rehab 167 (P)   -HT     Post Treatment Diastolic BP 83 (P)   -HT     Pretreatment Heart  Rate (beats/min) 73 (P)   -HT     Posttreatment Heart Rate (beats/min) 73 (P)   -HT     Pre SpO2 (%) 100 (P)   -HT     O2 Delivery Pre Treatment trach collar (P)   -HT     O2 Delivery Intra Treatment trach collar (P)   -HT     Post SpO2 (%) 98 (P)   -HT     O2 Delivery Post Treatment trach collar (P)   -HT     Pre Patient Position Supine (P)   -HT     Intra Patient Position Standing (P)   -HT     Post Patient Position Sitting (P)   -HT     Row Name 03/27/23 1021          Positioning and Restraints    Pre-Treatment Position in bed (P)   -HT     Post Treatment Position chair (P)   -HT     In Chair notified nsg;reclined;sitting;call light within reach;encouraged to call for assist;exit alarm on;RUE elevated;LUE elevated;legs elevated;on mechanical lift sling;waffle cushion (P)   -HT           User Key  (r) = Recorded By, (t) = Taken By, (c) = Cosigned By    Initials Name Provider Type     Chela Barron, PT Student PT Student               Outcome Measures     Row Name 03/27/23 1025          How much help from another person do you currently need...    Turning from your back to your side while in flat bed without using bedrails? 3 (P)   -HT     Moving from lying on back to sitting on the side of a flat bed without bedrails? 3 (P)   -HT     Moving to and from a bed to a chair (including a wheelchair)? 2 (P)   -HT     Standing up from a chair using your arms (e.g., wheelchair, bedside chair)? 3 (P)   -HT     Climbing 3-5 steps with a railing? 1 (P)   -HT     To walk in hospital room? 2 (P)   -HT     AM-PAC 6 Clicks Score (PT) 14 (P)   -HT     Highest level of mobility 4 --> Transferred to chair/commode (P)   -HT     Row Name 03/27/23 1025          Functional Assessment    Outcome Measure Options AM-PAC 6 Clicks Basic Mobility (PT) (P)   -HT           User Key  (r) = Recorded By, (t) = Taken By, (c) = Cosigned By    Initials Name Provider Type    Chela Bermeo, PT Student PT Student                              Physical Therapy Education     Title: PT OT SLP Therapies (Done)     Topic: Physical Therapy (Done)     Point: Mobility training (Done)     Learning Progress Summary           Patient Acceptance, E, VU by HT at 3/27/2023 1026    Acceptance, E, VU by EL at 3/26/2023 1707    Acceptance, E,D, VU,NR by LS at 3/26/2023 1429                   Point: Home exercise program (Done)     Learning Progress Summary           Patient Acceptance, E, VU by HT at 3/27/2023 1026    Acceptance, E, VU by EL at 3/26/2023 1707                   Point: Body mechanics (Done)     Learning Progress Summary           Patient Acceptance, E, VU by HT at 3/27/2023 1026    Acceptance, E, VU by EL at 3/26/2023 1707    Acceptance, E,D, VU,NR by LS at 3/26/2023 1429                   Point: Precautions (Done)     Learning Progress Summary           Patient Acceptance, E, VU by HT at 3/27/2023 1026    Acceptance, E, VU by EL at 3/26/2023 1707    Acceptance, E,D, VU,NR by LS at 3/26/2023 1429                               User Key     Initials Effective Dates Name Provider Type Discipline     02/03/23 -  Norma Villegas, PT Physical Therapist PT    EL 06/16/21 -  Giana Yates RN Registered Nurse Nurse     01/12/23 -  Chela Barron, PT Student PT Student PT              PT Recommendation and Plan     Plan of Care Reviewed With: (P) patient  Progress: (P) improving  Outcome Evaluation: (P) Pt ambulated up to 6' with BUE support and mod Ax2 showing limitations of balance deficits and generalized weakness. Rec skilled PT to increase ind with mobility and d/c to LTACH.     Time Calculation:    PT Charges     Row Name 03/27/23 1027             Time Calculation    Start Time 0804 (P)   -HT      PT Received On 03/27/23 (P)   -HT         Timed Charges    57762 - PT Therapeutic Exercise Minutes 8 (P)   -HT      45369 - PT Therapeutic Activity Minutes 25 (P)   -HT         Total Minutes    Timed Charges Total Minutes 33 (P)   -HT       Total Minutes 33  (P)   -HT            User Key  (r) = Recorded By, (t) = Taken By, (c) = Cosigned By    Initials Name Provider Type    HT Chela Barron, PT Student PT Student              Therapy Charges for Today     Code Description Service Date Service Provider Modifiers Qty    20366968028  PT THERAPEUTIC ACT EA 15 MIN 3/27/2023 Chela Barron, PT Student GP 2          PT G-Codes  Outcome Measure Options: (P) AM-PAC 6 Clicks Basic Mobility (PT)  AM-PAC 6 Clicks Score (PT): (P) 14  AM-PAC 6 Clicks Score (OT): 10       Chela Barron, PT Student  3/27/2023      Electronically signed by Judith Freire, PT at 23 1353          Occupational Therapy Notes (most recent note)      Rosanna Sterling, OT at 23 1305          Patient Name: Sandro Junior  : 1955    MRN: [id number]                              Today's Date: 3/26/2023       Admit Date: 3/16/2023    Visit Dx:     ICD-10-CM ICD-9-CM   1. Respiratory distress  R06.03 786.09   2. Tachycardia  R00.0 785.0   3. Airway compromise  J98.8 519.8   4. Acute epiglottitis with airway obstruction - probable  J05.11 464.31   5. Essential hypertension  I10 401.9     Patient Active Problem List   Diagnosis   • Essential hypertension   • Chronic pain   • Osteoarthritis of knee   • Prolonged depressive adjustment reaction   • Acute prostatitis   • Atopic rhinitis   • Erectile dysfunction of nonorganic origin   • Glaucoma suspect   • Onychomycosis of toenail   • Malignant neoplasm of rectum (HCC)   • Seborrheic eczema   • Keloid scar   • Pain of right lower extremity   • Esophageal reflux   • Acute respiratory failure (HCC)   • Acute strep pyogenes (group A strep) epiglottitis with airway obstruction      Past Medical History:   Diagnosis Date   • Epidermal inclusion cyst    • Esophageal candidiasis (HCC)    • Keloid scar    • Rectal cancer (HCC)    • Seborrheic dermatitis      Past Surgical History:   Procedure Laterality Date   • COLECTOMY PARTIAL / TOTAL     • KNEE  SURGERY      Left knee meniscal tear repair   • TOOTH EXTRACTION  2016 7 teeth   • TOTAL KNEE ARTHROPLASTY Right 04/11/2016   • TRACHEOSTOMY AND PEG TUBE INSERTION N/A 3/23/2023    Procedure: TRACHEOSTOMY AND PERCUTANEOUS ENDOSCOPIC GASTROSTOMY TUBE INSERTION;  Surgeon: Néstor Jerome MD;  Location: [facility];  Service: General;  Laterality: N/A;      General Information     Row Name 03/26/23 1416          OT Time and Intention    Document Type evaluation  -CS     Mode of Treatment occupational therapy  -CS     Row Name 03/26/23 1416          General Information    Patient Profile Reviewed yes  -CS     Prior Level of Function independent:;all household mobility;ADL's  -CS     Existing Precautions/Restrictions fall;oxygen therapy device and L/min;other (see comments)  trach/PEG  -CS     Barriers to Rehab medically complex  -CS     Row Name 03/26/23 1416          Living Environment    People in Home spouse  -CS     Row Name 03/26/23 1416          Cognition    Orientation Status (Cognition) oriented x 3  -CS     Row Name 03/26/23 1416          Safety Issues, Functional Mobility    Impairments Affecting Function (Mobility) balance;endurance/activity tolerance;strength;postural/trunk control  -CS           User Key  (r) = Recorded By, (t) = Taken By, (c) = Cosigned By    Initials Name Provider Type    CS Rosanna Sterling OT Occupational Therapist                 Mobility/ADL's     Row Name 03/26/23 1417          Bed Mobility    Bed Mobility supine-sit  -CS     Supine-Sit [place] (Bed Mobility) 2 person assist;moderate assist (50% patient effort);verbal cues  -CS     Assistive Device (Bed Mobility) bed rails;head of bed elevated  -CS     Row Name 03/26/23 1417          Transfers    Transfers sit-stand transfer;stand-sit transfer  -CS     Comment, (Transfers) BUE support  -CS     Row Name 03/26/23 1417          Sit-Stand Transfer    Sit-Stand [place] (Transfers) minimum assist (75% patient effort);2 person  assist;verbal cues  -     Row Name 03/26/23 1417          Stand-Sit Transfer    Stand-Sit [place] (Transfers) minimum assist (75% patient effort);2 person assist;verbal cues  -     Row Name 03/26/23 1417          Activities of Daily Living    BADL Assessment/Intervention lower body dressing;grooming  -     Row Name 03/26/23 141          Lower Body Dressing Assessment/Training    [place] Level (Lower Body Dressing) don;socks;dependent (less than 25% patient effort)  -     Position (Lower Body Dressing) supine  -     Row Name 03/26/23 1417          Grooming Assessment/Training    [place] Level (Grooming) wash face, hands;set up  -     Position (Grooming) supported sitting  -           User Key  (r) = Recorded By, (t) = Taken By, (c) = Cosigned By    Initials Name Provider Type    CS Rosanna Sterling OT Occupational Therapist               Obj/Interventions     [facility] Name 03/26/23 1417          Sensory Assessment (Somatosensory)    Sensory Assessment (Somatosensory) UE sensation intact  -[facility] Name 03/26/23 1417          Range of Motion Comprehensive    General Range of Motion bilateral upper extremity ROM WFL  -[facility] Name 03/26/23 1417          Strength Comprehensive (MMT)    Comment, General Manual Muscle Testing (MMT) Assessment BUE grossly 3+/5  -     Row Name 03/26/23 1417          Balance    Balance Assessment sitting static balance;sitting dynamic balance;standing static balance  -     Static Sitting Balance contact guard  -     Dynamic Sitting Balance minimal assist  -CS     Position, Sitting Balance sitting edge of mat  -     Static Standing Balance minimal assist;2-person assist  -CS     Position/Device Used, Standing Balance supported  -     Balance Interventions sitting;standing;static;dynamic;weight shifting activity;occupation based/functional task;dynamic reaching  -           User Key  (r) = Recorded By, (t) = Taken By, (c) = Cosigned By    Initials Name  Provider Type    CS Rosanna Sterling, OT Occupational Therapist               Goals/Plan     Row Name 03/26/23 1419          Transfer Goal 1 (OT)    Activity/Assistive Device (Transfer Goal 1, OT) sit-to-stand/stand-to-sit;toilet  -CS     [place] Level/Cues Needed (Transfer Goal 1, OT) contact guard required  -CS     Time Frame (Transfer Goal 1, OT) long term goal (LTG);10 days  -CS     Progress/Outcome (Transfer Goal 1, OT) goal ongoing  -CS     Row Name 03/26/23 1419          Dressing Goal 1 (OT)    Activity/Device (Dressing Goal 1, OT) lower body dressing  -CS     [place]/Cues Needed (Dressing Goal 1, OT) moderate assist (50-74% patient effort)  -CS     Time Frame (Dressing Goal 1, OT) long term goal (LTG);10 days  -CS     Strategies/Barriers (Dressing Goal 1, OT) don/doff socks w/ AAD  -CS     Progress/Outcome (Dressing Goal 1, OT) goal ongoing  -CS     Row Name 03/26/23 1419          Strength Goal 1 (OT)    Strength Goal 1 (OT) Pt will tolerate BUE HEP w/ red thera-band x15 reps.  -CS     Time Frame (Strength Goal 1, OT) long term goal (LTG);10 days  -CS     Progress/Outcome (Strength Goal 1, OT) goal ongoing  -CS     Row Name 03/26/23 1419          Therapy Assessment/Plan (OT)    Planned Therapy Interventions (OT) activity tolerance training;adaptive equipment training;BADL retraining;functional balance retraining;occupation/activity based interventions;ROM/therapeutic exercise;strengthening exercise;transfer/mobility retraining  -CS           User Key  (r) = Recorded By, (t) = Taken By, (c) = Cosigned By    Initials Name Provider Type    CS Rosanna Sterling, CATHI Occupational Therapist               Clinical Impression     Row Name 03/26/23 1418          Pain Assessment    Pretreatment Pain Rating 0/10 - no pain  -CS     Posttreatment Pain Rating 0/10 - no pain  -CS     Row Name 03/26/23 1418          Plan of Care Review    Plan of Care Reviewed With patient;family  -CS     Progress improving  -CS      Outcome Evaluation OT eval complete. Pt presents w/ generalized weakness, balance deficits, and decreased endurance from baseline limiting functional independence. Recommend cont skilled IPOT POC to facilitate return to PLOF. Recommend pt DC to LTACH, pending medical needs.  -CS     Row Name 03/26/23 1418          Therapy Assessment/Plan (OT)    Patient/Family Therapy Goal Statement (OT) Return to PLOF  -CS     Rehab Potential (OT) good, to achieve stated therapy goals  -CS     Criteria for Skilled Therapeutic Interventions Met (OT) yes;skilled treatment is necessary  -CS     Therapy Frequency (OT) daily  -CS     Row Name 03/26/23 1418          Therapy Plan Review/Discharge Plan (OT)    Anticipated Discharge Disposition (OT) LT (long-term care hospital)  -CS     Row Name 03/26/23 1418          Vital Signs    Pre Systolic BP Rehab --  VSS  -CS     O2 Delivery Pre Treatment trach collar  -CS     O2 Delivery Intra Treatment trach collar  -CS     O2 Delivery Post Treatment trach collar  -CS     Pre Patient Position Supine  -CS     Intra Patient Position Standing  -CS     Post Patient Position Sitting  -CS     Row Name 03/26/23 1418          Positioning and Restraints    Pre-Treatment Position in bed  -CS     Post Treatment Position bed  -CS     In Bed notified nsg;sitting EOB;with PT;call light within reach;encouraged to call for assist;with nsg;with family/caregiver  -CS           User Key  (r) = Recorded By, (t) = Taken By, (c) = Cosigned By    Initials Name Provider Type    CS Rosanna Sterling, OT Occupational Therapist               Outcome Measures     Row Name 03/26/23 1420          How much help from another is currently needed...    Putting on and taking off regular lower body clothing? 1  -CS     Bathing (including washing, rinsing, and drying) 2  -CS     Toileting (which includes using toilet bed pan or urinal) 1  -CS     Putting on and taking off regular upper body clothing 2  -CS     Taking care of personal  grooming (such as brushing teeth) 3  -CS     Eating meals 1  -CS     AM-PAC 6 Clicks Score (OT) 10  -CS     Row Name 03/26/23 1420          Functional Assessment    Outcome Measure Options AM-PAC 6 Clicks Daily Activity (OT)  -CS           User Key  (r) = Recorded By, (t) = Taken By, (c) = Cosigned By    Initials Name Provider Type     Rosanna Sterling OT Occupational Therapist                Occupational Therapy Education     Title: PT OT SLP Therapies (In Progress)     Topic: Occupational Therapy (In Progress)     Point: ADL training (In Progress)     Description:   Instruct learner(s) on proper safety adaptation and remediation techniques during self care or transfers.   Instruct in proper use of assistive devices.              Learning Progress Summary           Patient Acceptance, E, NR by  at 3/26/2023 1421                   Point: Home exercise program (Not Started)     Description:   Instruct learner(s) on appropriate technique for monitoring, assisting and/or progressing therapeutic exercises/activities.              Learner Progress:  Not documented in this visit.          Point: Precautions (In Progress)     Description:   Instruct learner(s) on prescribed precautions during self-care and functional transfers.              Learning Progress Summary           Patient Acceptance, E, NR by  at 3/26/2023 1421                   Point: Body mechanics (In Progress)     Description:   Instruct learner(s) on proper positioning and spine alignment during self-care, functional mobility activities and/or exercises.              Learning Progress Summary           Patient Acceptance, E, NR by  at 3/26/2023 1421                               User Key     Initials Effective Dates Name Provider Type Discipline     09/02/21 -  Rosanna Sterling OT Occupational Therapist OT              OT Recommendation and Plan  Planned Therapy Interventions (OT): activity tolerance training, adaptive equipment training, BADL  retraining, functional balance retraining, occupation/activity based interventions, ROM/therapeutic exercise, strengthening exercise, transfer/mobility retraining  Therapy Frequency (OT): daily  Plan of Care Review  Plan of Care Reviewed With: patient, family  Progress: improving  Outcome Evaluation: OT eval complete. Pt presents w/ generalized weakness, balance deficits, and decreased endurance from baseline limiting functional independence. Recommend cont skilled IPOT POC to facilitate return to PLOF. Recommend pt DC to LTACH, pending medical needs.     Time Calculation:    Time Calculation- OT     Row Name 23 1421             Time Calculation- OT    OT Start Time 1305  -CS      OT Received On 23  -CS      OT Goal Re-Cert Due Date 23  -CS         Untimed Charges    OT Eval/Re-eval Minutes 46  -CS         Total Minutes    Untimed Charges Total Minutes 46  -CS       Total Minutes 46  -CS            User Key  (r) = Recorded By, (t) = Taken By, (c) = Cosigned By    Initials Name Provider Type    CS Rosanna Sterling OT Occupational Therapist              Therapy Charges for Today     Code Description Service Date Service Provider Modifiers Qty    62437153209 HC OT EVAL MOD COMPLEXITY 4 3/26/2023 Rosanna Sterling OT GO 1               Rosanna Sterling OT  3/26/2023    Electronically signed by Rosanna Sterling OT at 23 1422          Speech Language Pathology Notes (most recent note)      Shiloh Campo MA,CCC-SLP at 23 1529          Acute Care - Speech Language Pathology Initial Evaluation  [facility]   Trach/Speaking Valve Evaluation       Patient Name: Sandro BARRIENTOS Binford  : 1955  MRN: [id number]  Today's Date: 3/26/2023               Admit Date: 3/16/2023     Visit Dx:    ICD-10-CM ICD-9-CM   1. Respiratory distress  R06.03 786.09   2. Tachycardia  R00.0 785.0   3. Airway compromise  J98.8 519.8   4. Acute epiglottitis with airway obstruction - probable  J05.11 464.31   5.  Essential hypertension  I10 401.9   6. Voice disturbance  R49.9 784.40     Patient Active Problem List   Diagnosis   • Essential hypertension   • Chronic pain   • Osteoarthritis of knee   • Prolonged depressive adjustment reaction   • Acute prostatitis   • Atopic rhinitis   • Erectile dysfunction of nonorganic origin   • Glaucoma suspect   • Onychomycosis of toenail   • Malignant neoplasm of rectum (HCC)   • Seborrheic eczema   • Keloid scar   • Pain of right lower extremity   • Esophageal reflux   • Acute respiratory failure (HCC)   • Acute strep pyogenes (group A strep) epiglottitis with airway obstruction      Past Medical History:   Diagnosis Date   • Epidermal inclusion cyst    • Esophageal candidiasis (HCC)    • Keloid scar    • Rectal cancer (HCC)    • Seborrheic dermatitis      Past Surgical History:   Procedure Laterality Date   • COLECTOMY PARTIAL / TOTAL     • KNEE SURGERY      Left knee meniscal tear repair   • TOOTH EXTRACTION  2016 7 teeth   • TOTAL KNEE ARTHROPLASTY Right 04/11/2016   • TRACHEOSTOMY AND PEG TUBE INSERTION N/A 3/23/2023    Procedure: TRACHEOSTOMY AND PERCUTANEOUS ENDOSCOPIC GASTROSTOMY TUBE INSERTION;  Surgeon: Néstor Jerome MD;  Location: [facility];  Service: General;  Laterality: N/A;       SLP Recommendation and Plan  SLP Diagnosis: difficulty voicing due to tracheostomy (03/26/23 1445)           [facility] Criteria for Skilled Therapy Interventions Met: yes (03/26/23 1445)     Demonstrates Need for Referral to Another Service: speech therapy (dysphagia evaluation per MD discretion) (03/26/23 1445)     Predicted Duration Therapy Intervention (Days): until discharge (03/26/23 1445)                          Plan of Care Reviewed With: patient, family (03/26/23 1528)      SLP EVALUATION (last 72 hours)     SLP [facility] Evaluation     Row Name 03/26/23 1445                   Communication Assessment/Intervention    Document Type evaluation  -VO        Subjective Information no complaints   -VO        Patient Observations alert;cooperative  -VO        Patient/Family/Caregiver Comments/Observations family at bedside  -VO        Patient Effort good  -VO           General Information    Patient Profile Reviewed yes  -VO        Pertinent History Of Current Problem adm epiglottitis 2' strep requiring intubation for airway protection and resulting in trach/PEG placement w/ cuffed shiley 8  -VO        Precautions/Limitations, Vision WFL with corrective lenses;for purposes of eval  -VO        Precautions/Limitations, Hearing WFL;for purposes of eval  -VO        Prior Level of Function-Communication WFL  -VO        Plans/Goals Discussed with patient;family  -VO        Barriers to Rehab none identified  -VO           Pain Scale: Numbers Pre/Post-Treatment    Pretreatment Pain Rating 0/10 - no pain  -VO        Posttreatment Pain Rating 0/10 - no pain  -VO           Speaking Valve    Respiratory Status trach collar  -VO        Pretreatment Heart Rate (beats/min) 56  -VO        Pre SpO2 (%) 95  -VO        Pretreatment Cuff Status Inflated  -VO        Trach Type Shiley;size 8  -VO        Types of Intervention tracheostomy speaking valve  -VO        Speaking Valve Type PMV-2000 (clear)  -VO        Phonation with Occlusion speaking valve;audible at 1 foot;audible at 3 feet  -VO        Vocal Quality on Phonation breathy;wet  intermittently  -VO        Secretion Description thin;clear  -VO        Response to Intervention tolerated for extended period;cough;speaking valve  intermittent cough  -VO        Minutes Tolerated 15-20 minutes  -VO        Posttreatment Heart Rate (beats/min) 60  -VO        Post SpO2 (%) 97  -VO        Posttreatment Cuff Status Deflated  -VO        At Conclusion speaking valve on;cuff remained deflated;notified RN/LPN  pt speaking with family  -VO        Speaking Valve Placement Recommendation SLP, RT and RN/LPN only  -VO        Speaking Valve, Comment Pt tolerated PMV placement for extended  period and was able to achieve audible phonation. Decreased intelligiblity w/ longer phrases and sentences. Slight coughing and suctioning of secretions throughout. Will continue to follow for PMV use.  -VO           SLP Evaluation Clinical Impressions    SLP Diagnosis difficulty voicing due to tracheostomy  -VO        Rehab Potential/Prognosis good  -VO        [facility] Criteria for Skilled Therapy Interventions Met yes  -VO        Functional Impact difficulty communicating wants, needs;difficulty communicating in an emergency;difficulty in expressing complex messages  -VO           Recommendations    Therapy Frequency (SLP SLC) 5 days per week  -VO        Predicted Duration Therapy Intervention (Days) until discharge  -VO        Demonstrates Need for Referral to Another Service speech therapy  dysphagia evaluation per MD discretion  -VO              User Key  (r) = Recorded By, (t) = Taken By, (c) = Cosigned By    Initials Name Effective Dates    VO Shiloh Campo MA,CCC-SLP 06/16/21 -                    EDUCATION  The patient has been educated in the following areas:     Speaking Valve.           SLP GOALS     Row Name 03/26/23 7132             Patient will demonstrate functional communication skills for return to discharge environment     [place] with minimal cues  -VO      Time frame by discharge  -VO      Progress/Outcomes new goal  -VO         Tolerate Speaking Valve Placement Goal 1 (SLP)    Tolerate Speaking Valve Placement Goal 1 (SLP) speaking valve >30 min;90%;independently (over 90% accuracy)  -VO      Time Frame (Tolerate Speaking Valve Placement Goal 1, SLP) short term goal (STG)  -VO      Progress/Outcomes (Tolerate Speaking Valve Placement Goal 1, SLP) new goal  -VO         Audible Speech with Speaking Valve Goal 1 (SLP)    Audible Speech Goal 1 (SLP) 3 feet;background noise;90%;with minimal cues (75-90%)  -VO      Time Frame (Audible Speech Goal 1, SLP) short term goal (STG)  -VO       Progress/Outcomes (Audible Speech Goal 1, SLP) new goal  -VO            User Key  (r) = Recorded By, (t) = Taken By, (c) = Cosigned By    Initials Name Provider Type    VO Shiloh Campo MA,CCC-SLP Speech and Language Pathologist                        Time Calculation:      Time Calculation- SLP     Row Name 03/26/23 1527             Time Calculation- SLP    SLP Start Time 1445  -VO      SLP Received On 03/26/23  -VO         Untimed Charges    31409-IR Eval Speech and Production w/ Language Minutes 45  -VO         Total Minutes    Untimed Charges Total Minutes 45  -VO       Total Minutes 45  -VO            User Key  (r) = Recorded By, (t) = Taken By, (c) = Cosigned By    Initials Name Provider Type    VO Shiloh Campo MA,CCC-SLP Speech and Language Pathologist                Therapy Charges for Today     Code Description Service Date Service Provider Modifiers Qty    50757516932 HC ST EVALUATION FIT VOICE PROSTH 3 3/26/2023 Shiloh Campo MA,CCC-SLP  1    19135677619 HC VALVE TRACH PMV SERIES 3/26/2023 Shiloh Campo MA,CCC-SLP  1                     Shiloh Campo MA,CCC-SLP  3/26/2023    Electronically signed by Shiloh Campo MA,CCC-SLP at 03/26/23 1520

## 2023-03-27 NOTE — PLAN OF CARE
Goal Outcome Evaluation:  Plan of Care Reviewed With: (P) patient        Progress: (P) improving  Outcome Evaluation: (P) Pt ambulated up to 6' with BUE support and mod Ax2 showing limitations of balance deficits and generalized weakness. Rec skilled PT to increase ind with mobility and d/c to LTACH.

## 2023-03-28 LAB
ANION GAP SERPL CALCULATED.3IONS-SCNC: 9 MMOL/L (ref 5–15)
ARTERIAL PATENCY WRIST A: ABNORMAL
ATMOSPHERIC PRESS: ABNORMAL MM[HG]
BASE EXCESS BLDA CALC-SCNC: 2.3 MMOL/L (ref 0–2)
BASOPHILS # BLD AUTO: 0.07 10*3/MM3 (ref 0–0.2)
BASOPHILS NFR BLD AUTO: 0.7 % (ref 0–1.5)
BDY SITE: ABNORMAL
BODY TEMPERATURE: 37 C
BUN SERPL-MCNC: 14 MG/DL (ref 8–23)
BUN/CREAT SERPL: 23 (ref 7–25)
CALCIUM SPEC-SCNC: 8.7 MG/DL (ref 8.6–10.5)
CHLORIDE SERPL-SCNC: 102 MMOL/L (ref 98–107)
CO2 BLDA-SCNC: 26 MMOL/L (ref 22–33)
CO2 SERPL-SCNC: 21 MMOL/L (ref 22–29)
COHGB MFR BLD: 1.2 % (ref 0–2)
CREAT SERPL-MCNC: 0.61 MG/DL (ref 0.76–1.27)
DEPRECATED RDW RBC AUTO: 47.1 FL (ref 37–54)
EGFRCR SERPLBLD CKD-EPI 2021: 105.9 ML/MIN/1.73
EOSINOPHIL # BLD AUTO: 0.08 10*3/MM3 (ref 0–0.4)
EOSINOPHIL NFR BLD AUTO: 0.9 % (ref 0.3–6.2)
EPAP: 0
ERYTHROCYTE [DISTWIDTH] IN BLOOD BY AUTOMATED COUNT: 13.2 % (ref 12.3–15.4)
GLUCOSE BLDC GLUCOMTR-MCNC: 119 MG/DL (ref 70–130)
GLUCOSE BLDC GLUCOMTR-MCNC: 133 MG/DL (ref 70–130)
GLUCOSE BLDC GLUCOMTR-MCNC: 139 MG/DL (ref 70–130)
GLUCOSE SERPL-MCNC: 140 MG/DL (ref 65–99)
HCO3 BLDA-SCNC: 25 MMOL/L (ref 20–26)
HCT VFR BLD AUTO: 36.8 % (ref 37.5–51)
HCT VFR BLD CALC: 37.3 % (ref 38–51)
HGB BLD-MCNC: 12 G/DL (ref 13–17.7)
HGB BLDA-MCNC: 12.2 G/DL (ref 13.5–17.5)
IMM GRANULOCYTES # BLD AUTO: 0.05 10*3/MM3 (ref 0–0.05)
IMM GRANULOCYTES NFR BLD AUTO: 0.5 % (ref 0–0.5)
INHALED O2 CONCENTRATION: 30 %
IPAP: 0
LYMPHOCYTES # BLD AUTO: 2.61 10*3/MM3 (ref 0.7–3.1)
LYMPHOCYTES NFR BLD AUTO: 27.8 % (ref 19.6–45.3)
MAGNESIUM SERPL-MCNC: 2.5 MG/DL (ref 1.6–2.4)
MCH RBC QN AUTO: 32.1 PG (ref 26.6–33)
MCHC RBC AUTO-ENTMCNC: 32.6 G/DL (ref 31.5–35.7)
MCV RBC AUTO: 98.4 FL (ref 79–97)
METHGB BLD QL: 0.5 % (ref 0–1.5)
MODALITY: ABNORMAL
MONOCYTES # BLD AUTO: 0.98 10*3/MM3 (ref 0.1–0.9)
MONOCYTES NFR BLD AUTO: 10.4 % (ref 5–12)
NEUTROPHILS NFR BLD AUTO: 5.6 10*3/MM3 (ref 1.7–7)
NEUTROPHILS NFR BLD AUTO: 59.7 % (ref 42.7–76)
NOTE: ABNORMAL
NRBC BLD AUTO-RTO: 0 /100 WBC (ref 0–0.2)
OXYHGB MFR BLDV: 96.9 % (ref 94–99)
PAW @ PEAK INSP FLOW SETTING VENT: 0 CMH2O
PCO2 BLDA: 32 MM HG (ref 35–45)
PCO2 TEMP ADJ BLD: 32 MM HG (ref 35–48)
PEEP RESPIRATORY: 6 CM[H2O]
PH BLDA: 7.5 PH UNITS (ref 7.35–7.45)
PH, TEMP CORRECTED: 7.5 PH UNITS
PHOSPHATE SERPL-MCNC: 2.9 MG/DL (ref 2.5–4.5)
PLATELET # BLD AUTO: 341 10*3/MM3 (ref 140–450)
PMV BLD AUTO: 9.6 FL (ref 6–12)
PO2 BLDA: 100 MM HG (ref 83–108)
PO2 TEMP ADJ BLD: 100 MM HG (ref 83–108)
POTASSIUM SERPL-SCNC: 4.1 MMOL/L (ref 3.5–5.2)
QT INTERVAL: 306 MS
QTC INTERVAL: 380 MS
RBC # BLD AUTO: 3.74 10*6/MM3 (ref 4.14–5.8)
SODIUM SERPL-SCNC: 132 MMOL/L (ref 136–145)
TOTAL RATE: 12 BREATHS/MINUTE
VENTILATOR MODE: ABNORMAL
VT ON VENT VENT: 0.6 ML
WBC NRBC COR # BLD: 9.39 10*3/MM3 (ref 3.4–10.8)

## 2023-03-28 PROCEDURE — 25010000002 HYDRALAZINE PER 20 MG: Performed by: SURGERY

## 2023-03-28 PROCEDURE — 85025 COMPLETE CBC W/AUTO DIFF WBC: CPT | Performed by: INTERNAL MEDICINE

## 2023-03-28 PROCEDURE — 25010000002 CEFTRIAXONE PER 250 MG: Performed by: INTERNAL MEDICINE

## 2023-03-28 PROCEDURE — 25010000002 HALOPERIDOL LACTATE PER 5 MG: Performed by: NURSE PRACTITIONER

## 2023-03-28 PROCEDURE — 82805 BLOOD GASES W/O2 SATURATION: CPT

## 2023-03-28 PROCEDURE — 25010000002 LORAZEPAM PER 2 MG: Performed by: NURSE PRACTITIONER

## 2023-03-28 PROCEDURE — 94003 VENT MGMT INPAT SUBQ DAY: CPT

## 2023-03-28 PROCEDURE — 93005 ELECTROCARDIOGRAM TRACING: CPT | Performed by: INTERNAL MEDICINE

## 2023-03-28 PROCEDURE — 83735 ASSAY OF MAGNESIUM: CPT | Performed by: NURSE PRACTITIONER

## 2023-03-28 PROCEDURE — 82375 ASSAY CARBOXYHB QUANT: CPT

## 2023-03-28 PROCEDURE — 25010000002 HEPARIN (PORCINE) PER 1000 UNITS: Performed by: SURGERY

## 2023-03-28 PROCEDURE — 92507 TX SP LANG VOICE COMM INDIV: CPT

## 2023-03-28 PROCEDURE — 82962 GLUCOSE BLOOD TEST: CPT

## 2023-03-28 PROCEDURE — 80048 BASIC METABOLIC PNL TOTAL CA: CPT | Performed by: INTERNAL MEDICINE

## 2023-03-28 PROCEDURE — 94799 UNLISTED PULMONARY SVC/PX: CPT

## 2023-03-28 PROCEDURE — 83050 HGB METHEMOGLOBIN QUAN: CPT

## 2023-03-28 PROCEDURE — 84100 ASSAY OF PHOSPHORUS: CPT | Performed by: INTERNAL MEDICINE

## 2023-03-28 PROCEDURE — 99233 SBSQ HOSP IP/OBS HIGH 50: CPT | Performed by: INTERNAL MEDICINE

## 2023-03-28 PROCEDURE — 97530 THERAPEUTIC ACTIVITIES: CPT

## 2023-03-28 PROCEDURE — 94761 N-INVAS EAR/PLS OXIMETRY MLT: CPT

## 2023-03-28 PROCEDURE — 25010000002 HALOPERIDOL LACTATE PER 5 MG

## 2023-03-28 PROCEDURE — 36600 WITHDRAWAL OF ARTERIAL BLOOD: CPT

## 2023-03-28 PROCEDURE — 93010 ELECTROCARDIOGRAM REPORT: CPT | Performed by: INTERNAL MEDICINE

## 2023-03-28 RX ORDER — HALOPERIDOL 5 MG/ML
5 INJECTION INTRAMUSCULAR ONCE
Status: COMPLETED | OUTPATIENT
Start: 2023-03-28 | End: 2023-03-28

## 2023-03-28 RX ORDER — LORAZEPAM 2 MG/ML
1 INJECTION INTRAMUSCULAR EVERY 4 HOURS PRN
Status: DISCONTINUED | OUTPATIENT
Start: 2023-03-28 | End: 2023-03-29

## 2023-03-28 RX ORDER — NICOTINE POLACRILEX 4 MG
15 LOZENGE BUCCAL
Status: DISCONTINUED | OUTPATIENT
Start: 2023-03-28 | End: 2023-03-31

## 2023-03-28 RX ORDER — VALSARTAN 160 MG/1
160 TABLET ORAL
Status: DISCONTINUED | OUTPATIENT
Start: 2023-03-29 | End: 2023-03-31

## 2023-03-28 RX ORDER — DEXTROSE MONOHYDRATE 25 G/50ML
25 INJECTION, SOLUTION INTRAVENOUS
Status: DISCONTINUED | OUTPATIENT
Start: 2023-03-28 | End: 2023-04-11 | Stop reason: HOSPADM

## 2023-03-28 RX ORDER — IBUPROFEN 600 MG/1
1 TABLET ORAL
Status: DISCONTINUED | OUTPATIENT
Start: 2023-03-28 | End: 2023-04-11 | Stop reason: HOSPADM

## 2023-03-28 RX ORDER — RISPERIDONE 1 MG/1
1 TABLET ORAL NIGHTLY
Status: DISCONTINUED | OUTPATIENT
Start: 2023-03-28 | End: 2023-04-11 | Stop reason: HOSPADM

## 2023-03-28 RX ADMIN — LORAZEPAM 1 MG: 2 INJECTION INTRAMUSCULAR; INTRAVENOUS at 02:36

## 2023-03-28 RX ADMIN — HALOPERIDOL LACTATE 5 MG: 5 INJECTION, SOLUTION INTRAMUSCULAR at 14:24

## 2023-03-28 RX ADMIN — POLYETHYLENE GLYCOL 3350 17 G: 17 POWDER, FOR SOLUTION ORAL at 09:40

## 2023-03-28 RX ADMIN — CHLORHEXIDINE GLUCONATE 0.12% ORAL RINSE 15 ML: 1.2 LIQUID ORAL at 09:40

## 2023-03-28 RX ADMIN — LATANOPROST 1 DROP: 50 SOLUTION OPHTHALMIC at 09:40

## 2023-03-28 RX ADMIN — PANTOPRAZOLE SODIUM 40 MG: 40 INJECTION, POWDER, LYOPHILIZED, FOR SOLUTION INTRAVENOUS at 06:19

## 2023-03-28 RX ADMIN — HEPARIN SODIUM 5000 UNITS: 5000 INJECTION, SOLUTION INTRAVENOUS; SUBCUTANEOUS at 14:28

## 2023-03-28 RX ADMIN — HYDRALAZINE HYDROCHLORIDE 20 MG: 20 INJECTION INTRAMUSCULAR; INTRAVENOUS at 18:30

## 2023-03-28 RX ADMIN — RISPERIDONE 1 MG: 1 TABLET ORAL at 20:59

## 2023-03-28 RX ADMIN — ALPRAZOLAM 0.25 MG: 0.25 TABLET ORAL at 17:14

## 2023-03-28 RX ADMIN — ALPRAZOLAM 0.25 MG: 0.25 TABLET ORAL at 00:15

## 2023-03-28 RX ADMIN — CHLORHEXIDINE GLUCONATE 0.12% ORAL RINSE 15 ML: 1.2 LIQUID ORAL at 20:59

## 2023-03-28 RX ADMIN — HALOPERIDOL LACTATE 5 MG: 5 INJECTION, SOLUTION INTRAMUSCULAR at 05:55

## 2023-03-28 RX ADMIN — Medication 30 ML: at 10:07

## 2023-03-28 RX ADMIN — SODIUM CHLORIDE 2 G: 900 INJECTION INTRAVENOUS at 09:40

## 2023-03-28 RX ADMIN — SENNOSIDES AND DOCUSATE SODIUM 2 TABLET: 8.6; 5 TABLET ORAL at 09:40

## 2023-03-28 RX ADMIN — Medication 10 ML: at 09:41

## 2023-03-28 RX ADMIN — HYDRALAZINE HYDROCHLORIDE 20 MG: 20 INJECTION INTRAMUSCULAR; INTRAVENOUS at 09:49

## 2023-03-28 RX ADMIN — METRONIDAZOLE 500 MG: 500 INJECTION, SOLUTION INTRAVENOUS at 03:15

## 2023-03-28 RX ADMIN — ACETAMINOPHEN 650 MG: 650 SOLUTION ORAL at 17:14

## 2023-03-28 RX ADMIN — METRONIDAZOLE 500 MG: 500 INJECTION, SOLUTION INTRAVENOUS at 14:32

## 2023-03-28 RX ADMIN — HEPARIN SODIUM 5000 UNITS: 5000 INJECTION, SOLUTION INTRAVENOUS; SUBCUTANEOUS at 21:48

## 2023-03-28 RX ADMIN — HYDRALAZINE HYDROCHLORIDE 20 MG: 20 INJECTION INTRAMUSCULAR; INTRAVENOUS at 14:31

## 2023-03-28 RX ADMIN — HYDRALAZINE HYDROCHLORIDE 20 MG: 20 INJECTION INTRAMUSCULAR; INTRAVENOUS at 02:32

## 2023-03-28 RX ADMIN — ACETAMINOPHEN 650 MG: 650 SOLUTION ORAL at 09:40

## 2023-03-28 RX ADMIN — HEPARIN SODIUM 5000 UNITS: 5000 INJECTION, SOLUTION INTRAVENOUS; SUBCUTANEOUS at 06:19

## 2023-03-28 RX ADMIN — Medication 10 ML: at 20:59

## 2023-03-28 RX ADMIN — ALPRAZOLAM 0.25 MG: 0.25 TABLET ORAL at 09:40

## 2023-03-28 RX ADMIN — VALSARTAN 80 MG: 80 TABLET, FILM COATED ORAL at 09:40

## 2023-03-28 RX ADMIN — METRONIDAZOLE 500 MG: 500 INJECTION, SOLUTION INTRAVENOUS at 09:40

## 2023-03-28 RX ADMIN — METRONIDAZOLE 500 MG: 500 INJECTION, SOLUTION INTRAVENOUS at 20:59

## 2023-03-28 NOTE — THERAPY TREATMENT NOTE
Patient Name: Sandro Junior  : 1955    MRN: [id number]                              Today's Date: 3/28/2023       Admit Date: 3/16/2023    Visit Dx:     ICD-10-CM ICD-9-CM   1. Respiratory distress  R06.03 786.09   2. Tachycardia  R00.0 785.0   3. Airway compromise  J98.8 519.8   4. Acute epiglottitis with airway obstruction - probable  J05.11 464.31   5. Essential hypertension  I10 401.9   6. Voice disturbance  R49.9 784.40     Patient Active Problem List   Diagnosis   • Essential hypertension   • Chronic pain   • Osteoarthritis of knee   • Prolonged depressive adjustment reaction   • Acute prostatitis   • Atopic rhinitis   • Erectile dysfunction of nonorganic origin   • Glaucoma suspect   • Onychomycosis of toenail   • Malignant neoplasm of rectum (HCC)   • Seborrheic eczema   • Keloid scar   • Pain of right lower extremity   • Esophageal reflux   • Acute respiratory failure (HCC)   • Acute strep pyogenes (group A strep) epiglottitis with airway obstruction      Past Medical History:   Diagnosis Date   • Epidermal inclusion cyst    • Esophageal candidiasis (HCC)    • Keloid scar    • Rectal cancer (HCC)    • Seborrheic dermatitis      Past Surgical History:   Procedure Laterality Date   • COLECTOMY PARTIAL / TOTAL     • KNEE SURGERY      Left knee meniscal tear repair   • TOOTH EXTRACTION  2016 teeth   • TOTAL KNEE ARTHROPLASTY Right 2016   • TRACHEOSTOMY AND PEG TUBE INSERTION N/A 3/23/2023    Procedure: TRACHEOSTOMY AND PERCUTANEOUS ENDOSCOPIC GASTROSTOMY TUBE INSERTION;  Surgeon: Néstor Jerome MD;  Location: [facility];  Service: General;  Laterality: N/A;      General Information     Row Name 23 1447          Physical Therapy Time and Intention    Document Type therapy note (daily note) (P)   -HT     Mode of Treatment physical therapy (P)   -HT     Row Name 23 1447          General Information    Patient Profile Reviewed yes (P)   -HT     Existing Precautions/Restrictions  fall;oxygen therapy device and L/min;other (see comments) (P)   trach/PEG  -HT     Barriers to Rehab medically complex (P)   -HT     Row Name 03/28/23 1447          Cognition    Orientation Status (Cognition) unable/difficult to assess;other (see comments) (P)   pt very lethargic this session and did not respond to orientation questions.  -HT     Row Name 03/28/23 1447          Safety Issues, Functional Mobility    Safety Issues Affecting Function (Mobility) safety precaution awareness;safety precautions follow-through/compliance;sequencing abilities;judgment;insight into deficits/self-awareness;awareness of need for assistance (P)   -HT     Impairments Affecting Function (Mobility) balance;endurance/activity tolerance;strength;postural/trunk control;shortness of breath (P)   -HT           User Key  (r) = Recorded By, (t) = Taken By, (c) = Cosigned By    Initials Name Provider Type    HT Chela Barron, PT Student PT Student               Mobility     Row Name 03/28/23 1448          Bed Mobility    Bed Mobility supine-sit (P)   -HT     Supine-Sit [place] (Bed Mobility) 2 person assist;moderate assist (50% patient effort) (P)   -HT     Assistive Device (Bed Mobility) bed rails;draw sheet;head of bed elevated (P)   -HT     Comment, (Bed Mobility) required cues for sequencing and assistance with LEs and raising trunk. (P)   -HT     Row Name 03/28/23 1448          Transfers    Comment, (Transfers) pt required max encouragement to mobilize this session and refused further mobility beyond EOB. (P)   -HT     Row Name 03/28/23 1448          Bed-Chair Transfer    Bed-Chair [place] (Transfers) not tested (P)   -HT     Row Name 03/28/23 1448          Sit-Stand Transfer    Sit-Stand [place] (Transfers) not tested (P)   -HT     Row Name 03/28/23 1448          Gait/Stairs (Locomotion)    [place] Level (Gait) not tested (P)   -HT           User Key  (r) = Recorded By, (t) = Taken By, (c) = Cosigned By     Initials Name Provider Type     Chela Barron, PT Student PT Student               Obj/Interventions     Row Name 03/28/23 1450          Motor Skills    Therapeutic Exercise knee;ankle (P)   -HT     Row Name 03/28/23 1450          Knee (Therapeutic Exercise)    Knee (Therapeutic Exercise) AAROM (active assistive range of motion) (P)   -HT     Knee AAROM (Therapeutic Exercise) bilateral;flexion;extension;5 repetitions;sitting (P)   -HT     Row Name 03/28/23 1450          Ankle (Therapeutic Exercise)    Ankle (Therapeutic Exercise) AAROM (active assistive range of motion) (P)   -HT     Ankle AAROM (Therapeutic Exercise) dorsiflexion;bilateral;5 repetitions (P)   -HT     Row Name 03/28/23 1450          Balance    Balance Assessment sitting static balance;sitting dynamic balance (P)   -HT     Static Sitting Balance contact guard (P)   -HT     Dynamic Sitting Balance minimal assist (P)   -HT     Position, Sitting Balance supported;sitting edge of bed (P)   -HT     Balance Interventions sitting;dynamic reaching (P)   -HT     Comment, Balance performed 4-5 reps of reaching each UE but pt showed minimal UE movement and would only reach short distances. Sat EOB for ~5-7 minutes without LOB. (P)   -HT           User Key  (r) = Recorded By, (t) = Taken By, (c) = Cosigned By    Initials Name Provider Type     Chela Barron, PT Student PT Student               Goals/Plan    No documentation.                Clinical Impression     Row Name 03/28/23 145          Pain    Additional Documentation Pain Scale: FACES Pre/Post-Treatment (Group) (P)   -HT     [facility] Name 03/28/23 1457          Pain Scale: FACES Pre/Post-Treatment    Pain: FACES Scale, Pretreatment 0-->no hurt (P)   -HT     Posttreatment Pain Rating 0-->no hurt (P)   -HT     Pre/Posttreatment Pain Comment pt demonsrated general discomfort and feeling unwell but did not report pain. (P)   -HT     Row Name 03/28/23 8050          Plan of Care Review    Plan of Care  Reviewed With patient (P)   -HT     Progress no change (P)   -HT     Outcome Evaluation Pt performed bed mobility with mod Ax2 showing limitations of generalized weakness and poor endurance. Rec skilled PT to increase ind with mobility and d/c to LTACH. (P)   -HT     Row Name 03/28/23 1458          Therapy Assessment/Plan (PT)    Criteria for Skilled Interventions Met (PT) yes;skilled treatment is necessary;meets criteria (P)   -HT     Row Name 03/28/23 145          Vital Signs    Pre Systolic BP Rehab 156 (P)   -HT     Pre Treatment Diastolic BP 86 (P)   -HT     Post Systolic BP Rehab 172 (P)   -HT     Post Treatment Diastolic BP 89 (P)   -HT     Pretreatment Heart Rate (beats/min) 82 (P)   -HT     Posttreatment Heart Rate (beats/min) 86 (P)   -HT     Pre SpO2 (%) 97 (P)   -HT     O2 Delivery Pre Treatment trach collar (P)   -HT     O2 Delivery Intra Treatment trach collar (P)   -HT     Post SpO2 (%) 97 (P)   -HT     O2 Delivery Post Treatment trach collar (P)   -HT     Pre Patient Position Supine (P)   -HT     Intra Patient Position Sitting (P)   -HT     Post Patient Position Supine (P)   -HT     Row Name 03/28/23 1458          Positioning and Restraints    Pre-Treatment Position in bed (P)   -HT     Post Treatment Position bed (P)   -HT     In Bed notified nsg;fowlers;exit alarm on;encouraged to call for assist;call light within reach;RUE elevated;LUE elevated (P)   -HT           User Key  (r) = Recorded By, (t) = Taken By, (c) = Cosigned By    Initials Name Provider Type    HT Chela Barron, PT Student PT Student               Outcome Measures     Row Name 03/28/23 1456          How much help from another person do you currently need...    Turning from your back to your side while in flat bed without using bedrails? 2 (P)   -HT     Moving from lying on back to sitting on the side of a flat bed without bedrails? 2 (P)   -HT     Moving to and from a bed to a chair (including a wheelchair)? 2 (P)   -HT      Standing up from a chair using your arms (e.g., wheelchair, bedside chair)? 2 (P)   -HT     Climbing 3-5 steps with a railing? 1 (P)   -HT     To walk in hospital room? 2 (P)   -HT     AM-PAC 6 Clicks Score (PT) 11 (P)   -HT     Highest level of mobility 4 --> Transferred to chair/commode (P)   -HT     Row Name 03/28/23 1457          Functional Assessment    Outcome Measure Options AM-PAC 6 Clicks Basic Mobility (PT) (P)   -HT           User Key  (r) = Recorded By, (t) = Taken By, (c) = Cosigned By    Initials Name Provider Type    HT Chela Barron, PT Student PT Student                             Physical Therapy Education     Title: PT OT SLP Therapies (Done)     Topic: Physical Therapy (Done)     Point: Mobility training (Done)     Learning Progress Summary           Patient Acceptance, E, DU,NR by HT at 3/28/2023 1458    Acceptance, E, VU by HT at 3/27/2023 1026    Acceptance, E, VU by EL at 3/26/2023 1707    Acceptance, E,D, VU,NR by  at 3/26/2023 1429                   Point: Home exercise program (Done)     Learning Progress Summary           Patient Acceptance, E, DU,NR by HT at 3/28/2023 1458    Acceptance, E, VU by HT at 3/27/2023 1026    Acceptance, E, VU by EL at 3/26/2023 1707                   Point: Body mechanics (Done)     Learning Progress Summary           Patient Acceptance, E, DU,NR by HT at 3/28/2023 1458    Acceptance, E, VU by HT at 3/27/2023 1026    Acceptance, E, VU by EL at 3/26/2023 1707    Acceptance, E,D, VU,NR by LS at 3/26/2023 1429                   Point: Precautions (Done)     Learning Progress Summary           Patient Acceptance, E, DU,NR by HT at 3/28/2023 1458    Acceptance, E, VU by HT at 3/27/2023 1026    Acceptance, E, VU by EL at 3/26/2023 1707    Acceptance, E,D, VU,NR by LS at 3/26/2023 1429                               User Key     Initials Effective Dates Name Provider Type [facility] 02/03/23 -  Norma Villegas, PT Physical Therapist PT    EL 06/16/21 -   Giana Yates, RN Registered Nurse Nurse     01/12/23 -  Chela Barron, PT Student PT Student PT              PT Recommendation and Plan     Plan of Care Reviewed With: (P) patient  Progress: (P) no change  Outcome Evaluation: (P) Pt performed bed mobility with mod Ax2 showing limitations of generalized weakness and poor endurance. Rec skilled PT to increase ind with mobility and d/c to LTACH.     Time Calculation:    PT Charges     Row Name 03/28/23 1458             Time Calculation    Start Time 1313 (P)   -HT      PT Received On 03/28/23 (P)   -HT         Timed Charges    46964 - PT Therapeutic Activity Minutes 18 (P)   -HT         Total Minutes    Timed Charges Total Minutes 18 (P)   -HT       Total Minutes 18 (P)   -HT            User Key  (r) = Recorded By, (t) = Taken By, (c) = Cosigned By    Initials Name Provider Type     Chela Barron, PT Student PT Student              Therapy Charges for Today     Code Description Service Date Service Provider Modifiers Qty    56300781935 HC PT THERAPEUTIC ACT EA 15 MIN 3/27/2023 Chela Barron, PT Student GP 2    01918374495 HC PT THERAPEUTIC ACT EA 15 MIN 3/28/2023 Chela Barron, PT Student GP 1          PT G-Codes  Outcome Measure Options: (P) AM-PAC 6 Clicks Basic Mobility (PT)  AM-PAC 6 Clicks Score (PT): (P) 11  AM-PAC 6 Clicks Score (OT): 10       Chela Barron PT Student  3/28/2023

## 2023-03-28 NOTE — PROGRESS NOTES
Clinical Nutrition     Nutrition Support Assessment  Reason for Visit: MDR, Follow-up protocol, EN      Patient Name: Sandro Junior  YOB: 1955  MRN: [id number]  Date of Encounter: 03/28/23 11:11 EDT  Admission date: 3/16/2023    Nutrition Assessment     Problem List:    Acute strep pyogenes (group A strep) epiglottitis with airway obstruction     Essential hypertension    Acute respiratory failure (HCC)    ARF/Intubated 3-16-23      PMH:   He  has a past medical history of Epidermal inclusion cyst, Esophageal candidiasis (HCC), Keloid scar, Rectal cancer (HCC), and Seborrheic dermatitis.    PSH:  He  has a past surgical history that includes Knee surgery; Colectomy partial / total; Total knee arthroplasty (Right, 04/11/2016); Tooth extraction (2016); and tracheostomy and peg tube insertion (N/A, 3/23/2023).      Applicable Nutrition Concerns:   Skin: surgical sites  GI:abdomen taut, last bm 3-26      Applicable Interval History:   (3/16) intubated, large bore NG tube placed  (3/17) EN initiated  (3/20) s/p bronchoscopy - airway edema  (3-23) s/p Trach, PEG      Reported/Observed/Food/Nutrition Related History:     Pt resting in bed, on TC trial at present, not alert    Per RN: pt became agitated yesterday at 6:15pm, was hypertensive, tachycardic, placed back on vent, had coughing spell, and started vomiting, this am pt currently tolerating TF    Labs    Labs Reviewed: Yes     Results from last 7 days   Lab Units 03/28/23  0823 03/27/23  0506 03/26/23  1206 03/26/23  0324 03/25/23  0425   GLUCOSE mg/dL 140* 141*  --  174* 194*   BUN mg/dL 14 17  --  19 19   CREATININE mg/dL 0.61* 0.68*  --  0.55* 0.56*   SODIUM mmol/L 132* 134*  --  134* 136   CHLORIDE mmol/L 102 100  --  102 102   POTASSIUM mmol/L 4.1 4.0  --  4.2 4.3   PHOSPHORUS mg/dL 2.9 2.6 2.1* 2.0*  2.0* 2.4*   MAGNESIUM mg/dL 2.5* 1.7  1.7  --  1.9 1.8   ALT (SGPT) U/L  --  17  --   --  22       Results from last 7 days    Lab Units 03/27/23  0506 03/26/23  0324 03/25/23  0425   ALBUMIN g/dL 3.2* 3.1* 3.2*   PREALBUMIN mg/dL 22.0  --   --    CRP mg/dL 1.09* 1.54* 2.86*   CHOLESTEROL mg/dL 118  --   --    TRIGLYCERIDES mg/dL 107  --   --        Results from last 7 days   Lab Units 03/27/23  1708 03/27/23  1102 03/27/23  0459 03/26/23  2359 03/26/23  1756 03/26/23  1137   GLUCOSE mg/dL 133* 139* 145* 150* 131* 144*     Lab Results   Lab Value Date/Time    HGBA1C 5.2 10/20/2020 0841                 Medications    Medications Reviewed: Yes  Abx, heparin, flagyl, protonix, bowel regimem      Intake/Ouptut 24 hrs (0701 - 0700)   I&O's Reviewed: Yes     1445/ 1700    Anthropometrics       Height: 73in  Weight: 171lb bedscale  BMI: 22.6  BMI classification: Normal: 18.5-24.9kg/m2      UBW:  Last 15 Recorded Weights  View Complete Flowsheet  Weight Weight (kg) Weight (lbs) Weight Method VISIT REPORT   3/17/2023 77.9 kg 171 lb 11.8 oz - -   3/16/2023 74.5 kg 164 lb 3.9 oz Bed scale -   3/16/2023 79.379 kg 175 lb Estimated -   4/5/2022 80.559 kg 177 lb 9.6 oz - Report   1/10/2022 80.196 kg 176 lb 12.8 oz - Report   12/9/2021 79.652 kg 175 lb 9.6 oz - Report   11/13/2021 84.823 kg 187 lb Stated -   3/22/2021 83.008 kg 183 lb - Report   11/19/2020 83.643 kg 184 lb 6.4 oz - Report   10/20/2020 82.918 kg 182 lb 12.8 oz - Report   9/21/2020 84.188 kg 185 lb 9.6 oz - Report   3/16/2020 85.639 kg 188 lb 12.8 oz - Report   2/7/2020 84.823 kg 187 lb - Report   7/16/2019 81.738 kg 180 lb 3.2 oz - Report   1/15/2019 83.915 kg 185 lb - Report       Needs Assessment   Date: 3/22    Height used:73in  Weights used:171lb    Estimated Calorie needs: ~1800 calories   Method:  PSU:1773kcal  Method: MSJ x 1,2: 1937kcal  Method: 25 Kcals/KG actual wt = 1943kcal    Estimated Protein needs: ~117 g protein daily  Method: 1.2-1.5 g/Kg actual wt =       Current Nutrition Prescription     PO: NPO Diet NPO Type: Strict NPO    EN: Peptamen AF @75ml/hr, Prosource  1x/day, free water 30ml Q 2 hours    Route: PEG    Intake/ 3 day average:1192ml, 1490kcal, 83% kcal needs, 106g protein, 91% protein needs,  7g fiber, 1199ml free water    TF at goal volume will provide 1500ml, 1860kcal, 103% kcal needs, 129g protein, 110% protein needs, 9g fiber, 1575ml free water including flush    Nutrition Diagnosis     Date: 3/17 Updated: 3-28  Problem Inadequate energy intake   Etiology Mechanical ventilation/ Surgery   Signs/Symptoms 79% goal volume EN   Status: improved      Goal:   General: Nutrition support treatment    EN/PN: Maintain EN    Nutrition Intervention      Follow treatment progress      Monitoring/Evaluation:   Per protocol, I&O, Pertinent labs, EN delivery/tolerance, Weight, GI status, Symptoms, Swallow function, Hemodynamic stability      Yessy Duncan, JAIRO  Time Spent: 30min

## 2023-03-28 NOTE — PLAN OF CARE
Goal Outcome Evaluation:  Plan of Care Reviewed With: patient        Progress: no change   SLP treatment completed. Will continue to address communication/PMV use in tx. Please see note for further details and recommendations.

## 2023-03-28 NOTE — PROGRESS NOTES
"Critical Care Note     LOS: 12 days   Patient Care Team:  Dora Loya PA as PCP - General (Internal Medicine)    Chief Complaint/Reason for visit:    Chief Complaint   Patient presents with   • Shortness of Breath     Acute strep pyogenes (group A strep) epiglottitis with airway obstruction     Acute respiratory failure (HCC)    Essential hypertension      Subjective     Interval History:     Tmax 101.4.  Transition from penicillin drip to Rocephin.  Became very agitated last night and ultimately received Haldol with improvement.  He was empirically placed back on mechanical ventilation.  He was placed back on trach collar at 8 AM.  He was calm until this afternoon when he became agitated again.  He received some Haldol with improvement.  He was then able to participate with physical therapy.  He needed moderate assist for bed mobility.  He is tolerating tube feeding.    Review of Systems:    All systems were reviewed and negative except as noted in subjective.    Medical history, surgical history, social history, family history reviewed    Objective     Intake/Output:    Intake/Output Summary (Last 24 hours) at 3/28/2023 1616  Last data filed at 3/28/2023 0600  Gross per 24 hour   Intake 1444.6 ml   Output 1050 ml   Net 394.6 ml       Nutrition:  NPO Diet NPO Type: Strict NPO    Infusions:       Mechanical Ventilator Settings:            Resp Rate (Set): 12  Pressure Support (cm H2O): 10 cm H20  FiO2 (%): 30 %  PEEP/CPAP (cm H2O): 6 cm H20    Minute Ventilation (L/min) (Obs): 7.56 L/min  Resp Rate (Observed) Vent: 13  I:E Ratio (Set): 1:3.55  I:E Ratio (Obs): 1:3.5    PIP Observed (cm H2O): 18 cm H2O  Plateau Pressure (cm H2O): (S) 21 cm H2O    Telemetry: Sinus rhythm, sinus tachycardia             Vital Signs  Blood pressure (!) 171/101, pulse 92, temperature 99.5 °F (37.5 °C), temperature source Axillary, resp. rate 18, height 185.4 cm (73\"), weight 92 kg (202 lb 13.2 oz), SpO2 98 %.    Physical " Exam:  General Appearance:   Middle-aged gentleman supine in bed   Head:   Atraumatic   Eyes:          No jaundice, conjunctiva pink   Ears:     Throat:  No thrush   Neck:  Trachea midline, tracheostomy in place, no palpable thyroid   Back:      Lungs:    Symmetric chest expansion with occasional rhonchi    Heart:   Regular rhythm, S1, S2 auscultated   Abdomen:    PEG tube in place.  Bowel sounds present, soft   Rectal:   Deferred   Extremities:  No pretibial edema   Pulses:  Palpable radial pulses   Skin:  Warm and dry   Lymph nodes:    Neurologic:  Drowsy but awakens and follows simple commands      Results Review:     I reviewed the patient's new clinical results.   Results from last 7 days   Lab Units 03/28/23  0823 03/27/23  0506 03/26/23  0324 03/25/23  0425   SODIUM mmol/L 132* 134* 134* 136   POTASSIUM mmol/L 4.1 4.0 4.2 4.3   CHLORIDE mmol/L 102 100 102 102   CO2 mmol/L 21.0* 28.0 25.0 28.0   BUN mg/dL 14 17 19 19   CREATININE mg/dL 0.61* 0.68* 0.55* 0.56*   CALCIUM mg/dL 8.7 9.1 8.5* 8.6   BILIRUBIN mg/dL  --  0.4  --  0.4   ALK PHOS U/L  --  65  --  51   ALT (SGPT) U/L  --  17  --  22   AST (SGOT) U/L  --  23  --  27   GLUCOSE mg/dL 140* 141* 174* 194*     Results from last 7 days   Lab Units 03/28/23  0623 03/27/23  0506 03/26/23  0324   WBC 10*3/mm3 9.39 7.85 7.04   HEMOGLOBIN g/dL 12.0* 12.1* 10.9*   HEMATOCRIT % 36.8* 37.9 34.3*   PLATELETS 10*3/mm3 341 271 221     Results from last 7 days   Lab Units 03/28/23  0343   PH, ARTERIAL pH units 7.502*   PO2 ART mm Hg 100.0   PCO2, ARTERIAL mm Hg 32.0*   HCO3 ART mmol/L 25.0     Lab Results   Component Value Date    BLOODCX No growth at 5 days 03/20/2023     No results found for: URINECX    I reviewed the patient's new imaging including images and reports.      All medications reviewed.   cefTRIAXone, 2 g, Intravenous, Q24H  chlorhexidine, 15 mL, Mouth/Throat, Q12H  heparin (porcine), 5,000 Units, Subcutaneous, Q8H  insulin regular, 0-7 Units, Subcutaneous,  Q6H  latanoprost, 1 drop, Both Eyes, Daily  metroNIDAZOLE, 500 mg, Intravenous, Q6H  pantoprazole, 40 mg, Intravenous, Q AM  polyethylene glycol, 17 g, Per PEG Tube, Daily  ProSource No Carb, 30 mL, Per PEG Tube, Daily  risperiDONE, 1 mg, Per PEG Tube, Nightly  senna-docusate sodium, 2 tablet, Per PEG Tube, BID  sodium chloride, 10 mL, Intravenous, Q12H  valsartan, 80 mg, Per PEG Tube, Q24H          Assessment & Plan       Acute strep pyogenes (group A strep) epiglottitis with airway obstruction     Essential hypertension    Acute respiratory failure (HCC)    68-year-old male who presented to [facility] ER 3/16/2023 c/o dyspnea and sore throat. Apparently, first noticed a sore throat around noon the day of admission and became increasingly symptomatic with dyspnea and stridor throughout the day before presenting to the ER.  He was given epinephrine, steroids, and H2 blockers in the ER but despite this he began tripoding and drooling and had audible stridor.  Per Dr. Eaton (ER physician) intubation was difficult due to edema and erythema. Imaging revealed abnormal appearance of the soft tissues of the laryngeal area with some edema and swelling of the uvula but no evidence of a fluid collection or abscess.  Bibasilar infiltrates were present possibly related to atelectasis or retained secretions but there was no evidence of consolidative pneumonia.  He was given Zosyn and vancomycin empirically as well as steroids.  Blood culture subsequently grew Streptococcus pyogenes.  Patient underwent bronchoscopy 3/20/2023 and was noted to have persistent edematous changes in the posterior oropharynx with copious mucopurulent secretions and repeat CT neck 3/21/2023 revealed worsening edema but no sign of abscess.  Antimicrobial coverage was changed by ID to a continuous penicillin infusion.  Because of persistent findings and inability to safely extubate patient, he underwent tracheostomy and PEG feeding tube 3/23/2023.  Patient  subsequently has begun trach collar trials as of 3/25/2023.     He was successfully weaned to trach collar for approximately 36 hours.  He then became agitated and was placed back on mechanical ventilation.  ABG on mechanical ventilation this morning pH 7.5, CO2 32, O2 100.  He did not have oxygenation issues.  This morning he was placed back on trach collar.  Physical therapy notes that he is weak and requires assistance for bed mobility.  He did not take any medication for anxiety or pain as an outpatient.      PLAN:    Antibiotics per infectious disease, transition to Rocephin and Flagyl   Leave on trach collar as tolerates  Start Risperdal 1 mg at bedtime  Continue tube feeding  PT, OT  Increase blood pressure medication, Diovan 160 mg daily      VTE Prophylaxis: subcutaneous heparin    Stress Ulcer Prophylaxis: Protonix    Monika Louis MD  03/28/23  16:16 EDT      Time: 30 minutes

## 2023-03-28 NOTE — THERAPY TREATMENT NOTE
Acute Care - Speech Language Pathology Treatment Note  [facility]     Patient Name: Sandro Junior  : 1955  MRN: [id number]  Today's Date: 3/28/2023               Admit Date: 3/16/2023     Visit Dx:    ICD-10-CM ICD-9-CM   1. Respiratory distress  R06.03 786.09   2. Tachycardia  R00.0 785.0   3. Airway compromise  J98.8 519.8   4. Acute epiglottitis with airway obstruction - probable  J05.11 464.31   5. Essential hypertension  I10 401.9   6. Voice disturbance  R49.9 784.40     Patient Active Problem List   Diagnosis   • Essential hypertension   • Chronic pain   • Osteoarthritis of knee   • Prolonged depressive adjustment reaction   • Acute prostatitis   • Atopic rhinitis   • Erectile dysfunction of nonorganic origin   • Glaucoma suspect   • Onychomycosis of toenail   • Malignant neoplasm of rectum (HCC)   • Seborrheic eczema   • Keloid scar   • Pain of right lower extremity   • Esophageal reflux   • Acute respiratory failure ([facility])   • Acute strep pyogenes (group A strep) epiglottitis with airway obstruction      Past Medical History:   Diagnosis Date   • Epidermal inclusion cyst    • Esophageal candidiasis ([facility])    • Keloid scar    • Rectal cancer ([facility])    • Seborrheic dermatitis      Past Surgical History:   Procedure Laterality Date   • COLECTOMY PARTIAL / TOTAL     • KNEE SURGERY      Left knee meniscal tear repair   • TOOTH EXTRACTION  2016 teeth   • TOTAL KNEE ARTHROPLASTY Right 2016   • TRACHEOSTOMY AND PEG TUBE INSERTION N/A 3/23/2023    Procedure: TRACHEOSTOMY AND PERCUTANEOUS ENDOSCOPIC GASTROSTOMY TUBE INSERTION;  Surgeon: Néstor Jerome MD;  Location: [facility];  Service: General;  Laterality: N/A;       SLP Recommendation and Plan                 Anticipated Discharge Disposition (SLP): inpatient rehabilitation facility, anticipate therapy at next level of care (23 1600)  Demonstrates Need for Referral to Another Service: speech therapy, other (see comments) (consider  consult for swallow eval when medically appropriate per physician discretion) (03/28/23 1600)           Daily Summary of Progress (SLP): progress toward functional goals as expected (03/28/23 1600)           Treatment Assessment (SLP): continued, difficulty voicing due to tracheostomy (03/28/23 1600)  Treatment Assessment Comments (SLP): Tolerating PMV well w/ occasional coughing, but needing further tx to address poor coordination of breathing/phonation. Cont PMV use when pt awake, cuff deflated, & w/ RN/RT/SLP. Remove PMV when pt sleeping. Attached PMV to trach collar w/ strap in event that pt coughs PMV off, will be ani to easily locate. (03/28/23 1600)  Plan for Continued Treatment (SLP): continue treatment per plan of care (03/28/23 1600)  Plan of Care Reviewed With: patient (03/28/23 1641)  Progress: no change (03/28/23 1641)      SLP EVALUATION (last 72 hours)     SLP SLC Evaluation     Row Name 03/28/23 1600 03/26/23 3124                Communication Assessment/Intervention    Document Type therapy note (daily note)  -AC evaluation  -VO       Subjective Information complains of;fatigue  -AC no complaints  -VO       Patient Observations lethargic;cooperative  -AC alert;cooperative  -VO       Patient/Family/Caregiver Comments/Observations No family present.  -AC family at bedside  -VO       Patient Effort fair  -AC good  -VO          General Information    Patient Profile Reviewed -- yes  -VO       Pertinent History Of Current Problem -- adm epiglottitis 2' strep requiring intubation for airway protection and resulting in trach/PEG placement w/ cuffed augusto 8  -VO       Precautions/Limitations, Vision -- WFL with corrective lenses;for purposes of eval  -VO       Precautions/Limitations, Hearing -- WFL;for purposes of eval  -VO       Prior Level of Function-Communication -- WFL  -VO       Plans/Goals Discussed with -- patient;family  -VO       Barriers to Rehab -- none identified  -VO          Pain Scale:  Numbers Pre/Post-Treatment    Pretreatment Pain Rating -- 0/10 - no pain  -VO       Posttreatment Pain Rating -- 0/10 - no pain  -VO          Pain Scale: FACES Pre/Post-Treatment    Pain: FACES Scale, Pretreatment 0-->no hurt  -AC --       Posttreatment Pain Rating 0-->no hurt  -AC --          Speaking Valve    Respiratory Status -- trach collar  -VO       Pretreatment Heart Rate (beats/min) -- 56  -VO       Pre SpO2 (%) -- 95  -VO       Pretreatment Cuff Status -- Inflated  -VO       Trach Type -- Shiley;size 8  -VO       Types of Intervention -- tracheostomy speaking valve  -VO       Speaking Valve Type -- PMV-2000 (clear)  -VO       Phonation with Occlusion -- speaking valve;audible at 1 foot;audible at 3 feet  -VO       Vocal Quality on Phonation -- breathy;wet  intermittently  -VO       Secretion Description -- thin;clear  -VO       Response to Intervention -- tolerated for extended period;cough;speaking valve  intermittent cough  -VO       Minutes Tolerated -- 15-20 minutes  -VO       Posttreatment Heart Rate (beats/min) -- 60  -VO       Post SpO2 (%) -- 97  -VO       Posttreatment Cuff Status -- Deflated  -VO       At Conclusion -- speaking valve on;cuff remained deflated;notified RN/LPN  pt speaking with family  -VO       Speaking Valve Placement Recommendation -- SLP, RT and RN/LPN only  -VO       Speaking Valve, Comment -- Pt tolerated PMV placement for extended period and was able to achieve audible phonation. Decreased intelligiblity w/ longer phrases and sentences. Slight coughing and suctioning of secretions throughout. Will continue to follow for PMV use.  -VO          SLP Evaluation Clinical Impressions    SLP Diagnosis -- difficulty voicing due to tracheostomy  -VO       Rehab Potential/Prognosis -- good  -VO       SLC Criteria for Skilled Therapy Interventions Met -- yes  -VO       Functional Impact -- difficulty communicating wants, needs;difficulty communicating in an emergency;difficulty in  expressing complex messages  -VO          SLP Treatment Clinical Impressions    Treatment Assessment (SLP) continued;difficulty voicing due to tracheostomy  -AC --       Treatment Assessment Comments (SLP) Tolerating PMV well w/ occasional coughing, but needing further tx to address poor coordination of breathing/phonation. Cont PMV use when pt awake, cuff deflated, & w/ RN/RT/SLP. Remove PMV when pt sleeping. Attached PMV to trach collar w/ strap in event that pt coughs PMV off, will be ani to easily locate.  -AC --       Daily Summary of Progress (SLP) progress toward functional goals as expected  -AC --       Barriers to Overall Progress (SLP) Lethargy  -AC --       Plan for Continued Treatment (SLP) continue treatment per plan of care  -AC --       Care Plan Review evaluation/treatment results reviewed;care plan/treatment goals reviewed;risks/benefits reviewed;current/potential barriers reviewed;patient/other agree to care plan  - --          Recommendations    Therapy Frequency (SLP SLC) -- 5 days per week  -VO       Predicted Duration Therapy Intervention (Days) -- until discharge  -VO       Anticipated Discharge Disposition (SLP) inpatient rehabilitation facility;anticipate therapy at next level of care  -AC --       Demonstrates Need for Referral to Another Service speech therapy;other (see comments)  consider consult for swallow eval when medically appropriate per physician discretion  -AC speech therapy  dysphagia evaluation per MD discretion  -VO             User Key  (r) = Recorded By, (t) = Taken By, (c) = Cosigned By    Initials Name Effective Dates    AC Shelley Iraheta, MS [facility]-SLP 02/03/23 -     VO Shiloh Campo MA,CCC-SLP 06/16/21 -                    EDUCATION  The patient has been educated in the following areas:     Communication Impairment Speaking Valve.           SLP GOALS     Row Name 03/28/23 1600 03/26/23 2878          Patient will demonstrate functional communication skills for  return to discharge environment     [place] with minimal cues  -AC with minimal cues  -VO     Time frame by discharge  -AC by discharge  -VO     Progress/Outcomes continuing progress toward goal  -AC new goal  -VO        Tolerate Speaking Valve Placement Goal 1 (SLP)    Tolerate Speaking Valve Placement Goal 1 (SLP) speaking valve >30 min;90%;independently (over 90% accuracy)  -AC speaking valve >30 min;90%;independently (over 90% accuracy)  -VO     Time Frame (Tolerate Speaking Valve Placement Goal 1, SLP) short term goal (STG)  -AC short term goal (STG)  -VO     Progress (Tolerate Speaking Valve Placement Goal 1, SLP) 90%;independently (over 90% accuracy)  -AC --     Progress/Outcomes (Tolerate Speaking Valve Placement Goal 1, SLP) good progress toward goal  -AC new goal  -VO     Comment (Tolerate Speaking Valve Placement Goal 1, SLP) Wearing PMV upon entry. Able to tolerate PMV w/o significant changes in vital signs. Occasional coughing that required oral suctioning. Reviewed recommendations for wearing PMV & removing when sleeping.  -AC --        Audible Speech with Speaking Valve Goal 1 (SLP)    Audible Speech Goal 1 (SLP) 3 feet;background noise;90%;with minimal cues (75-90%)  -AC 3 feet;background noise;90%;with minimal cues (75-90%)  -VO     Time Frame (Audible Speech Goal 1, SLP) short term goal (STG)  -AC short term goal (STG)  -VO     Progress (Audible Speech Goal 1, SLP) 10%;with minimal cues (75-90%)  -AC --     Progress/Outcomes (Audible Speech Goal 1, SLP) continuing progress toward goal  -AC new goal  -VO     Comment (Audible Speech Goal 1, SLP) Poor breath support and coordination of breathing/phonation. Audible @ 1 ft ~40% of the time. Difficulty reponding to cues to breathe after 1-2 words in therapeutic tasks (counting) 2' lethargy.  -AC --           User Key  (r) = Recorded By, (t) = Taken By, (c) = Cosigned By    Initials Name Provider Type    Shelley Godinez MS CCC-SLP Speech and  Language Pathologist    Shiloh Mcgill MA,CCC-SLP Speech and Language Pathologist                        Time Calculation:      Time Calculation- SLP     Row Name 03/28/23 1641             Time Calculation- SLP    SLP Start Time 1600  -AC      SLP Received On 03/28/23  -AC         Untimed Charges    39905-PY Treatment/ST Modification Prosth Aug Alter  35  -AC         Total Minutes    Untimed Charges Total Minutes 35  -AC       Total Minutes 35  -AC            User Key  (r) = Recorded By, (t) = Taken By, (c) = Cosigned By    Initials Name Provider Type     Shelley Iraheta MS CCC-SLP Speech and Language Pathologist                Therapy Charges for Today     Code Description Service Date Service Provider Modifiers Qty    [id number]  ST TREATMENT SPEECH 2 3/28/2023 Shelley Iraheta MS CCC-SLP GN 1                     Shelley Iraheta MS CCC-BRUNO  3/28/2023

## 2023-03-28 NOTE — PLAN OF CARE
Goal Outcome Evaluation:  Plan of Care Reviewed With: (P) patient        Progress: (P) no change  Outcome Evaluation: (P) Pt performed bed mobility with mod Ax2 showing limitations of generalized weakness and poor endurance. Rec skilled PT to increase ind with mobility and d/c to LTACH.

## 2023-03-28 NOTE — PROGRESS NOTES
1       Sandro Junior  1955  [id number]  3/28/2023    CC: Sore throat and fever    Sandro Junior is a 68 y.o. male here for adult epiglottitis, severe sepsis, group a beta-hemolytic streptococcal bacteremia with airway compromise.      Past medical history:  Past Medical History:   Diagnosis Date   • Epidermal inclusion cyst    • Esophageal candidiasis (HCC)    • Keloid scar    • Rectal cancer (HCC)    • Seborrheic dermatitis        Medications:   Current Facility-Administered Medications:   •  acetaminophen (TYLENOL) 160 MG/5ML solution 650 mg, 650 mg, Per PEG Tube, Q6H PRN, Yinka Payne RPH, 650 mg at 03/27/23 2054  •  [DISCONTINUED] acetaminophen (TYLENOL) tablet 650 mg, 650 mg, Nasogastric, Q4H PRN, 650 mg at 03/21/23 0253 **OR** acetaminophen (TYLENOL) suppository 650 mg, 650 mg, Rectal, Q4H PRN, Néstor Jerome MD  •  ALPRAZolam (XANAX) tablet 0.25 mg, 0.25 mg, Per PEG Tube, TID PRN, Monika Louis MD, 0.25 mg at 03/28/23 0015  •  sennosides-docusate (PERICOLACE) 8.6-50 MG per tablet 2 tablet, 2 tablet, Per PEG Tube, BID, 2 tablet at 03/26/23 0844 **AND** polyethylene glycol (MIRALAX) packet 17 g, 17 g, Per PEG Tube, Daily PRN **AND** [DISCONTINUED] bisacodyl (DULCOLAX) EC tablet 5 mg, 5 mg, Oral, Daily PRN **AND** bisacodyl (DULCOLAX) suppository 10 mg, 10 mg, Rectal, Daily PRN, Yinka Payne RPH  •  chlorhexidine (PERIDEX) 0.12 % solution 15 mL, 15 mL, Mouth/Throat, Q12H, Néstor Jerome MD, 15 mL at 03/27/23 2040  •  dextrose (D50W) (25 g/50 mL) IV injection 25 g, 25 g, Intravenous, Q15 Min PRN, Nsétor Jerome MD, 25 g at 03/21/23 1153  •  glucagon (GLUCAGEN) injection 1 mg, 1 mg, Intramuscular, Q15 Min PRN, Néstor Jerome MD  •  heparin (porcine) 5000 UNIT/ML injection 5,000 Units, 5,000 Units, Subcutaneous, Q8H, Néstor Jerome MD, 5,000 Units at 03/28/23 0619  •  hydrALAZINE (APRESOLINE) injection 20 mg, 20 mg, Intravenous, Q4H PRN, Néstor Jerome MD, 20  mg at 03/28/23 0232  •  latanoprost (XALATAN) 0.005 % ophthalmic solution 1 drop, 1 drop, Both Eyes, Daily, Néstor Jerome MD, 1 drop at 03/26/23 0844  •  LORazepam (ATIVAN) injection 1 mg, 1 mg, Intravenous, Q4H PRN, Shane Alves, APRN, 1 mg at 03/28/23 0236  •  Magnesium Standard Dose Replacement - Initiate Nurse / BPA Driven Protocol, , Does not apply, Justus KELLER Gustavo V, MD  •  metroNIDAZOLE (FLAGYL) IVPB 500 mg, 500 mg, Intravenous, Q6H, Jamil, Keila VFranchesca, DO, 500 mg at 03/28/23 0315  •  pantoprazole (PROTONIX) injection 40 mg, 40 mg, Intravenous, Q AM, Néstor Jerome MD, 40 mg at 03/28/23 0619  •  penicillin G potassium 12 Million Units in sodium chloride 0.9 % 250 mL IVPB, 12 Million Units, Intravenous, Q12H, Yinka Payne RPH, Last Rate: 20.8 mL/hr at 03/27/23 2103, 12 Million Units at 03/27/23 2103  •  Phosphorus Replacement - Initiate Nurse / BPA Driven Protocol, , Does not apply, Justus KELLER Gustavo V, MD  •  polyethylene glycol (MIRALAX) packet 17 g, 17 g, Per PEG Tube, Daily, Trish Callahan, PharmD  •  Potassium Replacement - Initiate Nurse / BPA Driven Protocol, , Does not apply, Justus KELLER Gustavo V, MD  •  ProSource No Carb oral solution 30 mL, 30 mL, Per PEG Tube, Daily, Yinka Payne RPH, 30 mL at 03/27/23 0905  •  sodium chloride 0.9 % flush 10 mL, 10 mL, Intravenous, PRNJustus Gustavo V, MD  •  sodium chloride 0.9 % flush 10 mL, 10 mL, Intravenous, Q12H, Néstor Jerome MD, 10 mL at 03/27/23 2041  •  sodium chloride 0.9 % flush 10 mL, 10 mL, Intravenous, PRNJustus Gustavo V, MD  •  sodium chloride 0.9 % flush 20 mL, 20 mL, Intravenous, PRN, Néstor Jerome MD  •  valsartan (DIOVAN) tablet 80 mg, 80 mg, Per PEG Tube, Q24H, Monika Louis MD, 80 mg at 03/27/23 1216  Antibiotics:  Anti-Infectives (From admission, onward)    Ordered     Dose/Rate Route Frequency Start Stop    03/24/23 0812  penicillin G potassium 12 Million Units in sodium chloride 0.9 %  "250 mL IVPB        Ordering Provider: Kerry Yinka, [facility]    12 Million Units  20.8 mL/hr over 12 Hours Intravenous Every 12 Hours 03/24/23 1000 03/28/23 2359    03/20/23 1241  metroNIDAZOLE (FLAGYL) IVPB 500 mg        Ordering Provider: Keila Negron DO    500 mg  over 30 Minutes Intravenous Every 6 Hours 03/20/23 1400 03/28/23 1359    03/16/23 2151  vancomycin 1500 mg/500 mL 0.9% NS IVPB ([facility])        Ordering Provider: Anand Eaton MD    20 mg/kg × 79.4 kg Intravenous Once 03/16/23 2153 03/16/23 2323          Allergies:  has No Known Allergies.    Review of Systems: All other reviewed and negative except as per HPI    Blood pressure 154/82, pulse 79, temperature 99.5 °F (37.5 °C), temperature source Axillary, resp. rate 18, height 185.4 cm (72.99\"), weight 92 kg (202 lb 13.2 oz), SpO2 97 %.  GENERAL: Awake and alert, in no acute distress.  Off mechanical ventilation.  Speaks with Passy-[place] valve.  Spent a significant amount of time yesterday sitting up in a bedside recliner.  HEENT: Interesting facial erythroderma with superficial desquamation likely mediated by a streptococcal exotoxin.  Oropharynx without thrush. Sinuses nontender. Dentition in good repair. No cervical adenopathy. No carotid bruits/ jugular venous distention.   EYES: PERRL. No conjunctival injection. No icterus. EOMI.  LYMPHATICS: No lymphadenopathy of the neck or axillary or inguinal regions.   HEART: No murmur, gallop, or pericardial friction rub.  Right antecubital PICC exit site without phlebitis.  LUNGS: Clear to auscultation anteriorly. No percussion dullness.  Few posterobasilar rales.  ABDOMEN: Soft, nontender, nondistended. No appreciable HSM.  PEG exit site without erythema or purulence.  No peritoneal findings of rebound or guarding.  SKIN: Warm and dry without cutaneous eruptions. No embolic stigmata.   PSYCHIATRIC: Mental status lucid. Cranial nerve function intact.       DIAGNOSTICS:  Lab Results   Component Value " Date    WBC 9.39 03/28/2023    HGB 12.0 (L) 03/28/2023    HCT 36.8 (L) 03/28/2023     03/28/2023     Lab Results   Component Value Date    CRP 1.09 (H) 03/27/2023     Lab Results   Component Value Date    SEDRATE >130 (H) 03/26/2023     Lab Results   Component Value Date    GLUCOSE 141 (H) 03/27/2023    BUN 17 03/27/2023    CREATININE 0.68 (L) 03/27/2023    EGFRIFAFRI 109 01/10/2022    BCR 25.0 03/27/2023    CO2 28.0 03/27/2023    CALCIUM 9.1 03/27/2023    ALBUMIN 3.2 (L) 03/27/2023    AST 23 03/27/2023    ALT 17 03/27/2023       Microbiology: Admission blood cultures with group A beta-hemolytic streptococci.  Favorable EDNA's for both penicillin and ceftriaxone.  Follow-up blood cultures with clearance.  MRSA nasopharyngeal PCR unremarkable.  Bio fire viral respiratory pathogen's panel negative.    RADIOLOGY:  Imaging Results (Last 72 Hours)     Procedure Component Value Units Date/Time    XR Chest 1 View [[id number]] Collected: 03/25/23 2200     Updated: 03/25/23 2204    Narrative:      XR CHEST 1 VW    Date of Exam: 3/25/2023 4:54 PM EDT    Indication: Follow-up respiratory failure and pneumonia.    Comparison: 3/24/2023    Findings:  Tracheostomy tube and right upper extremity PICC line are noted. PICC line tip appears to lie at the SVC-right atrial junction. Heart is upper normal size. Diffuse interstitial disease pattern of lungs is again noted, and appears a little increased.   There is focally dense airspace disease in the medial left lung base, and findings may represent mild worsening of pneumonia. No pneumothorax is seen.       Impression:      Impression:  Increasing diffuse pulmonary interstitial disease and focal left basilar airspace disease which may represent worsening pneumonia. No evidence of pneumothorax.    Electronically Signed: Kamran Bruno    3/25/2023 10:01 PM EDT    Workstation ID: [id number]          Assessment and Plan: Severe sore throat.  Fever.  Leukocytosis.  Lactic acidosis.  Adult  epiglottitis.  Group A beta-hemolytic streptococcal bacteremia.  Mechanical ventilatory support for threatened airway.  Status post tracheostomy and PEG placement.  Intermittent fever.  Facial erythroderma with desquamation.  Maximum temperature over 24 hours 101.4.  Currently 99.5.  Pulse 79, respiratory rate 18, blood pressure 154/82 mmHg with an O2 saturation of 97% with an FiO2 of 0.3 and 6 cm of PEEP nocturnally.  Peripheral leukocyte count 9.4.  H&H 12 g / 37%.  Platelet count 341,000.  WBC differential with 60% segmented neutrophils.  BUN/creatinine 17/0.7.  Albumin 3.2.  C-reactive protein 1.1.  Return of significant febrile events after 4 days without fever.  No hemodynamic instability.  No diarrhea to suggest C. difficile disease.  No issues with right-sided PICC catheter.  Recent follow-up CT scan of the soft tissues of the neck without evidence of retropharyngeal abscess.  Left basilar pulmonary infiltrate concerning for ventilator associated pneumonia.  The right lower extremity is cool in comparison to the left and a deep vein thrombosis should be excluded.  Beta-lactam drug fever is usually a diagnosis of exclusion but at this point the patient's illness, it is probably reasonable to switch to every 24 hours ceftriaxone 2 g and discontinue penicillin infusion.  Metronidazole should be maintained for expanded coverage against Bacteroides and Fusobacterium species.  Suggest matched blood cultures, 1 from the right antecubital PICC catheter and 1 from a peripheral stick.  Complex medical decision making.  Greater than 35 minutes spent in data review, radiographic assessment, culture review, computer , medical decision making, bedside examination, and dictation time.      Zana Johnson MD  3/28/2023

## 2023-03-29 LAB
GLUCOSE BLDC GLUCOMTR-MCNC: 119 MG/DL (ref 70–130)
GLUCOSE BLDC GLUCOMTR-MCNC: 140 MG/DL (ref 70–130)
GLUCOSE BLDC GLUCOMTR-MCNC: 144 MG/DL (ref 70–130)
GLUCOSE BLDC GLUCOMTR-MCNC: 174 MG/DL (ref 70–130)
QT INTERVAL: 372 MS
QTC INTERVAL: 452 MS

## 2023-03-29 PROCEDURE — 25010000002 HEPARIN (PORCINE) PER 1000 UNITS: Performed by: SURGERY

## 2023-03-29 PROCEDURE — 63710000001 INSULIN REGULAR HUMAN PER 5 UNITS

## 2023-03-29 PROCEDURE — 25010000002 CEFTRIAXONE PER 250 MG: Performed by: INTERNAL MEDICINE

## 2023-03-29 PROCEDURE — 97530 THERAPEUTIC ACTIVITIES: CPT

## 2023-03-29 PROCEDURE — 94761 N-INVAS EAR/PLS OXIMETRY MLT: CPT

## 2023-03-29 PROCEDURE — 94799 UNLISTED PULMONARY SVC/PX: CPT

## 2023-03-29 PROCEDURE — 82962 GLUCOSE BLOOD TEST: CPT

## 2023-03-29 PROCEDURE — 99233 SBSQ HOSP IP/OBS HIGH 50: CPT | Performed by: INTERNAL MEDICINE

## 2023-03-29 PROCEDURE — 25010000002 HYDRALAZINE PER 20 MG: Performed by: SURGERY

## 2023-03-29 RX ORDER — ALBUTEROL SULFATE 2.5 MG/3ML
2.5 SOLUTION RESPIRATORY (INHALATION) EVERY 6 HOURS PRN
Status: DISCONTINUED | OUTPATIENT
Start: 2023-03-29 | End: 2023-04-11 | Stop reason: HOSPADM

## 2023-03-29 RX ORDER — CARVEDILOL 6.25 MG/1
6.25 TABLET ORAL EVERY 12 HOURS SCHEDULED
Status: DISCONTINUED | OUTPATIENT
Start: 2023-03-29 | End: 2023-03-30

## 2023-03-29 RX ORDER — HYDROXYZINE HCL 10 MG/5 ML
25 SOLUTION, ORAL ORAL EVERY 8 HOURS PRN
Status: DISCONTINUED | OUTPATIENT
Start: 2023-03-29 | End: 2023-04-11 | Stop reason: HOSPADM

## 2023-03-29 RX ORDER — CARVEDILOL 6.25 MG/1
6.25 TABLET ORAL EVERY 12 HOURS SCHEDULED
Status: DISCONTINUED | OUTPATIENT
Start: 2023-03-29 | End: 2023-03-29

## 2023-03-29 RX ADMIN — Medication 10 ML: at 20:51

## 2023-03-29 RX ADMIN — HEPARIN SODIUM 5000 UNITS: 5000 INJECTION, SOLUTION INTRAVENOUS; SUBCUTANEOUS at 21:01

## 2023-03-29 RX ADMIN — HYDRALAZINE HYDROCHLORIDE 20 MG: 20 INJECTION INTRAMUSCULAR; INTRAVENOUS at 00:10

## 2023-03-29 RX ADMIN — PANTOPRAZOLE SODIUM 40 MG: 40 INJECTION, POWDER, LYOPHILIZED, FOR SOLUTION INTRAVENOUS at 05:39

## 2023-03-29 RX ADMIN — METRONIDAZOLE 500 MG: 500 INJECTION, SOLUTION INTRAVENOUS at 01:51

## 2023-03-29 RX ADMIN — CHLORHEXIDINE GLUCONATE 0.12% ORAL RINSE 15 ML: 1.2 LIQUID ORAL at 08:53

## 2023-03-29 RX ADMIN — HYDRALAZINE HYDROCHLORIDE 20 MG: 20 INJECTION INTRAMUSCULAR; INTRAVENOUS at 14:24

## 2023-03-29 RX ADMIN — SODIUM CHLORIDE 2 G: 900 INJECTION INTRAVENOUS at 08:53

## 2023-03-29 RX ADMIN — HEPARIN SODIUM 5000 UNITS: 5000 INJECTION, SOLUTION INTRAVENOUS; SUBCUTANEOUS at 14:06

## 2023-03-29 RX ADMIN — RISPERIDONE 1 MG: 1 TABLET ORAL at 20:51

## 2023-03-29 RX ADMIN — HYDRALAZINE HYDROCHLORIDE 20 MG: 20 INJECTION INTRAMUSCULAR; INTRAVENOUS at 20:51

## 2023-03-29 RX ADMIN — CARVEDILOL 6.25 MG: 6.25 TABLET, FILM COATED ORAL at 16:13

## 2023-03-29 RX ADMIN — HYDRALAZINE HYDROCHLORIDE 20 MG: 20 INJECTION INTRAMUSCULAR; INTRAVENOUS at 05:39

## 2023-03-29 RX ADMIN — VALSARTAN 160 MG: 160 TABLET, FILM COATED ORAL at 08:54

## 2023-03-29 RX ADMIN — LATANOPROST 1 DROP: 50 SOLUTION OPHTHALMIC at 09:27

## 2023-03-29 RX ADMIN — METRONIDAZOLE 500 MG: 500 INJECTION, SOLUTION INTRAVENOUS at 08:50

## 2023-03-29 RX ADMIN — METRONIDAZOLE 500 MG: 500 INJECTION, SOLUTION INTRAVENOUS at 20:51

## 2023-03-29 RX ADMIN — Medication 30 ML: at 12:38

## 2023-03-29 RX ADMIN — HYDROXYZINE HYDROCHLORIDE 25 MG: 10 SOLUTION ORAL at 17:15

## 2023-03-29 RX ADMIN — INSULIN HUMAN 2 UNITS: 100 INJECTION, SOLUTION PARENTERAL at 08:54

## 2023-03-29 RX ADMIN — CHLORHEXIDINE GLUCONATE 0.12% ORAL RINSE 15 ML: 1.2 LIQUID ORAL at 20:50

## 2023-03-29 RX ADMIN — METRONIDAZOLE 500 MG: 500 INJECTION, SOLUTION INTRAVENOUS at 14:09

## 2023-03-29 RX ADMIN — HEPARIN SODIUM 5000 UNITS: 5000 INJECTION, SOLUTION INTRAVENOUS; SUBCUTANEOUS at 05:39

## 2023-03-29 RX ADMIN — Medication 10 ML: at 12:38

## 2023-03-29 RX ADMIN — POLYETHYLENE GLYCOL 3350 17 G: 17 POWDER, FOR SOLUTION ORAL at 08:50

## 2023-03-29 NOTE — PROGRESS NOTES
"Critical Care Note     LOS: 13 days   Patient Care Team:  Dora Loya PA as PCP - General (Internal Medicine)    Chief Complaint/Reason for visit:    Chief Complaint   Patient presents with   • Shortness of Breath     Acute strep pyogenes (group A strep) epiglottitis with airway obstruction     Acute respiratory failure (HCC)    Essential hypertension      Subjective     Interval History:     Tmax 100.3, currently afebrile.  Less agitation last night.  He did require 2 doses of hydralazine for hypertension.  Nursing staff felt that the Xanax and Ativan caused more agitation.  He does not take any anxiety medications as an outpatient.  He is on 30% FiO2 by trach collar with a saturation of 96%.  He is hypertensive with a systolic of 189.  He did walk 40 feet with a rolling walker and physical therapy today.    Review of Systems:    All systems were reviewed and negative except as noted in subjective.    Medical history, surgical history, social history, family history reviewed    Objective     Intake/Output:    Intake/Output Summary (Last 24 hours) at 3/29/2023 1430  Last data filed at 3/29/2023 1200  Gross per 24 hour   Intake 3333.71 ml   Output 2750 ml   Net 583.71 ml       Nutrition:  NPO Diet NPO Type: Strict NPO    Infusions:       Mechanical Ventilator Settings:            Resp Rate (Set): 12  Pressure Support (cm H2O): 10 cm H20  FiO2 (%): 30 %  PEEP/CPAP (cm H2O): 6 cm H20    Minute Ventilation (L/min) (Obs): 7.56 L/min  Resp Rate (Observed) Vent: 65  I:E Ratio (Set): 1:3.55  I:E Ratio (Obs): 1:3.5    PIP Observed (cm H2O): 18 cm H2O  Plateau Pressure (cm H2O): (S) 21 cm H2O    Telemetry: Sinus rhythm, sinus tachycardia             Vital Signs  Blood pressure (!) 189/104, pulse 103, temperature 98.9 °F (37.2 °C), temperature source Oral, resp. rate 18, height 185.4 cm (73\"), weight 92 kg (202 lb 13.2 oz), SpO2 96 %.    Physical Exam:  General Appearance:   Middle-aged gentleman supine in bed   Head:   " Atraumatic   Eyes:          No jaundice, conjunctiva pink   Ears:     Throat:  No thrush   Neck:  Trachea midline, tracheostomy in place, no palpable thyroid   Back:      Lungs:    Symmetric chest expansion with scattered bilateral rhonchi    Heart:   Regular rhythm, S1, S2 auscultated   Abdomen:    PEG tube in place.  Bowel sounds present, soft   Rectal:   Deferred   Extremities:  No pretibial edema   Pulses:  Palpable radial pulses   Skin:  Warm and dry   Lymph nodes:    Neurologic:  Awake, follows commands, no focal weakness, speaking with [place] valve      Results Review:     I reviewed the patient's new clinical results.   Results from last 7 days   Lab Units 03/28/23  0823 03/27/23  0506 03/26/23  0324 03/25/23  0425   SODIUM mmol/L 132* 134* 134* 136   POTASSIUM mmol/L 4.1 4.0 4.2 4.3   CHLORIDE mmol/L 102 100 102 102   CO2 mmol/L 21.0* 28.0 25.0 28.0   BUN mg/dL 14 17 19 19   CREATININE mg/dL 0.61* 0.68* 0.55* 0.56*   CALCIUM mg/dL 8.7 9.1 8.5* 8.6   BILIRUBIN mg/dL  --  0.4  --  0.4   ALK PHOS U/L  --  65  --  51   ALT (SGPT) U/L  --  17  --  22   AST (SGOT) U/L  --  23  --  27   GLUCOSE mg/dL 140* 141* 174* 194*     Results from last 7 days   Lab Units 03/28/23  0623 03/27/23  0506 03/26/23  0324   WBC 10*3/mm3 9.39 7.85 7.04   HEMOGLOBIN g/dL 12.0* 12.1* 10.9*   HEMATOCRIT % 36.8* 37.9 34.3*   PLATELETS 10*3/mm3 341 271 221     Results from last 7 days   Lab Units 03/28/23  0343   PH, ARTERIAL pH units 7.502*   PO2 ART mm Hg 100.0   PCO2, ARTERIAL mm Hg 32.0*   HCO3 ART mmol/L 25.0     Lab Results   Component Value Date    BLOODCX No growth at 5 days 03/20/2023     No results found for: URINECX    I reviewed the patient's new imaging including images and reports.    No new x-rays    All medications reviewed.   cefTRIAXone, 2 g, Intravenous, Q24H  chlorhexidine, 15 mL, Mouth/Throat, Q12H  heparin (porcine), 5,000 Units, Subcutaneous, Q8H  insulin regular, 0-7 Units, Subcutaneous, Q6H  latanoprost, 1  drop, Both Eyes, Daily  metroNIDAZOLE, 500 mg, Intravenous, Q6H  pantoprazole, 40 mg, Intravenous, Q AM  polyethylene glycol, 17 g, Per PEG Tube, Daily  ProSource No Carb, 30 mL, Per PEG Tube, Daily  risperiDONE, 1 mg, Per PEG Tube, Nightly  senna-docusate sodium, 2 tablet, Per PEG Tube, BID  sodium chloride, 10 mL, Intravenous, Q12H  valsartan, 160 mg, Per PEG Tube, Q24H          Assessment & Plan       Acute strep pyogenes (group A strep) epiglottitis with airway obstruction     Essential hypertension    Acute respiratory failure (HCC)    68-year-old male who presented to [facility] ER 3/16/2023 c/o dyspnea and sore throat. Apparently, first noticed a sore throat around noon the day of admission and became increasingly symptomatic with dyspnea and stridor throughout the day before presenting to the ER.  He was given epinephrine, steroids, and H2 blockers in the ER but despite this he began tripoding and drooling and had audible stridor.  Per Dr. Eaton (ER physician) intubation was difficult due to edema and erythema. Imaging revealed abnormal appearance of the soft tissues of the laryngeal area with some edema and swelling of the uvula but no evidence of a fluid collection or abscess.  Bibasilar infiltrates were present possibly related to atelectasis or retained secretions but there was no evidence of consolidative pneumonia.  He was given Zosyn and vancomycin empirically as well as steroids.  Blood culture subsequently grew Streptococcus pyogenes.  Patient underwent bronchoscopy 3/20/2023 and was noted to have persistent edematous changes in the posterior oropharynx with copious mucopurulent secretions and repeat CT neck 3/21/2023 revealed worsening edema but no sign of abscess.  Antimicrobial coverage was changed by ID to a continuous penicillin infusion.  Because of persistent findings and inability to safely extubate patient, he underwent tracheostomy and PEG feeding tube 3/23/2023.  Patient subsequently has begun  trach collar trials as of 3/25/2023.     He has been off mechanical ventilation for over 48 hours.  Oxygenation remains adequate.  He does have some secretions.  He is having difficulty sleeping at night and is having some agitation.  He he is also getting hypertensive.  He was on Diovan at home which was restarted.  Despite restarting Diovan and increasing the dose he remains hypertensive.      PLAN:    Antibiotics per infectious disease,  Rocephin and Flagyl   Leave on trach collar as tolerates   Risperdal 1 mg at bedtime  Continue tube feeding  PT, OT   Diovan 160 mg daily  Add carvedilol 6.25 mg every 12 hours    LTAC      VTE Prophylaxis: subcutaneous heparin    Stress Ulcer Prophylaxis: Protonix    Monika Louis MD  03/29/23  14:30 EDT      Time: 25 minutes

## 2023-03-29 NOTE — CASE MANAGEMENT/SOCIAL WORK
Continued Stay Note  [facility]     Patient Name: Sandro Junior  MRN: [id number]  Today's Date: 3/29/2023    Admit Date: 3/16/2023    Plan: IPR VS LTACH   Discharge Plan     Row Name 03/29/23 1012       Plan    Plan IPR VS LTACH    Patient/Family in Agreement with Plan yes    Plan Comments Per MDR, Mr. Junior has been on trach collar for 36hrs.  He is currently on 8 L flow @ .35fio2.  I have spoken to his wife this morning by phone.  She remains agreeable to inpatient rehab referral to [facility].  Shantell with  is following as well as Jenny with [facility] in [place].  CM will cont to follow the plan of care and f/u with these pending referrals as Mr. Junior approaches medical readiness for discharge.    Final Discharge Disposition Code 62 - inpatient rehab facility               Discharge Codes    No documentation.               Expected Discharge Date and Time     Expected Discharge Date Expected Discharge Time    April 1, 2023             Janene Goode RN

## 2023-03-29 NOTE — PLAN OF CARE
Goal Outcome Evaluation:  Plan of Care Reviewed With: (P) patient        Progress: (P) improving  Outcome Evaluation: (P) Pt ambulated 40' with RW and CGA showing limitations of decreased endurance, balance deficits, and generalized weakness. Rec skilled PT to increase ind with mobility and d/c to LTACH vs SNF pending medical needs.

## 2023-03-29 NOTE — PLAN OF CARE
Goal Outcome Evaluation:   VSS on 35% FiO2 via tracheostomy collar, PRN hydralazine given for SBP >150, Coreg added. Pt had bowel movement and urinary incontinence episode, UOP is adequate. TF is going at 75mL/hr. Atarax given PRN for anxiety, Xanax & Ativan D/C. Ambulated with PT, up in the chair for ~5hrs. Afebrile. Spouse is at bedside and updated.

## 2023-03-29 NOTE — THERAPY TREATMENT NOTE
Patient Name: Sandro Junior  : 1955    MRN: [id number]                              Today's Date: 3/29/2023       Admit Date: 3/16/2023    Visit Dx:     ICD-10-CM ICD-9-CM   1. Respiratory distress  R06.03 786.09   2. Tachycardia  R00.0 785.0   3. Airway compromise  J98.8 519.8   4. Acute epiglottitis with airway obstruction - probable  J05.11 464.31   5. Essential hypertension  I10 401.9   6. Voice disturbance  R49.9 784.40     Patient Active Problem List   Diagnosis   • Essential hypertension   • Chronic pain   • Osteoarthritis of knee   • Prolonged depressive adjustment reaction   • Acute prostatitis   • Atopic rhinitis   • Erectile dysfunction of nonorganic origin   • Glaucoma suspect   • Onychomycosis of toenail   • Malignant neoplasm of rectum (HCC)   • Seborrheic eczema   • Keloid scar   • Pain of right lower extremity   • Esophageal reflux   • Acute respiratory failure (HCC)   • Acute strep pyogenes (group A strep) epiglottitis with airway obstruction      Past Medical History:   Diagnosis Date   • Epidermal inclusion cyst    • Esophageal candidiasis (HCC)    • Keloid scar    • Rectal cancer (HCC)    • Seborrheic dermatitis      Past Surgical History:   Procedure Laterality Date   • COLECTOMY PARTIAL / TOTAL     • KNEE SURGERY      Left knee meniscal tear repair   • TOOTH EXTRACTION  2016 teeth   • TOTAL KNEE ARTHROPLASTY Right 2016   • TRACHEOSTOMY AND PEG TUBE INSERTION N/A 3/23/2023    Procedure: TRACHEOSTOMY AND PERCUTANEOUS ENDOSCOPIC GASTROSTOMY TUBE INSERTION;  Surgeon: Néstor Jerome MD;  Location: [facility];  Service: General;  Laterality: N/A;      General Information     Row Name 23 1131          Physical Therapy Time and Intention    Document Type therapy note (daily note) (P)   -HT     Mode of Treatment physical therapy (P)   -HT     Row Name 23 1131          General Information    Patient Profile Reviewed yes (P)   -HT     Existing Precautions/Restrictions  fall;oxygen therapy device and L/min;other (see comments) (P)   trach/PEG  -HT     Barriers to Rehab medically complex (P)   -HT     Row Name 03/29/23 1131          Cognition    Orientation Status (Cognition) oriented x 3 (P)   -HT     Row Name 03/29/23 1131          Safety Issues, Functional Mobility    Safety Issues Affecting Function (Mobility) insight into deficits/self-awareness;safety precaution awareness;safety precautions follow-through/compliance;awareness of need for assistance;sequencing abilities (P)   -HT     Impairments Affecting Function (Mobility) balance;endurance/activity tolerance;strength;postural/trunk control;shortness of breath (P)   -HT           User Key  (r) = Recorded By, (t) = Taken By, (c) = Cosigned By    Initials Name Provider Type    HT Chela Barron PT Student PT Student               Mobility     Row Name 03/29/23 1132          Bed Mobility    Bed Mobility supine-sit (P)   -HT     Supine-Sit [place] (Bed Mobility) minimum assist (75% patient effort);1 person assist (P)   -HT     Assistive Device (Bed Mobility) bed rails;head of bed elevated (P)   -HT     Comment, (Bed Mobility) required assist raising trunk. (P)   -HT     Row Name 03/29/23 1132          Bed-Chair Transfer    Bed-Chair [place] (Transfers) minimum assist (75% patient effort);1 person assist (P)   -HT     Assistive Device (Bed-Chair Transfers) other (see comments) (P)   single UE support  -HT     Comment, (Bed-Chair Transfer) t/f from EOB to bedside commode taking 2 lateral steps requiring cues for sequencing. (P)   -HT     Row Name 03/29/23 1132          Sit-Stand Transfer    Sit-Stand [place] (Transfers) minimum assist (75% patient effort);1 person assist;verbal cues (P)   -HT     Assistive Device (Sit-Stand Transfers) walker, front-wheeled (P)   -HT     Comment, (Sit-Stand Transfer) performed 3x STS, 1x from EOB, 1x from chair, and 1x from bedside commode. (P)   -HT     Row Name 03/29/23 1132           Gait/Stairs (Locomotion)    [place] Level (Gait) contact guard;verbal cues;nonverbal cues (demo/gesture) (P)   -HT     Assistive Device (Gait) walker, front-wheeled (P)   -HT     Distance in Feet (Gait) 3+40 (P)   -HT     Deviations/Abnormal Patterns (Gait) bilateral deviations;bladimir decreased;weight shifting decreased;base of support, narrow;stride length decreased (P)   -HT     Bilateral Gait Deviations forward flexed posture (P)   -HT     Comment, (Gait/Stairs) ambulated from bedside commode to chair then in hallway. Pt demonstrated step through gait pattern showing decreased stride length B, decreased bladimir, and narrow BROCK requiring cues for increased stride length. upright posture, and walker management. Pt gait limited by decreased endurance, balance deficits, and generalized weakness. (P)   -HT           User Key  (r) = Recorded By, (t) = Taken By, (c) = Cosigned By    Initials Name Provider Type     Cheal Barron, PT Student PT Student               Obj/Interventions     Row Name 03/29/23 1139          Balance    Balance Assessment sitting static balance;sitting dynamic balance;standing static balance;standing dynamic balance (P)   -HT     Static Sitting Balance standby assist (P)   -HT     Dynamic Sitting Balance contact guard (P)   -HT     Position, Sitting Balance sitting edge of bed (P)   -HT     Static Standing Balance contact guard (P)   -HT     Dynamic Standing Balance contact guard (P)   -HT     Position/Device Used, Standing Balance walker, rolling (P)   -HT           User Key  (r) = Recorded By, (t) = Taken By, (c) = Cosigned By    Initials Name Provider Type     Chela Barron, PT Student PT Student               Goals/Plan    No documentation.                Clinical Impression     Row Name 03/29/23 1140          Pain    Pretreatment Pain Rating 0/10 - no pain (P)   -HT     Posttreatment Pain Rating 0/10 - no pain (P)   -HT     Additional Documentation Pain Scale: Numbers  Pre/Post-Treatment (Group) (P)   -HT     Row Name 03/29/23 1140          Plan of Care Review    Plan of Care Reviewed With patient (P)   -HT     Progress improving (P)   -HT     Outcome Evaluation Pt ambulated 40' with RW and CGA showing limitations of decreased endurance, balance deficits, and generalized weakness. Rec skilled PT to increase ind with mobility and d/c to LTACH vs SNF pending medical needs. (P)   -HT     Row Name 03/29/23 1140          Vital Signs    Pre Systolic BP Rehab 142 (P)   -HT     Pre Treatment Diastolic BP 87 (P)   -HT     Post Systolic BP Rehab 148 (P)   -HT     Post Treatment Diastolic BP 65 (P)   -HT     Pretreatment Heart Rate (beats/min) 111 (P)   -HT     Posttreatment Heart Rate (beats/min) 111 (P)   -HT     Pre SpO2 (%) 95 (P)   -HT     O2 Delivery Pre Treatment trach collar (P)   -HT     O2 Delivery Intra Treatment trach collar (P)   -HT     Post SpO2 (%) 95 (P)   -HT     O2 Delivery Post Treatment trach collar (P)   -HT     Pre Patient Position Supine (P)   -HT     Intra Patient Position Standing (P)   -HT     Post Patient Position Sitting (P)   -HT     Row Name 03/29/23 1140          Positioning and Restraints    Pre-Treatment Position in bed (P)   -HT     Post Treatment Position chair (P)   -HT     In Chair notified nsg;reclined;sitting;call light within reach;encouraged to call for assist;exit alarm on;with family/caregiver;LUE elevated;RUE elevated;on mechanical lift sling;waffle cushion;legs elevated (P)   -HT           User Key  (r) = Recorded By, (t) = Taken By, (c) = Cosigned By    Initials Name Provider Type    HT Chela Barron, PT Student PT Student               Outcome Measures     Row Name 03/29/23 1140          How much help from another person do you currently need...    Turning from your back to your side while in flat bed without using bedrails? 3 (P)   -HT     Moving from lying on back to sitting on the side of a flat bed without bedrails? 3 (P)   -HT      Moving to and from a bed to a chair (including a wheelchair)? 3 (P)   -HT     Standing up from a chair using your arms (e.g., wheelchair, bedside chair)? 3 (P)   -HT     Climbing 3-5 steps with a railing? 1 (P)   -HT     To walk in hospital room? 3 (P)   -HT     AM-PAC 6 Clicks Score (PT) 16 (P)   -HT     Highest level of mobility 5 --> Static standing (P)   -HT     Row Name 03/29/23 1144          Functional Assessment    Outcome Measure Options AM-PAC 6 Clicks Basic Mobility (PT) (P)   -HT           User Key  (r) = Recorded By, (t) = Taken By, (c) = Cosigned By    Initials Name Provider Type    HT Chela Barron, PT Student PT Student                             Physical Therapy Education     Title: PT OT SLP Therapies (Done)     Topic: Physical Therapy (Done)     Point: Mobility training (Done)     Learning Progress Summary           Patient Acceptance, E, VU by  at 3/29/2023 1144    Acceptance, E, DU,NR by HT at 3/28/2023 1458    Acceptance, E, VU by HT at 3/27/2023 1026    Acceptance, E, VU by EL at 3/26/2023 1707    Acceptance, E,D, VU,NR by  at 3/26/2023 1429                   Point: Home exercise program (Done)     Learning Progress Summary           Patient Acceptance, E, DU,NR by HT at 3/28/2023 1458    Acceptance, E, VU by HT at 3/27/2023 1026    Acceptance, E, VU by EL at 3/26/2023 1707                   Point: Body mechanics (Done)     Learning Progress Summary           Patient Acceptance, E, VU by HT at 3/29/2023 1144    Acceptance, E, DU,NR by HT at 3/28/2023 1458    Acceptance, E, VU by HT at 3/27/2023 1026    Acceptance, E, VU by EL at 3/26/2023 1707    Acceptance, E,D, VU,NR by LS at 3/26/2023 1429                   Point: Precautions (Done)     Learning Progress Summary           Patient Acceptance, E, VU by HT at 3/29/2023 1144    Acceptance, E, DU,NR by  at 3/28/2023 1458    Acceptance, E, VU by HT at 3/27/2023 1026    Acceptance, E, VU by EL at 3/26/2023 1707    Acceptance, E,D, VU,NR  by  at 3/26/2023 1429                               User Key     Initials Effective Dates Name Provider Type Discipline     02/03/23 -  Norma Villegas, PT Physical Therapist PT    EL 06/16/21 -  Giana Yates, RN Registered Nurse Nurse     01/12/23 -  Chela Barron, PT Student PT Student PT              PT Recommendation and Plan     Plan of Care Reviewed With: (P) patient  Progress: (P) improving  Outcome Evaluation: (P) Pt ambulated 40' with RW and CGA showing limitations of decreased endurance, balance deficits, and generalized weakness. Rec skilled PT to increase ind with mobility and d/c to LTACH vs SNF pending medical needs.     Time Calculation:    PT Charges     Row Name 03/29/23 1145             Time Calculation    Start Time 1056 (P)   -HT      PT Received On 03/29/23 (P)   -HT         Timed Charges    53934 - PT Therapeutic Activity Minutes 29 (P)   -HT         Total Minutes    Timed Charges Total Minutes 29 (P)   -HT       Total Minutes 29 (P)   -HT            User Key  (r) = Recorded By, (t) = Taken By, (c) = Cosigned By    Initials Name Provider Type     Chela Barron, PT Student PT Student              Therapy Charges for Today     Code Description Service Date Service Provider Modifiers Qty    21803467546 HC PT THERAPEUTIC ACT EA 15 MIN 3/28/2023 Chela Barron, PT Student GP 1    67892963227 HC PT THERAPEUTIC ACT EA 15 MIN 3/29/2023 Chela Barron, PT Student GP 2          PT G-Codes  Outcome Measure Options: (P) AM-PAC 6 Clicks Basic Mobility (PT)  AM-PAC 6 Clicks Score (PT): (P) 16  AM-PAC 6 Clicks Score (OT): 10       Chela Barron PT Student  3/29/2023

## 2023-03-29 NOTE — PROGRESS NOTES
1       Sandro Junior  1955  [id number]  3/29/2023    CC: Sore throat, fever, and shortness of breath    Sandro Junior is a 68 y.o. male here for adult epiglottitis, severe sepsis, group A beta-hemolytic streptococcal bacteremia, lactic acidosis and elevated procalcitonin..      Past medical history:  Past Medical History:   Diagnosis Date   • Epidermal inclusion cyst    • Esophageal candidiasis (HCC)    • Keloid scar    • Rectal cancer (HCC)    • Seborrheic dermatitis        Medications:   Current Facility-Administered Medications:   •  acetaminophen (TYLENOL) 160 MG/5ML solution 650 mg, 650 mg, Per PEG Tube, Q6H PRN, Yinka Payne RPH, 650 mg at 03/28/23 1714  •  [DISCONTINUED] acetaminophen (TYLENOL) tablet 650 mg, 650 mg, Nasogastric, Q4H PRN, 650 mg at 03/21/23 0253 **OR** acetaminophen (TYLENOL) suppository 650 mg, 650 mg, Rectal, Q4H PRN, Néstor Jerome MD  •  ALPRAZolam (XANAX) tablet 0.25 mg, 0.25 mg, Per PEG Tube, TID PRN, Monika Louis MD, 0.25 mg at 03/28/23 1714  •  sennosides-docusate (PERICOLACE) 8.6-50 MG per tablet 2 tablet, 2 tablet, Per PEG Tube, BID, 2 tablet at 03/28/23 0940 **AND** polyethylene glycol (MIRALAX) packet 17 g, 17 g, Per PEG Tube, Daily PRN **AND** [DISCONTINUED] bisacodyl (DULCOLAX) EC tablet 5 mg, 5 mg, Oral, Daily PRN **AND** bisacodyl (DULCOLAX) suppository 10 mg, 10 mg, Rectal, Daily PRN, Yinka Payne RPH  •  cefTRIAXone (ROCEPHIN) 2 g/100 mL 0.9% NS IVPB (MBP), 2 g, Intravenous, Q24H, Zana Johnson MD, 2 g at 03/28/23 0940  •  chlorhexidine (PERIDEX) 0.12 % solution 15 mL, 15 mL, Mouth/Throat, Q12H, Néstor Jerome MD, 15 mL at 03/28/23 2059  •  dextrose (D50W) (25 g/50 mL) IV injection 25 g, 25 g, Intravenous, Q15 Min PRN, Néstor Jerome MD, 25 g at 03/21/23 1153  •  dextrose (D50W) (25 g/50 mL) IV injection 25 g, 25 g, Intravenous, Q15 Min PRN, Negar Amezcua APRN  •  dextrose (GLUTOSE) oral gel 15 g, 15 g, Oral, Q15 Min  PRN, Negar Amezcua APRLALO  •  glucagon (GLUCAGEN) injection 1 mg, 1 mg, Intramuscular, Q15 Min PRN, Néstor Jerome MD  •  glucagon (GLUCAGEN) injection 1 mg, 1 mg, Intramuscular, Q15 Min PRN, Negar Amezcua APRLALO  •  heparin (porcine) 5000 UNIT/ML injection 5,000 Units, 5,000 Units, Subcutaneous, Q8H, Néstor Jerome MD, 5,000 Units at 03/29/23 0539  •  hydrALAZINE (APRESOLINE) injection 20 mg, 20 mg, Intravenous, Q4H PRN, Néstor Jerome MD, 20 mg at 03/29/23 0539  •  insulin regular (humuLIN R,novoLIN R) injection 0-7 Units, 0-7 Units, Subcutaneous, Q6H, Negar Amezcua APRLALO  •  latanoprost (XALATAN) 0.005 % ophthalmic solution 1 drop, 1 drop, Both Eyes, Daily, Néstor Jerome MD, 1 drop at 03/28/23 0940  •  LORazepam (ATIVAN) injection 1 mg, 1 mg, Intravenous, Q4H PRN, Shane Alves, APRN, 1 mg at 03/28/23 0236  •  Magnesium Standard Dose Replacement - Initiate Nurse / BPA Driven Protocol, , Does not apply, Justus KELLER Gustavo V, MD  •  metroNIDAZOLE (FLAGYL) IVPB 500 mg, 500 mg, Intravenous, Q6H, Zana Johnson MD, 500 mg at 03/29/23 0151  •  pantoprazole (PROTONIX) injection 40 mg, 40 mg, Intravenous, Q AM, Néstor Jerome MD, 40 mg at 03/29/23 0539  •  Phosphorus Replacement - Initiate Nurse / BPA Driven Protocol, , Does not apply, Justus KELLER Gustavo V, MD  •  polyethylene glycol (MIRALAX) packet 17 g, 17 g, Per PEG Tube, Daily, Trish Callahan, PharmD, 17 g at 03/28/23 0940  •  Potassium Replacement - Initiate Nurse / BPA Driven Protocol, , Does not apply, Justus KELLER Gustavo V, MD  •  ProSource No Carb oral solution 30 mL, 30 mL, Per PEG Tube, Daily, Yinka Payne, [facility], 30 mL at 03/28/23 1007  •  risperiDONE (risperDAL) tablet 1 mg, 1 mg, Per PEG Tube, Nightly, Monika Louis MD, 1 mg at 03/28/23 2059  •  sodium chloride 0.9 % flush 10 mL, 10 mL, Intravenous, PRN, Néstor Jerome MD  •  sodium chloride 0.9 % flush 10 mL, 10 mL, Intravenous, Q12H, Néstor Jerome  "MD CESAR, 10 mL at 03/28/23 2059  •  sodium chloride 0.9 % flush 10 mL, 10 mL, Intravenous, PRN, Néstor Jerome MD  •  sodium chloride 0.9 % flush 20 mL, 20 mL, Intravenous, PRN, Néstor Jerome MD  •  valsartan (DIOVAN) tablet 160 mg, 160 mg, Per PEG Tube, Q24H, Monika Louis MD  Antibiotics:  Anti-Infectives (From admission, onward)    Ordered     Dose/Rate Route Frequency Start Stop    03/28/23 0734  cefTRIAXone (ROCEPHIN) 2 g/100 mL 0.9% NS IVPB (MBP)        Ordering Provider: Zana Johnson MD    2 g  over 30 Minutes Intravenous Every 24 Hours Scheduled 03/28/23 0900 04/04/23 0859    03/20/23 1241  metroNIDAZOLE (FLAGYL) IVPB 500 mg        Ordering Provider: Zana Johnson MD    500 mg  over 30 Minutes Intravenous Every 6 Hours 03/20/23 1400 03/31/23 1359    03/16/23 2151  vancomycin 1500 mg/500 mL 0.9% NS IVPB ([facility])        Ordering Provider: Anand Eaton MD    20 mg/kg × 79.4 kg Intravenous Once 03/16/23 2153 03/16/23 2323          Allergies:  has No Known Allergies.    Review of Systems: All other reviewed and negative except as per HPI    Blood pressure 161/82, pulse 112, temperature 99.3 °F (37.4 °C), temperature source Axillary, resp. rate 20, height 185.4 cm (73\"), weight 92 kg (202 lb 13.2 oz), SpO2 96 %.  GENERAL: Awake and alert, in no acute distress.  Speaks with a tracheostomy tube and Passy-Kacie valve.  Unaware of low-grade fever to 100.3 maximum over the last 24 hours.  HEENT: Oropharynx without thrush. Sinuses nontender. Dentition in reasonable repair. No cervical adenopathy. No carotid bruits/ jugular venous distention.  Tracheostomy stoma without purulence.  EYES: PERRL. No conjunctival injection. No icterus. EOMI.  LYMPHATICS: No lymphadenopathy of the neck or axillary or inguinal regions.   HEART: No murmur, gallop, or pericardial friction rub.   LUNGS: Clear to auscultation anteriorly. No percussion dullness.   ABDOMEN: Soft, nontender, nondistended. No " appreciable HSM.  No peritoneal findings rebound or guarding.  PEG exit site without purulent drainage.  SKIN: Facial erythroderma with partial-thickness desquamation.  Warm and dry without cutaneous eruptions. No embolic stigmata.   PSYCHIATRIC: Mental status lucid. Cranial nerve function intact.       DIAGNOSTICS:  Lab Results   Component Value Date    WBC 9.39 03/28/2023    HGB 12.0 (L) 03/28/2023    HCT 36.8 (L) 03/28/2023     03/28/2023     Lab Results   Component Value Date    CRP 1.09 (H) 03/27/2023     Lab Results   Component Value Date    SEDRATE >130 (H) 03/26/2023     Lab Results   Component Value Date    GLUCOSE 140 (H) 03/28/2023    BUN 14 03/28/2023    CREATININE 0.61 (L) 03/28/2023    EGFRIFAFRI 109 01/10/2022    BCR 23.0 03/28/2023    CO2 21.0 (L) 03/28/2023    CALCIUM 8.7 03/28/2023    ALBUMIN 3.2 (L) 03/27/2023    AST 23 03/27/2023    ALT 17 03/27/2023       Microbiology: Bio fire viral respiratory pathogen's panel negative on admission.  Admission blood cultures with group A streptococci/favorable EDNA's for both penicillin and ceftriaxone.  MRSA nasopharyngeal PCR negative.  Follow-up blood cultures from 3/20 with clearance of bacteremia.    RADIOLOGY:  Imaging Results (Last 72 Hours)     ** No results found for the last 72 hours. **          Assessment and Plan: Severe sepsis.  Group A beta-hemolytic streptococcal bacteremia.  Adult epiglottitis with airway compromise.  Leukocytosis.  Lactic acidosis.  Elevated procalcitonin.  Intermittent fever.  Facial erythroderma with desquamation.  Maximum temperature over 24 hours 100.3.  Currently 99.3.  Pulse 112, respiratory rate 20, blood pressure 161/82 mmHg with an O2 saturation of 96% BUN and creatinine 14/0.6.  Peripheral leukocyte count 9.4 with 60% segmented neutrophils and 1 band form.  No new culture data.  No new radiographic imaging studies.  Continuous infusion penicillin changed to every 24 hours ceftriaxone yesterday due to concerns  for beta-lactam drug fever.  IV metronidazole continues for upper airway anaerobic concerns.  Overall, much improved.  Gratifying progress.      Zana Johnson MD  3/29/2023

## 2023-03-29 NOTE — PLAN OF CARE
Problem: Adult Inpatient Plan of Care  Goal: Absence of Hospital-Acquired Illness or Injury  Intervention: Identify and Manage Fall Risk  Recent Flowsheet Documentation  Taken 3/28/2023 1800 by Paula Win RN  Safety Promotion/Fall Prevention:   activity supervised   assistive device/personal items within reach   clutter free environment maintained   fall prevention program maintained   nonskid shoes/slippers when out of bed   room organization consistent   safety round/check completed  Taken 3/28/2023 1600 by Paula Win RN  Safety Promotion/Fall Prevention:   activity supervised   assistive device/personal items within reach   clutter free environment maintained   fall prevention program maintained   nonskid shoes/slippers when out of bed   room organization consistent   safety round/check completed  Taken 3/28/2023 1200 by Paula Win RN  Safety Promotion/Fall Prevention:   activity supervised   assistive device/personal items within reach   clutter free environment maintained   fall prevention program maintained   nonskid shoes/slippers when out of bed   safety round/check completed   room organization consistent  Taken 3/28/2023 1000 by Paula Win RN  Safety Promotion/Fall Prevention:   clutter free environment maintained   assistive device/personal items within reach   fall prevention program maintained   room organization consistent   safety round/check completed  Taken 3/28/2023 0800 by Paula Win RN  Safety Promotion/Fall Prevention:   activity supervised   clutter free environment maintained   fall prevention program maintained   room organization consistent   safety round/check completed     Problem: Adult Inpatient Plan of Care  Goal: Absence of Hospital-Acquired Illness or Injury  Intervention: Prevent Skin Injury  Recent Flowsheet Documentation  Taken 3/28/2023 1800 by Paula Win RN  Body Position: position changed independently  Skin Protection:   adhesive use  limited   incontinence pads utilized   skin-to-device areas padded   transparent dressing maintained   tubing/devices free from skin contact  Taken 3/28/2023 1600 by Paula Win RN  Body Position: position changed independently  Skin Protection:   adhesive use limited   incontinence pads utilized   skin-to-device areas padded   transparent dressing maintained   tubing/devices free from skin contact  Taken 3/28/2023 1200 by Paula Win RN  Body Position: weight shifting  Skin Protection:   adhesive use limited   incontinence pads utilized   skin sealant/moisture barrier applied   skin-to-device areas padded   transparent dressing maintained   tubing/devices free from skin contact  Taken 3/28/2023 1000 by Paula Win RN  Body Position:   tilted   right  Skin Protection:   adhesive use limited   incontinence pads utilized   skin sealant/moisture barrier applied   skin-to-device areas padded   transparent dressing maintained   tubing/devices free from skin contact  Taken 3/28/2023 0800 by Paula Win RN  Body Position: patient/family refused  Skin Protection:   adhesive use limited   incontinence pads utilized   pulse oximeter probe site changed   silicone foam dressing in place   skin sealant/moisture barrier applied   skin-to-device areas padded   transparent dressing maintained   tubing/devices free from skin contact     Problem: Adult Inpatient Plan of Care  Goal: Absence of Hospital-Acquired Illness or Injury  Intervention: Prevent Infection  Recent Flowsheet Documentation  Taken 3/28/2023 1600 by Paula Win RN  Infection Prevention:   cohorting utilized   environmental surveillance performed   visitors restricted/screened   single patient room provided   rest/sleep promoted   personal protective equipment utilized   hand hygiene promoted  Taken 3/28/2023 1200 by Paula Win RN  Infection Prevention:   cohorting utilized   environmental surveillance performed   equipment surfaces  disinfected   hand hygiene promoted   personal protective equipment utilized   rest/sleep promoted   single patient room provided  Taken 3/28/2023 1000 by Paula Win RN  Infection Prevention:   cohorting utilized   environmental surveillance performed   hand hygiene promoted   personal protective equipment utilized   rest/sleep promoted   single patient room provided  Taken 3/28/2023 0800 by Paula Win RN  Infection Prevention:   cohorting utilized   environmental surveillance performed   hand hygiene promoted   personal protective equipment utilized   single patient room provided   rest/sleep promoted     Problem: Adult Inpatient Plan of Care  Goal: Optimal Comfort and Wellbeing  Intervention: Provide Person-Centered Care  Recent Flowsheet Documentation  Taken 3/28/2023 1800 by Paula Win RN  Trust Relationship/Rapport: care explained  Taken 3/28/2023 1600 by Paula Win RN  Trust Relationship/Rapport:   care explained   choices provided   emotional support provided   empathic listening provided   questions answered   questions encouraged   thoughts/feelings acknowledged   reassurance provided  Taken 3/28/2023 1200 by Paula Win RN  Trust Relationship/Rapport:   care explained   choices provided   reassurance provided  Taken 3/28/2023 1000 by Paula Win RN  Trust Relationship/Rapport: care explained  Taken 3/28/2023 0800 by Paula Win RN  Trust Relationship/Rapport:   reassurance provided   thoughts/feelings acknowledged   care explained     Problem: Skin Injury Risk Increased  Goal: Skin Health and Integrity  Intervention: Optimize Skin Protection  Recent Flowsheet Documentation  Taken 3/28/2023 1800 by Paula Win RN  Pressure Reduction Techniques:   heels elevated off bed   pressure points protected   weight shift assistance provided  Head of Bed (HOB) Positioning: HOB at 30-45 degrees  Pressure Reduction Devices:   chair cushion utilized   heel offloading device  utilized  Skin Protection:   adhesive use limited   incontinence pads utilized   skin-to-device areas padded   transparent dressing maintained   tubing/devices free from skin contact  Taken 3/28/2023 1600 by Paula Win RN  Pressure Reduction Techniques:   frequent weight shift encouraged   heels elevated off bed   pressure points protected  Head of Bed (HOB) Positioning: HOB at 30-45 degrees  Pressure Reduction Devices:   specialty bed utilized   positioning supports utilized   chair cushion utilized  Skin Protection:   adhesive use limited   incontinence pads utilized   skin-to-device areas padded   transparent dressing maintained   tubing/devices free from skin contact  Taken 3/28/2023 1200 by Paula Win RN  Pressure Reduction Techniques:   heels elevated off bed   pressure points protected   weight shift assistance provided  Head of Bed ([facility]) Positioning: HOB at 30-45 degrees  Pressure Reduction Devices:   positioning supports utilized   foam padding utilized   heel offloading device utilized   specialty bed utilized  Skin Protection:   adhesive use limited   incontinence pads utilized   skin sealant/moisture barrier applied   skin-to-device areas padded   transparent dressing maintained   tubing/devices free from skin contact  Taken 3/28/2023 1000 by Paula Win RN  Pressure Reduction Techniques:   frequent weight shift encouraged   heels elevated off bed   pressure points protected   weight shift assistance provided  Head of Bed ([facility]) Positioning: HOB at 30-45 degrees  Pressure Reduction Devices:   specialty bed utilized   positioning supports utilized   foam padding utilized  Skin Protection:   adhesive use limited   incontinence pads utilized   skin sealant/moisture barrier applied   skin-to-device areas padded   transparent dressing maintained   tubing/devices free from skin contact  Taken 3/28/2023 0800 by Paula Win RN  Pressure Reduction Techniques:   frequent weight shift  encouraged   heels elevated off bed   pressure points protected   weight shift assistance provided  Head of Bed (HOB) Positioning: HOB at 30-45 degrees  Pressure Reduction Devices:   specialty bed utilized   positioning supports utilized   heel offloading device utilized  Skin Protection:   adhesive use limited   incontinence pads utilized   pulse oximeter probe site changed   silicone foam dressing in place   skin sealant/moisture barrier applied   skin-to-device areas padded   transparent dressing maintained   tubing/devices free from skin contact     Problem: Fall Injury Risk  Goal: Absence of Fall and Fall-Related Injury  Outcome: Ongoing, Progressing  Intervention: Identify and Manage Contributors  Recent Flowsheet Documentation  Taken 3/28/2023 1800 by Paula Win RN  Medication Review/Management: medications reviewed  Taken 3/28/2023 1600 by Paula Win RN  Medication Review/Management: medications reviewed  Taken 3/28/2023 1200 by Paula Win RN  Medication Review/Management: medications reviewed  Taken 3/28/2023 1000 by Paula Win RN  Medication Review/Management: medications reviewed  Taken 3/28/2023 0800 by Paula Win RN  Medication Review/Management: medications reviewed  Intervention: Promote Injury-Free Environment  Recent Flowsheet Documentation  Taken 3/28/2023 1800 by Paula Win RN  Safety Promotion/Fall Prevention:   activity supervised   assistive device/personal items within reach   clutter free environment maintained   fall prevention program maintained   nonskid shoes/slippers when out of bed   room organization consistent   safety round/check completed  Taken 3/28/2023 1600 by Paula Win RN  Safety Promotion/Fall Prevention:   activity supervised   assistive device/personal items within reach   clutter free environment maintained   fall prevention program maintained   nonskid shoes/slippers when out of bed   room organization consistent   safety  round/check completed  Taken 3/28/2023 1200 by Paula Win RN  Safety Promotion/Fall Prevention:   activity supervised   assistive device/personal items within reach   clutter free environment maintained   fall prevention program maintained   nonskid shoes/slippers when out of bed   safety round/check completed   room organization consistent  Taken 3/28/2023 1000 by Paula Win RN  Safety Promotion/Fall Prevention:   clutter free environment maintained   assistive device/personal items within reach   fall prevention program maintained   room organization consistent   safety round/check completed  Taken 3/28/2023 0800 by Paula Win RN  Safety Promotion/Fall Prevention:   activity supervised   clutter free environment maintained   fall prevention program maintained   room organization consistent   safety round/check completed   Goal Outcome Evaluation:

## 2023-03-30 LAB
ALBUMIN SERPL-MCNC: 3.3 G/DL (ref 3.5–5.2)
ALBUMIN/GLOB SERPL: 0.7 G/DL
ALP SERPL-CCNC: 55 U/L (ref 39–117)
ALT SERPL W P-5'-P-CCNC: 13 U/L (ref 1–41)
ANION GAP SERPL CALCULATED.3IONS-SCNC: 7 MMOL/L (ref 5–15)
AST SERPL-CCNC: 16 U/L (ref 1–40)
BASOPHILS # BLD AUTO: 0.07 10*3/MM3 (ref 0–0.2)
BASOPHILS NFR BLD AUTO: 0.9 % (ref 0–1.5)
BILIRUB SERPL-MCNC: 0.4 MG/DL (ref 0–1.2)
BUN SERPL-MCNC: 16 MG/DL (ref 8–23)
BUN/CREAT SERPL: 27.1 (ref 7–25)
CALCIUM SPEC-SCNC: 9.5 MG/DL (ref 8.6–10.5)
CHLORIDE SERPL-SCNC: 102 MMOL/L (ref 98–107)
CO2 SERPL-SCNC: 23 MMOL/L (ref 22–29)
CREAT SERPL-MCNC: 0.59 MG/DL (ref 0.76–1.27)
DEPRECATED RDW RBC AUTO: 49.2 FL (ref 37–54)
EGFRCR SERPLBLD CKD-EPI 2021: 107 ML/MIN/1.73
EOSINOPHIL # BLD AUTO: 0.17 10*3/MM3 (ref 0–0.4)
EOSINOPHIL NFR BLD AUTO: 2.2 % (ref 0.3–6.2)
ERYTHROCYTE [DISTWIDTH] IN BLOOD BY AUTOMATED COUNT: 13.4 % (ref 12.3–15.4)
GLOBULIN UR ELPH-MCNC: 4.8 GM/DL
GLUCOSE BLDC GLUCOMTR-MCNC: 102 MG/DL (ref 70–130)
GLUCOSE BLDC GLUCOMTR-MCNC: 109 MG/DL (ref 70–130)
GLUCOSE BLDC GLUCOMTR-MCNC: 125 MG/DL (ref 70–130)
GLUCOSE BLDC GLUCOMTR-MCNC: 134 MG/DL (ref 70–130)
GLUCOSE SERPL-MCNC: 166 MG/DL (ref 65–99)
HCT VFR BLD AUTO: 38.5 % (ref 37.5–51)
HGB BLD-MCNC: 12.3 G/DL (ref 13–17.7)
IMM GRANULOCYTES # BLD AUTO: 0.04 10*3/MM3 (ref 0–0.05)
IMM GRANULOCYTES NFR BLD AUTO: 0.5 % (ref 0–0.5)
LYMPHOCYTES # BLD AUTO: 2.21 10*3/MM3 (ref 0.7–3.1)
LYMPHOCYTES NFR BLD AUTO: 29.1 % (ref 19.6–45.3)
MCH RBC QN AUTO: 31.9 PG (ref 26.6–33)
MCHC RBC AUTO-ENTMCNC: 31.9 G/DL (ref 31.5–35.7)
MCV RBC AUTO: 100 FL (ref 79–97)
MONOCYTES # BLD AUTO: 1 10*3/MM3 (ref 0.1–0.9)
MONOCYTES NFR BLD AUTO: 13.2 % (ref 5–12)
NEUTROPHILS NFR BLD AUTO: 4.1 10*3/MM3 (ref 1.7–7)
NEUTROPHILS NFR BLD AUTO: 54.1 % (ref 42.7–76)
NRBC BLD AUTO-RTO: 0 /100 WBC (ref 0–0.2)
PLATELET # BLD AUTO: 408 10*3/MM3 (ref 140–450)
PMV BLD AUTO: 9.3 FL (ref 6–12)
POTASSIUM SERPL-SCNC: 4 MMOL/L (ref 3.5–5.2)
PROT SERPL-MCNC: 8.1 G/DL (ref 6–8.5)
RBC # BLD AUTO: 3.85 10*6/MM3 (ref 4.14–5.8)
SODIUM SERPL-SCNC: 132 MMOL/L (ref 136–145)
WBC NRBC COR # BLD: 7.59 10*3/MM3 (ref 3.4–10.8)

## 2023-03-30 PROCEDURE — 85025 COMPLETE CBC W/AUTO DIFF WBC: CPT | Performed by: INTERNAL MEDICINE

## 2023-03-30 PROCEDURE — 94799 UNLISTED PULMONARY SVC/PX: CPT

## 2023-03-30 PROCEDURE — 80053 COMPREHEN METABOLIC PANEL: CPT | Performed by: INTERNAL MEDICINE

## 2023-03-30 PROCEDURE — 82962 GLUCOSE BLOOD TEST: CPT

## 2023-03-30 PROCEDURE — 99233 SBSQ HOSP IP/OBS HIGH 50: CPT | Performed by: INTERNAL MEDICINE

## 2023-03-30 PROCEDURE — 25010000002 CEFTRIAXONE PER 250 MG: Performed by: INTERNAL MEDICINE

## 2023-03-30 PROCEDURE — 92507 TX SP LANG VOICE COMM INDIV: CPT

## 2023-03-30 PROCEDURE — 97530 THERAPEUTIC ACTIVITIES: CPT

## 2023-03-30 PROCEDURE — 25010000002 HEPARIN (PORCINE) PER 1000 UNITS: Performed by: SURGERY

## 2023-03-30 RX ORDER — LANSOPRAZOLE
15 KIT
Status: DISCONTINUED | OUTPATIENT
Start: 2023-03-31 | End: 2023-03-31

## 2023-03-30 RX ORDER — AMINO ACIDS/PROTEIN HYDROLYS 11G-40/45
30 LIQUID IN PACKET (ML) ORAL 2 TIMES DAILY
Status: DISCONTINUED | OUTPATIENT
Start: 2023-03-30 | End: 2023-04-11 | Stop reason: HOSPADM

## 2023-03-30 RX ORDER — CARVEDILOL 12.5 MG/1
12.5 TABLET ORAL EVERY 12 HOURS SCHEDULED
Status: DISCONTINUED | OUTPATIENT
Start: 2023-03-30 | End: 2023-04-10

## 2023-03-30 RX ADMIN — CARVEDILOL 6.25 MG: 6.25 TABLET, FILM COATED ORAL at 09:55

## 2023-03-30 RX ADMIN — CHLORHEXIDINE GLUCONATE 0.12% ORAL RINSE 15 ML: 1.2 LIQUID ORAL at 21:00

## 2023-03-30 RX ADMIN — CHLORHEXIDINE GLUCONATE 0.12% ORAL RINSE 15 ML: 1.2 LIQUID ORAL at 08:48

## 2023-03-30 RX ADMIN — LATANOPROST 1 DROP: 50 SOLUTION OPHTHALMIC at 09:07

## 2023-03-30 RX ADMIN — Medication 30 ML: at 08:48

## 2023-03-30 RX ADMIN — VALSARTAN 160 MG: 160 TABLET, FILM COATED ORAL at 08:49

## 2023-03-30 RX ADMIN — HEPARIN SODIUM 5000 UNITS: 5000 INJECTION, SOLUTION INTRAVENOUS; SUBCUTANEOUS at 06:03

## 2023-03-30 RX ADMIN — RISPERIDONE 1 MG: 1 TABLET ORAL at 21:00

## 2023-03-30 RX ADMIN — HYDROXYZINE HYDROCHLORIDE 25 MG: 10 SOLUTION ORAL at 14:06

## 2023-03-30 RX ADMIN — HEPARIN SODIUM 5000 UNITS: 5000 INJECTION, SOLUTION INTRAVENOUS; SUBCUTANEOUS at 21:04

## 2023-03-30 RX ADMIN — METRONIDAZOLE 500 MG: 500 INJECTION, SOLUTION INTRAVENOUS at 14:06

## 2023-03-30 RX ADMIN — HEPARIN SODIUM 5000 UNITS: 5000 INJECTION, SOLUTION INTRAVENOUS; SUBCUTANEOUS at 14:06

## 2023-03-30 RX ADMIN — CARVEDILOL 12.5 MG: 12.5 TABLET, FILM COATED ORAL at 21:00

## 2023-03-30 RX ADMIN — HYDROXYZINE HYDROCHLORIDE 25 MG: 10 SOLUTION ORAL at 02:01

## 2023-03-30 RX ADMIN — METRONIDAZOLE 500 MG: 500 INJECTION, SOLUTION INTRAVENOUS at 08:48

## 2023-03-30 RX ADMIN — Medication 10 ML: at 09:09

## 2023-03-30 RX ADMIN — Medication 10 ML: at 21:00

## 2023-03-30 RX ADMIN — SODIUM CHLORIDE 2 G: 900 INJECTION INTRAVENOUS at 08:48

## 2023-03-30 RX ADMIN — PANTOPRAZOLE SODIUM 40 MG: 40 INJECTION, POWDER, LYOPHILIZED, FOR SOLUTION INTRAVENOUS at 06:03

## 2023-03-30 RX ADMIN — METRONIDAZOLE 500 MG: 500 INJECTION, SOLUTION INTRAVENOUS at 01:18

## 2023-03-30 RX ADMIN — Medication 30 ML: at 21:04

## 2023-03-30 RX ADMIN — METRONIDAZOLE 500 MG: 500 INJECTION, SOLUTION INTRAVENOUS at 19:43

## 2023-03-30 NOTE — CASE MANAGEMENT/SOCIAL WORK
Continued Stay Note   [place]     Patient Name: Sandro Junior  MRN: [id number]  Today's Date: 3/30/2023    Admit Date: 3/16/2023    Plan: IPR   Discharge Plan     Row Name 03/30/23 1547       Plan    Plan IPR    Patient/Family in Agreement with Plan yes    Plan Comments Per Shantell Mayorga, a precert will be submitted today for insurance approval.  CM will cont o follow the plan of care, f/u with  regarding pending insurance approval and assist with transportation arrangements when appropriate.    Final Discharge Disposition Code 62 - inpatient rehab facility               Discharge Codes    No documentation.               Expected Discharge Date and Time     Expected Discharge Date Expected Discharge Time    Mar 29, 2023             Janene Goode RN

## 2023-03-30 NOTE — THERAPY TREATMENT NOTE
Patient Name: Sandro Junior  : 1955    MRN: [id number]                              Today's Date: 3/30/2023       Admit Date: 3/16/2023    Visit Dx:     ICD-10-CM ICD-9-CM   1. Respiratory distress  R06.03 786.09   2. Tachycardia  R00.0 785.0   3. Airway compromise  J98.8 519.8   4. Acute epiglottitis with airway obstruction - probable  J05.11 464.31   5. Essential hypertension  I10 401.9   6. Voice disturbance  R49.9 784.40     Patient Active Problem List   Diagnosis   • Essential hypertension   • Chronic pain   • Osteoarthritis of knee   • Prolonged depressive adjustment reaction   • Acute prostatitis   • Atopic rhinitis   • Erectile dysfunction of nonorganic origin   • Glaucoma suspect   • Onychomycosis of toenail   • Malignant neoplasm of rectum (HCC)   • Seborrheic eczema   • Keloid scar   • Pain of right lower extremity   • Esophageal reflux   • Acute respiratory failure (HCC)   • Acute strep pyogenes (group A strep) epiglottitis with airway obstruction      Past Medical History:   Diagnosis Date   • Epidermal inclusion cyst    • Esophageal candidiasis (HCC)    • Keloid scar    • Rectal cancer (HCC)    • Seborrheic dermatitis      Past Surgical History:   Procedure Laterality Date   • COLECTOMY PARTIAL / TOTAL     • KNEE SURGERY      Left knee meniscal tear repair   • TOOTH EXTRACTION  2016 teeth   • TOTAL KNEE ARTHROPLASTY Right 2016   • TRACHEOSTOMY AND PEG TUBE INSERTION N/A 3/23/2023    Procedure: TRACHEOSTOMY AND PERCUTANEOUS ENDOSCOPIC GASTROSTOMY TUBE INSERTION;  Surgeon: Néstor Jerome MD;  [facility];  Service: General;  Laterality: N/A;      General Information     Row Name 23 1349          Physical Therapy Time and Intention    Document Type therapy note (daily note) (P)   -HT     Mode of Treatment physical therapy (P)   -HT     Row Name 23 1349          General Information    Patient Profile Reviewed yes (P)   -HT     Existing Precautions/Restrictions  fall;oxygen therapy device and L/min;other (see comments) (P)   trach/PEG  -HT     Barriers to Rehab medically complex (P)   -HT     Row Name 03/30/23 1349          Cognition    Orientation Status (Cognition) oriented x 3 (P)   -HT     Row Name 03/30/23 1349          Safety Issues, Functional Mobility    Safety Issues Affecting Function (Mobility) awareness of need for assistance;insight into deficits/self-awareness;safety precautions follow-through/compliance;safety precaution awareness (P)   -HT     Impairments Affecting Function (Mobility) balance;endurance/activity tolerance;strength;shortness of breath (P)   -HT           User Key  (r) = Recorded By, (t) = Taken By, (c) = Cosigned By    Initials Name Provider Type    HT Chela Barron, PT Student PT Student               Mobility     Row Name 03/30/23 1349          Bed Mobility    Bed Mobility supine-sit (P)   -HT     Supine-Sit [place] (Bed Mobility) modified independence (P)   -HT     Assistive Device (Bed Mobility) bed rails;head of bed elevated (P)   -HT     Row Name 03/30/23 1340          Sit-Stand Transfer    Sit-Stand [place] (Transfers) contact guard;verbal cues (P)   -HT     Assistive Device (Sit-Stand Transfers) walker, front-wheeled (P)   -HT     Comment, (Sit-Stand Transfer) 1x STS from EOB requiring cues for hand placement (P)   -HT     Row Name 03/30/23 1345          Gait/Stairs (Locomotion)    [place] Level (Gait) minimum assist (75% patient effort);verbal cues;nonverbal cues (demo/gesture) (P)   -HT     Distance in Feet (Gait) 200 (P)   -HT     Deviations/Abnormal Patterns (Gait) bilateral deviations;bladimir decreased;weight shifting decreased;base of support, narrow;stride length decreased;left sided deviations (P)   -HT     Bilateral Gait Deviations forward flexed posture (P)   -HT     Left Sided Gait Deviations lateral trunk flexion (P)   -HT     Comment, (Gait/Stairs) pt demonstrated step through gait pattern with  intermittant LOB, decreased bladimir, lateral trunk lean to the L occasionally, and narrow BROCK requiring cues for wider BROCK, increased stride length, safety cues to avoid reaching for UE support, and upright posture. Toward the end of gait, pt exhibited more lateral trunk flexion c/o B knee stiffness taking increased time to complete ambulation. Gait limited by low endurance, balance deficits, and BLE weakness. (P)   -HT           User Key  (r) = Recorded By, (t) = Taken By, (c) = Cosigned By    Initials Name Provider Type     Chela Barron, PT Student PT Student               Obj/Interventions     Row Name 03/30/23 3680          Balance    Balance Assessment sitting static balance;sitting dynamic balance;standing static balance;standing dynamic balance (P)   -HT     Static Sitting Balance independent (P)   -HT     Dynamic Sitting Balance standby assist (P)   -HT     Position, Sitting Balance sitting edge of bed (P)   -HT     Static Standing Balance contact guard (P)   -HT     Dynamic Standing Balance minimal assist (P)   -HT     Position/Device Used, Standing Balance supported (P)   -HT           User Key  (r) = Recorded By, (t) = Taken By, (c) = Cosigned By    Initials Name Provider Type     Chela Barron, PT Student PT Student               Goals/Plan    No documentation.                Clinical Impression     Row Name 03/30/23 3337          Pain    Pretreatment Pain Rating 0/10 - no pain (P)   -HT     Posttreatment Pain Rating 0/10 - no pain (P)   -HT     Additional Documentation Pain Scale: Numbers Pre/Post-Treatment (Group) (P)   -HT     Row Name 03/30/23 0031          Plan of Care Review    Plan of Care Reviewed With patient (P)   -HT     Progress improving (P)   -HT     Outcome Evaluation Pt ambulated 200' with min A with intermittant LOB showing limitations of decreased endurance, balance deficits, and LE weakness. Rec skilled PT to increase ind with mobility and d/c to IRF pending medical needs.  (P)   -HT     Row Name 03/30/23 1358          Therapy Assessment/Plan (PT)    Rehab Potential (PT) -- (P)   -HT     Criteria for Skilled Interventions Met (PT) yes;skilled treatment is necessary;meets criteria (P)   -HT     Row Name 03/30/23 1357          Vital Signs    Pre Systolic BP Rehab 157 (P)   -HT     Pre Treatment Diastolic BP 5 (P)   -HT     Post Systolic BP Rehab 143 (P)   -HT     Post Treatment Diastolic BP 85 (P)   -HT     Pretreatment Heart Rate (beats/min) 97 (P)   -HT     Posttreatment Heart Rate (beats/min) 92 (P)   -HT     Pre SpO2 (%) 96 (P)   -HT     O2 Delivery Pre Treatment room air (P)   -HT     Intra SpO2 (%) 90 (P)   -HT     O2 Delivery Intra Treatment room air (P)   -HT     Post SpO2 (%) 97 (P)   -HT     O2 Delivery Post Treatment room air (P)   -HT     Pre Patient Position Supine (P)   -HT     Intra Patient Position Standing (P)   -HT     Post Patient Position Sitting (P)   -HT     Row Name 03/30/23 1206          Positioning and Restraints    Pre-Treatment Position in bed (P)   -HT     Post Treatment Position chair (P)   -HT     In Chair notified nsg;reclined;sitting;call light within reach;encouraged to call for assist;exit alarm on;legs elevated;waffle cushion (P)   -HT           User Key  (r) = Recorded By, (t) = Taken By, (c) = Cosigned By    Initials Name Provider Type    HT Chela Barron, PT Student PT Student               Outcome Measures     Row Name 03/30/23 1405          How much help from another person do you currently need...    Turning from your back to your side while in flat bed without using bedrails? 4 (P)   -HT     Moving from lying on back to sitting on the side of a flat bed without bedrails? 4 (P)   -HT     Moving to and from a bed to a chair (including a wheelchair)? 3 (P)   -HT     Standing up from a chair using your arms (e.g., wheelchair, bedside chair)? 3 (P)   -HT     Climbing 3-5 steps with a railing? 3 (P)   -HT     To walk in hospital room? 3 (P)   -HT      AM-PAC 6 Clicks Score (PT) 20 (P)   -HT     Highest level of mobility 6 --> Walked 10 steps or more (P)   -HT     Row Name 03/30/23 1404          Functional Assessment    Outcome Measure Options AM-PAC 6 Clicks Basic Mobility (PT) (P)   -HT           User Key  (r) = Recorded By, (t) = Taken By, (c) = Cosigned By    Initials Name Provider Type    HT Chela Barron, PT Student PT Student                             Physical Therapy Education     Title: PT OT SLP Therapies (Done)     Topic: Physical Therapy (Done)     Point: Mobility training (Done)     Learning Progress Summary           Patient Acceptance, E, VU,NR by HT at 3/30/2023 1405    Acceptance, E, VU by HT at 3/29/2023 1144    Acceptance, E, DU,NR by HT at 3/28/2023 1458    Acceptance, E, VU by HT at 3/27/2023 1026    Acceptance, E, VU by EL at 3/26/2023 1707    Acceptance, E,D, VU,NR by LS at 3/26/2023 1429                   Point: Home exercise program (Done)     Learning Progress Summary           Patient Acceptance, E, DU,NR by HT at 3/28/2023 1458    Acceptance, E, VU by HT at 3/27/2023 1026    Acceptance, E, VU by EL at 3/26/2023 1707                   Point: Body mechanics (Done)     Learning Progress Summary           Patient Acceptance, E, VU,NR by HT at 3/30/2023 1405    Acceptance, E, VU by HT at 3/29/2023 1144    Acceptance, E, DU,NR by HT at 3/28/2023 1458    Acceptance, E, VU by HT at 3/27/2023 1026    Acceptance, E, VU by EL at 3/26/2023 1707    Acceptance, E,D, VU,NR by LS at 3/26/2023 1429                   Point: Precautions (Done)     Learning Progress Summary           Patient Acceptance, E, VU,NR by HT at 3/30/2023 1405    Acceptance, E, VU by HT at 3/29/2023 1144    Acceptance, E, DU,NR by HT at 3/28/2023 1458    Acceptance, E, VU by HT at 3/27/2023 1026    Acceptance, E, VU by EL at 3/26/2023 1707    Acceptance, E,D, VU,NR by JUAN CARLOS at 3/26/2023 1429                               User Key     Initials Effective Dates Name Provider  Type Discipline     02/03/23 -  Norma Villegas, PT Physical Therapist PT    EL 06/16/21 -  Giana Yates, RN Registered Nurse Nurse    HT 01/12/23 -  Chela Barron, PT Student PT Student PT              PT Recommendation and Plan     Plan of Care Reviewed With: (P) patient  Progress: (P) improving  Outcome Evaluation: (P) Pt ambulated 200' with min A with intermittant LOB showing limitations of decreased endurance, balance deficits, and LE weakness. Rec skilled PT to increase ind with mobility and d/c to IRF pending medical needs.     Time Calculation:    PT Charges     Row Name 03/30/23 1405             Time Calculation    Start Time 1310 (P)   -HT      PT Received On 03/30/23 (P)   -HT         Timed Charges    46058 - PT Therapeutic Activity Minutes 23 (P)   -HT         Total Minutes    Timed Charges Total Minutes 23 (P)   -HT       Total Minutes 23 (P)   -HT            User Key  (r) = Recorded By, (t) = Taken By, (c) = Cosigned By    Initials Name Provider Type     Chela Barron, PT Student PT Student              Therapy Charges for Today     Code Description Service Date Service Provider Modifiers Qty    53441086393 HC PT THERAPEUTIC ACT EA 15 MIN 3/29/2023 Chela Barron, PT Student GP 2    03061844346 HC PT THERAPEUTIC ACT EA 15 MIN 3/30/2023 Chela Barron, PT Student GP 2          PT G-Codes  Outcome Measure Options: (P) AM-PAC 6 Clicks Basic Mobility (PT)  AM-PAC 6 Clicks Score (PT): (P) 20  AM-PAC 6 Clicks Score (OT): 10       Chela Barron PT Student  3/30/2023

## 2023-03-30 NOTE — PLAN OF CARE
Goal Outcome Evaluation:  VSS on room air, tracheostomy collar off this morning. Pt ambulated twice, has been up to the chair for most of the day. Atarax was given @1430 for anxiety. Pt switched to bolus TF, tolerated 320mL with 100mL flush of water. Coreg increased to 12.5 mg. Spouse at bedside and updated.

## 2023-03-30 NOTE — PROGRESS NOTES
Clinical Nutrition     Nutrition Support Assessment  Reason for Visit: MDR, Follow-up protocol, EN      Patient Name: Sandro Junior  YOB: 1955  MRN: [id number]  Date of Encounter: 03/30/23 12:43 EDT  Admission date: 3/16/2023    Nutrition Assessment     Problem List:    Acute strep pyogenes (group A strep) epiglottitis with airway obstruction     Essential hypertension    Acute respiratory failure (HCC)    ARF/Intubated 3-16-23      PMH:   He  has a past medical history of Epidermal inclusion cyst, Esophageal candidiasis (HCC), Keloid scar, Rectal cancer (HCC), and Seborrheic dermatitis.    PSH:  He  has a past surgical history that includes Knee surgery; Colectomy partial / total; Total knee arthroplasty (Right, 04/11/2016); Tooth extraction (2016); and tracheostomy and peg tube insertion (N/A, 3/23/2023).      Applicable Nutrition Concerns:   Skin: surgical sites  GI:abdomen taut, last bm 3-26      Applicable Interval History:   (3/16) intubated, large bore NG tube placed  (3/17) EN initiated  (3/20) s/p bronchoscopy - airway edema  (3-23) s/p Trach, PEG      Reported/Observed/Food/Nutrition Related History:     Pt resting in bed, talking with PMV, has garbled speech, lots of secretions, pt wants to know when he will be able to eat, take out Trach    Per RN: pt has been tolerating TF    Plan to transition to bolus feeds    Per PT: pt walked 40ft yesterday    Labs    Labs Reviewed: Yes     Results from last 7 days   Lab Units 03/30/23  0338 03/28/23  0823 03/27/23  0506 03/26/23  1206 03/26/23  0324 03/25/23  0425   GLUCOSE mg/dL 166* 140* 141*  --  174* 194*   BUN mg/dL 16 14 17  --  19 19   CREATININE mg/dL 0.59* 0.61* 0.68*  --  0.55* 0.56*   SODIUM mmol/L 132* 132* 134*  --  134* 136   CHLORIDE mmol/L 102 102 100  --  102 102   POTASSIUM mmol/L 4.0 4.1 4.0  --  4.2 4.3   PHOSPHORUS mg/dL  --  2.9 2.6 2.1* 2.0*  2.0* 2.4*   MAGNESIUM mg/dL  --  2.5* 1.7  1.7  --  1.9 1.8    ALT (SGPT) U/L 13  --  17  --   --  22       Results from last 7 days   Lab Units 03/30/23  0338 03/27/23  0506 03/26/23  0324 03/25/23  0425   ALBUMIN g/dL 3.3* 3.2* 3.1* 3.2*   PREALBUMIN mg/dL  --  22.0  --   --    CRP mg/dL  --  1.09* 1.54* 2.86*   CHOLESTEROL mg/dL  --  118  --   --    TRIGLYCERIDES mg/dL  --  107  --   --        Results from last 7 days   Lab Units 03/30/23  1205 03/30/23  0606 03/30/23  0012 03/29/23  1722 03/29/23  1142 03/29/23  0737   GLUCOSE mg/dL 109 134* 125 140* 119 174*     Lab Results   Lab Value Date/Time    HGBA1C 5.2 10/20/2020 0841                 Medications    Medications Reviewed: Yes  Abx, heparin, flagyl, protonix, bowel regimem      Intake/Ouptut 24 hrs (0701 - 0700)   I&O's Reviewed: Yes     2662/1200    Anthropometrics       Height: 73in  Weight:164lb bedscale on admit  BMI: 21.6  BMI classification: Normal: 18.5-24.9kg/m2      UBW:  Last 15 Recorded Weights  View Complete Flowsheet  Weight Weight (kg) Weight (lbs) Weight Method VISIT REPORT   3/17/2023 77.9 kg 171 lb 11.8 oz - -   3/16/2023 74.5 kg 164 lb 3.9 oz Bed scale -   3/16/2023 79.379 kg 175 lb Estimated -   4/5/2022 80.559 kg 177 lb 9.6 oz - Report   1/10/2022 80.196 kg 176 lb 12.8 oz - Report   12/9/2021 79.652 kg 175 lb 9.6 oz - Report   11/13/2021 84.823 kg 187 lb Stated -   3/22/2021 83.008 kg 183 lb - Report   11/19/2020 83.643 kg 184 lb 6.4 oz - Report   10/20/2020 82.918 kg 182 lb 12.8 oz - Report   9/21/2020 84.188 kg 185 lb 9.6 oz - Report   3/16/2020 85.639 kg 188 lb 12.8 oz - Report   2/7/2020 84.823 kg 187 lb - Report   7/16/2019 81.738 kg 180 lb 3.2 oz - Report   1/15/2019 83.915 kg 185 lb - Report       Needs Assessment   Date: 3/30    Height used:73in  Weights used:164lb    Estimated Calorie needs: ~2000 calories   Method: MSJ x 1.3: 2053kcal  Method: 25-30 Kcals/KG actual wt = 1864-2236kcal    Estimated Protein needs: ~112 g protein  Method: 1.2-1.5 g/Kg actual wt:89-112g protein      Current  Nutrition Prescription     PO: NPO Diet NPO Type: Strict NPO    EN: Peptamen AF @75ml/hr, Prosource 1x/day, free water 30ml Q 2 hours    Route: PEG    Intake: 1381ml, 1717kcal, 86% kcal needs, 120g protein, 107% protein needs,  8g fiber,7669ml free water    TF at goal volume will provide 1500ml, 1860kcal, 93% kcal needs, 129g protein, 115% protein needs, 9g fiber, 1575ml free water including flush    Nutrition Diagnosis     Date: 3/17 Updated: 3-30  Problem Inadequate energy intake   Etiology ARF/ Surgery   Signs/Symptoms 92% goal volume EN   Status: improved      Goal:   General: Nutrition support treatment    EN/PN: Adjust EN    Nutrition Intervention      Follow treatment progress, Nutrition support order placed    As pt now off vent for several days and is much more physically active, will adjust EN to better meet est kcal + protein needs    Will also transition to bolus feeds as pt has not had any further vomiting    Change to Fibersource HN:  320ml bolus 5x/day, Prosource 2x/day, 50ml flush before and after each bolus    Start first bolus with 160ml, then advance to 320ml next bolus     Schedule 6am---9am---12pm---3pm---6pm    TF at goal volume will provide 1600ml, 2040kcal, 116g protein, 24g fiber, 1796ml free water including flush    Na+ 132, will continue to monitor and adjust free water as clinically indicated    Monitoring/Evaluation:   Per protocol, I&O, Pertinent labs, EN delivery/tolerance, Weight, GI status, Symptoms, Swallow function, Hemodynamic stability      Yessy Duncan RD  Time Spent: 60min

## 2023-03-30 NOTE — PROGRESS NOTES
1       Sandro Junior  1955  [id number]  3/30/2023    CC: Sore throat and fever    Sandro Junior is a 68 y.o. male here for dyspnea, adult epiglottitis, severe sepsis with group A beta-hemolytic streptococcal bacteremia, mechanical ventilation/subsequent tracheostomy for airway support.      Past medical history:  Past Medical History:   Diagnosis Date   • Epidermal inclusion cyst    • Esophageal candidiasis (HCC)    • Keloid scar    • Rectal cancer (HCC)    • Seborrheic dermatitis        Medications:   Current Facility-Administered Medications:   •  acetaminophen (TYLENOL) 160 MG/5ML solution 650 mg, 650 mg, Per PEG Tube, Q6H PRN, Yinka Payne [facility], 650 mg at 03/28/23 1714  •  [DISCONTINUED] acetaminophen (TYLENOL) tablet 650 mg, 650 mg, Nasogastric, Q4H PRN, 650 mg at 03/21/23 0253 **OR** acetaminophen (TYLENOL) suppository 650 mg, 650 mg, Rectal, Q4H PRN, Néstor Jerome MD  •  albuterol (PROVENTIL) nebulizer solution 0.083% 2.5 mg/3mL, 2.5 mg, Nebulization, Q6H PRN, Monika Louis MD  •  sennosides-docusate (PERICOLACE) 8.6-50 MG per tablet 2 tablet, 2 tablet, Per PEG Tube, BID, 2 tablet at 03/28/23 0940 **AND** polyethylene glycol (MIRALAX) packet 17 g, 17 g, Per PEG Tube, Daily PRN **AND** [DISCONTINUED] bisacodyl (DULCOLAX) EC tablet 5 mg, 5 mg, Oral, Daily PRN **AND** bisacodyl (DULCOLAX) suppository 10 mg, 10 mg, Rectal, Daily PRN, Yinka Payne [facility]  •  carvedilol (COREG) tablet 6.25 mg, 6.25 mg, Per PEG Tube, Q12H, Monika Louis MD, 6.25 mg at 03/29/23 1613  •  cefTRIAXone (ROCEPHIN) 2 g/100 mL 0.9% NS IVPB (MBP), 2 g, Intravenous, Q24H, Zana Johnson MD, 2 g at 03/29/23 0853  •  chlorhexidine (PERIDEX) 0.12 % solution 15 mL, 15 mL, Mouth/Throat, Q12H, Néstor Jerome MD, 15 mL at 03/29/23 2050  •  dextrose (D50W) (25 g/50 mL) IV injection 25 g, 25 g, Intravenous, Q15 Min PRN, Negar Amezcua, DENIS  •  dextrose (GLUTOSE) oral gel 15 g, 15 g, Oral, Q15 Min  PRN, Negar Amezcua APRN  •  glucagon (GLUCAGEN) injection 1 mg, 1 mg, Intramuscular, Q15 Min PRN, Negar Amezcua APRN  •  heparin (porcine) 5000 UNIT/ML injection 5,000 Units, 5,000 Units, Subcutaneous, Q8H, Néstor Jerome MD, 5,000 Units at 03/30/23 0603  •  hydrALAZINE (APRESOLINE) injection 20 mg, 20 mg, Intravenous, Q4H PRN, Néstor Jerome MD, 20 mg at 03/29/23 2051  •  hydrOXYzine (ATARAX) 10 MG/5ML syrup 25 mg, 25 mg, Per PEG Tube, Q8H PRN, Monika Louis MD, 25 mg at 03/30/23 0201  •  insulin regular (humuLIN R,novoLIN R) injection 0-7 Units, 0-7 Units, Subcutaneous, Q6H, Negar Amezcua APRN, 2 Units at 03/29/23 0854  •  latanoprost (XALATAN) 0.005 % ophthalmic solution 1 drop, 1 drop, Both Eyes, Daily, Néstor Jerome MD, 1 drop at 03/29/23 0927  •  Magnesium Standard Dose Replacement - Initiate Nurse / BPA Driven Protocol, , Does not apply, Justus KELLER Gustavo V, MD  •  metroNIDAZOLE (FLAGYL) IVPB 500 mg, 500 mg, Intravenous, Q6H, Zana Johnson MD, 500 mg at 03/30/23 0118  •  pantoprazole (PROTONIX) injection 40 mg, 40 mg, Intravenous, Q AM, Néstor Jerome MD, 40 mg at 03/30/23 0603  •  Phosphorus Replacement - Initiate Nurse / BPA Driven Protocol, , Does not apply, Justus KELLER Gustavo V, MD  •  polyethylene glycol (MIRALAX) packet 17 g, 17 g, Per PEG Tube, Daily, Trish Callahan, PharmD, 17 g at 03/29/23 0850  •  Potassium Replacement - Initiate Nurse / BPA Driven Protocol, , Does not apply, Justus KELLER Gustavo V, MD  •  ProSource No Carb oral solution 30 mL, 30 mL, Per PEG Tube, Daily, Yinka Payne, [facility], 30 mL at 03/29/23 1238  •  risperiDONE (risperDAL) tablet 1 mg, 1 mg, Per PEG Tube, Nightly, Monika Louis MD, 1 mg at 03/29/23 2051  •  sodium chloride 0.9 % flush 10 mL, 10 mL, Intravenous, Q12H, Néstor Jerome MD, 10 mL at 03/29/23 2051  •  sodium chloride 0.9 % flush 10 mL, 10 mL, Intravenous, PRN, Néstor Jerome MD  •  sodium chloride 0.9 %  "flush 20 mL, 20 mL, Intravenous, PRN, Néstor Jerome MD  •  valsartan (DIOVAN) tablet 160 mg, 160 mg, Per PEG Tube, Q24H, Monika Louis MD, 160 mg at 03/29/23 0854  Antibiotics:  Anti-Infectives (From admission, onward)    Ordered     Dose/Rate Route Frequency Start Stop    03/28/23 0734  cefTRIAXone (ROCEPHIN) 2 g/100 mL 0.9% NS IVPB (MBP)        Ordering Provider: Zana Johnson MD    2 g  over 30 Minutes Intravenous Every 24 Hours Scheduled 03/28/23 0900 04/04/23 0859    03/20/23 1241  metroNIDAZOLE (FLAGYL) IVPB 500 mg        Ordering Provider: Zana Johnson MD    500 mg  over 30 Minutes Intravenous Every 6 Hours 03/20/23 1400 03/31/23 1359    03/16/23 2151  vancomycin 1500 mg/500 mL 0.9% NS IVPB ([facility])        Ordering Provider: Anand Eaton MD    20 mg/kg × 79.4 kg Intravenous Once 03/16/23 2153 03/16/23 2323          Allergies:  has No Known Allergies.    Review of Systems: All other reviewed and negative except as per HPI    Blood pressure (!) 166/102, pulse 112, temperature 99.3 °F (37.4 °C), temperature source Oral, resp. rate 20, height 185.4 cm (73\"), weight 92 kg (202 lb 13.2 oz), SpO2 92 %.  GENERAL: Awake and alert, in no acute distress.  Tracheostomy.  Speaks with Passy-Kacie valve.  Unaware of fever, chills, or night sweats.  Mild cough.  Scant sputum production.  No hemoptysis.  Denies nausea or vomiting.  No diarrhea.  Unaware of genitourinary tract symptoms.  HEENT: Facial erythroderma with partial desquamation.  Oropharynx without thrush. Sinuses nontender. Dentition in good repair. No cervical adenopathy. No carotid bruits/ jugular venous distention.   EYES: PERRL. No conjunctival injection. No icterus. EOMI.  LYMPHATICS: No lymphadenopathy of the neck or axillary or inguinal regions.   HEART: No murmur, gallop, or pericardial friction rub.  Right antecubital PICC exit site without phlebitis.  LUNGS: Clear to auscultation anteriorly. No percussion dullness. "   ABDOMEN: Soft, nontender, nondistended. No appreciable HSM.  No peritoneal findings rebound or guarding.  PEG exit site without erythema or purulent drainage.  SKIN: Warm and dry without cutaneous eruptions. No embolic stigmata.   PSYCHIATRIC: Mental status lucid. Cranial nerve function intact.       DIAGNOSTICS:  Lab Results   Component Value Date    WBC 7.59 03/30/2023    HGB 12.3 (L) 03/30/2023    HCT 38.5 03/30/2023     03/30/2023     Lab Results   Component Value Date    CRP 1.09 (H) 03/27/2023     Lab Results   Component Value Date    SEDRATE >130 (H) 03/26/2023     Lab Results   Component Value Date    GLUCOSE 166 (H) 03/30/2023    BUN 16 03/30/2023    CREATININE 0.59 (L) 03/30/2023    EGFRIFAFRI 109 01/10/2022    BCR 27.1 (H) 03/30/2023    CO2 23.0 03/30/2023    CALCIUM 9.5 03/30/2023    ALBUMIN 3.3 (L) 03/30/2023    AST 16 03/30/2023    ALT 13 03/30/2023       Microbiology: Bio fire respiratory pathogen's panel on admission negative.  2/3 admission blood cultures with beta-hemolytic group A streptococci/penicillin and ceftriaxone EDNA is favorable.  MRSA nasopharyngeal PCR unremarkable.    RADIOLOGY:  Imaging Results (Last 72 Hours)     ** No results found for the last 72 hours. **          Assessment and Plan: Adult epiglottitis.  Threatened airway.  Severe sepsis.  Marked leukocytosis.  Metabolic acidosis with elevated serum lactate.  Increased procalcitonin.  Group A beta-hemolytic streptococcal bacteremia.  Intermittent fever.  Questionable penicillin induced drug fever.  Maximum temperature over 24 hours 99.6.  Currently 99.3.  Pulse 112, blood pressure 166/102, respiratory 20, and O2 saturation of 92% on 8 L of humidified oxygen by trach collar.  BUN and creatinine 16/0.6.  Liver function studies within normal limits.  Peripheral leukocyte count 7.59 with 54% segmented neutrophils.  No new culture data.  No new radiographic imaging studies.  Continues on ceftriaxone and metronidazole.  Day #14  of intravenous antimicrobials directed against group A beta-hemolytic streptococci.  Consider de-escalation to oral amoxicillin-clavulanate but concerns over swallowing mechanism and persistent retropharyngeal edema.  This could be given by suspension form every 12 hours via PEG.  I will discuss with Dr. Louis.      Zana Johnson MD  3/30/2023

## 2023-03-30 NOTE — PROGRESS NOTES
"Critical Care Note     LOS: 14 days   Patient Care Team:  Dora Loya PA as PCP - General (Internal Medicine)    Chief Complaint/Reason for visit:    Chief Complaint   Patient presents with   • Shortness of Breath     Acute strep pyogenes (group A strep) epiglottitis with airway obstruction     Acute respiratory failure (HCC)    Essential hypertension      Subjective     Interval History:     Temperature resolved.  On trach collar for 3 days.  When he wears his Passy-Kacie valve he is on room air.  He received a dose of risperidone and 2 doses of Atarax last night and slept through the night and feels well this morning.  He got 1 dose of hydralazine overnight.  Urine output 1200 MLS yesterday.  He ambulated in the [place] today with therapy.    Review of Systems:    All systems were reviewed and negative except as noted in subjective.    Medical history, surgical history, social history, family history reviewed    Objective     Intake/Output:    Intake/Output Summary (Last 24 hours) at 3/30/2023 1404  Last data filed at 3/30/2023 1000  Gross per 24 hour   Intake 2384.32 ml   Output 2100 ml   Net 284.32 ml       Nutrition:  NPO Diet NPO Type: Strict NPO    Infusions:       Mechanical Ventilator Settings:            Resp Rate (Set): 12  Pressure Support (cm H2O): 10 cm H20  FiO2 (%): 30 %  PEEP/CPAP (cm H2O): 6 cm H20    Minute Ventilation (L/min) (Obs): 7.56 L/min  Resp Rate (Observed) Vent: 65  I:E Ratio (Set): 1:3.55  I:E Ratio (Obs): 1:3.5    PIP Observed (cm H2O): 18 cm H2O  Plateau Pressure (cm H2O): (S) 21 cm H2O    Telemetry: Sinus rhythm, sinus tachycardia             Vital Signs  Blood pressure 157/95, pulse 80, temperature 98.9 °F (37.2 °C), temperature source Oral, resp. rate 20, height 185.4 cm (73\"), weight 92 kg (202 lb 13.2 oz), SpO2 92 %.    Physical Exam:  General Appearance:   Middle-aged gentleman supine in bed   Head:   Atraumatic   Eyes:          No jaundice, conjunctiva pink   Ears:     Throat:  " No thrush   Neck:  Trachea midline, tracheostomy in place, no palpable thyroid   Back:      Lungs:    Symmetric chest expansion with scattered bilateral rhonchi    Heart:   Regular rhythm, S1, S2 auscultated   Abdomen:    PEG tube in place.  Bowel sounds present, soft   Rectal:   Deferred   Extremities:  No pretibial edema   Pulses:  Palpable radial pulses   Skin:  Warm and dry   Lymph nodes:    Neurologic:  Awake, follows commands, no focal weakness, speaking with Passy-[place] valve      Results Review:     I reviewed the patient's new clinical results.   Results from last 7 days   Lab Units 03/30/23  0338 03/28/23  0823 03/27/23  0506 03/26/23  0324 03/25/23  0425   SODIUM mmol/L 132* 132* 134*   < > 136   POTASSIUM mmol/L 4.0 4.1 4.0   < > 4.3   CHLORIDE mmol/L 102 102 100   < > 102   CO2 mmol/L 23.0 21.0* 28.0   < > 28.0   BUN mg/dL 16 14 17   < > 19   CREATININE mg/dL 0.59* 0.61* 0.68*   < > 0.56*   CALCIUM mg/dL 9.5 8.7 9.1   < > 8.6   BILIRUBIN mg/dL 0.4  --  0.4  --  0.4   ALK PHOS U/L 55  --  65  --  51   ALT (SGPT) U/L 13  --  17  --  22   AST (SGOT) U/L 16  --  23  --  27   GLUCOSE mg/dL 166* 140* 141*   < > 194*    < > = values in this interval not displayed.     Results from last 7 days   Lab Units 03/30/23  0338 03/28/23  0623 03/27/23  0506   WBC 10*3/mm3 7.59 9.39 7.85   HEMOGLOBIN g/dL 12.3* 12.0* 12.1*   HEMATOCRIT % 38.5 36.8* 37.9   PLATELETS 10*3/mm3 408 341 271     Results from last 7 days   Lab Units 03/28/23  0343   PH, ARTERIAL pH units 7.502*   PO2 ART mm Hg 100.0   PCO2, ARTERIAL mm Hg 32.0*   HCO3 ART mmol/L 25.0     Lab Results   Component Value Date    BLOODCX No growth at 5 days 03/20/2023     No results found for: URINECX    I reviewed the patient's new imaging including images and reports.    No new x-rays    All medications reviewed.   carvedilol, 6.25 mg, Per PEG Tube, Q12H  cefTRIAXone, 2 g, Intravenous, Q24H  chlorhexidine, 15 mL, Mouth/Throat, Q12H  heparin (porcine), 5,000 Units,  Subcutaneous, Q8H  insulin regular, 0-7 Units, Subcutaneous, Q6H  latanoprost, 1 drop, Both Eyes, Daily  metroNIDAZOLE, 500 mg, Intravenous, Q6H  pantoprazole, 40 mg, Intravenous, Q AM  polyethylene glycol, 17 g, Per PEG Tube, Daily  ProSource No Carb, 30 mL, Per PEG Tube, BID  risperiDONE, 1 mg, Per PEG Tube, Nightly  senna-docusate sodium, 2 tablet, Per PEG Tube, BID  sodium chloride, 10 mL, Intravenous, Q12H  valsartan, 160 mg, Per PEG Tube, Q24H          Assessment & Plan       Acute strep pyogenes (group A strep) epiglottitis with airway obstruction     Essential hypertension    Acute respiratory failure (HCC)    68-year-old male who presented to [facility] ER 3/16/2023 c/o dyspnea and sore throat. Apparently, first noticed a sore throat around noon the day of admission and became increasingly symptomatic with dyspnea and stridor throughout the day before presenting to the ER.  He was given epinephrine, steroids, and H2 blockers in the ER but despite this he began tripoding and drooling and had audible stridor.  Per Dr. Eaton (ER physician) intubation was difficult due to edema and erythema. Imaging revealed abnormal appearance of the soft tissues of the laryngeal area with some edema and swelling of the uvula but no evidence of a fluid collection or abscess.  Bibasilar infiltrates were present possibly related to atelectasis or retained secretions but there was no evidence of consolidative pneumonia.  He was given Zosyn and vancomycin empirically as well as steroids.  Blood culture subsequently grew Streptococcus pyogenes.  Patient underwent bronchoscopy 3/20/2023 and was noted to have persistent edematous changes in the posterior oropharynx with copious mucopurulent secretions and repeat CT neck 3/21/2023 revealed worsening edema but no sign of abscess.  Antimicrobial coverage was changed by ID to a continuous penicillin infusion.  Because of persistent findings and inability to safely extubate patient, he  underwent tracheostomy and PEG feeding tube 3/23/2023.  Patient subsequently has begun trach collar trials as of 3/25/2023.     He has been off mechanical ventilation for 3 days.  Oxygenation remains adequate.  He does have some secretions.  He got a good night sleep last night and is feeling improved today.  Still having some hypertension.  Initially his home Diovan was started and then increase but he remained hypertensive.  Carvedilol was started yesterday with improvement.        PLAN:    Antibiotics per infectious disease,  Rocephin and Flagyl   Leave on trach collar as tolerates  Occasionally requires tracheal suctioning   Risperdal 1 mg at bedtime  Atarax as needed anxiety  Continue tube feeding, try bolus feeding  PT, OT   Diovan 160 mg daily  Increase carvedilol to 12.5 mg every 12 hours    [facility] for rehab      VTE Prophylaxis: subcutaneous heparin    Stress Ulcer Prophylaxis: Protonix    Monika Louis MD  03/30/23  14:04 EDT      Time: 25 minutes

## 2023-03-30 NOTE — THERAPY TREATMENT NOTE
Acute Care - Speech Language Pathology Progress Note  [facility]     Patient Name: Sandro Junior  : 1955  MRN: [id number]  Today's Date: 3/30/2023               Admit Date: 3/16/2023     Visit Dx:    ICD-10-CM ICD-9-CM   1. Respiratory distress  R06.03 786.09   2. Tachycardia  R00.0 785.0   3. Airway compromise  J98.8 519.8   4. Acute epiglottitis with airway obstruction - probable  J05.11 464.31   5. Essential hypertension  I10 401.9   6. Voice disturbance  R49.9 784.40     Patient Active Problem List   Diagnosis   • Essential hypertension   • Chronic pain   • Osteoarthritis of knee   • Prolonged depressive adjustment reaction   • Acute prostatitis   • Atopic rhinitis   • Erectile dysfunction of nonorganic origin   • Glaucoma suspect   • Onychomycosis of toenail   • Malignant neoplasm of rectum (HCC)   • Seborrheic eczema   • Keloid scar   • Pain of right lower extremity   • Esophageal reflux   • Acute respiratory failure ([facility])   • Acute strep pyogenes (group A strep) epiglottitis with airway obstruction      Past Medical History:   Diagnosis Date   • Epidermal inclusion cyst    • Esophageal candidiasis ([facility])    • Keloid scar    • Rectal cancer ([facility])    • Seborrheic dermatitis      Past Surgical History:   Procedure Laterality Date   • COLECTOMY PARTIAL / TOTAL     • KNEE SURGERY      Left knee meniscal tear repair   • TOOTH EXTRACTION  2016 teeth   • TOTAL KNEE ARTHROPLASTY Right 2016   • TRACHEOSTOMY AND PEG TUBE INSERTION N/A 3/23/2023    Procedure: TRACHEOSTOMY AND PERCUTANEOUS ENDOSCOPIC GASTROSTOMY TUBE INSERTION;  Surgeon: Néstor Jerome MD;  Location: [facility];  Service: General;  Laterality: N/A;       SLP Recommendation and Plan  SLP Diagnosis: difficulty voicing due to tracheostomy (23 1050)           SLC Criteria for Skilled Therapy Interventions Met: yes (23 105)  Anticipated Discharge Disposition (SLP): inpatient rehabilitation facility, anticipate therapy at  next level of care (03/30/23 1050)  Demonstrates Need for Referral to Another Service: speech therapy, other (see comments) (03/30/23 1050)     Predicted Duration Therapy Intervention (Days): until discharge (03/30/23 1050)     Daily Summary of Progress (SLP): progress toward functional goals is good (03/30/23 1050)           Treatment Assessment (SLP): continued, improved (03/30/23 1050)  Treatment Assessment Comments (SLP): Tolerating PMV well, no trach collar on.  Voicing audible. discussed with patient not sleeping in PMV (03/30/23 1050)  Plan for Continued Treatment (SLP): continue treatment per plan of care (03/30/23 1050)  Plan of Care Reviewed With: patient (03/30/23 1345)  Progress: improving (03/30/23 1345)      SLP EVALUATION (last 72 hours)     SLP SLC Evaluation     Row Name 03/30/23 1050 03/28/23 1600                Communication Assessment/Intervention    Document Type therapy note (daily note)  -AV therapy note (daily note)  -AC       Subjective Information no complaints  -AV complains of;fatigue  -AC       Patient Observations alert;cooperative  -AV lethargic;cooperative  -AC       Patient/Family/Caregiver Comments/Observations no family present  -AV No family present.  -AC       Patient Effort good  -AV fair  -AC          Pain    Additional Documentation Pain Scale: FACES Pre/Post-Treatment (Group)  -AV --          Pain Scale: FACES Pre/Post-Treatment    Pain: FACES Scale, Pretreatment 0-->no hurt  -AV 0-->no hurt  -AC       Posttreatment Pain Rating 0-->no hurt  -AV 0-->no hurt  -AC          SLP Evaluation Clinical Impressions    SLP Diagnosis difficulty voicing due to tracheostomy  -AV --       Rehab Potential/Prognosis good  -AV --       SLC Criteria for Skilled Therapy Interventions Met yes  -AV --       Functional Impact difficulty communicating wants, needs;difficulty communicating in an emergency;difficulty in expressing complex messages  -AV --          SLP Treatment Clinical Impressions     Treatment Assessment (SLP) continued;improved  -AV continued;difficulty voicing due to tracheostomy  -AC       Treatment Assessment Comments (SLP) Tolerating PMV well, no trach collar on.  Voicing audible. discussed with patient not sleeping in PMV  -AV Tolerating PMV well w/ occasional coughing, but needing further tx to address poor coordination of breathing/phonation. Cont PMV use when pt awake, cuff deflated, & w/ RN/RT/SLP. Remove PMV when pt sleeping. Attached PMV to trach collar w/ strap in event that pt coughs PMV off, will be ani to easily locate.  -AC       Daily Summary of Progress (SLP) progress toward functional goals is good  -AV progress toward functional goals as expected  -AC       Barriers to Overall Progress (SLP) -- Lethargy  -AC       Plan for Continued Treatment (SLP) continue treatment per plan of care  -AV continue treatment per plan of care  -AC       Care Plan Review care plan/treatment goals reviewed  -AV evaluation/treatment results reviewed;care plan/treatment goals reviewed;risks/benefits reviewed;current/potential barriers reviewed;patient/other agree to care plan  -AC          Recommendations    Therapy Frequency (SLP SLC) 5 days per week  -AV --       Predicted Duration Therapy Intervention (Days) until discharge  -AV --       Anticipated Discharge Disposition (SLP) inpatient rehabilitation facility;anticipate therapy at next level of care  -AV inpatient rehabilitation facility;anticipate therapy at next level of care  -AC       Demonstrates Need for Referral to Another Service speech therapy;other (see comments)  -AV speech therapy;other (see comments)  consider consult for swallow eval when medically appropriate per physician discretion  -AC             User Key  (r) = Recorded By, (t) = Taken By, (c) = Cosigned By    Initials Name Effective Dates    AC Shelley Iraheta, MS CCC-SLP 02/03/23 -     AV Komal Villegas, MS CCC-SLP 06/16/21 -                    EDUCATION  The  patient has been educated in the following areas:     Speaking Valve.           SLP GOALS     Row Name 03/30/23 1050 03/28/23 1600          Patient will demonstrate functional communication skills for return to discharge environment     [place] with minimal cues  -AV with minimal cues  -AC     Time frame by discharge  -AV by discharge  -AC     Progress/Outcomes continuing progress toward goal  -AV continuing progress toward goal  -AC        Tolerate Speaking Valve Placement Goal 1 (SLP)    Tolerate Speaking Valve Placement Goal 1 (SLP) speaking valve >30 min;90%;independently (over 90% accuracy)  -AV speaking valve >30 min;90%;independently (over 90% accuracy)  -AC     Time Frame (Tolerate Speaking Valve Placement Goal 1, SLP) short term goal (STG)  -AV short term goal (STG)  -AC     Progress (Tolerate Speaking Valve Placement Goal 1, SLP) 90%;independently (over 90% accuracy)  -AV 90%;independently (over 90% accuracy)  -AC     Progress/Outcomes (Tolerate Speaking Valve Placement Goal 1, SLP) good progress toward goal  -AV good progress toward goal  -AC     Comment (Tolerate Speaking Valve Placement Goal 1, SLP) -- Wearing PMV upon entry. Able to tolerate PMV w/o significant changes in vital signs. Occasional coughing that required oral suctioning. Reviewed recommendations for wearing PMV & removing when sleeping.  -AC        Audible Speech with Speaking Valve Goal 1 (SLP)    Audible Speech Goal 1 (SLP) 3 feet;background noise;90%;with minimal cues (75-90%)  -AV 3 feet;background noise;90%;with minimal cues (75-90%)  -AC     Time Frame (Audible Speech Goal 1, SLP) short term goal (STG)  -AV short term goal (STG)  -AC     Progress (Audible Speech Goal 1, SLP) 80%;with minimal cues (75-90%)  -AV 10%;with minimal cues (75-90%)  -AC     Progress/Outcomes (Audible Speech Goal 1, SLP) continuing progress toward goal  -AV continuing progress toward goal  -AC     Comment (Audible Speech Goal 1, SLP) -- Poor breath  support and coordination of breathing/phonation. Audible @ 1 ft ~40% of the time. Difficulty reponding to cues to breathe after 1-2 words in therapeutic tasks (counting) 2' lethargy.  -AC           User Key  (r) = Recorded By, (t) = Taken By, (c) = Cosigned By    Initials Name Provider Type    AC Shelley Iraheta, MS CCC-SLP Speech and Language Pathologist    AV Komal Villegas MS CCC-SLP Speech and Language Pathologist                        Time Calculation:      Time Calculation- SLP     Row Name 03/30/23 1351             Time Calculation- SLP    SLP Start Time 1030  -AV      SLP Received On 03/30/23  -AV         Untimed Charges    53103-OJ Treatment/ST Modification Prosth Aug Alter  45  -AV         Total Minutes    Untimed Charges Total Minutes 45  -AV       Total Minutes 45  -AV            User Key  (r) = Recorded By, (t) = Taken By, (c) = Cosigned By    Initials Name Provider Type    AV Komal Villegas, MS CCC-SLP Speech and Language Pathologist                Therapy Charges for Today     Code Description Service Date Service Provider Modifiers Qty    83029591728  ST TREATMENT SPEECH 3 3/30/2023 Komal Villegas MS CCC-SLP GN 1                     Komal D Caprice Issa MS CCC-SLP  3/30/2023

## 2023-03-30 NOTE — PLAN OF CARE
Goal Outcome Evaluation:  Plan of Care Reviewed With: (P) patient        Progress: (P) improving  Outcome Evaluation: (P) Pt ambulated 200' with min A with intermittant LOB showing limitations of decreased endurance, balance deficits, and LE weakness. Rec skilled PT to increase ind with mobility and d/c to IRF pending medical needs.

## 2023-03-30 NOTE — PLAN OF CARE
Goal Outcome Evaluation:  Plan of Care Reviewed With: patient, spouse        Progress: improving  Outcome Evaluation: VSS on 35% FiO2 via tracheostomy collar, PRN hydralazine given for SBP >150. BM x1 and 2 urinary incontinence episodes, UOP is adequate. TF is going at 75mL/hr. Atarax given PRN for anxiety. Afebrile. Spouse updated at bedside and via phone call this AM.

## 2023-03-31 LAB
GLUCOSE BLDC GLUCOMTR-MCNC: 101 MG/DL (ref 70–130)
GLUCOSE BLDC GLUCOMTR-MCNC: 135 MG/DL (ref 70–130)
GLUCOSE BLDC GLUCOMTR-MCNC: 147 MG/DL (ref 70–130)
GLUCOSE BLDC GLUCOMTR-MCNC: 92 MG/DL (ref 70–130)
HCT VFR BLD AUTO: 39.3 % (ref 37.5–51)
HGB BLD-MCNC: 12.6 G/DL (ref 13–17.7)
MAGNESIUM SERPL-MCNC: 2.4 MG/DL (ref 1.6–2.4)
PHOSPHATE SERPL-MCNC: 3.3 MG/DL (ref 2.5–4.5)
POTASSIUM SERPL-SCNC: 4.3 MMOL/L (ref 3.5–5.2)

## 2023-03-31 PROCEDURE — 83735 ASSAY OF MAGNESIUM: CPT | Performed by: INTERNAL MEDICINE

## 2023-03-31 PROCEDURE — 97530 THERAPEUTIC ACTIVITIES: CPT

## 2023-03-31 PROCEDURE — 99232 SBSQ HOSP IP/OBS MODERATE 35: CPT | Performed by: INTERNAL MEDICINE

## 2023-03-31 PROCEDURE — 84132 ASSAY OF SERUM POTASSIUM: CPT | Performed by: INTERNAL MEDICINE

## 2023-03-31 PROCEDURE — 82962 GLUCOSE BLOOD TEST: CPT

## 2023-03-31 PROCEDURE — 84100 ASSAY OF PHOSPHORUS: CPT | Performed by: INTERNAL MEDICINE

## 2023-03-31 PROCEDURE — 85018 HEMOGLOBIN: CPT | Performed by: INTERNAL MEDICINE

## 2023-03-31 PROCEDURE — 85014 HEMATOCRIT: CPT | Performed by: INTERNAL MEDICINE

## 2023-03-31 PROCEDURE — 25010000002 HEPARIN (PORCINE) PER 1000 UNITS: Performed by: SURGERY

## 2023-03-31 RX ORDER — AMOXICILLIN AND CLAVULANATE POTASSIUM 400; 57 MG/5ML; MG/5ML
400 POWDER, FOR SUSPENSION ORAL EVERY 12 HOURS SCHEDULED
Status: DISCONTINUED | OUTPATIENT
Start: 2023-03-31 | End: 2023-04-10

## 2023-03-31 RX ORDER — CHOLECALCIFEROL (VITAMIN D3) 125 MCG
5 CAPSULE ORAL NIGHTLY PRN
Status: DISCONTINUED | OUTPATIENT
Start: 2023-03-31 | End: 2023-04-11 | Stop reason: HOSPADM

## 2023-03-31 RX ORDER — VALSARTAN 160 MG/1
80 TABLET ORAL
Status: DISCONTINUED | OUTPATIENT
Start: 2023-04-01 | End: 2023-04-11 | Stop reason: HOSPADM

## 2023-03-31 RX ORDER — LANSOPRAZOLE
15 KIT
Status: DISCONTINUED | OUTPATIENT
Start: 2023-04-01 | End: 2023-04-11 | Stop reason: HOSPADM

## 2023-03-31 RX ADMIN — Medication 30 ML: at 08:30

## 2023-03-31 RX ADMIN — HEPARIN SODIUM 5000 UNITS: 5000 INJECTION, SOLUTION INTRAVENOUS; SUBCUTANEOUS at 06:09

## 2023-03-31 RX ADMIN — CHLORHEXIDINE GLUCONATE 0.12% ORAL RINSE 15 ML: 1.2 LIQUID ORAL at 08:30

## 2023-03-31 RX ADMIN — Medication 10 ML: at 21:56

## 2023-03-31 RX ADMIN — CARVEDILOL 12.5 MG: 12.5 TABLET, FILM COATED ORAL at 08:30

## 2023-03-31 RX ADMIN — HEPARIN SODIUM 5000 UNITS: 5000 INJECTION, SOLUTION INTRAVENOUS; SUBCUTANEOUS at 21:55

## 2023-03-31 RX ADMIN — RISPERIDONE 1 MG: 1 TABLET ORAL at 21:55

## 2023-03-31 RX ADMIN — LANSOPRAZOLE 15 MG: KIT at 06:10

## 2023-03-31 RX ADMIN — HYDROXYZINE HYDROCHLORIDE 25 MG: 10 SOLUTION ORAL at 10:40

## 2023-03-31 RX ADMIN — CARVEDILOL 12.5 MG: 12.5 TABLET, FILM COATED ORAL at 21:55

## 2023-03-31 RX ADMIN — LATANOPROST 1 DROP: 50 SOLUTION OPHTHALMIC at 08:33

## 2023-03-31 RX ADMIN — SENNOSIDES AND DOCUSATE SODIUM 2 TABLET: 8.6; 5 TABLET ORAL at 08:30

## 2023-03-31 RX ADMIN — POLYETHYLENE GLYCOL 3350 17 G: 17 POWDER, FOR SOLUTION ORAL at 08:30

## 2023-03-31 RX ADMIN — CHLORHEXIDINE GLUCONATE 0.12% ORAL RINSE 15 ML: 1.2 LIQUID ORAL at 21:57

## 2023-03-31 RX ADMIN — METRONIDAZOLE 500 MG: 500 INJECTION, SOLUTION INTRAVENOUS at 01:44

## 2023-03-31 RX ADMIN — Medication 30 ML: at 22:59

## 2023-03-31 RX ADMIN — Medication 5 MG: at 22:54

## 2023-03-31 RX ADMIN — HEPARIN SODIUM 5000 UNITS: 5000 INJECTION, SOLUTION INTRAVENOUS; SUBCUTANEOUS at 14:38

## 2023-03-31 RX ADMIN — AMOXICILLIN AND CLAVULANATE POTASSIUM 400 MG: 400; 57 POWDER, FOR SUSPENSION ORAL at 22:34

## 2023-03-31 RX ADMIN — VALSARTAN 160 MG: 160 TABLET, FILM COATED ORAL at 08:30

## 2023-03-31 RX ADMIN — AMOXICILLIN AND CLAVULANATE POTASSIUM 400 MG: 400; 57 POWDER, FOR SUSPENSION ORAL at 10:40

## 2023-03-31 NOTE — PROGRESS NOTES
Multidisciplinary Rounds    Patient Name: Sandro Davisodore  Date of Encounter: 03/31/23 08:36 EDT  MRN: [id number]  Admission date: 3/16/2023    Reason for visit: MDR. RD to continue to follow per protocol.     Additional information obtained during MDR: doing well per RN. On room last night (did not require vent).  SLP following for PO diet readiness.  EN changed to bolus yesterday, tolerating so far.     GTT: none    24hr I/OS:  UOP 1550ml  Stool: + 2BM    Patient sitting in bedside chair at time of visit, EN infusing. No c/o abdominal discomfort or EN intolerance.  Reports he just had a BM.  Reports UBW 184lb. Patient with documented weight of 164lb on 3/16 (admission) with most recent bed weight of 202lb (no method).  Bed zeored at time of visit, will ask RN to obtain a weight once patient is back on in the bed. Patient does not feel he lost or gained any weight.     Current diet: NPO Diet NPO Type: Strict NPO    EN: FiberSource HN   Goal Rate: 320ml  Water Flushes: 100ml   Modular: Prosource-no carb 2/day  Route: PEG  Tube: Unknown    At goal over: Other bolus (5 times a day) - 6am, 9am, 12pm, 3pm and 6pm.     Rx will supply:   Goal Volume 1600 mL/day     Flush Volume 500 mL/day     Energy 2040 Kcal/day  % Est Need   Protein 116 g/day  % Est Need   Fiber 24 g/day     Water in  EN 1296 mL     Total Water 1796 mL     Meet DRI Yes        --------------------------------------------------------------------------  Product/Rate verified at bedside: Yes  Infusing Rate at time of visit: bolus feeding running @ time of visit.     Average Delivery from Charting: Insufficient data EN changed from continuous to bolus.     Intervention:  Follow treatment plan  Care plan reviewed  Continue with current EN Rx   Monitor swallow eval   RN to obtain weight once patient back in the bed.       Follow up:   Per protocol      Anita Angel RD  08:36 EDT  Time: 20min

## 2023-03-31 NOTE — CASE MANAGEMENT/SOCIAL WORK
Continued Stay Note  [facility]     Patient Name: Sandro Junior  MRN: [id number]  Today's Date: 3/31/2023    Admit Date: 3/16/2023    Plan: IPR   Discharge Plan     Row Name 03/31/23 1520       Plan    Plan IPR    Patient/Family in Agreement with Plan yes    Plan Comments [facility] for pending acute care rehab at [facility].  I have submitted this request to  Physician Advisor via email. The deadline for peer to peer is Monday, 4/3/2023 at 3pm.  CM will f/u with [facility] Mayorga, Mr. Junior and his family regarding outcome of peer to peer when known.    Final Discharge Disposition Code 62 - inpatient rehab facility               Discharge Codes    No documentation.               Expected Discharge Date and Time     Expected Discharge Date Expected Discharge Time    April 3, 2023             Janene Goode RN

## 2023-03-31 NOTE — THERAPY TREATMENT NOTE
Patient Name: Sandro Junior  : 1955    MRN: [id number]                              Today's Date: 3/31/2023       Admit Date: 3/16/2023    Visit Dx:     ICD-10-CM ICD-9-CM   1. Respiratory distress  R06.03 786.09   2. Tachycardia  R00.0 785.0   3. Airway compromise  J98.8 519.8   4. Acute epiglottitis with airway obstruction - probable  J05.11 464.31   5. Essential hypertension  I10 401.9   6. Voice disturbance  R49.9 784.40     Patient Active Problem List   Diagnosis   • Essential hypertension   • Chronic pain   • Osteoarthritis of knee   • Prolonged depressive adjustment reaction   • Acute prostatitis   • Atopic rhinitis   • Erectile dysfunction of nonorganic origin   • Glaucoma suspect   • Onychomycosis of toenail   • Malignant neoplasm of rectum (HCC)   • Seborrheic eczema   • Keloid scar   • Pain of right lower extremity   • Esophageal reflux   • Acute respiratory failure (HCC)   • Acute strep pyogenes (group A strep) epiglottitis with airway obstruction      Past Medical History:   Diagnosis Date   • Epidermal inclusion cyst    • Esophageal candidiasis (HCC)    • Keloid scar    • Rectal cancer (HCC)    • Seborrheic dermatitis      Past Surgical History:   Procedure Laterality Date   • COLECTOMY PARTIAL / TOTAL     • KNEE SURGERY      Left knee meniscal tear repair   • TOOTH EXTRACTION  2016 teeth   • TOTAL KNEE ARTHROPLASTY Right 2016   • TRACHEOSTOMY AND PEG TUBE INSERTION N/A 3/23/2023    Procedure: TRACHEOSTOMY AND PERCUTANEOUS ENDOSCOPIC GASTROSTOMY TUBE INSERTION;  Surgeon: Néstor Jerome MD;  Location: [facility];  Service: General;  Laterality: N/A;      General Information     Row Name 23 1505          Physical Therapy Time and Intention    Document Type therapy note (daily note)  -AY     Mode of Treatment physical therapy  -AY     Row Name 23 1505          General Information    Existing Precautions/Restrictions fall  PEG; trach with PMV  -AY     Barriers to Rehab  medically complex  -AY     Row Name 03/31/23 1505          Cognition    Orientation Status (Cognition) oriented x 3  -AY     Row Name 03/31/23 1505          Safety Issues, Functional Mobility    Safety Issues Affecting Function (Mobility) insight into deficits/self-awareness;sequencing abilities;awareness of need for assistance  -AY     Impairments Affecting Function (Mobility) balance;endurance/activity tolerance;strength;shortness of breath  -AY           User Key  (r) = Recorded By, (t) = Taken By, (c) = Cosigned By    Initials Name Provider Type    AY Judith Freire, PT Physical Therapist               Mobility     Row Name 03/31/23 1505          Bed Mobility    Bed Mobility sit-supine  -AY     Sit-Supine [place] (Bed Mobility) supervision  -AY     Assistive Device (Bed Mobility) bed rails;head of bed elevated  -AY     Row Name 03/31/23 1505          Sit-Stand Transfer    Sit-Stand [place] (Transfers) contact guard;verbal cues  -AY     Row Name 03/31/23 1505          Gait/Stairs (Locomotion)    [place] Level (Gait) minimum assist (75% patient effort);verbal cues;nonverbal cues (demo/gesture)  -AY     Distance in Feet (Gait) 80  -AY     Deviations/Abnormal Patterns (Gait) bilateral deviations;bladimir decreased;weight shifting decreased;base of support, narrow;stride length decreased;left sided deviations  -AY     Bilateral Gait Deviations forward flexed posture  -AY     Left Sided Gait Deviations leans left  -AY     [place] Level (Stairs) minimum assist (75% patient effort)  -AY     Handrail Location (Stairs) left side (ascending);right side (descending)  -AY     Number of Steps (Stairs) 4  -AY     Ascending Technique (Stairs) step-to-step  -AY     Descending Technique (Stairs) step-to-step  -AY     Comment, (Gait/Stairs) pt demonstrated step through gait pattern with decreased bladimir and flexed posture. cueing for posture, increased stride length, and heel strike. 8 LOB noted with min A  required for assistance with poor midline awareness and intermittent scissoring noted. giat distance limited by fatigue.  -AY           User Key  (r) = Recorded By, (t) = Taken By, (c) = Cosigned By    Initials Name Provider Type    Judith Godwin PT Physical Therapist               Obj/Interventions     Row Name 03/31/23 1507          Balance    Balance Assessment sitting static balance;sitting dynamic balance;sit to stand dynamic balance;standing dynamic balance;standing static balance  -AY     Static Sitting Balance supervision  -AY     Dynamic Sitting Balance supervision  -AY     Position, Sitting Balance unsupported;sitting in chair  -AY     Sit to Stand Dynamic Balance minimal assist  -AY     Static Standing Balance contact guard  -AY     Dynamic Standing Balance minimal assist  -AY     Balance Interventions standing;dynamic;moderate challenge  -AY     Comment, Balance narrwed stance dynamic reach activities, required mod A d/t multiple severe LOB.  -AY           User Key  (r) = Recorded By, (t) = Taken By, (c) = Cosigned By    Initials Name Provider Type    Judith Godwin PT Physical Therapist               Goals/Plan    No documentation.                Clinical Impression     Row Name 03/31/23 1508          Pain    Pretreatment Pain Rating 0/10 - no pain  -AY     Posttreatment Pain Rating 0/10 - no pain  -AY     Pain Intervention(s) Ambulation/increased activity;Repositioned  -AY     Additional Documentation Pain Scale: Numbers Pre/Post-Treatment (Group)  -AY     Row Name 03/31/23 1508          Plan of Care Review    Plan of Care Reviewed With patient  -AY     Progress no change  -AY     Outcome Evaluation Pt required min A to amb 80ft with >5 LOB. Poor midline awareness noted throughout. Continue to progress as able with strengthening and balance activities. d/c rec for IRF.  -AY     Row Name 03/31/23 1508          Vital Signs    Pre Systolic BP Rehab --  VSS  -AY     O2 Delivery Pre Treatment room  air  -AY     O2 Delivery Intra Treatment room air  -AY     O2 Delivery Post Treatment room air  -AY     Pre Patient Position Standing  -AY     Intra Patient Position Standing  -AY     Post Patient Position Supine  -AY     Row Name 03/31/23 1508          Positioning and Restraints    Pre-Treatment Position standing in room  -AY     Post Treatment Position bed  -AY     In Bed notified nsg;supine;call light within reach;exit alarm on;encouraged to call for assist;side rails up x2;legs elevated  -AY           User Key  (r) = Recorded By, (t) = Taken By, (c) = Cosigned By    Initials Name Provider Type    Judith Godwin, FREDO Physical Therapist               Outcome Measures     Row Name 03/31/23 1510          How much help from another person do you currently need...    Turning from your back to your side while in flat bed without using bedrails? 4  -AY     Moving from lying on back to sitting on the side of a flat bed without bedrails? 4  -AY     Moving to and from a bed to a chair (including a wheelchair)? 3  -AY     Standing up from a chair using your arms (e.g., wheelchair, bedside chair)? 3  -AY     Climbing 3-5 steps with a railing? 3  -AY     To walk in hospital room? 3  -AY     AM-PAC 6 Clicks Score (PT) 20  -AY     Highest level of mobility 6 --> Walked 10 steps or more  -AY     Row Name 03/31/23 1510 03/31/23 1458       Functional Assessment    Outcome Measure Options AM-PAC 6 Clicks Basic Mobility (PT)  -AY AM-PAC 6 Clicks Daily Activity (OT)  -CS          User Key  (r) = Recorded By, (t) = Taken By, (c) = Cosigned By    Initials Name Provider Type    Rosanna Quinones OT Occupational Therapist    Judith Godwin, PT Physical Therapist                             Physical Therapy Education     Title: PT OT SLP Therapies (In Progress)     Topic: Physical Therapy (Done)     Point: Mobility training (Done)     Learning Progress Summary           Patient Acceptance, TB,E, VU,NR by CHRISTIAN at 3/31/2023 1511     Acceptance, E, VU,NR by HT at 3/30/2023 1405    Acceptance, E, VU by HT at 3/29/2023 1144    Acceptance, E, DU,NR by HT at 3/28/2023 1458    Acceptance, E, VU by HT at 3/27/2023 1026    Acceptance, E, VU by EL at 3/26/2023 1707    Acceptance, E,D, VU,NR by LS at 3/26/2023 1429                   Point: Home exercise program (Done)     Learning Progress Summary           Patient Acceptance, TB,E, VU,NR by AY at 3/31/2023 1511    Acceptance, E, DU,NR by HT at 3/28/2023 1458    Acceptance, E, VU by HT at 3/27/2023 1026    Acceptance, E, VU by EL at 3/26/2023 1707                   Point: Body mechanics (Done)     Learning Progress Summary           Patient Acceptance, TB,E, VU,NR by AY at 3/31/2023 1511    Acceptance, E, VU,NR by HT at 3/30/2023 1405    Acceptance, E, VU by HT at 3/29/2023 1144    Acceptance, E, DU,NR by HT at 3/28/2023 1458    Acceptance, E, VU by HT at 3/27/2023 1026    Acceptance, E, VU by EL at 3/26/2023 1707    Acceptance, E,D, VU,NR by LS at 3/26/2023 1429                   Point: Precautions (Done)     Learning Progress Summary           Patient Acceptance, TB,E, VU,NR by AY at 3/31/2023 1511    Acceptance, E, VU,NR by HT at 3/30/2023 1405    Acceptance, E, VU by HT at 3/29/2023 1144    Acceptance, E, DU,NR by HT at 3/28/2023 1458    Acceptance, E, VU by HT at 3/27/2023 1026    Acceptance, E, VU by EL at 3/26/2023 1707    Acceptance, E,D, VU,NR by LS at 3/26/2023 1429                               User Key     Initials Effective Dates Name Provider Type Discipline     02/03/23 -  Norma Villegas, PT Physical Therapist PT    EL 06/16/21 -  Giana Yates RN Registered Nurse Nurse    AY 11/10/20 -  Judith Freire, PT Physical Therapist PT    HT 01/12/23 -  Chela Barron PT Student PT Student PT              PT Recommendation and Plan     Plan of Care Reviewed With: patient  Progress: no change  Outcome Evaluation: Pt required min A to amb 80ft with >5 LOB. Poor midline awareness noted  throughout. Continue to progress as able with strengthening and balance activities. d/c rec for IRF.     Time Calculation:    PT Charges     Row Name 03/31/23 1511             Time Calculation    Start Time 1315  -AY      PT Received On 03/31/23  -AY         Timed Charges    12987 - PT Therapeutic Activity Minutes 25  -AY         Total Minutes    Timed Charges Total Minutes 25  -AY       Total Minutes 25  -AY            User Key  (r) = Recorded By, (t) = Taken By, (c) = Cosigned By    Initials Name Provider Type    AY Judith Freire, FREDO Physical Therapist              Therapy Charges for Today     Code Description Service Date Service Provider Modifiers Qty    86201387613  PT THERAPEUTIC ACT EA 15 MIN 3/31/2023 Judith Freire, PT GP 2          PT G-Codes  Outcome Measure Options: AM-PAC 6 Clicks Basic Mobility (PT)  AM-PAC 6 Clicks Score (PT): 20  AM-PAC 6 Clicks Score (OT): 15  PT Discharge Summary  Anticipated Discharge Disposition (PT): inpatient rehabilitation facility    Judith Freire PT  3/31/2023

## 2023-03-31 NOTE — CASE MANAGEMENT/SOCIAL WORK
Continued Stay Note   [place]     Patient Name: Sandro Junior  MRN: [id number]  Today's Date: 3/31/2023    Admit Date: 3/16/2023    Plan: IPR   Discharge Plan     Row Name 03/31/23 1520       Plan    Plan IPR    Patient/Family in Agreement with Plan yes    Plan Comments Received notification today from [facility] requesting peer to peer for pending acute care rehab at [facility].  I have submitted this request to  Physician Advisor via email. The deadline for peer to peer is Monday, 4/3/2023 at 3pm.  CM will f/u with Cardinal Mayorga, Mr. Junior and his family regarding outcome of peer to peer when known.  I have faxed updated PT/OT/ST notes to [employer] @ [phone] as requested.    Final Discharge Disposition Code 62 - inpatient rehab facility               Discharge Codes    No documentation.               Expected Discharge Date and Time     Expected Discharge Date Expected Discharge Time    April 3, 2023             Janene Goode RN

## 2023-03-31 NOTE — PLAN OF CARE
Goal Outcome Evaluation:  Plan of Care Reviewed With: patient        Progress: improving  Outcome Evaluation: Pt demo improved independence by performing t/fs w/ CGA and LBD tasks w/ Supervision. Pt conts to require increased assist for dynamic standing balance tasks/ADLs w/ episode of LOB this date. Recommend cont skilled IPOT POC to facilitate return to PLOF. Recommend pt DC to IP rehab.

## 2023-03-31 NOTE — PROGRESS NOTES
1       Sandro Junior  1955  [id number]  3/31/2023    CC: Severe sore throat and shortness of breath    Sandro Junior is a 68 y.o. male here for adult epiglottitis, threatened airway, and severe sepsis with group A beta-hemolytic streptococcal bacteremia.      Past medical history:  Past Medical History:   Diagnosis Date   • Epidermal inclusion cyst    • Esophageal candidiasis (HCC)    • Keloid scar    • Rectal cancer (HCC)    • Seborrheic dermatitis        Medications:   Current Facility-Administered Medications:   •  acetaminophen (TYLENOL) 160 MG/5ML solution 650 mg, 650 mg, Per PEG Tube, Q6H PRN, Yinka Payne [facility], 650 mg at 03/28/23 1714  •  [DISCONTINUED] acetaminophen (TYLENOL) tablet 650 mg, 650 mg, Nasogastric, Q4H PRN, 650 mg at 03/21/23 0253 **OR** acetaminophen (TYLENOL) suppository 650 mg, 650 mg, Rectal, Q4H PRN, Néstor Jerome MD  •  albuterol (PROVENTIL) nebulizer solution 0.083% 2.5 mg/3mL, 2.5 mg, Nebulization, Q6H PRN, Monika Louis MD  •  sennosides-docusate (PERICOLACE) 8.6-50 MG per tablet 2 tablet, 2 tablet, Per PEG Tube, BID, 2 tablet at 03/28/23 0940 **AND** polyethylene glycol (MIRALAX) packet 17 g, 17 g, Per PEG Tube, Daily PRN **AND** [DISCONTINUED] bisacodyl (DULCOLAX) EC tablet 5 mg, 5 mg, Oral, Daily PRN **AND** bisacodyl (DULCOLAX) suppository 10 mg, 10 mg, Rectal, Daily PRN, Yinka Payne RPH  •  carvedilol (COREG) tablet 12.5 mg, 12.5 mg, Per PEG Tube, Q12H, Monika Louis MD, 12.5 mg at 03/30/23 2100  •  cefTRIAXone (ROCEPHIN) 2 g/100 mL 0.9% NS IVPB (MBP), 2 g, Intravenous, Q24H, Zana Johnson MD, 2 g at 03/30/23 0848  •  chlorhexidine (PERIDEX) 0.12 % solution 15 mL, 15 mL, Mouth/Throat, Q12H, Néstor Jerome MD, 15 mL at 03/30/23 2100  •  dextrose (D50W) (25 g/50 mL) IV injection 25 g, 25 g, Intravenous, Q15 Min PRN, Negar Amezcua, APRN  •  dextrose (GLUTOSE) oral gel 15 g, 15 g, Oral, Q15 Min PRN, Negar Amezcua, APRN  •   glucagon (GLUCAGEN) injection 1 mg, 1 mg, Intramuscular, Q15 Min PRN, Negar Amezcua APRN  •  heparin (porcine) 5000 UNIT/ML injection 5,000 Units, 5,000 Units, Subcutaneous, Q8H, Néstor Jerome MD, 5,000 Units at 03/31/23 0609  •  hydrALAZINE (APRESOLINE) injection 20 mg, 20 mg, Intravenous, Q4H PRN, Néstor Jerome MD, 20 mg at 03/29/23 2051  •  hydrOXYzine (ATARAX) 10 MG/5ML syrup 25 mg, 25 mg, Per PEG Tube, Q8H PRN, Monika Louis MD, 25 mg at 03/30/23 1406  •  insulin regular (humuLIN R,novoLIN R) injection 0-7 Units, 0-7 Units, Subcutaneous, Q6H, Negar Amezcua APRN, 2 Units at 03/29/23 0854  •  lansoprazole (FIRST) oral suspension 15 mg, 15 mg, Oral, Q AM, Monika Louis MD, 15 mg at 03/31/23 0610  •  latanoprost (XALATAN) 0.005 % ophthalmic solution 1 drop, 1 drop, Both Eyes, Daily, Néstor Jerome MD, 1 drop at 03/30/23 0907  •  Magnesium Standard Dose Replacement - Initiate Nurse / BPA Driven Protocol, , Does not apply, PRN, Néstor Jerome MD  •  metroNIDAZOLE (FLAGYL) IVPB 500 mg, 500 mg, Intravenous, Q6H, Zana Johnson MD, 500 mg at 03/31/23 0144  •  Phosphorus Replacement - Initiate Nurse / BPA Driven Protocol, , Does not apply, PRN, Néstor Jerome MD  •  polyethylene glycol (MIRALAX) packet 17 g, 17 g, Per PEG Tube, Daily, Trish Callahan, PharmD, 17 g at 03/29/23 0850  •  Potassium Replacement - Initiate Nurse / BPA Driven Protocol, , Does not apply, PRN, Néstor Jerome MD  •  ProSource No Carb oral solution 30 mL, 30 mL, Per PEG Tube, BID, Monika Louis MD, 30 mL at 03/30/23 2104  •  risperiDONE (risperDAL) tablet 1 mg, 1 mg, Per PEG Tube, Nightly, Monika Louis MD, 1 mg at 03/30/23 2100  •  sodium chloride 0.9 % flush 10 mL, 10 mL, Intravenous, Q12H, Néstor Jerome MD, 10 mL at 03/30/23 2100  •  sodium chloride 0.9 % flush 10 mL, 10 mL, Intravenous, PRN, Néstor Jerome MD  •  sodium chloride 0.9 % flush 20 mL, 20 mL,  "Intravenous, PRN, Néstor Jerome MD  •  valsartan (DIOVAN) tablet 160 mg, 160 mg, Per PEG Tube, Q24H, Monika Louis MD, 160 mg at 03/30/23 0849  Antibiotics:  Anti-Infectives (From admission, onward)    Ordered     Dose/Rate Route Frequency Start Stop    03/28/23 0734  cefTRIAXone (ROCEPHIN) 2 g/100 mL 0.9% NS IVPB (MBP)        Ordering Provider: Zana Johnson MD    2 g  over 30 Minutes Intravenous Every 24 Hours Scheduled 03/28/23 0900 04/04/23 0859    03/20/23 1241  metroNIDAZOLE (FLAGYL) IVPB 500 mg        Ordering Provider: Zana Johnson MD    500 mg  over 30 Minutes Intravenous Every 6 Hours 03/20/23 1400 03/31/23 1359    03/16/23 2151  vancomycin 1500 mg/500 mL 0.9% NS IVPB ([facility])        Ordering Provider: Anand Eaton MD    20 mg/kg × 79.4 kg Intravenous Once 03/16/23 2153 03/16/23 2323          Allergies:  has No Known Allergies.    Review of Systems: All other reviewed and negative except as per HPI    Blood pressure 134/93, pulse 86, temperature 98.8 °F (37.1 °C), temperature source Oral, resp. rate 18, height 185.4 cm (73\"), weight 91.8 kg (202 lb 6.1 oz), SpO2 92 %.  GENERAL: Awake and alert, in no acute distress.  Tracheostomy.  Phonates with Passy-Kacie valve.  Low-grade fever to 99.9.  No shaking rigors or night sweats.  Minimal sputum production.  Denies nausea or vomiting.  No diarrhea.  HEENT: Facial erythroderma with superficial desquamation.  Oropharynx without thrush. Sinuses nontender. Dentition in fair repair. No cervical adenopathy. No carotid bruits/ jugular venous distention.   EYES: PERRL. No conjunctival injection. No icterus. EOMI.  LYMPHATICS: No lymphadenopathy of the neck or axillary or inguinal regions.   HEART: No murmur, gallop, or pericardial friction rub.   LUNGS: Clear to auscultation anteriorly. No percussion dullness.   ABDOMEN: Soft, nontender, nondistended. No appreciable HSM.  No peritoneal findings of rebound or guarding.  PEG exit site " without gross purulence or erythema.  SKIN: Warm and dry without cutaneous eruptions. No embolic stigmata.   PSYCHIATRIC: Mental status lucid. Cranial nerve function intact.       DIAGNOSTICS:  Lab Results   Component Value Date    WBC 7.59 03/30/2023    HGB 12.6 (L) 03/31/2023    HCT 39.3 03/31/2023     03/30/2023     Lab Results   Component Value Date    CRP 1.09 (H) 03/27/2023     Lab Results   Component Value Date    SEDRATE >130 (H) 03/26/2023     Lab Results   Component Value Date    GLUCOSE 166 (H) 03/30/2023    BUN 16 03/30/2023    CREATININE 0.59 (L) 03/30/2023    EGFRIFAFRI 109 01/10/2022    BCR 27.1 (H) 03/30/2023    CO2 23.0 03/30/2023    CALCIUM 9.5 03/30/2023    ALBUMIN 3.3 (L) 03/30/2023    AST 16 03/30/2023    ALT 13 03/30/2023       Microbiology: Bio fire respiratory viral pathogen's panel negative on admission.  2 of 3 admission blood cultures with group A beta-hemolytic streptococci/favorable EDNA's to penicillin and ceftriaxone.  MRSA nasopharyngeal PCR negative.    RADIOLOGY:  Imaging Results (Last 72 Hours)     ** No results found for the last 72 hours. **          Assessment and Plan: Adult epiglottitis.  Threatened airway.  Severe sepsis manifested by group A beta-hemolytic streptococcal bacteremia, marked leukocytosis, and elevated serum lactate.  Increased procalcitonin.  Episodic fever/questionably related to high-dose continuous infusion penicillin therapy.  Maximum temperature over 24 hours 99.9.  Currently 98.8.  Pulse 86, respiratory rate 18, blood pressure 134/93 mmHg with an O2 saturation of 92% on room air.  BUN/creatinine 16/0.59.  Peripheral leukocyte count 7.6 with 54% segmented neutrophils.  C-reactive protein 1.1.  Sed rate greater than 130.  No new culture data.  No new radiographic imaging studies.  I have taken the liberty to discontinue ceftriaxone and metronidazole.  We will begin amoxicillin-clavulanate suspension 400 mg via PEG every 12 hours for an additional 7  days.  JUDE FISHER available for weekend problems.      Zana Johnson MD  3/31/2023

## 2023-03-31 NOTE — PLAN OF CARE
Goal Outcome Evaluation:              Outcome Evaluation: Pt rested throughout shift. Remains on room air throughout shift. Bathed with CHG and total linen change performed. VSS.

## 2023-03-31 NOTE — THERAPY TREATMENT NOTE
Patient Name: Sandro Junior  : 1955    MRN: [id number]                              Today's Date: 3/31/2023       Admit Date: 3/16/2023    Visit Dx:     ICD-10-CM ICD-9-CM   1. Respiratory distress  R06.03 786.09   2. Tachycardia  R00.0 785.0   3. Airway compromise  J98.8 519.8   4. Acute epiglottitis with airway obstruction - probable  J05.11 464.31   5. Essential hypertension  I10 401.9   6. Voice disturbance  R49.9 784.40     Patient Active Problem List   Diagnosis   • Essential hypertension   • Chronic pain   • Osteoarthritis of knee   • Prolonged depressive adjustment reaction   • Acute prostatitis   • Atopic rhinitis   • Erectile dysfunction of nonorganic origin   • Glaucoma suspect   • Onychomycosis of toenail   • Malignant neoplasm of rectum (HCC)   • Seborrheic eczema   • Keloid scar   • Pain of right lower extremity   • Esophageal reflux   • Acute respiratory failure (HCC)   • Acute strep pyogenes (group A strep) epiglottitis with airway obstruction      Past Medical History:   Diagnosis Date   • Epidermal inclusion cyst    • Esophageal candidiasis (HCC)    • Keloid scar    • Rectal cancer (HCC)    • Seborrheic dermatitis      Past Surgical History:   Procedure Laterality Date   • COLECTOMY PARTIAL / TOTAL     • KNEE SURGERY      Left knee meniscal tear repair   • TOOTH EXTRACTION  2016 teeth   • TOTAL KNEE ARTHROPLASTY Right 2016   • TRACHEOSTOMY AND PEG TUBE INSERTION N/A 3/23/2023    Procedure: TRACHEOSTOMY AND PERCUTANEOUS ENDOSCOPIC GASTROSTOMY TUBE INSERTION;  Surgeon: Néstor Jerome MD;  Location: [facility];  Service: General;  Laterality: N/A;      General Information     Row Name 23 1455          OT Time and Intention    Document Type therapy note (daily note)  -CS     Mode of Treatment occupational therapy  -CS     Row Name 23 1455          General Information    Existing Precautions/Restrictions fall  trach w/ PMV, PEG  -CS     Barriers to Rehab medically  complex  -     Row Name 03/31/23 1455          Cognition    Orientation Status (Cognition) oriented x 3  -     Row Name 03/31/23 1455          Safety Issues, Functional Mobility    Impairments Affecting Function (Mobility) balance;endurance/activity tolerance;strength;shortness of breath  -           User Key  (r) = Recorded By, (t) = Taken By, (c) = Cosigned By    Initials Name Provider Type    CS Rosanna Sterling OT Occupational Therapist                 Mobility/ADL's     Row Name 03/31/23 1455          Transfers    Transfers sit-stand transfer;stand-sit transfer  -     Row Name 03/31/23 1455          Sit-Stand Transfer    Sit-Stand Rich (Transfers) contact guard;verbal cues  -     Row Name 03/31/23 1455          Stand-Sit Transfer    Stand-Sit Rich (Transfers) contact guard;verbal cues  -[facility] Name 03/31/23 1455          Functional Mobility    Functional Mobility- Ind. Level minimum assist (75% patient effort);verbal cues required  -     Functional Mobility-Distance (Feet) --  household distance  -     Functional Mobility- Comment Pt w/ episode of L lateral LOB upon standing and require frequent cueing to safely navigate environment  -     Row Name 03/31/23 1455          Activities of Daily Living    BADL Assessment/Intervention lower body dressing;grooming  -[facility] Name 03/31/23 1455          Lower Body Dressing Assessment/Training    Rich Level (Lower Body Dressing) don;socks;supervision  -CS     Position (Lower Body Dressing) supported sitting  -[facility] Name 03/31/23 1455          Grooming Assessment/Training    Rich Level (Grooming) wash face, hands;set up  -CS     Position (Grooming) supported sitting  -           User Key  (r) = Recorded By, (t) = Taken By, (c) = Cosigned By    Initials Name Provider Type    Rosanna Quinones OT Occupational Therapist               Obj/Interventions     Row Name 03/31/23 1456          Balance    Balance  Assessment sitting static balance;sitting dynamic balance;standing dynamic balance;standing static balance  -CS     Static Sitting Balance supervision  -CS     Dynamic Sitting Balance supervision  -CS     Position, Sitting Balance sitting in chair  -CS     Static Standing Balance contact guard  -CS     Dynamic Standing Balance minimal assist  -CS     Position/Device Used, Standing Balance unsupported  -CS     Balance Interventions standing;sitting;static;dynamic;dynamic reaching;occupation based/functional task;weight shifting activity  -CS     Comment, Balance Pt performed targetted reaching tasks in standing w/ slight LOB to the L  -CS           User Key  (r) = Recorded By, (t) = Taken By, (c) = Cosigned By    Initials Name Provider Type    CS Rosanna Sterling, CATHI Occupational Therapist               Goals/Plan    No documentation.                Clinical Impression     Row Name 03/31/23 1457          Pain Assessment    Pretreatment Pain Rating 0/10 - no pain  -CS     Posttreatment Pain Rating 0/10 - no pain  -CS     Row Name 03/31/23 1457          Plan of Care Review    Plan of Care Reviewed With patient  -CS     Progress improving  -     Outcome Evaluation Pt demo improved independence by performing t/fs w/ CGA and LBD tasks w/ Supervision. Pt conts to require increased assist for dynamic standing balance tasks/ADLs w/ episode of LOB this date. Recommend cont skilled IPOT POC to facilitate return to PLOF. Recommend pt DC to IP rehab.  -     Row Name 03/31/23 1457          Therapy Plan Review/Discharge Plan (OT)    Anticipated Discharge Disposition (OT) inpatient rehabilitation facility  -     Row Name 03/31/23 1457          Vital Signs    Pre Systolic BP Rehab --  VSS  -CS     Pre Patient Position Sitting  -CS     Intra Patient Position Standing  -CS     Post Patient Position Standing  -CS     Row Name 03/31/23 1457          Positioning and Restraints    Pre-Treatment Position in bed  -CS     Post Treatment  Position other  -CS     Other Position with PT  -CS           User Key  (r) = Recorded By, (t) = Taken By, (c) = Cosigned By    Initials Name Provider Type    Rosanna Quinones OT Occupational Therapist               Outcome Measures     Row Name 03/31/23 1458          How much help from another is currently needed...    Putting on and taking off regular lower body clothing? 3  -CS     Bathing (including washing, rinsing, and drying) 2  -CS     Toileting (which includes using toilet bed pan or urinal) 2  -CS     Putting on and taking off regular upper body clothing 3  -CS     Taking care of personal grooming (such as brushing teeth) 4  -CS     Eating meals 1  -CS     AM-PAC 6 Clicks Score (OT) 15  -CS     Row Name 03/31/23 1458          Functional Assessment    Outcome Measure Options AM-PAC 6 Clicks Daily Activity (OT)  -CS           User Key  (r) = Recorded By, (t) = Taken By, (c) = Cosigned By    Initials Name Provider Type    Rosanna Quinones OT Occupational Therapist                Occupational Therapy Education     Title: PT OT SLP Therapies (In Progress)     Topic: Occupational Therapy (In Progress)     Point: ADL training (In Progress)     Description:   Instruct learner(s) on proper safety adaptation and remediation techniques during self care or transfers.   Instruct in proper use of assistive devices.              Learning Progress Summary           Patient Acceptance, E, NR by CS at 3/31/2023 1459    Acceptance, E, VU by EL at 3/26/2023 1707    Acceptance, E, NR by CS at 3/26/2023 1421                   Point: Home exercise program (Done)     Description:   Instruct learner(s) on appropriate technique for monitoring, assisting and/or progressing therapeutic exercises/activities.              Learning Progress Summary           Patient Acceptance, E, VU by EL at 3/26/2023 1707                   Point: Precautions (In Progress)     Description:   Instruct learner(s) on prescribed precautions during  self-care and functional transfers.              Learning Progress Summary           Patient Acceptance, E, NR by CS at 3/31/2023 1459    Acceptance, E, VU by EL at 3/26/2023 1707    Acceptance, E, NR by CS at 3/26/2023 1421                   Point: Body mechanics (In Progress)     Description:   Instruct learner(s) on proper positioning and spine alignment during self-care, functional mobility activities and/or exercises.              Learning Progress Summary           Patient Acceptance, E, NR by  at 3/31/2023 1459    Acceptance, E, VU by EL at 3/26/2023 1707    Acceptance, E, NR by CS at 3/26/2023 1421                               User Key     Initials Effective Dates Name Provider Type Discipline     09/02/21 -  Rosanna Sterling OT Occupational Therapist OT     06/16/21 -  Giana Yates RN Registered Nurse Nurse              OT Recommendation and Plan  Planned Therapy Interventions (OT): activity tolerance training, adaptive equipment training, BADL retraining, functional balance retraining, occupation/activity based interventions, ROM/therapeutic exercise, strengthening exercise, transfer/mobility retraining  Therapy Frequency (OT): daily  Plan of Care Review  Plan of Care Reviewed With: patient  Progress: improving  Outcome Evaluation: Pt demo improved independence by performing t/fs w/ CGA and LBD tasks w/ Supervision. Pt conts to require increased assist for dynamic standing balance tasks/ADLs w/ episode of LOB this date. Recommend cont skilled IPOT POC to facilitate return to PLOF. Recommend pt DC to IP rehab.     Time Calculation:    Time Calculation- OT     Row Name 03/31/23 1459             Time Calculation- OT    OT Start Time 1301  -CS      OT Received On 03/31/23  -      OT Goal Re-Cert Due Date 04/05/23  -         Timed Charges    91821 - OT Therapeutic Activity Minutes 5  -CS      40582 - OT Self Care/Mgmt Minutes 5  -CS         Total Minutes    Timed Charges Total Minutes 10  -CS        Total Minutes 10  -CS            User Key  (r) = Recorded By, (t) = Taken By, (c) = Cosigned By    Initials Name Provider Type    CS Rosanna Sterling OT Occupational Therapist              Therapy Charges for Today     Code Description Service Date Service Provider Modifiers Qty    [id number]  OT THERAPEUTIC ACT EA 15 MIN 3/31/2023 Rosanna Sterling OT GO 1               Rosanna Sterling OT  3/31/2023

## 2023-03-31 NOTE — PLAN OF CARE
Goal Outcome Evaluation:  Plan of Care Reviewed With: patient        Progress: no change  Outcome Evaluation: Pt required min A to amb 80ft with >5 LOB. Poor midline awareness noted throughout. Continue to progress as able with strengthening and balance activities. d/c rec for IRF.

## 2023-03-31 NOTE — DISCHARGE PLACEMENT REQUEST
"Case Management  [phone]    Sandro Junior (68 y.o. Male)     Date of Birth   1955    Social Security Number       Address   [address] [id number]    Home Phone   [phone]    MRN   [id number]       [facility]    Marital Status                               Admission Date   3/16/23    Admission Type   Emergency    Admitting Provider   Vikram Zamora MD    Attending Provider   Keila Negron DO    Department, Room/Bed   [facility] 2A ICU, N209/1       Discharge Date       Discharge Disposition       Discharge Destination                               Attending Provider: Keila Negron DO    Allergies: No Known Allergies    Isolation: None   Infection: None   Code Status: CPR    Ht: 185.4 cm (73\")   Wt: 91.8 kg (202 lb 6.1 oz)    Admission Cmt: None   Principal Problem: Acute strep pyogenes (group A strep) epiglottitis with airway obstruction  [J05.11]                 Active Insurance as of 3/16/2023     Primary Coverage     Payor Plan Insurance Group Employer/Plan Group    HUMANA MEDICARE REPLACEMENT HUMANA MEDICARE REPLACEMENT [id number]     Payor Plan Address Payor Plan Phone Number Payor Plan Fax Number Effective Dates    [address] [address] [phone]  2/1/2023 - None Entered    [facility] [address]       Subscriber Name Subscriber Birth Date Member ID       SANDRO JUNIOR 1955 [id number]                 Emergency Contacts      (Rel.) Home Phone Work Phone Mobile Phone    CAROL JUNIOR (Spouse) [phone] -- [phone]    C,Zohra (Grandchild) -- -- [phone]    C,Hope (Daughter) -- -- [phone]               History & Physical      Vikram Zamora MD at 03/16/23 1330                Chief complaint: Shortness of breath    Subjective      68-year-old male who presented to the ER this evening with shortness of breath and sore throat.  History obtained is from the chart and my discussion with Dr." Angelito patient is now intubated and sedated.  Apparently, he first noticed a sore throat around noon.  He became increasingly symptomatic throughout the day before presenting to the ER.  He was given epinephrine, steroids, and H2 blockers.  Despite this he began tripoding and drooling and had audible stridor.  Per Dr. Eaton, the intubation was difficult due to edema and erythema.    Imaging reveals abnormal appearance of the soft tissues of the laryngeal area with some edema and swelling of the uvula.  There is no evidence of a fluid collection or abscess.  Bibasilar infiltrates were present possibly related to atelectasis or retained secretions but there was no evidence of consolidative pneumonia.  He has been given Zosyn and vancomycin empirically and, as stated, has received steroids.  Because of tachycardia he was started on a Cardizem drip and a propofol drip was initiated.      History  Past Medical History:   Diagnosis Date   • Epidermal inclusion cyst    • Esophageal candidiasis (HCC)    • Keloid scar    • Rectal cancer (HCC)    • Seborrheic dermatitis      Past Surgical History:   Procedure Laterality Date   • COLECTOMY PARTIAL / TOTAL     • KNEE SURGERY      Left knee meniscal tear repair   • TOOTH EXTRACTION      7 teeth   • TOTAL KNEE ARTHROPLASTY Right 2016     History reviewed. No pertinent family history.  Social History     Tobacco Use   • Smoking status: Former     Types: Cigars     Quit date: 2019     Years since quittin.2   • Smokeless tobacco: Never   • Tobacco comments:     2 cigars a week   Vaping Use   • Vaping Use: Never used   Substance Use Topics   • Alcohol use: Yes     Alcohol/week: 4.0 - 5.0 standard drinks     Types: 4 - 5 Glasses of wine per week   • Drug use: Defer       Review of Systems   Review of systems could not be obtained due to   patient intubated.      Objective      Vital Signs  Temp:  [99.6 °F (37.6 °C)-100.1 °F (37.8 °C)] 100.1 °F (37.8 °C)  Heart Rate:  " [120-172] 120  Resp:  [24] 24  BP: (133-217)/() 144/92  FiO2 (%):  [40 %] 40 %    Physical Exam:    Objective:  General Appearance:  Ill-appearing.    Vital signs: (most recent): Blood pressure 144/92, pulse 120, temperature 100.1 °F (37.8 °C), temperature source Axillary, resp. rate 24, height 185.4 cm (73\"), weight 74.5 kg (164 lb 3.9 oz), SpO2 97 %.    HEENT: (Oral ET tube)    Lungs:  No rales, wheezes or rhonchi.    Heart: Normal rate.  Regular rhythm.  S1 normal and S2 normal.  No murmur, gallop or friction rub.   Chest: Symmetric chest wall expansion.   Abdomen: Abdomen is soft and non-distended.  Bowel sounds are normal.   There is no abdominal tenderness.   There is no mass. There is no splenomegaly. There is no hepatomegaly.   Extremities: There is no deformity or dependent edema.    Neurological: (Sedated).    Pupils:  Pupils are equal, round, and reactive to light.    Skin:  Warm and dry.              Results Review:    I reviewed the patient's new clinical results.  I reviewed the patient's new imaging results and agree with the interpretation.  I reviewed the patient's other test results and agree with the interpretation    Assessment & Plan     Assessment:    Active Hospital Problems    Diagnosis    • **Acute epiglottitis with airway obstruction - probable    • Acute respiratory failure (HCC)    • Essential hypertension        68-year-old male who presents with what appears to be acute epiglottitis.  He developed a sore throat around noon and symptoms progressed to the day.  Despite appropriate treatment in the ER his stridor and respiratory distress became worse and he underwent intubation which revealed erythematous and swollen airways.  CT of the neck reveals no fluid collection/abscess but swollen soft tissues.  He has arrived in the ICU intubated with acceptable oxygen saturations and blood pressure and with sinus tachycardia    Plan:    1. ICU admission  2. Maintain intubation with " sedation and physical restraints if needed so his airway is not dislodged  3. Empiric antimicrobial therapy with vancomycin and Rocephin per standard recommendations for epiglottitis  4. Additional fluid resuscitation due to tachycardia but this could be related to infection and administration of IM epinephrine in the ER.  Wean Cardizem.  5. Viral respiratory panel is negative  6. Will obtain swabs for Streptococcus  7. Anticipate the need for mechanical ventilation for several days until airway swelling improves  8. No indication for ongoing steroids for what is likely acute epiglottitis    I discussed the patients findings and my recommendations with nursing staff and Dr. Eaton.     Critical Care time spent in direct patient care: 45 minutes (excluding procedure time, if applicable) including high complexity decision making to assess, manipulate, and support vital organ system failure in this individual who has impairment of one or more vital organ systems such that there is a high probability of imminent or life threatening deterioration in the patient’s condition.      Vikram Zamora MD  Pulmonary and Critical Care Medicine  23  23:30 EDT            Electronically signed by Vikram Zamora MD at 23 5256          Physical Therapy Notes (most recent note)      Judith Freire, PT at 23 1315  Version 1 of 1         Patient Name: Sandro Davisodore  : 1955    MRN: [id number]                              Today's Date: 3/31/2023       Admit Date: 3/16/2023    Visit Dx:     ICD-10-CM ICD-9-CM   1. Respiratory distress  R06.03 786.09   2. Tachycardia  R00.0 785.0   3. Airway compromise  J98.8 519.8   4. Acute epiglottitis with airway obstruction - probable  J05.11 464.31   5. Essential hypertension  I10 401.9   6. Voice disturbance  R49.9 784.40     Patient Active Problem List   Diagnosis   • Essential hypertension   • Chronic pain   • Osteoarthritis of knee   • Prolonged  depressive adjustment reaction   • Acute prostatitis   • Atopic rhinitis   • Erectile dysfunction of nonorganic origin   • Glaucoma suspect   • Onychomycosis of toenail   • Malignant neoplasm of rectum (HCC)   • Seborrheic eczema   • Keloid scar   • Pain of right lower extremity   • Esophageal reflux   • Acute respiratory failure (HCC)   • Acute strep pyogenes (group A strep) epiglottitis with airway obstruction      Past Medical History:   Diagnosis Date   • Epidermal inclusion cyst    • Esophageal candidiasis (HCC)    • Keloid scar    • Rectal cancer (HCC)    • Seborrheic dermatitis      Past Surgical History:   Procedure Laterality Date   • COLECTOMY PARTIAL / TOTAL     • KNEE SURGERY      Left knee meniscal tear repair   • TOOTH EXTRACTION  2016 7 teeth   • TOTAL KNEE ARTHROPLASTY Right 04/11/2016   • TRACHEOSTOMY AND PEG TUBE INSERTION N/A 3/23/2023    Procedure: TRACHEOSTOMY AND PERCUTANEOUS ENDOSCOPIC GASTROSTOMY TUBE INSERTION;  Surgeon: Néstor Jerome MD;  Location: [facility];  Service: General;  Laterality: N/A;      General Information     Row Name 03/31/23 1505          Physical Therapy Time and Intention    Document Type therapy note (daily note)  -AY     Mode of Treatment physical therapy  -AY     Row Name 03/31/23 1505          General Information    Existing Precautions/Restrictions fall  PEG; trach with PMV  -AY     Barriers to Rehab medically complex  -AY     Row Name 03/31/23 1505          Cognition    Orientation Status (Cognition) oriented x 3  -AY     Row Name 03/31/23 1505          Safety Issues, Functional Mobility    Safety Issues Affecting Function (Mobility) insight into deficits/self-awareness;sequencing abilities;awareness of need for assistance  -AY     Impairments Affecting Function (Mobility) balance;endurance/activity tolerance;strength;shortness of breath  -AY           User Key  (r) = Recorded By, (t) = Taken By, (c) = Cosigned By    Initials Name Provider Type    AY Tani  FREDO Wong Physical Therapist               Mobility     Row Name 03/31/23 1505          Bed Mobility    Bed Mobility sit-supine  -AY     Sit-Supine [place] (Bed Mobility) supervision  -AY     Assistive Device (Bed Mobility) bed rails;head of bed elevated  -AY     Row Name 03/31/23 1505          Sit-Stand Transfer    Sit-Stand [place] (Transfers) contact guard;verbal cues  -AY     Row Name 03/31/23 1505          Gait/Stairs (Locomotion)    [place] Level (Gait) minimum assist (75% patient effort);verbal cues;nonverbal cues (demo/gesture)  -AY     Distance in Feet (Gait) 80  -AY     Deviations/Abnormal Patterns (Gait) bilateral deviations;bladimir decreased;weight shifting decreased;base of support, narrow;stride length decreased;left sided deviations  -AY     Bilateral Gait Deviations forward flexed posture  -AY     Left Sided Gait Deviations leans left  -AY     [place] Level (Stairs) minimum assist (75% patient effort)  -AY     Handrail Location (Stairs) left side (ascending);right side (descending)  -AY     Number of Steps (Stairs) 4  -AY     Ascending Technique (Stairs) step-to-step  -AY     Descending Technique (Stairs) step-to-step  -AY     Comment, (Gait/Stairs) pt demonstrated step through gait pattern with decreased bladimir and flexed posture. cueing for posture, increased stride length, and heel strike. 8 LOB noted with min A required for assistance with poor midline awareness and intermittent scissoring noted. giat distance limited by fatigue.  -AY           User Key  (r) = Recorded By, (t) = Taken By, (c) = Cosigned By    Initials Name Provider Type    AY Judith Freire PT Physical Therapist               Obj/Interventions     Row Name 03/31/23 1500          Balance    Balance Assessment sitting static balance;sitting dynamic balance;sit to stand dynamic balance;standing dynamic balance;standing static balance  -AY     Static Sitting Balance supervision  -AY     Dynamic Sitting Balance  supervision  -AY     Position, Sitting Balance unsupported;sitting in chair  -AY     Sit to Stand Dynamic Balance minimal assist  -AY     Static Standing Balance contact guard  -AY     Dynamic Standing Balance minimal assist  -AY     Balance Interventions standing;dynamic;moderate challenge  -AY     Comment, Balance narrwed stance dynamic reach activities, required mod A d/t multiple severe LOB.  -AY           User Key  (r) = Recorded By, (t) = Taken By, (c) = Cosigned By    Initials Name Provider Type    AY Judith Freire, PT Physical Therapist               Goals/Plan    No documentation.                Clinical Impression     Row Name 03/31/23 1508          Pain    Pretreatment Pain Rating 0/10 - no pain  -AY     Posttreatment Pain Rating 0/10 - no pain  -AY     Pain Intervention(s) Ambulation/increased activity;Repositioned  -AY     Additional Documentation Pain Scale: Numbers Pre/Post-Treatment (Group)  -AY     Row Name 03/31/23 1508          Plan of Care Review    Plan of Care Reviewed With patient  -AY     Progress no change  -AY     Outcome Evaluation Pt required min A to amb 80ft with >5 LOB. Poor midline awareness noted throughout. Continue to progress as able with strengthening and balance activities. d/c rec for IRF.  -AY     Row Name 03/31/23 1508          Vital Signs    Pre Systolic BP Rehab --  VSS  -AY     O2 Delivery Pre Treatment room air  -AY     O2 Delivery Intra Treatment room air  -AY     O2 Delivery Post Treatment room air  -AY     Pre Patient Position Standing  -AY     Intra Patient Position Standing  -AY     Post Patient Position Supine  -AY     Row Name 03/31/23 1508          Positioning and Restraints    Pre-Treatment Position standing in room  -AY     Post Treatment Position bed  -AY     In Bed notified nsg;supine;call light within reach;exit alarm on;encouraged to call for assist;side rails up x2;legs elevated  -AY           User Key  (r) = Recorded By, (t) = Taken By, (c) = Cosigned By     Initials Name Provider Type    Judith Godwin, PT Physical Therapist               Outcome Measures     Row Name 03/31/23 1510          How much help from another person do you currently need...    Turning from your back to your side while in flat bed without using bedrails? 4  -AY     Moving from lying on back to sitting on the side of a flat bed without bedrails? 4  -AY     Moving to and from a bed to a chair (including a wheelchair)? 3  -AY     Standing up from a chair using your arms (e.g., wheelchair, bedside chair)? 3  -AY     Climbing 3-5 steps with a railing? 3  -AY     To walk in hospital room? 3  -AY     AM-PAC 6 Clicks Score (PT) 20  -AY     Highest level of mobility 6 --> Walked 10 steps or more  -AY     Row Name 03/31/23 1510 03/31/23 1458       Functional Assessment    Outcome Measure Options AM-PAC 6 Clicks Basic Mobility (PT)  -AY AM-PAC 6 Clicks Daily Activity (OT)  -CS          User Key  (r) = Recorded By, (t) = Taken By, (c) = Cosigned By    Initials Name Provider Type    CS Rosanna Sterling OT Occupational Therapist    Judith Godwin, PT Physical Therapist                             Physical Therapy Education     Title: PT OT SLP Therapies (In Progress)     Topic: Physical Therapy (Done)     Point: Mobility training (Done)     Learning Progress Summary           Patient Acceptance, TB,E, VU,NR by AY at 3/31/2023 1511    Acceptance, E, VU,NR by HT at 3/30/2023 1405    Acceptance, E, VU by HT at 3/29/2023 1144    Acceptance, E, DU,NR by HT at 3/28/2023 1458    Acceptance, E, VU by HT at 3/27/2023 1026    Acceptance, E, VU by EL at 3/26/2023 1707    Acceptance, E,D, VU,NR by LS at 3/26/2023 1429                   Point: Home exercise program (Done)     Learning Progress Summary           Patient Acceptance, TB,E, VU,NR by AY at 3/31/2023 1511    Acceptance, E, DU,NR by HT at 3/28/2023 1458    Acceptance, E, VU by HT at 3/27/2023 1026    Acceptance, E, VU by EL at 3/26/2023 1707                    Point: Body mechanics (Done)     Learning Progress Summary           Patient Acceptance, TB,E, VU,NR by AY at 3/31/2023 1511    Acceptance, E, VU,NR by HT at 3/30/2023 1405    Acceptance, E, VU by HT at 3/29/2023 1144    Acceptance, E, DU,NR by HT at 3/28/2023 1458    Acceptance, E, VU by HT at 3/27/2023 1026    Acceptance, E, VU by EL at 3/26/2023 1707    Acceptance, E,D, VU,NR by LS at 3/26/2023 1429                   Point: Precautions (Done)     Learning Progress Summary           Patient Acceptance, TB,E, VU,NR by AY at 3/31/2023 1511    Acceptance, E, VU,NR by HT at 3/30/2023 1405    Acceptance, E, VU by HT at 3/29/2023 1144    Acceptance, E, DU,NR by HT at 3/28/2023 1458    Acceptance, E, VU by HT at 3/27/2023 1026    Acceptance, E, VU by EL at 3/26/2023 1707    Acceptance, E,D, VU,NR by LS at 3/26/2023 1429                               User Key     Initials Effective Dates Name Provider Type Discipline     02/03/23 -  Norma Villegas, PT Physical Therapist PT    EL 06/16/21 -  Giana Yates, RN Registered Nurse Nurse     11/10/20 -  Judith Freire, PT Physical Therapist PT     01/12/23 -  Chela Barron, PT Student PT Student PT              PT Recommendation and Plan     Plan of Care Reviewed With: patient  Progress: no change  Outcome Evaluation: Pt required min A to amb 80ft with >5 LOB. Poor midline awareness noted throughout. Continue to progress as able with strengthening and balance activities. d/c rec for IRF.     Time Calculation:    PT Charges     Row Name 03/31/23 1511             Time Calculation    Start Time 1315  -AY      PT Received On 03/31/23  -AY         Timed Charges    39487 - PT Therapeutic Activity Minutes 25  -AY         Total Minutes    Timed Charges Total Minutes 25  -AY       Total Minutes 25  -AY            User Key  (r) = Recorded By, (t) = Taken By, (c) = Cosigned By    Initials Name Provider Type    AY Judith Freire, PT Physical Therapist              Therapy  Charges for Today     Code Description Service Date Service Provider Modifiers Qty    20607113522 HC PT THERAPEUTIC ACT EA 15 MIN 3/31/2023 Judith Freire, PT GP 2          PT G-Codes  Outcome Measure Options: AM-PAC 6 Clicks Basic Mobility (PT)  AM-PAC 6 Clicks Score (PT): 20  AM-PAC 6 Clicks Score (OT): 15  PT Discharge Summary  Anticipated Discharge Disposition (PT): inpatient rehabilitation facility    Judith Freire, PT  3/31/2023      Electronically signed by Judith Freire, PT at 23 1512          Occupational Therapy Notes (most recent note)      Rosanna Sterling, OT at 23 1301          Patient Name: Sandro Junior  : 1955    MRN: [id number]                              Today's Date: 3/31/2023       Admit Date: 3/16/2023    Visit Dx:     ICD-10-CM ICD-9-CM   1. Respiratory distress  R06.03 786.09   2. Tachycardia  R00.0 785.0   3. Airway compromise  J98.8 519.8   4. Acute epiglottitis with airway obstruction - probable  J05.11 464.31   5. Essential hypertension  I10 401.9   6. Voice disturbance  R49.9 784.40     Patient Active Problem List   Diagnosis   • Essential hypertension   • Chronic pain   • Osteoarthritis of knee   • Prolonged depressive adjustment reaction   • Acute prostatitis   • Atopic rhinitis   • Erectile dysfunction of nonorganic origin   • Glaucoma suspect   • Onychomycosis of toenail   • Malignant neoplasm of rectum (HCC)   • Seborrheic eczema   • Keloid scar   • Pain of right lower extremity   • Esophageal reflux   • Acute respiratory failure (HCC)   • Acute strep pyogenes (group A strep) epiglottitis with airway obstruction      Past Medical History:   Diagnosis Date   • Epidermal inclusion cyst    • Esophageal candidiasis (HCC)    • Keloid scar    • Rectal cancer (HCC)    • Seborrheic dermatitis      Past Surgical History:   Procedure Laterality Date   • COLECTOMY PARTIAL / TOTAL     • KNEE SURGERY      Left knee meniscal tear repair   • TOOTH EXTRACTION      7 teeth    • TOTAL KNEE ARTHROPLASTY Right 04/11/2016   • TRACHEOSTOMY AND PEG TUBE INSERTION N/A 3/23/2023    Procedure: TRACHEOSTOMY AND PERCUTANEOUS ENDOSCOPIC GASTROSTOMY TUBE INSERTION;  Surgeon: Néstor Jerome MD;  Location: [facility];  Service: General;  Laterality: N/A;      General Information     Row Name 03/31/23 1455          OT Time and Intention    Document Type therapy note (daily note)  -CS     Mode of Treatment occupational therapy  -CS     Row Name 03/31/23 1455          General Information    Existing Precautions/Restrictions fall  trach w/ PMV, PEG  -CS     Barriers to Rehab medically complex  -CS     Row Name 03/31/23 1455          Cognition    Orientation Status (Cognition) oriented x 3  -CS     Row Name 03/31/23 1455          Safety Issues, Functional Mobility    Impairments Affecting Function (Mobility) balance;endurance/activity tolerance;strength;shortness of breath  -CS           User Key  (r) = Recorded By, (t) = Taken By, (c) = Cosigned By    Initials Name Provider Type    CS Rosanna Sterling, OT Occupational Therapist                 Mobility/ADL's     Row Name 03/31/23 1455          Transfers    Transfers sit-stand transfer;stand-sit transfer  -CS     Row Name 03/31/23 1455          Sit-Stand Transfer    Sit-Stand [place] (Transfers) contact guard;verbal cues  -     Row Name 03/31/23 1455          Stand-Sit Transfer    Stand-Sit [place] (Transfers) contact guard;verbal cues  -     Row Name 03/31/23 1455          Functional Mobility    Functional Mobility- Ind. Level minimum assist (75% patient effort);verbal cues required  -CS     Functional Mobility-Distance (Feet) --  household distance  -CS     Functional Mobility- Comment Pt w/ episode of L lateral LOB upon standing and require frequent cueing to safely navigate environment  -CS     Row Name 03/31/23 1455          Activities of Daily Living    BADL Assessment/Intervention lower body dressing;grooming  -CS     Row Name  03/31/23 1455          Lower Body Dressing Assessment/Training    [place] Level (Lower Body Dressing) don;socks;supervision  -CS     Position (Lower Body Dressing) supported sitting  -CS     Row Name 03/31/23 1455          Grooming Assessment/Training    [place] Level (Grooming) wash face, hands;set up  -CS     Position (Grooming) supported sitting  -CS           User Key  (r) = Recorded By, (t) = Taken By, (c) = Cosigned By    Initials Name Provider Type    CS Rosanna Sterling OT Occupational Therapist               Obj/Interventions     Row Name 03/31/23 1456          Balance    Balance Assessment sitting static balance;sitting dynamic balance;standing dynamic balance;standing static balance  -CS     Static Sitting Balance supervision  -CS     Dynamic Sitting Balance supervision  -CS     Position, Sitting Balance sitting in chair  -CS     Static Standing Balance contact guard  -CS     Dynamic Standing Balance minimal assist  -CS     Position/Device Used, Standing Balance unsupported  -CS     Balance Interventions standing;sitting;static;dynamic;dynamic reaching;occupation based/functional task;weight shifting activity  -CS     Comment, Balance Pt performed targetted reaching tasks in standing w/ slight LOB to the L  -CS           User Key  (r) = Recorded By, (t) = Taken By, (c) = Cosigned By    Initials Name Provider Type    Rosanna Quinones, CATHI Occupational Therapist               Goals/Plan    No documentation.                Clinical Impression     Row Name 03/31/23 1457          Pain Assessment    Pretreatment Pain Rating 0/10 - no pain  -     Posttreatment Pain Rating 0/10 - no pain  -     Row Name 03/31/23 1457          Plan of Care Review    Plan of Care Reviewed With patient  -CS     Progress improving  -     Outcome Evaluation Pt demo improved independence by performing t/fs w/ CGA and LBD tasks w/ Supervision. Pt conts to require increased assist for dynamic standing balance tasks/ADLs w/  episode of LOB this date. Recommend cont skilled IPOT POC to facilitate return to PLOF. Recommend pt DC to IP rehab.  -CS     Row Name 03/31/23 1457          Therapy Plan Review/Discharge Plan (OT)    Anticipated Discharge Disposition (OT) inpatient rehabilitation facility  -CS     Row Name 03/31/23 1457          Vital Signs    Pre Systolic BP Rehab --  VSS  -CS     Pre Patient Position Sitting  -CS     Intra Patient Position Standing  -CS     Post Patient Position Standing  -CS     Row Name 03/31/23 1457          Positioning and Restraints    Pre-Treatment Position in bed  -CS     Post Treatment Position other  -CS     Other Position with PT  -CS           User Key  (r) = Recorded By, (t) = Taken By, (c) = Cosigned By    Initials Name Provider Type    CS Rosanna Sterling OT Occupational Therapist               Outcome Measures     Row Name 03/31/23 1458          How much help from another is currently needed...    Putting on and taking off regular lower body clothing? 3  -CS     Bathing (including washing, rinsing, and drying) 2  -CS     Toileting (which includes using toilet bed pan or urinal) 2  -CS     Putting on and taking off regular upper body clothing 3  -CS     Taking care of personal grooming (such as brushing teeth) 4  -CS     Eating meals 1  -CS     AM-PAC 6 Clicks Score (OT) 15  -CS     Row Name 03/31/23 1458          Functional Assessment    Outcome Measure Options AM-PAC 6 Clicks Daily Activity (OT)  -CS           User Key  (r) = Recorded By, (t) = Taken By, (c) = Cosigned By    Initials Name Provider Type    CS Rosanna Sterling OT Occupational Therapist                Occupational Therapy Education     Title: PT OT SLP Therapies (In Progress)     Topic: Occupational Therapy (In Progress)     Point: ADL training (In Progress)     Description:   Instruct learner(s) on proper safety adaptation and remediation techniques during self care or transfers.   Instruct in proper use of assistive devices.               Learning Progress Summary           Patient Acceptance, E, NR by CS at 3/31/2023 1459    Acceptance, E, VU by EL at 3/26/2023 1707    Acceptance, E, NR by CS at 3/26/2023 1421                   Point: Home exercise program (Done)     Description:   Instruct learner(s) on appropriate technique for monitoring, assisting and/or progressing therapeutic exercises/activities.              Learning Progress Summary           Patient Acceptance, E, VU by EL at 3/26/2023 1707                   Point: Precautions (In Progress)     Description:   Instruct learner(s) on prescribed precautions during self-care and functional transfers.              Learning Progress Summary           Patient Acceptance, E, NR by CS at 3/31/2023 1459    Acceptance, E, VU by EL at 3/26/2023 1707    Acceptance, E, NR by CS at 3/26/2023 1421                   Point: Body mechanics (In Progress)     Description:   Instruct learner(s) on proper positioning and spine alignment during self-care, functional mobility activities and/or exercises.              Learning Progress Summary           Patient Acceptance, E, NR by  at 3/31/2023 1459    Acceptance, E, VU by EL at 3/26/2023 1707    Acceptance, E, NR by CS at 3/26/2023 1421                               User Key     Initials Effective Dates Name Provider Type Discipline     09/02/21 -  Rosanna Sterling OT Occupational Therapist OT     06/16/21 -  Giana Yates RN Registered Nurse Nurse              OT Recommendation and Plan  Planned Therapy Interventions (OT): activity tolerance training, adaptive equipment training, BADL retraining, functional balance retraining, occupation/activity based interventions, ROM/therapeutic exercise, strengthening exercise, transfer/mobility retraining  Therapy Frequency (OT): daily  Plan of Care Review  Plan of Care Reviewed With: patient  Progress: improving  Outcome Evaluation: Pt demo improved independence by performing t/fs w/ CGA and LBD tasks w/  Supervision. Pt conts to require increased assist for dynamic standing balance tasks/ADLs w/ episode of LOB this date. Recommend cont skilled IPOT POC to facilitate return to PLOF. Recommend pt DC to IP rehab.     Time Calculation:    Time Calculation- OT     Row Name 23 1459             Time Calculation- OT    OT Start Time 1301  -CS      OT Received On 23  -CS      OT Goal Re-Cert Due Date 23  -CS         Timed Charges    04558 - OT Therapeutic Activity Minutes 5  -CS      14422 - OT Self Care/Mgmt Minutes 5  -CS         Total Minutes    Timed Charges Total Minutes 10  -CS       Total Minutes 10  -CS            User Key  (r) = Recorded By, (t) = Taken By, (c) = Cosigned By    Initials Name Provider Type    CS Rosanna Sterling OT Occupational Therapist              Therapy Charges for Today     Code Description Service Date Service Provider Modifiers Qty    [id number]  OT THERAPEUTIC ACT EA 15 MIN 3/31/2023 Rosanna Sterling OT GO 1               Rosanna Sterling OT  3/31/2023    Electronically signed by Rosanna Sterling OT at 23 1501          Speech Language Pathology Notes (most recent note)      Komal Villegas, MS CCC-SLP at 23 1353          Acute Care - Speech Language Pathology Progress Note   [place]     Patient Name: Sandro Junior  : 1955  MRN: [id number]  Today's Date: 3/30/2023               Admit Date: 3/16/2023     Visit Dx:    ICD-10-CM ICD-9-CM   1. Respiratory distress  R06.03 786.09   2. Tachycardia  R00.0 785.0   3. Airway compromise  J98.8 519.8   4. Acute epiglottitis with airway obstruction - probable  J05.11 464.31   5. Essential hypertension  I10 401.9   6. Voice disturbance  R49.9 784.40     Patient Active Problem List   Diagnosis   • Essential hypertension   • Chronic pain   • Osteoarthritis of knee   • Prolonged depressive adjustment reaction   • Acute prostatitis   • Atopic rhinitis   • Erectile dysfunction of nonorganic origin   •  Glaucoma suspect   • Onychomycosis of toenail   • Malignant neoplasm of rectum (HCC)   • Seborrheic eczema   • Keloid scar   • Pain of right lower extremity   • Esophageal reflux   • Acute respiratory failure (HCC)   • Acute strep pyogenes (group A strep) epiglottitis with airway obstruction      Past Medical History:   Diagnosis Date   • Epidermal inclusion cyst    • Esophageal candidiasis (HCC)    • Keloid scar    • Rectal cancer (HCC)    • Seborrheic dermatitis      Past Surgical History:   Procedure Laterality Date   • COLECTOMY PARTIAL / TOTAL     • KNEE SURGERY      Left knee meniscal tear repair   • TOOTH EXTRACTION  2016 7 teeth   • TOTAL KNEE ARTHROPLASTY Right 04/11/2016   • TRACHEOSTOMY AND PEG TUBE INSERTION N/A 3/23/2023    Procedure: TRACHEOSTOMY AND PERCUTANEOUS ENDOSCOPIC GASTROSTOMY TUBE INSERTION;  Surgeon: Néstor Jerome MD;  Location: [facility];  Service: General;  Laterality: N/A;       SLP Recommendation and Plan  SLP Diagnosis: difficulty voicing due to tracheostomy (03/30/23 1050)           SLC Criteria for Skilled Therapy Interventions Met: yes (03/30/23 1050)  Anticipated Discharge Disposition (SLP): inpatient rehabilitation facility, anticipate therapy at next level of care (03/30/23 1050)  Demonstrates Need for Referral to Another Service: speech therapy, other (see comments) (03/30/23 1050)     Predicted Duration Therapy Intervention (Days): until discharge (03/30/23 1050)     Daily Summary of Progress (SLP): progress toward functional goals is good (03/30/23 1050)           Treatment Assessment (SLP): continued, improved (03/30/23 1050)  Treatment Assessment Comments (SLP): Tolerating PMV well, no trach collar on.  Voicing audible. discussed with patient not sleeping in PMV (03/30/23 1050)  Plan for Continued Treatment (SLP): continue treatment per plan of care (03/30/23 1050)  Plan of Care Reviewed With: patient (03/30/23 1345)  Progress: improving (03/30/23 1345)      SLP  EVALUATION (last 72 hours)     SLP SLC Evaluation     Row Name 03/30/23 1050 03/28/23 1600                Communication Assessment/Intervention    Document Type therapy note (daily note)  -AV therapy note (daily note)  -AC       Subjective Information no complaints  -AV complains of;fatigue  -AC       Patient Observations alert;cooperative  -AV lethargic;cooperative  -AC       Patient/Family/Caregiver Comments/Observations no family present  -AV No family present.  -AC       Patient Effort good  -AV fair  -AC          Pain    Additional Documentation Pain Scale: FACES Pre/Post-Treatment (Group)  -AV --          Pain Scale: FACES Pre/Post-Treatment    Pain: FACES Scale, Pretreatment 0-->no hurt  -AV 0-->no hurt  -AC       Posttreatment Pain Rating 0-->no hurt  -AV 0-->no hurt  -AC          SLP Evaluation Clinical Impressions    SLP Diagnosis difficulty voicing due to tracheostomy  -AV --       Rehab Potential/Prognosis good  -AV --       [facility] Criteria for Skilled Therapy Interventions Met yes  -AV --       Functional Impact difficulty communicating wants, needs;difficulty communicating in an emergency;difficulty in expressing complex messages  -AV --          SLP Treatment Clinical Impressions    Treatment Assessment (SLP) continued;improved  -AV continued;difficulty voicing due to tracheostomy  -AC       Treatment Assessment Comments (SLP) Tolerating PMV well, no trach collar on.  Voicing audible. discussed with patient not sleeping in PMV  -AV Tolerating PMV well w/ occasional coughing, but needing further tx to address poor coordination of breathing/phonation. Cont PMV use when pt awake, cuff deflated, & w/ RN/RT/SLP. Remove PMV when pt sleeping. Attached PMV to trach collar w/ strap in event that pt coughs PMV off, will be ani to easily locate.  -AC       Daily Summary of Progress (SLP) progress toward functional goals is good  -AV progress toward functional goals as expected  -AC       Barriers to Overall  Progress (SLP) -- Lethargy  -AC       Plan for Continued Treatment (SLP) continue treatment per plan of care  -AV continue treatment per plan of care  -AC       Care Plan Review care plan/treatment goals reviewed  -AV evaluation/treatment results reviewed;care plan/treatment goals reviewed;risks/benefits reviewed;current/potential barriers reviewed;patient/other agree to care plan  -AC          Recommendations    Therapy Frequency (SLP SLC) 5 days per week  -AV --       Predicted Duration Therapy Intervention (Days) until discharge  -AV --       Anticipated Discharge Disposition (SLP) inpatient rehabilitation facility;anticipate therapy at next level of care  -AV inpatient rehabilitation facility;anticipate therapy at next level of care  -AC       Demonstrates Need for Referral to Another Service speech therapy;other (see comments)  -AV speech therapy;other (see comments)  consider consult for swallow eval when medically appropriate per physician discretion  -AC             User Key  (r) = Recorded By, (t) = Taken By, (c) = Cosigned By    Initials Name Effective Dates    AC Shelley Iraheta MS CCC-SLP 02/03/23 -     AV Komal Villegas MS CCC-SLP 06/16/21 -                    EDUCATION  The patient has been educated in the following areas:     Speaking Valve.           SLP GOALS     Row Name 03/30/23 1050 03/28/23 1600          Patient will demonstrate functional communication skills for return to discharge environment     [place] with minimal cues  -AV with minimal cues  -AC     Time frame by discharge  -AV by discharge  -AC     Progress/Outcomes continuing progress toward goal  -AV continuing progress toward goal  -AC        Tolerate Speaking Valve Placement Goal 1 (SLP)    Tolerate Speaking Valve Placement Goal 1 (SLP) speaking valve >30 min;90%;independently (over 90% accuracy)  -AV speaking valve >30 min;90%;independently (over 90% accuracy)  -AC     Time Frame (Tolerate Speaking Valve Placement  Goal 1, SLP) short term goal (STG)  -AV short term goal (STG)  -AC     Progress (Tolerate Speaking Valve Placement Goal 1, SLP) 90%;independently (over 90% accuracy)  -AV 90%;independently (over 90% accuracy)  -AC     Progress/Outcomes (Tolerate Speaking Valve Placement Goal 1, SLP) good progress toward goal  -AV good progress toward goal  -AC     Comment (Tolerate Speaking Valve Placement Goal 1, SLP) -- Wearing PMV upon entry. Able to tolerate PMV w/o significant changes in vital signs. Occasional coughing that required oral suctioning. Reviewed recommendations for wearing PMV & removing when sleeping.  -AC        Audible Speech with Speaking Valve Goal 1 (SLP)    Audible Speech Goal 1 (SLP) 3 feet;background noise;90%;with minimal cues (75-90%)  -AV 3 feet;background noise;90%;with minimal cues (75-90%)  -AC     Time Frame (Audible Speech Goal 1, SLP) short term goal (STG)  -AV short term goal (STG)  -AC     Progress (Audible Speech Goal 1, SLP) 80%;with minimal cues (75-90%)  -AV 10%;with minimal cues (75-90%)  -AC     Progress/Outcomes (Audible Speech Goal 1, SLP) continuing progress toward goal  -AV continuing progress toward goal  -AC     Comment (Audible Speech Goal 1, SLP) -- Poor breath support and coordination of breathing/phonation. Audible @ 1 ft ~40% of the time. Difficulty reponding to cues to breathe after 1-2 words in therapeutic tasks (counting) 2' lethargy.  -AC           User Key  (r) = Recorded By, (t) = Taken By, (c) = Cosigned By    Initials Name Provider Type    AC Shelley Iraheta, MS CCC-SLP Speech and Language Pathologist    AV Komal Villegas MS CCC-SLP Speech and Language Pathologist                        Time Calculation:      Time Calculation- SLP     Row Name 03/30/23 1356             Time Calculation- SLP    SLP Start Time 1030  -AV      SLP Received On 03/30/23  -AV         Untimed Charges    24170-CH Treatment/ST Modification Prosth Aug Alter  45  -AV         Total Minutes     Untimed Charges Total Minutes 45  -AV       Total Minutes 45  -AV            User Key  (r) = Recorded By, (t) = Taken By, (c) = Cosigned By    Initials Name Provider Type    AV Komal Villegas MS CCC-SLP Speech and Language Pathologist                Therapy Charges for Today     Code Description Service Date Service Provider Modifiers Qty    [id number]  ST TREATMENT SPEECH 3 3/30/2023 Komal Villegas MS CCC-SLP GN 1                     MS ELDON Granados  3/30/2023    Electronically signed by Komal Villegas MS CCC-SLP at 03/30/23 3827

## 2023-04-01 LAB
GLUCOSE BLDC GLUCOMTR-MCNC: 103 MG/DL (ref 70–130)
GLUCOSE BLDC GLUCOMTR-MCNC: 120 MG/DL (ref 70–130)
GLUCOSE BLDC GLUCOMTR-MCNC: 125 MG/DL (ref 70–130)
GLUCOSE BLDC GLUCOMTR-MCNC: 94 MG/DL (ref 70–130)

## 2023-04-01 PROCEDURE — 82962 GLUCOSE BLOOD TEST: CPT

## 2023-04-01 PROCEDURE — 25010000002 HEPARIN (PORCINE) PER 1000 UNITS: Performed by: INTERNAL MEDICINE

## 2023-04-01 PROCEDURE — 25010000002 HEPARIN (PORCINE) PER 1000 UNITS: Performed by: SURGERY

## 2023-04-01 PROCEDURE — 94761 N-INVAS EAR/PLS OXIMETRY MLT: CPT

## 2023-04-01 PROCEDURE — 99232 SBSQ HOSP IP/OBS MODERATE 35: CPT | Performed by: INTERNAL MEDICINE

## 2023-04-01 RX ADMIN — VALSARTAN 80 MG: 160 TABLET, FILM COATED ORAL at 10:04

## 2023-04-01 RX ADMIN — Medication 10 ML: at 21:47

## 2023-04-01 RX ADMIN — AMOXICILLIN AND CLAVULANATE POTASSIUM 400 MG: 400; 57 POWDER, FOR SUSPENSION ORAL at 22:28

## 2023-04-01 RX ADMIN — POLYETHYLENE GLYCOL 3350 17 G: 17 POWDER, FOR SOLUTION ORAL at 10:04

## 2023-04-01 RX ADMIN — Medication 30 ML: at 10:03

## 2023-04-01 RX ADMIN — CARVEDILOL 12.5 MG: 12.5 TABLET, FILM COATED ORAL at 21:46

## 2023-04-01 RX ADMIN — HEPARIN SODIUM 5000 UNITS: 5000 INJECTION, SOLUTION INTRAVENOUS; SUBCUTANEOUS at 15:24

## 2023-04-01 RX ADMIN — Medication 10 ML: at 10:05

## 2023-04-01 RX ADMIN — CHLORHEXIDINE GLUCONATE 0.12% ORAL RINSE 15 ML: 1.2 LIQUID ORAL at 10:05

## 2023-04-01 RX ADMIN — RISPERIDONE 1 MG: 1 TABLET ORAL at 21:46

## 2023-04-01 RX ADMIN — CHLORHEXIDINE GLUCONATE 0.12% ORAL RINSE 15 ML: 1.2 LIQUID ORAL at 21:46

## 2023-04-01 RX ADMIN — Medication 30 ML: at 21:48

## 2023-04-01 RX ADMIN — HEPARIN SODIUM 5000 UNITS: 5000 INJECTION, SOLUTION INTRAVENOUS; SUBCUTANEOUS at 21:46

## 2023-04-01 RX ADMIN — AMOXICILLIN AND CLAVULANATE POTASSIUM 400 MG: 400; 57 POWDER, FOR SUSPENSION ORAL at 10:10

## 2023-04-01 RX ADMIN — CARVEDILOL 12.5 MG: 12.5 TABLET, FILM COATED ORAL at 10:06

## 2023-04-01 RX ADMIN — HEPARIN SODIUM 5000 UNITS: 5000 INJECTION, SOLUTION INTRAVENOUS; SUBCUTANEOUS at 05:12

## 2023-04-01 RX ADMIN — SENNOSIDES AND DOCUSATE SODIUM 2 TABLET: 8.6; 5 TABLET ORAL at 10:04

## 2023-04-01 NOTE — PLAN OF CARE
Goal Outcome Evaluation:  Plan of Care Reviewed With: patient           Outcome Evaluation: Pt resting in bed trach pessy mure will be removed before he goes to sleep , Oxygen sats 90%

## 2023-04-01 NOTE — PROGRESS NOTES
[facility]  PROGRESS NOTE    Patient Name: Sandro Junior  : 1955  MRN: [id number]    Date of Admission: 3/16/2023  Primary Care Physician: Dora Loya PA    Subjective   Subjective     CC:  F/u epiglottitis    HPI:  Patient resting in bed, asking when he might be going to rehab. Asking when trach might come out, overall feeling okay.    ROS:  Gen- No fevers, chills  CV- No chest pain, palpitations  Resp- No cough, dyspnea  GI- No N/V/D, abd pain    Objective   Objective     Vital Signs:   Temp:  [98.8 °F (37.1 °C)-99.6 °F (37.6 °C)] 99.4 °F (37.4 °C)  Heart Rate:  [68-88] 75  Resp:  [16-20] 18  BP: (103-144)/(59-86) 138/84     Physical Exam:  Constitutional: No acute distress, awake, alert  HENT: NCAT, mucous membranes moist  Respiratory: Clear to auscultation bilaterally, tracheostomy in place  Cardiovascular: RRR, no murmurs, rubs, or gallops  Gastrointestinal: Positive bowel sounds, soft, nontender, nondistended, PEG in place  Musculoskeletal: No bilateral ankle edema  Psychiatric: Appropriate affect, cooperative  Neurologic: Oriented x 3, speech clear  Skin: No rashes      Results Reviewed:  LAB RESULTS:      Lab 23  0455 23  0338 23  0623 23  0506 23  0324   WBC  --  7.59 9.39 7.85 7.04   HEMOGLOBIN 12.6* 12.3* 12.0* 12.1* 10.9*   HEMATOCRIT 39.3 38.5 36.8* 37.9 34.3*   PLATELETS  --  408 341 271 221   NEUTROS ABS  --  4.10 5.60 5.07 4.57   IMMATURE GRANS (ABS)  --  0.04 0.05 0.06* 0.04   LYMPHS ABS  --  2.21 2.61 1.68 1.34   MONOS ABS  --  1.00* 0.98* 0.88 0.87   EOS ABS  --  0.17 0.08 0.12 0.16   MCV  --  100.0* 98.4* 101.3* 100.6*   SED RATE  --   --   --   --  >130*   CRP  --   --   --  1.09* 1.54*   PROCALCITONIN  --   --   --   --  0.19   LDH  --   --   --   --  213         Lab 23  0455 23  0338 23  0823 23  0506 23  1206 23  0324   SODIUM  --  132* 132* 134*  --  134*   POTASSIUM  4.3 4.0 4.1 4.0  --  4.2   CHLORIDE  --  102 102 100  --  102   CO2  --  23.0 21.0* 28.0  --  25.0   ANION GAP  --  7.0 9.0 6.0  --  7.0   BUN  --  16 14 17  --  19   CREATININE  --  0.59* 0.61* 0.68*  --  0.55*   EGFR  --  107.0 105.9 102.5  --  109.3   GLUCOSE  --  166* 140* 141*  --  174*   CALCIUM  --  9.5 8.7 9.1  --  8.5*   MAGNESIUM 2.4  --  2.5* 1.7  1.7  --  1.9   PHOSPHORUS 3.3  --  2.9 2.6 2.1* 2.0*  2.0*         Lab 03/30/23  0338 03/27/23  0506 03/26/23  0324   TOTAL PROTEIN 8.1 7.8  --    ALBUMIN 3.3* 3.2* 3.1*   GLOBULIN 4.8 4.6  --    ALT (SGPT) 13 17  --    AST (SGOT) 16 23  --    BILIRUBIN 0.4 0.4  --    ALK PHOS 55 65  --              Lab 03/27/23  0506   CHOLESTEROL 118   TRIGLYCERIDES 107             Lab 03/28/23  0343 03/26/23  2346 03/26/23  0404   PH, ARTERIAL 7.502* 7.493* 7.477*   PCO2, ARTERIAL 32.0* 37.1 37.1   PO2 .0 90.2 132.0*   FIO2 30 40 30   HCO3 ART 25.0 28.5* 27.4*   BASE EXCESS ART 2.3* 5.0* 3.8*   CARBOXYHEMOGLOBIN 1.2 1.0 1.1     Brief Urine Lab Results  (Last result in the past 365 days)      Color   Clarity   Blood   Leuk Est   Nitrite   Protein   CREAT   Urine HCG        03/16/23 2227 Yellow   Clear   Negative   Negative   Negative   30 mg/dL (1+)                 Microbiology Results Abnormal     Procedure Component Value - Date/Time    Blood Culture - Blood, Arm, Left [[id number]]  (Normal) Collected: 03/20/23 1438    Lab Status: Final result Specimen: Blood from Arm, Left Updated: 03/25/23 1501     Blood Culture No growth at 5 days    Blood Culture - Blood, Hand, Right [[id number]]  (Normal) Collected: 03/16/23 2225    Lab Status: Final result Specimen: Blood from Hand, Right Updated: 03/21/23 2246     Blood Culture No growth at 5 days    MRSA Screen, PCR (Inpatient) - Swab, Nares [[id number]]  (Normal) Collected: 03/17/23 0933    Lab Status: Final result Specimen: Swab from Nares Updated: 03/17/23 1200     MRSA PCR Negative    Narrative:      The negative predictive  value of this diagnostic test is high and should only be used to consider de-escalating anti-MRSA therapy. A positive result may indicate colonization with MRSA and must be correlated clinically.  MRSA Negative    COVID PRE-OP / PRE-PROCEDURE SCREENING ORDER (NO ISOLATION) - Swab, Nasopharynx [[id number]]  (Normal) Collected: 03/16/23 2108    Lab Status: Final result Specimen: Swab from Nasopharynx Updated: 03/16/23 2234    Narrative:      The following orders were created for panel order COVID PRE-OP / PRE-PROCEDURE SCREENING ORDER (NO ISOLATION) - Swab, Nasopharynx.  Procedure                               Abnormality         Status                     ---------                               -----------         ------                     Respiratory Panel PCR w/...[[id number]]  Normal              Final result                 Please view results for these tests on the individual orders.    Respiratory Panel PCR w/COVID-19(SARS-CoV-2) [facility]/WILLIAM/RAMONITA/PAD/[facility]AD/[facility] In-House, NP Swab in UTM/VTM, 3-4 HR TAT - Swab, Nasopharynx [[id number]]  (Normal) Collected: 03/16/23 2108    Lab Status: Final result Specimen: Swab from Nasopharynx Updated: 03/16/23 2234     ADENOVIRUS, PCR Not Detected     Coronavirus 229E Not Detected     Coronavirus HKU1 Not Detected     Coronavirus NL63 Not Detected     Coronavirus OC43 Not Detected     COVID19 Not Detected     Human Metapneumovirus Not Detected     Human Rhinovirus/Enterovirus Not Detected     Influenza A PCR Not Detected     Influenza B PCR Not Detected     Parainfluenza Virus 1 Not Detected     Parainfluenza Virus 2 Not Detected     Parainfluenza Virus 3 Not Detected     Parainfluenza Virus 4 Not Detected     RSV, PCR Not Detected     Bordetella pertussis pcr Not Detected     Bordetella parapertussis PCR Not Detected     Chlamydophila pneumoniae PCR Not Detected     Mycoplasma pneumo by PCR Not Detected    Narrative:      In the setting of a positive respiratory panel with a viral  infection PLUS a negative procalcitonin without other underlying concern for bacterial infection, consider observing off antibiotics or discontinuation of antibiotics and continue supportive care. If the respiratory panel is positive for atypical bacterial infection (Bordetella pertussis, Chlamydophila pneumoniae, or Mycoplasma pneumoniae), consider antibiotic de-escalation to target atypical bacterial infection.          No radiology results from the last 24 hrs    Results for orders placed during the hospital encounter of 03/16/23    Adult Transthoracic Echo Complete W/ Cont if Necessary Per Protocol    Interpretation Summary  •  Left ventricular systolic function is normal. Calculated left ventricular 3D EF = 56% Left ventricular ejection fraction appears to be 51 - 55%.  •  Left ventricular diastolic function was normal.  •  Estimated right ventricular systolic pressure from tricuspid regurgitation is normal (<35 mmHg).      Current medications:  Scheduled Meds:amoxicillin-clavulanate, 400 mg, Per PEG Tube, Q12H  carvedilol, 12.5 mg, Per PEG Tube, Q12H  chlorhexidine, 15 mL, Mouth/Throat, Q12H  heparin (porcine), 5,000 Units, Subcutaneous, Q8H  insulin regular, 0-7 Units, Subcutaneous, Q6H  lansoprazole, 15 mg, Per PEG Tube, Q AM  latanoprost, 1 drop, Both Eyes, Daily  polyethylene glycol, 17 g, Per PEG Tube, Daily  ProSource No Carb, 30 mL, Per PEG Tube, BID  risperiDONE, 1 mg, Per PEG Tube, Nightly  senna-docusate sodium, 2 tablet, Per PEG Tube, BID  sodium chloride, 10 mL, Intravenous, Q12H  valsartan, 80 mg, Per PEG Tube, Q24H      Continuous Infusions:   PRN Meds:.•  acetaminophen  •  [DISCONTINUED] acetaminophen **OR** acetaminophen  •  albuterol  •  senna-docusate sodium **AND** polyethylene glycol **AND** [DISCONTINUED] bisacodyl **AND** bisacodyl  •  dextrose  •  glucagon (human recombinant)  •  hydrALAZINE  •  hydrOXYzine  •  Magnesium Standard Dose Replacement - Follow Nurse / BPA Driven Protocol  •   melatonin  •  Phosphorus Replacement - Follow Nurse / BPA Driven Protocol  •  Potassium Replacement - Follow Nurse / BPA Driven Protocol  •  sodium chloride  •  sodium chloride    Assessment & Plan   Assessment & Plan     Active Hospital Problems    Diagnosis  POA   • **Acute strep pyogenes (group A strep) epiglottitis with airway obstruction  [J05.11]  Yes   • Acute respiratory failure [J96.00]  Yes   • Essential hypertension [I10]  Yes      Resolved Hospital Problems   No resolved problems to display.        Brief Hospital Course to date:  Sandro Junior is a 68 y.o. male with PMHx significant for hypertension who presented to [facility] on 3/16 with sore throat and shortness of breath. Upon arrival to the ED he was noted to have significant stridor, tripoding and drooling. He was treated with steroids, epinephrine and H2 blockers but despite this he ultimately required intubation and admission to the ICU. His blood cultures grew Srep pyogenes. He underwent bronch on 3/20 which showed persistent edematous changes in the posterior oropharynx with copious mucopurulent secretions and repeat CT neck 3/21/2023 revealed worsening edema but no sign of abscess. ID was consulted and he was treated with continuous PCN infusion. He was not deemed safe to extubate, therefore he underwent trach and PEG on 3/23. He was transferred to the floor on 3/25.    Acute Group A Strep Epiglottitis   Airway Obstruction s/p Mechanical Ventilation, Trach/PEG 3/23  Strep Pyogenes Bacteremia with Sepsis  - Dr. Johnson following, transitioned to PO Augmentin x 7 days  - trach suctioning PRN, tolerating trach collar  - continue tube feedings, swallow eval planned    HTN:  - continue Diovan, Coreg    Anxiety:  - PRN hydroxyzine  - risperdal at bedtime    Planning [facility] at discharge    Expected Discharge Location and Transportation: [place]  Expected Discharge   Expected Discharge Date and Time     Expected Discharge Date Expected  Discharge Time    Mar 29, 2023            DVT prophylaxis:  Medical DVT prophylaxis orders are present.     AM-PAC 6 Clicks Score (PT): 20 (03/31/23 2393)    CODE STATUS:   Code Status and Medical Interventions:   Ordered at: 03/16/23 1188     Code Status (Patient has no pulse and is not breathing):    CPR (Attempt to Resuscitate)     Medical Interventions (Patient has pulse or is breathing):    Full Support       Lin Babb, DO  04/01/23

## 2023-04-02 LAB
GLUCOSE BLDC GLUCOMTR-MCNC: 104 MG/DL (ref 70–130)
GLUCOSE BLDC GLUCOMTR-MCNC: 171 MG/DL (ref 70–130)
GLUCOSE BLDC GLUCOMTR-MCNC: 178 MG/DL (ref 70–130)
GLUCOSE BLDC GLUCOMTR-MCNC: 97 MG/DL (ref 70–130)

## 2023-04-02 PROCEDURE — 63710000001 INSULIN REGULAR HUMAN PER 5 UNITS: Performed by: INTERNAL MEDICINE

## 2023-04-02 PROCEDURE — 25010000002 HEPARIN (PORCINE) PER 1000 UNITS: Performed by: INTERNAL MEDICINE

## 2023-04-02 PROCEDURE — 82962 GLUCOSE BLOOD TEST: CPT

## 2023-04-02 PROCEDURE — 99232 SBSQ HOSP IP/OBS MODERATE 35: CPT | Performed by: INTERNAL MEDICINE

## 2023-04-02 RX ADMIN — Medication 10 ML: at 20:32

## 2023-04-02 RX ADMIN — HEPARIN SODIUM 5000 UNITS: 5000 INJECTION, SOLUTION INTRAVENOUS; SUBCUTANEOUS at 13:24

## 2023-04-02 RX ADMIN — INSULIN HUMAN 2 UNITS: 100 INJECTION, SOLUTION PARENTERAL at 06:21

## 2023-04-02 RX ADMIN — LANSOPRAZOLE 15 MG: KIT at 05:18

## 2023-04-02 RX ADMIN — CARVEDILOL 12.5 MG: 12.5 TABLET, FILM COATED ORAL at 20:30

## 2023-04-02 RX ADMIN — POLYETHYLENE GLYCOL 3350 17 G: 17 POWDER, FOR SOLUTION ORAL at 09:57

## 2023-04-02 RX ADMIN — CHLORHEXIDINE GLUCONATE 0.12% ORAL RINSE 15 ML: 1.2 LIQUID ORAL at 20:31

## 2023-04-02 RX ADMIN — Medication 30 ML: at 20:33

## 2023-04-02 RX ADMIN — RISPERIDONE 1 MG: 1 TABLET ORAL at 20:30

## 2023-04-02 RX ADMIN — Medication 10 ML: at 09:59

## 2023-04-02 RX ADMIN — AMOXICILLIN AND CLAVULANATE POTASSIUM 400 MG: 400; 57 POWDER, FOR SUSPENSION ORAL at 20:30

## 2023-04-02 RX ADMIN — VALSARTAN 80 MG: 160 TABLET, FILM COATED ORAL at 09:57

## 2023-04-02 RX ADMIN — SENNOSIDES AND DOCUSATE SODIUM 2 TABLET: 8.6; 5 TABLET ORAL at 09:56

## 2023-04-02 RX ADMIN — CHLORHEXIDINE GLUCONATE 0.12% ORAL RINSE 15 ML: 1.2 LIQUID ORAL at 09:57

## 2023-04-02 RX ADMIN — HEPARIN SODIUM 5000 UNITS: 5000 INJECTION, SOLUTION INTRAVENOUS; SUBCUTANEOUS at 20:30

## 2023-04-02 RX ADMIN — INSULIN HUMAN 2 UNITS: 100 INJECTION, SOLUTION PARENTERAL at 13:24

## 2023-04-02 RX ADMIN — AMOXICILLIN AND CLAVULANATE POTASSIUM 400 MG: 400; 57 POWDER, FOR SUSPENSION ORAL at 09:59

## 2023-04-02 RX ADMIN — Medication 30 ML: at 09:57

## 2023-04-02 RX ADMIN — HEPARIN SODIUM 5000 UNITS: 5000 INJECTION, SOLUTION INTRAVENOUS; SUBCUTANEOUS at 05:18

## 2023-04-02 RX ADMIN — CARVEDILOL 12.5 MG: 12.5 TABLET, FILM COATED ORAL at 09:56

## 2023-04-02 NOTE — PLAN OF CARE
Goal Outcome Evaluation:  Plan of Care Reviewed With: patient           Outcome Evaluation: Pt resting on RA sats 98% trach intact , no problems at this time

## 2023-04-02 NOTE — PROGRESS NOTES
[facility] Medicine Services  PROGRESS NOTE    Patient Name: Sandro Junior  : 1955  MRN: [id number]    Date of Admission: 3/16/2023  Primary Care Physician: Dora Loya PA    Subjective   Subjective     CC:  F/u epiglottitis    HPI:  Patient resting in bed. Agitated about still being in the hospital and wants to know why he is not at rehab yet.    ROS:  Gen- No fevers, chills  CV- No chest pain, palpitations  Resp- No cough, dyspnea  GI- No N/V/D, abd pain    Objective   Objective     Vital Signs:   Temp:  [98.9 °F (37.2 °C)-99.5 °F (37.5 °C)] 99.2 °F (37.3 °C)  Heart Rate:  [69-99] 70  Resp:  [16-18] 18  BP: (115-152)/(74-86) 120/74     Physical Exam:  Constitutional: No acute distress, awake, alert  HENT: NCAT, mucous membranes moist  Respiratory: Clear to auscultation bilaterally, tracheostomy in place  Cardiovascular: RRR, no murmurs, rubs, or gallops  Gastrointestinal: Positive bowel sounds, soft, nontender, nondistended, PEG in place  Musculoskeletal: No bilateral ankle edema  Psychiatric: agitated  Neurologic: Oriented x 3, speech clear  Skin: No rashes      Results Reviewed:  LAB RESULTS:      Lab 23  0623 23  0506   WBC  --  7.59 9.39 7.85   HEMOGLOBIN 12.6* 12.3* 12.0* 12.1*   HEMATOCRIT 39.3 38.5 36.8* 37.9   PLATELETS  --  408 341 271   NEUTROS ABS  --  4.10 5.60 5.07   IMMATURE GRANS (ABS)  --  0.04 0.05 0.06*   LYMPHS ABS  --  2.21 2.61 1.68   MONOS ABS  --  1.00* 0.98* 0.88   EOS ABS  --  0.17 0.08 0.12   MCV  --  100.0* 98.4* 101.3*   CRP  --   --   --  1.09*         Lab 238 23  0823 23  0506 23  1206   SODIUM  --  132* 132* 134*  --    POTASSIUM 4.3 4.0 4.1 4.0  --    CHLORIDE  --  102 102 100  --    CO2  --  23.0 21.0* 28.0  --    ANION GAP  --  7.0 9.0 6.0  --    BUN  --  16 14 17  --    CREATININE  --  0.59* 0.61* 0.68*  --    EGFR  --  107.0 105.9 102.5  --    GLUCOSE   --  166* 140* 141*  --    CALCIUM  --  9.5 8.7 9.1  --    MAGNESIUM 2.4  --  2.5* 1.7  1.7  --    PHOSPHORUS 3.3  --  2.9 2.6 2.1*         Lab 03/30/23  0338 03/27/23  0506   TOTAL PROTEIN 8.1 7.8   ALBUMIN 3.3* 3.2*   GLOBULIN 4.8 4.6   ALT (SGPT) 13 17   AST (SGOT) 16 23   BILIRUBIN 0.4 0.4   ALK PHOS 55 65             Lab 03/27/23  0506   CHOLESTEROL 118   TRIGLYCERIDES 107             Lab 03/28/23  0343 03/26/23  2346   PH, ARTERIAL 7.502* 7.493*   PCO2, ARTERIAL 32.0* 37.1   PO2 .0 90.2   FIO2 30 40   HCO3 ART 25.0 28.5*   BASE EXCESS ART 2.3* 5.0*   CARBOXYHEMOGLOBIN 1.2 1.0     Brief Urine Lab Results  (Last result in the past 365 days)      Color   Clarity   Blood   Leuk Est   Nitrite   Protein   CREAT   Urine HCG        03/16/23 2227 Yellow   Clear   Negative   Negative   Negative   30 mg/dL (1+)                 Microbiology Results Abnormal     Procedure Component Value - Date/Time    Blood Culture - Blood, Arm, Left [[id number]]  (Normal) Collected: 03/20/23 1438    Lab Status: Final result Specimen: Blood from Arm, Left Updated: 03/25/23 1501     Blood Culture No growth at 5 days    Blood Culture - Blood, Hand, Right [[id number]]  (Normal) Collected: 03/16/23 2225    Lab Status: Final result Specimen: Blood from Hand, Right Updated: 03/21/23 2246     Blood Culture No growth at 5 days    MRSA Screen, PCR (Inpatient) - Swab, Nares [[id number]]  (Normal) Collected: 03/17/23 0933    Lab Status: Final result Specimen: Swab from Nares Updated: 03/17/23 1200     MRSA PCR Negative    Narrative:      The negative predictive value of this diagnostic test is high and should only be used to consider de-escalating anti-MRSA therapy. A positive result may indicate colonization with MRSA and must be correlated clinically.  MRSA Negative    COVID PRE-OP / PRE-PROCEDURE SCREENING ORDER (NO ISOLATION) - Swab, Nasopharynx [[id number]]  (Normal) Collected: 03/16/23 2105    Lab Status: Final result Specimen: Swab from  Nasopharynx Updated: 03/16/23 2234    Narrative:      The following orders were created for panel order COVID PRE-OP / PRE-PROCEDURE SCREENING ORDER (NO ISOLATION) - Swab, Nasopharynx.  Procedure                               Abnormality         Status                     ---------                               -----------         ------                     Respiratory Panel PCR w/...[[id number]]  Normal              Final result                 Please view results for these tests on the individual orders.    Respiratory Panel PCR w/COVID-19(SARS-CoV-2) [facility]ILLIAM/[facility]ONITA/[facility] In-House, NP Swab in UTM/VTM, 3-4 HR TAT - Swab, Nasopharynx [[id number]]  (Normal) Collected: 03/16/23 2108    Lab Status: Final result Specimen: Swab from Nasopharynx Updated: 03/16/23 2234     ADENOVIRUS, PCR Not Detected     Coronavirus 229E Not Detected     Coronavirus HKU1 Not Detected     Coronavirus NL63 Not Detected     Coronavirus OC43 Not Detected     COVID19 Not Detected     Human Metapneumovirus Not Detected     Human Rhinovirus/Enterovirus Not Detected     Influenza A PCR Not Detected     Influenza B PCR Not Detected     Parainfluenza Virus 1 Not Detected     Parainfluenza Virus 2 Not Detected     Parainfluenza Virus 3 Not Detected     Parainfluenza Virus 4 Not Detected     RSV, PCR Not Detected     Bordetella pertussis pcr Not Detected     Bordetella parapertussis PCR Not Detected     Chlamydophila pneumoniae PCR Not Detected     Mycoplasma pneumo by PCR Not Detected    Narrative:      In the setting of a positive respiratory panel with a viral infection PLUS a negative procalcitonin without other underlying concern for bacterial infection, consider observing off antibiotics or discontinuation of antibiotics and continue supportive care. If the respiratory panel is positive for atypical bacterial infection (Bordetella pertussis, Chlamydophila pneumoniae, or Mycoplasma pneumoniae), consider antibiotic de-escalation to target  atypical bacterial infection.          No radiology results from the last 24 hrs    Results for orders placed during the hospital encounter of 03/16/23    Adult Transthoracic Echo Complete W/ Cont if Necessary Per Protocol    Interpretation Summary  •  Left ventricular systolic function is normal. Calculated left ventricular 3D EF = 56% Left ventricular ejection fraction appears to be 51 - 55%.  •  Left ventricular diastolic function was normal.  •  Estimated right ventricular systolic pressure from tricuspid regurgitation is normal (<35 mmHg).      Current medications:  Scheduled Meds:amoxicillin-clavulanate, 400 mg, Per PEG Tube, Q12H  carvedilol, 12.5 mg, Per PEG Tube, Q12H  chlorhexidine, 15 mL, Mouth/Throat, Q12H  heparin (porcine), 5,000 Units, Subcutaneous, Q8H  insulin regular, 0-7 Units, Subcutaneous, Q6H  lansoprazole, 15 mg, Per PEG Tube, Q AM  latanoprost, 1 drop, Both Eyes, Daily  polyethylene glycol, 17 g, Per PEG Tube, Daily  ProSource No Carb, 30 mL, Per PEG Tube, BID  risperiDONE, 1 mg, Per PEG Tube, Nightly  senna-docusate sodium, 2 tablet, Per PEG Tube, BID  sodium chloride, 10 mL, Intravenous, Q12H  valsartan, 80 mg, Per PEG Tube, Q24H      Continuous Infusions:   PRN Meds:.•  acetaminophen  •  [DISCONTINUED] acetaminophen **OR** acetaminophen  •  albuterol  •  senna-docusate sodium **AND** polyethylene glycol **AND** [DISCONTINUED] bisacodyl **AND** bisacodyl  •  dextrose  •  glucagon (human recombinant)  •  hydrALAZINE  •  hydrOXYzine  •  Magnesium Standard Dose Replacement - Follow Nurse / BPA Driven Protocol  •  melatonin  •  Phosphorus Replacement - Follow Nurse / BPA Driven Protocol  •  Potassium Replacement - Follow Nurse / BPA Driven Protocol  •  sodium chloride  •  sodium chloride    Assessment & Plan   Assessment & Plan     Active Hospital Problems    Diagnosis  POA   • **Acute strep pyogenes (group A strep) epiglottitis with airway obstruction  [J05.11]  Yes   • Acute respiratory  failure [J96.00]  Yes   • Essential hypertension [I10]  Yes      Resolved Hospital Problems   No resolved problems to display.        Brief Hospital Course to date:  Sandro Junior is a 68 y.o. male with PMHx significant for hypertension who presented to [facility] on 3/16 with sore throat and shortness of breath. Upon arrival to the ED he was noted to have significant stridor, tripoding and drooling. He was treated with steroids, epinephrine and H2 blockers but despite this he ultimately required intubation and admission to the ICU. His blood cultures grew Srep pyogenes. He underwent bronch on 3/20 which showed persistent edematous changes in the posterior oropharynx with copious mucopurulent secretions and repeat CT neck 3/21/2023 revealed worsening edema but no sign of abscess. ID was consulted and he was treated with continuous PCN infusion. He was not deemed safe to extubate, therefore he underwent trach and PEG on 3/23. He was transferred to the floor on 3/25.    Acute Group A Strep Epiglottitis   Airway Obstruction s/p Mechanical Ventilation, Trach/PEG 3/23  Strep Pyogenes Bacteremia with Sepsis  - Dr. Johnson following, transitioned to PO Augmentin x 7 days  - trach suctioning PRN, tolerating trach collar  - continue tube feedings, swallow eval planned    HTN:  - continue Diovan, Coreg    Anxiety:  - PRN hydroxyzine  - risperdal at bedtime    Planning [facility] at discharge, peer to peer pending    Expected Discharge Location and Transportation: [facility]  Expected Discharge   Expected Discharge Date and Time     Expected Discharge Date Expected Discharge Time    Apr 3, 2023            DVT prophylaxis:  Medical DVT prophylaxis orders are present.     AM-PAC 6 Clicks Score (PT): 20 (04/01/23 0803)    CODE STATUS:   Code Status and Medical Interventions:   Ordered at: 03/16/23 5592     Code Status (Patient has no pulse and is not breathing):    CPR (Attempt to Resuscitate)     Medical Interventions (Patient  has pulse or is breathing):    Full Support       Lin Babb, DO  04/02/23

## 2023-04-03 DIAGNOSIS — I10 ESSENTIAL HYPERTENSION: ICD-10-CM

## 2023-04-03 LAB
GLUCOSE BLDC GLUCOMTR-MCNC: 131 MG/DL (ref 70–130)
GLUCOSE BLDC GLUCOMTR-MCNC: 159 MG/DL (ref 70–130)
GLUCOSE BLDC GLUCOMTR-MCNC: 89 MG/DL (ref 70–130)
GLUCOSE BLDC GLUCOMTR-MCNC: 97 MG/DL (ref 70–130)
GLUCOSE BLDC GLUCOMTR-MCNC: 99 MG/DL (ref 70–130)

## 2023-04-03 PROCEDURE — 82962 GLUCOSE BLOOD TEST: CPT

## 2023-04-03 PROCEDURE — 92610 EVALUATE SWALLOWING FUNCTION: CPT

## 2023-04-03 PROCEDURE — 99231 SBSQ HOSP IP/OBS SF/LOW 25: CPT | Performed by: INTERNAL MEDICINE

## 2023-04-03 PROCEDURE — 97110 THERAPEUTIC EXERCISES: CPT

## 2023-04-03 PROCEDURE — 97116 GAIT TRAINING THERAPY: CPT

## 2023-04-03 PROCEDURE — 63710000001 INSULIN REGULAR HUMAN PER 5 UNITS: Performed by: INTERNAL MEDICINE

## 2023-04-03 PROCEDURE — 92507 TX SP LANG VOICE COMM INDIV: CPT

## 2023-04-03 PROCEDURE — 25010000002 HEPARIN (PORCINE) PER 1000 UNITS: Performed by: INTERNAL MEDICINE

## 2023-04-03 RX ORDER — VALSARTAN 80 MG/1
TABLET ORAL
Qty: 90 TABLET | Refills: 3 | Status: SHIPPED | OUTPATIENT
Start: 2023-04-03 | End: 2023-04-11 | Stop reason: SDUPTHER

## 2023-04-03 RX ADMIN — HEPARIN SODIUM 5000 UNITS: 5000 INJECTION, SOLUTION INTRAVENOUS; SUBCUTANEOUS at 21:37

## 2023-04-03 RX ADMIN — CHLORHEXIDINE GLUCONATE 0.12% ORAL RINSE 15 ML: 1.2 LIQUID ORAL at 21:37

## 2023-04-03 RX ADMIN — Medication 30 ML: at 21:39

## 2023-04-03 RX ADMIN — Medication 10 ML: at 09:42

## 2023-04-03 RX ADMIN — RISPERIDONE 1 MG: 1 TABLET ORAL at 21:37

## 2023-04-03 RX ADMIN — LANSOPRAZOLE 15 MG: KIT at 06:33

## 2023-04-03 RX ADMIN — HEPARIN SODIUM 5000 UNITS: 5000 INJECTION, SOLUTION INTRAVENOUS; SUBCUTANEOUS at 17:07

## 2023-04-03 RX ADMIN — HEPARIN SODIUM 5000 UNITS: 5000 INJECTION, SOLUTION INTRAVENOUS; SUBCUTANEOUS at 06:32

## 2023-04-03 RX ADMIN — Medication 30 ML: at 09:42

## 2023-04-03 RX ADMIN — Medication 10 ML: at 21:38

## 2023-04-03 RX ADMIN — AMOXICILLIN AND CLAVULANATE POTASSIUM 400 MG: 400; 57 POWDER, FOR SUSPENSION ORAL at 21:37

## 2023-04-03 RX ADMIN — CHLORHEXIDINE GLUCONATE 0.12% ORAL RINSE 15 ML: 1.2 LIQUID ORAL at 09:41

## 2023-04-03 RX ADMIN — INSULIN HUMAN 2 UNITS: 100 INJECTION, SOLUTION PARENTERAL at 17:08

## 2023-04-03 RX ADMIN — LATANOPROST 1 DROP: 50 SOLUTION OPHTHALMIC at 11:58

## 2023-04-03 RX ADMIN — CARVEDILOL 12.5 MG: 12.5 TABLET, FILM COATED ORAL at 21:37

## 2023-04-03 RX ADMIN — AMOXICILLIN AND CLAVULANATE POTASSIUM 400 MG: 400; 57 POWDER, FOR SUSPENSION ORAL at 09:42

## 2023-04-03 NOTE — CASE MANAGEMENT/SOCIAL WORK
Continued Stay Note  JELLY Carranza     Patient Name: Sandro Junior  MRN: [id number]  Today's Date: 4/3/2023    Admit Date: 3/16/2023    Plan: update   Discharge Plan     Row Name 04/03/23 0952       Plan    Plan update    Patient/Family in Agreement with Plan yes    Plan Comments CM follow up regarding [facility], Physican Advisor MD suggesting needs LTCH.  Called liaison for LTCH who was already familiar with patient and indicates he is not appropriate for LTCH but for acute rehab.  Physician Advisor notified via secure chat which has been seen.  CM following.    Final Discharge Disposition Code 62 - inpatient rehab facility               Discharge Codes    No documentation.               Expected Discharge Date and Time     Expected Discharge Date Expected Discharge Time    Apr 3, 2023             Rekha Brand RN

## 2023-04-03 NOTE — PROGRESS NOTES
1       Sandro Junior  1955  [id number]  4/3/2023    CC: Sore throat and fever    Sandro Junior is a 68 y.o. male here for adult epiglottitis, leukocytosis, lactic acidosis, elevated procalcitonin, and severe sepsis with group A beta-hemolytic streptococci in blood cultures.      Past medical history:  Past Medical History:   Diagnosis Date   • Epidermal inclusion cyst    • Esophageal candidiasis    • Keloid scar    • Rectal cancer    • Seborrheic dermatitis        Medications:   Current Facility-Administered Medications:   •  acetaminophen (TYLENOL) 160 MG/5ML solution 650 mg, 650 mg, Per PEG Tube, Q6H PRN, Monika Louis MD, 650 mg at 03/28/23 1714  •  [DISCONTINUED] acetaminophen (TYLENOL) tablet 650 mg, 650 mg, Nasogastric, Q4H PRN, 650 mg at 03/21/23 0253 **OR** acetaminophen (TYLENOL) suppository 650 mg, 650 mg, Rectal, Q4H PRN, Monika Louis MD  •  albuterol (PROVENTIL) nebulizer solution 0.083% 2.5 mg/3mL, 2.5 mg, Nebulization, Q6H PRN, Monika Louis MD  •  amoxicillin-clavulanate (AUGMENTIN) 400-57 MG/5ML suspension 400 mg, 400 mg, Per PEG Tube, Q12H, Monika Louis MD, 400 mg at 04/02/23 2030  •  sennosides-docusate (PERICOLACE) 8.6-50 MG per tablet 2 tablet, 2 tablet, Per PEG Tube, BID, 2 tablet at 04/02/23 0956 **AND** polyethylene glycol (MIRALAX) packet 17 g, 17 g, Per PEG Tube, Daily PRN **AND** [DISCONTINUED] bisacodyl (DULCOLAX) EC tablet 5 mg, 5 mg, Oral, Daily PRN **AND** bisacodyl (DULCOLAX) suppository 10 mg, 10 mg, Rectal, Daily PRN, Monika Louis MD  •  carvedilol (COREG) tablet 12.5 mg, 12.5 mg, Per PEG Tube, Q12H, Monika Louis MD, 12.5 mg at 04/02/23 2030  •  chlorhexidine (PERIDEX) 0.12 % solution 15 mL, 15 mL, Mouth/Throat, Q12H, Monika Louis MD, 15 mL at 04/02/23 2031  •  dextrose (D50W) (25 g/50 mL) IV injection 25 g, 25 g, Intravenous, Q15 Min PRN, Monika Louis MD  •  glucagon (GLUCAGEN) injection 1 mg,  1 mg, Intramuscular, Q15 Min PRN, Monika Louis MD  •  heparin (porcine) 5000 UNIT/ML injection 5,000 Units, 5,000 Units, Subcutaneous, Q8H, Monika Louis MD, 5,000 Units at 04/03/23 0632  •  hydrALAZINE (APRESOLINE) injection 20 mg, 20 mg, Intravenous, Q4H PRN, Monika Louis MD, 20 mg at 03/29/23 2051  •  hydrOXYzine (ATARAX) 10 MG/5ML syrup 25 mg, 25 mg, Per PEG Tube, Q8H PRN, Monika Louis MD, 25 mg at 03/31/23 1040  •  insulin regular (humuLIN R,novoLIN R) injection 0-7 Units, 0-7 Units, Subcutaneous, Q6H, Monika Louis MD, 2 Units at 04/02/23 1324  •  lansoprazole (FIRST) oral suspension 15 mg, 15 mg, Per PEG Tube, Q AM, Monika Louis MD, 15 mg at 04/03/23 0633  •  latanoprost (XALATAN) 0.005 % ophthalmic solution 1 drop, 1 drop, Both Eyes, Daily, Monika Louis MD, 1 drop at 03/31/23 0833  •  Magnesium Standard Dose Replacement - Initiate Nurse / BPA Driven Protocol, , Does not apply, PRN, Monika Louis MD  •  melatonin tablet 5 mg, 5 mg, Per G Tube, Nightly PRN, Olga Perez, APRN, 5 mg at 03/31/23 2254  •  Phosphorus Replacement - Initiate Nurse / BPA Driven Protocol, , Does not apply, PRN, Monika Louis MD  •  polyethylene glycol (MIRALAX) packet 17 g, 17 g, Per PEG Tube, Daily, Monika Louis MD, 17 g at 04/02/23 0957  •  Potassium Replacement - Initiate Nurse / BPA Driven Protocol, , Does not apply, PRN, Monika Louis MD  •  ProSource No Carb oral solution 30 mL, 30 mL, Per PEG Tube, BID, Monika Louis MD, 30 mL at 04/02/23 2033  •  risperiDONE (risperDAL) tablet 1 mg, 1 mg, Per PEG Tube, Nightly, Monika Louis MD, 1 mg at 04/02/23 2030  •  sodium chloride 0.9 % flush 10 mL, 10 mL, Intravenous, Q12H, Monika Louis MD, 10 mL at 04/02/23 2032  •  sodium chloride 0.9 % flush 10 mL, 10 mL, Intravenous, PRN, Monika Louis MD  •  sodium chloride 0.9 % flush 20 mL, 20 mL,  "Intravenous, PRN, Monika Louis MD  •  valsartan (DIOVAN) tablet 80 mg, 80 mg, Per PEG Tube, Q24H, Monika Louis MD, 80 mg at 04/02/23 0957  Antibiotics:  Anti-Infectives (From admission, onward)    Ordered     Dose/Rate Route Frequency Start Stop    03/31/23 0754  amoxicillin-clavulanate (AUGMENTIN) 400-57 MG/5ML suspension 400 mg        Note to Pharmacy: Via PEG   Ordering Provider: Monika Louis MD    400 mg Per PEG Tube Every 12 Hours Scheduled 03/31/23 0900 04/07/23 0859    03/16/23 2151  vancomycin 1500 mg/500 mL 0.9% NS IVPB ([facility])        Ordering Provider: Anand Eaton MD    20 mg/kg × 79.4 kg Intravenous Once 03/16/23 2153 03/16/23 2323          Allergies:  has No Known Allergies.    Review of Systems: All other reviewed and negative except as per HPI    Blood pressure 143/75, pulse 63, temperature 98.2 °F (36.8 °C), temperature source Oral, resp. rate 19, height 185.4 cm (73\"), weight 73.2 kg (161 lb 4.8 oz), SpO2 97 %.  GENERAL: Awake and alert, in no acute distress.  Afebrile and hemodynamically stable over the weekend.  Mild cough.  Minimal tracheostomy sputum production.  Unaware of nausea or vomiting.  Denies diarrhea.  PICC catheter functional.  No unusual skin rashes or large joint effusions.  HEENT: Oropharynx without thrush. Sinuses nontender. Dentition in marginal repair. No cervical adenopathy. No carotid bruits/ jugular venous distention.  Tracheostomy stoma with Passy-Washington valve.  Soft phonation.  EYES: PERRL. No conjunctival injection. No icterus. EOMI.  LYMPHATICS: No lymphadenopathy of the neck or axillary or inguinal regions.   HEART: No murmur, gallop, or pericardial friction rub.  Right antecubital PICC exit site without phlebitis.  LUNGS: Clear to auscultation anteriorly. No percussion dullness.   ABDOMEN: Soft, nontender, nondistended. No appreciable HSM.  PEG exit site without erythema.  SKIN: Warm and dry without cutaneous eruptions. No embolic " stigmata.  Lower extremities warm and well-perfused.  PSYCHIATRIC: Mental status lucid. Cranial nerve function intact.       DIAGNOSTICS:  Lab Results   Component Value Date    WBC 7.59 03/30/2023    HGB 12.6 (L) 03/31/2023    HCT 39.3 03/31/2023     03/30/2023     Lab Results   Component Value Date    CRP 1.09 (H) 03/27/2023     Lab Results   Component Value Date    SEDRATE >130 (H) 03/26/2023     Lab Results   Component Value Date    GLUCOSE 166 (H) 03/30/2023    BUN 16 03/30/2023    CREATININE 0.59 (L) 03/30/2023    EGFRIFAFRI 109 01/10/2022    BCR 27.1 (H) 03/30/2023    CO2 23.0 03/30/2023    CALCIUM 9.5 03/30/2023    ALBUMIN 3.3 (L) 03/30/2023    AST 16 03/30/2023    ALT 13 03/30/2023       Microbiology: Bio fire respiratory pathogen's panel on admission negative for COVID-19 and influenza A/B.  2 of 3 admission blood cultures with group A beta-hemolytic streptococci/favorable EDNA's for both penicillin and ceftriaxone.    RADIOLOGY:  Imaging Results (Last 72 Hours)     ** No results found for the last 72 hours. **          Assessment and Plan: Adult epiglottitis.  Threatened airway.  Status post tracheostomy.  Severe sepsis with group A beta-hemolytic streptococci in blood cultures.  Leukocytosis.  Lactic acidosis.  Elevated procalcitonin.  Maximum temperature over 24 hours 99.2.  Currently 98.2.  Pulse 63, respiratory 19, blood pressure 143/75 mmHg with an O2 saturation of 97% on room air.  BUN/creatinine 16/0.6.  Peripheral leukocyte count 7.59 with 54% segmented neutrophils.  No new culture data.  No new radiographic imaging studies.  Currently on amoxicillin-clavulanate suspension.  Gratifying progress.      Zana Johnson MD  4/3/2023

## 2023-04-03 NOTE — THERAPY TREATMENT NOTE
Patient Name: Sandro Junior  : 1955    MRN: [id number]                              Today's Date: 4/3/2023       Admit Date: 3/16/2023    Visit Dx:     ICD-10-CM ICD-9-CM   1. Respiratory distress  R06.03 786.09   2. Tachycardia  R00.0 785.0   3. Airway compromise  J98.8 519.8   4. Acute epiglottitis with airway obstruction - probable  J05.11 464.31   5. Essential hypertension  I10 401.9   6. Voice disturbance  R49.9 784.40     Patient Active Problem List   Diagnosis   • Essential hypertension   • Chronic pain   • Osteoarthritis of knee   • Prolonged depressive adjustment reaction   • Acute prostatitis   • Atopic rhinitis   • Erectile dysfunction of nonorganic origin   • Glaucoma suspect   • Onychomycosis of toenail   • Malignant neoplasm of rectum   • Seborrheic eczema   • Keloid scar   • Pain of right lower extremity   • Esophageal reflux   • Acute respiratory failure   • Acute strep pyogenes (group A strep) epiglottitis with airway obstruction      Past Medical History:   Diagnosis Date   • Epidermal inclusion cyst    • Esophageal candidiasis    • Keloid scar    • Rectal cancer    • Seborrheic dermatitis      Past Surgical History:   Procedure Laterality Date   • COLECTOMY PARTIAL / TOTAL     • KNEE SURGERY      Left knee meniscal tear repair   • TOOTH EXTRACTION  2016 teeth   • TOTAL KNEE ARTHROPLASTY Right 2016   • TRACHEOSTOMY AND PEG TUBE INSERTION N/A 3/23/2023    Procedure: TRACHEOSTOMY AND PERCUTANEOUS ENDOSCOPIC GASTROSTOMY TUBE INSERTION;  Surgeon: Néstor Jerome MD;  Location: [facility];  Service: General;  Laterality: N/A;      General Information     Row Name 23          Physical Therapy Time and Intention    Document Type therapy note (daily note)  -KW     Mode of Treatment physical therapy  -KW     Row Name 23          General Information    Patient Profile Reviewed yes  -KW     Existing Precautions/Restrictions fall  PEG; trach with PMV  -KW     Row  Name 04/03/23 0921          Cognition    Orientation Status (Cognition) oriented x 3  -KW     Row Name 04/03/23 0921          Safety Issues, Functional Mobility    Impairments Affecting Function (Mobility) balance;endurance/activity tolerance;strength;shortness of breath  -KW           User Key  (r) = Recorded By, (t) = Taken By, (c) = Cosigned By    Initials Name Provider Type    Shawna Terry PT Physical Therapist               Mobility     Row Name 04/03/23 0921          Sit-Stand Transfer    Sit-Stand [place] (Transfers) contact guard;verbal cues  -KW     Row Name 04/03/23 0921          Gait/Stairs (Locomotion)    [place] Level (Gait) minimum assist (75% patient effort);verbal cues;nonverbal cues (demo/gesture)  -KW     Distance in Feet (Gait) 350  -KW     Deviations/Abnormal Patterns (Gait) bilateral deviations;bladimir decreased;weight shifting decreased;base of support, narrow;stride length decreased;left sided deviations  -KW     Comment, (Gait/Stairs) Patient with >5 LOB during ambulation needing asssitance to regain balance, intermittent scissoring and large pathway deviations at times.  -KW           User Key  (r) = Recorded By, (t) = Taken By, (c) = Cosigned By    Initials Name Provider Type    KW Shawna Farah PT Physical Therapist               Obj/Interventions    No documentation.                Goals/Plan    No documentation.                Clinical Impression     Row Name 04/03/23 0921          Pain    Pretreatment Pain Rating 0/10 - no pain  -KW     Row Name 04/03/23 0921          Plan of Care Review    Plan of Care Reviewed With patient  -KW     Progress no change  -KW     Outcome Evaluation Physical therapy treatment complete. The patient continues to demonstrate LOB with ambulation. Patient increased ambulation distance compared to previous treatment session. He returned to room and completed LE exercises in chair. The patient continues to present below baseline for  functional mobility. The patient would continue to benefit from skilled PT to address strength, balance and activity tolerance deficits. Continue to current PT POC  -KW     Row Name 04/03/23 0921          Vital Signs    Pre Systolic BP Rehab 1.91  -KW     Pretreatment Heart Rate (beats/min) 79  -KW     Row Name 04/03/23 0921          Positioning and Restraints    Pre-Treatment Position sitting in chair/recliner  -KW     Post Treatment Position chair  -KW     In Chair reclined;call light within reach;encouraged to call for assist  -KW           User Key  (r) = Recorded By, (t) = Taken By, (c) = Cosigned By    Initials Name Provider Type    Shawna Terry PT Physical Therapist               Outcome Measures     Row Name 04/03/23 0921          How much help from another person do you currently need...    Turning from your back to your side while in flat bed without using bedrails? 4  -KW     Moving from lying on back to sitting on the side of a flat bed without bedrails? 4  -KW     Moving to and from a bed to a chair (including a wheelchair)? 4  -KW     Standing up from a chair using your arms (e.g., wheelchair, bedside chair)? 3  -KW     Climbing 3-5 steps with a railing? 3  -KW     To walk in hospital room? 3  -KW     AM-PAC 6 Clicks Score (PT) 21  -KW     Highest level of mobility 6 --> Walked 10 steps or more  -KW     Row Name 04/03/23 0921          Functional Assessment    Outcome Measure Options AM-PAC 6 Clicks Basic Mobility (PT)  -KW           User Key  (r) = Recorded By, (t) = Taken By, (c) = Cosigned By    Initials Name Provider Type    Shawna Terry PT Physical Therapist                             Physical Therapy Education     Title: PT OT SLP Therapies (In Progress)     Topic: Physical Therapy (Done)     Point: Mobility training (Done)     Learning Progress Summary           Patient Acceptance, TB,E, VU,NR by CHRISTIAN at 3/31/2023 1511    Acceptance, E, VU,NR by LLOYD at 3/30/2023 1405     Acceptance, E, VU by HT at 3/29/2023 1144    Acceptance, E, DU,NR by HT at 3/28/2023 1458    Acceptance, E, VU by HT at 3/27/2023 1026    Acceptance, E, VU by EL at 3/26/2023 1707    Acceptance, E,D, VU,NR by LS at 3/26/2023 1429                   Point: Home exercise program (Done)     Learning Progress Summary           Patient Acceptance, TB,E, VU,NR by AY at 3/31/2023 1511    Acceptance, E, DU,NR by HT at 3/28/2023 1458    Acceptance, E, VU by HT at 3/27/2023 1026    Acceptance, E, VU by EL at 3/26/2023 1707                   Point: Body mechanics (Done)     Learning Progress Summary           Patient Acceptance, TB,E, VU,NR by AY at 3/31/2023 1511    Acceptance, E, VU,NR by HT at 3/30/2023 1405    Acceptance, E, VU by HT at 3/29/2023 1144    Acceptance, E, DU,NR by HT at 3/28/2023 1458    Acceptance, E, VU by HT at 3/27/2023 1026    Acceptance, E, VU by EL at 3/26/2023 1707    Acceptance, E,D, VU,NR by LS at 3/26/2023 1429                   Point: Precautions (Done)     Learning Progress Summary           Patient Acceptance, TB,E, VU,NR by AY at 3/31/2023 1511    Acceptance, E, VU,NR by HT at 3/30/2023 1405    Acceptance, E, VU by HT at 3/29/2023 1144    Acceptance, E, DU,NR by HT at 3/28/2023 1458    Acceptance, E, VU by HT at 3/27/2023 1026    Acceptance, E, VU by EL at 3/26/2023 1707    Acceptance, E,D, VU,NR by LS at 3/26/2023 1429                               User Key     Initials Effective Dates Name Provider Type Discipline     02/03/23 -  Norma Villegas, PT Physical Therapist PT    EL 06/16/21 -  Giana Yates, RN Registered Nurse Nurse    AY 11/10/20 -  Judith Freire, PT Physical Therapist PT    HT 01/12/23 -  Chela Barron, PT Student PT Student PT              PT Recommendation and Plan     Plan of Care Reviewed With: patient  Progress: no change  Outcome Evaluation: Physical therapy treatment complete. The patient continues to demonstrate LOB with ambulation. Patient increased ambulation  distance compared to previous treatment session. He returned to room and completed LE exercises in chair. The patient continues to present below baseline for functional mobility. The patient would continue to benefit from skilled PT to address strength, balance and activity tolerance deficits. Continue to current PT POC     Time Calculation:    PT Charges     Row Name 04/03/23 0921             Time Calculation    Start Time 0921  -KW      PT Received On 04/03/23  -KW      PT Goal Re-Cert Due Date 04/05/23  -KW         Timed Charges    48112 - PT Therapeutic Exercise Minutes 11  -KW      08895 - Gait Training Minutes  18  -KW         Total Minutes    Timed Charges Total Minutes 29  -KW       Total Minutes 29  -KW            User Key  (r) = Recorded By, (t) = Taken By, (c) = Cosigned By    Initials Name Provider Type    Shawna Terry PT Physical Therapist              Therapy Charges for Today     Code Description Service Date Service Provider Modifiers Qty    01939918821 HC PT THER PROC EA 15 MIN 4/3/2023 Shawna Farah, FREDO GP 1    06315211256 HC GAIT TRAINING EA 15 MIN 4/3/2023 Shawna Farah PT GP 1          PT G-Codes  Outcome Measure Options: AM-PAC 6 Clicks Basic Mobility (PT)  AM-PAC 6 Clicks Score (PT): 21  AM-PAC 6 Clicks Score (OT): 15  PT Discharge Summary  Anticipated Discharge Disposition (PT): inpatient rehabilitation facility    Shawna Farah PT  4/3/2023

## 2023-04-03 NOTE — NURSING NOTE
"                             Wound, Ostomy and Continence (WOC) Note    Reason for WOC Consultation: \"Wound under trach\"     Removed Allevyn foam dressing.  Moisture related breakdown noted at the inferior aspect of the tracheostomy site.  The wound bed is very moist, macerated and pink.  Wound presents more as moisture breakdown as opposed to a pressure injury.  Recommend changing Allevyn foam dressing more frequently to ensure that CLARISSA trach skin remains dry.      Trach care should be performed twice daily  Summary and Recommendation(s):     - Refer to wound care instructions in the \"Orders\" tab  - Specialty support surface in place: Foam Mattress with Waffle Overlay         Pressure Injury Prevention Measures (initiate for a Tito Scale Score of <18):     Most recent Tito Scale score:  Sensory Perception: 3-->slightly limited  Moisture: 4-->rarely moist  Activity: 3-->walks occasionally  Mobility: 3-->slightly limited  Nutrition: 3-->adequate  Friction and Shear: 3-->no apparent problem  Tito Score: 19 (04/03/23 0800)     -Turn q 2 hr. using an offloading foam wedge, keep heels elevated and offloaded with offloading heel boots.    -Raise knee-gatch before elevating HOB to reduce shearing   -Follow C.A.R.E protocol if medical devices (Bipap, [place], Ng tube, etc) are being used.  -Apply moisture barrier cream to bottom BID & PRN, if incontinent.  -If incontinent, consider applying an external catheter. External catheters are finicky and should be checked every few hours for placement.   -Clean skin gently with no-rinse PH-balanced foam cleanser and a soft, disposable cloth (barrier wipes-blue pack).   -Reduce layers under patient (one sheet as drawsheet and two incontinence pads)    Thank you for consulting the WOC Nurse.  WOC Team will sign off.  Please re consult if the wound(s) worsens.     Melvin Xie RN, BSN, CCRN, CWOCN  Wound, Ostomy and Continence (WOC) Department  [facility] " [place]

## 2023-04-03 NOTE — CASE MANAGEMENT/SOCIAL WORK
Continued Stay Note  [facility]     Patient Name: Sandro Junior  MRN: [id number]  Today's Date: 4/3/2023    Admit Date: 3/16/2023    Plan: update   Discharge Plan     Row Name 04/03/23 1528       Plan    Plan update    Patient/Family in Agreement with Plan yes    Plan Comments Received a call from [facility] liaison who has received notification of denial and has provided the number for patient to call for family appeal.  CM to , patient wants his daughter to do the appeal but she has left for work.  Spoke with patient daughter who advises she will be back at 0800 tomorrow and will do the appeal at that time.  CM following.    Final Discharge Disposition Code 62 - inpatient rehab facility    Row Name 04/03/23 1340       Plan    Plan update    Patient/Family in Agreement with Plan yes    Plan Comments Received a secure chat from Physician Advisor who reports that decsion was upheld.  Spoke to [facility] liaison who will not have a number to appeal until after the deadline today at 1500 if family wants to appeal.  Spoke with patient at  to advise that the P2P did not result in approval.   CM explained he could appeal the decision and an provide a number for him to call this afternoon once [facility] provides it.  Patient became very irrate and upset with this decision indicating they dont want to pay, he could have just gone home instead of sitting in the hospital.  CM continued to offer appeal as an option, will come back with the number.  CM following.   Patient discharge plan is ongoing.    Final Discharge Disposition Code 62 - inpatient rehab facility               Discharge Codes    No documentation.               Expected Discharge Date and Time     Expected Discharge Date Expected Discharge Time    Apr 4, 2023             Rekha Brand RN

## 2023-04-03 NOTE — THERAPY EVALUATION
Acute Care - Speech Language Pathology Treatment Note  [facility]   Trach/Speaking Valve Treatment  & Clinical Swallow Evaluation     Patient Name: Sandro Junior  : 1955  MRN: [id number]  Today's Date: 4/3/2023               Admit Date: 3/16/2023     Visit Dx:    ICD-10-CM ICD-9-CM   1. Respiratory distress  R06.03 786.09   2. Tachycardia  R00.0 785.0   3. Airway compromise  J98.8 519.8   4. Acute epiglottitis with airway obstruction - probable  J05.11 464.31   5. Essential hypertension  I10 401.9   6. Voice disturbance  R49.9 784.40   7. Dysphagia, unspecified type  R13.10 787.20     Patient Active Problem List   Diagnosis   • Essential hypertension   • Chronic pain   • Osteoarthritis of knee   • Prolonged depressive adjustment reaction   • Acute prostatitis   • Atopic rhinitis   • Erectile dysfunction of nonorganic origin   • Glaucoma suspect   • Onychomycosis of toenail   • Malignant neoplasm of rectum   • Seborrheic eczema   • Keloid scar   • Pain of right lower extremity   • Esophageal reflux   • Acute respiratory failure   • Acute strep pyogenes (group A strep) epiglottitis with airway obstruction      Past Medical History:   Diagnosis Date   • Epidermal inclusion cyst    • Esophageal candidiasis    • Keloid scar    • Rectal cancer    • Seborrheic dermatitis      Past Surgical History:   Procedure Laterality Date   • COLECTOMY PARTIAL / TOTAL     • KNEE SURGERY      Left knee meniscal tear repair   • TOOTH EXTRACTION      7 teeth   • TOTAL KNEE ARTHROPLASTY Right 2016   • TRACHEOSTOMY AND PEG TUBE INSERTION N/A 3/23/2023    Procedure: TRACHEOSTOMY AND PERCUTANEOUS ENDOSCOPIC GASTROSTOMY TUBE INSERTION;  Surgeon: Néstor Jerome MD;  Location: [facility];  Service: General;  Laterality: N/A;       SLP Recommendation and Plan              Swallow Criteria for Skilled Therapeutic Interventions Met: demonstrates skilled criteria (23 1645)  Anticipated Discharge Disposition (SLP):  inpatient rehabilitation facility, anticipate therapy at next level of care (04/03/23 1645)              Daily Summary of Progress (SLP): progress toward functional goals as expected (04/03/23 1630)           Treatment Assessment (SLP): improved (PMV tolerance) (04/03/23 1630)  Treatment Assessment Comments (SLP): Continue PMV use when cuff deflated & pt awake. Needs trained staff assist w/ placement/removal. (04/03/23 1630)  Plan for Continued Treatment (SLP): continue treatment per plan of care (04/03/23 1630)  Plan of Care Reviewed With: patient (04/03/23 1714)  Progress: improving (04/03/23 1714)      SLP EVALUATION (last 72 hours)     SLP SLC Evaluation     Row Name 04/03/23 1630                   Communication Assessment/Intervention    Document Type therapy note (daily note)  -AC        Subjective Information no complaints  -AC        Patient Observations alert;cooperative  -AC        Patient/Family/Caregiver Comments/Observations No family present.  -AC        Patient Effort good  -AC           Pain Scale: FACES Pre/Post-Treatment    Pain: FACES Scale, Pretreatment 0-->no hurt  -AC        Posttreatment Pain Rating 0-->no hurt  -AC           SLP Treatment Clinical Impressions    Treatment Assessment (SLP) improved  PMV tolerance  -AC        Treatment Assessment Comments (SLP) Continue PMV use when cuff deflated & pt awake. Needs trained staff assist w/ placement/removal.  -AC        Daily Summary of Progress (SLP) progress toward functional goals as expected  -AC        Plan for Continued Treatment (SLP) continue treatment per plan of care  -AC        Care Plan Review evaluation/treatment results reviewed;care plan/treatment goals reviewed;risks/benefits reviewed;current/potential barriers reviewed;patient/other agree to care plan  -AC           Recommendations    Anticipated Discharge Disposition (SLP) inpatient rehabilitation facility;anticipate therapy at next level of care  -AC              User Key  (r)  = Recorded By, (t) = Taken By, (c) = Cosigned By    Initials Name Effective Dates    AC Shelley Iraheta MS CCC-SLP 02/03/23 -                    EDUCATION  The patient has been educated in the following areas:     Communication Impairment Speaking Valve.           SLP GOALS     Row Name 04/03/23 1630             Patient will demonstrate functional communication skills for return to discharge environment     [place] Independently  -AC      Time frame by discharge  -AC      Progress/Outcomes goal revised this date  -AC         Tolerate Speaking Valve Placement Goal 1 (SLP)    Tolerate Speaking Valve Placement Goal 1 (SLP) speaking valve >30 min;90%;independently (over 90% accuracy)  -AC      Time Frame (Tolerate Speaking Valve Placement Goal 1, SLP) short term goal (STG)  -AC      Progress (Tolerate Speaking Valve Placement Goal 1, SLP) 100%;independently (over 90% accuracy)  -AC      Progress/Outcomes (Tolerate Speaking Valve Placement Goal 1, SLP) goal met  -AC      Comment (Tolerate Speaking Valve Placement Goal 1, SLP) Wearing PMV all day w/o issue. No excessive coughing or changes in VS noted during entirety of session.  -AC         Audible Speech with Speaking Valve Goal 1 (SLP)    Audible Speech Goal 1 (SLP) 3 feet;background noise;90%;with minimal cues (75-90%)  -AC      Time Frame (Audible Speech Goal 1, SLP) short term goal (STG)  -AC      Progress (Audible Speech Goal 1, SLP) 90%;with minimal cues (75-90%)  -AC      Progress/Outcomes (Audible Speech Goal 1, SLP) good progress toward goal  -AC      Comment (Audible Speech Goal 1, SLP) Check once more to ensure maintenance.  -AC         Independent Use of Speaking Valve Goal 1 (SLP)    Independent Use of Speaking Valve Goal 1 (SLP) place speak valve;remove speak valve;state precautions of use;100%;independently (over 90% accuracy)  -AC      Time Frame (Independent use of Speaking Valve Goal 1, SLP) short term goal (STG)  -AC      Progress/Outcomes  (Independent use of Speaking Valve Goal 1, SLP) new goal  -AC            User Key  (r) = Recorded By, (t) = Taken By, (c) = Cosigned By    Initials Name Provider Type    Shelley Godinez MS CCC-SLP Speech and Language Pathologist                        Time Calculation:      Time Calculation- SLP     Row Name 23 1715             Time Calculation- SLP    SLP Start Time 1630  -AC      SLP Received On 23  -AC         Untimed Charges    52378-QV Eval Oral Pharyng Swallow Minutes 32  -AC      35883-LP Treatment/ST Modification Prosth Aug Alter  10  -AC         Total Minutes    Untimed Charges Total Minutes 42  -AC       Total Minutes 42  -AC            User Key  (r) = Recorded By, (t) = Taken By, (c) = Cosigned By    Initials Name Provider Type    Shelley Godinez MS CCC-SLP Speech and Language Pathologist                Therapy Charges for Today     Code Description Service Date Service Provider Modifiers Qty    82669040321 HC ST TREATMENT SPEECH 1 4/3/2023 Shelley Iraheta MS CCC-SLP GN 1    51460970608 HC ST EVAL ORAL PHARYNG SWALLOW 2 4/3/2023 Shelley Iraheta MS CCC-SLP GN 1                     Shelley Iraheta MS CCC-SLP  4/3/2023 and Acute Care - Speech Language Pathology   Swallow Initial Evaluation [facility]     Patient Name: Sandro Junior  : 1955  MRN: [id number]  Today's Date: 4/3/2023               Admit Date: 3/16/2023    Visit Dx:     ICD-10-CM ICD-9-CM   1. Respiratory distress  R06.03 786.09   2. Tachycardia  R00.0 785.0   3. Airway compromise  J98.8 519.8   4. Acute epiglottitis with airway obstruction - probable  J05.11 464.31   5. Essential hypertension  I10 401.9   6. Voice disturbance  R49.9 784.40   7. Dysphagia, unspecified type  R13.10 787.20     Patient Active Problem List   Diagnosis   • Essential hypertension   • Chronic pain   • Osteoarthritis of knee   • Prolonged depressive adjustment reaction   • Acute prostatitis   • Atopic rhinitis   • Erectile dysfunction of  nonorganic origin   • Glaucoma suspect   • Onychomycosis of toenail   • Malignant neoplasm of rectum   • Seborrheic eczema   • Keloid scar   • Pain of right lower extremity   • Esophageal reflux   • Acute respiratory failure   • Acute strep pyogenes (group A strep) epiglottitis with airway obstruction      Past Medical History:   Diagnosis Date   • Epidermal inclusion cyst    • Esophageal candidiasis    • Keloid scar    • Rectal cancer    • Seborrheic dermatitis      Past Surgical History:   Procedure Laterality Date   • COLECTOMY PARTIAL / TOTAL     • KNEE SURGERY      Left knee meniscal tear repair   • TOOTH EXTRACTION  2016 7 teeth   • TOTAL KNEE ARTHROPLASTY Right 04/11/2016   • TRACHEOSTOMY AND PEG TUBE INSERTION N/A 3/23/2023    Procedure: TRACHEOSTOMY AND PERCUTANEOUS ENDOSCOPIC GASTROSTOMY TUBE INSERTION;  Surgeon: Néstor Jerome MD;  Location: [facility];  Service: General;  Laterality: N/A;       SLP Recommendation and Plan  SLP Swallowing Diagnosis: suspected pharyngeal dysphagia (04/03/23 1645)  SLP Diet Recommendation: NPO, long term alternate methods of nutrition/hydration (04/03/23 1645)     SLP Rec. for Method of Medication Administration: meds via alternate route (04/03/23 1645)        Recommended Diagnostics: FEES (04/03/23 1645)  Swallow Criteria for Skilled Therapeutic Interventions Met: demonstrates skilled criteria (04/03/23 1645)  Anticipated Discharge Disposition (SLP): inpatient rehabilitation facility, anticipate therapy at next level of care (04/03/23 1645)  Rehab Potential/Prognosis, Swallowing: good, to achieve stated therapy goals (04/03/23 1645)              Daily Summary of Progress (SLP): progress toward functional goals as expected (04/03/23 1630)               Treatment Assessment (SLP): improved (PMV tolerance) (04/03/23 1630)  Treatment Assessment Comments (SLP): Continue PMV use when cuff deflated & pt awake. Needs trained staff assist w/ placement/removal. (04/03/23  1630)  Plan for Continued Treatment (SLP): continue treatment per plan of care (04/03/23 1630)         Plan of Care Reviewed With: patient  Progress: improving      SWALLOW EVALUATION (last 72 hours)     SLP Adult Swallow Evaluation     Row Name 04/03/23 1648                   Rehab Evaluation    Document Type evaluation  -AC        Subjective Information no complaints  -AC        Patient Observations alert;cooperative  -AC        Patient/Family/Caregiver Comments/Observations No family present.  -AC        Patient Effort good  -AC           General Information    Patient Profile Reviewed yes  -AC        Pertinent History Of Current Problem See previous eval.  -AC        Current Method of Nutrition NPO;gastrostomy feedings  -AC        Prior Level of Function-Swallowing no diet consistency restrictions  -AC        Plans/Goals Discussed with patient;agreed upon  -AC        Barriers to Rehab none identified  -        Patient's Goals for Discharge return to PO diet  -AC           Pain Scale: FACES Pre/Post-Treatment    Pain: FACES Scale, Pretreatment 0-->no hurt  -AC        Posttreatment Pain Rating 0-->no hurt  -AC           Oral Motor Structure and Function    Oral Lesions or Structural Abnormalities and/or variants Tongue appeared slightly edematous, but pt feels back to baseline  -AC        Dentition Assessment missing teeth;other (see comments)  dentures were @ bedside  -AC        Secretion Management WNL/WFL  -AC        Mucosal Quality dry  -AC        Volitional Cough WFL  -AC           Oral Musculature and Cranial Nerve Assessment    Oral Motor General Assessment WFL  -AC           General Eating/Swallowing Observations    Respiratory Support Currently in Use tracheostomy;trach collar;other (see comments)  wearing PMV  -AC        Eating/Swallowing Skills fed by SLP  -AC        Positioning During Eating upright 90 degree;upright in chair  -AC        Utensils Used spoon  -AC        Consistencies Trialed ice  chips;thin liquids  -           Clinical Swallow Eval    Oral Prep Phase WFL  -AC        Oral Transit WFL  -AC        Oral Residue WFL  -AC        Pharyngeal Phase suspected pharyngeal impairment  -           Pharyngeal Phase Concerns    Pharyngeal Phase Concerns throat clear;multiple swallows  -        Multiple Swallows thin  -        Throat Clear thin  -AC        Pharyngeal Phase Concerns, Comment Oral phase seemingly WFL for consistencies trialed. Limited trials given 2' high risk for silent aspiration w/ trach. Ready for FEES.  -           SLP Evaluation Clinical Impression    SLP Swallowing Diagnosis suspected pharyngeal dysphagia  -        Functional Impact risk of aspiration/pneumonia  -        Rehab Potential/Prognosis, Swallowing good, to achieve stated therapy goals  -        Swallow Criteria for Skilled Therapeutic Interventions Met demonstrates skilled criteria  -           Recommendations    SLP Diet Recommendation NPO;long term alternate methods of nutrition/hydration  -        Recommended Diagnostics FEES  -        Oral Care Recommendations Oral Care BID/PRN;Suction toothbrush  -        SLP Rec. for Method of Medication Administration meds via alternate route  -        Anticipated Discharge Disposition (SLP) inpatient rehabilitation facility;anticipate therapy at next level of care  -              User Key  (r) = Recorded By, (t) = Taken By, (c) = Cosigned By    Initials Name Effective Dates     Shelley Iraheta MS [facility]-SLP 02/03/23 -                 EDUCATION  The patient has been educated in the following areas:   Dysphagia (Swallowing Impairment) Oral Care/Hydration NPO rationale.        SLP GOALS     Row Name 04/03/23 1630             Patient will demonstrate functional communication skills for return to discharge environment     [place] Independently  -      Time frame by discharge  -      Progress/Outcomes goal revised this date  -         Tolerate Speaking  Valve Placement Goal 1 (SLP)    Tolerate Speaking Valve Placement Goal 1 (SLP) speaking valve >30 min;90%;independently (over 90% accuracy)  -AC      Time Frame (Tolerate Speaking Valve Placement Goal 1, SLP) short term goal (STG)  -AC      Progress (Tolerate Speaking Valve Placement Goal 1, SLP) 100%;independently (over 90% accuracy)  -AC      Progress/Outcomes (Tolerate Speaking Valve Placement Goal 1, SLP) goal met  -AC      Comment (Tolerate Speaking Valve Placement Goal 1, SLP) Wearing PMV all day w/o issue. No excessive coughing or changes in VS noted during entirety of session.  -AC         Audible Speech with Speaking Valve Goal 1 (SLP)    Audible Speech Goal 1 (SLP) 3 feet;background noise;90%;with minimal cues (75-90%)  -AC      Time Frame (Audible Speech Goal 1, SLP) short term goal (STG)  -AC      Progress (Audible Speech Goal 1, SLP) 90%;with minimal cues (75-90%)  -AC      Progress/Outcomes (Audible Speech Goal 1, SLP) good progress toward goal  -AC      Comment (Audible Speech Goal 1, SLP) Check once more to ensure maintenance.  -AC         Independent Use of Speaking Valve Goal 1 (SLP)    Independent Use of Speaking Valve Goal 1 (SLP) place speak valve;remove speak valve;state precautions of use;100%;independently (over 90% accuracy)  -AC      Time Frame (Independent use of Speaking Valve Goal 1, SLP) short term goal (STG)  -AC      Progress/Outcomes (Independent use of Speaking Valve Goal 1, SLP) new goal  -AC            User Key  (r) = Recorded By, (t) = Taken By, (c) = Cosigned By    Initials Name Provider Type    Shelley Godinez MS CCC-SLP Speech and Language Pathologist                   Time Calculation:    Time Calculation- SLP     Row Name 04/03/23 8305             Time Calculation- SLP    SLP Start Time 1630  -      SLP Received On 04/03/23  -         Untimed Charges    01354-HY Eval Oral Pharyng Swallow Minutes 32  -AC      01046-VM Treatment/ST Modification Prosth Aug Alter  10  -AC          Total Minutes    Untimed Charges Total Minutes 42  -AC       Total Minutes 42  -AC            User Key  (r) = Recorded By, (t) = Taken By, (c) = Cosigned By    Initials Name Provider Type    Shelley Godinez MS CCC-SLP Speech and Language Pathologist                Therapy Charges for Today     Code Description Service Date Service Provider Modifiers Qty    81167498365 HC ST TREATMENT SPEECH 1 4/3/2023 Shelley Iraheta MS CCC-SLP GN 1    [id number]  ST EVAL ORAL PHARYNG SWALLOW 2 4/3/2023 Shelley Iraheta, MS BARRETT-SLP GN 1               Shelley Iraheta MS CCC-SLP  4/3/2023

## 2023-04-03 NOTE — PLAN OF CARE
Goal Outcome Evaluation:  Plan of Care Reviewed With: patient        Progress: no change  Outcome Evaluation: Physical therapy treatment complete. The patient continues to demonstrate LOB with ambulation. Patient increased ambulation distance compared to previous treatment session. He returned to room and completed LE exercises in chair. The patient continues to present below baseline for functional mobility. The patient would continue to benefit from skilled PT to address strength, balance and activity tolerance deficits. Continue to current PT POC

## 2023-04-03 NOTE — PLAN OF CARE
Goal Outcome Evaluation:      PEG. TF bolus @ 6am, 9am, 12pm, 3pm, and 6pm given as ordered. Trach in place, obturator at bedside. R arm triple lumen PICC. SLP saw pt today and plan is for FEEs or MBS tomorrow. Pt has a speaking valve in trach that is to be taken out while he is sleeping per SLP. AD ZAIRE. A/O. NSR. Pt upset about insurance situation today. WOC came to bedside today to assess trach site, MASD around site per WOC.

## 2023-04-03 NOTE — PLAN OF CARE
Goal Outcome Evaluation:  Plan of Care Reviewed With: patient        Progress: improving   SLP evaluation and treatment completed. Will continue to address communication/PMV use in tx. Will plan for FEES to further assess swallowing. Please see note for further details and recommendations.

## 2023-04-03 NOTE — CASE MANAGEMENT/SOCIAL WORK
Continued Stay Note  [facility]     Patient Name: Sandro Junior  MRN: [id number]  Today's Date: 4/3/2023    Admit Date: 3/16/2023    Plan: update   Discharge Plan     Row Name 04/03/23 0330       Plan    Plan update    Patient/Family in Agreement with Plan yes    Plan Comments Received a secure chat from Physician Advisor who reports that decsion was upheld.  Spoke to [facility] liaison who will not have a number to appeal until after the deadline today at 1500 if family wants to appeal.  Spoke with patient at  to advise that the P2P did not result in approval.   CM explained he could appeal the decision and an provide a number for him to call this afternoon once [facility] provides it.  Patient became very irrate and upset with this decision indicating they dont want to pay, he could have just gone home instead of sitting in the hospital.  CM continued to offer appeal as an option, will come back with the number.  CM following.   Patient discharge plan is ongoing.    Final Discharge Disposition Code 62 - inpatient rehab facility               Discharge Codes    No documentation.               Expected Discharge Date and Time     Expected Discharge Date Expected Discharge Time    Apr 4, 2023             Rekha Brand, RN

## 2023-04-03 NOTE — PROGRESS NOTES
[facility] Medicine Services  PROGRESS NOTE    Patient Name: Sandro Junior  : 1955  MRN: [id number]    Date of Admission: 3/16/2023  Primary Care Physician: Dora Loya PA    Subjective   Subjective     CC:  F/u epiglottitis    HPI:  No new issues overnight, sitting up in chair.Upset that he's still here and insurance has been an issue.     ROS:  Gen- No fevers, chills  CV- No chest pain, palpitations  Resp- No cough, dyspnea  GI- No N/V/D, abd pain    Objective   Objective     Vital Signs:   Temp:  [98.2 °F (36.8 °C)-99.1 °F (37.3 °C)] 98.6 °F (37 °C)  Heart Rate:  [61-97] 67  Resp:  [16-19] 18  BP: (100-143)/(65-83) 135/72     Physical Exam:  Constitutional: No acute distress, awake, alert  HENT: NCAT, mucous membranes moist  Respiratory: Clear to auscultation bilaterally, tracheostomy in place  Cardiovascular: RRR, no murmurs, rubs, or gallops  Gastrointestinal: Positive bowel sounds, soft, nontender, nondistended, PEG in place  Musculoskeletal: No bilateral ankle edema  Psychiatric: agitated  Neurologic: Oriented x 3, speech clear  Skin: No rashes      Results Reviewed:  LAB RESULTS:      Lab 23  0338 23  0623   WBC  --  7.59 9.39   HEMOGLOBIN 12.6* 12.3* 12.0*   HEMATOCRIT 39.3 38.5 36.8*   PLATELETS  --  408 341   NEUTROS ABS  --  4.10 5.60   IMMATURE GRANS (ABS)  --  0.04 0.05   LYMPHS ABS  --  2.21 2.61   MONOS ABS  --  1.00* 0.98*   EOS ABS  --  0.17 0.08   MCV  --  100.0* 98.4*         Lab 23  0338 23  0823   SODIUM  --  132* 132*   POTASSIUM 4.3 4.0 4.1   CHLORIDE  --  102 102   CO2  --  23.0 21.0*   ANION GAP  --  7.0 9.0   BUN  --  16 14   CREATININE  --  0.59* 0.61*   EGFR  --  107.0 105.9   GLUCOSE  --  166* 140*   CALCIUM  --  9.5 8.7   MAGNESIUM 2.4  --  2.5*   PHOSPHORUS 3.3  --  2.9         Lab 23  0338   TOTAL PROTEIN 8.1   ALBUMIN 3.3*   GLOBULIN 4.8   ALT (SGPT) 13   AST (SGOT) 16   BILIRUBIN  0.4   ALK PHOS 55                     Lab 03/28/23  0343   PH, ARTERIAL 7.502*   PCO2, ARTERIAL 32.0*   PO2 .0   FIO2 30   HCO3 ART 25.0   BASE EXCESS ART 2.3*   CARBOXYHEMOGLOBIN 1.2     Brief Urine Lab Results  (Last result in the past 365 days)      Color   Clarity   Blood   Leuk Est   Nitrite   Protein   CREAT   Urine HCG        03/16/23 2227 Yellow   Clear   Negative   Negative   Negative   30 mg/dL (1+)                 Microbiology Results Abnormal     Procedure Component Value - Date/Time    Blood Culture - Blood, Arm, Left [[id number]]  (Normal) Collected: 03/20/23 1438    Lab Status: Final result Specimen: Blood from Arm, Left Updated: 03/25/23 1501     Blood Culture No growth at 5 days    Blood Culture - Blood, Hand, Right [[id number]]  (Normal) Collected: 03/16/23 2225    Lab Status: Final result Specimen: Blood from Hand, Right Updated: 03/21/23 2246     Blood Culture No growth at 5 days    MRSA Screen, PCR (Inpatient) - Swab, Nares [[id number]]  (Normal) Collected: 03/17/23 0933    Lab Status: Final result Specimen: Swab from Nares Updated: 03/17/23 1200     MRSA PCR Negative    Narrative:      The negative predictive value of this diagnostic test is high and should only be used to consider de-escalating anti-MRSA therapy. A positive result may indicate colonization with MRSA and must be correlated clinically.  MRSA Negative    COVID PRE-OP / PRE-PROCEDURE SCREENING ORDER (NO ISOLATION) - Swab, Nasopharynx [[id number]]  (Normal) Collected: 03/16/23 2108    Lab Status: Final result Specimen: Swab from Nasopharynx Updated: 03/16/23 2234    Narrative:      The following orders were created for panel order COVID PRE-OP / PRE-PROCEDURE SCREENING ORDER (NO ISOLATION) - Swab, Nasopharynx.  Procedure                               Abnormality         Status                     ---------                               -----------         ------                     Respiratory Panel PCR w/...[[id number]]  Normal               Final result                 Please view results for these tests on the individual orders.    Respiratory Panel PCR w/COVID-19(SARS-CoV-2) [facility]ILLIAM/RAMONITA/PAD/[facility] In-House, NP Swab in UTM/VTM, 3-4 HR TAT - Swab, Nasopharynx [[id number]]  (Normal) Collected: 03/16/23 2108    Lab Status: Final result Specimen: Swab from Nasopharynx Updated: 03/16/23 2234     ADENOVIRUS, PCR Not Detected     Coronavirus 229E Not Detected     Coronavirus HKU1 Not Detected     Coronavirus NL63 Not Detected     Coronavirus OC43 Not Detected     COVID19 Not Detected     Human Metapneumovirus Not Detected     Human Rhinovirus/Enterovirus Not Detected     Influenza A PCR Not Detected     Influenza B PCR Not Detected     Parainfluenza Virus 1 Not Detected     Parainfluenza Virus 2 Not Detected     Parainfluenza Virus 3 Not Detected     Parainfluenza Virus 4 Not Detected     RSV, PCR Not Detected     Bordetella pertussis pcr Not Detected     Bordetella parapertussis PCR Not Detected     Chlamydophila pneumoniae PCR Not Detected     Mycoplasma pneumo by PCR Not Detected    Narrative:      In the setting of a positive respiratory panel with a viral infection PLUS a negative procalcitonin without other underlying concern for bacterial infection, consider observing off antibiotics or discontinuation of antibiotics and continue supportive care. If the respiratory panel is positive for atypical bacterial infection (Bordetella pertussis, Chlamydophila pneumoniae, or Mycoplasma pneumoniae), consider antibiotic de-escalation to target atypical bacterial infection.          No radiology results from the last 24 hrs    Results for orders placed during the hospital encounter of 03/16/23    Adult Transthoracic Echo Complete W/ Cont if Necessary Per Protocol    Interpretation Summary  •  Left ventricular systolic function is normal. Calculated left ventricular 3D EF = 56% Left ventricular ejection fraction appears to be 51 - 55%.  •  Left  ventricular diastolic function was normal.  •  Estimated right ventricular systolic pressure from tricuspid regurgitation is normal (<35 mmHg).      Current medications:  Scheduled Meds:amoxicillin-clavulanate, 400 mg, Per PEG Tube, Q12H  carvedilol, 12.5 mg, Per PEG Tube, Q12H  chlorhexidine, 15 mL, Mouth/Throat, Q12H  heparin (porcine), 5,000 Units, Subcutaneous, Q8H  insulin regular, 0-7 Units, Subcutaneous, Q6H  lansoprazole, 15 mg, Per PEG Tube, Q AM  latanoprost, 1 drop, Both Eyes, Daily  polyethylene glycol, 17 g, Per PEG Tube, Daily  ProSource No Carb, 30 mL, Per PEG Tube, BID  risperiDONE, 1 mg, Per PEG Tube, Nightly  senna-docusate sodium, 2 tablet, Per PEG Tube, BID  sodium chloride, 10 mL, Intravenous, Q12H  valsartan, 80 mg, Per PEG Tube, Q24H      Continuous Infusions:   PRN Meds:.•  acetaminophen  •  [DISCONTINUED] acetaminophen **OR** acetaminophen  •  albuterol  •  senna-docusate sodium **AND** polyethylene glycol **AND** [DISCONTINUED] bisacodyl **AND** bisacodyl  •  dextrose  •  glucagon (human recombinant)  •  hydrALAZINE  •  hydrOXYzine  •  Magnesium Standard Dose Replacement - Follow Nurse / BPA Driven Protocol  •  melatonin  •  Phosphorus Replacement - Follow Nurse / BPA Driven Protocol  •  Potassium Replacement - Follow Nurse / BPA Driven Protocol  •  sodium chloride  •  sodium chloride    Assessment & Plan   Assessment & Plan     Active Hospital Problems    Diagnosis  POA   • **Acute strep pyogenes (group A strep) epiglottitis with airway obstruction  [J05.11]  Yes   • Acute respiratory failure [J96.00]  Yes   • Essential hypertension [I10]  Yes      Resolved Hospital Problems   No resolved problems to display.        Brief Hospital Course to date:  Sandro Junior is a 68 y.o. male with PMHx significant for hypertension who presented to [facility] on 3/16 with sore throat and shortness of breath. Upon arrival to the ED he was noted to have significant stridor, tripoding and drooling. He was  treated with steroids, epinephrine and H2 blockers but despite this he ultimately required intubation and admission to the ICU. His blood cultures grew Srep pyogenes. He underwent bronch on 3/20 which showed persistent edematous changes in the posterior oropharynx with copious mucopurulent secretions and repeat CT neck 3/21/2023 revealed worsening edema but no sign of abscess. ID was consulted and he was treated with continuous PCN infusion. He was not deemed safe to extubate, therefore he underwent trach and PEG on 3/23. He was transferred to our service on 4/1.     Acute Group A Strep Epiglottitis   Airway Obstruction s/p Mechanical Ventilation, Trach/PEG 3/23  Strep Pyogenes Bacteremia with Sepsis  - Dr. Johnson following, transitioned to PO Augmentin x 7 days  - trach suctioning PRN, tolerating trach collar  - continue tube feedings, swallow eval planned    HTN:  - continue Diovan, Coreg    Anxiety:  - PRN hydroxyzine  - risperdal at bedtime    Planning [facility] at discharge, peer to peer pending    Expected Discharge Location and Transportation: [place]  Expected Discharge   Expected Discharge Date and Time     Expected Discharge Date Expected Discharge Time    Apr 4, 2023            DVT prophylaxis:  Medical DVT prophylaxis orders are present.     AM-PAC 6 Clicks Score (PT): 23 (04/02/23 0802)    CODE STATUS:   Code Status and Medical Interventions:   Ordered at: 03/16/23 2333     Code Status (Patient has no pulse and is not breathing):    CPR (Attempt to Resuscitate)     Medical Interventions (Patient has pulse or is breathing):    Full Support       Tere Orellana MD  04/03/23

## 2023-04-04 ENCOUNTER — ANCILLARY PROCEDURE (OUTPATIENT)
Dept: SPEECH THERAPY | Facility: HOSPITAL | Age: 68
DRG: 4 | End: 2023-04-04
Payer: MEDICARE

## 2023-04-04 LAB
ALBUMIN SERPL-MCNC: 3.7 G/DL (ref 3.5–5.2)
ALBUMIN/GLOB SERPL: 0.9 G/DL
ALP SERPL-CCNC: 50 U/L (ref 39–117)
ALT SERPL W P-5'-P-CCNC: 19 U/L (ref 1–41)
ANION GAP SERPL CALCULATED.3IONS-SCNC: 9 MMOL/L (ref 5–15)
AST SERPL-CCNC: 20 U/L (ref 1–40)
BILIRUB SERPL-MCNC: 0.4 MG/DL (ref 0–1.2)
BUN SERPL-MCNC: 18 MG/DL (ref 8–23)
BUN/CREAT SERPL: 24.3 (ref 7–25)
CALCIUM SPEC-SCNC: 9.8 MG/DL (ref 8.6–10.5)
CHLORIDE SERPL-SCNC: 102 MMOL/L (ref 98–107)
CHOLEST SERPL-MCNC: 132 MG/DL (ref 0–200)
CO2 SERPL-SCNC: 26 MMOL/L (ref 22–29)
CREAT SERPL-MCNC: 0.74 MG/DL (ref 0.76–1.27)
EGFRCR SERPLBLD CKD-EPI 2021: 99.9 ML/MIN/1.73
GLOBULIN UR ELPH-MCNC: 4.3 GM/DL
GLUCOSE BLDC GLUCOMTR-MCNC: 115 MG/DL (ref 70–130)
GLUCOSE BLDC GLUCOMTR-MCNC: 157 MG/DL (ref 70–130)
GLUCOSE BLDC GLUCOMTR-MCNC: 170 MG/DL (ref 70–130)
GLUCOSE BLDC GLUCOMTR-MCNC: 98 MG/DL (ref 70–130)
GLUCOSE SERPL-MCNC: 167 MG/DL (ref 65–99)
MAGNESIUM SERPL-MCNC: 2.1 MG/DL (ref 1.6–2.4)
PHOSPHATE SERPL-MCNC: 2.9 MG/DL (ref 2.5–4.5)
POTASSIUM SERPL-SCNC: 4 MMOL/L (ref 3.5–5.2)
PROT SERPL-MCNC: 8 G/DL (ref 6–8.5)
SODIUM SERPL-SCNC: 137 MMOL/L (ref 136–145)
TRIGL SERPL-MCNC: 174 MG/DL (ref 0–150)

## 2023-04-04 PROCEDURE — 99232 SBSQ HOSP IP/OBS MODERATE 35: CPT | Performed by: INTERNAL MEDICINE

## 2023-04-04 PROCEDURE — 92612 ENDOSCOPY SWALLOW (FEES) VID: CPT | Performed by: SPEECH-LANGUAGE PATHOLOGIST

## 2023-04-04 PROCEDURE — 84100 ASSAY OF PHOSPHORUS: CPT

## 2023-04-04 PROCEDURE — 82465 ASSAY BLD/SERUM CHOLESTEROL: CPT

## 2023-04-04 PROCEDURE — 25010000002 HEPARIN (PORCINE) PER 1000 UNITS: Performed by: INTERNAL MEDICINE

## 2023-04-04 PROCEDURE — 97116 GAIT TRAINING THERAPY: CPT

## 2023-04-04 PROCEDURE — 80053 COMPREHEN METABOLIC PANEL: CPT

## 2023-04-04 PROCEDURE — 63710000001 INSULIN REGULAR HUMAN PER 5 UNITS: Performed by: INTERNAL MEDICINE

## 2023-04-04 PROCEDURE — 84478 ASSAY OF TRIGLYCERIDES: CPT

## 2023-04-04 PROCEDURE — 82962 GLUCOSE BLOOD TEST: CPT

## 2023-04-04 PROCEDURE — 83735 ASSAY OF MAGNESIUM: CPT

## 2023-04-04 RX ORDER — GLYCOPYRROLATE 1 MG/1
1 TABLET ORAL 3 TIMES DAILY
Status: DISCONTINUED | OUTPATIENT
Start: 2023-04-04 | End: 2023-04-05

## 2023-04-04 RX ORDER — KETOCONAZOLE 20 MG/G
CREAM TOPICAL DAILY
Qty: 60 G | Refills: 1 | Status: SHIPPED | OUTPATIENT
Start: 2023-04-04

## 2023-04-04 RX ADMIN — HEPARIN SODIUM 5000 UNITS: 5000 INJECTION, SOLUTION INTRAVENOUS; SUBCUTANEOUS at 21:29

## 2023-04-04 RX ADMIN — Medication 10 ML: at 08:57

## 2023-04-04 RX ADMIN — RISPERIDONE 1 MG: 1 TABLET ORAL at 21:29

## 2023-04-04 RX ADMIN — LATANOPROST 1 DROP: 50 SOLUTION OPHTHALMIC at 08:55

## 2023-04-04 RX ADMIN — HEPARIN SODIUM 5000 UNITS: 5000 INJECTION, SOLUTION INTRAVENOUS; SUBCUTANEOUS at 05:39

## 2023-04-04 RX ADMIN — Medication 30 ML: at 21:31

## 2023-04-04 RX ADMIN — CHLORHEXIDINE GLUCONATE 0.12% ORAL RINSE 15 ML: 1.2 LIQUID ORAL at 08:55

## 2023-04-04 RX ADMIN — INSULIN HUMAN 2 UNITS: 100 INJECTION, SOLUTION PARENTERAL at 18:17

## 2023-04-04 RX ADMIN — VALSARTAN 80 MG: 160 TABLET, FILM COATED ORAL at 08:55

## 2023-04-04 RX ADMIN — Medication 10 ML: at 21:31

## 2023-04-04 RX ADMIN — HEPARIN SODIUM 5000 UNITS: 5000 INJECTION, SOLUTION INTRAVENOUS; SUBCUTANEOUS at 15:19

## 2023-04-04 RX ADMIN — AMOXICILLIN AND CLAVULANATE POTASSIUM 400 MG: 400; 57 POWDER, FOR SUSPENSION ORAL at 21:29

## 2023-04-04 RX ADMIN — GLYCOPYRROLATE 1 MG: 1 TABLET ORAL at 21:29

## 2023-04-04 RX ADMIN — GLYCOPYRROLATE 1 MG: 1 TABLET ORAL at 16:53

## 2023-04-04 RX ADMIN — CHLORHEXIDINE GLUCONATE 0.12% ORAL RINSE 15 ML: 1.2 LIQUID ORAL at 21:29

## 2023-04-04 RX ADMIN — AMOXICILLIN AND CLAVULANATE POTASSIUM 400 MG: 400; 57 POWDER, FOR SUSPENSION ORAL at 08:55

## 2023-04-04 RX ADMIN — CARVEDILOL 12.5 MG: 12.5 TABLET, FILM COATED ORAL at 21:29

## 2023-04-04 RX ADMIN — Medication 30 ML: at 08:56

## 2023-04-04 RX ADMIN — CARVEDILOL 12.5 MG: 12.5 TABLET, FILM COATED ORAL at 08:55

## 2023-04-04 RX ADMIN — LANSOPRAZOLE 15 MG: KIT at 05:39

## 2023-04-04 NOTE — PLAN OF CARE
Goal Outcome Evaluation:  Plan of Care Reviewed With: patient        Progress:  (initial FEES)   SLP evaluation completed. Will continue to address dysphagia. FEES completed, recommend continue NPO. Please see note for further details and recommendations.

## 2023-04-04 NOTE — PROGRESS NOTES
Clinical Nutrition     Nutrition Support Assessment  Reason for Visit: Follow-up protocol, Malnutrition Severity Assessment, EN      Patient Name: Sandro Junior  YOB: 1955  MRN: [id number]  Date of Encounter: 04/03/23 21:45 EDT  Admission date: 3/16/2023    Comment:Pt meets criteria for non severe chronic malnutrition based on wasting -note hx of wt loss from last year. See MSA note.     Nutrition Assessment     Admission Diagnosis:  Acute respiratory failure (HCC) [J96.00]    Problem List:    Acute strep pyogenes (group A strep) epiglottitis with airway obstruction     Essential hypertension    Acute respiratory failure        PMH:   He  has a past medical history of Epidermal inclusion cyst, Esophageal candidiasis, Keloid scar, Rectal cancer, and Seborrheic dermatitis.    PSH:  He  has a past surgical history that includes Knee surgery; Colectomy partial / total; Total knee arthroplasty (Right, 04/11/2016); Tooth extraction (2016); and tracheostomy and peg tube insertion (N/A, 3/23/2023).      Applicable Nutrition Concerns:   Skin: surgical sites  GI:      Applicable Interval History:   (3/16) intubated, large bore NG tube placed  (3/17) EN initiated  (3/20) s/p bronchoscopy - airway edema  (3-23) s/p Trach, PEG  (4/3) SLP rec cont NPO      Reported/Observed/Food/Nutrition Related History:     4/3  Pt now acknowledges wt loss from his UBW.of 186-188 lbs to current standing wt of 161 lbs on 4/3. (timeframe unclear). Allows doing well w bolus feedings. Believes receives these 3x/da.     3/31  Patient sitting in bedside chair at time of visit, EN infusing. No c/o abdominal discomfort or EN intolerance.  Reports he just had a BM.  Reports UBW 184lb. Patient with documented weight of 164lb on 3/16 (admission) with most recent bed weight of 202lb (no method).  Bed zeored at time of visit, will ask RN to obtain a weight once patient is back on in the bed. Patient does not feel he lost  "or gained any weight.     3/30  Pt resting in bed, talking with PMV, has garbled speech, lots of secretions, pt wants to know when he will be able to eat, take out Trach Per RN: pt has been tolerating TF Plan to transition to bolus feeds      Labs    Labs Reviewed: Yes     Results from last 7 days   Lab Units 03/31/23  0455 03/30/23  0338 03/28/23  0823   GLUCOSE mg/dL  --  166* 140*   BUN mg/dL  --  16 14   CREATININE mg/dL  --  0.59* 0.61*   SODIUM mmol/L  --  132* 132*   CHLORIDE mmol/L  --  102 102   POTASSIUM mmol/L 4.3 4.0 4.1   PHOSPHORUS mg/dL 3.3  --  2.9   MAGNESIUM mg/dL 2.4  --  2.5*   ALT (SGPT) U/L  --  13  --        Results from last 7 days   Lab Units 03/30/23  0338   ALBUMIN g/dL 3.3*       Results from last 7 days   Lab Units 04/03/23  2142 04/03/23  1623 04/03/23  1131 04/03/23  0456 04/03/23  0007 04/02/23  1809   GLUCOSE mg/dL 99 159* 131* 97 89 97     Lab Results   Lab Value Date/Time    HGBA1C 5.2 10/20/2020 0841                 Medications    Medications Reviewed: Yes  Abx, insulin, miralax, pericolace, lansoprazole, risperdal    Intake/Ouptut 24 hrs (0701 - 0700)   I&O's Reviewed: Yes   Intake & Output (last day)       04/03 0701  04/04 0700    P.O.     Other 500    NG/GT 1600    Total Intake(mL/kg) 2100 (28.7)    Urine (mL/kg/hr)     Total Output     Net +2100             Last recorded bm x 1 on 3/31    Anthropometrics     Admission Height 185.4 cm (73\") Documented at 03/16/2023 2104   Admission Weight 79.4 kg (175 lb) Documented at 03/16/2023 2104       Height: 73in 185.4 cm  Weight: 161 lb standing scale on 4/3 73.2 Kg  BMI:21.28  BMI classification: Normal: 18.5-24.9kg/m2      UBW: Most Recent Wts:   4/3/2023 73.165 kg 161 lb 4.8 oz Standing scale   4/2/2023 74.299 kg 163 lb 12.8 oz Standing scale   4/1/2023 80.151 kg 176 lb 11.2 oz Bed scale   3/31/2023 74.7 kg 164 lb 10.9 oz Bed scale   3/31/2023 91.8 kg 202 lb 6.1 oz -   3/26/2023 92 kg 202 lb 13.2 oz -   3/25/2023 87 kg 191 lb 12.8 " oz -   3/17/2023 77.9 kg 171 lb 11.8 oz -   3/16/2023 74.5 kg 164 lb 3.9 oz Bed scale   3/16/2023 79.379 kg 175 lb Estimated        Older Wts:   LView Complete Flowsheet  Weight Weight (kg) Weight (lbs) Weight Method VISIT REPORT   4/5/2022 80.559 kg 177 lb 9.6 oz - Report   1/10/2022 80.196 kg 176 lb 12.8 oz - Report   12/9/2021 79.652 kg 175 lb 9.6 oz - Report   11/13/2021 84.823 kg 187 lb Stated -   3/22/2021 83.008 kg 183 lb - Report   11/19/2020 83.643 kg 184 lb 6.4 oz - Report   10/20/2020 82.918 kg 182 lb 12.8 oz - Report   9/21/2020 84.188 kg 185 lb 9.6 oz - Report   3/16/2020 85.639 kg 188 lb 12.8 oz - Report   2/7/2020 84.823 kg 187 lb - Report   7/16/2019 81.738 kg 180 lb 3.2 oz - Report   1/15/2019 83.915 kg 185 lb - Report     Wt change: apparent loss from 177 lbs 1 yr ago to 161 lbs   Loss of 16 lbs 9% body wt from 1 yr ago.     Nutrition Focused Physical Exam     Date: 4/3    Pt meets criteria for non severe chronic malnutrition based on wasting See MSA note.     Needs Assessment   Date: 3/30    Height used:73in, 185.4 cm  Weights used:164lb 74.5 Kg from 3/16     Estimated Calorie needs: ~2000 calories   Method: MSJ x 1.3: 2053kcal  Method: 25-30 Kcals/KG actual wt = 1864-2236kcal    Estimated Protein needs: ~112 g protein  Method: 1.2-1.5 g/Kg actual wt:89-112g protein      Current Nutrition Prescription     PO: NPO Diet NPO Type: Strict NPO     EN: FiberSource HN   Goal Rate: 320ml per bolus                   Water Flushes: 100ml ev bolus  Modular: Prosource-no carb 2/day  Route: PEG  Tube: Lg bore     At goal over 12 daytime hrs Bolus 5 times a day: - 6am, 9am, 12pm, 3pm and 6pm.      Rx will supply:   Goal Volume 1600 mL/day       Flush Volume 500 mL/day       Energy 2040 Kcal/day 102  % Est Need   Protein 116 g/day 104  % Est Need   Fiber 24 g/day       Water in  EN 1296 mL       Total Water 1796 mL       Meet DRI Yes             --------------------------------------------------------------------------  Product/Rate verified at bedside: No  Infusing Rate at time of visit: no feeding at time of visit    Average Delivery from [place] Days: w Prosource  Volume 1280 mL/day 80  % Goal Vol.   Flush Volume 450 mL/day     Energy 1656 Kcal/day 83 % Est Need   Protein 99 g/day 88 % Est Need   Fiber 19.5 g/day     Water in  EN 1037 mL     Total Water 1487 mL     Meet DRI Yes            Nutrition Diagnosis     Date: 3/17 Updated: 3-30, 4/3  Problem Inadequate energy intake   Etiology ARF/ Surgery   Signs/Symptoms 92% goal volume EN   Status: per charting most recent 2 day delivery less: 80% goal volume delivered.    Date:  4/3 Updated:  Problem Malnutrition  non severe chronic   Etiology Alt GI Fx    Signs/Symptoms Wasting r/t wt loss over a year   Status:      Goal:   General: Nutrition support treatment    EN/PN: Maintain EN rx, assure delivery    Nutrition Intervention      Follow treatment progress, Care plan reviewed      Monitoring/Evaluation:   Per protocol, I&O, Pertinent labs, EN delivery/tolerance, Weight, GI status, Symptoms, Swallow function      Sonia Orr RD  Time Spent: 35 min

## 2023-04-04 NOTE — CASE MANAGEMENT/SOCIAL WORK
Continued Stay Note  [facility]     Patient Name: Sandro Junior  MRN: [id number]  Today's Date: 4/4/2023    Admit Date: 3/16/2023    Plan: update   Discharge Plan     Row Name 04/04/23 1207       Plan    Plan update    Patient/Family in Agreement with Plan yes    Plan Comments Met with patient and daughter at bedside who is prepared to complete a family appeal for her father today.  No other needs noted at this time.  Patient daughter completed family appeal today and was provided reference #[id number].  Supporting clinical documentation faxed to [phone].  Patient plan is to discharge to [facility] pending determination of Family Appeal.    Final Discharge Disposition Code 30 - still a patient               Discharge Codes    No documentation.               Expected Discharge Date and Time     Expected Discharge Date Expected Discharge Time    Apr 5, 2023             Rekha Brand RN

## 2023-04-04 NOTE — PLAN OF CARE
Goal Outcome Evaluation:            NSR. TF bolus administered per order @0600 through PEG. Trach in place, obturator at bedside. Trach care provided. Denies pain. Plan for FEEs today.

## 2023-04-04 NOTE — MBS/VFSS/FEES
Acute Care - Speech Language Pathology   Swallow Initial Evaluation [facility]   Fiberoptic Endoscopic Evaluation of Swallowing (FEES)       Patient Name: Sandro Junior  : 1955  MRN: [id number]  Today's Date: 2023               Admit Date: 3/16/2023    Visit Dx:     ICD-10-CM ICD-9-CM   1. Respiratory distress  R06.03 786.09   2. Tachycardia  R00.0 785.0   3. Airway compromise  J98.8 519.8   4. Acute epiglottitis with airway obstruction - probable  J05.11 464.31   5. Essential hypertension  I10 401.9   6. Voice disturbance  R49.9 784.40   7. Oropharyngeal dysphagia  R13.12 787.22     Patient Active Problem List   Diagnosis   • Essential hypertension   • Chronic pain   • Osteoarthritis of knee   • Prolonged depressive adjustment reaction   • Acute prostatitis   • Atopic rhinitis   • Erectile dysfunction of nonorganic origin   • Glaucoma suspect   • Onychomycosis of toenail   • Malignant neoplasm of rectum   • Seborrheic eczema   • Keloid scar   • Pain of right lower extremity   • Esophageal reflux   • Acute respiratory failure   • Acute strep pyogenes (group A strep) epiglottitis with airway obstruction      Past Medical History:   Diagnosis Date   • Epidermal inclusion cyst    • Esophageal candidiasis    • Keloid scar    • Rectal cancer    • Seborrheic dermatitis      Past Surgical History:   Procedure Laterality Date   • COLECTOMY PARTIAL / TOTAL     • KNEE SURGERY      Left knee meniscal tear repair   • TOOTH EXTRACTION      7 teeth   • TOTAL KNEE ARTHROPLASTY Right 2016   • TRACHEOSTOMY AND PEG TUBE INSERTION N/A 3/23/2023    Procedure: TRACHEOSTOMY AND PERCUTANEOUS ENDOSCOPIC GASTROSTOMY TUBE INSERTION;  Surgeon: Néstor Jerome MD;  Location: [facility];  Service: General;  Laterality: N/A;       SLP Recommendation and Plan  SLP Swallowing Diagnosis: severe, pharyngeal dysphagia (23 6580)  SLP Diet Recommendation: NPO, long term alternate methods of nutrition/hydration  (04/04/23 0955)  Recommended Precautions and Strategies: general aspiration precautions (04/04/23 0955)  SLP Rec. for Method of Medication Administration: meds via alternate route (04/04/23 0955)     Monitor for Signs of Aspiration: yes, notify SLP if any concerns (04/04/23 0955)     Swallow Criteria for Skilled Therapeutic Interventions Met: demonstrates skilled criteria (04/04/23 0955)  Anticipated Discharge Disposition (SLP): inpatient rehabilitation facility, anticipate therapy at next level of care (04/04/23 0955)  Rehab Potential/Prognosis, Swallowing: good, to achieve stated therapy goals (04/04/23 0955)  Therapy Frequency (Swallow): 5 days per week (04/04/23 0955)  Predicted Duration Therapy Intervention (Days): until discharge (04/04/23 0955)                                        Plan of Care Reviewed With: patient  Progress:  (initial FEES)      SWALLOW EVALUATION (last 72 hours)     SLP Adult Swallow Evaluation     Row Name 04/04/23 0955       Rehab Evaluation    Document Type evaluation  -CJ    Subjective Information no complaints  -CJ    Patient Observations alert;cooperative  -CJ    Patient/Family/Caregiver Comments/Observations daughter present  -CJ    Patient Effort good  -CJ    Symptoms Noted During/After Treatment none  -CJ       General Information    Patient Profile Reviewed yes  -CJ    Pertinent History Of Current Problem see prev eval; referred for FEES  -CJ    Current Method of Nutrition NPO;gastrostomy feedings  -CJ    Precautions/Limitations, Vision WFL with corrective lenses;for purposes of eval  -CJ    Precautions/Limitations, Hearing WFL;for purposes of eval  -CJ    Prior Level of Function-Communication WFL  -CJ    Prior Level of Function-Swallowing no diet consistency restrictions  -CJ    Plans/Goals Discussed with patient;agreed upon  -CJ    Barriers to Rehab none identified  -CJ    Patient's Goals for Discharge return to PO diet  -CJ       Pain    Additional Documentation Pain Scale:  FACES Pre/Post-Treatment (Group)  -       Pain Scale: FACES Pre/Post-Treatment    Pain: FACES Scale, Pretreatment 0-->no hurt  -CJ    Posttreatment Pain Rating 0-->no hurt  -CJ       Oral Motor Structure and Function    Oral Lesions or Structural Abnormalities and/or variants --    Dentition Assessment --    Secretion Management --    Mucosal Quality --    Volitional Cough --       Oral Musculature and Cranial Nerve Assessment    Oral Motor General Assessment --       General Eating/Swallowing Observations    Respiratory Support Currently in Use --    Eating/Swallowing Skills --    Positioning During Eating --    Utensils Used --    Consistencies Trialed --       Clinical Swallow Eval    Oral Prep Phase --    Oral Transit --    Oral Residue --    Pharyngeal Phase --       Pharyngeal Phase Concerns    Pharyngeal Phase Concerns --    Multiple Swallows --    Throat Clear --    Pharyngeal Phase Concerns, Comment --       Fiberoptic Endoscopic Evaluation of Swallowing (FEES)    Risks/Benefits Reviewed risks/benefits explained;patient;family;agreed to eval  -CJ    Tracheostomy Tested with speaking valve on;Tested with speaking valve off  -CJ    Nasal Entry right:  -    Scope serial number/identification [id number]  -       Anatomy and Physiology    Anatomic Considerations erythema;edema  -CJ    Comment Moderate diffuse edema/erythema, worse on R side; L side appears sluggish; moderate amount of pooled secretions prior to po presentations  -CJ    Velopharyngeal WFL  -CJ    Base of Tongue symmetrical;range reduced  -CJ    Epiglottis edematous  -CJ    Laryngeal Function Breathing decreased movement left  -CJ    Laryngeal Function Phonation decreased movement left  -CJ    Laryngeal Function to Breath Hold TVF contact;incomplete closure  -    Secretion Rating Scale (Jaiden et al. 1996) 3- secretions inside the laryngeal vestibule/aspiration, not cleared  -CJ    Secretion Description thin;clear;continuous  -CJ    Ice Chips  elicited swallow;did not clear secretions;aspirated  -CJ    Spontaneous Swallow frequency reduced;did not clear secretions  -CJ    Sensory sensed scope  -CJ    Utensils Used Spoon  -CJ    Consistencies Trialed thin liquids;pudding/puree;ice chips;spoon  -CJ       FEES Interpretation    Oral Phase solids not tested  -CJ       Initiation of Pharyngeal Swallow    Initiation of Pharyngeal Swallow bolus in pyriform sinuses  -CJ    Pharyngeal Phase impaired pharyngeal phase of swallowing  -CJ    Aspiration During the Swallow thin liquids;secondary to delayed swallow initiation or mistiming;secondary to reduced laryngeal elevation;secondary to reduced vestibular closure  -CJ    Penetration After the Swallow thin liquids;pudding/puree;secondary to residue;in pyriform sinuses  -CJ    Aspiration After the Swallow thin liquids;pudding/puree;secondary to residue;in pyriform sinuses;in valleculae;in laryngeal vestibule  -CJ    Depth of Penetration deep  -CJ    Response to Penetration No  -CJ    No spontaneous response to penetration and non-effective laryngeal clearance with cue (see comments)  -CJ    Response to Aspiration No  -CJ    No spontaneous response to aspiration with non-effective subglottic clearance with cue (see comments)  -CJ    Rosenbek's Scale thin:;pudding/puree:;8-->Level 8  -CJ    Residue thin liquids;pudding/puree;pyriform sinuses;posterior pharyngeal wall;laryngeal vestibule;valleculae;secondary to reduced posterior pharyngeal wall stripping;secondary to reduced laryngeal elevation;secondary to reduced hyolaryngeal excursion  -CJ    Response to Residue unable to clear residue;with cued swallow  -CJ    Attempted Compensatory Maneuvers bolus size;bolus presentation style;additional subsequent swallow;effortful (hard swallow);multiple swallows;throat clear after swallow  -CJ    Response to Attempted Compensatory Maneuvers did not prevent penetration;did not prevent aspiration  -CJ    Successful Compensatory  Maneuver Competency patient able to;demonstrate compensations  -    FEES Summary Severe pharyngeal dysphagia. Edema as well as secretions impacting severity of dysphagia. Silent aspiration of secretions prior to po presentations. Cued cough ineffective to clear. Silent aspiration of thin liquids occurring during the swallow. Moderate amount of diffuse residue w/ thin and pudding that mixed w/ pooled secretions resulting in silent aspiration of all residue after the swallow. Again cued cough is weak and ineffective to clear. Tested w/ PMV on/off w/ no significant difference. Further po presentations deferred 2/2 severity of dysphagia. Recommend safest at this time is NPO w/ alternative method of nutrition/hydration. Okay for 3-4 ice chips per hour after oral care w/ supervision. SLP will continue to f/u to address dysphagia and encourage independence w/ PMV. MD made aware of evaluation results.  -       SLP Evaluation Clinical Impression    SLP Swallowing Diagnosis severe;pharyngeal dysphagia  -    Functional Impact risk of aspiration/pneumonia  -    Rehab Potential/Prognosis, Swallowing good, to achieve stated therapy goals  -    Swallow Criteria for Skilled Therapeutic Interventions Met demonstrates skilled criteria  -       Recommendations    Therapy Frequency (Swallow) 5 days per week  -    Predicted Duration Therapy Intervention (Days) until discharge  -    SLP Diet Recommendation NPO;long term alternate methods of nutrition/hydration  -    Recommended Diagnostics --    Recommended Precautions and Strategies general aspiration precautions  -    Oral Care Recommendations Oral Care BID/PRN;Suction toothbrush  -    SLP Rec. for Method of Medication Administration meds via alternate route  -    Monitor for Signs of Aspiration yes;notify SLP if any concerns  -    Anticipated Discharge Disposition (SLP) inpatient rehabilitation facility;anticipate therapy at next level of care  -           User Key  (r) = Recorded By, (t) = Taken By, (c) = Cosigned By    Initials Name Effective Dates    AC Shelley Iraheta, MS CCC-SLP 02/03/23 -     CJ Danii Llanos, MS CCC-SLP 06/16/21 -                 EDUCATION  The patient has been educated in the following areas:   Dysphagia (Swallowing Impairment) Oral Care/Hydration NPO rationale.        SLP GOALS     Row Name 04/04/23 0955          (LTG) Patient will demonstrate functional swallow for    Diet Texture (Demonstrate functional swallow) soft to chew (chopped) textures  -CJ     Liquid viscosity (Demonstrate functional swallow) nectar/ mildly thick liquids  -CJ     [place]leberg (Demonstrate functional swallow) with minimal cues (75-90% accuracy)  -CJ     Time Frame (Demonstrate functional swallow) by discharge  -CJ     Progress/Outcomes (Demonstrate functional swallow) new goal  -CJ        (STG) Patient will tolerate therapeutic trials of    Consistencies Trialed (Tolerate therapeutic trials) thin liquids  -CJ     Desired Outcome (Tolerate therapeutic trials) without signs/symptoms of aspiration;without signs of distress  -CJ     Kleberg (Tolerate therapeutic trials) with minimal cues (75-90% accuracy)  -CJ     Time Frame (Tolerate therapeutic trials) 1 week  -CJ     Progress/Outcomes (Tolerate therapeutic trials) new goal  -CJ        (STG) Pharyngeal Strengthening Exercise Goal 1 (SLP)    Activity (Pharyngeal Strengthening Goal 1, SLP) increase timing;increase superior movement of the hyolaryngeal complex;increase anterior movement of the hyolaryngeal complex;increase closure at entrance to airway/closure of airway at glottis;increase squeeze/positive pressure generation;increase tongue base retraction  -CJ     Increase Timing prepping - 3 second prep or suck swallow or 3-step swallow  -CJ     Increase Superior Movement of the Hyolaryngeal Complex effortful pitch glide (falsetto + pharyngeal squeeze)  -CJ     Increase Closure at Entrance to Airway/Closure of  Airway at Glottis supraglottic swallow;breath hold exercises  -CJ     Increase Squeeze/Positive Pressure Generation hard effortful swallow  -CJ     Increase Tongue Base Retraction franklin  -     Calvert/Accuracy (Pharyngeal Strengthening Goal 1, SLP) with minimal cues (75-90% accuracy)  -     Time Frame (Pharyngeal Strengthening Goal 1, SLP) short term goal (STG)  -     Progress/Outcomes (Pharyngeal Strengthening Goal 1, SLP) new goal  -        Patient will demonstrate functional communication skills for return to discharge environment     Calvert --     Time frame --     Progress/Outcomes --        Tolerate Speaking Valve Placement Goal 1 (SLP)    Tolerate Speaking Valve Placement Goal 1 (SLP) --     Time Frame (Tolerate Speaking Valve Placement Goal 1, SLP) --     Progress (Tolerate Speaking Valve Placement Goal 1, SLP) --     Progress/Outcomes (Tolerate Speaking Valve Placement Goal 1, SLP) --     Comment (Tolerate Speaking Valve Placement Goal 1, SLP) --        Audible Speech with Speaking Valve Goal 1 (SLP)    Audible Speech Goal 1 (SLP) --     Time Frame (Audible Speech Goal 1, SLP) --     Progress (Audible Speech Goal 1, SLP) --     Progress/Outcomes (Audible Speech Goal 1, SLP) --     Comment (Audible Speech Goal 1, SLP) --        Independent Use of Speaking Valve Goal 1 (SLP)    Independent Use of Speaking Valve Goal 1 (SLP) --     Time Frame (Independent use of Speaking Valve Goal 1, SLP) --     Progress/Outcomes (Independent use of Speaking Valve Goal 1, SLP) --           User Key  (r) = Recorded By, (t) = Taken By, (c) = Cosigned By    Initials Name Provider Type    Shelley Godinez, MS CCC-SLP Speech and Language Pathologist    Danii Russo, MS CCC-SLP Speech and Language Pathologist                   Time Calculation:    Time Calculation- SLP     Row Name 04/04/23 1126             Time Calculation- SLP    SLP Start Time 0955  -      SLP Received On 04/04/23  -          Untimed Charges    31402-QF Fiberoptic Endo Eval Swallow Minutes 86  -CJ         Total Minutes    Untimed Charges Total Minutes 86  -CJ       Total Minutes 86  -CJ            User Key  (r) = Recorded By, (t) = Taken By, (c) = Cosigned By    Initials Name Provider Type    Danii Russo MS CCC-SLP Speech and Language Pathologist                Therapy Charges for Today     Code Description Service Date Service Provider Modifiers Qty    [id number]  ST FIBEROPTIC ENDO EVAL SWALL 6 4/4/2023 Danii Llanos MS CCC-SLP GN 1               Danii Llanos MS CCC-SLP  4/4/2023

## 2023-04-04 NOTE — PLAN OF CARE
Goal Outcome Evaluation:  Plan of Care Reviewed With: patient, daughter        Progress: improving  Outcome Evaluation: Pt able to ambulate with CGA and no AD this date but continues to demonstrate mild-moderate balance deficits throughout mobility. Required frequent cueing throughout session for attention to task to prevent LOB while ambulating. Recommend SPC trial next session. Based on current performance, PT rec IRF at d/c to improve functional strength and balance.

## 2023-04-04 NOTE — PROGRESS NOTES
Malnutrition Severity Assessment    Patient Name:  Sandro Junior  YOB: 1955  MRN: [id number]  Admit Date:  3/16/2023    Patient meets criteria for :  (Pt meets criteria for non severe chronic malnutrition based on wasting -note hx of wt loss from last year.)    Comments:      Malnutrition Severity Assessment  Malnutrition Type: Chronic Disease - Related Malnutrition  Malnutrition Type (last 8 hours)     Malnutrition Severity Assessment     Row Name 04/03/23 2222       Malnutrition Severity Assessment    Malnutrition Type Chronic Disease - Related Malnutrition    Row Name 04/03/23 2222       Insufficient Energy Intake     Insufficient Energy Intake Findings --  unable to quantify    Row Name 04/03/23 2222       Unintentional Weight Loss     Unintentional Weight Loss Findings --  Apparent loss from 177 lbs 1 yr ago to 161 lbs Loss of 16 lbs 9% body wt from 1 yr ago.    Row Name 04/03/23 2222       Muscle Loss    Loss of Muscle Mass Findings Mild    Orthodox Region --  mild    Clavicle Bone Region --  mild    Acromion Bone Region --  mild    Scapular Bone Region --  mild    Dorsal Hand Region None    Patellar Region Moderate - patella more prominent, less muscle definition around patella    Anterior Thigh Region --  mild    Posterior Calf Region Moderate - some roundness, slight firmness    Row Name 04/03/23 2222       Fat Loss    Subcutaneous Fat Loss Findings Mild    Orbital Region  --  mild    Upper Arm Region None    Thoracic & Lumbar Region --  mild    Row Name 04/03/23 2222       Criteria Met (Must meet criteria for severity in at least 2 of these categories: M Wasting, Fat Loss, Fluid, Secondary Signs, Wt. Status, Intake)    Patient meets criteria for  --  Pt meets criteria for non severe chronic malnutrition based on wasting -note hx of wt loss from last year.                Electronically signed by:  Sonia Orr RD  04/03/23 22:36 EDT

## 2023-04-04 NOTE — PROGRESS NOTES
1       Sandro Junior  1955  [id number]  4/4/2023    CC: Inability to swallow with shortness of breath    Sandro Junior is a 68 y.o. male here for adult epiglottitis, threatened airway, marked leukocytosis.  Lactic acidosis.  Elevated inflammatory markers status post tracheostomy.      Past medical history:  Past Medical History:   Diagnosis Date   • Epidermal inclusion cyst    • Esophageal candidiasis    • Keloid scar    • Rectal cancer    • Seborrheic dermatitis        Medications:   Current Facility-Administered Medications:   •  acetaminophen (TYLENOL) 160 MG/5ML solution 650 mg, 650 mg, Per PEG Tube, Q6H PRN, Monika Louis MD, 650 mg at 03/28/23 1714  •  [DISCONTINUED] acetaminophen (TYLENOL) tablet 650 mg, 650 mg, Nasogastric, Q4H PRN, 650 mg at 03/21/23 0253 **OR** acetaminophen (TYLENOL) suppository 650 mg, 650 mg, Rectal, Q4H PRN, Monika Louis MD  •  albuterol (PROVENTIL) nebulizer solution 0.083% 2.5 mg/3mL, 2.5 mg, Nebulization, Q6H PRN, Monika Louis MD  •  amoxicillin-clavulanate (AUGMENTIN) 400-57 MG/5ML suspension 400 mg, 400 mg, Per PEG Tube, Q12H, Monika Louis MD, 400 mg at 04/03/23 2137  •  sennosides-docusate (PERICOLACE) 8.6-50 MG per tablet 2 tablet, 2 tablet, Per PEG Tube, BID, 2 tablet at 04/02/23 0956 **AND** polyethylene glycol (MIRALAX) packet 17 g, 17 g, Per PEG Tube, Daily PRN **AND** [DISCONTINUED] bisacodyl (DULCOLAX) EC tablet 5 mg, 5 mg, Oral, Daily PRN **AND** bisacodyl (DULCOLAX) suppository 10 mg, 10 mg, Rectal, Daily PRN, Monika Louis MD  •  carvedilol (COREG) tablet 12.5 mg, 12.5 mg, Per PEG Tube, Q12H, Monika Louis MD, 12.5 mg at 04/03/23 2137  •  chlorhexidine (PERIDEX) 0.12 % solution 15 mL, 15 mL, Mouth/Throat, Q12H, Monika Louis MD, 15 mL at 04/03/23 2137  •  dextrose (D50W) (25 g/50 mL) IV injection 25 g, 25 g, Intravenous, Q15 Min PRN, Monika Louis MD  •  glucagon (GLUCAGEN) injection  1 mg, 1 mg, Intramuscular, Q15 Min PRN, Monika Louis MD  •  heparin (porcine) 5000 UNIT/ML injection 5,000 Units, 5,000 Units, Subcutaneous, Q8H, Monika Louis MD, 5,000 Units at 04/04/23 0539  •  hydrALAZINE (APRESOLINE) injection 20 mg, 20 mg, Intravenous, Q4H PRN, Monika Louis MD, 20 mg at 03/29/23 2051  •  hydrOXYzine (ATARAX) 10 MG/5ML syrup 25 mg, 25 mg, Per PEG Tube, Q8H PRN, Monika Louis MD, 25 mg at 03/31/23 1040  •  insulin regular (humuLIN R,novoLIN R) injection 0-7 Units, 0-7 Units, Subcutaneous, Q6H, Monika Louis MD, 2 Units at 04/03/23 1708  •  lansoprazole (FIRST) oral suspension 15 mg, 15 mg, Per PEG Tube, Q AM, Monika Louis MD, 15 mg at 04/04/23 0539  •  latanoprost (XALATAN) 0.005 % ophthalmic solution 1 drop, 1 drop, Both Eyes, Daily, Monika Louis MD, 1 drop at 04/03/23 1158  •  Magnesium Standard Dose Replacement - Initiate Nurse / BPA Driven Protocol, , Does not apply, PRN, Monika Louis MD  •  melatonin tablet 5 mg, 5 mg, Per G Tube, Nightly PRN, Olga Perez, APRN, 5 mg at 03/31/23 2254  •  Phosphorus Replacement - Initiate Nurse / BPA Driven Protocol, , Does not apply, PRN, Monika Louis MD  •  polyethylene glycol (MIRALAX) packet 17 g, 17 g, Per PEG Tube, Daily, Monika Louis MD, 17 g at 04/02/23 0957  •  Potassium Replacement - Initiate Nurse / BPA Driven Protocol, , Does not apply, PRN, Monika Louis MD  •  ProSource No Carb oral solution 30 mL, 30 mL, Per PEG Tube, BID, Monika Louis MD, 30 mL at 04/03/23 2139  •  risperiDONE (risperDAL) tablet 1 mg, 1 mg, Per PEG Tube, Nightly, Monika Louis MD, 1 mg at 04/03/23 2137  •  sodium chloride 0.9 % flush 10 mL, 10 mL, Intravenous, Q12H, Monika Louis MD, 10 mL at 04/03/23 2138  •  sodium chloride 0.9 % flush 10 mL, 10 mL, Intravenous, PRN, Monika Louis MD  •  sodium chloride 0.9 % flush 20 mL, 20 mL,  "Intravenous, PRN, Monika Louis MD  •  valsartan (DIOVAN) tablet 80 mg, 80 mg, Per PEG Tube, Q24H, Monika Louis MD, 80 mg at 04/02/23 0957  Antibiotics:  Anti-Infectives (From admission, onward)    Ordered     Dose/Rate Route Frequency Start Stop    03/31/23 0754  amoxicillin-clavulanate (AUGMENTIN) 400-57 MG/5ML suspension 400 mg        Note to Pharmacy: Via PEG   Ordering Provider: Monika Louis MD    400 mg Per PEG Tube Every 12 Hours Scheduled 03/31/23 0900 04/07/23 0859    03/16/23 2151  vancomycin 1500 mg/500 mL 0.9% NS IVPB ([facility])        Ordering Provider: Anand Eaton MD    20 mg/kg × 79.4 kg Intravenous Once 03/16/23 2153 03/16/23 2323          Allergies:  has No Known Allergies.    Review of Systems: All other reviewed and negative except as per HPI    Blood pressure 122/64, pulse 69, temperature 98.6 °F (37 °C), temperature source Oral, resp. rate 18, height 185.4 cm (73\"), weight 74.2 kg (163 lb 9.6 oz), SpO2 96 %.  GENERAL: Awake and alert, in no acute distress.  Afebrile and hemodynamically stable.  Denies sore throat or dysphagia/odynophagia.  Mild cough.  Scant tracheal sputum.  No hemoptysis.  Denies nausea or vomiting.  Generalized weakness.  HEENT: Oropharynx without thrush. Sinuses nontender. Dentition in reasonable repair. No cervical adenopathy. No carotid bruits/ jugular venous distention.  Tracheostomy stoma clean.  Speaks with Passy-[place] valve.  EYES: PERRL. No conjunctival injection. No icterus. EOMI.  LYMPHATICS: No lymphadenopathy of the neck or axillary or inguinal regions.   HEART: No murmur, gallop, or pericardial friction rub.   LUNGS: Clear to auscultation anteriorly. No percussion dullness.  Few posterobasilar rhonchi/dependent.  ABDOMEN: Soft, nontender, nondistended. No appreciable HSM.  PEG exit site without erythema or gross purulence.  SKIN: Warm and dry without cutaneous eruptions. No embolic stigmata.   PSYCHIATRIC: Mental status lucid. " Cranial nerve function intact.       DIAGNOSTICS:  Lab Results   Component Value Date    WBC 7.59 03/30/2023    HGB 12.6 (L) 03/31/2023    HCT 39.3 03/31/2023     03/30/2023     Lab Results   Component Value Date    CRP 1.09 (H) 03/27/2023     Lab Results   Component Value Date    SEDRATE >130 (H) 03/26/2023     Lab Results   Component Value Date    GLUCOSE 166 (H) 03/30/2023    BUN 16 03/30/2023    CREATININE 0.59 (L) 03/30/2023    EGFRIFAFRI 109 01/10/2022    BCR 27.1 (H) 03/30/2023    CO2 23.0 03/30/2023    CALCIUM 9.5 03/30/2023    ALBUMIN 3.3 (L) 03/30/2023    AST 16 03/30/2023    ALT 13 03/30/2023       Microbiology: Bio fire respiratory pathogen's panel negative on admission.  2 of 3 admission blood cultures with group A beta-hemolytic streptococci/favorable penicillin and ceftriaxone EDNA's.    RADIOLOGY:  Imaging Results (Last 72 Hours)     ** No results found for the last 72 hours. **          Assessment and Plan: Adult epiglottitis.  Threatened airway.  Marked leukocytosis.  Lactic acidosis.  Increased inflammatory markers.  Group A beta-hemolytic streptococcal bacteremia.  Status post tracheostomy and PEG.  Refractory hypomagnesemia.  Questionable beta-lactam drug fever while on continuous infusion penicillin.  Maximum temperature over 24 hours 98.9.  Currently 98.6.  Pulse 69, respiratory rate 18, blood pressure 122/64 mmHg with an O2 saturation of 96% on room air.  BUN/creatinine 16/0.59.  Peripheral leukocyte count 7.59 with 54% segmented neutrophils (3/30).  Continues on amoxicillin-clavulanate suspension.  Likely to benefit from 7 to 10 days at [facility] with intensive physical therapy.    Zana Johnson MD  4/4/2023

## 2023-04-04 NOTE — THERAPY TREATMENT NOTE
Patient Name: Sandro Junior  : 1955    MRN: [id number]                              Today's Date: 2023       Admit Date: 3/16/2023    Visit Dx:     ICD-10-CM ICD-9-CM   1. Respiratory distress  R06.03 786.09   2. Tachycardia  R00.0 785.0   3. Airway compromise  J98.8 519.8   4. Acute epiglottitis with airway obstruction - probable  J05.11 464.31   5. Essential hypertension  I10 401.9   6. Voice disturbance  R49.9 784.40   7. Oropharyngeal dysphagia  R13.12 787.22     Patient Active Problem List   Diagnosis   • Essential hypertension   • Chronic pain   • Osteoarthritis of knee   • Prolonged depressive adjustment reaction   • Acute prostatitis   • Atopic rhinitis   • Erectile dysfunction of nonorganic origin   • Glaucoma suspect   • Onychomycosis of toenail   • Malignant neoplasm of rectum   • Seborrheic eczema   • Keloid scar   • Pain of right lower extremity   • Esophageal reflux   • Acute respiratory failure   • Acute strep pyogenes (group A strep) epiglottitis with airway obstruction      Past Medical History:   Diagnosis Date   • Epidermal inclusion cyst    • Esophageal candidiasis    • Keloid scar    • Rectal cancer    • Seborrheic dermatitis      Past Surgical History:   Procedure Laterality Date   • COLECTOMY PARTIAL / TOTAL     • KNEE SURGERY      Left knee meniscal tear repair   • TOOTH EXTRACTION  2016 teeth   • TOTAL KNEE ARTHROPLASTY Right 2016   • TRACHEOSTOMY AND PEG TUBE INSERTION N/A 3/23/2023    Procedure: TRACHEOSTOMY AND PERCUTANEOUS ENDOSCOPIC GASTROSTOMY TUBE INSERTION;  Surgeon: Néstor Jerome MD;  Location: [facility];  Service: General;  Laterality: N/A;      General Information     Row Name 23 1128          Physical Therapy Time and Intention    Document Type therapy note (daily note)  -ES     Mode of Treatment physical therapy  -ES     Row Name 23 1128          General Information    Patient Profile Reviewed yes  -ES     Existing  Precautions/Restrictions fall  PEG, trach w/ PMV  -ES     Barriers to Rehab medically complex  -ES     Row Name 04/04/23 1128          Cognition    Orientation Status (Cognition) oriented x 3  -ES     Row Name 04/04/23 1128          Safety Issues, Functional Mobility    Safety Issues Affecting Function (Mobility) insight into deficits/self-awareness;sequencing abilities;safety precaution awareness  -ES     Impairments Affecting Function (Mobility) balance;endurance/activity tolerance;strength;shortness of breath  -ES           User Key  (r) = Recorded By, (t) = Taken By, (c) = Cosigned By    Initials Name Provider Type    ES Chica Stevens, FREDO Physical Therapist               Mobility     Row Name 04/04/23 1129          Bed Mobility    Bed Mobility supine-sit;sit-supine  -ES     Supine-Sit [place] (Bed Mobility) modified independence  -ES     Sit-Supine [place] (Bed Mobility) modified independence  -ES     Assistive Device (Bed Mobility) bed rails;head of bed elevated  -ES     Row Name 04/04/23 1129          Sit-Stand Transfer    Sit-Stand [place] (Transfers) contact guard  -ES     Assistive Device (Sit-Stand Transfers) other (see comments)  none  -ES     Row Name 04/04/23 1129          Gait/Stairs (Locomotion)    [place] Level (Gait) contact guard;verbal cues  -ES     Assistive Device (Gait) other (see comments)  none  -ES     Distance in Feet (Gait) 250+250  -ES     Deviations/Abnormal Patterns (Gait) bilateral deviations;weight shifting decreased;base of support, narrow;stride length decreased;left sided deviations;scissoring  -ES     Bilateral Gait Deviations forward flexed posture  -ES     Comment, (Gait/Stairs) Pt amb 500' with CGA and no AD. Demo'd narrow BROCK with intermittent scissoring gait pattern but improved sequencing during turns. Pt required intermittent cueing for attention to task to avoid LOB.  -ES           User Key  (r) = Recorded By, (t) = Taken By, (c) = Cosigned By     Initials Name Provider Type    ES Chica Stevens, FREDO Physical Therapist               Obj/Interventions     Row Name 04/04/23 1131          Balance    Balance Assessment sitting static balance;sitting dynamic balance;sit to stand dynamic balance;standing static balance;standing dynamic balance  -ES     Static Sitting Balance supervision  -ES     Dynamic Sitting Balance supervision  -ES     Position, Sitting Balance unsupported;sitting edge of bed  -ES     Static Standing Balance contact guard  -ES     Dynamic Standing Balance contact guard  -ES     Position/Device Used, Standing Balance unsupported  -ES     Balance Interventions sitting;standing;sit to stand;dynamic;static  -ES     Comment, Balance narrow BROCK throughout mobility with intermittent scissoring gait pattern. Required cueing for attention to task. One minor LOB but able to self correct  -ES           User Key  (r) = Recorded By, (t) = Taken By, (c) = Cosigned By    Initials Name Provider Type    ES Chica Stevens, PT Physical Therapist               Goals/Plan    No documentation.                Clinical Impression     Row Name 04/04/23 1132          Pain    Pretreatment Pain Rating 0/10 - no pain  -ES     Posttreatment Pain Rating 0/10 - no pain  -ES     Pre/Posttreatment Pain Comment tolerated  -ES     Pain Intervention(s) Repositioned;Ambulation/increased activity  -ES     Row Name 04/04/23 1132          Plan of Care Review    Plan of Care Reviewed With patient;daughter  -ES     Progress improving  -ES     Outcome Evaluation Pt able to ambulate with CGA and no AD this date but continues to demonstrate mild-moderate balance deficits throughout mobility. Required frequent cueing throughout session for attention to task to prevent LOB while ambulating. Recommend SPC trial next session. Based on current performance, PT rec IRF at d/c to improve functional strength and balance.  -ES     Row Name 04/04/23 1132          Therapy Assessment/Plan (PT)     Rehab Potential (PT) good, to achieve stated therapy goals  -ES     Criteria for Skilled Interventions Met (PT) yes;skilled treatment is necessary;meets criteria  -ES     Therapy Frequency (PT) daily  -ES     Row Name 04/04/23 1132          Vital Signs    Pre Systolic BP Rehab --  VSS  -ES     O2 Delivery Pre Treatment room air  -ES     O2 Delivery Intra Treatment room air  -ES     O2 Delivery Post Treatment room air  -ES     Pre Patient Position Supine  -ES     Intra Patient Position Standing  -ES     Post Patient Position Supine  -ES     Row Name 04/04/23 1132          Positioning and Restraints    Pre-Treatment Position in bed  -ES     Post Treatment Position bed  -ES     In Bed notified nsg;fowlers;call light within reach;encouraged to call for assist;exit alarm on  -ES           User Key  (r) = Recorded By, (t) = Taken By, (c) = Cosigned By    Initials Name Provider Type    Chica Jacob PT Physical Therapist               Outcome Measures     Row Name 04/04/23 1138          How much help from another person do you currently need...    Turning from your back to your side while in flat bed without using bedrails? 4  -ES     Moving from lying on back to sitting on the side of a flat bed without bedrails? 4  -ES     Moving to and from a bed to a chair (including a wheelchair)? 3  -ES     Standing up from a chair using your arms (e.g., wheelchair, bedside chair)? 3  -ES     Climbing 3-5 steps with a railing? 3  -ES     To walk in hospital room? 3  -ES     AM-PAC 6 Clicks Score (PT) 20  -ES     Highest level of mobility 6 --> Walked 10 steps or more  -ES     Row Name 04/04/23 1138          Functional Assessment    Outcome Measure Options AM-PAC 6 Clicks Basic Mobility (PT)  -ES           User Key  (r) = Recorded By, (t) = Taken By, (c) = Cosigned By    Initials Name Provider Type    Chica Jacob PT Physical Therapist                             Physical Therapy Education     Title: PT OT SLP Therapies  (In Progress)     Topic: Physical Therapy (Done)     Point: Mobility training (Done)     Learning Progress Summary           Patient Acceptance, TB,E, VU,NR by AY at 3/31/2023 1511    Acceptance, E, VU,NR by HT at 3/30/2023 1405    Acceptance, E, VU by HT at 3/29/2023 1144    Acceptance, E, DU,NR by HT at 3/28/2023 1458    Acceptance, E, VU by HT at 3/27/2023 1026    Acceptance, E, VU by EL at 3/26/2023 1707    Acceptance, E,D, VU,NR by LS at 3/26/2023 1429                   Point: Home exercise program (Done)     Learning Progress Summary           Patient Acceptance, TB,E, VU,NR by AY at 3/31/2023 1511    Acceptance, E, DU,NR by HT at 3/28/2023 1458    Acceptance, E, VU by HT at 3/27/2023 1026    Acceptance, E, VU by EL at 3/26/2023 1707                   Point: Body mechanics (Done)     Learning Progress Summary           Patient Acceptance, TB,E, VU,NR by AY at 3/31/2023 1511    Acceptance, E, VU,NR by HT at 3/30/2023 1405    Acceptance, E, VU by HT at 3/29/2023 1144    Acceptance, E, DU,NR by HT at 3/28/2023 1458    Acceptance, E, VU by HT at 3/27/2023 1026    Acceptance, E, VU by EL at 3/26/2023 1707    Acceptance, E,D, VU,NR by LS at 3/26/2023 1429                   Point: Precautions (Done)     Learning Progress Summary           Patient Acceptance, TB,E, VU,NR by AY at 3/31/2023 1511    Acceptance, E, VU,NR by HT at 3/30/2023 1405    Acceptance, E, VU by HT at 3/29/2023 1144    Acceptance, E, DU,NR by HT at 3/28/2023 1458    Acceptance, E, VU by HT at 3/27/2023 1026    Acceptance, E, VU by EL at 3/26/2023 1707    Acceptance, E,D, VU,NR by LS at 3/26/2023 1429                               User Key     Initials Effective Dates Name Provider Type Discipline    LS 02/03/23 -  Norma Villegas, PT Physical Therapist PT    EL 06/16/21 -  Giana Yates, RN Registered Nurse Nurse    AY 11/10/20 -  Judith Freire, PT Physical Therapist PT    HT 01/12/23 -  Chela Barron, PT Student PT Student PT              PT  Recommendation and Plan     Plan of Care Reviewed With: patient, daughter  Progress: improving  Outcome Evaluation: Pt able to ambulate with CGA and no AD this date but continues to demonstrate mild-moderate balance deficits throughout mobility. Required frequent cueing throughout session for attention to task to prevent LOB while ambulating. Recommend SPC trial next session. Based on current performance, PT rec IRF at d/c to improve functional strength and balance.     Time Calculation:    PT Charges     Row Name 04/04/23 1138             Time Calculation    Start Time 1111  -ES      PT Received On 04/04/23  -ES      PT Goal Re-Cert Due Date 04/05/23  -ES         Time Calculation- PT    Total Timed Code Minutes- PT 16 minute(s)  -ES         Timed Charges    90626 - Gait Training Minutes  16  -ES         Total Minutes    Timed Charges Total Minutes 16  -ES       Total Minutes 16  -ES            User Key  (r) = Recorded By, (t) = Taken By, (c) = Cosigned By    Initials Name Provider Type    ES Chica Stevens, PT Physical Therapist              Therapy Charges for Today     Code Description Service Date Service Provider Modifiers Qty    [id number] HC GAIT TRAINING EA 15 MIN 4/4/2023 Chica Stevens PT GP 1          PT G-Codes  Outcome Measure Options: AM-PAC 6 Clicks Basic Mobility (PT)  AM-PAC 6 Clicks Score (PT): 20  AM-PAC 6 Clicks Score (OT): 15  PT Discharge Summary  Anticipated Discharge Disposition (PT): inpatient rehabilitation facility    Chica Stevens PT  4/4/2023

## 2023-04-04 NOTE — PROGRESS NOTES
[facility] Medicine Services  PROGRESS NOTE    Patient Name: Sandro Junior  : 1955  MRN: [id number]    Date of Admission: 3/16/2023  Primary Care Physician: Dora Loya PA    Subjective   Subjective     CC:  F/u epiglottitis    HPI:  No new issues overnight.  SLP getting ready to do bedside re-evaluation. Daughter also at bedside.     ROS:  Gen- No fevers, chills  CV- No chest pain, palpitations  Resp- No cough, dyspnea  GI- No N/V/D, abd pain    Objective   Objective     Vital Signs:   Temp:  [97.7 °F (36.5 °C)-98.9 °F (37.2 °C)] 98.6 °F (37 °C)  Heart Rate:  [63-91] 69  Resp:  [16-18] 18  BP: (100-151)/(64-95) 122/64     Physical Exam:  Constitutional: No acute distress, awake, alert  HENT: NCAT, mucous membranes moist  Respiratory: Clear to auscultation bilaterally, tracheostomy in place  Cardiovascular: RRR, no murmurs, rubs, or gallops  Gastrointestinal: Positive bowel sounds, soft, nontender, nondistended, PEG in place  Musculoskeletal: No bilateral ankle edema  Psychiatric: agitated  Neurologic: Oriented x 3, speech clear  Skin: No rashes      Results Reviewed:  LAB RESULTS:      Lab 23  0455 23   WBC  --  7.59   HEMOGLOBIN 12.6* 12.3*   HEMATOCRIT 39.3 38.5   PLATELETS  --  408   NEUTROS ABS  --  4.10   IMMATURE GRANS (ABS)  --  0.04   LYMPHS ABS  --  2.21   MONOS ABS  --  1.00*   EOS ABS  --  0.17   MCV  --  100.0*         Lab 23  0455 23  0338   SODIUM  --  132*   POTASSIUM 4.3 4.0   CHLORIDE  --  102   CO2  --  23.0   ANION GAP  --  7.0   BUN  --  16   CREATININE  --  0.59*   EGFR  --  107.0   GLUCOSE  --  166*   CALCIUM  --  9.5   MAGNESIUM 2.4  --    PHOSPHORUS 3.3  --          Lab 23  0338   TOTAL PROTEIN 8.1   ALBUMIN 3.3*   GLOBULIN 4.8   ALT (SGPT) 13   AST (SGOT) 16   BILIRUBIN 0.4   ALK PHOS 55                     Brief Urine Lab Results  (Last result in the past 365 days)      Color   Clarity   Blood   Leuk Est    Nitrite   Protein   CREAT   Urine HCG        03/16/23 2227 Yellow   Clear   Negative   Negative   Negative   30 mg/dL (1+)                 Microbiology Results Abnormal     Procedure Component Value - Date/Time    Blood Culture - Blood, Arm, Left [[id number]]  (Normal) Collected: 03/20/23 1438    Lab Status: Final result Specimen: Blood from Arm, Left Updated: 03/25/23 1501     Blood Culture No growth at 5 days    Blood Culture - Blood, Hand, Right [[id number]]  (Normal) Collected: 03/16/23 2225    Lab Status: Final result Specimen: Blood from Hand, Right Updated: 03/21/23 2246     Blood Culture No growth at 5 days    MRSA Screen, PCR (Inpatient) - Swab, Nares [[id number]]  (Normal) Collected: 03/17/23 0933    Lab Status: Final result Specimen: Swab from Nares Updated: 03/17/23 1200     MRSA PCR Negative    Narrative:      The negative predictive value of this diagnostic test is high and should only be used to consider de-escalating anti-MRSA therapy. A positive result may indicate colonization with MRSA and must be correlated clinically.  MRSA Negative    COVID PRE-OP / PRE-PROCEDURE SCREENING ORDER (NO ISOLATION) - Swab, Nasopharynx [[id number]]  (Normal) Collected: 03/16/23 2108    Lab Status: Final result Specimen: Swab from Nasopharynx Updated: 03/16/23 2234    Narrative:      The following orders were created for panel order COVID PRE-OP / PRE-PROCEDURE SCREENING ORDER (NO ISOLATION) - Swab, Nasopharynx.  Procedure                               Abnormality         Status                     ---------                               -----------         ------                     Respiratory Panel PCR w/...[[id number]]  Normal              Final result                 Please view results for these tests on the individual orders.    Respiratory Panel PCR w/COVID-19(SARS-CoV-2) [facility] In-House, NP Swab in UTM/VTM, 3-4 HR TAT - Swab, Nasopharynx [[id number]]  (Normal) Collected: 03/16/23 2108    Lab  Status: Final result Specimen: Swab from Nasopharynx Updated: 03/16/23 2234     ADENOVIRUS, PCR Not Detected     Coronavirus 229E Not Detected     Coronavirus HKU1 Not Detected     Coronavirus NL63 Not Detected     Coronavirus OC43 Not Detected     COVID19 Not Detected     Human Metapneumovirus Not Detected     Human Rhinovirus/Enterovirus Not Detected     Influenza A PCR Not Detected     Influenza B PCR Not Detected     Parainfluenza Virus 1 Not Detected     Parainfluenza Virus 2 Not Detected     Parainfluenza Virus 3 Not Detected     Parainfluenza Virus 4 Not Detected     RSV, PCR Not Detected     Bordetella pertussis pcr Not Detected     Bordetella parapertussis PCR Not Detected     Chlamydophila pneumoniae PCR Not Detected     Mycoplasma pneumo by PCR Not Detected    Narrative:      In the setting of a positive respiratory panel with a viral infection PLUS a negative procalcitonin without other underlying concern for bacterial infection, consider observing off antibiotics or discontinuation of antibiotics and continue supportive care. If the respiratory panel is positive for atypical bacterial infection (Bordetella pertussis, Chlamydophila pneumoniae, or Mycoplasma pneumoniae), consider antibiotic de-escalation to target atypical bacterial infection.          No radiology results from the last 24 hrs    Results for orders placed during the hospital encounter of 03/16/23    Adult Transthoracic Echo Complete W/ Cont if Necessary Per Protocol    Interpretation Summary  •  Left ventricular systolic function is normal. Calculated left ventricular 3D EF = 56% Left ventricular ejection fraction appears to be 51 - 55%.  •  Left ventricular diastolic function was normal.  •  Estimated right ventricular systolic pressure from tricuspid regurgitation is normal (<35 mmHg).      Current medications:  Scheduled Meds:amoxicillin-clavulanate, 400 mg, Per PEG Tube, Q12H  carvedilol, 12.5 mg, Per PEG Tube, Q12H  chlorhexidine, 15  mL, Mouth/Throat, Q12H  heparin (porcine), 5,000 Units, Subcutaneous, Q8H  insulin regular, 0-7 Units, Subcutaneous, Q6H  lansoprazole, 15 mg, Per PEG Tube, Q AM  latanoprost, 1 drop, Both Eyes, Daily  polyethylene glycol, 17 g, Per PEG Tube, Daily  ProSource No Carb, 30 mL, Per PEG Tube, BID  risperiDONE, 1 mg, Per PEG Tube, Nightly  senna-docusate sodium, 2 tablet, Per PEG Tube, BID  sodium chloride, 10 mL, Intravenous, Q12H  valsartan, 80 mg, Per PEG Tube, Q24H      Continuous Infusions:   PRN Meds:.•  acetaminophen  •  [DISCONTINUED] acetaminophen **OR** acetaminophen  •  albuterol  •  senna-docusate sodium **AND** polyethylene glycol **AND** [DISCONTINUED] bisacodyl **AND** bisacodyl  •  dextrose  •  glucagon (human recombinant)  •  hydrALAZINE  •  hydrOXYzine  •  Magnesium Standard Dose Replacement - Follow Nurse / BPA Driven Protocol  •  melatonin  •  Phosphorus Replacement - Follow Nurse / BPA Driven Protocol  •  Potassium Replacement - Follow Nurse / BPA Driven Protocol  •  sodium chloride  •  sodium chloride    Assessment & Plan   Assessment & Plan     Active Hospital Problems    Diagnosis  POA   • **Acute strep pyogenes (group A strep) epiglottitis with airway obstruction  [J05.11]  Yes   • Acute respiratory failure [J96.00]  Yes   • Essential hypertension [I10]  Yes      Resolved Hospital Problems   No resolved problems to display.        Brief Hospital Course to date:  Sandro Junior is a 68 y.o. male with PMHx significant for hypertension who presented to [facility] on 3/16 with sore throat and shortness of breath. Upon arrival to the ED he was noted to have significant stridor, tripoding and drooling. He was treated with steroids, epinephrine and H2 blockers but despite this he ultimately required intubation and admission to the ICU. His blood cultures grew Srep pyogenes. He underwent bronch on 3/20 which showed persistent edematous changes in the posterior oropharynx with copious mucopurulent secretions  and repeat CT neck 3/21/2023 revealed worsening edema but no sign of abscess. ID was consulted and he was treated with continuous PCN infusion. He was not deemed safe to extubate, therefore he underwent trach and PEG on 3/23. He was transferred to our service on 4/1.     Acute Group A Strep Epiglottitis   Airway Obstruction s/p Mechanical Ventilation, Trach/PEG 3/23  Strep Pyogenes Bacteremia with Sepsis  - Dr. Johnson following, transitioned to PO Augmentin x 7 days  - trach suctioning PRN, tolerating trach collar  - continue tube feedings, SLP consulted  -- add Robinul per SLP recommendations    HTN:  - continue Diovan, Coreg    Anxiety:  - PRN hydroxyzine  - risperdal at bedtime    Planning [facility] at discharge, insurance denial upheld after P2P yesterday. Family planning to appeal today.     Expected Discharge Location and Transportation: [place] [facility]  Expected Discharge   Expected Discharge Date and Time     Expected Discharge Date Expected Discharge Time    Apr 5, 2023            DVT prophylaxis:  Medical DVT prophylaxis orders are present.     AM-PAC 6 Clicks Score (PT): 21 (04/03/23 0921)    CODE STATUS:   Code Status and Medical Interventions:   Ordered at: 03/16/23 6097     Code Status (Patient has no pulse and is not breathing):    CPR (Attempt to Resuscitate)     Medical Interventions (Patient has pulse or is breathing):    Full Support       Tere Orellana MD  04/04/23

## 2023-04-04 NOTE — TELEPHONE ENCOUNTER
Caller: Sandro Junior ILIANA    Relationship: Self    Best call back number: [phone]    Requested Prescriptions:   Requested Prescriptions     Pending Prescriptions Disp Refills   • ketoconazole (NIZORAL) 2 % cream 30 g 4     Sig: Apply  topically to the appropriate area as directed Daily.      Pharmacy where request should be sent: [id number] - [place], [place] - [address] - [phone]  - [phone] FX     Last office visit with prescribing clinician: 4/5/2022   Last telemedicine visit with prescribing clinician: Visit date not found   Next office visit with prescribing clinician: Visit date not found     Additional details provided by patient: PATIENT WANTS 90 DAY SUPPLY    Does the patient have less than a 3 day supply:  [x] Yes  [] No    Would you like a call back once the refill request has been completed: [x] Yes [] No    If the office needs to give you a call back, can they leave a voicemail: [x] Yes [] No    Esequiel Morales Rep   04/04/23 08:24 EDT

## 2023-04-05 LAB
GLUCOSE BLDC GLUCOMTR-MCNC: 119 MG/DL (ref 70–130)
GLUCOSE BLDC GLUCOMTR-MCNC: 129 MG/DL (ref 70–130)
GLUCOSE BLDC GLUCOMTR-MCNC: 139 MG/DL (ref 70–130)
GLUCOSE BLDC GLUCOMTR-MCNC: 150 MG/DL (ref 70–130)
GLUCOSE BLDC GLUCOMTR-MCNC: 152 MG/DL (ref 70–130)
GLUCOSE BLDC GLUCOMTR-MCNC: 97 MG/DL (ref 70–130)

## 2023-04-05 PROCEDURE — 97116 GAIT TRAINING THERAPY: CPT

## 2023-04-05 PROCEDURE — 92507 TX SP LANG VOICE COMM INDIV: CPT | Performed by: SPEECH-LANGUAGE PATHOLOGIST

## 2023-04-05 PROCEDURE — 25010000002 HEPARIN (PORCINE) PER 1000 UNITS: Performed by: INTERNAL MEDICINE

## 2023-04-05 PROCEDURE — 82962 GLUCOSE BLOOD TEST: CPT

## 2023-04-05 PROCEDURE — 97110 THERAPEUTIC EXERCISES: CPT

## 2023-04-05 PROCEDURE — 97530 THERAPEUTIC ACTIVITIES: CPT

## 2023-04-05 PROCEDURE — 63710000001 INSULIN REGULAR HUMAN PER 5 UNITS: Performed by: INTERNAL MEDICINE

## 2023-04-05 PROCEDURE — 92526 ORAL FUNCTION THERAPY: CPT | Performed by: SPEECH-LANGUAGE PATHOLOGIST

## 2023-04-05 PROCEDURE — 99231 SBSQ HOSP IP/OBS SF/LOW 25: CPT | Performed by: INTERNAL MEDICINE

## 2023-04-05 RX ORDER — GLYCOPYRROLATE 1 MG/1
1 TABLET ORAL 3 TIMES DAILY
Status: DISCONTINUED | OUTPATIENT
Start: 2023-04-05 | End: 2023-04-11 | Stop reason: HOSPADM

## 2023-04-05 RX ADMIN — AMOXICILLIN AND CLAVULANATE POTASSIUM 400 MG: 400; 57 POWDER, FOR SUSPENSION ORAL at 21:45

## 2023-04-05 RX ADMIN — CHLORHEXIDINE GLUCONATE 0.12% ORAL RINSE 15 ML: 1.2 LIQUID ORAL at 21:45

## 2023-04-05 RX ADMIN — Medication 10 ML: at 21:46

## 2023-04-05 RX ADMIN — HEPARIN SODIUM 5000 UNITS: 5000 INJECTION, SOLUTION INTRAVENOUS; SUBCUTANEOUS at 15:20

## 2023-04-05 RX ADMIN — HEPARIN SODIUM 5000 UNITS: 5000 INJECTION, SOLUTION INTRAVENOUS; SUBCUTANEOUS at 21:46

## 2023-04-05 RX ADMIN — CARVEDILOL 12.5 MG: 12.5 TABLET, FILM COATED ORAL at 21:46

## 2023-04-05 RX ADMIN — Medication 30 ML: at 09:16

## 2023-04-05 RX ADMIN — LATANOPROST 1 DROP: 50 SOLUTION OPHTHALMIC at 09:16

## 2023-04-05 RX ADMIN — RISPERIDONE 1 MG: 1 TABLET ORAL at 21:46

## 2023-04-05 RX ADMIN — HEPARIN SODIUM 5000 UNITS: 5000 INJECTION, SOLUTION INTRAVENOUS; SUBCUTANEOUS at 05:24

## 2023-04-05 RX ADMIN — GLYCOPYRROLATE 1 MG: 1 TABLET ORAL at 09:15

## 2023-04-05 RX ADMIN — LANSOPRAZOLE 15 MG: KIT at 05:24

## 2023-04-05 RX ADMIN — GLYCOPYRROLATE 1 MG: 1 TABLET ORAL at 15:21

## 2023-04-05 RX ADMIN — CHLORHEXIDINE GLUCONATE 0.12% ORAL RINSE 15 ML: 1.2 LIQUID ORAL at 09:15

## 2023-04-05 RX ADMIN — CARVEDILOL 12.5 MG: 12.5 TABLET, FILM COATED ORAL at 09:15

## 2023-04-05 RX ADMIN — Medication 10 ML: at 09:54

## 2023-04-05 RX ADMIN — AMOXICILLIN AND CLAVULANATE POTASSIUM 400 MG: 400; 57 POWDER, FOR SUSPENSION ORAL at 09:15

## 2023-04-05 RX ADMIN — INSULIN HUMAN 2 UNITS: 100 INJECTION, SOLUTION PARENTERAL at 12:03

## 2023-04-05 RX ADMIN — VALSARTAN 80 MG: 160 TABLET, FILM COATED ORAL at 09:15

## 2023-04-05 RX ADMIN — GLYCOPYRROLATE 1 MG: 1 TABLET ORAL at 21:46

## 2023-04-05 RX ADMIN — Medication 30 ML: at 21:45

## 2023-04-05 NOTE — PROGRESS NOTES
[facility] Medicine Services  PROGRESS NOTE    Patient Name: Sandro Junior  : 1955  MRN: [id number]    Date of Admission: 3/16/2023  Primary Care Physician: Dora Loya PA    Subjective   Subjective     CC:  F/u epiglottitis    HPI:  No new issues overnight.  ROS:  Gen- No fevers, chills  CV- No chest pain, palpitations  Resp- No cough, dyspnea  GI- No N/V/D, abd pain    Objective   Objective     Vital Signs:   Temp:  [97.6 °F (36.4 °C)-98.1 °F (36.7 °C)] 97.6 °F (36.4 °C)  Heart Rate:  [57-79] 64  Resp:  [18-19] 18  BP: (107-156)/(71-83) 135/74     Physical Exam:  Constitutional: No acute distress, awake, alert  HENT: NCAT, mucous membranes moist  Respiratory: Clear to auscultation bilaterally, tracheostomy in place  Cardiovascular: RRR, no murmurs, rubs, or gallops  Gastrointestinal: Positive bowel sounds, soft, nontender, nondistended, PEG in place  Musculoskeletal: No bilateral ankle edema  Psychiatric: agitated  Neurologic: Oriented x 3, speech clear  Skin: No rashes      Results Reviewed:  LAB RESULTS:      Lab 23   WBC  --  7.59   HEMOGLOBIN 12.6* 12.3*   HEMATOCRIT 39.3 38.5   PLATELETS  --  408   NEUTROS ABS  --  4.10   IMMATURE GRANS (ABS)  --  0.04   LYMPHS ABS  --  2.21   MONOS ABS  --  1.00*   EOS ABS  --  0.17   MCV  --  100.0*         Lab 2328 23  033   SODIUM 137  --  132*   POTASSIUM 4.0 4.3 4.0   CHLORIDE 102  --  102   CO2 26.0  --  23.0   ANION GAP 9.0  --  7.0   BUN 18  --  16   CREATININE 0.74*  --  0.59*   EGFR 99.9  --  107.0   GLUCOSE 167*  --  166*   CALCIUM 9.8  --  9.5   MAGNESIUM 2.1 2.4  --    PHOSPHORUS 2.9 3.3  --          Lab 23  0728 23  0338   TOTAL PROTEIN 8.0 8.1   ALBUMIN 3.7 3.3*   GLOBULIN 4.3 4.8   ALT (SGPT) 19 13   AST (SGOT) 20 16   BILIRUBIN 0.4 0.4   ALK PHOS 50 55             Lab 23  0728   CHOLESTEROL 132   TRIGLYCERIDES 174*             Brief Urine  Lab Results  (Last result in the past 365 days)      Color   Clarity   Blood   Leuk Est   Nitrite   Protein   CREAT   Urine HCG        03/16/23 2227 Yellow   Clear   Negative   Negative   Negative   30 mg/dL (1+)                 Microbiology Results Abnormal     Procedure Component Value - Date/Time    Blood Culture - Blood, Arm, Left [[id number]]  (Normal) Collected: 03/20/23 1438    Lab Status: Final result Specimen: Blood from Arm, Left Updated: 03/25/23 1501     Blood Culture No growth at 5 days    Blood Culture - Blood, Hand, Right [[id number]]  (Normal) Collected: 03/16/23 2225    Lab Status: Final result Specimen: Blood from Hand, Right Updated: 03/21/23 2246     Blood Culture No growth at 5 days    MRSA Screen, PCR (Inpatient) - Swab, Nares [[id number]]  (Normal) Collected: 03/17/23 0933    Lab Status: Final result Specimen: Swab from Nares Updated: 03/17/23 1200     MRSA PCR Negative    Narrative:      The negative predictive value of this diagnostic test is high and should only be used to consider de-escalating anti-MRSA therapy. A positive result may indicate colonization with MRSA and must be correlated clinically.  MRSA Negative    COVID PRE-OP / PRE-PROCEDURE SCREENING ORDER (NO ISOLATION) - Swab, Nasopharynx [[id number]]  (Normal) Collected: 03/16/23 2108    Lab Status: Final result Specimen: Swab from Nasopharynx Updated: 03/16/23 2234    Narrative:      The following orders were created for panel order COVID PRE-OP / PRE-PROCEDURE SCREENING ORDER (NO ISOLATION) - Swab, Nasopharynx.  Procedure                               Abnormality         Status                     ---------                               -----------         ------                     Respiratory Panel PCR w/...[[id number]]  Normal              Final result                 Please view results for these tests on the individual orders.    Respiratory Panel PCR w/COVID-19(SARS-CoV-2) CALLY/WILLIAM/RAMONITA/PAD/COR/MAD/CLARK In-House, NP Swab in  UTM/VTM, 3-4 HR TAT - Swab, Nasopharynx [[id number]]  (Normal) Collected: 03/16/23 2108    Lab Status: Final result Specimen: Swab from Nasopharynx Updated: 03/16/23 2234     ADENOVIRUS, PCR Not Detected     Coronavirus 229E Not Detected     Coronavirus HKU1 Not Detected     Coronavirus NL63 Not Detected     Coronavirus OC43 Not Detected     COVID19 Not Detected     Human Metapneumovirus Not Detected     Human Rhinovirus/Enterovirus Not Detected     Influenza A PCR Not Detected     Influenza B PCR Not Detected     Parainfluenza Virus 1 Not Detected     Parainfluenza Virus 2 Not Detected     Parainfluenza Virus 3 Not Detected     Parainfluenza Virus 4 Not Detected     RSV, PCR Not Detected     Bordetella pertussis pcr Not Detected     Bordetella parapertussis PCR Not Detected     Chlamydophila pneumoniae PCR Not Detected     Mycoplasma pneumo by PCR Not Detected    Narrative:      In the setting of a positive respiratory panel with a viral infection PLUS a negative procalcitonin without other underlying concern for bacterial infection, consider observing off antibiotics or discontinuation of antibiotics and continue supportive care. If the respiratory panel is positive for atypical bacterial infection (Bordetella pertussis, Chlamydophila pneumoniae, or Mycoplasma pneumoniae), consider antibiotic de-escalation to target atypical bacterial infection.          SLP FEES - Fiberoptic Endo Eval Swallow    Result Date: 4/4/2023  This procedure was auto-finalized with no dictation required.      Results for orders placed during the hospital encounter of 03/16/23    Adult Transthoracic Echo Complete W/ Cont if Necessary Per Protocol    Interpretation Summary  •  Left ventricular systolic function is normal. Calculated left ventricular 3D EF = 56% Left ventricular ejection fraction appears to be 51 - 55%.  •  Left ventricular diastolic function was normal.  •  Estimated right ventricular systolic pressure from tricuspid  regurgitation is normal (<35 mmHg).      Current medications:  Scheduled Meds:amoxicillin-clavulanate, 400 mg, Per PEG Tube, Q12H  carvedilol, 12.5 mg, Per PEG Tube, Q12H  chlorhexidine, 15 mL, Mouth/Throat, Q12H  glycopyrrolate, 1 mg, Per PEG Tube, TID  heparin (porcine), 5,000 Units, Subcutaneous, Q8H  insulin regular, 0-7 Units, Subcutaneous, Q6H  lansoprazole, 15 mg, Per PEG Tube, Q AM  latanoprost, 1 drop, Both Eyes, Daily  polyethylene glycol, 17 g, Per PEG Tube, Daily  ProSource No Carb, 30 mL, Per PEG Tube, BID  risperiDONE, 1 mg, Per PEG Tube, Nightly  senna-docusate sodium, 2 tablet, Per PEG Tube, BID  sodium chloride, 10 mL, Intravenous, Q12H  valsartan, 80 mg, Per PEG Tube, Q24H      Continuous Infusions:   PRN Meds:.•  acetaminophen  •  [DISCONTINUED] acetaminophen **OR** acetaminophen  •  albuterol  •  senna-docusate sodium **AND** polyethylene glycol **AND** [DISCONTINUED] bisacodyl **AND** bisacodyl  •  dextrose  •  glucagon (human recombinant)  •  hydrALAZINE  •  hydrOXYzine  •  Magnesium Standard Dose Replacement - Follow Nurse / BPA Driven Protocol  •  melatonin  •  Phosphorus Replacement - Follow Nurse / BPA Driven Protocol  •  Potassium Replacement - Follow Nurse / BPA Driven Protocol  •  sodium chloride  •  sodium chloride    Assessment & Plan   Assessment & Plan     Active Hospital Problems    Diagnosis  POA   • **Acute strep pyogenes (group A strep) epiglottitis with airway obstruction  [J05.11]  Yes   • Acute respiratory failure [J96.00]  Yes   • Essential hypertension [I10]  Yes      Resolved Hospital Problems   No resolved problems to display.        Brief Hospital Course to date:  Sandro Junior is a 68 y.o. male with PMHx significant for hypertension who presented to [facility] on 3/16 with sore throat and shortness of breath. Upon arrival to the ED he was noted to have significant stridor, tripoding and drooling. He was treated with steroids, epinephrine and H2 blockers but despite this he  ultimately required intubation and admission to the ICU. His blood cultures grew Srep pyogenes. He underwent bronch on 3/20 which showed persistent edematous changes in the posterior oropharynx with copious mucopurulent secretions and repeat CT neck 3/21/2023 revealed worsening edema but no sign of abscess. ID was consulted and he was treated with continuous PCN infusion. He was not deemed safe to extubate, therefore he underwent trach and PEG on 3/23. He was transferred to our service on 4/1.     Acute Group A Strep Epiglottitis   Airway Obstruction s/p Mechanical Ventilation, Trach/PEG 3/23  Strep Pyogenes Bacteremia with Sepsis  - Dr. Johnson following, transitioned to Per PEG Augmentin x 7 days  - trach suctioning PRN, tolerating trach collar  - continue tube feedings, SLP consulted  -- added Robinul per SLP recommendations    HTN:  - continue Diovan, Coreg    Anxiety:  - PRN hydroxyzine  - risperdal at bedtime    Family appealing insurance denial.     Expected Discharge Location and Transportation: [place]  Expected Discharge   Expected Discharge Date and Time     Expected Discharge Date Expected Discharge Time    Apr 8, 2023            DVT prophylaxis:  Medical DVT prophylaxis orders are present.     AM-PAC 6 Clicks Score (PT): 20 (04/05/23 0800)    CODE STATUS:   Code Status and Medical Interventions:   Ordered at: 03/16/23 9957     Code Status (Patient has no pulse and is not breathing):    CPR (Attempt to Resuscitate)     Medical Interventions (Patient has pulse or is breathing):    Full Support       Tere Orellana MD  04/05/23

## 2023-04-05 NOTE — PLAN OF CARE
Goal Outcome Evaluation:  Plan of Care Reviewed With: patient        Progress: improving   SLP treatment completed. Will continue to address dysphagia and PMV in treatment. Please see note for further details and recommendations.

## 2023-04-05 NOTE — CASE MANAGEMENT/SOCIAL WORK
Continued Stay Note  [facility]     Patient Name: Sandro Junior  MRN: [id number]  Today's Date: 4/5/2023    Admit Date: 3/16/2023    Plan: update   Discharge Plan     Row Name 04/05/23 1531       Plan    Plan update    Patient/Family in Agreement with Plan yes    Plan Comments Spoke with patient regarding discharge plan and advised insurane appeal is still pending.  PT working with patient.  CM following.  [facility].    Final Discharge Disposition Code 03 - skilled nursing facility (SNF)               Discharge Codes    No documentation.               Expected Discharge Date and Time     Expected Discharge Date Expected Discharge Time    Apr 8, 2023             Rekha Brand RN

## 2023-04-05 NOTE — PLAN OF CARE
Goal Outcome Evaluation:  Plan of Care Reviewed With: patient        Progress: improving  Outcome Evaluation: Pt. able to progress to UE tband TE 15 reps each.  Pt. will benefit from harder resistant green band for some of TE.  Handout issued and reviewed.  Pt. declined OOB activity this session due to recentlly returning to bed.  Noted met LBD goal last session, upgraded goal.  Pt. remains below baseline ADL independence warranting continued skilled OT services. Continue to recommend IRF at this time.

## 2023-04-05 NOTE — THERAPY TREATMENT NOTE
Acute Care - Speech Language Pathology   Swallow Treatment Note  [place]   + PMV therapy note     Patient Name: Sandro Junior  : 1955  MRN: [id number]  Today's Date: 2023               Admit Date: 3/16/2023    Visit Dx:     ICD-10-CM ICD-9-CM   1. Respiratory distress  R06.03 786.09   2. Tachycardia  R00.0 785.0   3. Airway compromise  J98.8 519.8   4. Acute epiglottitis with airway obstruction - probable  J05.11 464.31   5. Essential hypertension  I10 401.9   6. Voice disturbance  R49.9 784.40   7. Oropharyngeal dysphagia  R13.12 787.22     Patient Active Problem List   Diagnosis   • Essential hypertension   • Chronic pain   • Osteoarthritis of knee   • Prolonged depressive adjustment reaction   • Acute prostatitis   • Atopic rhinitis   • Erectile dysfunction of nonorganic origin   • Glaucoma suspect   • Onychomycosis of toenail   • Malignant neoplasm of rectum   • Seborrheic eczema   • Keloid scar   • Pain of right lower extremity   • Esophageal reflux   • Acute respiratory failure   • Acute strep pyogenes (group A strep) epiglottitis with airway obstruction      Past Medical History:   Diagnosis Date   • Epidermal inclusion cyst    • Esophageal candidiasis    • Keloid scar    • Rectal cancer    • Seborrheic dermatitis      Past Surgical History:   Procedure Laterality Date   • COLECTOMY PARTIAL / TOTAL     • KNEE SURGERY      Left knee meniscal tear repair   • TOOTH EXTRACTION  2016 teeth   • TOTAL KNEE ARTHROPLASTY Right 2016   • TRACHEOSTOMY AND PEG TUBE INSERTION N/A 3/23/2023    Procedure: TRACHEOSTOMY AND PERCUTANEOUS ENDOSCOPIC GASTROSTOMY TUBE INSERTION;  Surgeon: Néstor Jerome MD;  Location: [facility];  Service: General;  Laterality: N/A;       SLP Recommendation and Plan  SLP Swallowing Diagnosis: severe, pharyngeal dysphagia (23 0955)  SLP Diet Recommendation: NPO, long term alternate methods of nutrition/hydration (okay for 3-4 ice chips per hour) (23  0900)  Recommended Precautions and Strategies: general aspiration precautions (04/05/23 0900)  SLP Rec. for Method of Medication Administration: meds via alternate route (04/05/23 0900)     Monitor for Signs of Aspiration: yes, notify SLP if any concerns (04/05/23 0900)     Swallow Criteria for Skilled Therapeutic Interventions Met: demonstrates skilled criteria (04/04/23 0955)  Anticipated Discharge Disposition (SLP): inpatient rehabilitation facility, anticipate therapy at next level of care (04/05/23 0900)  Rehab Potential/Prognosis, Swallowing: good, to achieve stated therapy goals (04/04/23 0955)  Therapy Frequency (Swallow): 5 days per week (04/05/23 0900)  Predicted Duration Therapy Intervention (Days): until discharge (04/05/23 0900)        Daily Summary of Progress (SLP): progress toward functional goals as expected (04/05/23 0900)               Treatment Assessment (SLP): continued, clinical signs of, suspected, aspiration, difficulty voicing due to tracheostomy (04/05/23 0900)  Treatment Assessment Comments (SLP): Pt provided w/ dysphagia exercises as well as mirror to improve independent use of PMV. Continue to address to improve independence as well as improve dysphgaia. Pt verbalized understanding of all information discussed (04/05/23 0900)  Plan for Continued Treatment (SLP): continue treatment per plan of care (04/05/23 0900)         Plan of Care Reviewed With: patient  Progress: improving      SWALLOW EVALUATION (last 72 hours)     SLP Adult Swallow Evaluation     Row Name 04/05/23 0900       Rehab Evaluation    Document Type therapy note (daily note)  -CJ    Subjective Information no complaints  -CJ    Patient Observations alert;cooperative  -CJ    Patient/Family/Caregiver Comments/Observations no family present  -CJ    Patient Effort good  -CJ    Symptoms Noted During/After Treatment none  -CJ       General Information    Patient Profile Reviewed --    Pertinent History Of Current Problem --     Current Method of Nutrition --    Precautions/Limitations, Vision --    Precautions/Limitations, Hearing --    Prior Level of Function-Communication --    Prior Level of Function-Swallowing --    Plans/Goals Discussed with --    Barriers to Rehab --    Patient's Goals for Discharge --       Pain    Additional Documentation Pain Scale: FACES Pre/Post-Treatment (Group)  -       Pain Scale: FACES Pre/Post-Treatment    Pain: FACES Scale, Pretreatment 0-->no hurt  -    Posttreatment Pain Rating 0-->no hurt  -          SLP Treatment Clinical Impressions    Treatment Assessment (SLP) continued;clinical signs of;suspected;aspiration;difficulty voicing due to tracheostomy  -    Treatment Assessment Comments (SLP) Pt provided w/ dysphagia exercises as well as mirror to improve independent use of PMV. Continue to address to improve independence as well as improve dysphgaia. Pt verbalized understanding of all information discussed  -    Daily Summary of Progress (SLP) progress toward functional goals as expected  -    Plan for Continued Treatment (SLP) continue treatment per plan of care  -    Care Plan Review evaluation/treatment results reviewed;care plan/treatment goals reviewed;patient/other agree to care plan  -       Recommendations    Therapy Frequency (Swallow) 5 days per week  -    Predicted Duration Therapy Intervention (Days) until discharge  -    SLP Diet Recommendation NPO;long term alternate methods of nutrition/hydration  okay for 3-4 ice chips per hour  -    Recommended Diagnostics --    Recommended Precautions and Strategies general aspiration precautions  -    Oral Care Recommendations Oral Care BID/PRN;Suction toothbrush  -    SLP Rec. for Method of Medication Administration meds via alternate route  -    Monitor for Signs of Aspiration yes;notify SLP if any concerns  -    Anticipated Discharge Disposition (SLP) inpatient rehabilitation facility;anticipate therapy at next level  of care  -CJ          User Key  (r) = Recorded By, (t) = Taken By, (c) = Cosigned By    Initials Name Effective Dates    AC Shelley Iarheta, MS CCC-SLP 02/03/23 -     CJ Danii Llanos, MS CCC-SLP 06/16/21 -                 EDUCATION  The patient has been educated in the following areas:   Speaking Valve Dysphagia (Swallowing Impairment) Oral Care/Hydration.        SLP GOALS     Row Name 04/05/23 0900       (LTG) Patient will demonstrate functional swallow for    Diet Texture (Demonstrate functional swallow) soft to chew (chopped) textures  -CJ    Liquid viscosity (Demonstrate functional swallow) nectar/ mildly thick liquids  -CJ    Harmony (Demonstrate functional swallow) with minimal cues (75-90% accuracy)  -CJ    Time Frame (Demonstrate functional swallow) by discharge  -CJ    Progress/Outcomes (Demonstrate functional swallow) continuing progress toward goal  -CJ       (STG) Patient will tolerate therapeutic trials of    Consistencies Trialed (Tolerate therapeutic trials) thin liquids  -CJ    Desired Outcome (Tolerate therapeutic trials) without signs/symptoms of aspiration;without signs of distress  -CJ    Harmony (Tolerate therapeutic trials) with minimal cues (75-90% accuracy)  -CJ    Time Frame (Tolerate therapeutic trials) 1 week  -CJ    Progress/Outcomes (Tolerate therapeutic trials) continuing progress toward goal  -CJ    Comment (Tolerate therapeutic trials) throat clear x2 w/ ice  -CJ       (STG) Pharyngeal Strengthening Exercise Goal 1 (SLP)    Activity (Pharyngeal Strengthening Goal 1, SLP) increase timing;increase superior movement of the hyolaryngeal complex;increase anterior movement of the hyolaryngeal complex;increase closure at entrance to airway/closure of airway at glottis;increase squeeze/positive pressure generation;increase tongue base retraction  -CJ    Increase Timing prepping - 3 second prep or suck swallow or 3-step swallow  -CJ    Increase Superior Movement of the  Hyolaryngeal Complex effortful pitch glide (falsetto + pharyngeal squeeze)  -CJ    Increase Closure at Entrance to Airway/Closure of Airway at Glottis supraglottic swallow;breath hold exercises  -CJ    Increase Squeeze/Positive Pressure Generation hard effortful swallow  -CJ    Increase Tongue Base Retraction franklin  -CJ    [place]/Accuracy (Pharyngeal Strengthening Goal 1, SLP) with minimal cues (75-90% accuracy)  -CJ    Time Frame (Pharyngeal Strengthening Goal 1, SLP) short term goal (STG)  -CJ    Progress/Outcomes (Pharyngeal Strengthening Goal 1, SLP) continuing progress toward goal  -CJ    Comment (Pharyngeal Strengthening Goal 1, SLP) reviewed and completed all exercises x5; printout left at bedside  -CJ       Patient will demonstrate functional communication skills for return to discharge environment     [place] Independently  -CJ    Time frame by discharge  -CJ    Progress/Outcomes continuing progress toward goal  -CJ       Tolerate Speaking Valve Placement Goal 1 (SLP)    Tolerate Speaking Valve Placement Goal 1 (SLP) --    Time Frame (Tolerate Speaking Valve Placement Goal 1, SLP) --    Progress (Tolerate Speaking Valve Placement Goal 1, SLP) --    Progress/Outcomes (Tolerate Speaking Valve Placement Goal 1, SLP) --    Comment (Tolerate Speaking Valve Placement Goal 1, SLP) --       Audible Speech with Speaking Valve Goal 1 (SLP)    Audible Speech Goal 1 (SLP) 3 feet;background noise;90%;with minimal cues (75-90%)  -CJ    Time Frame (Audible Speech Goal 1, SLP) short term goal (STG)  -CJ    Progress (Audible Speech Goal 1, SLP) 90%;with minimal cues (75-90%)  -CJ    Progress/Outcomes (Audible Speech Goal 1, SLP) goal met  -CJ    Comment (Audible Speech Goal 1, SLP) --       Independent Use of Speaking Valve Goal 1 (SLP)    Independent Use of Speaking Valve Goal 1 (SLP) place speak valve;remove speak valve;state precautions of use;100%;independently (over 90% accuracy)  -CJ    Time Frame  (Independent use of Speaking Valve Goal 1, SLP) short term goal (STG)  -CJ    Progress (Independent use of Speaking Valve Goal 1, SLP) 50%;with 1:1 supervision/constant cues  -CJ    Progress/Outcomes (Independent use of Speaking Valve Goal 1, SLP) continuing progress toward goal  -CJ    Comment (Independent use of Speaking Valve Goal 1, SLP) difficulty placing PMV, provided mirror for feedback and modeled hand over hand  -CJ          User Key  (r) = Recorded By, (t) = Taken By, (c) = Cosigned By    Initials Name Provider Type    Shelley Godinez, MS CCC-SLP Speech and Language Pathologist    Danii Russo, MS CCC-SLP Speech and Language Pathologist                   Time Calculation:    Time Calculation- SLP     Row Name 04/05/23 0947             Time Calculation- SLP    SLP Start Time 0900  -      SLP Received On 04/05/23  -         Untimed Charges    43206-FH Treatment/ST Modification Prosth Aug Alter  24  -      77969-GF Treatment Swallow Minutes 25  -CJ         Total Minutes    Untimed Charges Total Minutes 49  -CJ       Total Minutes 49  -CJ            User Key  (r) = Recorded By, (t) = Taken By, (c) = Cosigned By    Initials Name Provider Type    Danii Russo, MS CCC-SLP Speech and Language Pathologist                Therapy Charges for Today     Code Description Service Date Service Provider Modifiers Qty    62654040022 HC ST FIBEROPTIC ENDO EVAL SWALL 6 4/4/2023 Danii Llanos MS CCC-SLP GN 1    75803668310 HC ST TREATMENT SPEECH 2 4/5/2023 Danii Llanos MS CCC-SLP GN 1    49008819487 HC ST TREATMENT SWALLOW 2 4/5/2023 Danii Llanos MS CCC-SLP GN 1               Danii Llanos MS CCC-SLP  4/5/2023

## 2023-04-05 NOTE — PLAN OF CARE
Goal Outcome Evaluation:            NSR. No c/o pain. TF bolus administered per order @0600 through PEG. Trach in place, obturator at bedside. Trach care provided. Family appeal to [facility] pending.

## 2023-04-05 NOTE — PROGRESS NOTES
1       Sandro Junior  1955  [id number]  4/5/2023    CC: Severe sore throat with shortness of breath    Sandro Junior is a 68 y.o. male here for adult epiglottitis, threatened airway, emergent tracheostomy, group A beta-hemolytic streptococcal bacteremia.      Past medical history:  Past Medical History:   Diagnosis Date   • Epidermal inclusion cyst    • Esophageal candidiasis    • Keloid scar    • Rectal cancer    • Seborrheic dermatitis        Medications:   Current Facility-Administered Medications:   •  acetaminophen (TYLENOL) 160 MG/5ML solution 650 mg, 650 mg, Per PEG Tube, Q6H PRN, Monika Louis MD, 650 mg at 03/28/23 1714  •  [DISCONTINUED] acetaminophen (TYLENOL) tablet 650 mg, 650 mg, Nasogastric, Q4H PRN, 650 mg at 03/21/23 0253 **OR** acetaminophen (TYLENOL) suppository 650 mg, 650 mg, Rectal, Q4H PRN, Monika Louis MD  •  albuterol (PROVENTIL) nebulizer solution 0.083% 2.5 mg/3mL, 2.5 mg, Nebulization, Q6H PRN, Monika Louis MD  •  amoxicillin-clavulanate (AUGMENTIN) 400-57 MG/5ML suspension 400 mg, 400 mg, Per PEG Tube, Q12H, Monika Louis MD, 400 mg at 04/04/23 2129  •  sennosides-docusate (PERICOLACE) 8.6-50 MG per tablet 2 tablet, 2 tablet, Per PEG Tube, BID, 2 tablet at 04/02/23 0956 **AND** polyethylene glycol (MIRALAX) packet 17 g, 17 g, Per PEG Tube, Daily PRN **AND** [DISCONTINUED] bisacodyl (DULCOLAX) EC tablet 5 mg, 5 mg, Oral, Daily PRN **AND** bisacodyl (DULCOLAX) suppository 10 mg, 10 mg, Rectal, Daily PRN, Monika Louis MD  •  carvedilol (COREG) tablet 12.5 mg, 12.5 mg, Per PEG Tube, Q12H, Monika Louis MD, 12.5 mg at 04/04/23 2129  •  chlorhexidine (PERIDEX) 0.12 % solution 15 mL, 15 mL, Mouth/Throat, Q12H, Monika Louis MD, 15 mL at 04/04/23 2129  •  dextrose (D50W) (25 g/50 mL) IV injection 25 g, 25 g, Intravenous, Q15 Min PRN, Monika Louis MD  •  glucagon (GLUCAGEN) injection 1 mg, 1 mg, Intramuscular,  Q15 Min PRN, Monika Louis MD  •  glycopyrrolate (ROBINUL) tablet 1 mg, 1 mg, Oral, TID, Tere Orellana MD, 1 mg at 04/04/23 2129  •  heparin (porcine) 5000 UNIT/ML injection 5,000 Units, 5,000 Units, Subcutaneous, Q8H, Monika Louis MD, 5,000 Units at 04/05/23 0524  •  hydrALAZINE (APRESOLINE) injection 20 mg, 20 mg, Intravenous, Q4H PRN, Monika Louis MD, 20 mg at 03/29/23 2051  •  hydrOXYzine (ATARAX) 10 MG/5ML syrup 25 mg, 25 mg, Per PEG Tube, Q8H PRN, Monika Louis MD, 25 mg at 03/31/23 1040  •  insulin regular (humuLIN R,novoLIN R) injection 0-7 Units, 0-7 Units, Subcutaneous, Q6H, Monika Louis MD, 2 Units at 04/04/23 1817  •  lansoprazole (FIRST) oral suspension 15 mg, 15 mg, Per PEG Tube, Q AM, Monika Louis MD, 15 mg at 04/05/23 0524  •  latanoprost (XALATAN) 0.005 % ophthalmic solution 1 drop, 1 drop, Both Eyes, Daily, Monika Louis MD, 1 drop at 04/04/23 0855  •  Magnesium Standard Dose Replacement - Initiate Nurse / BPA Driven Protocol, , Does not apply, PRN, Monika Louis MD  •  melatonin tablet 5 mg, 5 mg, Per G Tube, Nightly PRN, Olga Perez, APRN, 5 mg at 03/31/23 2254  •  Phosphorus Replacement - Initiate Nurse / BPA Driven Protocol, , Does not apply, PRN, Monika Louis MD  •  polyethylene glycol (MIRALAX) packet 17 g, 17 g, Per PEG Tube, Daily, Monika Louis MD, 17 g at 04/02/23 0957  •  Potassium Replacement - Initiate Nurse / BPA Driven Protocol, , Does not apply, PRN, Monika Louis MD  •  ProSource No Carb oral solution 30 mL, 30 mL, Per PEG Tube, BID, Monika Louis MD, 30 mL at 04/04/23 2131  •  risperiDONE (risperDAL) tablet 1 mg, 1 mg, Per PEG Tube, Nightly, Monika Louis MD, 1 mg at 04/04/23 2129  •  sodium chloride 0.9 % flush 10 mL, 10 mL, Intravenous, Q12H, Monika Louis MD, 10 mL at 04/04/23 2131  •  sodium chloride 0.9 % flush 10 mL, 10 mL,  "Intravenous, PRN, Monika Louis MD  •  sodium chloride 0.9 % flush 20 mL, 20 mL, Intravenous, PRN, Monika Louis MD  •  valsartan (DIOVAN) tablet 80 mg, 80 mg, Per PEG Tube, Q24H, Monika Louis MD, 80 mg at 04/04/23 0855  Antibiotics:  Anti-Infectives (From admission, onward)    Ordered     Dose/Rate Route Frequency Start Stop    03/31/23 0754  amoxicillin-clavulanate (AUGMENTIN) 400-57 MG/5ML suspension 400 mg        Note to Pharmacy: Via PEG   Ordering Provider: Monika Louis MD    400 mg Per PEG Tube Every 12 Hours Scheduled 03/31/23 0900 04/07/23 0859    03/16/23 2151  vancomycin 1500 mg/500 mL 0.9% NS IVPB ([facility])        Ordering Provider: Anand Eaton MD    20 mg/kg × 79.4 kg Intravenous Once 03/16/23 2153 03/16/23 2323          Allergies:  has No Known Allergies.    Review of Systems: All other reviewed and negative except as per HPI    Blood pressure 132/77, pulse 67, temperature 98 °F (36.7 °C), temperature source Oral, resp. rate 18, height 185.4 cm (73\"), weight 77.6 kg (171 lb), SpO2 93 %.  GENERAL: Awake and alert, in no acute distress.  Afebrile and hemodynamically stable.  Minimal cough.  Scant tracheostomy sputum.  Swallowing study yesterday.  Taking ice chips and liquids.  Walking in hallways.  HEENT: Oropharynx without thrush. Sinuses nontender. Dentition in marginal repair. No cervical adenopathy. No carotid bruits/ jugular venous distention.   EYES: PERRL. No conjunctival injection. No icterus. EOMI.  LYMPHATICS: No lymphadenopathy of the neck or axillary or inguinal regions.   HEART: No murmur, gallop, or pericardial friction rub.  Right antecubital PICC exit site without phlebitis.    LUNGS: Tracheostomy tube.  Speaks with Passy-Kacie valve.  Clear to auscultation anteriorly. No percussion dullness.   ABDOMEN: Soft, nontender, nondistended. No appreciable HSM.  No peritoneal findings rebound or guarding.  SKIN: Warm and dry without cutaneous eruptions. " No embolic stigmata.   PSYCHIATRIC: Mental status lucid. Cranial nerve function intact.       DIAGNOSTICS:  Lab Results   Component Value Date    WBC 7.59 03/30/2023    HGB 12.6 (L) 03/31/2023    HCT 39.3 03/31/2023     03/30/2023     Lab Results   Component Value Date    CRP 1.09 (H) 03/27/2023     Lab Results   Component Value Date    SEDRATE >130 (H) 03/26/2023     Lab Results   Component Value Date    GLUCOSE 167 (H) 04/04/2023    BUN 18 04/04/2023    CREATININE 0.74 (L) 04/04/2023    EGFRIFAFRI 109 01/10/2022    BCR 24.3 04/04/2023    CO2 26.0 04/04/2023    CALCIUM 9.8 04/04/2023    ALBUMIN 3.7 04/04/2023    AST 20 04/04/2023    ALT 19 04/04/2023       Microbiology: Bio fire respiratory pathogen's panel on admission negative for COVID-19 and influenza A/B.  2 of 3 admission blood cultures with group A beta-hemolytic streptococci/favorable EDNA's to penicillin and ceftriaxone.  MRSA nasopharyngeal PCR negative.    RADIOLOGY:  Imaging Results (Last 72 Hours)     Procedure Component Value Units Date/Time    SLP FEES - Fiberoptic Endo Eval Swallow [[id number]] Resulted: 04/04/23 1014     Updated: 04/04/23 1014    Narrative:      This procedure was auto-finalized with no dictation required.          Assessment and Plan: Adult epiglottitis.  Severe sepsis with group A beta-hemolytic streptococci.  Leukocytosis.  Lactic acidosis.  Elevated procalcitonin.  Threatened airway with difficult intubation/tracheostomy.  Possible beta-lactam induced drug fever with continuous infusion penicillin.  Tolerance of intravenous ceftriaxone/currently on amoxicillin-clavulanate suspension without difficulty.  Maximum temperature over 24 hours 98.7.  Currently 98.0.  Pulse 67, respiratory rate 18, blood pressure 132/77 mmHg with an O2 saturation of 93% on room air.  BUN/creatinine 18/0.74.  Liver function studies within normal limits.  Last WBC 7.6 with 54% segmented neutrophils.  No new culture data.  Acceptable for transfer to  [facility] at any point time.  I will follow there.  Anticipate an additional 7 days of amoxicillin-clavulanate suspension with probiotics.      Zana Johnson MD  4/5/2023

## 2023-04-05 NOTE — PLAN OF CARE
Goal Outcome Evaluation:  Plan of Care Reviewed With: patient        Progress: improving  Outcome Evaluation: PT progress note completed.  Pt with good progress and has met majority of therapy goals this period.  Improved performance and toleration to activity today noted by improved level of assist with STS and ambulation, increased ambulation distance, and progressed to stair navigation.  STS with SBA.  Ambulated 160+400ft with SPC and CGA progressing to SBA.  Ascended/descended 12 steps with CGA and use of handrail and SPC.  Rec use of SPC for ambulation at this time for improved balance and support.  Con to demonstrate mild decreased functional endurance, gait instability, and weakness compared to baseline level of function.  Con to progress pt as able per PT POC and toward updated goals.  Con to recommend IPR upon d/c at this time d/t pt not having family support during the day at home.  Pt with significant improvement in performance during today's session and will monitor progress closely for consistency; may benefit from HWA and OP PT in near future.

## 2023-04-05 NOTE — THERAPY TREATMENT NOTE
Patient Name: Sandro Junior  : 1955    MRN: [id number]                              Today's Date: 2023       Admit Date: 3/16/2023    Visit Dx:     ICD-10-CM ICD-9-CM   1. Respiratory distress  R06.03 786.09   2. Tachycardia  R00.0 785.0   3. Airway compromise  J98.8 519.8   4. Acute epiglottitis with airway obstruction - probable  J05.11 464.31   5. Essential hypertension  I10 401.9   6. Voice disturbance  R49.9 784.40   7. Oropharyngeal dysphagia  R13.12 787.22     Patient Active Problem List   Diagnosis   • Essential hypertension   • Chronic pain   • Osteoarthritis of knee   • Prolonged depressive adjustment reaction   • Acute prostatitis   • Atopic rhinitis   • Erectile dysfunction of nonorganic origin   • Glaucoma suspect   • Onychomycosis of toenail   • Malignant neoplasm of rectum   • Seborrheic eczema   • Keloid scar   • Pain of right lower extremity   • Esophageal reflux   • Acute respiratory failure   • Acute strep pyogenes (group A strep) epiglottitis with airway obstruction      Past Medical History:   Diagnosis Date   • Epidermal inclusion cyst    • Esophageal candidiasis    • Keloid scar    • Rectal cancer    • Seborrheic dermatitis      Past Surgical History:   Procedure Laterality Date   • COLECTOMY PARTIAL / TOTAL     • KNEE SURGERY      Left knee meniscal tear repair   • TOOTH EXTRACTION  2016 teeth   • TOTAL KNEE ARTHROPLASTY Right 2016   • TRACHEOSTOMY AND PEG TUBE INSERTION N/A 3/23/2023    Procedure: TRACHEOSTOMY AND PERCUTANEOUS ENDOSCOPIC GASTROSTOMY TUBE INSERTION;  Surgeon: Néstor Jerome MD;  [facility];  Service: General;  Laterality: N/A;      General Information     Row Name 23 1034          OT Time and Intention    Document Type progress note/recertification  -SAVANNAH     Mode of Treatment individual therapy;occupational therapy  -SAVANNAH     Row Name 23 1034          General Information    Patient Profile Reviewed yes  -SAVANNAH     Existing  Precautions/Restrictions fall;other (see comments)  PEG/Trach, PMV  -SAVANNAH     Barriers to Rehab medically complex  -SAVANNAH     Row Name 04/05/23 1034          Cognition    Orientation Status (Cognition) oriented to;person  other not retested  -SAVANNAH     Row Name 04/05/23 1034          Safety Issues, Functional Mobility    Comment, Safety Issues/Impairments (Mobility) NT this date  -SAVANNAH           User Key  (r) = Recorded By, (t) = Taken By, (c) = Cosigned By    Initials Name Provider Type    Kiarra Calix, OT Occupational Therapist                 Mobility/ADL's     Row Name 04/05/23 1035          Bed Mobility    Comment, (Bed Mobility) pt. declined stating he just got back in bed  -SAVANNAH     Row Name 04/05/23 1035          Transfers    Comment, (Transfers) pt. declined reporting he just returnted to bed  -SAVANNAH     Row Name 04/05/23 1035          Activities of Daily Living    BADL Assessment/Intervention feeding  pt. declined  -SAVANNAH     Row Name 04/05/23 1035          Self-Feeding Assessment/Training    [place] Level (Feeding) scoop food and bring to mouth;independent  -SAVANNAH     Position (Self-Feeding) sitting up in bed  -SAVANNAH     Comment, (Feeding) Pt. eating ice chips.  -SAVANNAH           User Key  (r) = Recorded By, (t) = Taken By, (c) = Cosigned By    Initials Name Provider Type    Kiarra Calix, OT Occupational Therapist               Obj/Interventions     Row Name 04/05/23 1036          Shoulder (Therapeutic Exercise)    Shoulder (Therapeutic Exercise) strengthening exercise  -SAVANNAH     Shoulder Strengthening (Therapeutic Exercise) bilateral;flexion;extension;horizontal aBduction/aDduction;external rotation;red;resistance band;15 repititions;sitting  sitting up in bed, 3 short rest periods grossly 20 seconds each, some assist to stabilize L shoulder for R shoulder extension  -SAVANNAH     Row Name 04/05/23 1036          Elbow/Forearm (Therapeutic Exercise)    Elbow/Forearm (Therapeutic Exercise) strengthening exercise  -SAVANNAH      Elbow/Forearm Strengthening (Therapeutic Exercise) bilateral;flexion;extension;15 repititions;red;resistance band;sitting  handout issued and reviewed for all UE TE  -SAVANNAH     Row Name 04/05/23 1036          Motor Skills    Therapeutic Exercise shoulder;elbow/forearm  -SAVANNAH           User Key  (r) = Recorded By, (t) = Taken By, (c) = Cosigned By    Initials Name Provider Type    Kiarra Calix, OT Occupational Therapist               Goals/Plan     Row Name 04/05/23 1041          Transfer Goal 1 (OT)    Progress/Outcome (Transfer Goal 1, OT) goal ongoing  -SAVANNAH     Row Name 04/05/23 1041          Dressing Goal 1 (OT)    Activity/Device (Dressing Goal 1, OT) lower body dressing  -SAVANNAH     [place]/Cues Needed (Dressing Goal 1, OT) minimum assist (75% or more patient effort)  -SAVANNAH     Time Frame (Dressing Goal 1, OT) long term goal (LTG);10 days  -SAVANNAH     Strategies/Barriers (Dressing Goal 1, OT) pants/undergarment  -SAVANNAH     Progress/Outcome (Dressing Goal 1, OT) goal met;goal revised this date  goal met last session with socks  -SAVANNAH     Row Name 04/05/23 1041          Strength Goal 1 (OT)    Strength Goal 1 (OT) Pt. will tolerate BUE HEp with green thera-band 15 reps each to support ADL progression.  -SAVANNAH     Time Frame (Strength Goal 1, OT) long term goal (LTG);10 days  -SAVANNAH     Progress/Outcome (Strength Goal 1, OT) goal met;goal revised this date  met with red band, progress to green band  -SAVANNAH           User Key  (r) = Recorded By, (t) = Taken By, (c) = Cosigned By    Initials Name Provider Type    Kiarra Calix, OT Occupational Therapist               Clinical Impression     Row Name 04/05/23 1037          Pain Assessment    Pretreatment Pain Rating 0/10 - no pain  -SAVANNAH     Posttreatment Pain Rating 0/10 - no pain  -SAVANNAH     Row Name 04/05/23 1037          Plan of Care Review    Plan of Care Reviewed With patient  -SAVANNAH     Progress improving  -SAVANNAH     Outcome Evaluation Pt. able to progress to UE tband TE 15 reps each.   Pt. will benefit from harder resistant green band for some of TE.  Handout issued and reviewed.  Pt. declined OOB activity this session due to recentlly returning to bed.  Noted met LBD goal last session, upgraded goal.  Pt. remains below baseline ADL independence warranting continued skilled OT services. Continue to recommend IRF at this time.  -SAVANNAH     Row Name 04/05/23 1037          Therapy Assessment/Plan (OT)    Therapy Frequency (OT) daily  -SAVANNAH     Row Name 04/05/23 1037          Therapy Plan Review/Discharge Plan (OT)    Anticipated Discharge Disposition (OT) inpatient rehabilitation facility  -SAVANNAH     Row Name 04/05/23 1037          Vital Signs    Pre Systolic BP Rehab 107  -SAVANNAH     Pre Treatment Diastolic BP 71  -SAVANNAH     Post Systolic BP Rehab 117  -SAVANNAH     Post Treatment Diastolic BP 74  -SAVANNAH     Pretreatment Heart Rate (beats/min) 61  -SAVANNAH     Posttreatment Heart Rate (beats/min) 67  -SAVANNAH     O2 Delivery Pre Treatment room air  -SAVANNAH     O2 Delivery Intra Treatment room air  -SAVANNAH     Post SpO2 (%) 97  -SAVANNAH     O2 Delivery Post Treatment room air  -SAVANNAH     Pre Patient Position Supine  -SAVANNAH     Intra Patient Position Supine  -SAVANNAH     Post Patient Position Supine  -SAVANNAH     Row Name 04/05/23 1037          Positioning and Restraints    Pre-Treatment Position in bed  -SAVANNAH     In Bed sitting;call light within reach;encouraged to call for assist  did not adjust exit alarm from arrival setting due to no OOB activity  -SAVANNAH           User Key  (r) = Recorded By, (t) = Taken By, (c) = Cosigned By    Initials Name Provider Type    Kiarra Calix, OT Occupational Therapist               Outcome Measures     Row Name 04/05/23 1042          How much help from another is currently needed...    Putting on and taking off regular lower body clothing? 3  -SAVANNAH     Bathing (including washing, rinsing, and drying) 2  -SAVANNAH     Toileting (which includes using toilet bed pan or urinal) 2  -SAVANNAH     Putting on and taking off regular upper body  clothing 3  -SAVANNAH     Taking care of personal grooming (such as brushing teeth) 4  -SAVANNAH     Eating meals 1  PEG  -SAVANNAH     AM-PAC 6 Clicks Score (OT) 15  -SAVANNAH     Row Name 04/05/23 1042          Functional Assessment    Outcome Measure Options AM-PAC 6 Clicks Daily Activity (OT)  -SAVANNAH           User Key  (r) = Recorded By, (t) = Taken By, (c) = Cosigned By    Initials Name Provider Type    Kiarra Calix, OT Occupational Therapist                Occupational Therapy Education     Title: PT OT SLP Therapies (In Progress)     Topic: Occupational Therapy (In Progress)     Point: ADL training (In Progress)     Description:   Instruct learner(s) on proper safety adaptation and remediation techniques during self care or transfers.   Instruct in proper use of assistive devices.              Learning Progress Summary           Patient Acceptance, E, NR by CS at 3/31/2023 1459    Acceptance, E, VU by EL at 3/26/2023 1707    Acceptance, E, NR by CS at 3/26/2023 1421                   Point: Home exercise program (Done)     Description:   Instruct learner(s) on appropriate technique for monitoring, assisting and/or progressing therapeutic exercises/activities.              Learning Progress Summary           Patient Acceptance, E,D,H, VU,NR by SAVANNAH at 4/5/2023 1043    Comment: tband UE strengthening program    Acceptance, E, VU by EL at 3/26/2023 1707                   Point: Precautions (In Progress)     Description:   Instruct learner(s) on prescribed precautions during self-care and functional transfers.              Learning Progress Summary           Patient Acceptance, E, NR by CS at 3/31/2023 1459    Acceptance, E, VU by EL at 3/26/2023 1707    Acceptance, E, NR by CS at 3/26/2023 1421                   Point: Body mechanics (In Progress)     Description:   Instruct learner(s) on proper positioning and spine alignment during self-care, functional mobility activities and/or exercises.              Learning Progress Summary            Patient Acceptance, E, NR by CS at 3/31/2023 1459    Acceptance, E, VU by  at 3/26/2023 1707    Acceptance, E, NR by  at 3/26/2023 1421                               User Key     Initials Effective Dates Name Provider Type Discipline    SAVANNAH 02/03/23 -  Kiarra Melendrez, OT Occupational Therapist OT    CS 09/02/21 -  Rosanna Sterling OT Occupational Therapist OT    EL 06/16/21 -  Giana Yates RN Registered Nurse Nurse              OT Recommendation and Plan  Therapy Frequency (OT): daily  Plan of Care Review  Plan of Care Reviewed With: patient  Progress: improving  Outcome Evaluation: Pt. able to progress to UE tband TE 15 reps each.  Pt. will benefit from harder resistant green band for some of TE.  Handout issued and reviewed.  Pt. declined OOB activity this session due to recentlly returning to bed.  Noted met LBD goal last session, upgraded goal.  Pt. remains below baseline ADL independence warranting continued skilled OT services. Continue to recommend IRF at this time.     Time Calculation:    Time Calculation- OT     Row Name 04/05/23 1044             Time Calculation- OT    OT Start Time 1012  -SAVANNAH      OT Received On 04/05/23  -SAVANNAH      OT Goal Re-Cert Due Date 04/15/23  -SAVANNAH         Timed Charges    32496 - OT Therapeutic Exercise Minutes 20  -SAVANNAH         Total Minutes    Timed Charges Total Minutes 20  -SAVANNAH       Total Minutes 20  -SAVANNAH            User Key  (r) = Recorded By, (t) = Taken By, (c) = Cosigned By    Initials Name Provider Type    Kiarra Calix, OT Occupational Therapist              Therapy Charges for Today     Code Description Service Date Service Provider Modifiers Qty    23958214428 HC OT THER PROC EA 15 MIN 4/5/2023 Kiarra Melendrez OT GO 1               Kiarra Melendrez OT  4/5/2023

## 2023-04-05 NOTE — PLAN OF CARE
Goal Outcome Evaluation:               Patient RA with trach capped, SR on the monitor, up ad daron, and AO. No c/o pain. TF bolus administered per order through PEG. Trach in place, obturator at bedside. Trach care provided. Family appeal to [facility] pending.

## 2023-04-05 NOTE — THERAPY PROGRESS REPORT/RE-CERT
Patient Name: Sandro Junior  : 1955    MRN: [id number]                              Today's Date: 2023       Admit Date: 3/16/2023    Visit Dx:     ICD-10-CM ICD-9-CM   1. Respiratory distress  R06.03 786.09   2. Tachycardia  R00.0 785.0   3. Airway compromise  J98.8 519.8   4. Acute epiglottitis with airway obstruction - probable  J05.11 464.31   5. Essential hypertension  I10 401.9   6. Voice disturbance  R49.9 784.40   7. Oropharyngeal dysphagia  R13.12 787.22     Patient Active Problem List   Diagnosis   • Essential hypertension   • Chronic pain   • Osteoarthritis of knee   • Prolonged depressive adjustment reaction   • Acute prostatitis   • Atopic rhinitis   • Erectile dysfunction of nonorganic origin   • Glaucoma suspect   • Onychomycosis of toenail   • Malignant neoplasm of rectum   • Seborrheic eczema   • Keloid scar   • Pain of right lower extremity   • Esophageal reflux   • Acute respiratory failure   • Acute strep pyogenes (group A strep) epiglottitis with airway obstruction      Past Medical History:   Diagnosis Date   • Epidermal inclusion cyst    • Esophageal candidiasis    • Keloid scar    • Rectal cancer    • Seborrheic dermatitis      Past Surgical History:   Procedure Laterality Date   • COLECTOMY PARTIAL / TOTAL     • KNEE SURGERY      Left knee meniscal tear repair   • TOOTH EXTRACTION  2016 teeth   • TOTAL KNEE ARTHROPLASTY Right 2016   • TRACHEOSTOMY AND PEG TUBE INSERTION N/A 3/23/2023    Procedure: TRACHEOSTOMY AND PERCUTANEOUS ENDOSCOPIC GASTROSTOMY TUBE INSERTION;  Surgeon: Néstor Jerome MD;  Location: [facility];  Service: General;  Laterality: N/A;      General Information     Row Name 23 1529          Physical Therapy Time and Intention    Document Type progress note/recertification  -BA     Mode of Treatment physical therapy  -BA     Row Name 23 1529          General Information    Patient Profile Reviewed yes  -BA     Existing  Precautions/Restrictions fall;other (see comments)  PEG/Trach, PMV  -     Barriers to Rehab medically complex  -     Row Name 04/05/23 1529          Cognition    Orientation Status (Cognition) oriented to;person  -     Row Name 04/05/23 1529          Safety Issues, Functional Mobility    Safety Issues Affecting Function (Mobility) insight into deficits/self-awareness;safety precaution awareness;safety precautions follow-through/compliance  -     Impairments Affecting Function (Mobility) balance;endurance/activity tolerance;strength  -           User Key  (r) = Recorded By, (t) = Taken By, (c) = Cosigned By    Initials Name Provider Type     Orquidea Bronson, PT Physical Therapist               Mobility     Row Name 04/05/23 1531          Bed Mobility    Bed Mobility supine-sit;scooting/bridging  -     Scooting/Bridging [place] (Bed Mobility) independent  -     Supine-Sit [place] (Bed Mobility) modified independence  -     Assistive Device (Bed Mobility) head of bed elevated  -     Comment, (Bed Mobility) No difficulty noted.  Reported no dizziness upon sitting EOB.  -     Row Name 04/05/23 1531          Sit-Stand Transfer    Sit-Stand [place] (Transfers) standby assist  -     Assistive Device (Sit-Stand Transfers) cane, straight  -BA     Comment, (Sit-Stand Transfer) Good stability.  Reported no dizziness upon standing.  -     Row Name 04/05/23 1531          Gait/Stairs (Locomotion)    [place] Level (Gait) contact guard;standby assist;verbal cues  -     Assistive Device (Gait) cane, straight  -BA     Distance in Feet (Gait) 160+400  -BA     Deviations/Abnormal Patterns (Gait) bilateral deviations;base of support, narrow  -BA     [place] Level (Stairs) contact guard;verbal cues  -     Assistive Device (Stairs) cane, straight  -BA     Handrail Location (Stairs) right side (ascending);right side (descending)  -     Number of Steps (Stairs) 12  -BA     Ascending  Technique (Stairs) step-over-step  -BA     Descending Technique (Stairs) step-to-step  -BA     Comment, (Gait/Stairs) Demonstrated step through gait pattern with use of SPC and CGA progressing to SBA.  Occasional mild narrow BROCK.  VCs for technique/sequencing with SPC and improved safety awareness with attention to task.  Fairly steady gait and adequate balance throughout with no LOB noted.  Stair navigation with VCs for sequencing, hand placement, and improved focus to task.  -BA           User Key  (r) = Recorded By, (t) = Taken By, (c) = Cosigned By    Initials Name Provider Type    Orquidea Calderon, PT Physical Therapist               Obj/Interventions     Row Name 04/05/23 2693          Balance    Balance Assessment sitting static balance;sitting dynamic balance;sit to stand dynamic balance;standing static balance;standing dynamic balance  -BA     Static Sitting Balance independent  -BA     Dynamic Sitting Balance supervision  -BA     Position, Sitting Balance unsupported;sitting edge of bed;sitting in chair  -BA     Sit to Stand Dynamic Balance standby assist  -BA     Static Standing Balance standby assist  -BA     Dynamic Standing Balance contact guard;standby assist;verbal cues  -BA     Position/Device Used, Standing Balance supported;cane, straight  -BA     Balance Interventions sitting;sit to stand;standing;supported;static;dynamic;occupation based/functional task;weight shifting activity;tandem gait  -BA     Comment, Balance Adequate static/dynamic sitting balance for donning shoes; no difficulty or LOB noted.  Fairly adequate standing balance and steady gait throughout with use of SPC; no LOB noted.  Rec use of SPC for ambulation at this time for improved balance.  Intermittent mild instability with stair navigation; no overt LOB noted.  Participated in dynamic, higher level balance activities consisting of tandem walking, backward walking, and side-stepping to B sides, each x 30ft with use of SPC  and CGA/SBA.  Exhibited good form throughout; occasional mild instability with no overt LOB noted.  -BA           User Key  (r) = Recorded By, (t) = Taken By, (c) = Cosigned By    Initials Name Provider Type    Orquidea Calderon, PT Physical Therapist               Goals/Plan     Row Name 04/05/23 1651          Bed Mobility Goal 1 (PT)    Activity/Assistive Device (Bed Mobility Goal 1, PT) sit to supine/supine to sit  -BA     [place] Level/Cues Needed (Bed Mobility Goal 1, PT) independent  -BA     Time Frame (Bed Mobility Goal 1, PT) long term goal (LTG);10 days  -BA     Progress/Outcomes (Bed Mobility Goal 1, PT) good progress toward goal;goal ongoing  -     Row Name 04/05/23 1651          Transfer Goal 1 (PT)    Activity/Assistive Device (Transfer Goal 1, PT) sit-to-stand/stand-to-sit;bed-to-chair/chair-to-bed  -BA     [place] Level/Cues Needed (Transfer Goal 1, PT) independent  -BA     Time Frame (Transfer Goal 1, PT) long term goal (LTG);2 weeks  -BA     Progress/Outcome (Transfer Goal 1, PT) goal met;new goal  -     Row Name 04/05/23 1651          Gait Training Goal 1 (PT)    Activity/Assistive Device (Gait Training Goal 1, PT) gait (walking locomotion);assistive device use;cane, straight  -BA     [place] Level (Gait Training Goal 1, PT) modified independence  -BA     Distance (Gait Training Goal 1, PT) 500  -BA     Time Frame (Gait Training Goal 1, PT) long term goal (LTG);10 days  -BA     Progress/Outcome (Gait Training Goal 1, PT) goal met;new goal  -     Row Name 04/05/23 1651          Stairs Goal 1 (PT)    Activity/Assistive Device (Stairs Goal 1, PT) ascending stairs;descending stairs;using handrail, right;cane, straight  -BA     [place] Level/Cues Needed (Stairs Goal 1, PT) modified independence  -BA     Number of Stairs (Stairs Goal 1, PT) 15  -BA     Time Frame (Stairs Goal 1, PT) long term goal (LTG);10 days  -BA     Progress/Outcome (Stairs Goal 1, PT) goal met;new goal   -           User Key  (r) = Recorded By, (t) = Taken By, (c) = Cosigned By    Initials Name Provider Type    Orquidea Calderon, PT Physical Therapist               Clinical Impression     Row Name 04/05/23 1641          Pain    Pretreatment Pain Rating 0/10 - no pain  -BA     Posttreatment Pain Rating 0/10 - no pain  -     Row Name 04/05/23 1641          Plan of Care Review    Plan of Care Reviewed With patient  -BA     Progress improving  -     Outcome Evaluation PT progress note completed.  Pt with good progress and has met majority of therapy goals this period.  Improved performance and toleration to activity today noted by improved level of assist with STS and ambulation, increased ambulation distance, and progressed to stair navigation.  STS with SBA.  Ambulated 160+400ft with SPC and CGA progressing to SBA.  Ascended/descended 12 steps with CGA and use of handrail and SPC.  Rec use of SPC for ambulation at this time for improved balance and support.  Con to demonstrate mild decreased functional endurance, gait instability, and weakness compared to baseline level of function.  Con to progress pt as able per PT POC and toward updated goals.  Con to recommend IPR upon d/c at this time d/t pt not having family support during the day at home.  Pt with significant improvement in performance during today's session and will monitor progress closely for consistency; may benefit from HWA and OP PT in near future.  -     Row Name 04/05/23 1641          Therapy Assessment/Plan (PT)    Rehab Potential (PT) good, to achieve stated therapy goals  -     Criteria for Skilled Interventions Met (PT) yes;meets criteria;skilled treatment is necessary  -     Therapy Frequency (PT) daily  -     Row Name 04/05/23 1641          Vital Signs    Pre Systolic BP Rehab 135  VSS; RN cleared for activity.  -BA     Pre Treatment Diastolic BP 75  -BA     Pretreatment Heart Rate (beats/min) 71  -BA     Posttreatment Heart Rate  (beats/min) 73  -BA     Pre SpO2 (%) 98  -BA     O2 Delivery Pre Treatment room air  -BA     O2 Delivery Intra Treatment room air  -BA     Post SpO2 (%) 100  -BA     O2 Delivery Post Treatment room air  -BA     Pre Patient Position Supine  -BA     Intra Patient Position Standing  -BA     Post Patient Position Sitting  -BA     Row Name 04/05/23 1641          Positioning and Restraints    Pre-Treatment Position in bed  -BA     Post Treatment Position chair  -BA     In Chair notified nsg;sitting;call light within reach;encouraged to call for assist;with family/caregiver  RN deferred chair alarm.  -           User Key  (r) = Recorded By, (t) = Taken By, (c) = Cosigned By    Initials Name Provider Type    Orquidea Calderon, FREDO Physical Therapist               Outcome Measures     Row Name 04/05/23 1656 04/05/23 0800       How much help from another person do you currently need...    Turning from your back to your side while in flat bed without using bedrails? 4  -BA 4  -KS    Moving from lying on back to sitting on the side of a flat bed without bedrails? 4  -BA 4  -KS    Moving to and from a bed to a chair (including a wheelchair)? 3  -BA 3  -KS    Standing up from a chair using your arms (e.g., wheelchair, bedside chair)? 4  -BA 3  -KS    Climbing 3-5 steps with a railing? 3  -BA 3  -KS    To walk in hospital room? 3  -BA 3  -KS    AM-PAC 6 Clicks Score (PT) 21  -BA 20  -KS    Highest level of mobility 6 --> Walked 10 steps or more  - 6 --> Walked 10 steps or more  -KS    Row Name 04/05/23 1656 04/05/23 1042       Functional Assessment    Outcome Measure Options AM-PAC 6 Clicks Basic Mobility (PT)  -BA AM-PAC 6 Clicks Daily Activity (OT)  -SAVANNAH          User Key  (r) = Recorded By, (t) = Taken By, (c) = Cosigned By    Initials Name Provider Type    Kiarra Calix, OT Occupational Therapist    Corina Pro, RN Registered Nurse    Orquidea Calderon, PT Physical Therapist                              Physical Therapy Education     Title: PT OT SLP Therapies (In Progress)     Topic: Physical Therapy (Done)     Point: Mobility training (Done)     Learning Progress Summary           Patient Acceptance, E, VU,NR by BA at 4/5/2023 1656    Acceptance, TB,E, VU,NR by AY at 3/31/2023 1511    Acceptance, E, VU,NR by HT at 3/30/2023 1405    Acceptance, E, VU by HT at 3/29/2023 1144    Acceptance, E, DU,NR by HT at 3/28/2023 1458    Acceptance, E, VU by HT at 3/27/2023 1026    Acceptance, E, VU by EL at 3/26/2023 1707    Acceptance, E,D, VU,NR by LS at 3/26/2023 1429                   Point: Home exercise program (Done)     Learning Progress Summary           Patient Acceptance, TB,E, VU,NR by AY at 3/31/2023 1511    Acceptance, E, DU,NR by HT at 3/28/2023 1458    Acceptance, E, VU by HT at 3/27/2023 1026    Acceptance, E, VU by EL at 3/26/2023 1707                   Point: Body mechanics (Done)     Learning Progress Summary           Patient Acceptance, E, VU,NR by BA at 4/5/2023 1656    Acceptance, TB,E, VU,NR by AY at 3/31/2023 1511    Acceptance, E, VU,NR by HT at 3/30/2023 1405    Acceptance, E, VU by HT at 3/29/2023 1144    Acceptance, E, DU,NR by HT at 3/28/2023 1458    Acceptance, E, VU by HT at 3/27/2023 1026    Acceptance, E, VU by EL at 3/26/2023 1707    Acceptance, E,D, VU,NR by LS at 3/26/2023 1429                   Point: Precautions (Done)     Learning Progress Summary           Patient Acceptance, E, VU,NR by BA at 4/5/2023 1656    Acceptance, TB,E, VU,NR by AY at 3/31/2023 1511    Acceptance, E, VU,NR by HT at 3/30/2023 1405    Acceptance, E, VU by HT at 3/29/2023 1144    Acceptance, E, DU,NR by HT at 3/28/2023 1458    Acceptance, E, VU by HT at 3/27/2023 1026    Acceptance, E, VU by EL at 3/26/2023 1707    Acceptance, E,D, VU,NR by LS at 3/26/2023 1429                               User Key     Initials Effective Dates Name Provider Type Discipline    JUAN CARLOS 02/03/23 -  Norma Villegas, PT Physical Therapist  PT    EL 06/16/21 -  Giana Yates, RN Registered Nurse Nurse    AY 11/10/20 -  Judith Freire, PT Physical Therapist PT    BA 09/21/21 -  Orquidea Bronson, PT Physical Therapist PT     01/12/23 -  Chela Barron, PT Student PT Student PT              PT Recommendation and Plan     Plan of Care Reviewed With: patient  Progress: improving  Outcome Evaluation: PT progress note completed.  Pt with good progress and has met majority of therapy goals this period.  Improved performance and toleration to activity today noted by improved level of assist with STS and ambulation, increased ambulation distance, and progressed to stair navigation.  STS with SBA.  Ambulated 160+400ft with SPC and CGA progressing to SBA.  Ascended/descended 12 steps with CGA and use of handrail and SPC.  Rec use of SPC for ambulation at this time for improved balance and support.  Con to demonstrate mild decreased functional endurance, gait instability, and weakness compared to baseline level of function.  Con to progress pt as able per PT POC and toward updated goals.  Con to recommend IPR upon d/c at this time d/t pt not having family support during the day at home.  Pt with significant improvement in performance during today's session and will monitor progress closely for consistency; may benefit from HWA and OP PT in near future.     Time Calculation:    PT Charges     Row Name 04/05/23 1657             Time Calculation    Start Time 1450  -BA      PT Received On 04/05/23  -      PT Goal Re-Cert Due Date 04/15/23  -         Time Calculation- PT    Total Timed Code Minutes- PT 25 minute(s)  -BA         Timed Charges    97299 - Gait Training Minutes  10  -BA      16497 - PT Therapeutic Activity Minutes 15  -BA         Total Minutes    Timed Charges Total Minutes 25  -BA       Total Minutes 25  -BA            User Key  (r) = Recorded By, (t) = Taken By, (c) = Cosigned By    Initials Name Provider Type    BA Orquidea Bronson, PT  Physical Therapist              Therapy Charges for Today     Code Description Service Date Service Provider Modifiers Qty    95775065981 HC GAIT TRAINING EA 15 MIN 4/5/2023 Orquidea Bronson, PT GP 1    50022023308 HC PT THERAPEUTIC ACT EA 15 MIN 4/5/2023 Orquidea Bronson, PT GP 1          PT G-Codes  Outcome Measure Options: AM-PAC 6 Clicks Basic Mobility (PT)  AM-PAC 6 Clicks Score (PT): 21  AM-PAC 6 Clicks Score (OT): 15  PT Discharge Summary  Anticipated Discharge Disposition (PT): inpatient rehabilitation facility    Orquidea Bronson, PT  4/5/2023

## 2023-04-06 LAB
GLUCOSE BLDC GLUCOMTR-MCNC: 102 MG/DL (ref 70–130)
GLUCOSE BLDC GLUCOMTR-MCNC: 113 MG/DL (ref 70–130)
GLUCOSE BLDC GLUCOMTR-MCNC: 119 MG/DL (ref 70–130)
GLUCOSE BLDC GLUCOMTR-MCNC: 126 MG/DL (ref 70–130)
GLUCOSE BLDC GLUCOMTR-MCNC: 139 MG/DL (ref 70–130)

## 2023-04-06 PROCEDURE — 25010000002 HEPARIN (PORCINE) PER 1000 UNITS: Performed by: INTERNAL MEDICINE

## 2023-04-06 PROCEDURE — 94761 N-INVAS EAR/PLS OXIMETRY MLT: CPT

## 2023-04-06 PROCEDURE — 99232 SBSQ HOSP IP/OBS MODERATE 35: CPT | Performed by: INTERNAL MEDICINE

## 2023-04-06 PROCEDURE — 82962 GLUCOSE BLOOD TEST: CPT

## 2023-04-06 RX ORDER — BISACODYL 10 MG
10 SUPPOSITORY, RECTAL RECTAL DAILY PRN
Status: DISCONTINUED | OUTPATIENT
Start: 2023-04-06 | End: 2023-04-11 | Stop reason: HOSPADM

## 2023-04-06 RX ORDER — AMOXICILLIN 250 MG
2 CAPSULE ORAL 2 TIMES DAILY PRN
Status: DISCONTINUED | OUTPATIENT
Start: 2023-04-06 | End: 2023-04-11 | Stop reason: HOSPADM

## 2023-04-06 RX ORDER — POLYETHYLENE GLYCOL 3350 17 G/17G
17 POWDER, FOR SOLUTION ORAL DAILY PRN
Status: DISCONTINUED | OUTPATIENT
Start: 2023-04-06 | End: 2023-04-11 | Stop reason: HOSPADM

## 2023-04-06 RX ORDER — SACCHAROMYCES BOULARDII 250 MG
250 CAPSULE ORAL 2 TIMES DAILY
Status: DISCONTINUED | OUTPATIENT
Start: 2023-04-06 | End: 2023-04-10

## 2023-04-06 RX ORDER — SACCHAROMYCES BOULARDII 250 MG
250 CAPSULE ORAL 2 TIMES DAILY
Status: DISCONTINUED | OUTPATIENT
Start: 2023-04-06 | End: 2023-04-06

## 2023-04-06 RX ADMIN — LANSOPRAZOLE 15 MG: KIT at 05:44

## 2023-04-06 RX ADMIN — HEPARIN SODIUM 5000 UNITS: 5000 INJECTION, SOLUTION INTRAVENOUS; SUBCUTANEOUS at 22:16

## 2023-04-06 RX ADMIN — Medication 30 ML: at 08:25

## 2023-04-06 RX ADMIN — Medication 30 ML: at 22:30

## 2023-04-06 RX ADMIN — RISPERIDONE 1 MG: 1 TABLET ORAL at 22:16

## 2023-04-06 RX ADMIN — HEPARIN SODIUM 5000 UNITS: 5000 INJECTION, SOLUTION INTRAVENOUS; SUBCUTANEOUS at 14:57

## 2023-04-06 RX ADMIN — Medication 10 ML: at 22:30

## 2023-04-06 RX ADMIN — Medication 10 ML: at 08:20

## 2023-04-06 RX ADMIN — LATANOPROST 1 DROP: 50 SOLUTION OPHTHALMIC at 08:20

## 2023-04-06 RX ADMIN — CHLORHEXIDINE GLUCONATE 0.12% ORAL RINSE 15 ML: 1.2 LIQUID ORAL at 08:19

## 2023-04-06 RX ADMIN — CHLORHEXIDINE GLUCONATE 0.12% ORAL RINSE 15 ML: 1.2 LIQUID ORAL at 22:16

## 2023-04-06 RX ADMIN — GLYCOPYRROLATE 1 MG: 1 TABLET ORAL at 22:22

## 2023-04-06 RX ADMIN — CARVEDILOL 12.5 MG: 12.5 TABLET, FILM COATED ORAL at 22:16

## 2023-04-06 RX ADMIN — AMOXICILLIN AND CLAVULANATE POTASSIUM 400 MG: 400; 57 POWDER, FOR SUSPENSION ORAL at 11:37

## 2023-04-06 RX ADMIN — AMOXICILLIN AND CLAVULANATE POTASSIUM 400 MG: 400; 57 POWDER, FOR SUSPENSION ORAL at 22:22

## 2023-04-06 RX ADMIN — Medication 250 MG: at 22:16

## 2023-04-06 RX ADMIN — GLYCOPYRROLATE 1 MG: 1 TABLET ORAL at 14:58

## 2023-04-06 RX ADMIN — GLYCOPYRROLATE 1 MG: 1 TABLET ORAL at 08:20

## 2023-04-06 RX ADMIN — Medication 250 MG: at 08:20

## 2023-04-06 RX ADMIN — HEPARIN SODIUM 5000 UNITS: 5000 INJECTION, SOLUTION INTRAVENOUS; SUBCUTANEOUS at 05:44

## 2023-04-06 NOTE — PLAN OF CARE
Goal Outcome Evaluation:  Plan of Care Reviewed With: patient        Progress: improving       Pt denies any dyspnea or pain at this time. Trach wound care provided early this morning before shift change, will complete again prior to night shift. VSS on RA, NSR on monitor. AM BP meds held due to SBP <100 discussed with Hospitalist. TF boluses completed per orders.

## 2023-04-06 NOTE — PROGRESS NOTES
[facility] Medicine Services  PROGRESS NOTE    Patient Name: Sandro Junior  : 1955  MRN: [id number]    Date of Admission: 3/16/2023  Primary Care Physician: Dora Loya PA    Subjective   Subjective     CC:  F/u epiglottitis    HPI:  No new issues overnight. Doing well this am.  Nursing notes that he has had BMs and wants to hold scheduled regimen. Also, BP was a little low this am so held antihypertensives    ROS:  Gen- No fevers, chills  CV- No chest pain, palpitations  Resp- No cough, dyspnea  GI- No N/V/D, abd pain    Objective   Objective     Vital Signs:   Temp:  [96.8 °F (36 °C)-98.3 °F (36.8 °C)] 98.1 °F (36.7 °C)  Heart Rate:  [60-89] 72  Resp:  [16-18] 18  BP: ()/(67-88) 107/75     Physical Exam:  Constitutional: No acute distress, awake, alert  HENT: NCAT, mucous membranes moist  Respiratory: Clear to auscultation bilaterally, tracheostomy in place  Cardiovascular: RRR, no murmurs, rubs, or gallops  Gastrointestinal: Positive bowel sounds, soft, nontender, nondistended, PEG in place  Musculoskeletal: No bilateral ankle edema  Psychiatric: agitated  Neurologic: Oriented x 3, speech clear  Skin: No rashes      Results Reviewed:  LAB RESULTS:      Lab 23  045   HEMOGLOBIN 12.6*   HEMATOCRIT 39.3         Lab 23  0728 23  0455   SODIUM 137  --    POTASSIUM 4.0 4.3   CHLORIDE 102  --    CO2 26.0  --    ANION GAP 9.0  --    BUN 18  --    CREATININE 0.74*  --    EGFR 99.9  --    GLUCOSE 167*  --    CALCIUM 9.8  --    MAGNESIUM 2.1 2.4   PHOSPHORUS 2.9 3.3         Lab 23   TOTAL PROTEIN 8.0   ALBUMIN 3.7   GLOBULIN 4.3   ALT (SGPT) 19   AST (SGOT) 20   BILIRUBIN 0.4   ALK PHOS 50             Lab 23   CHOLESTEROL 132   TRIGLYCERIDES 174*             Brief Urine Lab Results  (Last result in the past 365 days)      Color   Clarity   Blood   Leuk Est   Nitrite   Protein   CREAT   Urine HCG        23 Yellow   Clear    Negative   Negative   Negative   30 mg/dL (1+)                 Microbiology Results Abnormal     Procedure Component Value - Date/Time    Blood Culture - Blood, Arm, Left [[id number]]  (Normal) Collected: 03/20/23 1438    Lab Status: Final result Specimen: Blood from Arm, Left Updated: 03/25/23 1501     Blood Culture No growth at 5 days    Blood Culture - Blood, Hand, Right [[id number]]  (Normal) Collected: 03/16/23 2225    Lab Status: Final result Specimen: Blood from Hand, Right Updated: 03/21/23 2246     Blood Culture No growth at 5 days    MRSA Screen, PCR (Inpatient) - Swab, Nares [[id number]]  (Normal) Collected: 03/17/23 0933    Lab Status: Final result Specimen: Swab from Nares Updated: 03/17/23 1200     MRSA PCR Negative    Narrative:      The negative predictive value of this diagnostic test is high and should only be used to consider de-escalating anti-MRSA therapy. A positive result may indicate colonization with MRSA and must be correlated clinically.  MRSA Negative    COVID PRE-OP / PRE-PROCEDURE SCREENING ORDER (NO ISOLATION) - Swab, Nasopharynx [[id number]]  (Normal) Collected: 03/16/23 2108    Lab Status: Final result Specimen: Swab from Nasopharynx Updated: 03/16/23 2234    Narrative:      The following orders were created for panel order COVID PRE-OP / PRE-PROCEDURE SCREENING ORDER (NO ISOLATION) - Swab, Nasopharynx.  Procedure                               Abnormality         Status                     ---------                               -----------         ------                     Respiratory Panel PCR w/...[[id number]]  Normal              Final result                 Please view results for these tests on the individual orders.    Respiratory Panel PCR w/COVID-19(SARS-CoV-2) CALLY/WILLIAM/RAMONITA/PAD/COR/MAD/CLARK In-House, NP Swab in UTM/VTM, 3-4 HR TAT - Swab, Nasopharynx [[id number]]  (Normal) Collected: 03/16/23 2108    Lab Status: Final result Specimen: Swab from Nasopharynx Updated: 03/16/23 2234      ADENOVIRUS, PCR Not Detected     Coronavirus 229E Not Detected     Coronavirus HKU1 Not Detected     Coronavirus NL63 Not Detected     Coronavirus OC43 Not Detected     COVID19 Not Detected     Human Metapneumovirus Not Detected     Human Rhinovirus/Enterovirus Not Detected     Influenza A PCR Not Detected     Influenza B PCR Not Detected     Parainfluenza Virus 1 Not Detected     Parainfluenza Virus 2 Not Detected     Parainfluenza Virus 3 Not Detected     Parainfluenza Virus 4 Not Detected     RSV, PCR Not Detected     Bordetella pertussis pcr Not Detected     Bordetella parapertussis PCR Not Detected     Chlamydophila pneumoniae PCR Not Detected     Mycoplasma pneumo by PCR Not Detected    Narrative:      In the setting of a positive respiratory panel with a viral infection PLUS a negative procalcitonin without other underlying concern for bacterial infection, consider observing off antibiotics or discontinuation of antibiotics and continue supportive care. If the respiratory panel is positive for atypical bacterial infection (Bordetella pertussis, Chlamydophila pneumoniae, or Mycoplasma pneumoniae), consider antibiotic de-escalation to target atypical bacterial infection.          No radiology results from the last 24 hrs    Results for orders placed during the hospital encounter of 03/16/23    Adult Transthoracic Echo Complete W/ Cont if Necessary Per Protocol    Interpretation Summary  •  Left ventricular systolic function is normal. Calculated left ventricular 3D EF = 56% Left ventricular ejection fraction appears to be 51 - 55%.  •  Left ventricular diastolic function was normal.  •  Estimated right ventricular systolic pressure from tricuspid regurgitation is normal (<35 mmHg).      Current medications:  Scheduled Meds:amoxicillin-clavulanate, 400 mg, Per PEG Tube, Q12H  carvedilol, 12.5 mg, Per PEG Tube, Q12H  chlorhexidine, 15 mL, Mouth/Throat, Q12H  glycopyrrolate, 1 mg, Per PEG Tube, TID  heparin  (porcine), 5,000 Units, Subcutaneous, Q8H  insulin regular, 0-7 Units, Subcutaneous, Q6H  lansoprazole, 15 mg, Per PEG Tube, Q AM  latanoprost, 1 drop, Both Eyes, Daily  ProSource No Carb, 30 mL, Per PEG Tube, BID  risperiDONE, 1 mg, Per PEG Tube, Nightly  saccharomyces boulardii, 250 mg, Per PEG Tube, BID  sodium chloride, 10 mL, Intravenous, Q12H  valsartan, 80 mg, Per PEG Tube, Q24H      Continuous Infusions:   PRN Meds:.•  acetaminophen  •  [DISCONTINUED] acetaminophen **OR** acetaminophen  •  albuterol  •  senna-docusate sodium **AND** polyethylene glycol **AND** [DISCONTINUED] bisacodyl **AND** bisacodyl  •  dextrose  •  glucagon (human recombinant)  •  hydrALAZINE  •  hydrOXYzine  •  Magnesium Standard Dose Replacement - Follow Nurse / BPA Driven Protocol  •  melatonin  •  Phosphorus Replacement - Follow Nurse / BPA Driven Protocol  •  Potassium Replacement - Follow Nurse / BPA Driven Protocol  •  sodium chloride  •  sodium chloride    Assessment & Plan   Assessment & Plan     Active Hospital Problems    Diagnosis  POA   • **Acute strep pyogenes (group A strep) epiglottitis with airway obstruction  [J05.11]  Yes   • Acute respiratory failure [J96.00]  Yes   • Essential hypertension [I10]  Yes      Resolved Hospital Problems   No resolved problems to display.        Brief Hospital Course to date:  Sandro Junior is a 68 y.o. male with PMHx significant for hypertension who presented to [facility] on 3/16 with sore throat and shortness of breath. Upon arrival to the ED he was noted to have significant stridor, tripoding and drooling. He was treated with steroids, epinephrine and H2 blockers but despite this he ultimately required intubation and admission to the ICU. His blood cultures grew Srep pyogenes. He underwent bronch on 3/20 which showed persistent edematous changes in the posterior oropharynx with copious mucopurulent secretions and repeat CT neck 3/21/2023 revealed worsening edema but no sign of abscess. ID  was consulted and he was treated with continuous PCN infusion. He was not deemed safe to extubate, therefore he underwent trach and PEG on 3/23. He was transferred to our service on 4/1.     Acute Group A Strep Epiglottitis   Airway Obstruction s/p Mechanical Ventilation, Trach/PEG 3/23  Strep Pyogenes Bacteremia with Sepsis  - Dr. Johnson following, transitioned to Per PEG Augmentin x 7 days  - trach suctioning PRN, tolerating trach collar  - continue tube feedings, SLP consulted  -- added Robinul per SLP recommendations    HTN:  - continue Diovan, Coreg as BP allows    Anxiety:  - PRN hydroxyzine  - risperdal at bedtime    Constipation (resolved)  -- change bowel regimen from scheduled to PRN    Family appealing insurance denial.     Expected Discharge Location and Transportation: [place]  Expected Discharge   Expected Discharge Date and Time     Expected Discharge Date Expected Discharge Time    Apr 8, 2023            DVT prophylaxis:  Medical DVT prophylaxis orders are present.     AM-PAC 6 Clicks Score (PT): 21 (04/06/23 0819)    CODE STATUS:   Code Status and Medical Interventions:   Ordered at: 03/16/23 2183     Code Status (Patient has no pulse and is not breathing):    CPR (Attempt to Resuscitate)     Medical Interventions (Patient has pulse or is breathing):    Full Support       Tere Orellana MD  04/06/23

## 2023-04-06 NOTE — PROGRESS NOTES
1       Sandro Junior  1955  [id number]  4/6/2023    CC: Severe sore throat and shortness of breath    Sandro Junior is a 68 y.o. male here for severe sepsis with group A beta-hemolytic streptococcal bacteremia, adult epiglottitis, leukocytosis, lactic acidosis, and threatened airway leading to tracheostomy.      Past medical history:  Past Medical History:   Diagnosis Date   • Epidermal inclusion cyst    • Esophageal candidiasis    • Keloid scar    • Rectal cancer    • Seborrheic dermatitis        Medications:   Current Facility-Administered Medications:   •  acetaminophen (TYLENOL) 160 MG/5ML solution 650 mg, 650 mg, Per PEG Tube, Q6H PRN, Monika Louis MD, 650 mg at 03/28/23 1714  •  [DISCONTINUED] acetaminophen (TYLENOL) tablet 650 mg, 650 mg, Nasogastric, Q4H PRN, 650 mg at 03/21/23 0253 **OR** acetaminophen (TYLENOL) suppository 650 mg, 650 mg, Rectal, Q4H PRN, Monika Louis MD  •  albuterol (PROVENTIL) nebulizer solution 0.083% 2.5 mg/3mL, 2.5 mg, Nebulization, Q6H PRN, Monika Louis MD  •  amoxicillin-clavulanate (AUGMENTIN) 400-57 MG/5ML suspension 400 mg, 400 mg, Per PEG Tube, Q12H, Monika Louis MD, 400 mg at 04/05/23 2145  •  sennosides-docusate (PERICOLACE) 8.6-50 MG per tablet 2 tablet, 2 tablet, Per PEG Tube, BID, 2 tablet at 04/02/23 0956 **AND** polyethylene glycol (MIRALAX) packet 17 g, 17 g, Per PEG Tube, Daily PRN **AND** [DISCONTINUED] bisacodyl (DULCOLAX) EC tablet 5 mg, 5 mg, Oral, Daily PRN **AND** bisacodyl (DULCOLAX) suppository 10 mg, 10 mg, Rectal, Daily PRN, Monika Louis MD  •  carvedilol (COREG) tablet 12.5 mg, 12.5 mg, Per PEG Tube, Q12H, Monika Louis MD, 12.5 mg at 04/05/23 2146  •  chlorhexidine (PERIDEX) 0.12 % solution 15 mL, 15 mL, Mouth/Throat, Q12H, Monika Louis MD, 15 mL at 04/05/23 2145  •  dextrose (D50W) (25 g/50 mL) IV injection 25 g, 25 g, Intravenous, Q15 Min PRN, Monika Louis MD  •   glucagon (GLUCAGEN) injection 1 mg, 1 mg, Intramuscular, Q15 Min PRN, Monika Louis MD  •  glycopyrrolate (ROBINUL) tablet 1 mg, 1 mg, Per PEG Tube, TID, Tere Orellana MD, 1 mg at 04/05/23 2146  •  heparin (porcine) 5000 UNIT/ML injection 5,000 Units, 5,000 Units, Subcutaneous, Q8H, Monika Louis MD, 5,000 Units at 04/06/23 0544  •  hydrALAZINE (APRESOLINE) injection 20 mg, 20 mg, Intravenous, Q4H PRN, Monika Louis MD, 20 mg at 03/29/23 2051  •  hydrOXYzine (ATARAX) 10 MG/5ML syrup 25 mg, 25 mg, Per PEG Tube, Q8H PRN, Monika Louis MD, 25 mg at 03/31/23 1040  •  insulin regular (humuLIN R,novoLIN R) injection 0-7 Units, 0-7 Units, Subcutaneous, Q6H, Monika Louis MD, 2 Units at 04/05/23 1203  •  lansoprazole (FIRST) oral suspension 15 mg, 15 mg, Per PEG Tube, Q AM, Monika Louis MD, 15 mg at 04/06/23 0544  •  latanoprost (XALATAN) 0.005 % ophthalmic solution 1 drop, 1 drop, Both Eyes, Daily, Monika Louis MD, 1 drop at 04/05/23 0916  •  Magnesium Standard Dose Replacement - Initiate Nurse / BPA Driven Protocol, , Does not apply, PRN, Monika Louis MD  •  melatonin tablet 5 mg, 5 mg, Per G Tube, Nightly PRN, Olga Perez, APRN, 5 mg at 03/31/23 2254  •  Phosphorus Replacement - Initiate Nurse / BPA Driven Protocol, , Does not apply, PRN, Monika Louis MD  •  polyethylene glycol (MIRALAX) packet 17 g, 17 g, Per PEG Tube, Daily, Monika Lousi MD, 17 g at 04/02/23 0957  •  Potassium Replacement - Initiate Nurse / BPA Driven Protocol, , Does not apply, PRN, Monika Louis MD  •  ProSource No Carb oral solution 30 mL, 30 mL, Per PEG Tube, BID, Monika Louis MD, 30 mL at 04/05/23 2145  •  risperiDONE (risperDAL) tablet 1 mg, 1 mg, Per PEG Tube, Nightly, Monika Louis MD, 1 mg at 04/05/23 2146  •  sodium chloride 0.9 % flush 10 mL, 10 mL, Intravenous, Q12H, Monika Louis MD, 10 mL at  "04/05/23 2146  •  sodium chloride 0.9 % flush 10 mL, 10 mL, Intravenous, PRN, Monika Louis MD  •  sodium chloride 0.9 % flush 20 mL, 20 mL, Intravenous, PRN, Monika Louis MD  •  valsartan (DIOVAN) tablet 80 mg, 80 mg, Per PEG Tube, Q24H, Monika Louis MD, 80 mg at 04/05/23 0915  Antibiotics:  Anti-Infectives (From admission, onward)    Ordered     Dose/Rate Route Frequency Start Stop    03/31/23 0754  amoxicillin-clavulanate (AUGMENTIN) 400-57 MG/5ML suspension 400 mg        Note to Pharmacy: Via PEG   Ordering Provider: Monika Louis MD    400 mg Per PEG Tube Every 12 Hours Scheduled 03/31/23 0900 04/07/23 0859    03/16/23 2151  vancomycin 1500 mg/500 mL 0.9% NS IVPB ([facility])        Ordering Provider: Anand Eaton MD    20 mg/kg × 79.4 kg Intravenous Once 03/16/23 2153 03/16/23 2323          Allergies:  has No Known Allergies.    Review of Systems: All other reviewed and negative except as per HPI    Blood pressure 128/73, pulse 63, temperature 97.6 °F (36.4 °C), temperature source Oral, resp. rate 16, height 185.4 cm (73\"), weight 77.6 kg (171 lb), SpO2 94 %.  GENERAL: Awake and alert, in no acute distress.  Afebrile and hemodynamically stable.  Mild cough.  Scant sputum production.  No nausea or vomiting.  Denies diarrhea.  Ambulatory in hallways.  Soft diet.  HEENT: Oropharynx without thrush. Sinuses nontender. Dentition in marginal repair. No cervical adenopathy. No carotid bruits/ jugular venous distention.   EYES: PERRL. No conjunctival injection. No icterus. EOMI.  LYMPHATICS: No lymphadenopathy of the neck or axillary or inguinal regions.   HEART: No murmur, gallop, or pericardial friction rub.  PICC exit site without phlebitis.  LUNGS: Clear to auscultation anteriorly. No percussion dullness.  Tracheostomy stoma clean.  ABDOMEN: Soft, nontender, nondistended. No appreciable HSM.  PEG exit site without erythema or purulence.  SKIN: Warm and dry without cutaneous " eruptions. No embolic stigmata.   PSYCHIATRIC: Mental status lucid. Cranial nerve function intact.       DIAGNOSTICS:  Lab Results   Component Value Date    WBC 7.59 03/30/2023    HGB 12.6 (L) 03/31/2023    HCT 39.3 03/31/2023     03/30/2023     Lab Results   Component Value Date    CRP 1.09 (H) 03/27/2023     Lab Results   Component Value Date    SEDRATE >130 (H) 03/26/2023     Lab Results   Component Value Date    GLUCOSE 167 (H) 04/04/2023    BUN 18 04/04/2023    CREATININE 0.74 (L) 04/04/2023    EGFRIFAFRI 109 01/10/2022    BCR 24.3 04/04/2023    CO2 26.0 04/04/2023    CALCIUM 9.8 04/04/2023    ALBUMIN 3.7 04/04/2023    AST 20 04/04/2023    ALT 19 04/04/2023       Microbiology: Bio fire viral respiratory pathogen's panel on admission negative for COVID-19 or influenza A/B.  2 of 3 admission blood cultures with group A streptococci/favorable EDNA's to both penicillin and ceftriaxone.  MRSA nasopharyngeal PCR negative.    RADIOLOGY:  Imaging Results (Last 72 Hours)     Procedure Component Value Units Date/Time    SLP FEES - Fiberoptic Endo Eval Swallow [[id number]] Resulted: 04/04/23 1014     Updated: 04/04/23 1014    Narrative:      This procedure was auto-finalized with no dictation required.          Assessment and Plan: Adult epiglottitis.  Group A beta-hemolytic streptococcal bacteremia.  Threatened airway.  Marked leukocytosis.  Lactic acidosis.  Elevated procalcitonin.  Increased inflammatory markers.  Status post tracheostomy and PEG.  Maximum temperature over 24 hours 98.1.  Currently 97.6.  Pulse 63, respiratory 16, blood pressure 128/73 mmHg with an O2 saturation of 94% on room air.  BUN/creatinine 18/0.74.  Liver function studies within normal limits.  Last peripheral leukocyte count 7.6.  No new culture data or radiographic imaging studies.  I have renewed the patient's amoxicillin-clavulanate suspension and added probiotic therapy with Florastor as the patient is now able to swallow and manage  his secretions.  If transferred to [facility] for rehabilitation, I will follow there.      Zana Johnson MD  4/6/2023

## 2023-04-06 NOTE — PROGRESS NOTES
Clinical Nutrition     Nutrition Support Assessment  Reason for Visit: Follow-up protocol      Patient Name: Sandro Junior  YOB: 1955  MRN: [id number]  Date of Encounter: 04/06/23 08:08 EDT  Admission date: 3/16/2023    Comment:    Pt reporting some hunger on current regimen, will adjust regimen to:    Fibersource HN @ 350ml 5x/day (6a, 9a, 12p, 3p, 6p). Provide Prosource BID. Water flush @ 100ml (50ml before and after each bolus).   =1750ml, 2220 kcal (111% est needs), 124g pro (111% est needs), 27g fiber, 1418ml FW, 500ml water from flushes, 1918ml TFW.    Nutrition Assessment     Admission Diagnosis:  Acute respiratory failure (HCC) [J96.00]    Problem List:    Acute strep pyogenes (group A strep) epiglottitis with airway obstruction     Essential hypertension    Acute respiratory failure        PMH:   He  has a past medical history of Epidermal inclusion cyst, Esophageal candidiasis, Keloid scar, Rectal cancer, and Seborrheic dermatitis.    PSH:  He  has a past surgical history that includes Knee surgery; Colectomy partial / total; Total knee arthroplasty (Right, 04/11/2016); Tooth extraction (2016); and tracheostomy and peg tube insertion (N/A, 3/23/2023).      Applicable Nutrition Concerns:   Skin: surgical sites  GI:      Applicable Interval History:   (3/16) intubated, large bore NG tube placed  (3/17) EN initiated  (3/20) s/p bronchoscopy - airway edema  (3-23) s/p Trach, PEG  (4/3) SLP rec cont NPO      Reported/Observed/Food/Nutrition Related History:     4/6  Pt reports he is tolerating EN well, denies bloating, abdominal pain. Pt does note that he feels hunger and suspects its a combination of mental hunger and physical hunger-pt tells me he was a big eater PTA. RD will adjust EN regimen-pt prefers to keep 5 feeds and increase volume per feed at this time.      4/3  Pt now acknowledges wt loss from his UBW.of 186-188 lbs to current standing wt of 161 lbs on 4/3.  "(timeframe unclear). Allows doing well w bolus feedings. Believes receives these 3x/da.     3/31  Patient sitting in bedside chair at time of visit, EN infusing. No c/o abdominal discomfort or EN intolerance.  Reports he just had a BM.  Reports UBW 184lb. Patient with documented weight of 164lb on 3/16 (admission) with most recent bed weight of 202lb (no method).  Bed zeored at time of visit, will ask RN to obtain a weight once patient is back on in the bed. Patient does not feel he lost or gained any weight.     3/30  Pt resting in bed, talking with PMV, has garbled speech, lots of secretions, pt wants to know when he will be able to eat, take out Trach Per RN: pt has been tolerating TF Plan to transition to bolus feeds      Labs    Labs Reviewed: Yes     Results from last 7 days   Lab Units 04/04/23  0728 03/31/23  0455   GLUCOSE mg/dL 167*  --    BUN mg/dL 18  --    CREATININE mg/dL 0.74*  --    SODIUM mmol/L 137  --    CHLORIDE mmol/L 102  --    POTASSIUM mmol/L 4.0 4.3   PHOSPHORUS mg/dL 2.9 3.3   MAGNESIUM mg/dL 2.1 2.4   ALT (SGPT) U/L 19  --        Results from last 7 days   Lab Units 04/04/23  0728   ALBUMIN g/dL 3.7   CHOLESTEROL mg/dL 132   TRIGLYCERIDES mg/dL 174*       Results from last 7 days   Lab Units 04/06/23  0725 04/06/23  0543 04/06/23  0058 04/05/23 2009 04/05/23  1609 04/05/23  1116   GLUCOSE mg/dL 139* 102 119 139* 129 152*     Lab Results   Lab Value Date/Time    HGBA1C 5.2 10/20/2020 0841                 Medications    Medications Reviewed: Yes  Abx, insulin, miralax, pericolace, lansoprazole, risperdal, probiotic    Intake/Ouptut 24 hrs (0701 - 0700)   I&O's Reviewed: Yes   Intake & Output (last day)       04/05 0701 04/06 0700 04/06 0701 04/07 0700    P.O. 0     Other 670     NG/GT 3280     Total Intake(mL/kg) 3950 (50.9)     Net +3950               Last recorded bm x 1 on 4/5    Anthropometrics     Admission Height 185.4 cm (73\") Documented at 03/16/2023 2104   Admission Weight 79.4 " kg (175 lb) Documented at 03/16/2023 2104       Height: 73in 185.4 cm  Weight: 161 lb standing scale on 4/3 73.2 Kg  BMI:21.28  BMI classification: Normal: 18.5-24.9kg/m2      UBW: Most Recent Wts:   4/3/2023 73.165 kg 161 lb 4.8 oz Standing scale   4/2/2023 74.299 kg 163 lb 12.8 oz Standing scale   4/1/2023 80.151 kg 176 lb 11.2 oz Bed scale   3/31/2023 74.7 kg 164 lb 10.9 oz Bed scale   3/31/2023 91.8 kg 202 lb 6.1 oz -   3/26/2023 92 kg 202 lb 13.2 oz -   3/25/2023 87 kg 191 lb 12.8 oz -   3/17/2023 77.9 kg 171 lb 11.8 oz -   3/16/2023 74.5 kg 164 lb 3.9 oz Bed scale   3/16/2023 79.379 kg 175 lb Estimated        Older Wts:   LView Complete Flowsheet  Weight Weight (kg) Weight (lbs) Weight Method VISIT REPORT   4/5/2022 80.559 kg 177 lb 9.6 oz - Report   1/10/2022 80.196 kg 176 lb 12.8 oz - Report   12/9/2021 79.652 kg 175 lb 9.6 oz - Report   11/13/2021 84.823 kg 187 lb Stated -   3/22/2021 83.008 kg 183 lb - Report   11/19/2020 83.643 kg 184 lb 6.4 oz - Report   10/20/2020 82.918 kg 182 lb 12.8 oz - Report   9/21/2020 84.188 kg 185 lb 9.6 oz - Report   3/16/2020 85.639 kg 188 lb 12.8 oz - Report   2/7/2020 84.823 kg 187 lb - Report   7/16/2019 81.738 kg 180 lb 3.2 oz - Report   1/15/2019 83.915 kg 185 lb - Report     Wt change: apparent loss from 177 lbs 1 yr ago to 161 lbs   Loss of 16 lbs 9% body wt from 1 yr ago.     Nutrition Focused Physical Exam     Date: 4/3    Pt meets criteria for non severe chronic malnutrition based on wasting See MSA note.     Needs Assessment   Date: 3/30, 4/6 (standing scale wt 163lbs)    Height used:73in, 185.4 cm  Weights used:164lb 74.5 Kg from 3/16     Estimated Calorie needs: ~2000 calories   Method: MSJ x 1.3: 2053kcal  Method: 25-30 Kcals/KG actual wt = 1864-2236kcal    Estimated Protein needs: ~112 g protein  Method: 1.2-1.5 g/Kg actual wt:89-112g protein      Current Nutrition Prescription     PO: NPO Diet NPO Type: Strict NPO     EN: FiberSource HN   Goal Rate: 320ml per  bolus                   Water Flushes: 100ml ev bolus  Modular: Prosource-no carb 2/day  Route: PEG  Tube: Lg bore     At goal over 12 daytime hrs Bolus 5 times a day: - 6am, 9am, 12pm, 3pm and 6pm.      Rx will supply:   Goal Volume 1600 mL/day       Flush Volume 500 mL/day       Energy 2040 Kcal/day 102  % Est Need   Protein 116 g/day 104  % Est Need   Fiber 24 g/day       Water in  EN 1296 mL       Total Water 1796 mL       Meet DRI Yes            --------------------------------------------------------------------------  Product/Rate verified at bedside: No  Infusing Rate at time of visit: no feeding at time of visit    Average Delivery from [place] Days: w Prosource  Volume 1470 mL/day 92  % Goal Vol.   Flush Volume 535 mL/day     Energy 1884 Kcal/day 92 % Est Need   Protein 109 g/day 94 % Est Need   Fiber 22 g/day     Water in  EN 1191 mL     Total Water 1726 mL     Meet DRI Yes            Nutrition Diagnosis     Date: 3/17 Updated: 3-30, 4/3  Problem Inadequate energy intake   Etiology ARF/ Surgery   Signs/Symptoms 92% goal volume EN   Status: per charting most recent 2 day delivery less: 80% goal volume delivered.    Date:  4/3 Updated:  Problem Malnutrition  non severe chronic   Etiology Alt GI Fx    Signs/Symptoms Wasting r/t wt loss over a year   Status:      Goal:   General: Nutrition support treatment  EN/PN: Adjust EN     Nutrition Intervention      Follow treatment progress, Care plan reviewed    Adjust current bolus regimen:  Fibersource HN @ 350ml 5x/day (6a, 9a, 12p, 3p, 6p). Provide Prosource BID. Water flush @ 100ml (50ml before and after each bolus).   =1750ml, 2220 kcal (111% est needs), 124g pro (111% est needs), 27g fiber, 1418ml FW, 500ml water from flushes, 1918ml TFW.    Monitoring/Evaluation:   Per protocol, I&O, Pertinent labs, EN delivery/tolerance, Weight, GI status, Symptoms, Swallow function      Giana Warner RD  Time Spent: 35 min

## 2023-04-06 NOTE — PLAN OF CARE
Goal Outcome Evaluation:               Aox4. VSS. Room air. No complaints of pain. Voiding without difficulty. Bolus TF administered through PEG. Trach dressing changed.

## 2023-04-07 LAB
GLUCOSE BLDC GLUCOMTR-MCNC: 101 MG/DL (ref 70–130)
GLUCOSE BLDC GLUCOMTR-MCNC: 122 MG/DL (ref 70–130)
GLUCOSE BLDC GLUCOMTR-MCNC: 126 MG/DL (ref 70–130)
GLUCOSE BLDC GLUCOMTR-MCNC: 128 MG/DL (ref 70–130)
GLUCOSE BLDC GLUCOMTR-MCNC: 128 MG/DL (ref 70–130)

## 2023-04-07 PROCEDURE — 82962 GLUCOSE BLOOD TEST: CPT

## 2023-04-07 PROCEDURE — 92507 TX SP LANG VOICE COMM INDIV: CPT | Performed by: SPEECH-LANGUAGE PATHOLOGIST

## 2023-04-07 PROCEDURE — 99231 SBSQ HOSP IP/OBS SF/LOW 25: CPT | Performed by: INTERNAL MEDICINE

## 2023-04-07 PROCEDURE — 94761 N-INVAS EAR/PLS OXIMETRY MLT: CPT

## 2023-04-07 PROCEDURE — 92526 ORAL FUNCTION THERAPY: CPT | Performed by: SPEECH-LANGUAGE PATHOLOGIST

## 2023-04-07 PROCEDURE — 25010000002 HEPARIN (PORCINE) PER 1000 UNITS: Performed by: INTERNAL MEDICINE

## 2023-04-07 RX ADMIN — CHLORHEXIDINE GLUCONATE 0.12% ORAL RINSE 15 ML: 1.2 LIQUID ORAL at 09:44

## 2023-04-07 RX ADMIN — Medication 30 ML: at 09:45

## 2023-04-07 RX ADMIN — GLYCOPYRROLATE 1 MG: 1 TABLET ORAL at 21:52

## 2023-04-07 RX ADMIN — Medication 30 ML: at 21:45

## 2023-04-07 RX ADMIN — CARVEDILOL 12.5 MG: 12.5 TABLET, FILM COATED ORAL at 21:37

## 2023-04-07 RX ADMIN — Medication 5 MG: at 21:37

## 2023-04-07 RX ADMIN — HEPARIN SODIUM 5000 UNITS: 5000 INJECTION, SOLUTION INTRAVENOUS; SUBCUTANEOUS at 05:29

## 2023-04-07 RX ADMIN — AMOXICILLIN AND CLAVULANATE POTASSIUM 400 MG: 400; 57 POWDER, FOR SUSPENSION ORAL at 21:52

## 2023-04-07 RX ADMIN — GLYCOPYRROLATE 1 MG: 1 TABLET ORAL at 09:44

## 2023-04-07 RX ADMIN — Medication 250 MG: at 09:44

## 2023-04-07 RX ADMIN — CARVEDILOL 12.5 MG: 12.5 TABLET, FILM COATED ORAL at 09:44

## 2023-04-07 RX ADMIN — LATANOPROST 1 DROP: 50 SOLUTION OPHTHALMIC at 09:44

## 2023-04-07 RX ADMIN — RISPERIDONE 1 MG: 1 TABLET ORAL at 21:37

## 2023-04-07 RX ADMIN — CHLORHEXIDINE GLUCONATE 0.12% ORAL RINSE 15 ML: 1.2 LIQUID ORAL at 21:37

## 2023-04-07 RX ADMIN — GLYCOPYRROLATE 1 MG: 1 TABLET ORAL at 14:38

## 2023-04-07 RX ADMIN — Medication 250 MG: at 21:37

## 2023-04-07 RX ADMIN — LANSOPRAZOLE 15 MG: KIT at 05:29

## 2023-04-07 RX ADMIN — VALSARTAN 80 MG: 160 TABLET, FILM COATED ORAL at 09:44

## 2023-04-07 RX ADMIN — HEPARIN SODIUM 5000 UNITS: 5000 INJECTION, SOLUTION INTRAVENOUS; SUBCUTANEOUS at 21:37

## 2023-04-07 RX ADMIN — Medication 10 ML: at 09:44

## 2023-04-07 RX ADMIN — HEPARIN SODIUM 5000 UNITS: 5000 INJECTION, SOLUTION INTRAVENOUS; SUBCUTANEOUS at 14:37

## 2023-04-07 RX ADMIN — AMOXICILLIN AND CLAVULANATE POTASSIUM 400 MG: 400; 57 POWDER, FOR SUSPENSION ORAL at 09:53

## 2023-04-07 NOTE — THERAPY TREATMENT NOTE
Acute Care - Speech Language Pathology   Swallow Treatment Note  [place]     Patient Name: Sandro Junior  : 1955  MRN: [id number]  Today's Date: 2023               Admit Date: 3/16/2023    Visit Dx:     ICD-10-CM ICD-9-CM   1. Respiratory distress  R06.03 786.09   2. Tachycardia  R00.0 785.0   3. Airway compromise  J98.8 519.8   4. Acute epiglottitis with airway obstruction - probable  J05.11 464.31   5. Essential hypertension  I10 401.9   6. Voice disturbance  R49.9 784.40   7. Oropharyngeal dysphagia  R13.12 787.22     Patient Active Problem List   Diagnosis   • Essential hypertension   • Chronic pain   • Osteoarthritis of knee   • Prolonged depressive adjustment reaction   • Acute prostatitis   • Atopic rhinitis   • Erectile dysfunction of nonorganic origin   • Glaucoma suspect   • Onychomycosis of toenail   • Malignant neoplasm of rectum   • Seborrheic eczema   • Keloid scar   • Pain of right lower extremity   • Esophageal reflux   • Acute respiratory failure   • Acute strep pyogenes (group A strep) epiglottitis with airway obstruction      Past Medical History:   Diagnosis Date   • Epidermal inclusion cyst    • Esophageal candidiasis    • Keloid scar    • Rectal cancer    • Seborrheic dermatitis      Past Surgical History:   Procedure Laterality Date   • COLECTOMY PARTIAL / TOTAL     • KNEE SURGERY      Left knee meniscal tear repair   • TOOTH EXTRACTION  2016 teeth   • TOTAL KNEE ARTHROPLASTY Right 2016   • TRACHEOSTOMY AND PEG TUBE INSERTION N/A 3/23/2023    Procedure: TRACHEOSTOMY AND PERCUTANEOUS ENDOSCOPIC GASTROSTOMY TUBE INSERTION;  Surgeon: Néstor Jerome MD;  Location: [facility];  Service: General;  Laterality: N/A;       SLP Recommendation and Plan     SLP Diet Recommendation: NPO, long term alternate methods of nutrition/hydration (23 1325)  Recommended Precautions and Strategies: general aspiration precautions (23 132)  SLP Rec. for Method of Medication  Administration: meds via alternate route (04/07/23 1325)     Monitor for Signs of Aspiration: yes, notify SLP if any concerns (04/07/23 1325)  Recommended Diagnostics:  (repeat instrumental prior to d/c) (04/07/23 1325)     Anticipated Discharge Disposition (SLP): inpatient rehabilitation facility, home with home health (04/07/23 1325)     Therapy Frequency (Swallow): 5 days per week (04/07/23 1325)  Predicted Duration Therapy Intervention (Days): until discharge (04/07/23 1325)  Demonstrates Need for Referral to Another Service: speech therapy (04/07/23 1325)     Daily Summary of Progress (SLP): progress toward functional goals as expected (04/07/23 1325)               Treatment Assessment (SLP): improved, clinical signs of, suspected, aspiration (04/07/23 1325)  Treatment Assessment Comments (SLP): Pt w/ improvement in secretions per pt report as well as after trials of ice chips. Pt continues to need support to independently place PMV. Able to state precautions. Pt has been denied rehab and will need further education/instruction as well as repeat instrumental prior to D/C (04/07/23 1325)  Plan for Continued Treatment (SLP): continue treatment per plan of care (04/07/23 1325)         Plan of Care Reviewed With: patient  Progress: improving      SWALLOW EVALUATION (last 72 hours)     SLP Adult Swallow Evaluation     Row Name 04/07/23 1325       Rehab Evaluation    Document Type therapy note (daily note)  -CJ    Subjective Information no complaints  -CJ    Patient Observations alert;cooperative  -CJ    Patient/Family/Caregiver Comments/Observations no family present  -CJ    Patient Effort good  -CJ    Symptoms Noted During/After Treatment none  -CJ       Pain    Additional Documentation Pain Scale: FACES Pre/Post-Treatment (Group)  -CJ       Pain Scale: FACES Pre/Post-Treatment    Pain: FACES Scale, Pretreatment 0-->no hurt  -CJ    Posttreatment Pain Rating 0-->no hurt  -CJ       SLP Treatment Clinical Impressions     Treatment Assessment (SLP) improved;clinical signs of;suspected;aspiration  -    Treatment Assessment Comments (SLP) Pt w/ improvement in secretions per pt report as well as after trials of ice chips. Pt continues to need support to independently place PMV. Able to state precautions. Pt has been denied rehab and will need further education/instruction as well as repeat instrumental prior to D/C  -    Daily Summary of Progress (SLP) progress toward functional goals as expected  -    Plan for Continued Treatment (SLP) continue treatment per plan of care  -    Care Plan Review evaluation/treatment results reviewed;care plan/treatment goals reviewed  -       Recommendations    Therapy Frequency (Swallow) 5 days per week  -    Predicted Duration Therapy Intervention (Days) until discharge  -    SLP Diet Recommendation NPO;long term alternate methods of nutrition/hydration  -    Recommended Diagnostics --  repeat instrumental prior to d/c  -CJ    Recommended Precautions and Strategies general aspiration precautions  -    Oral Care Recommendations Oral Care BID/PRN;Suction toothbrush  -    SLP Rec. for Method of Medication Administration meds via alternate route  -    Monitor for Signs of Aspiration yes;notify SLP if any concerns  -    Anticipated Discharge Disposition (SLP) inpatient rehabilitation facility;home with home health  -    Demonstrates Need for Referral to Another Service speech therapy  -          User Key  (r) = Recorded By, (t) = Taken By, (c) = Cosigned By    Initials Name Effective Dates    Danii Russo, MS CCC-SLP 06/16/21 -                 EDUCATION  The patient has been educated in the following areas:   Dysphagia (Swallowing Impairment) Oral Care/Hydration Modified Diet Instruction.        SLP GOALS     Row Name 04/07/23 1325 04/05/23 0900          (LTG) Patient will demonstrate functional swallow for    Diet Texture (Demonstrate functional swallow) soft to chew  (chopped) textures  -CJ soft to chew (chopped) textures  -CJ     Liquid viscosity (Demonstrate functional swallow) nectar/ mildly thick liquids  -CJ nectar/ mildly thick liquids  -CJ     [place] (Demonstrate functional swallow) with minimal cues (75-90% accuracy)  -CJ with minimal cues (75-90% accuracy)  -CJ     Time Frame (Demonstrate functional swallow) by discharge  -CJ by discharge  -CJ     Progress/Outcomes (Demonstrate functional swallow) continuing progress toward goal  -CJ continuing progress toward goal  -CJ        (STG) Patient will tolerate therapeutic trials of    Consistencies Trialed (Tolerate therapeutic trials) thin liquids  -CJ thin liquids  -CJ     Desired Outcome (Tolerate therapeutic trials) without signs/symptoms of aspiration;without signs of distress  -CJ without signs/symptoms of aspiration;without signs of distress  -CJ     [place] (Tolerate therapeutic trials) with minimal cues (75-90% accuracy)  -CJ with minimal cues (75-90% accuracy)  -CJ     Time Frame (Tolerate therapeutic trials) 1 week  -CJ 1 week  -CJ     Progress/Outcomes (Tolerate therapeutic trials) continuing progress toward goal  -CJ continuing progress toward goal  -CJ     Comment (Tolerate therapeutic trials) WVQ x2  -CJ throat clear x2 w/ ice  -CJ        (STG) Pharyngeal Strengthening Exercise Goal 1 (SLP)    Activity (Pharyngeal Strengthening Goal 1, SLP) increase timing;increase superior movement of the hyolaryngeal complex;increase anterior movement of the hyolaryngeal complex;increase closure at entrance to airway/closure of airway at glottis;increase squeeze/positive pressure generation;increase tongue base retraction  -CJ increase timing;increase superior movement of the hyolaryngeal complex;increase anterior movement of the hyolaryngeal complex;increase closure at entrance to airway/closure of airway at glottis;increase squeeze/positive pressure generation;increase tongue base retraction  -CJ     Increase  Timing prepping - 3 second prep or suck swallow or 3-step swallow  -CJ prepping - 3 second prep or suck swallow or 3-step swallow  -CJ     Increase Superior Movement of the Hyolaryngeal Complex effortful pitch glide (falsetto + pharyngeal squeeze)  -CJ effortful pitch glide (falsetto + pharyngeal squeeze)  -CJ     Increase Closure at Entrance to Airway/Closure of Airway at Glottis supraglottic swallow;breath hold exercises  -CJ supraglottic swallow;breath hold exercises  -CJ     Increase Squeeze/Positive Pressure Generation hard effortful swallow  -CJ hard effortful swallow  -CJ     Increase Tongue Base Retraction franklin  -CJ [place]  -CJ     [place]/Accuracy (Pharyngeal Strengthening Goal 1, SLP) with minimal cues (75-90% accuracy)  -CJ with minimal cues (75-90% accuracy)  -CJ     Time Frame (Pharyngeal Strengthening Goal 1, SLP) short term goal (STG)  -CJ short term goal (STG)  -CJ     Progress/Outcomes (Pharyngeal Strengthening Goal 1, SLP) continuing progress toward goal  -CJ continuing progress toward goal  -CJ     Comment (Pharyngeal Strengthening Goal 1, SLP) reviewed and completed all exercises x5; pt reports independent practice  -CJ reviewed and completed all exercises x5; printout left at bedside  -CJ        Patient will demonstrate functional communication skills for return to discharge environment     [place] Independently  -CJ Independently  -CJ     Time frame by discharge  -CJ by discharge  -CJ     Progress/Outcomes continuing progress toward goal  -CJ continuing progress toward goal  -CJ        Tolerate Speaking Valve Placement Goal 1 (SLP)    Progress/Outcomes (Tolerate Speaking Valve Placement Goal 1, SLP) goal met  -CJ --        Audible Speech with Speaking Valve Goal 1 (SLP)    Audible Speech Goal 1 (SLP) -- 3 feet;background noise;90%;with minimal cues (75-90%)  -CJ     Time Frame (Audible Speech Goal 1, SLP) -- short term goal (STG)  -CJ     Progress (Audible Speech Goal 1, SLP) --  90%;with minimal cues (75-90%)  -CJ     Progress/Outcomes (Audible Speech Goal 1, SLP) goal met  -CJ goal met  -CJ        Independent Use of Speaking Valve Goal 1 (SLP)    Independent Use of Speaking Valve Goal 1 (SLP) place speak valve;remove speak valve;state precautions of use;100%;independently (over 90% accuracy)  -CJ place speak valve;remove speak valve;state precautions of use;100%;independently (over 90% accuracy)  -CJ     Time Frame (Independent use of Speaking Valve Goal 1, SLP) short term goal (STG)  -CJ short term goal (STG)  -CJ     Progress (Independent use of Speaking Valve Goal 1, SLP) 60%;with 1:1 supervision/constant cues  -CJ 50%;with 1:1 supervision/constant cues  -CJ     Progress/Outcomes (Independent use of Speaking Valve Goal 1, SLP) continuing progress toward goal  -CJ continuing progress toward goal  -CJ     Comment (Independent use of Speaking Valve Goal 1, SLP) difficulty placing PMV still; able to state precautions  -CJ difficulty placing PMV, provided mirror for feedback and modeled hand over hand  -CJ           User Key  (r) = Recorded By, (t) = Taken By, (c) = Cosigned By    Initials Name Provider Type    Danii Russo MS CCC-SLP Speech and Language Pathologist                   Time Calculation:    Time Calculation- SLP     Row Name 04/07/23 1404             Time Calculation- SLP    SLP Start Time 1325  -      SLP Received On 04/07/23  -         Untimed Charges    95818-OU Treatment/ST Modification Prosth Aug Alter  23  -      [id number] Treatment Swallow Minutes 24  -CJ         Total Minutes    Untimed Charges Total Minutes 47  -CJ       Total Minutes 47  -CJ            User Key  (r) = Recorded By, (t) = Taken By, (c) = Cosigned By    Initials Name Provider Type    Danii Russo MS CCC-SLP Speech and Language Pathologist                Therapy Charges for Today     Code Description Service Date Service Provider Modifiers Qty    [id number]  ST TREATMENT SWALLOW 2  4/7/2023 Danii Llanos, MS CCC-SLP GN 1    [id number]  ST TREATMENT SPEECH 2 4/7/2023 Danii Llanos, MS ARNOLD-SLP GN 1               Danii Llanos, MS ARNOLD-BRUNO  4/7/2023

## 2023-04-07 NOTE — PROGRESS NOTES
[facility]  PROGRESS NOTE    Patient Name: Sandro Junior  : 1955  MRN: [id number]    Date of Admission: 3/16/2023  Primary Care Physician: Dora Loya PA    Subjective   Subjective     CC:  F/u epiglottitis    HPI:  No new issues overnight.States that he got an email from his insurance stating that his denial was upheld.     ROS:  Gen- No fevers, chills  CV- No chest pain, palpitations  Resp- No cough, dyspnea  GI- No N/V/D, abd pain    Objective   Objective     Vital Signs:   Temp:  [97.6 °F (36.4 °C)-98.5 °F (36.9 °C)] 97.6 °F (36.4 °C)  Heart Rate:  [61-79] 76  Resp:  [18-19] 19  BP: (127-134)/(62-85) 129/85     Physical Exam:  Constitutional: No acute distress, awake, alert  HENT: NCAT, mucous membranes moist  Respiratory: Clear to auscultation bilaterally, tracheostomy in place  Cardiovascular: RRR, no murmurs, rubs, or gallops  Gastrointestinal: Positive bowel sounds, soft, nontender, nondistended, PEG in place  Musculoskeletal: No bilateral ankle edema  Psychiatric: agitated  Neurologic: Oriented x 3, speech clear  Skin: No rashes      Results Reviewed:  LAB RESULTS:          Lab 23  0728   SODIUM 137   POTASSIUM 4.0   CHLORIDE 102   CO2 26.0   ANION GAP 9.0   BUN 18   CREATININE 0.74*   EGFR 99.9   GLUCOSE 167*   CALCIUM 9.8   MAGNESIUM 2.1   PHOSPHORUS 2.9         Lab 23  0728   TOTAL PROTEIN 8.0   ALBUMIN 3.7   GLOBULIN 4.3   ALT (SGPT) 19   AST (SGOT) 20   BILIRUBIN 0.4   ALK PHOS 50             Lab 23  0728   CHOLESTEROL 132   TRIGLYCERIDES 174*             Brief Urine Lab Results  (Last result in the past 365 days)      Color   Clarity   Blood   Leuk Est   Nitrite   Protein   CREAT   Urine HCG        23 Yellow   Clear   Negative   Negative   Negative   30 mg/dL (1+)                 Microbiology Results Abnormal     Procedure Component Value - Date/Time    Blood Culture - Blood, Arm, Left [[id number]]  (Normal)  Collected: 03/20/23 1438    Lab Status: Final result Specimen: Blood from Arm, Left Updated: 03/25/23 1501     Blood Culture No growth at 5 days    Blood Culture - Blood, Hand, Right [[id number]]  (Normal) Collected: 03/16/23 2225    Lab Status: Final result Specimen: Blood from Hand, Right Updated: 03/21/23 2246     Blood Culture No growth at 5 days    MRSA Screen, PCR (Inpatient) - Swab, Nares [[id number]]  (Normal) Collected: 03/17/23 0933    Lab Status: Final result Specimen: Swab from Nares Updated: 03/17/23 1200     MRSA PCR Negative    Narrative:      The negative predictive value of this diagnostic test is high and should only be used to consider de-escalating anti-MRSA therapy. A positive result may indicate colonization with MRSA and must be correlated clinically.  MRSA Negative    COVID PRE-OP / PRE-PROCEDURE SCREENING ORDER (NO ISOLATION) - Swab, Nasopharynx [[id number]]  (Normal) Collected: 03/16/23 2108    Lab Status: Final result Specimen: Swab from Nasopharynx Updated: 03/16/23 2234    Narrative:      The following orders were created for panel order COVID PRE-OP / PRE-PROCEDURE SCREENING ORDER (NO ISOLATION) - Swab, Nasopharynx.  Procedure                               Abnormality         Status                     ---------                               -----------         ------                     Respiratory Panel PCR w/...[[id number]]  Normal              Final result                 Please view results for these tests on the individual orders.    Respiratory Panel PCR w/COVID-19(SARS-CoV-2) CALLY/WILLIAM/RAMONITA/PAD/COR/MAD/CLARK In-House, NP Swab in UTM/VTM, 3-4 HR TAT - Swab, Nasopharynx [[id number]]  (Normal) Collected: 03/16/23 2108    Lab Status: Final result Specimen: Swab from Nasopharynx Updated: 03/16/23 2234     ADENOVIRUS, PCR Not Detected     Coronavirus 229E Not Detected     Coronavirus HKU1 Not Detected     Coronavirus NL63 Not Detected     Coronavirus OC43 Not Detected     COVID19 Not Detected      Human Metapneumovirus Not Detected     Human Rhinovirus/Enterovirus Not Detected     Influenza A PCR Not Detected     Influenza B PCR Not Detected     Parainfluenza Virus 1 Not Detected     Parainfluenza Virus 2 Not Detected     Parainfluenza Virus 3 Not Detected     Parainfluenza Virus 4 Not Detected     RSV, PCR Not Detected     Bordetella pertussis pcr Not Detected     Bordetella parapertussis PCR Not Detected     Chlamydophila pneumoniae PCR Not Detected     Mycoplasma pneumo by PCR Not Detected    Narrative:      In the setting of a positive respiratory panel with a viral infection PLUS a negative procalcitonin without other underlying concern for bacterial infection, consider observing off antibiotics or discontinuation of antibiotics and continue supportive care. If the respiratory panel is positive for atypical bacterial infection (Bordetella pertussis, Chlamydophila pneumoniae, or Mycoplasma pneumoniae), consider antibiotic de-escalation to target atypical bacterial infection.          No radiology results from the last 24 hrs    Results for orders placed during the hospital encounter of 03/16/23    Adult Transthoracic Echo Complete W/ Cont if Necessary Per Protocol    Interpretation Summary  •  Left ventricular systolic function is normal. Calculated left ventricular 3D EF = 56% Left ventricular ejection fraction appears to be 51 - 55%.  •  Left ventricular diastolic function was normal.  •  Estimated right ventricular systolic pressure from tricuspid regurgitation is normal (<35 mmHg).      Current medications:  Scheduled Meds:amoxicillin-clavulanate, 400 mg, Per PEG Tube, Q12H  carvedilol, 12.5 mg, Per PEG Tube, Q12H  chlorhexidine, 15 mL, Mouth/Throat, Q12H  glycopyrrolate, 1 mg, Per PEG Tube, TID  heparin (porcine), 5,000 Units, Subcutaneous, Q8H  insulin regular, 0-7 Units, Subcutaneous, Q6H  lansoprazole, 15 mg, Per PEG Tube, Q AM  latanoprost, 1 drop, Both Eyes, Daily  ProSource No Carb, 30 mL,  Per PEG Tube, BID  risperiDONE, 1 mg, Per PEG Tube, Nightly  saccharomyces boulardii, 250 mg, Per PEG Tube, BID  sodium chloride, 10 mL, Intravenous, Q12H  valsartan, 80 mg, Per PEG Tube, Q24H      Continuous Infusions:   PRN Meds:.•  acetaminophen  •  [DISCONTINUED] acetaminophen **OR** acetaminophen  •  albuterol  •  senna-docusate sodium **AND** polyethylene glycol **AND** [DISCONTINUED] bisacodyl **AND** bisacodyl  •  dextrose  •  glucagon (human recombinant)  •  hydrALAZINE  •  hydrOXYzine  •  Magnesium Standard Dose Replacement - Follow Nurse / BPA Driven Protocol  •  melatonin  •  Phosphorus Replacement - Follow Nurse / BPA Driven Protocol  •  Potassium Replacement - Follow Nurse / BPA Driven Protocol  •  sodium chloride  •  sodium chloride    Assessment & Plan   Assessment & Plan     Active Hospital Problems    Diagnosis  POA   • **Acute strep pyogenes (group A strep) epiglottitis with airway obstruction  [J05.11]  Yes   • Acute respiratory failure [J96.00]  Yes   • Essential hypertension [I10]  Yes      Resolved Hospital Problems   No resolved problems to display.        Brief Hospital Course to date:  Sandro Junior is a 68 y.o. male with PMHx significant for hypertension who presented to [facility] on 3/16 with sore throat and shortness of breath. Upon arrival to the ED he was noted to have significant stridor, tripoding and drooling. He was treated with steroids, epinephrine and H2 blockers but despite this he ultimately required intubation and admission to the ICU. His blood cultures grew Srep pyogenes. He underwent bronch on 3/20 which showed persistent edematous changes in the posterior oropharynx with copious mucopurulent secretions and repeat CT neck 3/21/2023 revealed worsening edema but no sign of abscess. ID was consulted and he was treated with continuous PCN infusion. He was not deemed safe to extubate, therefore he underwent trach and PEG on 3/23. He was transferred to our service on 4/1.     Acute  Group A Strep Epiglottitis   Airway Obstruction s/p Mechanical Ventilation, Trach/PEG 3/23  Strep Pyogenes Bacteremia with Sepsis  - Dr. Johnson following, transitioned to Per PEG Augmentin x 7 days  - trach suctioning PRN, tolerating trach collar  - continue tube feedings, SLP consulted  -- added Robinul per SLP recommendations    HTN:  - continue Diovan, Coreg as BP allows    Anxiety:  - PRN hydroxyzine  - risperdal at bedtime    Constipation (resolved)  -- change bowel regimen from scheduled to PRN    Insurance upheld denial of acute rehab despite family appeal.     Expected Discharge Location and Transportation: TBD  Expected Discharge   Expected Discharge Date and Time     Expected Discharge Date Expected Discharge Time    Apr 10, 2023            DVT prophylaxis:  Medical DVT prophylaxis orders are present.     AM-PAC 6 Clicks Score (PT): 21 (04/06/23 0082)    CODE STATUS:   Code Status and Medical Interventions:   Ordered at: 03/16/23 1756     Code Status (Patient has no pulse and is not breathing):    CPR (Attempt to Resuscitate)     Medical Interventions (Patient has pulse or is breathing):    Full Support       Tere Orellana MD  04/07/23

## 2023-04-07 NOTE — CASE MANAGEMENT/SOCIAL WORK
Continued Stay Note  [facility]     Patient Name: Sandro Junior  MRN: [id number]  Today's Date: 4/7/2023    Admit Date: 3/16/2023    Plan: Home   Discharge Plan     Row Name 04/07/23 1536       Plan    Plan Home    Patient/Family in Agreement with Plan yes    Plan Comments I talked with patient and he told me he rec'd an email from [employer] stating the denial upheld after the family appealed. So, no to [facility]. I called [employer] this information however  hasn't rec'd this information. I discussed patient going home first of next week with services. If he needs HH, he wants [facility]. Patient very angry and upset in his room. Currently, on RA but if home with trach then he will trach kits and his family will learn how to do trach drsg changes. Patient may need home suction and yankers. CM following.    Final Discharge Disposition Code 01 - home or self-care               Discharge Codes    No documentation.               Expected Discharge Date and Time     Expected Discharge Date Expected Discharge Time    Apr 10, 2023             Estephanie Newman, RN

## 2023-04-07 NOTE — PLAN OF CARE
Goal Outcome Evaluation:  Plan of Care Reviewed With: patient        Progress: no change       Patient denies any pain or dyspnea. VSS on RA. NSR w/intermittent 1st degree AVB. TF boluses via pump.

## 2023-04-07 NOTE — PLAN OF CARE
Problem: Skin Injury Risk Increased  Goal: Skin Health and Integrity  Outcome: Ongoing, Progressing     Problem: Fall Injury Risk  Goal: Absence of Fall and Fall-Related Injury  Outcome: Ongoing, Progressing     Problem: Adult Inpatient Plan of Care  Goal: Readiness for Transition of Care  Outcome: Ongoing, Progressing   Goal Outcome Evaluation:  Plan of Care Reviewed With: patient

## 2023-04-07 NOTE — PROGRESS NOTES
1       Sandro Junior  1955  [id number]  4/7/2023    CC: Fever and severe sore throat    Sandro Junior is a 68 y.o. male here for adult epiglottitis, threatened airway with tracheostomy, severe sepsis with group A beta-hemolytic streptococcal bacteremia, leukocytosis, and lactic acidosis.      Past medical history:  Past Medical History:   Diagnosis Date   • Epidermal inclusion cyst    • Esophageal candidiasis    • Keloid scar    • Rectal cancer    • Seborrheic dermatitis        Medications:   Current Facility-Administered Medications:   •  acetaminophen (TYLENOL) 160 MG/5ML solution 650 mg, 650 mg, Per PEG Tube, Q6H PRN, Monika Louis MD, 650 mg at 03/28/23 1714  •  [DISCONTINUED] acetaminophen (TYLENOL) tablet 650 mg, 650 mg, Nasogastric, Q4H PRN, 650 mg at 03/21/23 0253 **OR** acetaminophen (TYLENOL) suppository 650 mg, 650 mg, Rectal, Q4H PRN, Monika Louis MD  •  albuterol (PROVENTIL) nebulizer solution 0.083% 2.5 mg/3mL, 2.5 mg, Nebulization, Q6H PRN, Monika Louis MD  •  amoxicillin-clavulanate (AUGMENTIN) 400-57 MG/5ML suspension 400 mg, 400 mg, Per PEG Tube, Q12H, Zana Johnson MD, 400 mg at 04/06/23 2222  •  sennosides-docusate (PERICOLACE) 8.6-50 MG per tablet 2 tablet, 2 tablet, Per PEG Tube, BID PRN **AND** polyethylene glycol (MIRALAX) packet 17 g, 17 g, Per PEG Tube, Daily PRN **AND** [DISCONTINUED] bisacodyl (DULCOLAX) EC tablet 5 mg, 5 mg, Oral, Daily PRN **AND** bisacodyl (DULCOLAX) suppository 10 mg, 10 mg, Rectal, Daily PRN, Tere Orellana MD  •  carvedilol (COREG) tablet 12.5 mg, 12.5 mg, Per PEG Tube, Q12H, Monika Louis MD, 12.5 mg at 04/06/23 2216  •  chlorhexidine (PERIDEX) 0.12 % solution 15 mL, 15 mL, Mouth/Throat, Q12H, Monika Louis MD, 15 mL at 04/06/23 2216  •  dextrose (D50W) (25 g/50 mL) IV injection 25 g, 25 g, Intravenous, Q15 Min PRN, Monika Louis MD  •  glucagon (GLUCAGEN) injection 1 mg, 1 mg,  Intramuscular, Q15 Min PRN, Monika Louis MD  •  glycopyrrolate (ROBINUL) tablet 1 mg, 1 mg, Per PEG Tube, TID, Tere Orellana MD, 1 mg at 04/06/23 2222  •  heparin (porcine) 5000 UNIT/ML injection 5,000 Units, 5,000 Units, Subcutaneous, Q8H, Monika Louis MD, 5,000 Units at 04/07/23 0529  •  hydrALAZINE (APRESOLINE) injection 20 mg, 20 mg, Intravenous, Q4H PRN, Monika Louis MD, 20 mg at 03/29/23 2051  •  hydrOXYzine (ATARAX) 10 MG/5ML syrup 25 mg, 25 mg, Per PEG Tube, Q8H PRN, Monika Louis MD, 25 mg at 03/31/23 1040  •  insulin regular (humuLIN R,novoLIN R) injection 0-7 Units, 0-7 Units, Subcutaneous, Q6H, Monika Louis MD, 2 Units at 04/05/23 1203  •  lansoprazole (FIRST) oral suspension 15 mg, 15 mg, Per PEG Tube, Q AM, Monika Louis MD, 15 mg at 04/07/23 0529  •  latanoprost (XALATAN) 0.005 % ophthalmic solution 1 drop, 1 drop, Both Eyes, Daily, Monika Louis MD, 1 drop at 04/06/23 0820  •  Magnesium Standard Dose Replacement - Initiate Nurse / BPA Driven Protocol, , Does not apply, PRN, Monika Louis MD  •  melatonin tablet 5 mg, 5 mg, Per G Tube, Nightly PRN, Olga Perez, APRN, 5 mg at 03/31/23 2254  •  Phosphorus Replacement - Initiate Nurse / BPA Driven Protocol, , Does not apply, PRN, Monika Louis MD  •  Potassium Replacement - Initiate Nurse / BPA Driven Protocol, , Does not apply, PRN, Monika Louis MD  •  ProSource No Carb oral solution 30 mL, 30 mL, Per PEG Tube, BID, Monika Louis MD, 30 mL at 04/06/23 2230  •  risperiDONE (risperDAL) tablet 1 mg, 1 mg, Per PEG Tube, Nightly, Monika Louis MD, 1 mg at 04/06/23 2216  •  saccharomyces boulardii (FLORASTOR) capsule 250 mg, 250 mg, Per PEG Tube, BID, Tere Orellana MD, 250 mg at 04/06/23 2216  •  sodium chloride 0.9 % flush 10 mL, 10 mL, Intravenous, Q12H, Monika Louis MD, 10 mL at 04/06/23 2230  •  sodium chloride  "0.9 % flush 10 mL, 10 mL, Intravenous, PRN, Monika Louis MD  •  sodium chloride 0.9 % flush 20 mL, 20 mL, Intravenous, PRN, Monika Louis MD  •  valsartan (DIOVAN) tablet 80 mg, 80 mg, Per PEG Tube, Q24H, Monika Louis MD, 80 mg at 04/05/23 0915  Antibiotics:  Anti-Infectives (From admission, onward)    Ordered     Dose/Rate Route Frequency Start Stop    03/31/23 0754  amoxicillin-clavulanate (AUGMENTIN) 400-57 MG/5ML suspension 400 mg        Note to Pharmacy: Via PEG   Ordering Provider: Zana Johnson MD    400 mg Per PEG Tube Every 12 Hours Scheduled 03/31/23 0900 04/13/23 0717    03/16/23 2151  vancomycin 1500 mg/500 mL 0.9% NS IVPB ([facility])        Ordering Provider: Anand Eaton MD    20 mg/kg × 79.4 kg Intravenous Once 03/16/23 2153 03/16/23 2323          Allergies:  has No Known Allergies.    Review of Systems: All other reviewed and negative except as per HPI    Blood pressure 131/64, pulse 79, temperature 97.8 °F (36.6 °C), temperature source Oral, resp. rate 19, height 185.4 cm (73\"), weight 74.6 kg (164 lb 6.4 oz), SpO2 97 %.  GENERAL: Awake and alert, in no acute distress.  Afebrile and hemodynamically stable.  Mild cough.  Minimal sputum production.  Walking in hallways.  Increased oral intake.  HEENT: Oropharynx without thrush. Sinuses nontender. Dentition in marginal repair. No cervical adenopathy. No carotid bruits/ jugular venous distention.   EYES: PERRL. No conjunctival injection. No icterus. EOMI.  LYMPHATICS: No lymphadenopathy of the neck or axillary or inguinal regions.   HEART: No murmur, gallop, or pericardial friction rub.  PICC exit site without phlebitis.  LUNGS: Clear to auscultation anteriorly. No percussion dullness.  Tracheostomy stoma clean.  ABDOMEN: Soft, nontender, nondistended. No appreciable HSM.  PEG exit site without erythema or gross purulence.  SKIN: Warm and dry without cutaneous eruptions. No embolic stigmata.   PSYCHIATRIC: Mental " status lucid. Cranial nerve function intact.       DIAGNOSTICS:  Lab Results   Component Value Date    WBC 7.59 03/30/2023    HGB 12.6 (L) 03/31/2023    HCT 39.3 03/31/2023     03/30/2023     Lab Results   Component Value Date    CRP 1.09 (H) 03/27/2023     Lab Results   Component Value Date    SEDRATE >130 (H) 03/26/2023     Lab Results   Component Value Date    GLUCOSE 167 (H) 04/04/2023    BUN 18 04/04/2023    CREATININE 0.74 (L) 04/04/2023    EGFRIFAFRI 109 01/10/2022    BCR 24.3 04/04/2023    CO2 26.0 04/04/2023    CALCIUM 9.8 04/04/2023    ALBUMIN 3.7 04/04/2023    AST 20 04/04/2023    ALT 19 04/04/2023       Microbiology: 2/3 admission blood cultures with group A beta-hemolytic streptococci.  Favorable EDNA's to penicillin and ceftriaxone.  MRSA nasopharyngeal PCR negative.  Bio fire viral respiratory pathogen's panel negative for influenza A/B and COVID-19    RADIOLOGY:  Imaging Results (Last 72 Hours)     Procedure Component Value Units Date/Time    SLP FEES - Fiberoptic Endo Eval Swallow [[id number]] Resulted: 04/04/23 1014     Updated: 04/04/23 1014    Narrative:      This procedure was auto-finalized with no dictation required.          Assessment and Plan: Adult epiglottitis.  Group A beta-hemolytic streptococcal bacteremia.  Severe sepsis with fever, leukocytosis, and lactic acidosis.  Tracheostomy for threatened airway.  Questionable beta-lactam drug fever on continuous infusion penicillin.  Maximum temperature over 24 hours 98.5.  Currently 97.8.  Pulse 79, respiratory 18, blood pressure 131/64 mmHg with an O2 saturation of 97% on room air.  BUN/creatinine 18/0.74.  Last peripheral leukocyte count 7.59 with 54% segmented neutrophils.  No new culture data or radiographic imaging studies.  Continue amoxicillin-clavulanate suspension and probiotics through the weekend.  JUDE FISHER available for interval problems.      Zana Johnson MD  4/7/2023

## 2023-04-07 NOTE — PLAN OF CARE
Goal Outcome Evaluation:  Plan of Care Reviewed With: patient        Progress: improving   SLP treatment completed. Will continue to address dysphagia and PMV in tx. Please see note for further details and recommendations.

## 2023-04-08 LAB
GLUCOSE BLDC GLUCOMTR-MCNC: 104 MG/DL (ref 70–130)
GLUCOSE BLDC GLUCOMTR-MCNC: 112 MG/DL (ref 70–130)
GLUCOSE BLDC GLUCOMTR-MCNC: 119 MG/DL (ref 70–130)
GLUCOSE BLDC GLUCOMTR-MCNC: 134 MG/DL (ref 70–130)

## 2023-04-08 PROCEDURE — 99232 SBSQ HOSP IP/OBS MODERATE 35: CPT | Performed by: HOSPITALIST

## 2023-04-08 PROCEDURE — 82962 GLUCOSE BLOOD TEST: CPT

## 2023-04-08 PROCEDURE — 25010000002 HEPARIN (PORCINE) PER 1000 UNITS: Performed by: INTERNAL MEDICINE

## 2023-04-08 RX ADMIN — HEPARIN SODIUM 5000 UNITS: 5000 INJECTION, SOLUTION INTRAVENOUS; SUBCUTANEOUS at 21:16

## 2023-04-08 RX ADMIN — Medication 5 MG: at 21:16

## 2023-04-08 RX ADMIN — CARVEDILOL 12.5 MG: 12.5 TABLET, FILM COATED ORAL at 08:51

## 2023-04-08 RX ADMIN — GLYCOPYRROLATE 1 MG: 1 TABLET ORAL at 16:02

## 2023-04-08 RX ADMIN — LATANOPROST 1 DROP: 50 SOLUTION OPHTHALMIC at 08:52

## 2023-04-08 RX ADMIN — GLYCOPYRROLATE 1 MG: 1 TABLET ORAL at 21:18

## 2023-04-08 RX ADMIN — AMOXICILLIN AND CLAVULANATE POTASSIUM 400 MG: 400; 57 POWDER, FOR SUSPENSION ORAL at 08:52

## 2023-04-08 RX ADMIN — Medication 30 ML: at 21:22

## 2023-04-08 RX ADMIN — LANSOPRAZOLE 15 MG: KIT at 05:34

## 2023-04-08 RX ADMIN — AMOXICILLIN AND CLAVULANATE POTASSIUM 400 MG: 400; 57 POWDER, FOR SUSPENSION ORAL at 21:17

## 2023-04-08 RX ADMIN — Medication 30 ML: at 08:53

## 2023-04-08 RX ADMIN — Medication 250 MG: at 08:51

## 2023-04-08 RX ADMIN — CHLORHEXIDINE GLUCONATE 0.12% ORAL RINSE 15 ML: 1.2 LIQUID ORAL at 08:51

## 2023-04-08 RX ADMIN — VALSARTAN 80 MG: 160 TABLET, FILM COATED ORAL at 08:51

## 2023-04-08 RX ADMIN — Medication 10 ML: at 08:52

## 2023-04-08 RX ADMIN — HEPARIN SODIUM 5000 UNITS: 5000 INJECTION, SOLUTION INTRAVENOUS; SUBCUTANEOUS at 13:29

## 2023-04-08 RX ADMIN — Medication 250 MG: at 21:16

## 2023-04-08 RX ADMIN — CARVEDILOL 12.5 MG: 12.5 TABLET, FILM COATED ORAL at 21:16

## 2023-04-08 RX ADMIN — Medication 10 ML: at 21:17

## 2023-04-08 RX ADMIN — RISPERIDONE 1 MG: 1 TABLET ORAL at 21:16

## 2023-04-08 RX ADMIN — CHLORHEXIDINE GLUCONATE 0.12% ORAL RINSE 15 ML: 1.2 LIQUID ORAL at 21:16

## 2023-04-08 RX ADMIN — HEPARIN SODIUM 5000 UNITS: 5000 INJECTION, SOLUTION INTRAVENOUS; SUBCUTANEOUS at 05:33

## 2023-04-08 RX ADMIN — GLYCOPYRROLATE 1 MG: 1 TABLET ORAL at 08:51

## 2023-04-08 NOTE — PROGRESS NOTES
[facility] Medicine Services  PROGRESS NOTE    Patient Name: Sandro Junior  : 1955  MRN: [id number]    Date of Admission: 3/16/2023  Primary Care Physician: Dora Loya PA    Subjective   Subjective     CC:  F/U epiglottitis    HPI:  Patient seen this morning, overall no major complaints. Wondering if speech therapy is going to re-evaluate him soon. He is thinking it may be Monday or Tuesday before everything can get arranged for him to go home.     ROS:  Gen-no fevers, no chills  CV-no chest pain, no palpitations  Resp-no cough, no dyspnea  GI-no N/V/D, no abd pain    Objective   Objective     Vital Signs:   Temp:  [97.6 °F (36.4 °C)-98.1 °F (36.7 °C)] 97.9 °F (36.6 °C)  Heart Rate:  [65-78] 71  Resp:  [17-19] 18  BP: ()/(60-88) 106/77     Physical Exam:  Gen-no acute distress  HENT-NCAT, mucous membranes moist  CV-RRR, S1 S2 normal, no m/r/g  Resp-CTAB, no wheezes or rales, tracheostomy in place  Abd-soft, NT, ND, +BS, PEG in place  Ext-no edema  Neuro-A&Ox3, no focal deficits  Skin-no rashes  Psych-appropriate mood      Results Reviewed:  LAB RESULTS:          Lab 23  0728   SODIUM 137   POTASSIUM 4.0   CHLORIDE 102   CO2 26.0   ANION GAP 9.0   BUN 18   CREATININE 0.74*   EGFR 99.9   GLUCOSE 167*   CALCIUM 9.8   MAGNESIUM 2.1   PHOSPHORUS 2.9         Lab 23  0728   TOTAL PROTEIN 8.0   ALBUMIN 3.7   GLOBULIN 4.3   ALT (SGPT) 19   AST (SGOT) 20   BILIRUBIN 0.4   ALK PHOS 50             Lab 23  0728   CHOLESTEROL 132   TRIGLYCERIDES 174*             Brief Urine Lab Results  (Last result in the past 365 days)      Color   Clarity   Blood   Leuk Est   Nitrite   Protein   CREAT   Urine HCG        23 Yellow   Clear   Negative   Negative   Negative   30 mg/dL (1+)                 Microbiology Results Abnormal     Procedure Component Value - Date/Time    Blood Culture - Blood, Arm, Left [[id number]]  (Normal) Collected: 23 1438    Lab  Status: Final result Specimen: Blood from Arm, Left Updated: 03/25/23 1501     Blood Culture No growth at 5 days    Blood Culture - Blood, Hand, Right [[id number]]  (Normal) Collected: 03/16/23 2225    Lab Status: Final result Specimen: Blood from Hand, Right Updated: 03/21/23 2246     Blood Culture No growth at 5 days    MRSA Screen, PCR (Inpatient) - Swab, Nares [[id number]]  (Normal) Collected: 03/17/23 0933    Lab Status: Final result Specimen: Swab from Nares Updated: 03/17/23 1200     MRSA PCR Negative    Narrative:      The negative predictive value of this diagnostic test is high and should only be used to consider de-escalating anti-MRSA therapy. A positive result may indicate colonization with MRSA and must be correlated clinically.  MRSA Negative    COVID PRE-OP / PRE-PROCEDURE SCREENING ORDER (NO ISOLATION) - Swab, Nasopharynx [[id number]]  (Normal) Collected: 03/16/23 2108    Lab Status: Final result Specimen: Swab from Nasopharynx Updated: 03/16/23 2234    Narrative:      The following orders were created for panel order COVID PRE-OP / PRE-PROCEDURE SCREENING ORDER (NO ISOLATION) - Swab, Nasopharynx.  Procedure                               Abnormality         Status                     ---------                               -----------         ------                     Respiratory Panel PCR w/...[[id number]]  Normal              Final result                 Please view results for these tests on the individual orders.    Respiratory Panel PCR w/COVID-19(SARS-CoV-2) CALLY/WILLIAM/RAMONITA/PAD/COR/MAD/CLARK In-House, NP Swab in UTM/VTM, 3-4 HR TAT - Swab, Nasopharynx [[id number]]  (Normal) Collected: 03/16/23 2108    Lab Status: Final result Specimen: Swab from Nasopharynx Updated: 03/16/23 2234     ADENOVIRUS, PCR Not Detected     Coronavirus 229E Not Detected     Coronavirus HKU1 Not Detected     Coronavirus NL63 Not Detected     Coronavirus OC43 Not Detected     COVID19 Not Detected     Human Metapneumovirus Not  Detected     Human Rhinovirus/Enterovirus Not Detected     Influenza A PCR Not Detected     Influenza B PCR Not Detected     Parainfluenza Virus 1 Not Detected     Parainfluenza Virus 2 Not Detected     Parainfluenza Virus 3 Not Detected     Parainfluenza Virus 4 Not Detected     RSV, PCR Not Detected     Bordetella pertussis pcr Not Detected     Bordetella parapertussis PCR Not Detected     Chlamydophila pneumoniae PCR Not Detected     Mycoplasma pneumo by PCR Not Detected    Narrative:      In the setting of a positive respiratory panel with a viral infection PLUS a negative procalcitonin without other underlying concern for bacterial infection, consider observing off antibiotics or discontinuation of antibiotics and continue supportive care. If the respiratory panel is positive for atypical bacterial infection (Bordetella pertussis, Chlamydophila pneumoniae, or Mycoplasma pneumoniae), consider antibiotic de-escalation to target atypical bacterial infection.          No radiology results from the last 24 hrs    Results for orders placed during the hospital encounter of 03/16/23    Adult Transthoracic Echo Complete W/ Cont if Necessary Per Protocol    Interpretation Summary  •  Left ventricular systolic function is normal. Calculated left ventricular 3D EF = 56% Left ventricular ejection fraction appears to be 51 - 55%.  •  Left ventricular diastolic function was normal.  •  Estimated right ventricular systolic pressure from tricuspid regurgitation is normal (<35 mmHg).      Current medications:  Scheduled Meds:amoxicillin-clavulanate, 400 mg, Per PEG Tube, Q12H  carvedilol, 12.5 mg, Per PEG Tube, Q12H  chlorhexidine, 15 mL, Mouth/Throat, Q12H  glycopyrrolate, 1 mg, Per PEG Tube, TID  heparin (porcine), 5,000 Units, Subcutaneous, Q8H  insulin regular, 0-7 Units, Subcutaneous, Q6H  lansoprazole, 15 mg, Per PEG Tube, Q AM  latanoprost, 1 drop, Both Eyes, Daily  ProSource No Carb, 30 mL, Per PEG Tube,  BID  risperiDONE, 1 mg, Per PEG Tube, Nightly  saccharomyces boulardii, 250 mg, Per PEG Tube, BID  sodium chloride, 10 mL, Intravenous, Q12H  valsartan, 80 mg, Per PEG Tube, Q24H      Continuous Infusions:   PRN Meds:.•  acetaminophen  •  [DISCONTINUED] acetaminophen **OR** acetaminophen  •  albuterol  •  senna-docusate sodium **AND** polyethylene glycol **AND** [DISCONTINUED] bisacodyl **AND** bisacodyl  •  dextrose  •  glucagon (human recombinant)  •  hydrALAZINE  •  hydrOXYzine  •  Magnesium Standard Dose Replacement - Follow Nurse / BPA Driven Protocol  •  melatonin  •  Phosphorus Replacement - Follow Nurse / BPA Driven Protocol  •  Potassium Replacement - Follow Nurse / BPA Driven Protocol  •  sodium chloride  •  sodium chloride    Assessment & Plan   Assessment & Plan     Active Hospital Problems    Diagnosis  POA   • **Acute strep pyogenes (group A strep) epiglottitis with airway obstruction  [J05.11]  Yes   • Acute respiratory failure [J96.00]  Yes   • Essential hypertension [I10]  Yes      Resolved Hospital Problems   No resolved problems to display.        Brief Hospital Course to date:  Sandro Junior is a 68 y.o. male with PMHx significant for HTN who presented to [facility] on 3/16/23 with sore throat and shortness of breath. Upon arrival to the ED he was noted to have significant stridor, tripoding, and drooling. He was treated with steroids, epinephrine, and H2 blockers but despite this he ultimately required intubation and admission to the ICU. His blood cultures grew Streptococcus pyogenes. He underwent bronchoscopy on 3/20/23 which showed persistent edematous changes in the posterior oropharynx with copious mucopurulent secretions and repeat CT neck 3/21/23 revealed worsening edema but no sign of abscess. ID was consulted and he was treated with continuous PCN infusion. He was not deemed safe to extubate, therefore he underwent tracheostomy and PEG placement on 3/23/23. He was transferred to telemetry  and the hospitalist service on 4/1/23.     This patient's problems and plans were partially entered by my partner and updated as appropriate by me 04/08/23. Copied text in this note has been reviewed and is accurate as of today's date.      Acute Group A Strep Epiglottitis   Airway Obstruction s/p Mechanical Ventilation, Trach/PEG 3/23/23  Strep Pyogenes Bacteremia with Sepsis  --Dr. Johnson/ID following, transitioned to per PEG Augmentin on 3/31/23, on 4/5/23 he recommended continuing for an additional 7 days (~through 4/12/23?)  --Trach suctioning PRN, tolerating trach collar  --Continue tube feedings, SLP following: continue to recommend NPO at this time  --Added Robinul per SLP recommendations     HTN  --Continue home Diovan   --Coreg added this admission     Anxiety  --PRN Hydroxyzine  --Risperdal at bedtime     Constipation (resolved)  --Changed bowel regimen from scheduled to PRN     Insurance upheld denial of acute rehab despite family appeal, plan will now be for home with home health. Will likely discharge home early next week once arrangements have been made. Will need trach/PEG supplies, etc.       Expected Discharge Location and Transportation: home   Expected Discharge   Expected Discharge Date and Time     Expected Discharge Date Expected Discharge Time    Apr 10, 2023            DVT prophylaxis:  Medical DVT prophylaxis orders are present.     AM-PAC 6 Clicks Score (PT): 21 (04/08/23 0800)    CODE STATUS:   Code Status and Medical Interventions:   Ordered at: 03/16/23 1186     Code Status (Patient has no pulse and is not breathing):    CPR (Attempt to Resuscitate)     Medical Interventions (Patient has pulse or is breathing):    Full Support       Nicolle Cross MD  04/08/23

## 2023-04-08 NOTE — PLAN OF CARE
Problem: Adult Inpatient Plan of Care  Goal: Absence of Hospital-Acquired Illness or Injury  Outcome: Ongoing, Progressing     Problem: Fall Injury Risk  Goal: Absence of Fall and Fall-Related Injury  Outcome: Ongoing, Progressing     Problem: Adult Inpatient Plan of Care  Goal: Readiness for Transition of Care  Outcome: Ongoing, Progressing     Problem: Adult Inpatient Plan of Care  Goal: Optimal Comfort and Wellbeing  Outcome: Ongoing, Progressing   Goal Outcome Evaluation:  Plan of Care Reviewed With: patient        Progress: improving

## 2023-04-09 LAB
GLUCOSE BLDC GLUCOMTR-MCNC: 146 MG/DL (ref 70–130)
GLUCOSE BLDC GLUCOMTR-MCNC: 164 MG/DL (ref 70–130)
GLUCOSE BLDC GLUCOMTR-MCNC: 95 MG/DL (ref 70–130)

## 2023-04-09 PROCEDURE — 97116 GAIT TRAINING THERAPY: CPT

## 2023-04-09 PROCEDURE — 97530 THERAPEUTIC ACTIVITIES: CPT

## 2023-04-09 PROCEDURE — 25010000002 HEPARIN (PORCINE) PER 1000 UNITS: Performed by: INTERNAL MEDICINE

## 2023-04-09 PROCEDURE — 99232 SBSQ HOSP IP/OBS MODERATE 35: CPT | Performed by: HOSPITALIST

## 2023-04-09 PROCEDURE — 82962 GLUCOSE BLOOD TEST: CPT

## 2023-04-09 PROCEDURE — 63710000001 INSULIN REGULAR HUMAN PER 5 UNITS: Performed by: INTERNAL MEDICINE

## 2023-04-09 RX ADMIN — GLYCOPYRROLATE 1 MG: 1 TABLET ORAL at 16:02

## 2023-04-09 RX ADMIN — CARVEDILOL 12.5 MG: 12.5 TABLET, FILM COATED ORAL at 08:17

## 2023-04-09 RX ADMIN — INSULIN HUMAN 2 UNITS: 100 INJECTION, SOLUTION PARENTERAL at 17:47

## 2023-04-09 RX ADMIN — CARVEDILOL 12.5 MG: 12.5 TABLET, FILM COATED ORAL at 20:55

## 2023-04-09 RX ADMIN — CHLORHEXIDINE GLUCONATE 0.12% ORAL RINSE 15 ML: 1.2 LIQUID ORAL at 08:17

## 2023-04-09 RX ADMIN — Medication 250 MG: at 20:55

## 2023-04-09 RX ADMIN — Medication 10 ML: at 20:55

## 2023-04-09 RX ADMIN — AMOXICILLIN AND CLAVULANATE POTASSIUM 400 MG: 400; 57 POWDER, FOR SUSPENSION ORAL at 08:17

## 2023-04-09 RX ADMIN — Medication 30 ML: at 08:17

## 2023-04-09 RX ADMIN — RISPERIDONE 1 MG: 1 TABLET ORAL at 20:55

## 2023-04-09 RX ADMIN — AMOXICILLIN AND CLAVULANATE POTASSIUM 400 MG: 400; 57 POWDER, FOR SUSPENSION ORAL at 21:10

## 2023-04-09 RX ADMIN — LATANOPROST 1 DROP: 50 SOLUTION OPHTHALMIC at 08:17

## 2023-04-09 RX ADMIN — LANSOPRAZOLE 15 MG: KIT at 05:58

## 2023-04-09 RX ADMIN — Medication 10 ML: at 08:17

## 2023-04-09 RX ADMIN — GLYCOPYRROLATE 1 MG: 1 TABLET ORAL at 20:55

## 2023-04-09 RX ADMIN — HEPARIN SODIUM 5000 UNITS: 5000 INJECTION, SOLUTION INTRAVENOUS; SUBCUTANEOUS at 21:01

## 2023-04-09 RX ADMIN — Medication 250 MG: at 08:16

## 2023-04-09 RX ADMIN — CHLORHEXIDINE GLUCONATE 0.12% ORAL RINSE 15 ML: 1.2 LIQUID ORAL at 20:55

## 2023-04-09 RX ADMIN — GLYCOPYRROLATE 1 MG: 1 TABLET ORAL at 08:17

## 2023-04-09 RX ADMIN — HEPARIN SODIUM 5000 UNITS: 5000 INJECTION, SOLUTION INTRAVENOUS; SUBCUTANEOUS at 05:58

## 2023-04-09 RX ADMIN — HEPARIN SODIUM 5000 UNITS: 5000 INJECTION, SOLUTION INTRAVENOUS; SUBCUTANEOUS at 13:03

## 2023-04-09 RX ADMIN — Medication 30 ML: at 20:54

## 2023-04-09 NOTE — THERAPY TREATMENT NOTE
Patient Name: Sandro Junior  : 1955    MRN: [id number]                              Today's Date: 2023       Admit Date: 3/16/2023    Visit Dx:     ICD-10-CM ICD-9-CM   1. Respiratory distress  R06.03 786.09   2. Tachycardia  R00.0 785.0   3. Airway compromise  J98.8 519.8   4. Acute epiglottitis with airway obstruction - probable  J05.11 464.31   5. Essential hypertension  I10 401.9   6. Voice disturbance  R49.9 784.40   7. Oropharyngeal dysphagia  R13.12 787.22     Patient Active Problem List   Diagnosis   • Essential hypertension   • Chronic pain   • Osteoarthritis of knee   • Prolonged depressive adjustment reaction   • Acute prostatitis   • Atopic rhinitis   • Erectile dysfunction of nonorganic origin   • Glaucoma suspect   • Onychomycosis of toenail   • Malignant neoplasm of rectum   • Seborrheic eczema   • Keloid scar   • Pain of right lower extremity   • Esophageal reflux   • Acute respiratory failure   • Acute strep pyogenes (group A strep) epiglottitis with airway obstruction      Past Medical History:   Diagnosis Date   • Epidermal inclusion cyst    • Esophageal candidiasis    • Keloid scar    • Rectal cancer    • Seborrheic dermatitis      Past Surgical History:   Procedure Laterality Date   • COLECTOMY PARTIAL / TOTAL     • KNEE SURGERY      Left knee meniscal tear repair   • TOOTH EXTRACTION  2016 teeth   • TOTAL KNEE ARTHROPLASTY Right 2016   • TRACHEOSTOMY AND PEG TUBE INSERTION N/A 3/23/2023    Procedure: TRACHEOSTOMY AND PERCUTANEOUS ENDOSCOPIC GASTROSTOMY TUBE INSERTION;  Surgeon: Néstor Jerome MD;  Location: [facility];  Service: General;  Laterality: N/A;      General Information     Row Name 23 1139          Physical Therapy Time and Intention    Document Type therapy note (daily note)  -ML     Mode of Treatment physical therapy  -ML     Row Name 23 1139          General Information    Patient Profile Reviewed yes  -ML     Existing  Precautions/Restrictions fall;other (see comments)  PEG/Trach, PMV  -ML     Barriers to Rehab medically complex  -ML     Row Name 04/09/23 1139          Cognition    Orientation Status (Cognition) oriented x 3  -ML     Row Name 04/09/23 1139          Safety Issues, Functional Mobility    Impairments Affecting Function (Mobility) endurance/activity tolerance;balance  -ML           User Key  (r) = Recorded By, (t) = Taken By, (c) = Cosigned By    Initials Name Provider Type    Mckayla Mesa Physical Therapist               Mobility     Row Name 04/09/23 1142          Bed Mobility    Bed Mobility supine-sit;sit-supine  -ML     Supine-Sit Webster (Bed Mobility) independent  -ML     Sit-Supine Webster (Bed Mobility) independent  -ML     Row Name 04/09/23 1142          Sit-Stand Transfer    Sit-Stand Webster (Transfers) modified independence  -ML     Assistive Device (Sit-Stand Transfers) cane, straight  -ML     Row Name 04/09/23 1142          Gait/Stairs (Locomotion)    Webster Level (Gait) standby assist  -ML     Assistive Device (Gait) cane, straight  -ML     Distance in Feet (Gait) 600  -ML     Comment, (Gait/Stairs) Patient ambulates with step through gait pattern, no gait deficits noted.  -ML           User Key  (r) = Recorded By, (t) = Taken By, (c) = Cosigned By    Initials Name Provider Type    Mckayla Mesa Physical Therapist               Obj/Interventions     Row Name 04/09/23 1143          Balance    Balance Assessment sitting static balance;sitting dynamic balance;sit to stand dynamic balance;standing static balance;standing dynamic balance  -ML     Static Sitting Balance independent  -ML     Dynamic Sitting Balance independent  -ML     Position, Sitting Balance unsupported;sitting edge of bed  -ML     Sit to Stand Dynamic Balance modified independence  -ML     Static Standing Balance independent  -ML     Dynamic Standing Balance standby assist  -ML     Position/Device Used, Standing  Balance supported;cane, straight  -ML     Balance Interventions sitting;standing;sit to stand;supported;static;dynamic;occupation based/functional task  -ML           User Key  (r) = Recorded By, (t) = Taken By, (c) = Cosigned By    Initials Name Provider Type    Mckayla Mesa Physical Therapist               Goals/Plan     Row Name 04/09/23 1147          Bed Mobility Goal 1 (PT)    Progress/Outcomes (Bed Mobility Goal 1, PT) goal met  -ML     Row Name 04/09/23 1147          Transfer Goal 1 (PT)    Progress/Outcome (Transfer Goal 1, PT) goal met  -ML     Row Name 04/09/23 1147          Gait Training Goal 1 (PT)    Progress/Outcome (Gait Training Goal 1, PT) goal ongoing  -ML           User Key  (r) = Recorded By, (t) = Taken By, (c) = Cosigned By    Initials Name Provider Type    Mckayla Mesa Physical Therapist               Clinical Impression     Row Name 04/09/23 1144          Pain    Pretreatment Pain Rating 0/10 - no pain  -ML     Posttreatment Pain Rating 0/10 - no pain  -ML     Row Name 04/09/23 1144          Plan of Care Review    Plan of Care Reviewed With patient;spouse  -ML     Progress improving  -ML     Outcome Evaluation Physical therapy treatment complete. The patient with significant improvement in mobility and ambulation distance. Patient required SBA to ambulate with SPC, no rest breaks required and no gait deficits noted. Patient without complaints of dyspnea during ambulation, oxygen saturation stable on room air. At this time recommend patient return home with assist and OPPT.  -ML     [facility] Name 04/09/23 1144          Vital Signs    Pre SpO2 (%) 98  -ML     O2 Delivery Pre Treatment room air  -ML     Post SpO2 (%) 97  -ML     O2 Delivery Post Treatment room air  -ML     Pre Patient Position Supine  -ML     Intra Patient Position Standing  -ML     Post Patient Position Supine  -ML     Row Name 04/09/23 1144          Positioning and Restraints    Pre-Treatment Position in bed  -ML     Post  Treatment Position bed  -ML     In Bed notified nsg;fowlers;call light within reach;encouraged to call for assist;with family/caregiver  -ML           User Key  (r) = Recorded By, (t) = Taken By, (c) = Cosigned By    Initials Name Provider Type    Mckayla Mesa Physical Therapist               Outcome Measures     Row Name 04/09/23 1148 04/09/23 0800       How much help from another person do you currently need...    Turning from your back to your side while in flat bed without using bedrails? 4  -ML 4  -SA    Moving from lying on back to sitting on the side of a flat bed without bedrails? 4  -ML 4  -SA    Moving to and from a bed to a chair (including a wheelchair)? 4  -ML 4  -SA    Standing up from a chair using your arms (e.g., wheelchair, bedside chair)? 4  -ML 4  -SA    Climbing 3-5 steps with a railing? 3  -ML 3  -SA    To walk in hospital room? 4  -ML 3  -SA    AM-PAC 6 Clicks Score (PT) 23  -ML 22  -SA    Highest level of mobility 7 --> Walked 25 feet or more  -ML 7 --> Walked 25 feet or more  -SA    Row Name 04/09/23 1148          Functional Assessment    Outcome Measure Options AM-PAC 6 Clicks Basic Mobility (PT)  -ML           User Key  (r) = Recorded By, (t) = Taken By, (c) = Cosigned By    Initials Name Provider Type    Mckayla Mesa Physical Therapist    Olga Johnson, RN Registered Nurse                             Physical Therapy Education     Title: PT OT SLP Therapies (In Progress)     Topic: Physical Therapy (Done)     Point: Mobility training (Done)     Learning Progress Summary           Patient Acceptance, E, VU by ML at 4/9/2023 1148    Acceptance, E, VU,NR by BA at 4/5/2023 1656    Acceptance, TB,E, VU,NR by AY at 3/31/2023 1511    Acceptance, E, VU,NR by HT at 3/30/2023 1405    Acceptance, E, VU by HT at 3/29/2023 1144    Acceptance, E, DU,NR by HT at 3/28/2023 1458    Acceptance, E, VU by HT at 3/27/2023 1026    Acceptance, E, VU by EL at 3/26/2023 1707    Acceptance, PRESTON GRANT VU,NR by LS  at 3/26/2023 1429                   Point: Home exercise program (Done)     Learning Progress Summary           Patient Acceptance, TB,E, VU,NR by AY at 3/31/2023 1511    Acceptance, E, DU,NR by HT at 3/28/2023 1458    Acceptance, E, VU by HT at 3/27/2023 1026    Acceptance, E, VU by EL at 3/26/2023 1707                   Point: Body mechanics (Done)     Learning Progress Summary           Patient Acceptance, E, VU,NR by BA at 4/5/2023 1656    Acceptance, TB,E, VU,NR by AY at 3/31/2023 1511    Acceptance, E, VU,NR by HT at 3/30/2023 1405    Acceptance, E, VU by HT at 3/29/2023 1144    Acceptance, E, DU,NR by HT at 3/28/2023 1458    Acceptance, E, VU by HT at 3/27/2023 1026    Acceptance, E, VU by EL at 3/26/2023 1707    Acceptance, E,D, VU,NR by LS at 3/26/2023 1429                   Point: Precautions (Done)     Learning Progress Summary           Patient Acceptance, E, VU by ML at 4/9/2023 1148    Acceptance, E, VU,NR by BA at 4/5/2023 1656    Acceptance, TB,E, VU,NR by AY at 3/31/2023 1511    Acceptance, E, VU,NR by HT at 3/30/2023 1405    Acceptance, E, VU by HT at 3/29/2023 1144    Acceptance, E, DU,NR by HT at 3/28/2023 1458    Acceptance, E, VU by HT at 3/27/2023 1026    Acceptance, E, VU by EL at 3/26/2023 1707    Acceptance, E,D, VU,NR by LS at 3/26/2023 1429                               User Key     Initials Effective Dates Name Provider Type Discipline     02/03/23 -  Norma Villegas, PT Physical Therapist PT    EL 06/16/21 -  Giana Yates, RN Registered Nurse Nurse    AY 11/10/20 -  Judith Freire, PT Physical Therapist PT    ML 04/22/21 -  Mckayla Morton Physical Therapist PT    BA 09/21/21 -  Orquidea Bronson, PT Physical Therapist PT    HT 01/12/23 -  Chela Barron PT Student PT Student PT              PT Recommendation and Plan     Plan of Care Reviewed With: patient, spouse  Progress: improving  Outcome Evaluation: Physical therapy treatment complete. The patient with significant improvement  in mobility and ambulation distance. Patient required SBA to ambulate with SPC, no rest breaks required and no gait deficits noted. Patient without complaints of dyspnea during ambulation, oxygen saturation stable on room air. At this time recommend patient return home with assist and OPPT.     Time Calculation:    PT Charges     Row Name 04/09/23 1150             Time Calculation    Start Time 1126  -ML      PT Received On 04/09/23  -ML         Timed Charges    89414 - Gait Training Minutes  --  -ML      32761 - PT Therapeutic Activity Minutes 11  -ML         Total Minutes    Timed Charges Total Minutes 11  -ML       Total Minutes 11  -ML            User Key  (r) = Recorded By, (t) = Taken By, (c) = Cosigned By    Initials Name Provider Type    Mckayla Mesa Physical Therapist              Therapy Charges for Today     Code Description Service Date Service Provider Modifiers Qty    [id number]  PT THERAPEUTIC ACT EA 15 MIN 4/9/2023 Mckayla Morton GP 1          PT G-Codes  Outcome Measure Options: AM-PAC 6 Clicks Basic Mobility (PT)  AM-PAC 6 Clicks Score (PT): 23  AM-PAC 6 Clicks Score (OT): 15  PT Discharge Summary  Anticipated Discharge Disposition (PT): home with assist, home with outpatient therapy services    Mckayla Morton  4/9/2023

## 2023-04-09 NOTE — PLAN OF CARE
Goal Outcome Evaluation:      No complain from pt. Medicated as per MAR. Trach size 8 Sh in place, no suctioning required took and tolerated ice trips. PEG tube in place. Fed BAG CHANGED, 350 mls and FEED AND flushed and with sterile water 50 mls. Due care and observation continue.

## 2023-04-09 NOTE — PROGRESS NOTES
[facility] Medicine Services  PROGRESS NOTE    Patient Name: Sandro Junior  : 1955  MRN: [id number]    Date of Admission: 3/16/2023  Primary Care Physician: Dora Loya PA    Subjective   Subjective     CC:  F/U epiglottitis    HPI:  Patient seen this morning, agitated about PT and SLP not seeing him more frequently. No actual medical complaints, breathing well, no pain.    ROS:  Gen-no fevers, no chills  CV-no chest pain, no palpitations  Resp-no cough, no dyspnea  GI-no N/V/D, no abd pain    Objective   Objective     Vital Signs:   Temp:  [97.9 °F (36.6 °C)-98 °F (36.7 °C)] 97.9 °F (36.6 °C)  Heart Rate:  [60-84] 79  Resp:  [18] 18  BP: ()/(62-77) 89/63     Physical Exam:  Gen-no acute distress  HENT-NCAT, mucous membranes moist  CV-RRR, S1 S2 normal, no m/r/g  Resp-CTAB, no wheezes or rales, tracheostomy in place  Abd-soft, NT, ND, +BS, PEG in place  Ext-no edema  Neuro-A&Ox3, no focal deficits  Skin-no rashes  Psych-agitated today      Results Reviewed:  LAB RESULTS:          Lab 23  0728   SODIUM 137   POTASSIUM 4.0   CHLORIDE 102   CO2 26.0   ANION GAP 9.0   BUN 18   CREATININE 0.74*   EGFR 99.9   GLUCOSE 167*   CALCIUM 9.8   MAGNESIUM 2.1   PHOSPHORUS 2.9         Lab 23  0728   TOTAL PROTEIN 8.0   ALBUMIN 3.7   GLOBULIN 4.3   ALT (SGPT) 19   AST (SGOT) 20   BILIRUBIN 0.4   ALK PHOS 50             Lab 23  0728   CHOLESTEROL 132   TRIGLYCERIDES 174*             Brief Urine Lab Results  (Last result in the past 365 days)      Color   Clarity   Blood   Leuk Est   Nitrite   Protein   CREAT   Urine HCG        23 2227 Yellow   Clear   Negative   Negative   Negative   30 mg/dL (1+)                 Microbiology Results Abnormal     Procedure Component Value - Date/Time    Blood Culture - Blood, Arm, Left [[id number]]  (Normal) Collected: 23 1438    Lab Status: Final result Specimen: Blood from Arm, Left Updated: 23 1501     Blood  Culture No growth at 5 days    Blood Culture - Blood, Hand, Right [[id number]]  (Normal) Collected: 03/16/23 2225    Lab Status: Final result Specimen: Blood from Hand, Right Updated: 03/21/23 2246     Blood Culture No growth at 5 days    MRSA Screen, PCR (Inpatient) - Swab, Nares [[id number]]  (Normal) Collected: 03/17/23 0933    Lab Status: Final result Specimen: Swab from Nares Updated: 03/17/23 1200     MRSA PCR Negative    Narrative:      The negative predictive value of this diagnostic test is high and should only be used to consider de-escalating anti-MRSA therapy. A positive result may indicate colonization with MRSA and must be correlated clinically.  MRSA Negative    COVID PRE-OP / PRE-PROCEDURE SCREENING ORDER (NO ISOLATION) - Swab, Nasopharynx [[id number]]  (Normal) Collected: 03/16/23 2108    Lab Status: Final result Specimen: Swab from Nasopharynx Updated: 03/16/23 2234    Narrative:      The following orders were created for panel order COVID PRE-OP / PRE-PROCEDURE SCREENING ORDER (NO ISOLATION) - Swab, Nasopharynx.  Procedure                               Abnormality         Status                     ---------                               -----------         ------                     Respiratory Panel PCR w/...[[id number]]  Normal              Final result                 Please view results for these tests on the individual orders.    Respiratory Panel PCR w/COVID-19(SARS-CoV-2) CALLY/WILLIAM/RAMONITA/PAD/COR/MAD/CLARK In-House, NP Swab in UTM/VTM, 3-4 HR TAT - Swab, Nasopharynx [[id number]]  (Normal) Collected: 03/16/23 2108    Lab Status: Final result Specimen: Swab from Nasopharynx Updated: 03/16/23 2234     ADENOVIRUS, PCR Not Detected     Coronavirus 229E Not Detected     Coronavirus HKU1 Not Detected     Coronavirus NL63 Not Detected     Coronavirus OC43 Not Detected     COVID19 Not Detected     Human Metapneumovirus Not Detected     Human Rhinovirus/Enterovirus Not Detected     Influenza A PCR Not Detected      Influenza B PCR Not Detected     Parainfluenza Virus 1 Not Detected     Parainfluenza Virus 2 Not Detected     Parainfluenza Virus 3 Not Detected     Parainfluenza Virus 4 Not Detected     RSV, PCR Not Detected     Bordetella pertussis pcr Not Detected     Bordetella parapertussis PCR Not Detected     Chlamydophila pneumoniae PCR Not Detected     Mycoplasma pneumo by PCR Not Detected    Narrative:      In the setting of a positive respiratory panel with a viral infection PLUS a negative procalcitonin without other underlying concern for bacterial infection, consider observing off antibiotics or discontinuation of antibiotics and continue supportive care. If the respiratory panel is positive for atypical bacterial infection (Bordetella pertussis, Chlamydophila pneumoniae, or Mycoplasma pneumoniae), consider antibiotic de-escalation to target atypical bacterial infection.          No radiology results from the last 24 hrs    Results for orders placed during the hospital encounter of 03/16/23    Adult Transthoracic Echo Complete W/ Cont if Necessary Per Protocol    Interpretation Summary  •  Left ventricular systolic function is normal. Calculated left ventricular 3D EF = 56% Left ventricular ejection fraction appears to be 51 - 55%.  •  Left ventricular diastolic function was normal.  •  Estimated right ventricular systolic pressure from tricuspid regurgitation is normal (<35 mmHg).      Current medications:  Scheduled Meds:amoxicillin-clavulanate, 400 mg, Per PEG Tube, Q12H  carvedilol, 12.5 mg, Per PEG Tube, Q12H  chlorhexidine, 15 mL, Mouth/Throat, Q12H  glycopyrrolate, 1 mg, Per PEG Tube, TID  heparin (porcine), 5,000 Units, Subcutaneous, Q8H  insulin regular, 0-7 Units, Subcutaneous, Q6H  lansoprazole, 15 mg, Per PEG Tube, Q AM  latanoprost, 1 drop, Both Eyes, Daily  ProSource No Carb, 30 mL, Per PEG Tube, BID  risperiDONE, 1 mg, Per PEG Tube, Nightly  saccharomyces boulardii, 250 mg, Per PEG Tube,  BID  sodium chloride, 10 mL, Intravenous, Q12H  valsartan, 80 mg, Per PEG Tube, Q24H      Continuous Infusions:   PRN Meds:.•  acetaminophen  •  [DISCONTINUED] acetaminophen **OR** acetaminophen  •  albuterol  •  senna-docusate sodium **AND** polyethylene glycol **AND** [DISCONTINUED] bisacodyl **AND** bisacodyl  •  dextrose  •  glucagon (human recombinant)  •  hydrALAZINE  •  hydrOXYzine  •  Magnesium Standard Dose Replacement - Follow Nurse / BPA Driven Protocol  •  melatonin  •  Phosphorus Replacement - Follow Nurse / BPA Driven Protocol  •  Potassium Replacement - Follow Nurse / BPA Driven Protocol  •  sodium chloride  •  sodium chloride    Assessment & Plan   Assessment & Plan     Active Hospital Problems    Diagnosis  POA   • **Acute strep pyogenes (group A strep) epiglottitis with airway obstruction  [J05.11]  Yes   • Acute respiratory failure [J96.00]  Yes   • Essential hypertension [I10]  Yes      Resolved Hospital Problems   No resolved problems to display.        Brief Hospital Course to date:  Sandro Junior is a 68 y.o. male with PMHx significant for HTN who presented to [facility] on 3/16/23 with sore throat and shortness of breath. Upon arrival to the ED he was noted to have significant stridor, tripoding, and drooling. He was treated with steroids, epinephrine, and H2 blockers but despite this he ultimately required intubation and admission to the ICU. His blood cultures grew Streptococcus pyogenes. He underwent bronchoscopy on 3/20/23 which showed persistent edematous changes in the posterior oropharynx with copious mucopurulent secretions and repeat CT neck 3/21/23 revealed worsening edema but no sign of abscess. ID was consulted and he was treated with continuous PCN infusion. He was not deemed safe to extubate, therefore he underwent tracheostomy and PEG placement on 3/23/23. He was transferred to telemetry and the hospitalist service on 4/1/23.     This patient's problems and plans were partially  entered by my partner and updated as appropriate by me 04/09/23. Copied text in this note has been reviewed and is accurate as of today's date.      Acute Group A Strep Epiglottitis   Airway Obstruction s/p Mechanical Ventilation, Trach/PEG 3/23/23  Strep Pyogenes Bacteremia with Sepsis  --Dr. Johnson/ID following, transitioned to per PEG Augmentin on 3/31/23, on 4/5/23 he recommended continuing for an additional 7 days (~through 4/12/23?)  --Trach suctioning PRN, tolerating trach collar: discussed with Pulmonology today, will have them see the patient tomorrow to discuss possibly de-cannulating him  --Continue tube feedings, SLP following: continue to recommend NPO at this time  --Added Owenul per SLP recommendations     HTN  --Continue home Diovan   --Coreg added this admission  --Will add hold parameters today as BP running low     Anxiety  --PRN Hydroxyzine  --Risperdal at bedtime     Constipation (resolved)  --Changed bowel regimen from scheduled to PRN     Insurance upheld denial of acute rehab despite family appeal, plan will now be for home with home health. Will likely discharge home early this week once arrangements have been made. Will need trach/PEG supplies, etc.      Expected Discharge Location and Transportation: home   Expected Discharge 1-2 days based on home arrangements, Pulmonary re-evaluation of trach, SLP re-evaluation  Expected Discharge Date and Time     Expected Discharge Date Expected Discharge Time    Apr 10, 2023            DVT prophylaxis:  Medical DVT prophylaxis orders are present.     AM-PAC 6 Clicks Score (PT): 22 (04/09/23 0800)    CODE STATUS:   Code Status and Medical Interventions:   Ordered at: 03/16/23 1764     Code Status (Patient has no pulse and is not breathing):    CPR (Attempt to Resuscitate)     Medical Interventions (Patient has pulse or is breathing):    Full Support       Nicolle Cross MD  04/09/23

## 2023-04-10 ENCOUNTER — ANCILLARY PROCEDURE (OUTPATIENT)
Dept: SPEECH THERAPY | Facility: HOSPITAL | Age: 68
DRG: 4 | End: 2023-04-10
Payer: MEDICARE

## 2023-04-10 LAB
GLUCOSE BLDC GLUCOMTR-MCNC: 111 MG/DL (ref 70–130)
GLUCOSE BLDC GLUCOMTR-MCNC: 138 MG/DL (ref 70–130)
GLUCOSE BLDC GLUCOMTR-MCNC: 165 MG/DL (ref 70–130)

## 2023-04-10 PROCEDURE — 25010000002 HEPARIN (PORCINE) PER 1000 UNITS: Performed by: INTERNAL MEDICINE

## 2023-04-10 PROCEDURE — 99232 SBSQ HOSP IP/OBS MODERATE 35: CPT | Performed by: HOSPITALIST

## 2023-04-10 PROCEDURE — 99232 SBSQ HOSP IP/OBS MODERATE 35: CPT | Performed by: INTERNAL MEDICINE

## 2023-04-10 PROCEDURE — 63710000001 INSULIN REGULAR HUMAN PER 5 UNITS: Performed by: INTERNAL MEDICINE

## 2023-04-10 PROCEDURE — 82962 GLUCOSE BLOOD TEST: CPT

## 2023-04-10 PROCEDURE — 92612 ENDOSCOPY SWALLOW (FEES) VID: CPT

## 2023-04-10 RX ORDER — CARVEDILOL 6.25 MG/1
6.25 TABLET ORAL EVERY 12 HOURS SCHEDULED
Status: DISCONTINUED | OUTPATIENT
Start: 2023-04-10 | End: 2023-04-11 | Stop reason: HOSPADM

## 2023-04-10 RX ADMIN — Medication 30 ML: at 21:47

## 2023-04-10 RX ADMIN — GLYCOPYRROLATE 1 MG: 1 TABLET ORAL at 16:19

## 2023-04-10 RX ADMIN — HEPARIN SODIUM 5000 UNITS: 5000 INJECTION, SOLUTION INTRAVENOUS; SUBCUTANEOUS at 21:46

## 2023-04-10 RX ADMIN — HEPARIN SODIUM 5000 UNITS: 5000 INJECTION, SOLUTION INTRAVENOUS; SUBCUTANEOUS at 14:30

## 2023-04-10 RX ADMIN — LANSOPRAZOLE 15 MG: KIT at 09:07

## 2023-04-10 RX ADMIN — Medication 30 ML: at 08:20

## 2023-04-10 RX ADMIN — GLYCOPYRROLATE 1 MG: 1 TABLET ORAL at 21:46

## 2023-04-10 RX ADMIN — CHLORHEXIDINE GLUCONATE 0.12% ORAL RINSE 15 ML: 1.2 LIQUID ORAL at 08:18

## 2023-04-10 RX ADMIN — HEPARIN SODIUM 5000 UNITS: 5000 INJECTION, SOLUTION INTRAVENOUS; SUBCUTANEOUS at 06:49

## 2023-04-10 RX ADMIN — RISPERIDONE 1 MG: 1 TABLET ORAL at 21:46

## 2023-04-10 RX ADMIN — INSULIN HUMAN 2 UNITS: 100 INJECTION, SOLUTION PARENTERAL at 17:51

## 2023-04-10 RX ADMIN — GLYCOPYRROLATE 1 MG: 1 TABLET ORAL at 08:19

## 2023-04-10 RX ADMIN — CHLORHEXIDINE GLUCONATE 0.12% ORAL RINSE 15 ML: 1.2 LIQUID ORAL at 21:46

## 2023-04-10 RX ADMIN — Medication 10 ML: at 21:46

## 2023-04-10 RX ADMIN — CARVEDILOL 12.5 MG: 12.5 TABLET, FILM COATED ORAL at 08:18

## 2023-04-10 RX ADMIN — CARVEDILOL 6.25 MG: 6.25 TABLET, FILM COATED ORAL at 21:46

## 2023-04-10 RX ADMIN — LATANOPROST 1 DROP: 50 SOLUTION OPHTHALMIC at 08:18

## 2023-04-10 RX ADMIN — Medication 10 ML: at 08:19

## 2023-04-10 NOTE — MBS/VFSS/FEES
Acute Care - Speech Language Pathology   Swallow Re-Evaluation [facility]   Fiberoptic Endoscopic Evaluation of Swallowing (FEES)     Patient Name: Sandro Junior  : 1955  MRN: [id number]  Today's Date: 4/10/2023               Admit Date: 3/16/2023    Visit Dx:     ICD-10-CM ICD-9-CM   1. Respiratory distress  R06.03 786.09   2. Tachycardia  R00.0 785.0   3. Airway compromise  J98.8 519.8   4. Acute epiglottitis with airway obstruction - probable  J05.11 464.31   5. Essential hypertension  I10 401.9   6. Voice disturbance  R49.9 784.40   7. Oropharyngeal dysphagia  R13.12 787.22     Patient Active Problem List   Diagnosis   • Essential hypertension   • Chronic pain   • Osteoarthritis of knee   • Prolonged depressive adjustment reaction   • Acute prostatitis   • Atopic rhinitis   • Erectile dysfunction of nonorganic origin   • Glaucoma suspect   • Onychomycosis of toenail   • Malignant neoplasm of rectum   • Seborrheic eczema   • Keloid scar   • Pain of right lower extremity   • Esophageal reflux   • Acute respiratory failure   • Acute strep pyogenes (group A strep) epiglottitis with airway obstruction      Past Medical History:   Diagnosis Date   • Epidermal inclusion cyst    • Esophageal candidiasis    • Keloid scar    • Rectal cancer    • Seborrheic dermatitis      Past Surgical History:   Procedure Laterality Date   • COLECTOMY PARTIAL / TOTAL     • KNEE SURGERY      Left knee meniscal tear repair   • TOOTH EXTRACTION      7 teeth   • TOTAL KNEE ARTHROPLASTY Right 2016   • TRACHEOSTOMY AND PEG TUBE INSERTION N/A 3/23/2023    Procedure: TRACHEOSTOMY AND PERCUTANEOUS ENDOSCOPIC GASTROSTOMY TUBE INSERTION;  Surgeon: Néstor Jerome MD;  Location: [facility];  Service: General;  Laterality: N/A;       SLP Recommendation and Plan  SLP Swallowing Diagnosis: severe, pharyngeal dysphagia (04/10/23 1535)  SLP Diet Recommendation: NPO, long term alternate methods of nutrition/hydration, other (see  comments) (ok for 3-4 ice chips/hr, as tolerated) (04/10/23 1535)     SLP Rec. for Method of Medication Administration: meds via alternate route (04/10/23 1535)        Recommended Diagnostics: other (see comments) (consider repeat instrumental study after decannulation (~1 mo), or as indicated, to determine if safe for PO intake) (04/10/23 1535)  Swallow Criteria for Skilled Therapeutic Interventions Met: demonstrates skilled criteria (04/10/23 1535)  Anticipated Discharge Disposition (SLP): anticipate therapy at next level of care, home with home health (04/10/23 1535)  Rehab Potential/Prognosis, Swallowing: good, to achieve stated therapy goals (04/10/23 1535)  Therapy Frequency (Swallow): 5 days per week (04/10/23 1535)  Predicted Duration Therapy Intervention (Days): until discharge (04/10/23 1535)  Demonstrates Need for Referral to Another Service: otolaryngology (ENT), other (see comments) (consider ENT referral if voice does not improve (especially after trach decannulation)) (04/10/23 1535)                                     Plan of Care Reviewed With: patient  Progress: no change      SWALLOW EVALUATION (last 72 hours)     SLP Adult Swallow Evaluation     Row Name 04/10/23 1535                   Rehab Evaluation    Document Type re-evaluation  -AC        Subjective Information no complaints  -        Patient Observations alert;cooperative  -        Patient/Family/Caregiver Comments/Observations No family present.  -AC        Patient Effort excellent  -           General Information    Patient Profile Reviewed yes  -AC        Pertinent History Of Current Problem See previous eval.  -AC        Current Method of Nutrition NPO;gastrostomy feedings  -AC        Plans/Goals Discussed with patient;agreed upon  -        Barriers to Rehab none identified  -        Patient's Goals for Discharge return to PO diet  -AC           Pain Scale: Numbers Pre/Post-Treatment    Pretreatment Pain Rating 0/10 - no pain   -AC        Posttreatment Pain Rating 0/10 - no pain  -AC           General Eating/Swallowing Observations    Respiratory Support Currently in Use tracheostomy;trach collar  -AC        Eating/Swallowing Skills fed by staff/caregiver  -AC        Positioning During Eating upright 90 degree;upright in bed  -           Fiberoptic Endoscopic Evaluation of Swallowing (FEES)    Risks/Benefits Reviewed risks/benefits explained;patient;agreed to eval  -        Tracheostomy Tested with speaking valve on  -AC        Nasal Entry left:  -AC        Scope serial number/identification [id number]  -AC           Anatomy and Physiology    Anatomic Considerations anatomic deviation observed (see comments)  -AC        Comment L TVF movement restricted/sluggish. R TVF remains edematous/erythemic, but appeared to able to achieve both adequate abduction/adduction movement. Incomplete TVF closure upon phonation.  -AC        Velopharyngeal WFL  -AC        Base of Tongue range reduced  -AC        Epiglottis WFL  -AC        Laryngeal Function Breathing asymmetrical;decreased movement left  -AC        Laryngeal Function Phonation asymmetrical;decreased movement left  -AC        Laryngeal Function to Breath Hold no closure  -AC        Secretion Rating Scale (Jaiden et al. 1996) 3- secretions inside the laryngeal vestibule/aspiration, not cleared  -AC        Secretion Description thin;clear;continuous  -AC        Ice Chips elicited swallow;partially cleared secretions;aspirated  -AC        Spontaneous Swallow frequency reduced  -AC        Sensory sensed scope  -AC        Utensils Used Spoon  -AC        Consistencies Trialed thin liquids;honey-thick liquids;pudding/puree  -AC           FEES Interpretation    Oral Phase WFL;solids not tested  -           Initiation of Pharyngeal Swallow    Initiation of Pharyngeal Swallow bolus in valleculae  -AC        Pharyngeal Phase impaired pharyngeal phase of swallowing  -AC        Penetration After the  Swallow all consistencies tested;secondary to residue;in pyriform sinuses  -AC        Aspiration After the Swallow all consistencies tested;secondary to residue;in pyriform sinuses;in laryngeal vestibule  -AC        Depth of Penetration deep  -AC        Response to Penetration No  -AC        Response to Aspiration No  -AC        No spontaneous response to aspiration with non-effective subglottic clearance with cue (see comments);other (see comments)  cough  -AC        Rosenbek's Scale all consistencies:;8-->Level 8  -AC        Residue all consistencies tested;diffuse within pharynx;secondary to reduced base of tongue retraction;secondary to reduced posterior pharyngeal wall stripping;secondary to reduced laryngeal elevation;secondary to reduced hyolaryngeal excursion;other (see comments)  moderate amount  -AC        Response to Residue unable to clear residue;with compensatory maneuver (see comments)  -AC        Attempted Compensatory Maneuvers chin tuck;head turn to left;multiple swallows;effortful (hard swallow)  -AC        Response to Attempted Compensatory Maneuvers did not prevent aspiration;other (see comments)  Head turn to L most successul in reducing residue, but did not fully clear residue & pt cont'd to eventually aspirate after the swallow.  -AC        FEES Summary Unfortunately, no significant improvement in swallow function. Continued laryngeal abnormalities, as well as poor secretion management. Continued aspiration of all consistencies.  -           SLP Evaluation Clinical Impression    SLP Swallowing Diagnosis severe;pharyngeal dysphagia  -AC        Functional Impact risk of aspiration/pneumonia  -        Rehab Potential/Prognosis, Swallowing good, to achieve stated therapy goals  -        Swallow Criteria for Skilled Therapeutic Interventions Met demonstrates skilled criteria  -           Recommendations    Therapy Frequency (Swallow) 5 days per week  -        Predicted Duration Therapy  Intervention (Days) until discharge  -AC        SLP Diet Recommendation NPO;long term alternate methods of nutrition/hydration;other (see comments)  ok for 3-4 ice chips/hr, as tolerated  -AC        Recommended Diagnostics other (see comments)  consider repeat instrumental study after decannulation (~1 mo), or as indicated, to determine if safe for PO intake  -AC        Oral Care Recommendations Oral Care BID/PRN;Suction toothbrush;Before ice/water  -AC        SLP Rec. for Method of Medication Administration meds via alternate route  -AC        Anticipated Discharge Disposition (SLP) anticipate therapy at next level of care;home with home health  -AC        Demonstrates Need for Referral to Another Service otolaryngology (ENT);other (see comments)  consider ENT referral if voice does not improve (especially after trach decannulation)  -              User Key  (r) = Recorded By, (t) = Taken By, (c) = Cosigned By    Initials Name Effective Dates    AC Shelley Iraheta, MS [facility]-SLP 02/03/23 -                 EDUCATION  The patient has been educated in the following areas:   Dysphagia (Swallowing Impairment) Oral Care/Hydration NPO rationale.        SLP GOALS     Row Name 04/10/23 1535             (LTG) Patient will demonstrate functional swallow for    Diet Texture (Demonstrate functional swallow) soft to chew (chopped) textures  -AC      Liquid viscosity (Demonstrate functional swallow) nectar/ mildly thick liquids  -AC      [place] (Demonstrate functional swallow) with minimal cues (75-90% accuracy)  -AC      Progress/Outcomes (Demonstrate functional swallow) goal ongoing  -AC         (STG) Patient will tolerate therapeutic trials of    Consistencies Trialed (Tolerate therapeutic trials) thin liquids  -AC      Desired Outcome (Tolerate therapeutic trials) without signs/symptoms of aspiration;without signs of distress  -AC      [place] (Tolerate therapeutic trials) with minimal cues (75-90% accuracy)  -AC       Time Frame (Tolerate therapeutic trials) by discharge  -AC      Progress/Outcomes (Tolerate therapeutic trials) goal ongoing  -AC         (STG) Pharyngeal Strengthening Exercise Goal 1 (SLP)    Increase Superior Movement of the Hyolaryngeal Complex Mendelsohn;effortful pitch glide (falsetto + pharyngeal squeeze)  -AC      Increase Anterior Movement of the Hyolaryngeal Complex chin tuck against resistance (CTAR)  -AC      Increase Closure at Entrance to Airway/Closure of Airway at Glottis supraglottic swallow;breath hold exercises  -AC      Increase Squeeze/Positive Pressure Generation hard effortful swallow  -AC      Increase Tongue Base Retraction franklin  -AC      [place]/Accuracy (Pharyngeal Strengthening Goal 1, SLP) with minimal cues (75-90% accuracy)  -AC      Time Frame (Pharyngeal Strengthening Goal 1, SLP) short term goal (STG)  -AC      Progress/Outcomes (Pharyngeal Strengthening Goal 1, SLP) goal revised this date  -AC            User Key  (r) = Recorded By, (t) = Taken By, (c) = Cosigned By    Initials Name Provider Type    Shelley Godinez MS CCC-SLP Speech and Language Pathologist                   Time Calculation:    Time Calculation- SLP     Row Name 04/10/23 1639             Time Calculation- SLP    SLP Start Time 1535  -AC      SLP Received On 04/10/23  -         Untimed Charges    [id number] Fiberoptic Endo Eval Swallow Minutes 72  -AC         Total Minutes    Untimed Charges Total Minutes 72  -AC       Total Minutes 72  -AC            User Key  (r) = Recorded By, (t) = Taken By, (c) = Cosigned By    Initials Name Provider Type    Shelley Godinez MS CCC-SLP Speech and Language Pathologist                Therapy Charges for Today     Code Description Service Date Service Provider Modifiers Qty    [id number]  ST FIBEROPTIC ENDO EVAL SWALL 5 4/10/2023 Shelley Iraheta MS CCC-SLP GN 1               Shelley Iraheta MS CCC-SLP  4/10/2023

## 2023-04-10 NOTE — PROGRESS NOTES
[facility] Medicine Services  PROGRESS NOTE    Patient Name: Sandro Junior  : 1955  MRN: [id number]    Date of Admission: 3/16/2023  Primary Care Physician: Dora Loya PA    Subjective   Subjective     CC:  F/U epiglottitis    HPI:  Patient seen this morning, daughters at bedside. No complaints.    ROS:  Gen-no fevers, no chills  CV-no chest pain, no palpitations  Resp-no cough, no dyspnea  GI-no N/V/D, no abd pain    Objective   Objective     Vital Signs:   Temp:  [97.9 °F (36.6 °C)-98.6 °F (37 °C)] 98.4 °F (36.9 °C)  Heart Rate:  [63-95] 71  Resp:  [18] 18  BP: (102-132)/(67-84) 102/67     Physical Exam:  Gen-no acute distress  HENT-NCAT, mucous membranes moist  CV-RRR, S1 S2 normal, no m/r/g  Resp-CTAB, no wheezes or rales, tracheostomy in place  Abd-soft, NT, ND, +BS, PEG in place  Ext-no edema  Neuro-A&Ox3, no focal deficits  Skin-no rashes  Psych-appropriate moood      Results Reviewed:  LAB RESULTS:          Lab 23  0728   SODIUM 137   POTASSIUM 4.0   CHLORIDE 102   CO2 26.0   ANION GAP 9.0   BUN 18   CREATININE 0.74*   EGFR 99.9   GLUCOSE 167*   CALCIUM 9.8   MAGNESIUM 2.1   PHOSPHORUS 2.9         Lab 23  0728   TOTAL PROTEIN 8.0   ALBUMIN 3.7   GLOBULIN 4.3   ALT (SGPT) 19   AST (SGOT) 20   BILIRUBIN 0.4   ALK PHOS 50             Lab 23  0728   CHOLESTEROL 132   TRIGLYCERIDES 174*             Brief Urine Lab Results  (Last result in the past 365 days)      Color   Clarity   Blood   Leuk Est   Nitrite   Protein   CREAT   Urine HCG        237 Yellow   Clear   Negative   Negative   Negative   30 mg/dL (1+)                 Microbiology Results Abnormal     Procedure Component Value - Date/Time    Blood Culture - Blood, Arm, Left [[id number]]  (Normal) Collected: 23 1438    Lab Status: Final result Specimen: Blood from Arm, Left Updated: 23 1501     Blood Culture No growth at 5 days    Blood Culture - Blood, Hand, Right [[id number]]   (Normal) Collected: 03/16/23 2225    Lab Status: Final result Specimen: Blood from Hand, Right Updated: 03/21/23 2246     Blood Culture No growth at 5 days    MRSA Screen, PCR (Inpatient) - Swab, Nares [[id number]]  (Normal) Collected: 03/17/23 0933    Lab Status: Final result Specimen: Swab from Nares Updated: 03/17/23 1200     MRSA PCR Negative    Narrative:      The negative predictive value of this diagnostic test is high and should only be used to consider de-escalating anti-MRSA therapy. A positive result may indicate colonization with MRSA and must be correlated clinically.  MRSA Negative    COVID PRE-OP / PRE-PROCEDURE SCREENING ORDER (NO ISOLATION) - Swab, Nasopharynx [[id number]]  (Normal) Collected: 03/16/23 2108    Lab Status: Final result Specimen: Swab from Nasopharynx Updated: 03/16/23 2234    Narrative:      The following orders were created for panel order COVID PRE-OP / PRE-PROCEDURE SCREENING ORDER (NO ISOLATION) - Swab, Nasopharynx.  Procedure                               Abnormality         Status                     ---------                               -----------         ------                     Respiratory Panel PCR w/...[[id number]]  Normal              Final result                 Please view results for these tests on the individual orders.    Respiratory Panel PCR w/COVID-19(SARS-CoV-2) CALLY/WILLIAM/RAMONITA/PAD/COR/MAD/CLARK In-House, NP Swab in UTM/VTM, 3-4 HR TAT - Swab, Nasopharynx [[id number]]  (Normal) Collected: 03/16/23 2108    Lab Status: Final result Specimen: Swab from Nasopharynx Updated: 03/16/23 2234     ADENOVIRUS, PCR Not Detected     Coronavirus 229E Not Detected     Coronavirus HKU1 Not Detected     Coronavirus NL63 Not Detected     Coronavirus OC43 Not Detected     COVID19 Not Detected     Human Metapneumovirus Not Detected     Human Rhinovirus/Enterovirus Not Detected     Influenza A PCR Not Detected     Influenza B PCR Not Detected     Parainfluenza Virus 1 Not Detected      Parainfluenza Virus 2 Not Detected     Parainfluenza Virus 3 Not Detected     Parainfluenza Virus 4 Not Detected     RSV, PCR Not Detected     Bordetella pertussis pcr Not Detected     Bordetella parapertussis PCR Not Detected     Chlamydophila pneumoniae PCR Not Detected     Mycoplasma pneumo by PCR Not Detected    Narrative:      In the setting of a positive respiratory panel with a viral infection PLUS a negative procalcitonin without other underlying concern for bacterial infection, consider observing off antibiotics or discontinuation of antibiotics and continue supportive care. If the respiratory panel is positive for atypical bacterial infection (Bordetella pertussis, Chlamydophila pneumoniae, or Mycoplasma pneumoniae), consider antibiotic de-escalation to target atypical bacterial infection.          No radiology results from the last 24 hrs    Results for orders placed during the hospital encounter of 03/16/23    Adult Transthoracic Echo Complete W/ Cont if Necessary Per Protocol    Interpretation Summary  •  Left ventricular systolic function is normal. Calculated left ventricular 3D EF = 56% Left ventricular ejection fraction appears to be 51 - 55%.  •  Left ventricular diastolic function was normal.  •  Estimated right ventricular systolic pressure from tricuspid regurgitation is normal (<35 mmHg).      Current medications:  Scheduled Meds:carvedilol, 12.5 mg, Per PEG Tube, Q12H  chlorhexidine, 15 mL, Mouth/Throat, Q12H  glycopyrrolate, 1 mg, Per PEG Tube, TID  heparin (porcine), 5,000 Units, Subcutaneous, Q8H  insulin regular, 0-7 Units, Subcutaneous, Q6H  lansoprazole, 15 mg, Per PEG Tube, Q AM  latanoprost, 1 drop, Both Eyes, Daily  ProSource No Carb, 30 mL, Per PEG Tube, BID  risperiDONE, 1 mg, Per PEG Tube, Nightly  sodium chloride, 10 mL, Intravenous, Q12H  valsartan, 80 mg, Per PEG Tube, Q24H      Continuous Infusions:   PRN Meds:.•  acetaminophen  •  [DISCONTINUED] acetaminophen **OR**  acetaminophen  •  albuterol  •  senna-docusate sodium **AND** polyethylene glycol **AND** [DISCONTINUED] bisacodyl **AND** bisacodyl  •  dextrose  •  glucagon (human recombinant)  •  hydrALAZINE  •  hydrOXYzine  •  Magnesium Standard Dose Replacement - Follow Nurse / BPA Driven Protocol  •  melatonin  •  Phosphorus Replacement - Follow Nurse / BPA Driven Protocol  •  Potassium Replacement - Follow Nurse / BPA Driven Protocol  •  sodium chloride  •  sodium chloride    Assessment & Plan   Assessment & Plan     Active Hospital Problems    Diagnosis  POA   • **Acute strep pyogenes (group A strep) epiglottitis with airway obstruction  [J05.11]  Yes   • Acute respiratory failure [J96.00]  Yes   • Essential hypertension [I10]  Yes      Resolved Hospital Problems   No resolved problems to display.        Brief Hospital Course to date:  Sandro Junior is a 68 y.o. male with PMHx significant for HTN who presented to [facility] on 3/16/23 with sore throat and shortness of breath. Upon arrival to the ED he was noted to have significant stridor, tripoding, and drooling. He was treated with steroids, epinephrine, and H2 blockers but despite this he ultimately required intubation and admission to the ICU. His blood cultures grew Streptococcus pyogenes. He underwent bronchoscopy on 3/20/23 which showed persistent edematous changes in the posterior oropharynx with copious mucopurulent secretions and repeat CT neck 3/21/23 revealed worsening edema but no sign of abscess. ID was consulted and he was treated with continuous PCN infusion. He was not deemed safe to extubate, therefore he underwent tracheostomy and PEG placement on 3/23/23. He was transferred to telemetry and the hospitalist service on 4/1/23.     This patient's problems and plans were partially entered by my partner and updated as appropriate by me 04/10/23. Copied text in this note has been reviewed and is accurate as of today's date.      Acute Group A Strep Epiglottitis    Airway Obstruction s/p Mechanical Ventilation, Trach/PEG 3/23/23  Strep Pyogenes Bacteremia with Sepsis  --Dr. Johnson/ID following, transitioned to per PEG Augmentin on 3/31/23, discontinued today 4/10/23  --Trach suctioning PRN, tolerating trach collar: discussed with Pulmonology today, will see patient today for re-evaluation however unlikely that they will de-cannulate trach just yet  --Continue tube feedings, SLP following: continue to recommend NPO at this time--to reevaluate with FEES today vs tomorrow to see if he can start taking PO  --Added Robinul per SLP recommendations     HTN  --Continue home Diovan   --Coreg added this admission, will decrease dose to 6.25 mg BID today and continue hold parameters     Anxiety  --PRN Hydroxyzine  --Risperdal at bedtime     Constipation (resolved)  --Changed bowel regimen from scheduled to PRN     Insurance upheld denial of acute rehab despite family appeal, and patient with significant improvement with last PT session 4/9/23. The plan will now be for home with home health. Will likely discharge home tomorrow once SLP and Pulmonary have re-evaluated the patient.      Expected Discharge Location and Transportation: home   Expected Discharge likely tomorrow  Expected Discharge Date and Time     Expected Discharge Date Expected Discharge Time    Apr 11, 2023            DVT prophylaxis:  Medical DVT prophylaxis orders are present.     AM-PAC 6 Clicks Score (PT): 23 (04/10/23 0800)    CODE STATUS:   Code Status and Medical Interventions:   Ordered at: 03/16/23 6586     Code Status (Patient has no pulse and is not breathing):    CPR (Attempt to Resuscitate)     Medical Interventions (Patient has pulse or is breathing):    Full Support       Nicolle Cross MD  04/10/23

## 2023-04-10 NOTE — PROGRESS NOTES
"INPATIENT PULMONARY SERVICE  PROGRESS NOTE     Hospital LOS: 25 days    Mr. Sandro Junior, is followed for a Chief Complaint of:  Chief Complaint   Patient presents with   • Shortness of Breath     S     Interval History: Patient is a 68-year-old male originally admitted for 3/16/2023 with acute epiglottitis, severe sepsis and group A hemolytic streptococcal bacteremia.  Hospital course complicated requiring a tracheostomy and PEG (3/23/2023).  Pulmonary reconsulted on 4/10 to evaluate tracheostomy and determine plan for decannulation.  He is overall doing well.  Afebrile and hemodynamically stable today on evaluation.    The patient's relevant past medical, surgical and social history were reviewed and updated in Epic as appropriate.      ROS:   Constitutional: Negative for fever.   Respiratory: Negative for dyspnea.   Cardiovascular: Negative for chest pain.   Gastrointestinal: Negative for  nausea, vomiting and diarrhea.     O     SpO2  Min: 95 %  Max: 99 % No data recorded    I/O 24 HR (7:00 AM-6:59AM):  Intake/Output       04/09/23 0700 - 04/10/23 0659 04/10/23 0700 - 04/11/23 0659    Intake (ml) 1545 985    Output (ml) 400 --    Net (ml) 1145 985    Last Weight 74.6 kg (164 lb 6.4 oz) --        Physical Examination:  Vital Signs: Blood pressure 110/69, pulse 76, temperature 98.7 °F (37.1 °C), temperature source Oral, resp. rate 18, height 185.4 cm (73\"), weight 74.6 kg (164 lb 6.4 oz), SpO2 98 %.    General: The patient appears in no acute distress. Alert, cooperative and interactive.  Neck: Trachea midline, No masses.  Tracheostomy in place without surrounding erythema, bleeding or purulent drainage.  Capped.  Good voice quality.  No stridor.  Chest: Clear to auscultation bilaterally, No wheezing, rhonchi, or rales. No egophony. Normal work of breathing. Equal chest rise.  Appropriate oxygen saturations on room air.   Extremities: No lower extremity edema. No clubbing or cyanosis.  Skin: No rashes, open " wounds, or bruising. Warm, dry, well-perfused.  Neuro: Motor power grossly intact bilaterally. Speech fluid and fluent. Thought process coherent.  Psych: Alert and oriented x 3. Mood stable.    I reviewed the patient's new clinical results.  I reviewed the patient's new imaging results and agree with the interpretation.    Active Hospital Problems    Diagnosis    • **Acute strep pyogenes (group A strep) epiglottitis with airway obstruction     • Acute respiratory failure    • Essential hypertension        A/P     Assessment / Plan:    #Tracheostomy management     Spoke with patient today concerning leaving tracheostomy in for approximately 2-4x weeks post hospital discharge for continued rehabilitation.  Patient reportedly was denied rehab after hospitalization but will continue to rehab at home with home health.  Patient just discontinued antibiotics this morning (4/10; per ID) and has outpatient follow-up in one 1-week.  He is still using PEG for feeds with pending speech evaluation.  Ideally patient passes swallow study, strengthens at home and does well off antibiotics.  If these criteria met and patient looks well in the clinic we will either decannulate during outpatient visit or schedule time in endoscopy if there are ongoing concerns. May see any provider.     -- Rakesh Alaniz MD  Pulmonary/Critical Care

## 2023-04-10 NOTE — PROGRESS NOTES
1       Sandro Junior  1955  [id number]  4/10/2023    CC: Fever and severe sore throat    Sandro Junior is a 68 y.o. male here for adult epiglottitis, severe sepsis with group A beta-hemolytic streptococcal bacteremia, leukocytosis, lactic acidosis, and threatened airway with tracheostomy.      Past medical history:  Past Medical History:   Diagnosis Date   • Epidermal inclusion cyst    • Esophageal candidiasis    • Keloid scar    • Rectal cancer    • Seborrheic dermatitis        Medications:   Current Facility-Administered Medications:   •  acetaminophen (TYLENOL) 160 MG/5ML solution 650 mg, 650 mg, Per PEG Tube, Q6H PRN, Monika Louis MD, 650 mg at 03/28/23 1714  •  [DISCONTINUED] acetaminophen (TYLENOL) tablet 650 mg, 650 mg, Nasogastric, Q4H PRN, 650 mg at 03/21/23 0253 **OR** acetaminophen (TYLENOL) suppository 650 mg, 650 mg, Rectal, Q4H PRN, Monika Louis MD  •  albuterol (PROVENTIL) nebulizer solution 0.083% 2.5 mg/3mL, 2.5 mg, Nebulization, Q6H PRN, Monika Louis MD  •  amoxicillin-clavulanate (AUGMENTIN) 400-57 MG/5ML suspension 400 mg, 400 mg, Per PEG Tube, Q12H, Zana Johnson MD, 400 mg at 04/09/23 2110  •  sennosides-docusate (PERICOLACE) 8.6-50 MG per tablet 2 tablet, 2 tablet, Per PEG Tube, BID PRN **AND** polyethylene glycol (MIRALAX) packet 17 g, 17 g, Per PEG Tube, Daily PRN **AND** [DISCONTINUED] bisacodyl (DULCOLAX) EC tablet 5 mg, 5 mg, Oral, Daily PRN **AND** bisacodyl (DULCOLAX) suppository 10 mg, 10 mg, Rectal, Daily PRN, Tere Orellana MD  •  carvedilol (COREG) tablet 12.5 mg, 12.5 mg, Per PEG Tube, Q12H, Nicolle Cross MD, 12.5 mg at 04/09/23 2055  •  chlorhexidine (PERIDEX) 0.12 % solution 15 mL, 15 mL, Mouth/Throat, Q12H, Monika Louis MD, 15 mL at 04/09/23 2055  •  dextrose (D50W) (25 g/50 mL) IV injection 25 g, 25 g, Intravenous, Q15 Min PRN, Monika Louis MD  •  glucagon (GLUCAGEN) injection 1 mg, 1 mg,  Intramuscular, Q15 Min PRN, Monika Louis MD  •  glycopyrrolate (ROBINUL) tablet 1 mg, 1 mg, Per PEG Tube, TID, Tere Orellana MD, 1 mg at 04/09/23 2055  •  heparin (porcine) 5000 UNIT/ML injection 5,000 Units, 5,000 Units, Subcutaneous, Q8H, Monika Louis MD, 5,000 Units at 04/10/23 0649  •  hydrALAZINE (APRESOLINE) injection 20 mg, 20 mg, Intravenous, Q4H PRN, Monika Louis MD, 20 mg at 03/29/23 2051  •  hydrOXYzine (ATARAX) 10 MG/5ML syrup 25 mg, 25 mg, Per PEG Tube, Q8H PRN, Monika Louis MD, 25 mg at 03/31/23 1040  •  insulin regular (humuLIN R,novoLIN R) injection 0-7 Units, 0-7 Units, Subcutaneous, Q6H, Monika Louis MD, 2 Units at 04/09/23 1747  •  lansoprazole (FIRST) oral suspension 15 mg, 15 mg, Per PEG Tube, Q AM, Monika Louis MD, 15 mg at 04/09/23 0558  •  latanoprost (XALATAN) 0.005 % ophthalmic solution 1 drop, 1 drop, Both Eyes, Daily, Monika Louis MD, 1 drop at 04/09/23 0817  •  Magnesium Standard Dose Replacement - Initiate Nurse / BPA Driven Protocol, , Does not apply, PRN, Monika Louis MD  •  melatonin tablet 5 mg, 5 mg, Per G Tube, Nightly PRN, Olga Perez, APRN, 5 mg at 04/08/23 2116  •  Phosphorus Replacement - Initiate Nurse / BPA Driven Protocol, , Does not apply, PRN, Monika Louis MD  •  Potassium Replacement - Initiate Nurse / BPA Driven Protocol, , Does not apply, PRN, Monika Louis MD  •  ProSource No Carb oral solution 30 mL, 30 mL, Per PEG Tube, BID, Monika Louis MD, 30 mL at 04/09/23 2054  •  risperiDONE (risperDAL) tablet 1 mg, 1 mg, Per PEG Tube, Nightly, Monika Louis MD, 1 mg at 04/09/23 2055  •  saccharomyces boulardii (FLORASTOR) capsule 250 mg, 250 mg, Per PEG Tube, BID, Tere Orellana MD, 250 mg at 04/09/23 2055  •  sodium chloride 0.9 % flush 10 mL, 10 mL, Intravenous, Q12H, Monika Louis MD, 10 mL at 04/09/23 2055  •  sodium chloride  "0.9 % flush 10 mL, 10 mL, Intravenous, PRN, Monika Louis MD  •  sodium chloride 0.9 % flush 20 mL, 20 mL, Intravenous, PRN, Monika Louis MD  •  valsartan (DIOVAN) tablet 80 mg, 80 mg, Per PEG Tube, Q24H, Nicolle Cross MD, 80 mg at 04/08/23 0851  Antibiotics:  Anti-Infectives (From admission, onward)    Ordered     Dose/Rate Route Frequency Start Stop    03/31/23 0754  amoxicillin-clavulanate (AUGMENTIN) 400-57 MG/5ML suspension 400 mg        Note to Pharmacy: Via PEG   Ordering Provider: Zana Johnson MD    400 mg Per PEG Tube Every 12 Hours Scheduled 03/31/23 0900 04/13/23 0717    03/16/23 2151  vancomycin 1500 mg/500 mL 0.9% NS IVPB ([facility])        Ordering Provider: Anand Eaton MD    20 mg/kg × 79.4 kg Intravenous Once 03/16/23 2153 03/16/23 2323          Allergies:  has No Known Allergies.    Review of Systems: All other reviewed and negative except as per HPI    Blood pressure 129/84, pulse 68, temperature 98.4 °F (36.9 °C), temperature source Oral, resp. rate 18, height 185.4 cm (73\"), weight 74.6 kg (164 lb 6.4 oz), SpO2 96 %.  GENERAL: Awake and alert, in no acute distress.  Quiet weekend.  Afebrile and hemodynamically stable.  Minimal cough.  Scant tracheostomy sputum production.  Denies nausea or vomiting.  No diarrhea.  No unusual skin rashes or large joint effusions.  HEENT: Tracheostomy stoma clean.  Oropharynx without thrush. Sinuses nontender. Dentition in reasonable repair. No cervical adenopathy. No carotid bruits/ jugular venous distention.   EYES: PERRL. No conjunctival injection. No icterus. EOMI.  LYMPHATICS: No lymphadenopathy of the neck or axillary or inguinal regions.   HEART: No murmur, gallop, or pericardial friction rub.  PICC exit site without phlebitis.  LUNGS: Clear to auscultation anteriorly. No percussion dullness.  Few posterobasilar rhonchi.  ABDOMEN: Soft, nontender, nondistended. No appreciable HSM.  PEG exit site without erythema or gross " purulence.  No peritoneal findings of rebound or guarding.  SKIN: Warm and dry without cutaneous eruptions. No embolic stigmata.   PSYCHIATRIC: Mental status lucid. Cranial nerve function intact.       DIAGNOSTICS:  Lab Results   Component Value Date    WBC 7.59 03/30/2023    HGB 12.6 (L) 03/31/2023    HCT 39.3 03/31/2023     03/30/2023     Lab Results   Component Value Date    CRP 1.09 (H) 03/27/2023     Lab Results   Component Value Date    SEDRATE >130 (H) 03/26/2023     Lab Results   Component Value Date    GLUCOSE 167 (H) 04/04/2023    BUN 18 04/04/2023    CREATININE 0.74 (L) 04/04/2023    EGFRIFAFRI 109 01/10/2022    BCR 24.3 04/04/2023    CO2 26.0 04/04/2023    CALCIUM 9.8 04/04/2023    ALBUMIN 3.7 04/04/2023    AST 20 04/04/2023    ALT 19 04/04/2023       Microbiology: 2 of 3 admission blood cultures with group A beta-hemolytic streptococci.  Favorable EDNA's to penicillin and ceftriaxone.    RADIOLOGY:  Imaging Results (Last 72 Hours)     ** No results found for the last 72 hours. **          Assessment and Plan: Adult epiglottitis.  Group A beta-hemolytic streptococcal bacteremia.  Severe sepsis.  Leukocytosis.  Lactic acidosis.  Threatened airway with tracheostomy.  Questionable penicillin induced drug fever.  Maximum temperature over 24 hours 98.5.  Currently 98.4.  Pulse 68, respiratory rate 18, blood pressure 129/84 mmHg with an O2 saturation of 96% on room air.  BUN/creatinine 18/0.74.  Peripheral leukocyte count 7.6 with 54% segmented neutrophils.  No new culture data. No new radiographic imaging studies.     Utilization management: Potential discharge home today.  We will discontinue amoxicillin-clavulanate suspension and probiotics.  Please pull PICC catheter before discharge home.  Follow-up with me in the office in 1 week.      Zana Johnson MD  4/10/2023

## 2023-04-10 NOTE — CASE MANAGEMENT/SOCIAL WORK
Continued Stay Note  [facility]     Patient Name: Sandro Junior  MRN: [id number]  Today's Date: 4/10/2023    Admit Date: 3/16/2023    Plan: HOME +/- HH   Discharge Plan     Row Name 04/10/23 1219       Plan    Plan HOME +/- HH    Patient/Family in Agreement with Plan yes    Plan Comments SLP reassessment pending.  Pulmonary to assess for possible trach decannulation prior to DC.  Per bedside discussion with family, plan is for pt to return home with family assistance and HH services (depending on home care needs; ? Trach/PEG/TF).  CM will cont to follow for final DCP.    Final Discharge Disposition Code 30 - still a patient               Discharge Codes    No documentation.               Expected Discharge Date and Time     Expected Discharge Date Expected Discharge Time    Apr 10, 2023             Hazel Bright RN

## 2023-04-10 NOTE — PLAN OF CARE
Goal Outcome Evaluation:  Plan of Care Reviewed With: patient        Progress: no change   SLP re-evaluation completed. Will continue to address dysphagia & communication/PMV use in tx. Needs cont'd SLP services @ next level of care. Please see note for further details and recommendations.

## 2023-04-10 NOTE — PROGRESS NOTES
Clinical Nutrition     Nutrition Support Assessment  Reason for Visit: Follow-up protocol      Patient Name: Sandro Junior  YOB: 1955  MRN: [id number]  Date of Encounter: 04/10/23 11:39 EDT  Admission date: 3/16/2023    Comment:    Continue current regimen:  Fibersource HN @ 350ml 5x/day (6a, 9a, 12p, 3p, 6p). Provide Prosource BID. Water flush @ 100ml (50ml before and after each bolus).   =1750ml, 2220 kcal (111% est needs), 124g pro (111% est needs), 27g fiber, 1418ml FW, 500ml water from flushes, 1918ml TFW.    Nutrition Assessment     Admission Diagnosis:  Acute respiratory failure (HCC) [J96.00]    Problem List:    Acute strep pyogenes (group A strep) epiglottitis with airway obstruction     Essential hypertension    Acute respiratory failure        PMH:   He  has a past medical history of Epidermal inclusion cyst, Esophageal candidiasis, Keloid scar, Rectal cancer, and Seborrheic dermatitis.    PSH:  He  has a past surgical history that includes Knee surgery; Colectomy partial / total; Total knee arthroplasty (Right, 04/11/2016); Tooth extraction (2016); and tracheostomy and peg tube insertion (N/A, 3/23/2023).      Applicable Nutrition Concerns:   Skin: surgical sites  GI:      Applicable Interval History:   (3/16) intubated, large bore NG tube placed  (3/17) EN initiated  (3/20) s/p bronchoscopy - airway edema  (3-23) s/p Trach, PEG  (4/3) SLP rec cont NPO  (4/7) SLP rec cont NPO    Reported/Observed/Food/Nutrition Related History:     4/10  Pt denies any issues w/current regimen-states increased feeds have helped w/hunger.     4/6  Pt reports he is tolerating EN well, denies bloating, abdominal pain. Pt does note that he feels hunger and suspects its a combination of mental hunger and physical hunger-pt tells me he was a big eater PTA. RD will adjust EN regimen-pt prefers to keep 5 feeds and increase volume per feed at this time.      4/3  Pt now acknowledges wt loss from  his UBW.of 186-188 lbs to current standing wt of 161 lbs on 4/3. (timeframe unclear). Allows doing well w bolus feedings. Believes receives these 3x/da.     3/31  Patient sitting in bedside chair at time of visit, EN infusing. No c/o abdominal discomfort or EN intolerance.  Reports he just had a BM.  Reports UBW 184lb. Patient with documented weight of 164lb on 3/16 (admission) with most recent bed weight of 202lb (no method).  Bed zeored at time of visit, will ask RN to obtain a weight once patient is back on in the bed. Patient does not feel he lost or gained any weight.     3/30  Pt resting in bed, talking with PMV, has garbled speech, lots of secretions, pt wants to know when he will be able to eat, take out Trach Per RN: pt has been tolerating TF Plan to transition to bolus feeds      Labs    Labs Reviewed: Yes     Results from last 7 days   Lab Units 04/04/23  0728   GLUCOSE mg/dL 167*   BUN mg/dL 18   CREATININE mg/dL 0.74*   SODIUM mmol/L 137   CHLORIDE mmol/L 102   POTASSIUM mmol/L 4.0   PHOSPHORUS mg/dL 2.9   MAGNESIUM mg/dL 2.1   ALT (SGPT) U/L 19       Results from last 7 days   Lab Units 04/04/23  0728   ALBUMIN g/dL 3.7   CHOLESTEROL mg/dL 132   TRIGLYCERIDES mg/dL 174*       Results from last 7 days   Lab Units 04/10/23  1134 04/10/23  0023 04/09/23  1727 04/09/23  1117 04/09/23  0550 04/08/23  2345   GLUCOSE mg/dL 138* 111 164* 146* 95 112     Lab Results   Lab Value Date/Time    HGBA1C 5.2 10/20/2020 0841                 Medications    Medications Reviewed: Yes  Insulin, pericolace, lansoprazole, risperdal    Intake/Ouptut 24 hrs (0701 - 0700)   I&O's Reviewed: Yes   Intake & Output (last day)       04/09 0701  04/10 0700 04/10 0701  04/11 0700    Other 395 185    NG/GT 1150 350    Total Intake(mL/kg) 1545 (20.7) 535 (7.2)    Urine (mL/kg/hr) 0 (0)     Emesis/NG output 400     Total Output 400     Net +1145 +535          Urine Unmeasured Occurrence 4 x         Last recorded bm x 1 on  "4/9    Anthropometrics     Admission Height 185.4 cm (73\") Documented at 03/16/2023 2104   Admission Weight 79.4 kg (175 lb) Documented at 03/16/2023 2104       Height: 73in 185.4 cm  Weight: 161 lb standing scale on 4/3 73.2 Kg  BMI:21.28  BMI classification: Normal: 18.5-24.9kg/m2      UBW: Most Recent Wts:   4/3/2023 73.165 kg 161 lb 4.8 oz Standing scale   4/2/2023 74.299 kg 163 lb 12.8 oz Standing scale   4/1/2023 80.151 kg 176 lb 11.2 oz Bed scale   3/31/2023 74.7 kg 164 lb 10.9 oz Bed scale   3/31/2023 91.8 kg 202 lb 6.1 oz -   3/26/2023 92 kg 202 lb 13.2 oz -   3/25/2023 87 kg 191 lb 12.8 oz -   3/17/2023 77.9 kg 171 lb 11.8 oz -   3/16/2023 74.5 kg 164 lb 3.9 oz Bed scale   3/16/2023 79.379 kg 175 lb Estimated        Older Wts:   LView Complete Flowsheet  Weight Weight (kg) Weight (lbs) Weight Method VISIT REPORT   4/5/2022 80.559 kg 177 lb 9.6 oz - Report   1/10/2022 80.196 kg 176 lb 12.8 oz - Report   12/9/2021 79.652 kg 175 lb 9.6 oz - Report   11/13/2021 84.823 kg 187 lb Stated -   3/22/2021 83.008 kg 183 lb - Report   11/19/2020 83.643 kg 184 lb 6.4 oz - Report   10/20/2020 82.918 kg 182 lb 12.8 oz - Report   9/21/2020 84.188 kg 185 lb 9.6 oz - Report   3/16/2020 85.639 kg 188 lb 12.8 oz - Report   2/7/2020 84.823 kg 187 lb - Report   7/16/2019 81.738 kg 180 lb 3.2 oz - Report   1/15/2019 83.915 kg 185 lb - Report     Wt change: apparent loss from 177 lbs 1 yr ago to 161 lbs   Loss of 16 lbs 9% body wt from 1 yr ago.     Nutrition Focused Physical Exam     Date: 4/3    Pt meets criteria for non severe chronic malnutrition based on wasting See MSA note.     Needs Assessment   Date: 3/30, 4/6 (standing scale wt 163lbs)    Height used:73in, 185.4 cm  Weights used:164lb 74.5 Kg from 3/16     Estimated Calorie needs: ~2000 calories   Method: MS x 1.3: 2053kcal  Method: 25-30 Kcals/KG actual wt = 1864-2236kcal    Estimated Protein needs: ~112 g protein  Method: 1.2-1.5 g/Kg actual wt:89-112g " protein      Current Nutrition Prescription     PO: NPO Diet NPO Type: Strict NPO     EN: FiberSource HN   Goal Rate: 350ml per bolus                   Water Flushes: 100ml ev bolus  Modular: Prosource-no carb 2/day  Route: PEG  Tube: Lg bore     At goal over 12 daytime hrs Bolus 5 times a day: - 6am, 9am, 12pm, 3pm and 6pm.      Rx will supply:   Goal Volume 1750 mL/day       Flush Volume 500 mL/day       Energy 2220 Kcal/day 111 % Est Need   Protein 124 g/day 111 % Est Need   Fiber 27 g/day       Water in  EN 1418 mL       Total Water 1918 mL       Meet DRI Yes            --------------------------------------------------------------------------  Product/Rate verified at bedside: No  Infusing Rate at time of visit: no feeding at time of visit    Average Delivery from [place] Days: w Prosource  Volume 1505 mL/day 86  % Goal Vol.   Flush Volume 420 mL/day     Energy 1926 Kcal/day 87 % Est Need   Protein 111 g/day 89 % Est Need   Fiber 23 g/day     Water in  EN 1219 mL     Total Water 1639 mL     Meet DRI Yes            Nutrition Diagnosis     Date: 3/17 Updated: 3-30, 4/3  Problem Inadequate energy intake   Etiology ARF/ Surgery   Signs/Symptoms 92% goal volume EN   Status: resolved    Date:  4/3 Updated:  Problem Malnutrition  non severe chronic   Etiology Alt GI Fx    Signs/Symptoms Wasting r/t wt loss over a year   Status:    Date:  4/10 Updated:  Problem Swallowing difficulty   Etiology dysphagia   Signs/Symptoms Long-term NPO rec per SLP, +PEG   Status:    Goal:   General: Nutrition support treatment  EN/PN: Maintain EN, Tolerate EN at goal     Nutrition Intervention      Follow treatment progress, Care plan reviewed    Fibersource HN @ 350ml 5x/day (6a, 9a, 12p, 3p, 6p). Provide Prosource BID. Water flush @ 100ml (50ml before and after each bolus).   =1750ml, 2220 kcal (111% est needs), 124g pro (111% est needs), 27g fiber, 1418ml FW, 500ml water from flushes, 1918ml TFW.    Monitoring/Evaluation:   Per  protocol, I&O, Pertinent labs, EN delivery/tolerance, Weight, GI status, Symptoms, Swallow function      Giana Warner RD  Time Spent: 25 min

## 2023-04-10 NOTE — PLAN OF CARE
Goal Outcome Evaluation:     Pt. Remain stable. No complain. VSS stable. Trach in place with speaking valve. Pt. Breathing on room air.TL picc to R+ UA saline lock. Possible home today.

## 2023-04-11 ENCOUNTER — READMISSION MANAGEMENT (OUTPATIENT)
Dept: CALL CENTER | Facility: HOSPITAL | Age: 68
End: 2023-04-11
Payer: MEDICARE

## 2023-04-11 VITALS
HEART RATE: 77 BPM | HEIGHT: 73 IN | WEIGHT: 168.6 LBS | RESPIRATION RATE: 19 BRPM | DIASTOLIC BLOOD PRESSURE: 72 MMHG | SYSTOLIC BLOOD PRESSURE: 123 MMHG | BODY MASS INDEX: 22.35 KG/M2 | OXYGEN SATURATION: 96 % | TEMPERATURE: 98.5 F

## 2023-04-11 PROBLEM — J05.11 ACUTE EPIGLOTTITIS WITH AIRWAY OBSTRUCTION: Status: RESOLVED | Noted: 2023-03-16 | Resolved: 2023-04-11

## 2023-04-11 PROBLEM — R13.10 DYSPHAGIA: Status: ACTIVE | Noted: 2023-04-11

## 2023-04-11 PROBLEM — J96.00 ACUTE RESPIRATORY FAILURE: Status: RESOLVED | Noted: 2023-03-16 | Resolved: 2023-04-11

## 2023-04-11 LAB
GLUCOSE BLDC GLUCOMTR-MCNC: 103 MG/DL (ref 70–130)
GLUCOSE BLDC GLUCOMTR-MCNC: 110 MG/DL (ref 70–130)
GLUCOSE BLDC GLUCOMTR-MCNC: 139 MG/DL (ref 70–130)

## 2023-04-11 PROCEDURE — 94761 N-INVAS EAR/PLS OXIMETRY MLT: CPT

## 2023-04-11 PROCEDURE — 25010000002 HEPARIN (PORCINE) PER 1000 UNITS: Performed by: INTERNAL MEDICINE

## 2023-04-11 PROCEDURE — 82962 GLUCOSE BLOOD TEST: CPT

## 2023-04-11 PROCEDURE — 99239 HOSP IP/OBS DSCHRG MGMT >30: CPT | Performed by: HOSPITALIST

## 2023-04-11 RX ORDER — SILDENAFIL 50 MG/1
50 TABLET, FILM COATED ORAL DAILY PRN
Qty: 30 TABLET | Refills: 1
Start: 2023-04-11

## 2023-04-11 RX ORDER — GLYCOPYRROLATE 1 MG/1
1 TABLET ORAL 3 TIMES DAILY
Qty: 90 TABLET | Refills: 0 | Status: SHIPPED | OUTPATIENT
Start: 2023-04-11 | End: 2023-05-11

## 2023-04-11 RX ORDER — VALSARTAN 80 MG/1
80 TABLET ORAL DAILY
Qty: 90 TABLET | Refills: 3
Start: 2023-04-11

## 2023-04-11 RX ORDER — LANSOPRAZOLE
15 KIT
Qty: 300 ML | Refills: 0 | Status: SHIPPED | OUTPATIENT
Start: 2023-04-12

## 2023-04-11 RX ORDER — AMINO ACIDS/PROTEIN HYDROLYS 11G-40/45
30 LIQUID IN PACKET (ML) ORAL 2 TIMES DAILY
Qty: 1800 ML | Refills: 0 | Status: SHIPPED | OUTPATIENT
Start: 2023-04-11 | End: 2023-05-11

## 2023-04-11 RX ADMIN — CARVEDILOL 6.25 MG: 6.25 TABLET, FILM COATED ORAL at 08:39

## 2023-04-11 RX ADMIN — Medication 10 ML: at 08:41

## 2023-04-11 RX ADMIN — CHLORHEXIDINE GLUCONATE 0.12% ORAL RINSE 15 ML: 1.2 LIQUID ORAL at 08:39

## 2023-04-11 RX ADMIN — VALSARTAN 80 MG: 160 TABLET, FILM COATED ORAL at 08:39

## 2023-04-11 RX ADMIN — LATANOPROST 1 DROP: 50 SOLUTION OPHTHALMIC at 08:39

## 2023-04-11 RX ADMIN — LANSOPRAZOLE 15 MG: KIT at 06:04

## 2023-04-11 RX ADMIN — GLYCOPYRROLATE 1 MG: 1 TABLET ORAL at 15:25

## 2023-04-11 RX ADMIN — GLYCOPYRROLATE 1 MG: 1 TABLET ORAL at 08:39

## 2023-04-11 RX ADMIN — HEPARIN SODIUM 5000 UNITS: 5000 INJECTION, SOLUTION INTRAVENOUS; SUBCUTANEOUS at 06:03

## 2023-04-11 RX ADMIN — Medication 30 ML: at 08:42

## 2023-04-11 NOTE — CASE MANAGEMENT/SOCIAL WORK
Case Management Discharge Note      Final Note: Plan is home with [facility].  They are aware of DC today.  TF arranged per [facility] and will be delivered to pt's home this afternoon (approx $1.50/day).  CM provided pt with info to order ProStat or ProSource protein supplement online.  Pt instructed to provide 15g protein per day as directed on bottle. Trach supplies and suction delivered to bedside today per [facility].  No other needs identified/voiced.         Selected Continued Care - Admitted Since 3/16/2023     Destination    No services have been selected for the patient.              Durable Medical Equipment     Service Provider Selected Services Address Phone Fax Patient Preferred    [facility] [facility] [address], [facility] [id number] [phone] --          Dialysis/Infusion     Service Provider Selected Services Address Phone Fax Patient Preferred    [facility] [address], [facility] [id number] [phone] --          Home Medical Care     Service Provider Selected Services Address Phone Fax Patient Preferred    [facility] [address], [facility] [id number] [phone] --          Therapy    No services have been selected for the patient.              Community Resources    No services have been selected for the patient.              Community & DME    No services have been selected for the patient.                       Final Discharge Disposition Code: 06 - home with home health care

## 2023-04-11 NOTE — DISCHARGE PLACEMENT REQUEST
"DENISE COLE, RN    P:  [phone]  F:  [phone]                    Sandro Junior (68 y.o. Male)     Date of Birth   1955    Social Security Number       Address   [address] [place] [address]    Home Phone   [phone]    MRN   [id number]       [facility]    Marital Status                               Admission Date   3/16/23    Admission Type   Emergency    Admitting Provider   Nicolle Cross MD    Attending Provider   Nicolle Cross MD    Department, Room/Bed   [address], S454/1       Discharge Date       Discharge Disposition       Discharge Destination                               Attending Provider: Nicolle Cross MD    Allergies: No Known Allergies    Isolation: None   Infection: None   Code Status: CPR    Ht: 185.4 cm (73\")   Wt: 76.5 kg (168 lb 9.6 oz)    Admission Cmt: None   Principal Problem: Acute strep pyogenes (group A strep) epiglottitis with airway obstruction  [J05.11]                 Active Insurance as of 3/16/2023     Primary Coverage     Payor Plan Insurance Group Employer/Plan Group    HUMANA MEDICARE REPLACEMENT HUMANA MEDICARE REPLACEMENT [id number]     Payor Plan Address Payor Plan Phone Number Payor Plan Fax Number Effective Dates    [address] [address] [phone]  2023 - None Entered    [facility] [address]       Subscriber Name Subscriber Birth Date Member ID       SANDRO JUNIOR 1955 [id number]                 Emergency Contacts      (Rel.) Home Phone Work Phone Mobile Phone    CAROL JUNIOR (Spouse) [phone] -- [phone]    C,Zohra (Grandchild) -- -- [phone]    C,Hope (Daughter) -- -- [phone]                               [address] [place] [address] [facility] [address]  Phone:  [phone]  Fax:  [phone]        Patient: ROOM: [facility] [[email or web address]]   Sandro Junior MRN:  [id number]   [address] [address] :  " 1955  SSN:    Phone: [phone] Sex:  M   PCP: Dora Loya                Emergency Contact Information      Name Relation Home Work Mobile     CAROL JUNIOR Spouse [phone]     C,Zohra Grandchild     [phone]     C,Adeshahbaz Daughter     [phone]          INSURANCE PAYOR PLAN GROUP # SUBSCRIBER ID   Primary:    HUMANA MEDICARE REPLACEMENT [id number]   Admitting Diagnosis: Acute respiratory failure [J96.00]  Order Date:  Apr 11, 2023        Case Management  Consult       (Order ID: [id number])     Diagnosis:         Priority:  Routine Expected Date:   Expiration Date:        Interval:  Once Count:    Comments: -Fibersource HN @ 350ml 5x/day (6a, 9a, 12p, 3p, 6p).   -Provide Prosource BID.   -Water flush @ 100ml (50ml before and after each bolus)  Reason for Consult: Arrange home tube feeding        Authorizing Provider:Nicolle Cross MD  Authorizing Provider's NPI: [id number]  Order Entered By: Hazel Bright RN 4/11/2023  9:07 AM     Electronically signed by: Nicolle Cross MD 4/11/2023  9:07 AM                              Physician Progress Notes (most recent note)      Zana Johnson MD at 04/11/23 0827          1       Sandro Junior  1955  [id number]  4/11/2023    CC: Severe sore throat and fever    Sandro Junior is a 68 y.o. male here for adult epiglottitis, severe sepsis with group A beta-hemolytic streptococcal bacteremia, and threatened airway with tracheostomy.      Past medical history:  Past Medical History:   Diagnosis Date   • Epidermal inclusion cyst    • Esophageal candidiasis    • Keloid scar    • Rectal cancer    • Seborrheic dermatitis        Medications:   Current Facility-Administered Medications:   •  acetaminophen (TYLENOL) 160 MG/5ML solution 650 mg, 650 mg, Per PEG Tube, Q6H PRN, Monika Louis MD, 650 mg at 03/28/23 5360  •  [DISCONTINUED] acetaminophen (TYLENOL) tablet 650 mg, 650  mg, Nasogastric, Q4H PRN, 650 mg at 03/21/23 0253 **OR** acetaminophen (TYLENOL) suppository 650 mg, 650 mg, Rectal, Q4H PRN, Monika Louis MD  •  albuterol (PROVENTIL) nebulizer solution 0.083% 2.5 mg/3mL, 2.5 mg, Nebulization, Q6H PRN, Monika Louis MD  •  sennosides-docusate (PERICOLACE) 8.6-50 MG per tablet 2 tablet, 2 tablet, Per PEG Tube, BID PRN **AND** polyethylene glycol (MIRALAX) packet 17 g, 17 g, Per PEG Tube, Daily PRN **AND** [DISCONTINUED] bisacodyl (DULCOLAX) EC tablet 5 mg, 5 mg, Oral, Daily PRN **AND** bisacodyl (DULCOLAX) suppository 10 mg, 10 mg, Rectal, Daily PRN, Tere Orellana MD  •  carvedilol (COREG) tablet 6.25 mg, 6.25 mg, Per PEG Tube, Q12H, Nicolle Cross MD, 6.25 mg at 04/10/23 2146  •  chlorhexidine (PERIDEX) 0.12 % solution 15 mL, 15 mL, Mouth/Throat, Q12H, Monika Louis MD, 15 mL at 04/10/23 2146  •  dextrose (D50W) (25 g/50 mL) IV injection 25 g, 25 g, Intravenous, Q15 Min PRN, Monika Louis MD  •  glucagon (GLUCAGEN) injection 1 mg, 1 mg, Intramuscular, Q15 Min PRN, Monika Louis MD  •  glycopyrrolate (ROBINUL) tablet 1 mg, 1 mg, Per PEG Tube, TID, Tere Orellana MD, 1 mg at 04/10/23 2146  •  heparin (porcine) 5000 UNIT/ML injection 5,000 Units, 5,000 Units, Subcutaneous, Q8H, Monika Louis MD, 5,000 Units at 04/11/23 0603  •  hydrALAZINE (APRESOLINE) injection 20 mg, 20 mg, Intravenous, Q4H PRN, Monika Louis MD, 20 mg at 03/29/23 2051  •  hydrOXYzine (ATARAX) 10 MG/5ML syrup 25 mg, 25 mg, Per PEG Tube, Q8H PRN, Monika Louis MD, 25 mg at 03/31/23 1040  •  insulin regular (humuLIN R,novoLIN R) injection 0-7 Units, 0-7 Units, Subcutaneous, Q6H, Monika Louis MD, 2 Units at 04/10/23 1751  •  lansoprazole (FIRST) oral suspension 15 mg, 15 mg, Per PEG Tube, Q AM, Monika Louis MD, 15 mg at 04/11/23 0604  •  latanoprost (XALATAN) 0.005 % ophthalmic solution 1 drop, 1 drop, Both  "Eyes, Daily, Monika Louis MD, 1 drop at 04/10/23 0818  •  Magnesium Standard Dose Replacement - Initiate Nurse / BPA Driven Protocol, , Does not apply, PRN, Monika Louis MD  •  melatonin tablet 5 mg, 5 mg, Per G Tube, Nightly PRN, Olga Perez, APRN, 5 mg at 04/08/23 2116  •  Phosphorus Replacement - Initiate Nurse / BPA Driven Protocol, , Does not apply, PRN, Monika Louis MD  •  Potassium Replacement - Initiate Nurse / BPA Driven Protocol, , Does not apply, PRN, Monika Louis MD  •  ProSource No Carb oral solution 30 mL, 30 mL, Per PEG Tube, BID, Monika Louis MD, 30 mL at 04/10/23 2147  •  risperiDONE (risperDAL) tablet 1 mg, 1 mg, Per PEG Tube, Nightly, Monika Louis MD, 1 mg at 04/10/23 2146  •  sodium chloride 0.9 % flush 10 mL, 10 mL, Intravenous, Q12H, Monika Louis MD, 10 mL at 04/10/23 2146  •  sodium chloride 0.9 % flush 10 mL, 10 mL, Intravenous, PRN, Monika Louis MD  •  sodium chloride 0.9 % flush 20 mL, 20 mL, Intravenous, PRN, Monika Louis MD  •  valsartan (DIOVAN) tablet 80 mg, 80 mg, Per PEG Tube, Q24H, Nicolle Cross MD, 80 mg at 04/08/23 0851  Antibiotics:  Anti-Infectives (From admission, onward)    Ordered     Dose/Rate Route Frequency Start Stop    03/16/23 2151  vancomycin 1500 mg/500 mL 0.9% NS IVPB ([facility])        Ordering Provider: Anand Eaton MD    20 mg/kg × 79.4 kg Intravenous Once 03/16/23 2153 03/16/23 2323          Allergies:  has No Known Allergies.    Review of Systems: All other reviewed and negative except as per HPI    Blood pressure 102/65, pulse 79, temperature 98.5 °F (36.9 °C), temperature source Oral, resp. rate 19, height 185.4 cm (73\"), weight 76.5 kg (168 lb 9.6 oz), SpO2 97 %.  GENERAL: Awake and alert, in no acute distress.  Sitting up in bedside recliner.  Afebrile and hemodynamically stable.  Minimal cough.  Denies sore throat.  No antibiotic associated colitis.  HEENT: " Tracheostomy with Passy[place] valve.  Oropharynx without thrush. Sinuses nontender. Dentition in good repair. No cervical adenopathy. No carotid bruits/ jugular venous distention.   EYES: PERRL. No conjunctival injection. No icterus. EOMI.  LYMPHATICS: No lymphadenopathy of the neck or axillary or inguinal regions.   HEART: No murmur, gallop, or pericardial friction rub.  Right PICC exit site without phlebitis.  LUNGS: Clear to auscultation anteriorly. No percussion dullness.   ABDOMEN: Soft, nontender, nondistended. No appreciable HSM.  PEG exit site without erythema or gross purulence.  SKIN: Warm and dry without cutaneous eruptions. No embolic stigmata.   PSYCHIATRIC: Mental status lucid. Cranial nerve function intact.       DIAGNOSTICS:  Lab Results   Component Value Date    WBC 7.59 03/30/2023    HGB 12.6 (L) 03/31/2023    HCT 39.3 03/31/2023     03/30/2023     Lab Results   Component Value Date    CRP 1.09 (H) 03/27/2023     Lab Results   Component Value Date    SEDRATE >130 (H) 03/26/2023     Lab Results   Component Value Date    GLUCOSE 167 (H) 04/04/2023    BUN 18 04/04/2023    CREATININE 0.74 (L) 04/04/2023    EGFRIFAFRI 109 01/10/2022    BCR 24.3 04/04/2023    CO2 26.0 04/04/2023    CALCIUM 9.8 04/04/2023    ALBUMIN 3.7 04/04/2023    AST 20 04/04/2023    ALT 19 04/04/2023       Microbiology: Admission blood cultures with group A beta-hemolytic streptococci    RADIOLOGY:  Imaging Results (Last 72 Hours)     Procedure Component Value Units Date/Time    SLP FEES - Fiberoptic Endo Eval Swallow [[id number]] Resulted: 04/10/23 1629     Updated: 04/10/23 1629    Narrative:      This procedure was auto-finalized with no dictation required.          Assessment and Plan: Severe sore throat.  Fever.  Respiratory distress.  Adult epiglottitis.  Severe sepsis with group A streptococcal bacteremia.  Tracheostomy for airway support.  Questionable beta-lactam drug fever/continuous infusion penicillin.  Maximum  temperature over 24 hours 98.7.  Currently 98.5.  Pulse 79, respiratory rate 18, blood pressure 102/65 mmHg with an O2 saturation of 97% on room air.  BUN/creatinine 18/0.74.  Last peripheral leukocyte count 7.6.  No new culture data or radiographic imaging studies.  Amoxicillin-clavulanate discontinued yesterday.  Anticipate discharge today.  Follow-up with me in 1 week.  Gratifying progress.      Zana Johnson MD  4/11/2023      Electronically signed by Zana Johnson MD at 04/11/23 0878

## 2023-04-11 NOTE — PROGRESS NOTES
Brief EN Review Note    Patient Name: Sandro BARRIENTOS Wood  Date of Encounter: 04/11/23 09:34 EDT  MRN: [id number]  Admission date: 3/16/2023    Reason for visit: D/C home recs    RD rec'd message from MD and RN-plans for pt to d/c today. RD spoke w/pt at bedside who reports he is tolerating current formula and likes his current regimen of 5 feedings per day. RD educated pt on alternative formulas if desired and if able to obtain per home health-brand [employer] Blends. RD encouraged pt to only put formula in PEG tube to maintain tube integrity, pt verbalized understanding.     Addendum after speaking w/CM  Recommended home regimen:  Fibersource HN @ 350ml 5x/day (6a, 9a, 12p, 3p, 6p). Provide Prosource daily. Water flush @ 100ml (50ml before and after each bolus).   =1750ml, 2160 kcal (108% est needs), 109g pro (97% est needs), 27g fiber, 1418ml FW, 500ml water from flushes, 1918ml TFW.    If Fibersource HN not available, recommend:  Jevity 1.2 @ 350ml 5x/day (6a, 9a, 12p, 3p, 6p). Provide Prosource daily. Water flush @ 100ml (50ml before and after each bolus).   =1750ml, 2160kcals (108% est needs), 112g pro (100% pro needs), 32g fiber, 1418ml FW, 500ml water from flushes, 1918ml TFW.    Can provide Prosource or Prostat.    Needs Assessment:  Ht used: 73in  Wt used: 163lbs (standing scale wt on 4/6)    Estimated Calorie needs: ~2000 calories   Method: MSJ x 1.3: 2045kcal  Method: 25-30 Kcals/KG actual wt = 1852-2222kcal     Estimated Protein needs: ~112 g protein  Method: 1.2-1.5 g/Kg actual wt:89-112g protein    Giana Warner RD  09:34 EDT  Time: 30min

## 2023-04-11 NOTE — PLAN OF CARE
Goal Outcome Evaluation:         A&Ox4. VSS. RA. Trach and dressing clean, dry and intact. Passy terrie in use. All discharge teaching done. Pt demonstrated how to give himself bolus feeds and sent with all equipment. PIV removed. Family to transport home. Safety maintained. PICC removed. PEG intact clean and dry.

## 2023-04-11 NOTE — OUTREACH NOTE
Prep Survey    Flowsheet Row Responses   [facility] patient discharged from? [facility]   Is LACE score < 7 ? No   Eligibility [facility]   Date of Admission 03/16/23   Date of Discharge 04/11/23   Discharge Disposition Home or Self Care   Discharge diagnosis Acute strep pyogenes (group A strep) epiglottitis with airway obstruction,   s/p Mechanical Ventilation, Trach/PEG,    Strep Pyogenes Bacteremia with Sepsis     Does the patient have one of the following disease processes/diagnoses(primary or secondary)? Sepsis   Does the patient have Home health ordered? Yes   What is the Home health agency?  [facility]   Is there a DME ordered? Yes   What DME was ordered? Trach supplies and suction - [facility]   Prep survey completed? Yes          Barbara OLSON - Registered Nurse

## 2023-04-11 NOTE — PROGRESS NOTES
1       Sandro Junior  1955  [id number]  4/11/2023    CC: Severe sore throat and fever    Sandro Junior is a 68 y.o. male here for adult epiglottitis, severe sepsis with group A beta-hemolytic streptococcal bacteremia, and threatened airway with tracheostomy.      Past medical history:  Past Medical History:   Diagnosis Date   • Epidermal inclusion cyst    • Esophageal candidiasis    • Keloid scar    • Rectal cancer    • Seborrheic dermatitis        Medications:   Current Facility-Administered Medications:   •  acetaminophen (TYLENOL) 160 MG/5ML solution 650 mg, 650 mg, Per PEG Tube, Q6H PRN, Monika Louis MD, 650 mg at 03/28/23 1714  •  [DISCONTINUED] acetaminophen (TYLENOL) tablet 650 mg, 650 mg, Nasogastric, Q4H PRN, 650 mg at 03/21/23 0253 **OR** acetaminophen (TYLENOL) suppository 650 mg, 650 mg, Rectal, Q4H PRN, Monika Louis MD  •  albuterol (PROVENTIL) nebulizer solution 0.083% 2.5 mg/3mL, 2.5 mg, Nebulization, Q6H PRN, Monika Louis MD  •  sennosides-docusate (PERICOLACE) 8.6-50 MG per tablet 2 tablet, 2 tablet, Per PEG Tube, BID PRN **AND** polyethylene glycol (MIRALAX) packet 17 g, 17 g, Per PEG Tube, Daily PRN **AND** [DISCONTINUED] bisacodyl (DULCOLAX) EC tablet 5 mg, 5 mg, Oral, Daily PRN **AND** bisacodyl (DULCOLAX) suppository 10 mg, 10 mg, Rectal, Daily PRN, Tere Orellana MD  •  carvedilol (COREG) tablet 6.25 mg, 6.25 mg, Per PEG Tube, Q12H, Nicolle Cross MD, 6.25 mg at 04/10/23 2146  •  chlorhexidine (PERIDEX) 0.12 % solution 15 mL, 15 mL, Mouth/Throat, Q12H, Monika Louis MD, 15 mL at 04/10/23 2146  •  dextrose (D50W) (25 g/50 mL) IV injection 25 g, 25 g, Intravenous, Q15 Min PRN, Monika Louis MD  •  glucagon (GLUCAGEN) injection 1 mg, 1 mg, Intramuscular, Q15 Min PRN, Monika Louis MD  •  glycopyrrolate (ROBINUL) tablet 1 mg, 1 mg, Per PEG Tube, TID, Tere Orellana MD, 1 mg at 04/10/23 2146  •  heparin (porcine)  5000 UNIT/ML injection 5,000 Units, 5,000 Units, Subcutaneous, Q8H, Monika Louis MD, 5,000 Units at 04/11/23 0603  •  hydrALAZINE (APRESOLINE) injection 20 mg, 20 mg, Intravenous, Q4H PRN, Monika Louis MD, 20 mg at 03/29/23 2051  •  hydrOXYzine (ATARAX) 10 MG/5ML syrup 25 mg, 25 mg, Per PEG Tube, Q8H PRN, Monika Louis MD, 25 mg at 03/31/23 1040  •  insulin regular (humuLIN R,novoLIN R) injection 0-7 Units, 0-7 Units, Subcutaneous, Q6H, Monika Louis MD, 2 Units at 04/10/23 1751  •  lansoprazole (FIRST) oral suspension 15 mg, 15 mg, Per PEG Tube, Q AM, Monika Louis MD, 15 mg at 04/11/23 0604  •  latanoprost (XALATAN) 0.005 % ophthalmic solution 1 drop, 1 drop, Both Eyes, Daily, Monika Louis MD, 1 drop at 04/10/23 0818  •  Magnesium Standard Dose Replacement - Initiate Nurse / BPA Driven Protocol, , Does not apply, PRN, Monika Louis MD  •  melatonin tablet 5 mg, 5 mg, Per G Tube, Nightly PRN, Olga Perez, APRN, 5 mg at 04/08/23 2116  •  Phosphorus Replacement - Initiate Nurse / BPA Driven Protocol, , Does not apply, PRN, Monika Louis MD  •  Potassium Replacement - Initiate Nurse / BPA Driven Protocol, , Does not apply, PRN, Monika Louis MD  •  ProSource No Carb oral solution 30 mL, 30 mL, Per PEG Tube, BID, Monika Louis MD, 30 mL at 04/10/23 2147  •  risperiDONE (risperDAL) tablet 1 mg, 1 mg, Per PEG Tube, Nightly, Monika Louis MD, 1 mg at 04/10/23 2146  •  sodium chloride 0.9 % flush 10 mL, 10 mL, Intravenous, Q12H, Monika Louis MD, 10 mL at 04/10/23 2146  •  sodium chloride 0.9 % flush 10 mL, 10 mL, Intravenous, PRN, Monika Louis MD  •  sodium chloride 0.9 % flush 20 mL, 20 mL, Intravenous, PRN, Monika oLuis MD  •  valsartan (DIOVAN) tablet 80 mg, 80 mg, Per PEG Tube, Q24H, Nicolle Cross MD, 80 mg at 04/08/23 0851  Antibiotics:  Anti-Infectives (From admission, onward)     "Ordered     Dose/Rate Route Frequency Start Stop    03/16/23 2151  vancomycin 1500 mg/500 mL 0.9% NS IVPB ([facility])        Ordering Provider: Anand Eaton MD    20 mg/kg × 79.4 kg Intravenous Once 03/16/23 2153 03/16/23 2323          Allergies:  has No Known Allergies.    Review of Systems: All other reviewed and negative except as per HPI    Blood pressure 102/65, pulse 79, temperature 98.5 °F (36.9 °C), temperature source Oral, resp. rate 19, height 185.4 cm (73\"), weight 76.5 kg (168 lb 9.6 oz), SpO2 97 %.  GENERAL: Awake and alert, in no acute distress.  Sitting up in bedside recliner.  Afebrile and hemodynamically stable.  Minimal cough.  Denies sore throat.  No antibiotic associated colitis.  HEENT: Tracheostomy with Passy-Aurora valve.  Oropharynx without thrush. Sinuses nontender. Dentition in good repair. No cervical adenopathy. No carotid bruits/ jugular venous distention.   EYES: PERRL. No conjunctival injection. No icterus. EOMI.  LYMPHATICS: No lymphadenopathy of the neck or axillary or inguinal regions.   HEART: No murmur, gallop, or pericardial friction rub.  Right PICC exit site without phlebitis.  LUNGS: Clear to auscultation anteriorly. No percussion dullness.   ABDOMEN: Soft, nontender, nondistended. No appreciable HSM.  PEG exit site without erythema or gross purulence.  SKIN: Warm and dry without cutaneous eruptions. No embolic stigmata.   PSYCHIATRIC: Mental status lucid. Cranial nerve function intact.       DIAGNOSTICS:  Lab Results   Component Value Date    WBC 7.59 03/30/2023    HGB 12.6 (L) 03/31/2023    HCT 39.3 03/31/2023     03/30/2023     Lab Results   Component Value Date    CRP 1.09 (H) 03/27/2023     Lab Results   Component Value Date    SEDRATE >130 (H) 03/26/2023     Lab Results   Component Value Date    GLUCOSE 167 (H) 04/04/2023    BUN 18 04/04/2023    CREATININE 0.74 (L) 04/04/2023    EGFRIFAFRI 109 01/10/2022    BCR 24.3 04/04/2023    CO2 26.0 04/04/2023    CALCIUM " 9.8 04/04/2023    ALBUMIN 3.7 04/04/2023    AST 20 04/04/2023    ALT 19 04/04/2023       Microbiology: Admission blood cultures with group A beta-hemolytic streptococci    RADIOLOGY:  Imaging Results (Last 72 Hours)     Procedure Component Value Units Date/Time    SLP FEES - Fiberoptic Endo Eval Swallow [[id number]] Resulted: 04/10/23 1629     Updated: 04/10/23 1629    Narrative:      This procedure was auto-finalized with no dictation required.          Assessment and Plan: Severe sore throat.  Fever.  Respiratory distress.  Adult epiglottitis.  Severe sepsis with group A streptococcal bacteremia.  Tracheostomy for airway support.  Questionable beta-lactam drug fever/continuous infusion penicillin.  Maximum temperature over 24 hours 98.7.  Currently 98.5.  Pulse 79, respiratory rate 18, blood pressure 102/65 mmHg with an O2 saturation of 97% on room air.  BUN/creatinine 18/0.74.  Last peripheral leukocyte count 7.6.  No new culture data or radiographic imaging studies.  Amoxicillin-clavulanate discontinued yesterday.  Anticipate discharge today.  Follow-up with me in 1 week.  Gratifying progress.      Zana Johnson MD  4/11/2023

## 2023-04-11 NOTE — DISCHARGE PLACEMENT REQUEST
"  DENISE COLE RN    P:  [phone]  F:  [phone]              Sandro Junior (68 y.o. Male)     Date of Birth   1955    Social Security Number       Address   [address] [address]    Home Phone   [phone]    MRN   [id number]       [facility]    Marital Status                               Admission Date   3/16/23    Admission Type   Emergency    Admitting Provider   Nicolle Crsos MD    Attending Provider   Nicolle Cross MD    Department, Room/Bed   [facility] 4G, S454/1       Discharge Date       Discharge Disposition   Home or Self Care    Discharge Destination                               Attending Provider: Nicolle Cross MD    Allergies: No Known Allergies    Isolation: None   Infection: None   Code Status: CPR    Ht: 185.4 cm (73\")   Wt: 76.5 kg (168 lb 9.6 oz)    Admission Cmt: None   Principal Problem: Acute strep pyogenes (group A strep) epiglottitis with airway obstruction  [J05.11]                 Active Insurance as of 3/16/2023     Primary Coverage     Payor Plan Insurance Group Employer/Plan Group    HUMANA MEDICARE REPLACEMENT HU[employer]NA MEDICARE REPLACEMENT [id number]     Payor Plan Address Payor Plan Phone Number Payor Plan Fax Number Effective Dates    [address] [address] [phone]  2023 - None Entered    [facility] [id number]       Subscriber Name Subscriber Birth Date Member ID       SANDRO JUNIOR 1955 [id number]                 Emergency Contacts      (Rel.) Home Phone Work Phone Mobile Phone    CAROL JUNIOR (Spouse) [phone] -- [phone]    C,Zohra (Grandchild) -- -- [phone]    C,Hope (Daughter) -- -- [phone]               Discharge Summary      Nicolle Cross MD at 23 1358              [facility] Medicine Services  DISCHARGE SUMMARY    Patient Name: Sandro Junior  : 1955  MRN: [id number]    Date of Admission: 3/16/2023  " 8:21 PM  Date of Discharge:  04/11/23  Primary Care Physician: Dora Loya PA    Consults     Date and Time Order Name Status Description    3/20/2023  8:28 AM Inpatient Infectious Diseases Consult Completed           Hospital Course     Presenting Problem:   Acute respiratory failure [J96.00]    Active Hospital Problems    Diagnosis  POA   • Dysphagia [R13.10]  Unknown   • Essential hypertension [I10]  Yes      Resolved Hospital Problems    Diagnosis Date Resolved POA   • **Acute strep pyogenes (group A strep) epiglottitis with airway obstruction  [J05.11] 04/11/2023 Yes   • Acute respiratory failure [J96.00] 04/11/2023 Yes          Hospital Course:  Sandro Junior is a 68 y.o. male with PMHx significant for HTN who presented to [facility] on 3/16/23 with sore throat and shortness of breath. Upon arrival to the ED he was noted to have significant stridor, tripoding, and drooling. He was treated with steroids, epinephrine, and H2 blockers but despite this he ultimately required intubation and admission to the ICU. His blood cultures grew Streptococcus pyogenes. He underwent bronchoscopy on 3/20/23 which showed persistent edematous changes in the posterior oropharynx with copious mucopurulent secretions and repeat CT neck 3/21/23 revealed worsening edema but no sign of abscess. ID was consulted and he was treated with continuous PCN infusion. He was not deemed safe to extubate, therefore he underwent tracheostomy and PEG placement on 3/23/23. He was transferred to telemetry and the hospitalist service on 4/1/23.      Acute Group A Strep Epiglottitis   Airway Obstruction s/p Mechanical Ventilation, Trach/PEG 3/23/23  Strep Pyogenes Bacteremia with Sepsis  --Dr. Johnson/ID following, transitioned to per PEG Augmentin on 3/31/23, discontinued 4/10/23   --Trach suctioning PRN, tolerating trach collar: Pulmonology saw patient 4/10/23 and recommended keep trach in place and F/U as outpatient once he is stronger to discuss  de-cannulating   --Continue tube feedings, SLP following: continue to recommend NPO at this time, had FEES on 4/10/23, okay for 3-4 ice chips at a time, otherwise stay NPO  --Patient will require tube feedings to provide 2000 mery/day due to weight loss for greater than 90 days  --Added Robinul per SLP recommendations, continue at discharge     HTN  --Continue home Diovan   --Coreg added this admission, but BP running low/normal so will discontinue at discharge     Anxiety  --PRN Hydroxyzine, stop at discharge  --Risperdal at bedtime--will stop as I suspect this will improve once he is out of the hospital     Insurance upheld denial of acute rehab despite family appeal, and patient with significant improvement with last PT session 4/9/23. The plan will now be for home with home health.      Discharge Follow Up Recommendations for outpatient labs/diagnostics:  F/U with PCP in 1 week  F/U with Pulmonology in 4 weeks    Day of Discharge     HPI:   Patient seen this morning, remains frustrated that he cannot eat or drink anything by mouth. Otherwise no complaints. Wants to keep practicing doing his own tube feeding a couple more times prior to discharging home today.    Review of Systems  Gen-no fevers, no chills  CV-no chest pain, no palpitations  Resp-no cough, no dyspnea  GI-no N/V/D, no abd pain    Vital Signs:   Temp:  [97.8 °F (36.6 °C)-98.5 °F (36.9 °C)] 98.5 °F (36.9 °C)  Heart Rate:  [67-88] 73  Resp:  [17-19] 19  BP: (102-132)/(65-78) 123/72      Physical Exam:  Gen-no acute distress  HENT-NCAT, mucous membranes moist  CV-RRR, S1 S2 normal, no m/r/g  Resp-CTAB, no wheezes or rales, tracheostomy in place, on room air  Abd-soft, NT, ND, +BS, PEG in place  Ext-no edema  Neuro-A&Ox3, no focal deficits  Skin-no rashes  Psych-appropriate mood    Pertinent  and/or Most Recent Results     LAB RESULTS:                              Brief Urine Lab Results  (Last result in the past 365 days)      Color   Clarity   Blood    Leuk Est   Nitrite   Protein   CREAT   Urine HCG        03/16/23 2227 Yellow   Clear   Negative   Negative   Negative   30 mg/dL (1+)               Microbiology Results (last 10 days)     ** No results found for the last 240 hours. **          SLP FEES - Fiberoptic Endo Eval Swallow    Result Date: 4/10/2023  This procedure was auto-finalized with no dictation required.    SLP FEES - Fiberoptic Endo Eval Swallow    Result Date: 4/4/2023  This procedure was auto-finalized with no dictation required.      Results for orders placed during the hospital encounter of 03/16/23    Duplex Venous Lower Extremity - Left CAR    Interpretation Summary  •  Normal left lower extremity venous duplex scan.      Results for orders placed during the hospital encounter of 03/16/23    Duplex Venous Lower Extremity - Left CAR    Interpretation Summary  •  Normal left lower extremity venous duplex scan.      Results for orders placed during the hospital encounter of 03/16/23    Adult Transthoracic Echo Complete W/ Cont if Necessary Per Protocol    Interpretation Summary  •  Left ventricular systolic function is normal. Calculated left ventricular 3D EF = 56% Left ventricular ejection fraction appears to be 51 - 55%.  •  Left ventricular diastolic function was normal.  •  Estimated right ventricular systolic pressure from tricuspid regurgitation is normal (<35 mmHg).      Discharge Details        Discharge Medications      New Medications      Instructions Start Date   glycopyrrolate 1 MG tablet  Commonly known as: ROBINUL   1 mg, Per G Tube, 3 Times Daily      lansoprazole 3 MG/ML suspension oral suspension   15 mg, Per PEG Tube, Every Early Morning   Start Date: April 12, 2023     ProSource No Carb liquid   30 mL, Per PEG Tube, 2 Times Daily         Changes to Medications      Instructions Start Date   sildenafil 50 MG tablet  Commonly known as: VIAGRA  What changed: how to take this   50 mg, Per G Tube, Daily PRN      valsartan 80 MG  tablet  Commonly known as: DIOVAN  What changed: how to take this   80 mg, Per G Tube, Daily         Continue These Medications      Instructions Start Date   ketoconazole 2 % cream  Commonly known as: NIZORAL   Topical, Daily      latanoprost 0.005 % ophthalmic solution  Commonly known as: XALATAN   No dose, route, or frequency recorded.         Stop These Medications    omeprazole 40 MG capsule  Commonly known as: priLOSEC            No Known Allergies      Discharge Disposition:  Home or Self Care    Diet:  Hospital:  Diet Order   Procedures   • NPO Diet NPO Type: Strict NPO       Activity:  Activity Instructions     Activity as Tolerated               CODE STATUS:    Code Status and Medical Interventions:   Ordered at: 03/16/23 1743     Code Status (Patient has no pulse and is not breathing):    CPR (Attempt to Resuscitate)     Medical Interventions (Patient has pulse or is breathing):    Full Support       No future appointments.    Additional Instructions for the Follow-ups that You Need to Schedule     Ambulatory Referral to Home Health   As directed      -Fibersource HN @ 350ml 5x/day (6a, 9a, 12p, 3p, 6p).   -Provide Prosource BID.   -Water flush @ 100ml (50ml before and after each bolus)    Order Comments: -Fibersource HN @ 350ml 5x/day (6a, 9a, 12p, 3p, 6p). -Provide Prosource BID. -Water flush @ 100ml (50ml before and after each bolus)     Face to Face Visit Date: 4/11/2023    Follow-up provider for Plan of Care?: I treated the patient in an acute care facility and will not continue treatment after discharge.    Follow-up provider: ROCIO HAYWARD [[id number]]    Reason/Clinical Findings: Epiglottitis with airway obstruction    Describe mobility limitations that make leaving home difficult: Impaired functional mobility    Nursing/Therapeutic Services Requested: Skilled Nursing Speech Therapy    Skilled nursing orders: Enteral therapy (Trach care (capped))    SLP orders: Dysphagia therapy (Pharyngeal dysphagia;  Use of PMV.) Voice    Frequency: 1 Week 1         Discharge Follow-up with PCP   As directed       Currently Documented PCP:    Dora Loya PA    PCP Phone Number:    [phone]     Follow Up Details: 1 week         Discharge Follow-up with Specified Provider: [facility], any provider, in 4 weeks   As directed      To: [facility], any provider, in 4 weeks                     Nicolle Cross MD  04/11/23      Time Spent on Discharge:  I spent  35 minutes on this discharge activity which included: face-to-face encounter with the patient, reviewing the data in the system, coordination of the care with the nursing staff as well as consultants, documentation, and entering orders.            Electronically signed by Nicolle Cross MD at 04/11/23 0636

## 2023-04-11 NOTE — DISCHARGE SUMMARY
[facility]  DISCHARGE SUMMARY    Patient Name: Sandro Junior  : 1955  MRN: [id number]    Date of Admission: 3/16/2023  8:21 PM  Date of Discharge:  23  Primary Care Physician: Dora Loya PA    Consults     Date and Time Order Name Status Description    3/20/2023  8:28 AM Inpatient Infectious Diseases Consult Completed           Hospital Course     Presenting Problem:   Acute respiratory failure [J96.00]    Active Hospital Problems    Diagnosis  POA   • Dysphagia [R13.10]  Unknown   • Essential hypertension [I10]  Yes      Resolved Hospital Problems    Diagnosis Date Resolved POA   • **Acute strep pyogenes (group A strep) epiglottitis with airway obstruction  [J05.11] 2023 Yes   • Acute respiratory failure [J96.00] 2023 Yes          Hospital Course:  Sandro Junior is a 68 y.o. male with PMHx significant for HTN who presented to [facility] on 3/16/23 with sore throat and shortness of breath. Upon arrival to the ED he was noted to have significant stridor, tripoding, and drooling. He was treated with steroids, epinephrine, and H2 blockers but despite this he ultimately required intubation and admission to the ICU. His blood cultures grew Streptococcus pyogenes. He underwent bronchoscopy on 3/20/23 which showed persistent edematous changes in the posterior oropharynx with copious mucopurulent secretions and repeat CT neck 3/21/23 revealed worsening edema but no sign of abscess. ID was consulted and he was treated with continuous PCN infusion. He was not deemed safe to extubate, therefore he underwent tracheostomy and PEG placement on 3/23/23. He was transferred to telemetry and the hospitalist service on 23.      Acute Group A Strep Epiglottitis   Airway Obstruction s/p Mechanical Ventilation, Trach/PEG 3/23/23  Strep Pyogenes Bacteremia with Sepsis  --Dr. Johnson/ID following, transitioned to per PEG Augmentin on 3/31/23, discontinued  4/10/23   --Trach suctioning PRN, tolerating trach collar: Pulmonology saw patient 4/10/23 and recommended keep trach in place and F/U as outpatient once he is stronger to discuss de-cannulating   --Continue tube feedings, SLP following: continue to recommend NPO at this time, had FEES on 4/10/23, okay for 3-4 ice chips at a time, otherwise stay NPO  --Patient will require tube feedings to provide 2000 mery/day due to weight loss for greater than 90 days  --Added Robinul per SLP recommendations, continue at discharge     HTN  --Continue home Diovan   --Coreg added this admission, but BP running low/normal so will discontinue at discharge     Anxiety  --PRN Hydroxyzine, stop at discharge  --Risperdal at bedtime--will stop as I suspect this will improve once he is out of the hospital     Insurance upheld denial of acute rehab despite family appeal, and patient with significant improvement with last PT session 4/9/23. The plan will now be for home with home health.      Discharge Follow Up Recommendations for outpatient labs/diagnostics:  F/U with PCP in 1 week  F/U with Pulmonology in 4 weeks    Day of Discharge     HPI:   Patient seen this morning, remains frustrated that he cannot eat or drink anything by mouth. Otherwise no complaints. Wants to keep practicing doing his own tube feeding a couple more times prior to discharging home today.    Review of Systems  Gen-no fevers, no chills  CV-no chest pain, no palpitations  Resp-no cough, no dyspnea  GI-no N/V/D, no abd pain    Vital Signs:   Temp:  [97.8 °F (36.6 °C)-98.5 °F (36.9 °C)] 98.5 °F (36.9 °C)  Heart Rate:  [67-88] 73  Resp:  [17-19] 19  BP: (102-132)/(65-78) 123/72      Physical Exam:  Gen-no acute distress  HENT-NCAT, mucous membranes moist  CV-RRR, S1 S2 normal, no m/r/g  Resp-CTAB, no wheezes or rales, tracheostomy in place, on room air  Abd-soft, NT, ND, +BS, PEG in place  Ext-no edema  Neuro-A&Ox3, no focal deficits  Skin-no rashes  Psych-appropriate  mood    Pertinent  and/or Most Recent Results     LAB RESULTS:                              Brief Urine Lab Results  (Last result in the past 365 days)      Color   Clarity   Blood   Leuk Est   Nitrite   Protein   CREAT   Urine HCG        03/16/23 2227 Yellow   Clear   Negative   Negative   Negative   30 mg/dL (1+)               Microbiology Results (last 10 days)     ** No results found for the last 240 hours. **          SLP FEES - Fiberoptic Endo Eval Swallow    Result Date: 4/10/2023  This procedure was auto-finalized with no dictation required.    SLP FEES - Fiberoptic Endo Eval Swallow    Result Date: 4/4/2023  This procedure was auto-finalized with no dictation required.      Results for orders placed during the hospital encounter of 03/16/23    Duplex Venous Lower Extremity - Left CAR    Interpretation Summary  •  Normal left lower extremity venous duplex scan.      Results for orders placed during the hospital encounter of 03/16/23    Duplex Venous Lower Extremity - Left CAR    Interpretation Summary  •  Normal left lower extremity venous duplex scan.      Results for orders placed during the hospital encounter of 03/16/23    Adult Transthoracic Echo Complete W/ Cont if Necessary Per Protocol    Interpretation Summary  •  Left ventricular systolic function is normal. Calculated left ventricular 3D EF = 56% Left ventricular ejection fraction appears to be 51 - 55%.  •  Left ventricular diastolic function was normal.  •  Estimated right ventricular systolic pressure from tricuspid regurgitation is normal (<35 mmHg).      Discharge Details        Discharge Medications      New Medications      Instructions Start Date   glycopyrrolate 1 MG tablet  Commonly known as: ROBINUL   1 mg, Per G Tube, 3 Times Daily      lansoprazole 3 MG/ML suspension oral suspension   15 mg, Per PEG Tube, Every Early Morning   Start Date: April 12, 2023     ProSource No Carb liquid   30 mL, Per PEG Tube, 2 Times Daily         Changes  to Medications      Instructions Start Date   sildenafil 50 MG tablet  Commonly known as: VIAGRA  What changed: how to take this   50 mg, Per G Tube, Daily PRN      valsartan 80 MG tablet  Commonly known as: DIOVAN  What changed: how to take this   80 mg, Per G Tube, Daily         Continue These Medications      Instructions Start Date   ketoconazole 2 % cream  Commonly known as: NIZORAL   Topical, Daily      latanoprost 0.005 % ophthalmic solution  Commonly known as: XALATAN   No dose, route, or frequency recorded.         Stop These Medications    omeprazole 40 MG capsule  Commonly known as: priLOSEC            No Known Allergies      Discharge Disposition:  Home or Self Care    Diet:  Hospital:  Diet Order   Procedures   • NPO Diet NPO Type: Strict NPO       Activity:  Activity Instructions     Activity as Tolerated               CODE STATUS:    Code Status and Medical Interventions:   Ordered at: 03/16/23 5600     Code Status (Patient has no pulse and is not breathing):    CPR (Attempt to Resuscitate)     Medical Interventions (Patient has pulse or is breathing):    Full Support       No future appointments.    Additional Instructions for the Follow-ups that You Need to Schedule     Ambulatory Referral to Home Health   As directed      -Fibersource HN @ 350ml 5x/day (6a, 9a, 12p, 3p, 6p).   -Provide Prosource BID.   -Water flush @ 100ml (50ml before and after each bolus)    Order Comments: -Fibersource HN @ 350ml 5x/day (6a, 9a, 12p, 3p, 6p). -Provide Prosource BID. -Water flush @ 100ml (50ml before and after each bolus)     Face to Face Visit Date: 4/11/2023    Follow-up provider for Plan of Care?: I treated the patient in an acute care facility and will not continue treatment after discharge.    Follow-up provider: ROCIO HAYWARD [[id number]]    Reason/Clinical Findings: Epiglottitis with airway obstruction    Describe mobility limitations that make leaving home difficult: Impaired functional mobility     Nursing/Therapeutic Services Requested: Skilled Nursing Speech Therapy    Skilled nursing orders: Enteral therapy (Trach care (capped))    SLP orders: Dysphagia therapy (Pharyngeal dysphagia; Use of PMV.) Voice    Frequency: 1 Week 1         Discharge Follow-up with PCP   As directed       Currently Documented PCP:    Dora Loya PA    PCP Phone Number:    [phone]     Follow Up Details: 1 week         Discharge Follow-up with Specified Provider: [facility], any provider, in 4 weeks   As directed      To: [facility], any provider, in 4 weeks                     Nicolle Cross MD  04/11/23      Time Spent on Discharge:  I spent  35 minutes on this discharge activity which included: face-to-face encounter with the patient, reviewing the data in the system, coordination of the care with the nursing staff as well as consultants, documentation, and entering orders.

## 2023-04-11 NOTE — DISCHARGE PLACEMENT REQUEST
"DENISE COLE, RN    P:  [phone]  F:  [phone]                Sandro Junior (68 y.o. Male)     Date of Birth   1955    Social Security Number       Address   [address] [place] [address]    Home Phone   [phone]    MRN   [id number]       [facility]    Marital Status                               Admission Date   3/16/23    Admission Type   Emergency    Admitting Provider   Nicolle Cross MD    Attending Provider   Nicolle Cross MD    Department, Room/Bed   [address], S454/1       Discharge Date       Discharge Disposition   Home or Self Care    Discharge Destination                               Attending Provider: Nicolle Cross MD    Allergies: No Known Allergies    Isolation: None   Infection: None   Code Status: CPR    Ht: 185.4 cm (73\")   Wt: 76.5 kg (168 lb 9.6 oz)    Admission Cmt: None   Principal Problem: Acute strep pyogenes (group A strep) epiglottitis with airway obstruction  [J05.11]                 Active Insurance as of 3/16/2023     Primary Coverage     Payor Plan Insurance Group Employer/Plan Group    HUMANA MEDICARE REPLACEMENT HUMANA MEDICARE REPLACEMENT [id number]     Payor Plan Address Payor Plan Phone Number Payor Plan Fax Number Effective Dates    [address] [address] [phone]  2023 - None Entered    [facility] [address]       Subscriber Name Subscriber Birth Date Member ID       SANDRO JUNIOR 1955 [id number]                 Emergency Contacts      (Rel.) Home Phone Work Phone Mobile Phone    CAROL JUNIOR (Spouse) [phone] -- [phone]    C,Zohra (Grandchild) -- -- [phone]    C,Hope (Daughter) -- -- [phone]                                 [address] [address]  Phone:  [phone]  Fax:  [phone]        Patient: ROOM: [facility]   Sandro Junior MRN:  [id number]   [address] [address] :  " 1955  SSN:    Phone: [phone] Sex:  M   PCP: Dora Loya                Emergency Contact Information      Name Relation Home Work Mobile     CAROL PIERCE Spouse [phone]     C,Zohra Grandchild     [phone]     C,Adeshahbaz Daughter     [phone]          INSURANCE PAYOR PLAN GROUP # SUBSCRIBER ID   Primary:    HUMANA MEDICARE REPLACEMENT [id number]   Admitting Diagnosis: Acute respiratory failure [J96.00]  Order Date:  Apr 11, 2023        Case Management  Consult       (Order ID: [id number])     Diagnosis:         Priority:  Routine Expected Date:   Expiration Date:        Interval:  Once Count:    Comments: -Jevity 1.2 @ 350ml 5x/day (6a, 9a, 12p, 3p, 6p).   -Provide Prosource daily (pt to order self).   -Water flush @ 100ml (50ml before and after each bolus).      Rx duration:  6 months  Length of need:  6 months  Reason for Consult: Arrange home tube feedings        Authorizing Provider:Nicolle Cross MD  Authorizing Provider's NPI: [id number]  Order Entered By: Hazel Bright RN 4/11/2023  2:21 PM     Electronically signed by: Nicolle Cross MD 4/11/2023  2:21 PM                      History & Physical      Vikram Zamora MD at 03/16/23 2330                Chief complaint: Shortness of breath    Subjective      68-year-old male who presented to the ER this evening with shortness of breath and sore throat.  History obtained is from the chart and my discussion with Dr. Eaton patient is now intubated and sedated.  Apparently, he first noticed a sore throat around noon.  He became increasingly symptomatic throughout the day before presenting to the ER.  He was given epinephrine, steroids, and H2 blockers.  Despite this he began tripoding and drooling and had audible stridor.  Per Dr. Eaton, the intubation was difficult due to edema and erythema.    Imaging reveals abnormal appearance of the soft tissues of the  "laryngeal area with some edema and swelling of the uvula.  There is no evidence of a fluid collection or abscess.  Bibasilar infiltrates were present possibly related to atelectasis or retained secretions but there was no evidence of consolidative pneumonia.  He has been given Zosyn and vancomycin empirically and, as stated, has received steroids.  Because of tachycardia he was started on a Cardizem drip and a propofol drip was initiated.      History  Past Medical History:   Diagnosis Date   • Epidermal inclusion cyst    • Esophageal candidiasis (HCC)    • Keloid scar    • Rectal cancer (HCC)    • Seborrheic dermatitis      Past Surgical History:   Procedure Laterality Date   • COLECTOMY PARTIAL / TOTAL     • KNEE SURGERY      Left knee meniscal tear repair   • TOOTH EXTRACTION  2016 teeth   • TOTAL KNEE ARTHROPLASTY Right 2016     History reviewed. No pertinent family history.  Social History     Tobacco Use   • Smoking status: Former     Types: Cigars     Quit date: 2019     Years since quittin.2   • Smokeless tobacco: Never   • Tobacco comments:     2 cigars a week   Vaping Use   • Vaping Use: Never used   Substance Use Topics   • Alcohol use: Yes     Alcohol/week: 4.0 - 5.0 standard drinks     Types: 4 - 5 Glasses of wine per week   • Drug use: Defer       Review of Systems   Review of systems could not be obtained due to   patient intubated.      Objective      Vital Signs  Temp:  [99.6 °F (37.6 °C)-100.1 °F (37.8 °C)] 100.1 °F (37.8 °C)  Heart Rate:  [120-172] 120  Resp:  [24] 24  BP: (133-217)/() 144/92  FiO2 (%):  [40 %] 40 %    Physical Exam:    Objective:  General Appearance:  Ill-appearing.    Vital signs: (most recent): Blood pressure 144/92, pulse 120, temperature 100.1 °F (37.8 °C), temperature source Axillary, resp. rate 24, height 185.4 cm (73\"), weight 74.5 kg (164 lb 3.9 oz), SpO2 97 %.    HEENT: (Oral ET tube)    Lungs:  No rales, wheezes or rhonchi.    Heart: Normal " rate.  Regular rhythm.  S1 normal and S2 normal.  No murmur, gallop or friction rub.   Chest: Symmetric chest wall expansion.   Abdomen: Abdomen is soft and non-distended.  Bowel sounds are normal.   There is no abdominal tenderness.   There is no mass. There is no splenomegaly. There is no hepatomegaly.   Extremities: There is no deformity or dependent edema.    Neurological: (Sedated).    Pupils:  Pupils are equal, round, and reactive to light.    Skin:  Warm and dry.              Results Review:    I reviewed the patient's new clinical results.  I reviewed the patient's new imaging results and agree with the interpretation.  I reviewed the patient's other test results and agree with the interpretation    Assessment & Plan     Assessment:    Active Hospital Problems    Diagnosis    • **Acute epiglottitis with airway obstruction - probable    • Acute respiratory failure (HCC)    • Essential hypertension        68-year-old male who presents with what appears to be acute epiglottitis.  He developed a sore throat around noon and symptoms progressed to the day.  Despite appropriate treatment in the ER his stridor and respiratory distress became worse and he underwent intubation which revealed erythematous and swollen airways.  CT of the neck reveals no fluid collection/abscess but swollen soft tissues.  He has arrived in the ICU intubated with acceptable oxygen saturations and blood pressure and with sinus tachycardia    Plan:    1. ICU admission  2. Maintain intubation with sedation and physical restraints if needed so his airway is not dislodged  3. Empiric antimicrobial therapy with vancomycin and Rocephin per standard recommendations for epiglottitis  4. Additional fluid resuscitation due to tachycardia but this could be related to infection and administration of IM epinephrine in the ER.  Wean Cardizem.  5. Viral respiratory panel is negative  6. Will obtain swabs for Streptococcus  7. Anticipate the need for  mechanical ventilation for several days until airway swelling improves  8. No indication for ongoing steroids for what is likely acute epiglottitis    I discussed the patients findings and my recommendations with nursing staff and Dr. Eaton.     Critical Care time spent in direct patient care: 45 minutes (excluding procedure time, if applicable) including high complexity decision making to assess, manipulate, and support vital organ system failure in this individual who has impairment of one or more vital organ systems such that there is a high probability of imminent or life threatening deterioration in the patient’s condition.      Vikram Zamora MD  Pulmonary and Critical Care Medicine  03/16/23  23:30 EDT            Electronically signed by Vikram Zamora MD at 03/16/23 5177          Operative/Procedure Notes (most recent note)      Néstor Jerome MD at 03/23/23 1348        Operative Report    Patient Name:  Sandro Junior  YOB: 1955  [id number]    3/23/2023        PREOPERATIVE DIAGNOSIS:    1) Respiratory Failure    2) Feeding difficulty       POSTOPERATIVE DIAGNOSIS: Same      PROCEDURE:     1) Tracheostomy    2) EGD with PEG placement       SURGEON: Néstor Jerome MD       ASSISTANT: None       ANESTHESIA: General      SPECIMENS: None      FINDINGS:      1) # 8 Shiley Cuffed Tracheostomy tube placed between the second and third tracheal ring   2) 20 Georgian PEG tube placed at the 3 cm level      INDICATIONS:      The patient is a 68 y.o. year old male with a history of respiratory failure and feeding difficulty who we have been asked to see for long term respiratory and feeding access. The risks and benefits of tracheostomy and PEG placement were discussed at length with the patient's family and they agreed to proceed.    DESCRIPTION OF PROCEDURE:     After obtaining informed consent, the patient was taken to the operating room and placed in the supine position on  "the operating room table. General anesthesia was initiated. The neck and chest were prepped and draped in the usual sterile fashion. A time out was properly performed. SCDs were properly placed on the patient and turned on. After infiltrating the skin with local anesthetic, a 2.5cm skin incision was made over the trachea. Electrocautery dissection was carried down in the midline to the level of the thyroid. This was split in the midline and stay sutures placed bilaterally to control bleeding. At this point the anesthetist advanced the endotracheal tube distally and the balloon was deflated. The trachea was then entered between the second and third tracheal ring using a \"T\" shaped incision. Stay sutures of Prolene were placed on each side. The anesthetist then withdrew the endotracheal tube under direct visualization, and a # 8 Shiley cuffed tracheostomy tube was placed through the defect into the trachea. The cuff was inflated, and the tracheostomy attached to the ventilator circuit. Excellent tidal volumes were noted as well as end-tidal CO2. The tracheostomy was then affixed using a tracheostomy collar. It was dressed in standard sterile fashion. The Prolene stay sutures were then affixed to the anterior chest wall, to aid in the event of need for future reintubation.  A piece of Surgicel was left in place within the wound bed     Attention was then turned to the gastrostomy portion of the procedure. A bite block was placed into the patient's mouth. The endoscope was advanced into the mouth and into the esophagus without difficulty.  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  The endoscope was able to be advanced past the site without difficulty.  It was advanced into the stomach, and into the duodenum, which was normal . The scope was then returned to the " "stomach, and the stomach was maximally insufflated. An appropriate site for PEG placement was then determined by one-to-one palpation, transillumination, and the \"safe track\" needle technique. This area was prepped and draped in standard sterile fashion, and after infiltrating the skin with local anesthetic, a 7mm skin incision was made in this location. A needle was advanced through this incision and into the stomach under endoscopic guidance. A wire was then placed through the needle, grasped with the endoscope, and brought out through the mouth. At this point, using a pull-through technique, a 20 Swiss PEG tube was brought through the abdominal wall in this location. The endoscope was again advanced through the mouth and esophagus and into the stomach, where the PEG bumper could be seen abutting the anterior gastric wall in standard fashion without bleeding. The endoscope was then used to desufflate the stomach, and removed from the patient's mouth without difficulty. The PEG tube was secured with the bumper at the 3 cm level. It was sutured to the skin, dressed in standard sterile fashion, and placed to gravity drainage. The patient was then transported back to the ICU in stable, yet critical, condition. All needle, instrument, and sponge counts were correct at the completion of the case. There were no immediate complications. I was present for the entire procedure.       Néstor Jerome MD  3/23/2023  14:21 EDT              Electronically signed by Néstor Jerome MD at 03/23/23 1423                  Giana Warner RD   Registered Dietitian  Specialty:  Nutrition  Progress Notes      Addendum  Date of Service:  04/11/23 0934  Creation Time:  04/11/23 0934       Brief EN Review Note     Patient Name: Sandro BARRIENTOS Bartlett  Date of Encounter: 04/11/23 09:34 EDT  MRN: [id number]  Admission date: 3/16/2023     Reason for visit: D/C home recs     RD rec'd message from MD and RN-plans for pt to d/c today. RD spoke " w/pt at bedside who reports he is tolerating current formula and likes his current regimen of 5 feedings per day. RD educated pt on alternative formulas if desired and if able to obtain per home health-brand [employer]. RD encouraged pt to only put formula in PEG tube to maintain tube integrity, pt verbalized understanding.      Addendum after speaking w/CM  Recommended home regimen:  Fibersource HN @ 350ml 5x/day (6a, 9a, 12p, 3p, 6p). Provide Prosource daily. Water flush @ 100ml (50ml before and after each bolus).   =1750ml, 2160 kcal (108% est needs), 109g pro (97% est needs), 27g fiber, 1418ml FW, 500ml water from flushes, 1918ml TFW.     If Fibersource HN not available, recommend:  Jevity 1.2 @ 350ml 5x/day (6a, 9a, 12p, 3p, 6p). Provide Prosource daily. Water flush @ 100ml (50ml before and after each bolus).   =1750ml, 2160kcals (108% est needs), 112g pro (100% pro needs), 32g fiber, 1418ml FW, 500ml water from flushes, 1918ml TFW.     Can provide Prosource or Prostat.     Needs Assessment:  Ht used: 73in  Wt used: 163lbs (standing scale wt on 4/6)     Estimated Calorie needs: ~2000 calories   Method: MSJ x 1.3: 2045kcal  Method: 25-30 Kcals/KG actual wt = 1852-2222kcal     Estimated Protein needs: ~112 g protein  Method: 1.2-1.5 g/Kg actual wt:89-112g protein     Giana Warner RD  09:34 EDT  Time: 30min

## 2023-04-11 NOTE — PLAN OF CARE
Goal Outcome Evaluation:      NSR. No c/o pain. TF bolus administered per order through PEG. Trach in place, obturator at bedside. Trach care provided. Possible DC home today w/ HH.

## 2023-04-11 NOTE — DISCHARGE PLACEMENT REQUEST
"DENISE COLE, RN    P:  [phone]  F:  [phone]                    Sandro Junior (68 y.o. Male)     Date of Birth   1955    Social Security Number       Address   [address] [address]    Home Phone   [phone]    MRN   [id number]       [place]   [facility]    Marital Status                               Admission Date   3/16/23    Admission Type   Emergency    Admitting Provider   Nicolle Cross MD    Attending Provider   Nicolle Cross MD    Department, Room/Bed   [facility] 4G, S454/1       Discharge Date       Discharge Disposition   Home or Self Care    Discharge Destination                               Attending Provider: Nicolle Cross MD    Allergies: No Known Allergies    Isolation: None   Infection: None   Code Status: CPR    Ht: 185.4 cm (73\")   Wt: 76.5 kg (168 lb 9.6 oz)    Admission Cmt: None   Principal Problem: Acute strep pyogenes (group A strep) epiglottitis with airway obstruction  [J05.11]                 Active Insurance as of 3/16/2023     Primary Coverage     Payor Plan Insurance Group Employer/Plan Group    HUMANA MEDICARE REPLACEMENT HUMANA MEDICARE REPLACEMENT [id number]     Payor Plan Address Payor Plan Phone Number Payor Plan Fax Number Effective Dates    [address] [address] [phone]  2/1/2023 - None Entered    [facility] [address]       Subscriber Name Subscriber Birth Date Member ID       SANDRO JUNIOR 1955 [id number]                 Emergency Contacts      (Rel.) Home Phone Work Phone Mobile Phone    CAROL JUNIOR (Spouse) [phone] -- [phone]    C,Zohra (Grandchild) -- -- [phone]    C,Hope (Daughter) -- -- [phone]               Speech Language Pathology Notes (most recent note)      Shelley Iraheta, MS CCC-SLP at 04/10/23 1640          Acute Care - Speech Language Pathology   Swallow Re-Evaluation [facility]   Fiberoptic Endoscopic Evaluation of Swallowing " (FEES)     Patient Name: Sandro Junior  : 1955  MRN: [id number]  Today's Date: 4/10/2023               Admit Date: 3/16/2023    Visit Dx:     ICD-10-CM ICD-9-CM   1. Respiratory distress  R06.03 786.09   2. Tachycardia  R00.0 785.0   3. Airway compromise  J98.8 519.8   4. Acute epiglottitis with airway obstruction - probable  J05.11 464.31   5. Essential hypertension  I10 401.9   6. Voice disturbance  R49.9 784.40   7. Oropharyngeal dysphagia  R13.12 787.22     Patient Active Problem List   Diagnosis   • Essential hypertension   • Chronic pain   • Osteoarthritis of knee   • Prolonged depressive adjustment reaction   • Acute prostatitis   • Atopic rhinitis   • Erectile dysfunction of nonorganic origin   • Glaucoma suspect   • Onychomycosis of toenail   • Malignant neoplasm of rectum   • Seborrheic eczema   • Keloid scar   • Pain of right lower extremity   • Esophageal reflux   • Acute respiratory failure   • Acute strep pyogenes (group A strep) epiglottitis with airway obstruction      Past Medical History:   Diagnosis Date   • Epidermal inclusion cyst    • Esophageal candidiasis    • Keloid scar    • Rectal cancer    • Seborrheic dermatitis      Past Surgical History:   Procedure Laterality Date   • COLECTOMY PARTIAL / TOTAL     • KNEE SURGERY      Left knee meniscal tear repair   • TOOTH EXTRACTION  2016 teeth   • TOTAL KNEE ARTHROPLASTY Right 2016   • TRACHEOSTOMY AND PEG TUBE INSERTION N/A 3/23/2023    Procedure: TRACHEOSTOMY AND PERCUTANEOUS ENDOSCOPIC GASTROSTOMY TUBE INSERTION;  Surgeon: Néstor Jerome MD;  Location: [facility];  Service: General;  Laterality: N/A;       SLP Recommendation and Plan  SLP Swallowing Diagnosis: severe, pharyngeal dysphagia (04/10/23 1535)  SLP Diet Recommendation: NPO, long term alternate methods of nutrition/hydration, other (see comments) (ok for 3-4 ice chips/hr, as tolerated) (04/10/23 1535)     SLP Rec. for Method of Medication Administration: meds  via alternate route (04/10/23 1535)        Recommended Diagnostics: other (see comments) (consider repeat instrumental study after decannulation (~1 mo), or as indicated, to determine if safe for PO intake) (04/10/23 1535)  Swallow Criteria for Skilled Therapeutic Interventions Met: demonstrates skilled criteria (04/10/23 1535)  Anticipated Discharge Disposition (SLP): anticipate therapy at next level of care, home with home health (04/10/23 1535)  Rehab Potential/Prognosis, Swallowing: good, to achieve stated therapy goals (04/10/23 1535)  Therapy Frequency (Swallow): 5 days per week (04/10/23 1535)  Predicted Duration Therapy Intervention (Days): until discharge (04/10/23 1535)  Demonstrates Need for Referral to Another Service: otolaryngology (ENT), other (see comments) (consider ENT referral if voice does not improve (especially after trach decannulation)) (04/10/23 1535)                                     Plan of Care Reviewed With: patient  Progress: no change      SWALLOW EVALUATION (last 72 hours)     SLP Adult Swallow Evaluation     Row Name 04/10/23 1535                   Rehab Evaluation    Document Type re-evaluation  -AC        Subjective Information no complaints  -AC        Patient Observations alert;cooperative  -AC        Patient/Family/Caregiver Comments/Observations No family present.  -AC        Patient Effort excellent  -AC           General Information    Patient Profile Reviewed yes  -AC        Pertinent History Of Current Problem See previous eval.  -AC        Current Method of Nutrition NPO;gastrostomy feedings  -AC        Plans/Goals Discussed with patient;agreed upon  -AC        Barriers to Rehab none identified  -AC        Patient's Goals for Discharge return to PO diet  -AC           Pain Scale: Numbers Pre/Post-Treatment    Pretreatment Pain Rating 0/10 - no pain  -AC        Posttreatment Pain Rating 0/10 - no pain  -AC           General Eating/Swallowing Observations    Respiratory  Support Currently in Use tracheostomy;trach collar  -        Eating/Swallowing Skills fed by staff/caregiver  -AC        Positioning During Eating upright 90 degree;upright in bed  -AC           Fiberoptic Endoscopic Evaluation of Swallowing (FEES)    Risks/Benefits Reviewed risks/benefits explained;patient;agreed to eval  -        Tracheostomy Tested with speaking valve on  -AC        Nasal Entry left:  -AC        Scope serial number/identification [id number]  -AC           Anatomy and Physiology    Anatomic Considerations anatomic deviation observed (see comments)  -AC        Comment L TVF movement restricted/sluggish. R TVF remains edematous/erythemic, but appeared to able to achieve both adequate abduction/adduction movement. Incomplete TVF closure upon phonation.  -AC        Velopharyngeal WFL  -AC        Base of Tongue range reduced  -AC        Epiglottis WFL  -AC        Laryngeal Function Breathing asymmetrical;decreased movement left  -AC        Laryngeal Function Phonation asymmetrical;decreased movement left  -AC        Laryngeal Function to Breath Hold no closure  -AC        Secretion Rating Scale (Jaiden et al. 1996) 3- secretions inside the laryngeal vestibule/aspiration, not cleared  -AC        Secretion Description thin;clear;continuous  -AC        Ice Chips elicited swallow;partially cleared secretions;aspirated  -AC        Spontaneous Swallow frequency reduced  -AC        Sensory sensed scope  -AC        Utensils Used Spoon  -AC        Consistencies Trialed thin liquids;honey-thick liquids;pudding/puree  -AC           FEES Interpretation    Oral Phase WFL;solids not tested  -AC           Initiation of Pharyngeal Swallow    Initiation of Pharyngeal Swallow bolus in valleculae  -AC        Pharyngeal Phase impaired pharyngeal phase of swallowing  -AC        Penetration After the Swallow all consistencies tested;secondary to residue;in pyriform sinuses  -AC        Aspiration After the Swallow all  consistencies tested;secondary to residue;in pyriform sinuses;in laryngeal vestibule  -AC        Depth of Penetration deep  -AC        Response to Penetration No  -AC        Response to Aspiration No  -AC        No spontaneous response to aspiration with non-effective subglottic clearance with cue (see comments);other (see comments)  cough  -AC        Rosenbek's Scale all consistencies:;8-->Level 8  -AC        Residue all consistencies tested;diffuse within pharynx;secondary to reduced base of tongue retraction;secondary to reduced posterior pharyngeal wall stripping;secondary to reduced laryngeal elevation;secondary to reduced hyolaryngeal excursion;other (see comments)  moderate amount  -AC        Response to Residue unable to clear residue;with compensatory maneuver (see comments)  -AC        Attempted Compensatory Maneuvers chin tuck;head turn to left;multiple swallows;effortful (hard swallow)  -AC        Response to Attempted Compensatory Maneuvers did not prevent aspiration;other (see comments)  Head turn to L most successul in reducing residue, but did not fully clear residue & pt cont'd to eventually aspirate after the swallow.  -AC        FEES Summary Unfortunately, no significant improvement in swallow function. Continued laryngeal abnormalities, as well as poor secretion management. Continued aspiration of all consistencies.  -           SLP Evaluation Clinical Impression    SLP Swallowing Diagnosis severe;pharyngeal dysphagia  -        Functional Impact risk of aspiration/pneumonia  -        Rehab Potential/Prognosis, Swallowing good, to achieve stated therapy goals  -        Swallow Criteria for Skilled Therapeutic Interventions Met demonstrates skilled criteria  -           Recommendations    Therapy Frequency (Swallow) 5 days per week  -        Predicted Duration Therapy Intervention (Days) until discharge  -        SLP Diet Recommendation NPO;long term alternate methods of  nutrition/hydration;other (see comments)  ok for 3-4 ice chips/hr, as tolerated  -AC        Recommended Diagnostics other (see comments)  consider repeat instrumental study after decannulation (~1 mo), or as indicated, to determine if safe for PO intake  -AC        Oral Care Recommendations Oral Care BID/PRN;Suction toothbrush;Before ice/water  -AC        SLP Rec. for Method of Medication Administration meds via alternate route  -AC        Anticipated Discharge Disposition (SLP) anticipate therapy at next level of care;home with home health  -AC        Demonstrates Need for Referral to Another Service otolaryngology (ENT);other (see comments)  consider ENT referral if voice does not improve (especially after trach decannulation)  -AC              User Key  (r) = Recorded By, (t) = Taken By, (c) = Cosigned By    Initials Name Effective Dates    AC Shellye Iraheta, MS CCC-SLP 02/03/23 -                 EDUCATION  The patient has been educated in the following areas:   Dysphagia (Swallowing Impairment) Oral Care/Hydration NPO rationale.        SLP GOALS     Row Name 04/10/23 1535             (LTG) Patient will demonstrate functional swallow for    Diet Texture (Demonstrate functional swallow) soft to chew (chopped) textures  -AC      Liquid viscosity (Demonstrate functional swallow) nectar/ mildly thick liquids  -AC      [place] (Demonstrate functional swallow) with minimal cues (75-90% accuracy)  -AC      Progress/Outcomes (Demonstrate functional swallow) goal ongoing  -AC         (STG) Patient will tolerate therapeutic trials of    Consistencies Trialed (Tolerate therapeutic trials) thin liquids  -AC      Desired Outcome (Tolerate therapeutic trials) without signs/symptoms of aspiration;without signs of distress  -AC      [place] (Tolerate therapeutic trials) with minimal cues (75-90% accuracy)  -AC      Time Frame (Tolerate therapeutic trials) by discharge  -AC      Progress/Outcomes (Tolerate therapeutic  trials) goal ongoing  -AC         (STG) Pharyngeal Strengthening Exercise Goal 1 (SLP)    Increase Superior Movement of the Hyolaryngeal Complex Mendelsohn;effortful pitch glide (falsetto + pharyngeal squeeze)  -AC      Increase Anterior Movement of the Hyolaryngeal Complex chin tuck against resistance (CTAR)  -AC      Increase Closure at Entrance to Airway/Closure of Airway at Glottis supraglottic swallow;breath hold exercises  -AC      Increase Squeeze/Positive Pressure Generation hard effortful swallow  -AC      Increase Tongue Base Retraction franklin  -AC      [place]/Accuracy (Pharyngeal Strengthening Goal 1, SLP) with minimal cues (75-90% accuracy)  -AC      Time Frame (Pharyngeal Strengthening Goal 1, SLP) short term goal (STG)  -AC      Progress/Outcomes (Pharyngeal Strengthening Goal 1, SLP) goal revised this date  -            User Key  (r) = Recorded By, (t) = Taken By, (c) = Cosigned By    Initials Name Provider Type     Shelley Iraheta MS CCC-SLP Speech and Language Pathologist                   Time Calculation:    Time Calculation- SLP     Row Name 04/10/23 1639             Time Calculation- SLP    SLP Start Time 1535  -AC      SLP Received On 04/10/23  -         Untimed Charges    72031-NC Fiberoptic Endo Eval Swallow Minutes 72  -AC         Total Minutes    Untimed Charges Total Minutes 72  -AC       Total Minutes 72  -AC            User Key  (r) = Recorded By, (t) = Taken By, (c) = Cosigned By    Initials Name Provider Type    AC Shelley Iraheta MS CCC-SLP Speech and Language Pathologist                Therapy Charges for Today     Code Description Service Date Service Provider Modifiers Qty    [id number]  ST FIBEROPTIC ENDO EVAL SWALL 5 4/10/2023 Shelley Iraheta MS CCC-SLP GN 1               Shelley Iraheta MS CCC-SLP  4/10/2023    Electronically signed by Shelley Iraheta MS CCC-SLP at 04/10/23 1641

## 2023-04-12 ENCOUNTER — TRANSITIONAL CARE MANAGEMENT TELEPHONE ENCOUNTER (OUTPATIENT)
Dept: CALL CENTER | Facility: HOSPITAL | Age: 68
End: 2023-04-12
Payer: MEDICARE

## 2023-04-12 NOTE — OUTREACH NOTE
Call Center TCM Note    Flowsheet Row Responses   [facility] patient discharged from? [place]   Does the patient have one of the following disease processes/diagnoses(primary or secondary)? Sepsis   TCM attempt successful? No   Unsuccessful attempts Attempt 2   Call Status Comments no pcp b[facility] verbal on file.          Kaila Du, RN    4/12/2023, 16:03 EDT

## 2023-04-12 NOTE — OUTREACH NOTE
Call Center TCM Note    Flowsheet Row Responses   [facility] patient discharged from? [place]   Does the patient have one of the following disease processes/diagnoses(primary or secondary)? Sepsis   TCM attempt successful? No   Unsuccessful attempts Attempt 1   Call Status Comments no pcp b[facility] verbal on file.          Kaila Du RN    4/12/2023, 15:43 EDT

## 2023-04-13 ENCOUNTER — TRANSITIONAL CARE MANAGEMENT TELEPHONE ENCOUNTER (OUTPATIENT)
Dept: CALL CENTER | Facility: HOSPITAL | Age: 68
End: 2023-04-13
Payer: MEDICARE

## 2023-04-13 NOTE — OUTREACH NOTE
Call Center TCM Note    Flowsheet Row Responses   [facility] patient discharged from? [place]   Does the patient have one of the following disease processes/diagnoses(primary or secondary)? Sepsis   TCM attempt successful? Yes   Call start time 0836   Call end time 0839   Discharge diagnosis Acute strep pyogenes (group A strep) epiglottitis with airway obstruction,   s/p Mechanical Ventilation, Trach/PEG,    Strep Pyogenes Bacteremia with Sepsis     Person spoke with today (if not patient) and relationship Patient   Meds reviewed with patient/caregiver? Yes   Nursing Interventions Nurse provided patient education   Prescription comments Advised patient to call [facility] on [address] and have them pull the other medications sent to [facility]- Patient states [facility] [address] was out of the medication and he will call [facility]   Is the patient taking all medications as directed (includes completed medication regime)? Yes   Comments Patient is aware of hospital fu on 04/17 @ 0900   Does the patient have an appointment with their PCP within 7 days of discharge? Yes   What is the Home health agency?  [facility]   Has home health visited the patient within 72 hours of discharge? Yes   What DME was ordered? Trach supplies and suction - [employer]   Has all DME been delivered? Yes   Psychosocial issues? No   Did the patient receive a copy of their discharge instructions? Yes   Nursing interventions Reviewed instructions with patient   What is the patient's perception of their health status since discharge? Improving   Nursing interventions Nurse provided patient education   Is the patient/caregiver able to teach back TIME? T emperature - higher or lower than normal, I nfection - may have signs and symptoms of an infection, M ental Decline - confused, sleepy, difficult to arouse, E xtremely Ill - severe pain, discomfort, shortness of breath   Nursing interventions Nurse provided patient education   Is  patient/caregiver able to teach back steps to recovery at home? Set small, achievable goals for return to baseline health, Rest and regain strength   Is the patient/caregiver able to teach back signs and symptoms of worsening condition: Fever, Rapid heart rate (>90), Shortness of breath/rapid respiratory rate, Altered mental status(confusion/coma)   Is the patient/caregiver able to teach back the hierarchy of who to call/visit for symptoms/problems? PCP, Specialist, Home health nurse, Urgent Care, ED, 911 Yes   TCM call completed? Yes   Wrap up additional comments Patient states he is doing well, HH has already been to the home. He will call [facility] and have them get the medication from other pharmacy. No s/s of worsening infection. No other concerns or questions noted.   Call end time 0839   Would this patient benefit from a Referral to Amb Social Work? No   Is the patient interested in additional calls from an ambulatory ?  NOTE:  applies to high risk patients requiring additional follow-up. No          Kaila Du RN    4/13/2023, 08:39 EDT

## 2023-04-14 ENCOUNTER — TELEPHONE (OUTPATIENT)
Dept: INTERNAL MEDICINE | Facility: CLINIC | Age: 68
End: 2023-04-14
Payer: MEDICARE

## 2023-04-14 NOTE — TELEPHONE ENCOUNTER
ANA GEORGES FROM [facility] NEEDING VERBAL ORDERS FOR:     OT 1 TIME A WEEK FOR 4 WEEKS    CALL BACK NUMBER:   [phone]

## 2023-04-17 ENCOUNTER — OFFICE VISIT (OUTPATIENT)
Dept: INTERNAL MEDICINE | Facility: CLINIC | Age: 68
End: 2023-04-17
Payer: MEDICARE

## 2023-04-17 VITALS
HEART RATE: 88 BPM | WEIGHT: 168.6 LBS | DIASTOLIC BLOOD PRESSURE: 90 MMHG | BODY MASS INDEX: 22.24 KG/M2 | SYSTOLIC BLOOD PRESSURE: 120 MMHG | OXYGEN SATURATION: 98 %

## 2023-04-17 DIAGNOSIS — Z93.1 PEG (PERCUTANEOUS ENDOSCOPIC GASTROSTOMY) STATUS: ICD-10-CM

## 2023-04-17 DIAGNOSIS — J05.10 EPIGLOTTITIS: Primary | ICD-10-CM

## 2023-04-17 DIAGNOSIS — R13.10 DYSPHAGIA, UNSPECIFIED TYPE: ICD-10-CM

## 2023-04-17 DIAGNOSIS — F43.0 ACUTE REACTION TO SITUATIONAL STRESS: ICD-10-CM

## 2023-04-17 DIAGNOSIS — Z93.0 TRACHEOSTOMY PRESENT: ICD-10-CM

## 2023-04-17 RX ORDER — OMEPRAZOLE 40 MG/1
40 CAPSULE, DELAYED RELEASE ORAL
COMMUNITY

## 2023-04-17 NOTE — PROGRESS NOTES
Transitional Care Follow Up Visit  Subjective     Sandro Junior is a 68 y.o. male who presents for a transitional care management visit.    Within 48 business hours after discharge our office contacted him via telephone to coordinate his care and needs.      I reviewed and discussed the details of that call along with the discharge summary, hospital problems, inpatient lab results, inpatient diagnostic studies, and consultation reports with Sandro.     Current outpatient and discharge medications have been reconciled for the patient.  Reviewed by: CHARLIE Hernandez          4/11/2023     7:56 PM   Date of TCM Phone Call   [facility]   Date of Admission 3/16/2023   Date of Discharge 4/11/2023   Discharge Disposition Home or Self Care     Risk for Readmission (LACE) Score: 11 (4/11/2023  6:01 AM)      History of Present Illness   Course During Hospital Stay:      The patient presents to the clinic today for a transitional care management visit. He is accompanied by his wife.    He recently had a tracheostomy performed on 03/23/2023 for epiglottitis. He states he was at home when his airway started to close. He was intubated within minutes when he arrived at the hospital. His stay was approximately 1.5 weeks in the ICU. He was admitted to the hospital on 03/16/2023, and discharged approximately on 04/11/2023. He was told the tracheostomy was reversible as well as the PEG tube, as soon as he could pass a swallowing study.  Has a follow-up appointment with infectious disease on 04/25/2023 with Dr. Johnson. He has completed a course of antibiotics through 04/10/2023. A PEG tube was placed and is currently performing tube feedings himself at home. He receives tube feedings 5 times a day that he needs to have 2,000 calories in a day. Has had swallowing tests several times. Dr. Jerome performed the PEG tube and tracheostomy. He has a follow-up appointment with pulmonology on 05/15/2023.     His last  swallowing study was performed before being released to go home, but he did not pass the swallowing study to be cleared on having the PEG tube and tracheotomy removed. Home health is coming to his home. He has a physical therapist, occupational therapist, speech therapist, and a nurse come to his home. He denies having cough attacks regularly, he notes the one in the office today is rare. He confirms to having ulcerative esophagitis and had an upper endoscopy in 06/2022. His last colonoscopy was at the end of 04/2022. He was given FiberSource HN and Prosource HN through his dietician while in the hospital. He has sipped on water and states that he was encouraged to eat some ice chips to work on swallowing.     He confirms that his voice has improved over the past few weeks.      He states that he has PTSD from being intubated as well as his stay in the hospital. There are days where he feels more depressed than others and is trying to work through and manage his symptoms. He confirms he has a good support system that include his wife, his daughter, and granddaughter. Hid daughter and granddaughter are both nurses at the [facility], and they help with his wound dressing changes. He uses a cane to assist with ambulation. The patient's wife confirms he has driven up to the store and to the clinic today. He did attend [employer] yesterday, 04/16/2023. He will sit on the front porch occasionally to get some fresh air.     Received the most recent COVID-19 booster on 09/04/2022. Had a pneumonia vaccine approximately 1 year ago.     The following portions of the patient's history were reviewed and updated as appropriate: allergies, current medications, past family history, past medical history, past social history, past surgical history and problem list.    Review of Systems   Constitutional: Negative for activity change, appetite change and fatigue.   HENT: Negative for congestion and rhinorrhea.    Respiratory: Positive for cough.  Negative for chest tightness and shortness of breath.    Cardiovascular: Negative for chest pain and palpitations.   Gastrointestinal: Negative for abdominal pain.   Genitourinary: Negative for dysuria.   Musculoskeletal: Negative for arthralgias and myalgias.   Neurological: Negative for dizziness, weakness, light-headedness and headaches.   Psychiatric/Behavioral: Positive for dysphoric mood. The patient is not nervous/anxious.        Objective      /90   Pulse 88   Wt 76.5 kg (168 lb 9.6 oz)   SpO2 98%   BMI 22.24 kg/m²     Physical Exam  Vitals and nursing note reviewed.   Constitutional:       Appearance: He is well-developed.   HENT:      Head: Normocephalic.      Right Ear: Hearing, tympanic membrane, ear canal and external ear normal.      Left Ear: Hearing, tympanic membrane, ear canal and external ear normal.      Nose: Nose normal.   Eyes:      Conjunctiva/sclera: Conjunctivae normal.      Pupils: Pupils are equal, round, and reactive to light.   Neck:      Trachea: Tracheostomy present.   Cardiovascular:      Rate and Rhythm: Normal rate and regular rhythm.      Heart sounds: Normal heart sounds.   Pulmonary:      Effort: Pulmonary effort is normal.      Breath sounds: Normal breath sounds. No decreased breath sounds, wheezing, rhonchi or rales.   Musculoskeletal:         General: Normal range of motion.      Cervical back: Normal range of motion.   Skin:     General: Skin is warm and dry.   Neurological:      Mental Status: He is alert.   Psychiatric:         Behavior: Behavior normal.         Assessment & Plan   Diagnoses and all orders for this visit:    1. Epiglottitis (Primary)  Comments:  Pending f/u with Dr. Johnson, ID.    2. Tracheostomy present  Comments:  F/u with Pulomonology in May to determine if tracheostomy and PEG can be reversed.     3. PEG (percutaneous endoscopic gastrostomy) status  Comments:  Continue recommendations per speech therapist, 5 feedings daily. HH  following.    4. Dysphagia, unspecified type  Comments:  Will need another swallowing study. Pt plans to schedule with his regular GI provider, Dr. Allen.    5. Acute reaction to situational stress  Comments:  Validated feelings after traumatic hospital experience. Offered appointment with counselor but patient prefers to work through feelings on his own.               The patient will follow up in 6 weeks.          Transcribed from ambient dictation for CHARLIE Jernigan by Valentina Vidal.  04/17/23   13:47 EDT    Patient or patient representative verbalized consent to the visit recording.  I have personally performed the services described in this document as transcribed by the above individual, and it is both accurate and complete.

## 2023-04-19 ENCOUNTER — READMISSION MANAGEMENT (OUTPATIENT)
Dept: CALL CENTER | Facility: HOSPITAL | Age: 68
End: 2023-04-19
Payer: MEDICARE

## 2023-04-19 NOTE — OUTREACH NOTE
Sepsis Week 2 Survey    Flowsheet Row Responses   [facility] facility patient discharged from? [place]   Does the patient have one of the following disease processes/diagnoses(primary or secondary)? Sepsis   Week 2 attempt successful? No   Unsuccessful attempts Attempt 1          Kaila OLSON - Registered Nurse

## 2023-04-24 ENCOUNTER — READMISSION MANAGEMENT (OUTPATIENT)
Dept: CALL CENTER | Facility: HOSPITAL | Age: 68
End: 2023-04-24
Payer: MEDICARE

## 2023-04-24 NOTE — OUTREACH NOTE
Sepsis Week 2 Survey    Flowsheet Row Responses   [facility] patient discharged from? [place]   Does the patient have one of the following disease processes/diagnoses(primary or secondary)? Sepsis   Week 2 attempt successful? Yes   Call start time 1651   Call end time 1655   Discharge diagnosis Acute strep pyogenes (group A strep) epiglottitis with airway obstruction,   s/p Mechanical Ventilation, Trach/PEG,    Strep Pyogenes Bacteremia with Sepsis     Meds reviewed with patient/caregiver? Yes   Is the patient having any side effects they believe may be caused by any medication additions or changes? No   Does the patient have all medications related to this admission filled (includes all antibiotics, inhalers, nebulizers,steroids,etc.) Yes   Is the patient taking all medications as directed (includes completed medication regime)? Yes   Does the patient have a primary care provider?  Yes   Does the patient have an appointment with their PCP within 7 days of discharge? Yes   Has the patient kept scheduled appointments due by today? Yes   What is the Home health agency?  [facility]   Has home health visited the patient within 72 hours of discharge? Yes   Psychosocial issues? No   Did the patient receive a copy of their discharge instructions? Yes   Nursing interventions Reviewed instructions with patient   What is the patient's perception of their health status since discharge? Improving   Nursing interventions Nurse provided patient education   Is the patient/caregiver able to teach back TIME? T emperature - higher or lower than normal, I nfection - may have signs and symptoms of an infection, M ental Decline - confused, sleepy, difficult to arouse, E xtremely Ill - severe pain, discomfort, shortness of breath   Nursing interventions Nurse provided reassurance to patient, Nurse provided patient education   Is patient/caregiver able to teach back steps to recovery at home? Set small, achievable goals for  return to baseline health, Rest and regain strength, Eat a balanced diet, Exercise as tolerated   Is the patient/caregiver able to teach back signs and symptoms of worsening condition: Fever, Hyperthermia, Rapid heart rate (>90), Shortness of breath/rapid respiratory rate, Altered mental status(confusion/coma), Edema   Is the patient/caregiver able to teach back the hierarchy of who to call/visit for symptoms/problems? PCP, Specialist, Home health nurse, Urgent Care, ED, 911 Yes   Week 2 call completed? Yes          Landy JONES - Registered Nurse

## 2023-05-03 ENCOUNTER — TELEPHONE (OUTPATIENT)
Dept: INTERNAL MEDICINE | Facility: CLINIC | Age: 68
End: 2023-05-03
Payer: MEDICARE

## 2023-05-03 DIAGNOSIS — Z93.1 PEG (PERCUTANEOUS ENDOSCOPIC GASTROSTOMY) STATUS: ICD-10-CM

## 2023-05-03 DIAGNOSIS — R13.10 DYSPHAGIA, UNSPECIFIED TYPE: Primary | ICD-10-CM

## 2023-05-03 NOTE — TELEPHONE ENCOUNTER
Spoke w/ Mckayla w/ [facility]. States pt is still npo, is doing well w/ HH swallow tests, but would like to get an order to have a modified barium swallow ordered. Please advise

## 2023-05-04 ENCOUNTER — READMISSION MANAGEMENT (OUTPATIENT)
Dept: CALL CENTER | Facility: HOSPITAL | Age: 68
End: 2023-05-04
Payer: MEDICARE

## 2023-05-04 NOTE — OUTREACH NOTE
Sepsis Week 3 Survey    Flowsheet Row Responses   [facility] patient discharged from? [place]   Does the patient have one of the following disease processes/diagnoses(primary or secondary)? Sepsis   Week 3 attempt successful? Yes   Call start time 1529   Call end time 1530   Discharge diagnosis Acute strep pyogenes (group A strep) epiglottitis with airway obstruction,   s/p Mechanical Ventilation, Trach/PEG,    Strep Pyogenes Bacteremia with Sepsis     Person spoke with today (if not patient) and relationship Patient   Meds reviewed with patient/caregiver? Yes   Does the patient have all medications related to this admission filled (includes all antibiotics, inhalers, nebulizers,steroids,etc.) Yes   Is the patient taking all medications as directed (includes completed medication regime)? Yes   Does the patient have a primary care provider?  Yes   Comments regarding PCP CHARLIE Le   Does the patient have an appointment with their PCP within 7 days of discharge? Yes   Has the patient kept scheduled appointments due by today? Yes   What is the Home health agency?  [facility]   Has home health visited the patient within 72 hours of discharge? Yes   Home health comments PT, OT, HH   What DME was ordered? Trach supplies and suction - [employer]   Has all DME been delivered? Yes   Psychosocial issues? No   Did the patient receive a copy of their discharge instructions? Yes   Nursing interventions Reviewed instructions with patient   What is the patient's perception of their health status since discharge? Improving   Week 3 call completed? Yes   Is the patient interested in additional calls from an ambulatory ?  NOTE:  applies to high risk patients requiring additional follow-up. No   Wrap up additional comments pt reports he is doing well at this time.  No needs.          AMALIA LUCAS - Registered Nurse

## 2023-05-04 NOTE — TELEPHONE ENCOUNTER
Gave verbal to Mckayla and she states we will need to put the order in and notify patient when it is scheduled.

## 2023-05-05 ENCOUNTER — OUTSIDE FACILITY SERVICE (OUTPATIENT)
Dept: INTERNAL MEDICINE | Facility: CLINIC | Age: 68
End: 2023-05-05
Payer: MEDICARE

## 2023-05-10 ENCOUNTER — TELEPHONE (OUTPATIENT)
Dept: INTERNAL MEDICINE | Facility: CLINIC | Age: 68
End: 2023-05-10
Payer: MEDICARE

## 2023-05-10 NOTE — TELEPHONE ENCOUNTER
ANA, OCCUPATION THERAPIST FORM [facility] WOULD LIKE TO LET ROCIO KNOW THAT THE PATIENT WAS DISCHARGED FROM OCCUPATIONAL THERAPY TODAY. ANA STATES THE PT IS STILL RECEIVING PHYSICAL THERAPY AND NURSING. HE IS DOING VERY WELL. CALL IF THERE ARE QUESTIONS. [phone].

## 2023-05-15 ENCOUNTER — OFFICE VISIT (OUTPATIENT)
Dept: PULMONOLOGY | Facility: CLINIC | Age: 68
End: 2023-05-15
Payer: MEDICARE

## 2023-05-15 ENCOUNTER — HOSPITAL ENCOUNTER (OUTPATIENT)
Dept: GENERAL RADIOLOGY | Facility: HOSPITAL | Age: 68
Discharge: HOME | End: 2023-05-15
Payer: MEDICARE

## 2023-05-15 VITALS
DIASTOLIC BLOOD PRESSURE: 70 MMHG | HEART RATE: 72 BPM | OXYGEN SATURATION: 98 % | TEMPERATURE: 98.4 F | HEIGHT: 73 IN | SYSTOLIC BLOOD PRESSURE: 140 MMHG | BODY MASS INDEX: 22.35 KG/M2 | WEIGHT: 168.6 LBS

## 2023-05-15 DIAGNOSIS — Z93.1 PEG (PERCUTANEOUS ENDOSCOPIC GASTROSTOMY) STATUS: ICD-10-CM

## 2023-05-15 DIAGNOSIS — Z93.0 TRACHEOSTOMY PRESENT: ICD-10-CM

## 2023-05-15 DIAGNOSIS — J05.11 ACUTE EPIGLOTTITIS WITH AIRWAY OBSTRUCTION: Primary | ICD-10-CM

## 2023-05-15 DIAGNOSIS — Z43.1 PEG (PERCUTANEOUS ENDOSCOPIC GASTROSTOMY) ADJUSTMENT/REPLACEMENT/REMOVAL: ICD-10-CM

## 2023-05-15 DIAGNOSIS — R13.10 DYSPHAGIA, UNSPECIFIED TYPE: ICD-10-CM

## 2023-05-15 PROCEDURE — 74230 X-RAY XM SWLNG FUNCJ C+: CPT

## 2023-05-15 PROCEDURE — 92611 MOTION FLUOROSCOPY/SWALLOW: CPT | Performed by: SPEECH-LANGUAGE PATHOLOGIST

## 2023-05-15 RX ADMIN — BARIUM SULFATE 20 ML: 400 PASTE ORAL at 13:22

## 2023-05-15 RX ADMIN — BARIUM SULFATE 100 ML: 0.81 POWDER, FOR SUSPENSION ORAL at 13:22

## 2023-05-15 NOTE — PROGRESS NOTES
"[facility] Pulmonary Evaluation    Chief Complaint  Breathing Problem (Hospital follow up for trachea tube insertion)    Referring Provider:     Subjective          Sandro Junior presents to [facility] for hospital follow-up, he was seen by Dr. Alaniz in the hospital.    He was originally admitted for 3/16/2023 with acute epiglottitis, severe sepsis and group A hemolytic streptococcal bacteremia.  Hospital course complicated requiring a tracheostomy and PEG (3/23/2023).  Pulmonary reconsulted on 4/10 to evaluate tracheostomy and determine plan for decannulation.  Dr. Alaniz's plan when he is on April 10 was to leave a tracheostomy until he completed rehab for about 2 to 4 weeks as well as passed his speech therapy evaluation.      He has been doing very well since discharge home.  He worked with home physical and occupational therapy and is regained his strength.  He is eating and drinking well, and has his modified barium swallow today to clear him for regular foods.  He has no cough or sputum production.  He has never had to have suctioning via his tracheostomy.  He has a Shiley that has been capped since his discharge.  He has good voice quality today.      Objective     Vital Signs:   /70 (BP Location: Left arm, Patient Position: Sitting, Cuff Size: Adult)   Pulse 72   Temp 98.4 °F (36.9 °C)   Ht 185.4 cm (73\")   Wt 76.5 kg (168 lb 9.6 oz)   SpO2 98% Comment: Resting at room air  BMI 22.24 kg/m²       Immunization History   Administered Date(s) Administered   • COVID-19 (PFIZER) BIVALENT 12+YRS 09/22/2022   • COVID-19 (PFIZER) Purple Cap Monovalent 03/04/2021, 03/31/2021, 11/05/2021   • Flu Vaccine Intradermal Quad 18-64YR 11/03/2014   • FluLaval/Fluzone >6mos 09/10/2015, 10/04/2016, 08/24/2017, 09/21/2020, 10/25/2021   • Fluzone High-Dose 65+yrs 11/03/2022   • Hepatitis A 12/03/2018, 07/16/2019   • Influenza Seasonal Injectable 11/03/2014   • " Pneumococcal Polysaccharide (PPSV23) 04/05/2022   • Tdap 04/26/2018   • flucelvax quad pfs =>4 YRS 11/26/2018, 12/27/2019   • influenza Split 10/04/2016, 08/24/2017       Physical Exam  Vitals reviewed.   Constitutional:       Appearance: He is well-developed.   HENT:      Head: Normocephalic and atraumatic.   Eyes:      Pupils: Pupils are equal, round, and reactive to light.   Cardiovascular:      Rate and Rhythm: Normal rate and regular rhythm.      Heart sounds: No murmur heard.  Pulmonary:      Effort: Pulmonary effort is normal. No respiratory distress.      Breath sounds: Normal breath sounds. No wheezing or rales.   Abdominal:      General: Bowel sounds are normal. There is no distension.      Palpations: Abdomen is soft.   Musculoskeletal:         General: Normal range of motion.      Cervical back: Normal range of motion and neck supple.   Skin:     General: Skin is warm and dry.      Findings: No erythema.   Neurological:      Mental Status: He is alert and oriented to person, place, and time.   Psychiatric:         Behavior: Behavior normal.          Result Review :                           Assessment and Plan    Problem List Items Addressed This Visit    None  Visit Diagnoses     Acute epiglottitis with airway obstruction    -  Primary    Tracheostomy present        PEG (percutaneous endoscopic gastrostomy) adjustment/replacement/removal        Relevant Orders    Ambulatory Referral to General Surgery (Completed)        Hopefully after his speech therapy evaluation/modified barium swallow today we will go ahead and get him decannulated.  I will call him back later this week and get him rescheduled to come in and get his tracheostomy removed.    He also needs his PEG removed, Dr. Jerome put it in at the hospital will refer him over there to get that removed.            Follow Up     No follow-ups on file.  Patient was given instructions and counseling regarding his condition or for health maintenance  advice. Please see specific information pulled into the AVS if appropriate.     DENIS Kothari, ACNP  [facility]  Electronically signed

## 2023-05-15 NOTE — MBS/VFSS/FEES
Outpatient Speech Language Pathology   Adult Swallow Initial Evaluation  [facility]   Outpatient Modified Barium Swallow Study (MBS)       Patient Name: Sandro Junior  : 1955  MRN: [id number]  Today's Date: 5/15/2023         Visit Date: 05/15/2023   Patient Active Problem List   Diagnosis   • Essential hypertension   • Chronic pain   • Osteoarthritis of knee   • Prolonged depressive adjustment reaction   • Acute prostatitis   • Atopic rhinitis   • Erectile dysfunction of nonorganic origin   • Glaucoma suspect   • Onychomycosis of toenail   • Malignant neoplasm of rectum   • Seborrheic eczema   • Keloid scar   • Pain of right lower extremity   • Esophageal reflux   • Dysphagia        Past Medical History:   Diagnosis Date   • Epidermal inclusion cyst    • Esophageal candidiasis    • Keloid scar    • Rectal cancer    • Seborrheic dermatitis         Past Surgical History:   Procedure Laterality Date   • COLECTOMY PARTIAL / TOTAL     • KNEE SURGERY      Left knee meniscal tear repair   • TOOTH EXTRACTION  2016 teeth   • TOTAL KNEE ARTHROPLASTY Right 2016   • TRACHEOSTOMY AND PEG TUBE INSERTION N/A 3/23/2023    Procedure: TRACHEOSTOMY AND PERCUTANEOUS ENDOSCOPIC GASTROSTOMY TUBE INSERTION;  Surgeon: Néstor Jerome MD;  [facility];  Service: General;  Laterality: N/A;         Visit Dx:     ICD-10-CM ICD-9-CM   1. Dysphagia, unspecified type  R13.10 787.20   2. PEG (percutaneous endoscopic gastrostomy) status  Z93.1 V44.1                SLP Adult Swallow Evaluation     Row Name 05/15/23 1305       Rehab Evaluation    Document Type evaluation  -CJ    Subjective Information no complaints  -CJ    Patient Observations alert;cooperative  -CJ    Patient/Family/Caregiver Comments/Observations no family present  -CJ    Patient Effort good  -CJ    Symptoms Noted During/After Treatment none  -CJ       General Information    Patient Profile Reviewed yes  -CJ    Pertinent History Of Current Problem  Pt very familiar to SLP department, most recent adm w/ SLP recs for for NPO w/ PEG upon d/c  -CJ    Current Method of Nutrition NPO;gastrostomy feedings  -CJ    Precautions/Limitations, Vision WFL;for purposes of eval  -CJ    Precautions/Limitations, Hearing WFL;for purposes of eval  -CJ    Prior Level of Function-Communication WFL  -    Prior Level of Function-Swallowing alternative feeding method  -    Plans/Goals Discussed with patient  -    Barriers to Rehab medically complex  -    Patient's Goals for Discharge return to regular diet  -       Pain    Additional Documentation Pain Scale: FACES Pre/Post-Treatment (Group)  -       Pain Scale: FACES Pre/Post-Treatment    Pain: FACES Scale, Pretreatment 0-->no hurt  -CJ    Posttreatment Pain Rating 0-->no hurt  -       General Eating/Swallowing Observations    Respiratory Support Currently in Use room air  PMV In place  -    Eating/Swallowing Skills self-fed  -    Positioning During Eating upright in chair  -       Respiratory    Date of Intubation Pt continues on RA w/ trach in place  -       MBS/VFSS    Utensils Used spoon;cup;straw  -    Consistencies Trialed regular textures;thin liquids;pudding thick  -       MBS/VFSS Interpretation    Oral Prep Phase WFL  -    Oral Transit Phase WFL  -    Oral Residue WFL  -    VFSS Summary Grossly functional oropharyngeal swallowing fnx. Oral phase is wfl w/ adequate mastication/manipulation w/ stack. Transient penetration intermittently w/ thin liquids however cleared upon completion of swallow. No aspiration evidenced across any po presentations (with PMV on and off). No significant pharyngeal residue. Recommend regular diet w/ thin liquids.    Recommendations:  1. Regular diet w/ thin liquids  2. Okay for PO intake w/ PMV on or off  3. Aspiration precautions.  4. Meds whole in puree/thins as tolerated      -       Initiation of Pharyngeal Swallow    Initiation of Pharyngeal Swallow bolus in  valleculae  -    Pharyngeal Phase impaired pharyngeal phase of swallowing  -    Anatomical abnormalities noted osteophyte/bone spur per radiology report  -CJ    Penetration During the Swallow thin liquids;secondary to reduced vestibular closure;secondary to reduced laryngeal elevation  -    Depth of Penetration shallow;transient  -    Rosenbek's Scale thin:;2--->level 2  -    Pharyngeal Residue no significant pharyngeal residue noted  -       SLP Evaluation Clinical Impression    SLP Swallowing Diagnosis swallow WFL/no suspected pharyngeal impairment  -    Functional Impact no impact on function  -       Recommendations    SLP Diet Recommendation regular textures;thin liquids  -    Recommended Precautions and Strategies upright posture during/after eating;general aspiration precautions  -    Oral Care Recommendations Oral Care BID/PRN;Toothbrush  -    SLP Rec. for Method of Medication Administration meds whole;with thin liquids;as tolerated  -    Monitor for Signs of Aspiration yes;notify SLP if any concerns  -    Anticipated Discharge Disposition (SLP) home  -          User Key  (r) = Recorded By, (t) = Taken By, (c) = Cosigned By    Initials Name Provider Type     Danii Llanos MS CCC-SLP Speech and Language Pathologist                                           Time Calculation:   SLP Start Time: 1305  Untimed Charges  35272-JB Motion Fluoro Eval Swallow Minutes: 68  Total Minutes  Untimed Charges Total Minutes: 68   Total Minutes: 68    Therapy Charges for Today     Code Description Service Date Service Provider Modifiers Qty    [id number]  ST MOTION FLUORO EVAL SWALLOW 5 5/15/2023 Danii Llanos MS CCC-SLP GN 1                   Danii Llanos MS CCC-SLP  5/15/2023

## 2023-05-18 ENCOUNTER — TELEPHONE (OUTPATIENT)
Dept: INTERNAL MEDICINE | Facility: CLINIC | Age: 68
End: 2023-05-18
Payer: MEDICARE

## 2023-05-18 NOTE — TELEPHONE ENCOUNTER
I received the results of his swallowing test which showed that he passed and can resume a regular diet. I assume this was discussed with him at the time of testing. It looks like he has an appointment with pulmonary tomorrow- hopefully to remove his tracheostomy. Please confirm all this with him.    Also, did he get a follow up scheduled with the surgeon to remove his PEG?

## 2023-05-19 ENCOUNTER — OFFICE VISIT (OUTPATIENT)
Dept: PULMONOLOGY | Facility: CLINIC | Age: 68
End: 2023-05-19
Payer: MEDICARE

## 2023-05-19 VITALS
TEMPERATURE: 97.5 F | HEART RATE: 90 BPM | SYSTOLIC BLOOD PRESSURE: 124 MMHG | DIASTOLIC BLOOD PRESSURE: 82 MMHG | OXYGEN SATURATION: 97 % | HEIGHT: 73 IN | BODY MASS INDEX: 22.43 KG/M2 | WEIGHT: 169.2 LBS

## 2023-05-19 DIAGNOSIS — Z93.0 TRACHEOSTOMY PRESENT: Primary | ICD-10-CM

## 2023-05-19 NOTE — PROGRESS NOTES
"[facility] Pulmonary Follow up      Chief Complaint  Acute epiglottitis with airway obstruction (Follow up.)    Subjective          Sandro Junior presents to [facility] for removal of his tracheostomy.  I saw him last week after his hospitalization for severe epiglottitis and respiratory failure.  He did have a tracheostomy placed.  When I saw him last week he was doing very well.  He needed to do his modified barium swallow for tracheostomy removal.  He had that test done on May 15 with no signs of aspiration.  He is here today for tracheostomy removal.      Objective     Vital Signs:   /82 (BP Location: Left arm, Patient Position: Sitting, Cuff Size: Adult)   Pulse 90   Temp 97.5 °F (36.4 °C)   Ht 185.4 cm (73\")   Wt 76.7 kg (169 lb 3.2 oz)   SpO2 97% Comment: Resting at room air  BMI 22.32 kg/m²       Immunization History   Administered Date(s) Administered   • COVID-19 (PFIZER) BIVALENT 12+YRS 09/22/2022   • COVID-19 (PFIZER) Purple Cap Monovalent 03/04/2021, 03/31/2021, 11/05/2021   • Flu Vaccine Intradermal Quad 18-64YR 11/03/2014   • FluLaval/Fluzone >6mos 09/10/2015, 10/04/2016, 08/24/2017, 09/21/2020, 10/25/2021   • Fluzone High-Dose 65+yrs 11/03/2022   • Hepatitis A 12/03/2018, 07/16/2019   • Influenza Seasonal Injectable 11/03/2014   • Pneumococcal Polysaccharide (PPSV23) 04/05/2022   • Tdap 04/26/2018   • flucelvax quad pfs =>4 YRS 11/26/2018, 12/27/2019   • influenza Split 10/04/2016, 08/24/2017       Physical Exam  Neck:      Trachea: Tracheostomy present. No tracheal tenderness.     Musculoskeletal:      Cervical back: No edema or erythema.          Result Review :                         Assessment and Plan    Problem List Items Addressed This Visit        ENT    Tracheostomy present - Primary       I removed Mr. Junior's tracheostomy today without difficulty, as well as the tracheal sutures.  I applied a sterile dressing with tape.  " He does have dressing supplies at home.    We discussed precautions for the next few days while the wound is healing.  I did tell him to call the on-call physician over the weekend if he has any acute issues or go to the emergency department.  And call next week if he has any questions or concerns.    Follow Up     No follow-ups on file.  Patient was given instructions and counseling regarding his condition or for health maintenance advice. Please see specific information pulled into the AVS if appropriate.             DENIS Kothari, ACNP  [facility]  Electronically signed

## 2023-05-22 ENCOUNTER — OFFICE VISIT (OUTPATIENT)
Dept: INTERNAL MEDICINE | Facility: CLINIC | Age: 68
End: 2023-05-22
Payer: MEDICARE

## 2023-05-22 ENCOUNTER — LAB (OUTPATIENT)
Dept: LAB | Facility: HOSPITAL | Age: 68
End: 2023-05-22
Payer: MEDICARE

## 2023-05-22 VITALS
WEIGHT: 168 LBS | SYSTOLIC BLOOD PRESSURE: 140 MMHG | DIASTOLIC BLOOD PRESSURE: 90 MMHG | BODY MASS INDEX: 22.16 KG/M2 | OXYGEN SATURATION: 96 % | HEART RATE: 86 BPM

## 2023-05-22 DIAGNOSIS — R73.9 HYPERGLYCEMIA: ICD-10-CM

## 2023-05-22 DIAGNOSIS — Z93.1 PEG (PERCUTANEOUS ENDOSCOPIC GASTROSTOMY) STATUS: ICD-10-CM

## 2023-05-22 DIAGNOSIS — I10 ESSENTIAL HYPERTENSION: ICD-10-CM

## 2023-05-22 DIAGNOSIS — R73.9 HYPERGLYCEMIA: Primary | ICD-10-CM

## 2023-05-22 LAB
ALBUMIN SERPL-MCNC: 4.3 G/DL (ref 3.5–5.2)
ALBUMIN/GLOB SERPL: 1.3 G/DL
ALP SERPL-CCNC: 63 U/L (ref 39–117)
ALT SERPL W P-5'-P-CCNC: 20 U/L (ref 1–41)
ANION GAP SERPL CALCULATED.3IONS-SCNC: 8 MMOL/L (ref 5–15)
AST SERPL-CCNC: 26 U/L (ref 1–40)
BILIRUB SERPL-MCNC: 0.9 MG/DL (ref 0–1.2)
BUN SERPL-MCNC: 7 MG/DL (ref 8–23)
BUN/CREAT SERPL: 9.5 (ref 7–25)
CALCIUM SPEC-SCNC: 9.1 MG/DL (ref 8.6–10.5)
CHLORIDE SERPL-SCNC: 100 MMOL/L (ref 98–107)
CO2 SERPL-SCNC: 29 MMOL/L (ref 22–29)
CREAT SERPL-MCNC: 0.74 MG/DL (ref 0.76–1.27)
EGFRCR SERPLBLD CKD-EPI 2021: 99.9 ML/MIN/1.73
GLOBULIN UR ELPH-MCNC: 3.3 GM/DL
GLUCOSE SERPL-MCNC: 93 MG/DL (ref 65–99)
HBA1C MFR BLD: 5.5 % (ref 4.8–5.6)
POTASSIUM SERPL-SCNC: 4 MMOL/L (ref 3.5–5.2)
PROT SERPL-MCNC: 7.6 G/DL (ref 6–8.5)
SODIUM SERPL-SCNC: 137 MMOL/L (ref 136–145)

## 2023-05-22 PROCEDURE — 36415 COLL VENOUS BLD VENIPUNCTURE: CPT

## 2023-05-22 PROCEDURE — 80053 COMPREHEN METABOLIC PANEL: CPT

## 2023-05-22 PROCEDURE — 83036 HEMOGLOBIN GLYCOSYLATED A1C: CPT

## 2023-05-22 PROCEDURE — 99214 OFFICE O/P EST MOD 30 MIN: CPT | Performed by: PHYSICIAN ASSISTANT

## 2023-05-22 PROCEDURE — 3077F SYST BP >= 140 MM HG: CPT | Performed by: PHYSICIAN ASSISTANT

## 2023-05-22 PROCEDURE — 3080F DIAST BP >= 90 MM HG: CPT | Performed by: PHYSICIAN ASSISTANT

## 2023-05-22 NOTE — PROGRESS NOTES
Chief Complaint   Patient presents with   • Follow-up     Follow up after speech eval and Pulmonology follow up       Subjective     Sandro Junior is a 68 y.o. male.        History of Present Illness       The patient saw Dr. Claudette Pace, pulmonology, on 05/15/2023. He had a swallowing test done on 05/17/2023. He had a PEG tube placed when he was in the hospital. He is eating normally. He is not using the PEG tube. He is flushing water through it. He is still on a soft diet.  He keeps a dressing around it every day. He was discharged from occupational therapy. His sleep is still off. He tries to take the PEG tube out twice a day. He flushes it twice a day. He keeps it dry. His weight is coming back up. He did not have throat pain in the beginning. He is back to doing his medicines by mouth. He is taking valsartan 80 mg. His blood pressure is elevated today. He does not check his blood pressure at home.  His blood pressure has been running 117 to 120 over 80.      Current Outpatient Medications:   •  ketoconazole (NIZORAL) 2 % cream, Apply  topically to the appropriate area as directed Daily., Disp: 60 g, Rfl: 1  •  latanoprost (XALATAN) 0.005 % ophthalmic solution, , Disp: , Rfl: 0  •  omeprazole (priLOSEC) 40 MG capsule, Take 1 capsule by mouth., Disp: , Rfl:   •  sildenafil (VIAGRA) 50 MG tablet, Administer 1 tablet per G tube Daily As Needed for Erectile Dysfunction., Disp: 30 tablet, Rfl: 1  •  valsartan (DIOVAN) 80 MG tablet, Administer 1 tablet per G tube Daily., Disp: 90 tablet, Rfl: 3     PMFSH  The following portions of the patient's history were reviewed and updated as appropriate: allergies, current medications, past family history, past medical history, past social history, past surgical history and problem list.    Review of Systems   Constitutional: Negative for activity change, appetite change and fatigue.   HENT: Negative for congestion and rhinorrhea.    Respiratory: Negative for chest tightness  and shortness of breath.    Cardiovascular: Negative for chest pain and palpitations.   Gastrointestinal: Negative for abdominal pain.   Genitourinary: Negative for dysuria.   Musculoskeletal: Negative for arthralgias and myalgias.   Neurological: Negative for dizziness, weakness, light-headedness and headaches.   Psychiatric/Behavioral: Negative for dysphoric mood. The patient is not nervous/anxious.        Objective   /90   Pulse 86   Wt 76.2 kg (168 lb)   SpO2 96%   BMI 22.16 kg/m²     Physical Exam  Vitals and nursing note reviewed.   Constitutional:       Appearance: He is well-developed.   HENT:      Head: Normocephalic.      Right Ear: Hearing, tympanic membrane, ear canal and external ear normal.      Left Ear: Hearing, tympanic membrane, ear canal and external ear normal.      Nose: Nose normal.   Eyes:      Conjunctiva/sclera: Conjunctivae normal.      Pupils: Pupils are equal, round, and reactive to light.   Cardiovascular:      Rate and Rhythm: Normal rate and regular rhythm.      Heart sounds: Normal heart sounds.   Pulmonary:      Effort: Pulmonary effort is normal.      Breath sounds: Normal breath sounds. No decreased breath sounds, wheezing, rhonchi or rales.   Musculoskeletal:         General: Normal range of motion.      Cervical back: Normal range of motion.   Skin:     General: Skin is warm and dry.   Neurological:      Mental Status: He is alert.   Psychiatric:         Behavior: Behavior normal.              ASSESSMENT/PLAN    Diagnoses and all orders for this visit:    1. Hyperglycemia (Primary)  Comments:  Recheck cmp and hemoglobin a1c.  Orders:  -     Comprehensive Metabolic Panel; Future  -     Hemoglobin A1c; Future    2. Essential hypertension  Assessment & Plan:  Hypertension is unchanged.  Continue current treatment regimen.  Dietary sodium restriction.  Weight loss.  Regular aerobic exercise.  Continue current medications.  Ambulatory blood pressure monitoring.  Blood  pressure will be reassessed at the next regular appointment.      3. PEG (percutaneous endoscopic gastrostomy) status  Comments:  Gave pt contact info for general surgeon, Dr. Jerome, referral already ordered. Will discuss PEG removal.           Return in about 3 months (around 8/22/2023) for Medicare Wellness.     Transcribed from ambient dictation for CHARLIE Jernigan by Siomara Alvarado.  05/22/23   10:11 EDT    Patient or patient representative verbalized consent to the visit recording.

## 2023-06-13 ENCOUNTER — OUTSIDE FACILITY SERVICE (OUTPATIENT)
Dept: GASTROENTEROLOGY | Facility: CLINIC | Age: 68
End: 2023-06-13
Payer: MEDICARE

## 2023-07-07 PROCEDURE — 88305 TISSUE EXAM BY PATHOLOGIST: CPT

## 2023-07-10 ENCOUNTER — LAB REQUISITION (OUTPATIENT)
Dept: LAB | Facility: HOSPITAL | Age: 68
End: 2023-07-10
Payer: MEDICARE

## 2023-07-10 DIAGNOSIS — K22.2 ESOPHAGEAL OBSTRUCTION: ICD-10-CM

## 2023-07-10 DIAGNOSIS — R13.10 DYSPHAGIA, UNSPECIFIED: ICD-10-CM

## 2023-07-10 DIAGNOSIS — K29.70 GASTRITIS, UNSPECIFIED, WITHOUT BLEEDING: ICD-10-CM

## 2023-07-11 LAB — REF LAB TEST METHOD: NORMAL

## 2024-02-02 ENCOUNTER — TELEPHONE (OUTPATIENT)
Dept: GASTROENTEROLOGY | Facility: CLINIC | Age: 69
End: 2024-02-02
Payer: COMMERCIAL

## 2024-02-02 NOTE — TELEPHONE ENCOUNTER
PATIENT STOPPED BY NEEDING TO SCHEDULE A EGD AND COLON HE HAD COLON IN 2022 WITH 5 YEAR FOLLOW UP. SO HE GOING TO DO EGD FOR DYSPHAGIA AND WILL TLAK WITH DR. NICE REGARDING HAVING A COLON.

## 2024-02-07 ENCOUNTER — OUTSIDE FACILITY SERVICE (OUTPATIENT)
Dept: GASTROENTEROLOGY | Facility: CLINIC | Age: 69
End: 2024-02-07
Payer: COMMERCIAL

## 2024-02-07 PROCEDURE — 88305 TISSUE EXAM BY PATHOLOGIST: CPT | Performed by: INTERNAL MEDICINE

## 2024-02-08 ENCOUNTER — LAB REQUISITION (OUTPATIENT)
Dept: LAB | Facility: HOSPITAL | Age: 69
End: 2024-02-08
Payer: COMMERCIAL

## 2024-02-08 DIAGNOSIS — R13.10 DYSPHAGIA, UNSPECIFIED: ICD-10-CM

## 2024-02-08 DIAGNOSIS — K22.2 ESOPHAGEAL OBSTRUCTION: ICD-10-CM

## 2024-02-09 LAB — REF LAB TEST METHOD: NORMAL

## 2024-04-11 DIAGNOSIS — I10 ESSENTIAL HYPERTENSION: ICD-10-CM

## 2024-04-11 RX ORDER — VALSARTAN 80 MG/1
80 TABLET ORAL DAILY
Qty: 90 TABLET | Refills: 3 | OUTPATIENT
Start: 2024-04-11

## 2024-04-16 ENCOUNTER — LAB (OUTPATIENT)
Dept: LAB | Facility: HOSPITAL | Age: 69
End: 2024-04-16
Payer: COMMERCIAL

## 2024-04-16 ENCOUNTER — OFFICE VISIT (OUTPATIENT)
Dept: INTERNAL MEDICINE | Facility: CLINIC | Age: 69
End: 2024-04-16
Payer: COMMERCIAL

## 2024-04-16 VITALS
HEART RATE: 71 BPM | WEIGHT: 170 LBS | DIASTOLIC BLOOD PRESSURE: 84 MMHG | OXYGEN SATURATION: 98 % | BODY MASS INDEX: 24.34 KG/M2 | SYSTOLIC BLOOD PRESSURE: 135 MMHG | HEIGHT: 70 IN

## 2024-04-16 DIAGNOSIS — I10 ESSENTIAL HYPERTENSION: ICD-10-CM

## 2024-04-16 DIAGNOSIS — N52.9 ERECTILE DYSFUNCTION, UNSPECIFIED ERECTILE DYSFUNCTION TYPE: ICD-10-CM

## 2024-04-16 DIAGNOSIS — Z13.220 SCREENING CHOLESTEROL LEVEL: ICD-10-CM

## 2024-04-16 DIAGNOSIS — Z12.5 PROSTATE CANCER SCREENING: ICD-10-CM

## 2024-04-16 DIAGNOSIS — K21.9 GASTROESOPHAGEAL REFLUX DISEASE, UNSPECIFIED WHETHER ESOPHAGITIS PRESENT: ICD-10-CM

## 2024-04-16 DIAGNOSIS — Z13.29 THYROID DISORDER SCREEN: ICD-10-CM

## 2024-04-16 DIAGNOSIS — I10 ESSENTIAL HYPERTENSION: Primary | ICD-10-CM

## 2024-04-16 PROCEDURE — 80050 GENERAL HEALTH PANEL: CPT

## 2024-04-16 PROCEDURE — 85007 BL SMEAR W/DIFF WBC COUNT: CPT

## 2024-04-16 PROCEDURE — G2211 COMPLEX E/M VISIT ADD ON: HCPCS | Performed by: INTERNAL MEDICINE

## 2024-04-16 PROCEDURE — 80061 LIPID PANEL: CPT

## 2024-04-16 PROCEDURE — 99214 OFFICE O/P EST MOD 30 MIN: CPT | Performed by: INTERNAL MEDICINE

## 2024-04-16 PROCEDURE — G0103 PSA SCREENING: HCPCS

## 2024-04-16 RX ORDER — SILDENAFIL 50 MG/1
50 TABLET, FILM COATED ORAL DAILY PRN
Start: 2024-04-16

## 2024-04-16 RX ORDER — HYDROCODONE BITARTRATE AND ACETAMINOPHEN 5; 325 MG/1; MG/1
TABLET ORAL SEE ADMIN INSTRUCTIONS
COMMUNITY
Start: 2023-12-22 | End: 2024-04-16

## 2024-04-16 RX ORDER — IBUPROFEN 800 MG/1
800 TABLET ORAL EVERY 8 HOURS PRN
COMMUNITY
Start: 2023-12-22

## 2024-04-16 RX ORDER — VALSARTAN 80 MG/1
80 TABLET ORAL DAILY
Start: 2024-04-16

## 2024-04-16 RX ORDER — CHLORHEXIDINE GLUCONATE ORAL RINSE 1.2 MG/ML
SOLUTION DENTAL
COMMUNITY
Start: 2024-02-05 | End: 2024-04-16

## 2024-04-16 RX ORDER — KETOCONAZOLE 20 MG/G
CREAM TOPICAL DAILY
Qty: 60 G | Refills: 1 | Status: SHIPPED | OUTPATIENT
Start: 2024-04-16

## 2024-04-16 NOTE — PROGRESS NOTES
Subjective   Sandro Junior is a 69 y.o. male.   Chief Complaint   Patient presents with    [facility]    Med Refill       History of Present Illness   New patient to establish with me. Previous patient of NP in our office. Chronic conditions: HTN and GERD. HTN controlled with Dovan. GERD controlled with Prilosec.   The following portions of the patient's history were reviewed and updated as appropriate: allergies, current medications, past family history, past medical history, past social history, past surgical history, and problem list.  Past Medical History:   Diagnosis Date    Epidermal inclusion cyst     Esophageal candidiasis     Hypertension     Keloid scar     Rectal cancer     Seborrheic dermatitis      Past Surgical History:   Procedure Laterality Date    COLECTOMY PARTIAL / TOTAL      COLONOSCOPY      KNEE SURGERY      Left knee meniscal tear repair    TOOTH EXTRACTION      7 teeth    TOTAL KNEE ARTHROPLASTY Right 2016    TRACHEOSTOMY AND PEG TUBE INSERTION N/A 2023    Procedure: TRACHEOSTOMY AND PERCUTANEOUS ENDOSCOPIC GASTROSTOMY TUBE INSERTION;  Surgeon: Néstor Jerome MD;  Location: [facility];  Service: General;  Laterality: N/A;     History reviewed. No pertinent family history.  Social History     Socioeconomic History    Marital status:    Tobacco Use    Smoking status: Former     Types: Cigars     Quit date: 2019     Years since quittin.2    Smokeless tobacco: Never    Tobacco comments:     2 cigars a week   Vaping Use    Vaping status: Never Used   Substance and Sexual Activity    Alcohol use: Yes     Alcohol/week: 4.0 - 5.0 standard drinks of alcohol     Types: 4 - 5 Glasses of wine per week    Drug use: Defer    Sexual activity: Defer       Review of Systems   Constitutional:  Negative for fatigue and fever.   Respiratory:  Negative for cough and shortness of breath.    Cardiovascular:  Negative for chest pain and leg swelling.   Gastrointestinal:   "Negative for abdominal pain.   Psychiatric/Behavioral:  Negative for confusion.      /84   Pulse 71   Ht 177.8 cm (70\")   Wt 77.1 kg (170 lb)   SpO2 98%   BMI 24.39 kg/m²     Objective   Physical Exam  Vitals reviewed.   Cardiovascular:      Rate and Rhythm: Normal rate and regular rhythm.   Pulmonary:      Effort: No respiratory distress.      Breath sounds: No wheezing.   Neurological:      Mental Status: He is alert and oriented to person, place, and time.   Psychiatric:         Behavior: Behavior normal.         Assessment & Plan   Diagnoses and all orders for this visit:    1. Essential hypertension (Primary)  -     valsartan (DIOVAN) 80 MG tablet; Take 1 tablet by mouth Daily.  -     CBC & Differential; Future  -     Comprehensive Metabolic Panel; Future  Continue Diovan 80 mg po qhs  2. Gastroesophageal reflux disease, unspecified whether esophagitis present  Continue Prilosec.  3. Erectile dysfunction, unspecified erectile dysfunction type  Comments:  Check testosterone level. Trial of viagra as ordered.  Orders:  -     sildenafil (VIAGRA) 50 MG tablet; Administer 1 tablet per G tube Daily As Needed for Erectile Dysfunction.    4. Screening cholesterol level  -     Lipid Panel; Future    5. Thyroid disorder screen  -     TSH; Future    6. Prostate cancer screening  -     PSA Screen; Future                   "

## 2024-04-17 ENCOUNTER — TELEPHONE (OUTPATIENT)
Dept: INTERNAL MEDICINE | Facility: CLINIC | Age: 69
End: 2024-04-17
Payer: COMMERCIAL

## 2024-04-17 DIAGNOSIS — D72.819 LEUKOPENIA, UNSPECIFIED TYPE: Primary | ICD-10-CM

## 2024-04-17 LAB
ALBUMIN SERPL-MCNC: 4.4 G/DL (ref 3.5–5.2)
ALBUMIN/GLOB SERPL: 1.4 G/DL
ALP SERPL-CCNC: 70 U/L (ref 39–117)
ALT SERPL W P-5'-P-CCNC: 20 U/L (ref 1–41)
ANION GAP SERPL CALCULATED.3IONS-SCNC: 11.4 MMOL/L (ref 5–15)
ANISOCYTOSIS BLD QL: ABNORMAL
AST SERPL-CCNC: 36 U/L (ref 1–40)
BASOPHILS # BLD MANUAL: 0.03 10*3/MM3 (ref 0–0.2)
BASOPHILS NFR BLD MANUAL: 1.1 % (ref 0–1.5)
BILIRUB SERPL-MCNC: 0.5 MG/DL (ref 0–1.2)
BUN SERPL-MCNC: 9 MG/DL (ref 8–23)
BUN/CREAT SERPL: 10.5 (ref 7–25)
CALCIUM SPEC-SCNC: 9.1 MG/DL (ref 8.6–10.5)
CHLORIDE SERPL-SCNC: 100 MMOL/L (ref 98–107)
CHOLEST SERPL-MCNC: 192 MG/DL (ref 0–200)
CO2 SERPL-SCNC: 25.6 MMOL/L (ref 22–29)
CREAT SERPL-MCNC: 0.86 MG/DL (ref 0.76–1.27)
DACRYOCYTES BLD QL SMEAR: ABNORMAL
DEPRECATED RDW RBC AUTO: 46.5 FL (ref 37–54)
EGFRCR SERPLBLD CKD-EPI 2021: 94.9 ML/MIN/1.73
EOSINOPHIL # BLD MANUAL: 0.2 10*3/MM3 (ref 0–0.4)
EOSINOPHIL NFR BLD MANUAL: 6.3 % (ref 0.3–6.2)
ERYTHROCYTE [DISTWIDTH] IN BLOOD BY AUTOMATED COUNT: 13.1 % (ref 12.3–15.4)
GLOBULIN UR ELPH-MCNC: 3.1 GM/DL
GLUCOSE SERPL-MCNC: 93 MG/DL (ref 65–99)
HCT VFR BLD AUTO: 41.6 % (ref 37.5–51)
HDLC SERPL-MCNC: 87 MG/DL (ref 40–60)
HGB BLD-MCNC: 13.5 G/DL (ref 13–17.7)
LDLC SERPL CALC-MCNC: 67 MG/DL (ref 0–100)
LDLC/HDLC SERPL: 0.66 {RATIO}
LYMPHOCYTES # BLD MANUAL: 1.59 10*3/MM3 (ref 0.7–3.1)
LYMPHOCYTES NFR BLD MANUAL: 15.8 % (ref 5–12)
MACROCYTES BLD QL SMEAR: ABNORMAL
MCH RBC QN AUTO: 31.9 PG (ref 26.6–33)
MCHC RBC AUTO-ENTMCNC: 32.5 G/DL (ref 31.5–35.7)
MCV RBC AUTO: 98.3 FL (ref 79–97)
MONOCYTES # BLD: 0.5 10*3/MM3 (ref 0.1–0.9)
NEUTROPHILS # BLD AUTO: 0.83 10*3/MM3 (ref 1.7–7)
NEUTROPHILS NFR BLD MANUAL: 26.3 % (ref 42.7–76)
NRBC BLD AUTO-RTO: 0 /100 WBC (ref 0–0.2)
OVALOCYTES BLD QL SMEAR: ABNORMAL
PLAT MORPH BLD: NORMAL
PLATELET # BLD AUTO: 159 10*3/MM3 (ref 140–450)
PMV BLD AUTO: 10 FL (ref 6–12)
POIKILOCYTOSIS BLD QL SMEAR: ABNORMAL
POTASSIUM SERPL-SCNC: 4.1 MMOL/L (ref 3.5–5.2)
PROT SERPL-MCNC: 7.5 G/DL (ref 6–8.5)
PSA SERPL-MCNC: 0.42 NG/ML (ref 0–4)
RBC # BLD AUTO: 4.23 10*6/MM3 (ref 4.14–5.8)
SODIUM SERPL-SCNC: 137 MMOL/L (ref 136–145)
TRIGL SERPL-MCNC: 239 MG/DL (ref 0–150)
TSH SERPL DL<=0.05 MIU/L-ACNC: 0.56 UIU/ML (ref 0.27–4.2)
VARIANT LYMPHS NFR BLD MANUAL: 1.1 % (ref 0–5)
VARIANT LYMPHS NFR BLD MANUAL: 49.5 % (ref 19.6–45.3)
VLDLC SERPL-MCNC: 38 MG/DL (ref 5–40)
WBC MORPH BLD: NORMAL
WBC NRBC COR # BLD AUTO: 3.15 10*3/MM3 (ref 3.4–10.8)

## 2024-04-17 NOTE — TELEPHONE ENCOUNTER
----- Message from Selin Espinal DO sent at 4/17/2024 11:40 AM EDT -----  White count a little low. Needs repeat lab in 4 weeks. Triglycerides increased. Low cabr diet.

## 2024-04-17 NOTE — TELEPHONE ENCOUNTER
Attempted to reach the patient to relay results and recommendations. LVM asking for a call back. *HUB TO RELAY*

## 2024-04-18 ENCOUNTER — TELEPHONE (OUTPATIENT)
Dept: INTERNAL MEDICINE | Facility: CLINIC | Age: 69
End: 2024-04-18
Payer: COMMERCIAL

## 2024-04-19 ENCOUNTER — TELEPHONE (OUTPATIENT)
Dept: INTERNAL MEDICINE | Facility: CLINIC | Age: 69
End: 2024-04-19
Payer: COMMERCIAL

## 2024-04-19 NOTE — TELEPHONE ENCOUNTER
Attempted to reach the patient to relay results and recommendations. LVM asking for a call back.  3rd attempt, letter previously sent. *HUB TO RELAY*

## 2024-04-23 ENCOUNTER — OUTSIDE FACILITY SERVICE (OUTPATIENT)
Dept: GASTROENTEROLOGY | Facility: CLINIC | Age: 69
End: 2024-04-23
Payer: COMMERCIAL

## 2024-04-23 PROCEDURE — 43249 ESOPH EGD DILATION <30 MM: CPT | Performed by: INTERNAL MEDICINE

## 2024-07-24 PROCEDURE — 99284 EMERGENCY DEPT VISIT MOD MDM: CPT

## 2024-07-25 ENCOUNTER — APPOINTMENT (OUTPATIENT)
Dept: CT IMAGING | Facility: HOSPITAL | Age: 69
End: 2024-07-25
Payer: COMMERCIAL

## 2024-07-25 ENCOUNTER — HOSPITAL ENCOUNTER (EMERGENCY)
Facility: HOSPITAL | Age: 69
Discharge: HOME | End: 2024-07-25
Attending: EMERGENCY MEDICINE
Payer: COMMERCIAL

## 2024-07-25 VITALS
OXYGEN SATURATION: 95 % | BODY MASS INDEX: 24.39 KG/M2 | SYSTOLIC BLOOD PRESSURE: 149 MMHG | HEIGHT: 73 IN | TEMPERATURE: 97.9 F | RESPIRATION RATE: 22 BRPM | WEIGHT: 184 LBS | HEART RATE: 112 BPM | DIASTOLIC BLOOD PRESSURE: 105 MMHG

## 2024-07-25 DIAGNOSIS — S01.511A LIP LACERATION, INITIAL ENCOUNTER: ICD-10-CM

## 2024-07-25 DIAGNOSIS — F10.920 ACUTE ALCOHOL INTOXICATION, UNCOMPLICATED: ICD-10-CM

## 2024-07-25 DIAGNOSIS — Z86.79 HISTORY OF HYPERTENSION: ICD-10-CM

## 2024-07-25 DIAGNOSIS — S00.33XA CONTUSION OF NOSE, INITIAL ENCOUNTER: ICD-10-CM
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
## 2024-07-25 DIAGNOSIS — S09.90XA INJURY OF HEAD, INITIAL ENCOUNTER: ICD-10-CM

## 2024-07-25 PROCEDURE — 70486 CT MAXILLOFACIAL W/O DYE: CPT

## 2024-07-25 PROCEDURE — 70450 CT HEAD/BRAIN W/O DYE: CPT

## 2024-07-25 RX ORDER — LIDOCAINE HYDROCHLORIDE 10 MG/ML
10 INJECTION, SOLUTION EPIDURAL; INFILTRATION; INTRACAUDAL; PERINEURAL ONCE
Status: COMPLETED | OUTPATIENT
Start: 2024-07-25 | End: 2024-07-25

## 2024-07-25 RX ADMIN — LIDOCAINE HYDROCHLORIDE 10 ML: 10 INJECTION, SOLUTION EPIDURAL; INFILTRATION; INTRACAUDAL; PERINEURAL at 01:45

## 2024-07-25 NOTE — ED PROVIDER NOTES
"Subjective   History of Present Illness  This is a 69-year-old male that presents the ER with lip laceration after fall 2 hours ago.  Patient says that he had some \"cocktails\" this evening.  He was headed to bed and he fell and hit his lip on the stairs.  He has dentures and denies any oral injury to the gums.  He has laceration to the lower external lip that does not cross the vermilion border.  Patient denied any loss of consciousness.  He does report some nasal bone pain with some bruising.  He denies any visual changes.  He denies nausea/vomiting.  No other injuries from the fall.  He says that tetanus status is up-to-date.  His wife brought him to the ER for further assessment.  He denies any neck pain.  Past medical history is significant for seborrheic dermatitis, hypertension, and alcohol use.    History provided by:  Patient  Lip Laceration  Location:  Mouth and face  Facial laceration location:  Nose (Mild soft tissue swelling with bruising.  No septal deviation and no nasal bleeding)  Mouth laceration location:  Lower outer lip  Time since incident:  2 hours  Injury mechanism: Patient fell going up the stairs and hit his mouth on the stairs.  Pain details:     Severity:  Mild  Foreign body present:  No foreign bodies  Relieved by:  Nothing  Worsened by:  Nothing  Ineffective treatments:  None tried  Tetanus status:  Up to date  Associated symptoms: swelling (Mild swelling to bridge of nose without obvious deformity)    Associated symptoms: no fever        Review of Systems   Constitutional: Negative.  Negative for activity change, appetite change, chills, diaphoresis, fatigue and fever.   HENT:  Negative for dental problem and nosebleeds.          laceration to external lower lip.  Patient has dentures.  No oral injury or laceration to gingiva.  Mild soft tissue swelling with bruising to the nose but no septal deviation or nosebleed.   Eyes: Negative.  Negative for visual disturbance.   Respiratory: " "Negative.     Cardiovascular: Negative.    Gastrointestinal: Negative.  Negative for nausea and vomiting.   Skin:  Positive for wound (laceration to external lower lip.).   Neurological: Negative.  Negative for syncope (No loss of consciousness.).   Psychiatric/Behavioral:          ETOH intoxication. Pt said he had some \"cocktails\" this evening.     All other systems reviewed and are negative.      Past Medical History:   Diagnosis Date    Epidermal inclusion cyst     Esophageal candidiasis     Hypertension     Keloid scar     Rectal cancer     Seborrheic dermatitis        No Known Allergies    Past Surgical History:   Procedure Laterality Date    COLECTOMY PARTIAL / TOTAL      COLONOSCOPY      KNEE SURGERY      Left knee meniscal tear repair    TOOTH EXTRACTION      7 teeth    TOTAL KNEE ARTHROPLASTY Right 2016    TRACHEOSTOMY AND PEG TUBE INSERTION N/A 2023    Procedure: TRACHEOSTOMY AND PERCUTANEOUS ENDOSCOPIC GASTROSTOMY TUBE INSERTION;  Surgeon: Néstor Jerome MD;  Location: [facility];  Service: General;  Laterality: N/A;       No family history on file.    Social History     Socioeconomic History    Marital status:    Tobacco Use    Smoking status: Former     Types: Cigars     Quit date: 2019     Years since quittin.5    Smokeless tobacco: Never    Tobacco comments:     2 cigars a week   Vaping Use    Vaping status: Never Used   Substance and Sexual Activity    Alcohol use: Yes     Alcohol/week: 4.0 - 5.0 standard drinks of alcohol     Types: 4 - 5 Glasses of wine per week    Drug use: Defer    Sexual activity: Defer           Objective   Physical Exam  Vitals and nursing note reviewed.   Constitutional:       General: He is not in acute distress.     Appearance: Normal appearance. He is not ill-appearing, toxic-appearing or diaphoretic.      Comments: No acute distress.  Patient smells of alcohol.   HENT:      Head: Normocephalic. Contusion present. No abrasion or " laceration.      Jaw: There is normal jaw occlusion. No tenderness, swelling or pain on movement.        Comments: No scalp tenderness or sign of trauma.  Patient has mild soft tissue swelling with bruising to the bridge of the nose.  No septal deviation or obvious deformity.     Nose: Signs of injury and nasal tenderness present. No nasal deformity, septal deviation or laceration.      Right Nostril: No epistaxis or septal hematoma.      Left Nostril: No epistaxis or septal hematoma.      Comments: Mild soft tissue swelling with bruising to the bridge of the nose.  No septal deviation.  No septal hematoma or evidence of nasal bleeding bilaterally.     Mouth/Throat:      Mouth: Mucous membranes are moist. Injury and lacerations present.      Dentition: No gingival swelling or gum lesions.      Pharynx: Oropharynx is clear.      Comments: Patient has 1.5 cm V-shaped laceration to the middle aspect of external lower lip.  No active bleeding.  No dentures in place to lower mouth and no oral injury or gingival swelling or laceration.  Eyes:      Extraocular Movements: Extraocular movements intact.      Right eye: Normal extraocular motion.      Left eye: Normal extraocular motion.      Conjunctiva/sclera: Conjunctivae normal.      Pupils: Pupils are equal, round, and reactive to light.      Comments: Pupils equal round reactive to light.  Extraocular movements intact.   Neck:      Comments: No C-spine tenderness.  Full range of motion  Cardiovascular:      Rate and Rhythm: Regular rhythm. Tachycardia present. No extrasystoles are present.     Pulses: Normal pulses.      Heart sounds: Normal heart sounds.      Comments: Mild tachycardia.  No ectopy  Pulmonary:      Effort: Pulmonary effort is normal.      Breath sounds: Normal breath sounds.      Comments: Lungs are clear to auscultation bilaterally  Chest:      Chest wall: No swelling, tenderness, crepitus or edema.      Comments: No chest wall tenderness  Abdominal:       General: Bowel sounds are normal.      Palpations: Abdomen is soft.   Musculoskeletal:         General: Normal range of motion.      Cervical back: Normal range of motion and neck supple. No pain with movement, spinous process tenderness or muscular tenderness.      Comments: No C, T, or LS spinal tenderness.  Full range of motion bilateral upper extremities and lower extremities without bony tenderness   Skin:     General: Skin is warm and dry.      Findings: Laceration present.      Comments: See HEENT for details on lower lip laceration   Neurological:      General: No focal deficit present.      Mental Status: He is alert and oriented to person, place, and time.      Cranial Nerves: Cranial nerves 2-12 are intact.      Sensory: Sensation is intact.      Motor: Motor function is intact.      Coordination: Coordination is intact.      Comments: Alert and oriented x 3.  Neuro intact and nonfocal   Psychiatric:         Mood and Affect: Mood and affect normal.         Speech: Speech normal.         Behavior: Behavior normal. Behavior is cooperative.         Thought Content: Thought content normal.         Cognition and Memory: Cognition and memory normal.         Judgment: Judgment normal.      Comments: Uncomplicated alcohol intoxication.         Laceration Repair    Date/Time: 7/25/2024 1:52 AM    Performed by: Layne Carlisle PA-C  Authorized by: Deshaun Dean MD    Consent:     Consent obtained:  Verbal    Consent given by:  Patient    Risks, benefits, and alternatives were discussed: yes      Risks discussed:  Infection, pain, poor cosmetic result and poor wound healing  Universal protocol:     Patient identity confirmed:  Verbally with patient  Anesthesia:     Anesthesia method:  Local infiltration    Local anesthetic:  Lidocaine 1% w/o epi  Laceration details:     Location:  Lip    Lip location:  Lower exterior lip    Length (cm):  1.5    Depth (mm):  2  Pre-procedure details:     Preparation:  Patient was  prepped and draped in usual sterile fashion  Exploration:     Imaging obtained comment:  CT of the facial bones    Imaging outcome: foreign body not noted      Wound extent: no foreign bodies/material noted and no underlying fracture noted      Contaminated: no    Treatment:     Area cleansed with:  Povidone-iodine and saline    Amount of cleaning:  Standard    Irrigation solution:  Sterile saline    Irrigation method: 4x4s.    Visualized foreign bodies/material removed: no      Debridement:  None    Undermining:  None    Scar revision: no    Skin repair:     Repair method:  Sutures    Suture size:  5-0    Suture material:  Fast-absorbing gut    Suture technique:  Simple interrupted    Number of sutures:  6  Approximation:     Approximation:  Close    Vermilion border well-aligned: yes (Laceration did not cross the vermilion border)    Repair type:     Repair type:  Complex  Post-procedure details:     Dressing:  Open (no dressing)    Procedure completion:  Tolerated well, no immediate complications             ED Course  ED Course as of 07/25/24 0156   Thu Jul 25, 2024   0154 Vital signs were stable.  Neurologic exam was within normal limits.  CT of the facial bones revealed no acute nasal bone fracture and no other facial bone fracture.  CT of the brain without contrast revealed no acute intracranial abnormality.  Patient had an uncomplicated alcohol intoxication.  Wife was present and will be driving him home.  Tetanus status is up-to-date.  I applied #6 absorbable sutures to the lower external lip with good closure.  Keep lip clean with soap and water and return to the ER if any worsening symptoms of redness, warmth, or swelling.  Tylenol every 4-6 hours as needed for pain. [FC]      ED Course User Index  [FC] Layne Carlisle PA-C                                      No results found for this or any previous visit (from the past 24 hour(s)).  Note: In addition to lab results from this visit, the labs listed  "above may include labs taken at another facility or during a different encounter within the last 24 hours. Please correlate lab times with ED admission and discharge times for further clarification of the services performed during this visit.    CT Head Without Contrast   Final Result   Impression:   Chronic microvascular ischemic change. No acute intracranial process.            Electronically Signed: Darrin Hoskins MD     7/25/2024 12:47 AM EDT     Workstation ID: [id number]      CT Facial Bones Without Contrast   Final Result   Impression:   Soft tissue gas in the anterior nasal soft tissues and nasolabial fold region. No underlying fracture.            Electronically Signed: Darrin Hoskins MD     7/25/2024 1:04 AM EDT     Workstation ID: [id number]        Vitals:    07/24/24 2350 07/25/24 0106   BP: (!) 139/107 (!) 149/105   Pulse: 112    Resp: 22    Temp: 97.9 °F (36.6 °C)    SpO2: 95%    Weight: 83.5 kg (184 lb)    Height: 185.4 cm (73\")      Medications   lidocaine PF 1% (XYLOCAINE) injection 10 mL (10 mL Infiltration Given by Other 7/25/24 0145)     ECG/EMG Results (last 24 hours)       ** No results found for the last 24 hours. **          No orders to display              Medical Decision Making  Amount and/or Complexity of Data Reviewed  Radiology: ordered.    Risk  Prescription drug management.        Final diagnoses:   Fall, initial encounter   Lip laceration, initial encounter   Contusion of nose, initial encounter   Acute alcohol intoxication, uncomplicated   History of hypertension   Injury of head, initial encounter       ED Disposition  ED Disposition       ED Disposition   Discharge    Condition   Stable    Comment   --               Selin Espinal,   [address] [facility] [address] [phone]    Call today  Call today for close recheck    [facility] EMERGENCY DEPARTMENT  [address] [facility] [address] [phone]    If symptoms worsen         Medication " List      No changes were made to your prescriptions during this visit.            Layne Carlisle PA-C  07/25/24 0156

## 2024-07-25 NOTE — Clinical Note
[facility] EMERGENCY DEPARTMENT  [address]  [facility] [address]  Phone: [phone]    Sandro Junior was seen and treated in our emergency department on 7/24/2024.  He may return to work on 07/26/2024.         Thank you for choosing [facility].    Layne Carlisle, TUNG

## 2024-07-25 NOTE — DISCHARGE INSTRUCTIONS
CT of the brain and facial bones revealed no acute intracranial abnormality and no facial bone fracture.  Tetanus status is up-to-date.  I applied #6 absorbable sutures to the lower lip laceration.  Keep lip clean and recommend close PCP follow-up for recheck.  We will give head injury instructions.  Return to the ER if worsening symptoms.

## 2024-08-22 ENCOUNTER — HOSPITAL ENCOUNTER (EMERGENCY)
Facility: HOSPITAL | Age: 69
Discharge: HOME | End: 2024-08-22
Attending: EMERGENCY MEDICINE
Payer: COMMERCIAL

## 2024-08-22 ENCOUNTER — APPOINTMENT (OUTPATIENT)
Dept: CT IMAGING | Facility: HOSPITAL | Age: 69
End: 2024-08-22
Payer: COMMERCIAL

## 2024-08-22 VITALS
RESPIRATION RATE: 18 BRPM | DIASTOLIC BLOOD PRESSURE: 104 MMHG | BODY MASS INDEX: 24.25 KG/M2 | SYSTOLIC BLOOD PRESSURE: 188 MMHG | HEART RATE: 53 BPM | HEIGHT: 73 IN | WEIGHT: 183 LBS | OXYGEN SATURATION: 96 % | TEMPERATURE: 97.6 F

## 2024-08-22 DIAGNOSIS — I10 HYPERTENSION, UNSPECIFIED TYPE: ICD-10-CM

## 2024-08-22 DIAGNOSIS — E87.6 HYPOKALEMIA: ICD-10-CM

## 2024-08-22 DIAGNOSIS — R10.31 RIGHT LOWER QUADRANT ABDOMINAL PAIN: Primary | ICD-10-CM

## 2024-08-22 DIAGNOSIS — K40.90 INGUINAL HERNIA, RIGHT: ICD-10-CM

## 2024-08-22 DIAGNOSIS — K63.9 DISORDER OF CECUM: ICD-10-CM

## 2024-08-22 LAB
ALBUMIN SERPL-MCNC: 4.1 G/DL (ref 3.5–5.2)
ALBUMIN/GLOB SERPL: 1.2 G/DL
ALP SERPL-CCNC: 65 U/L (ref 39–117)
ALT SERPL W P-5'-P-CCNC: 17 U/L (ref 1–41)
ANION GAP SERPL CALCULATED.3IONS-SCNC: 11 MMOL/L (ref 5–15)
AST SERPL-CCNC: 38 U/L (ref 1–40)
BACTERIA UR QL AUTO: NORMAL /HPF
BASOPHILS # BLD AUTO: 0.03 10*3/MM3 (ref 0–0.2)
BASOPHILS NFR BLD AUTO: 0.8 % (ref 0–1.5)
BILIRUB SERPL-MCNC: 1 MG/DL (ref 0–1.2)
BILIRUB UR QL STRIP: ABNORMAL
BUN SERPL-MCNC: 7 MG/DL (ref 8–23)
BUN/CREAT SERPL: 9.7 (ref 7–25)
CALCIUM SPEC-SCNC: 8.7 MG/DL (ref 8.6–10.5)
CHLORIDE SERPL-SCNC: 101 MMOL/L (ref 98–107)
CLARITY UR: CLEAR
CO2 SERPL-SCNC: 29 MMOL/L (ref 22–29)
COLOR UR: ABNORMAL
CREAT SERPL-MCNC: 0.72 MG/DL (ref 0.76–1.27)
D-LACTATE SERPL-SCNC: 0.8 MMOL/L (ref 0.5–2)
DEPRECATED RDW RBC AUTO: 62.4 FL (ref 37–54)
EGFRCR SERPLBLD CKD-EPI 2021: 100.1 ML/MIN/1.73
EOSINOPHIL # BLD AUTO: 0.04 10*3/MM3 (ref 0–0.4)
EOSINOPHIL NFR BLD AUTO: 1.1 % (ref 0.3–6.2)
ERYTHROCYTE [DISTWIDTH] IN BLOOD BY AUTOMATED COUNT: 17 % (ref 12.3–15.4)
GLOBULIN UR ELPH-MCNC: 3.4 GM/DL
GLUCOSE SERPL-MCNC: 115 MG/DL (ref 65–99)
GLUCOSE UR STRIP-MCNC: NEGATIVE MG/DL
HCT VFR BLD AUTO: 33.6 % (ref 37.5–51)
HGB BLD-MCNC: 11.5 G/DL (ref 13–17.7)
HGB UR QL STRIP.AUTO: NEGATIVE
HOLD SPECIMEN: NORMAL
HYALINE CASTS UR QL AUTO: NORMAL /LPF
IMM GRANULOCYTES # BLD AUTO: 0 10*3/MM3 (ref 0–0.05)
IMM GRANULOCYTES NFR BLD AUTO: 0 % (ref 0–0.5)
KETONES UR QL STRIP: ABNORMAL
LEUKOCYTE ESTERASE UR QL STRIP.AUTO: ABNORMAL
LIPASE SERPL-CCNC: 38 U/L (ref 13–60)
LYMPHOCYTES # BLD AUTO: 1.85 10*3/MM3 (ref 0.7–3.1)
LYMPHOCYTES NFR BLD AUTO: 48.8 % (ref 19.6–45.3)
MCH RBC QN AUTO: 34.4 PG (ref 26.6–33)
MCHC RBC AUTO-ENTMCNC: 34.2 G/DL (ref 31.5–35.7)
MCV RBC AUTO: 100.6 FL (ref 79–97)
MONOCYTES # BLD AUTO: 0.47 10*3/MM3 (ref 0.1–0.9)
MONOCYTES NFR BLD AUTO: 12.4 % (ref 5–12)
NEUTROPHILS NFR BLD AUTO: 1.4 10*3/MM3 (ref 1.7–7)
NEUTROPHILS NFR BLD AUTO: 36.9 % (ref 42.7–76)
NITRITE UR QL STRIP: NEGATIVE
NRBC BLD AUTO-RTO: 0.5 /100 WBC (ref 0–0.2)
PH UR STRIP.AUTO: 6 [PH] (ref 5–8)
PLATELET # BLD AUTO: 192 10*3/MM3 (ref 140–450)
PMV BLD AUTO: 9.5 FL (ref 6–12)
POTASSIUM SERPL-SCNC: 3 MMOL/L (ref 3.5–5.2)
PROT SERPL-MCNC: 7.5 G/DL (ref 6–8.5)
PROT UR QL STRIP: ABNORMAL
RBC # BLD AUTO: 3.34 10*6/MM3 (ref 4.14–5.8)
RBC # UR STRIP: NORMAL /HPF
REF LAB TEST METHOD: NORMAL
SODIUM SERPL-SCNC: 141 MMOL/L (ref 136–145)
SP GR UR STRIP: 1.03 (ref 1–1.03)
SQUAMOUS #/AREA URNS HPF: NORMAL /HPF
UROBILINOGEN UR QL STRIP: ABNORMAL
WBC # UR STRIP: NORMAL /HPF
WBC NRBC COR # BLD AUTO: 3.79 10*3/MM3 (ref 3.4–10.8)
WHOLE BLOOD HOLD COAG: NORMAL
WHOLE BLOOD HOLD SPECIMEN: NORMAL

## 2024-08-22 PROCEDURE — 99285 EMERGENCY DEPT VISIT HI MDM: CPT

## 2024-08-22 PROCEDURE — 36415 COLL VENOUS BLD VENIPUNCTURE: CPT

## 2024-08-22 PROCEDURE — 81001 URINALYSIS AUTO W/SCOPE: CPT | Performed by: EMERGENCY MEDICINE

## 2024-08-22 PROCEDURE — 25510000001 IOPAMIDOL 61 % SOLUTION: Performed by: EMERGENCY MEDICINE

## 2024-08-22 PROCEDURE — 74177 CT ABD & PELVIS W/CONTRAST: CPT

## 2024-08-22 PROCEDURE — 80053 COMPREHEN METABOLIC PANEL: CPT | Performed by: EMERGENCY MEDICINE

## 2024-08-22 PROCEDURE — 85025 COMPLETE CBC W/AUTO DIFF WBC: CPT | Performed by: EMERGENCY MEDICINE

## 2024-08-22 PROCEDURE — 83605 ASSAY OF LACTIC ACID: CPT | Performed by: EMERGENCY MEDICINE

## 2024-08-22 PROCEDURE — 83690 ASSAY OF LIPASE: CPT | Performed by: EMERGENCY MEDICINE

## 2024-08-22 RX ORDER — SODIUM CHLORIDE 9 MG/ML
10 INJECTION, SOLUTION INTRAMUSCULAR; INTRAVENOUS; SUBCUTANEOUS AS NEEDED
Status: DISCONTINUED | OUTPATIENT
Start: 2024-08-22 | End: 2024-08-22 | Stop reason: HOSPADM

## 2024-08-22 RX ORDER — POTASSIUM CHLORIDE 1500 MG/1
20 TABLET, EXTENDED RELEASE ORAL DAILY
Qty: 5 TABLET | Refills: 0 | Status: SHIPPED | OUTPATIENT
Start: 2024-08-22 | End: 2024-08-27

## 2024-08-22 RX ORDER — POTASSIUM CHLORIDE 750 MG/1
40 CAPSULE, EXTENDED RELEASE ORAL ONCE
Status: COMPLETED | OUTPATIENT
Start: 2024-08-22 | End: 2024-08-22

## 2024-08-22 RX ORDER — IOPAMIDOL 612 MG/ML
80 INJECTION, SOLUTION INTRAVASCULAR
Status: COMPLETED | OUTPATIENT
Start: 2024-08-22 | End: 2024-08-22

## 2024-08-22 RX ADMIN — POTASSIUM CHLORIDE 40 MEQ: 750 CAPSULE, EXTENDED RELEASE ORAL at 17:46

## 2024-08-22 RX ADMIN — IOPAMIDOL 80 ML: 612 INJECTION, SOLUTION INTRAVENOUS at 16:52

## 2024-08-22 NOTE — Clinical Note
[facility] EMERGENCY DEPARTMENT  [address]  [facility] [address]  Phone: [phone]    Sandro Junior was seen and treated in our emergency department on 8/22/2024.  He may return to work on 08/23/2024.         Thank you for choosing [facility].    Symone Posada, PA

## 2024-08-22 NOTE — ED PROVIDER NOTES
Subjective   History of Present Illness  Pt is a 68 yo male presenting to ED with complaints of abdominal pain. PMHx significant for HTN, Anxiety, Prostatitis, rectal cancer s/p colectomy and 4/2023 had strep epiglottitis / sepsis requiring intubation, trach and PEG. Pt complains of worsening right lower abdominal pain for the past week. He reports pain worse with movement, eating and having bowel movements. LBM this morning and normal, no blood. He denies N/V, urinary sx, flank pain or fevers. Denies CP, SOB or cough. He works as a  at a school and thought he had just strained lifting but pain kept getting worse. He uses ETOH but denies tobacco or drug use.     History provided by:  Patient and medical records      Review of Systems   Constitutional:  Negative for chills and fever.   HENT:  Negative for congestion and trouble swallowing.    Eyes:  Negative for visual disturbance.   Respiratory:  Negative for cough and shortness of breath.    Cardiovascular:  Negative for chest pain and leg swelling.   Gastrointestinal:  Positive for abdominal pain. Negative for blood in stool, constipation, diarrhea, nausea and vomiting.   Genitourinary:  Negative for difficulty urinating, dysuria and flank pain.   Musculoskeletal:  Negative for arthralgias and back pain.   Skin: Negative.  Negative for rash and wound.   Allergic/Immunologic: Negative.    Neurological:  Negative for dizziness, syncope, weakness, numbness and headaches.   Psychiatric/Behavioral:  Negative for confusion.    All other systems reviewed and are negative.      Past Medical History:   Diagnosis Date    Epidermal inclusion cyst     Esophageal candidiasis     Hypertension     Keloid scar     Rectal cancer     Seborrheic dermatitis        No Known Allergies    Past Surgical History:   Procedure Laterality Date    COLECTOMY PARTIAL / TOTAL      COLONOSCOPY      KNEE SURGERY      Left knee meniscal tear repair    TOOTH EXTRACTION  2016 7 teeth     TOTAL KNEE ARTHROPLASTY Right 2016    TRACHEOSTOMY AND PEG TUBE INSERTION N/A 2023    Procedure: TRACHEOSTOMY AND PERCUTANEOUS ENDOSCOPIC GASTROSTOMY TUBE INSERTION;  Surgeon: Néstor Jerome MD;  Location: [facility];  Service: General;  Laterality: N/A;       No family history on file.    Social History     Socioeconomic History    Marital status:    Tobacco Use    Smoking status: Former     Types: Cigars     Quit date: 2019     Years since quittin.6    Smokeless tobacco: Never    Tobacco comments:     2 cigars a week   Vaping Use    Vaping status: Never Used   Substance and Sexual Activity    Alcohol use: Yes     Alcohol/week: 4.0 - 5.0 standard drinks of alcohol     Types: 4 - 5 Glasses of wine per week    Drug use: Defer    Sexual activity: Defer           Objective   Physical Exam  Vitals and nursing note reviewed.   Constitutional:       Appearance: He is well-developed.   HENT:      Head: Atraumatic.      Nose: Nose normal.   Eyes:      General: Lids are normal.      Conjunctiva/sclera: Conjunctivae normal.      Pupils: Pupils are equal, round, and reactive to light.   Cardiovascular:      Rate and Rhythm: Normal rate and regular rhythm.      Heart sounds: Normal heart sounds.   Pulmonary:      Effort: Pulmonary effort is normal.      Breath sounds: Normal breath sounds.   Abdominal:      General: There is no distension.      Palpations: Abdomen is soft.      Tenderness: There is abdominal tenderness in the right lower quadrant. There is no right CVA tenderness, left CVA tenderness, guarding or rebound.   Musculoskeletal:         General: No tenderness or deformity. Normal range of motion.      Cervical back: Normal range of motion and neck supple.   Skin:     General: Skin is warm and dry.   Neurological:      Mental Status: He is alert and oriented to person, place, and time.      Sensory: No sensory deficit.   Psychiatric:         Behavior: Behavior normal.          Procedures           ED Course  ED Course as of 08/22/24 2045 Thu Aug 22, 2024   1427 Potassium(!): 3.0 [RT]   1529 Awaiting IV for CT scan.  [RT]   1720 Noted his HTN. He hasn't taken his meds today and will go home and take them. He has no headache, dizziness, confusion, weakness, numbness or CP.  [RT]      ED Course User Index  [RT] Symone Posada, PA      Recent Results (from the past 24 hour(s))   Comprehensive Metabolic Panel    Collection Time: 08/22/24  1:41 PM    Specimen: Blood   Result Value Ref Range    Glucose 115 (H) 65 - 99 mg/dL    BUN 7 (L) 8 - 23 mg/dL    Creatinine 0.72 (L) 0.76 - 1.27 mg/dL    Sodium 141 136 - 145 mmol/L    Potassium 3.0 (L) 3.5 - 5.2 mmol/L    Chloride 101 98 - 107 mmol/L    CO2 29.0 22.0 - 29.0 mmol/L    Calcium 8.7 8.6 - 10.5 mg/dL    Total Protein 7.5 6.0 - 8.5 g/dL    Albumin 4.1 3.5 - 5.2 g/dL    ALT (SGPT) 17 1 - 41 U/L    AST (SGOT) 38 1 - 40 U/L    Alkaline Phosphatase 65 39 - 117 U/L    Total Bilirubin 1.0 0.0 - 1.2 mg/dL    Globulin 3.4 gm/dL    A/G Ratio 1.2 g/dL    BUN/Creatinine Ratio 9.7 7.0 - 25.0    Anion Gap 11.0 5.0 - 15.0 mmol/L    eGFR 100.1 >60.0 mL/min/1.73   Lipase    Collection Time: 08/22/24  1:41 PM    Specimen: Blood   Result Value Ref Range    Lipase 38 13 - 60 U/L   Lactic Acid, Plasma    Collection Time: 08/22/24  1:41 PM    Specimen: Blood   Result Value Ref Range    Lactate 0.8 0.5 - 2.0 mmol/L   Green Top (Gel)    Collection Time: 08/22/24  1:41 PM   Result Value Ref Range    Extra Tube Hold for add-ons.    Lavender Top    Collection Time: 08/22/24  1:41 PM   Result Value Ref Range    Extra Tube hold for add-on    Gold Top - SST    Collection Time: 08/22/24  1:41 PM   Result Value Ref Range    Extra Tube Hold for add-ons.    Gray Top    Collection Time: 08/22/24  1:41 PM   Result Value Ref Range    Extra Tube Hold for add-ons.    Light Blue Top    Collection Time: 08/22/24  1:41 PM   Result Value Ref Range    Extra Tube Hold for  add-ons.    CBC Auto Differential    Collection Time: 08/22/24  1:41 PM    Specimen: Blood   Result Value Ref Range    WBC 3.79 3.40 - 10.80 10*3/mm3    RBC 3.34 (L) 4.14 - 5.80 10*6/mm3    Hemoglobin 11.5 (L) 13.0 - 17.7 g/dL    Hematocrit 33.6 (L) 37.5 - 51.0 %    .6 (H) 79.0 - 97.0 fL    MCH 34.4 (H) 26.6 - 33.0 pg    MCHC 34.2 31.5 - 35.7 g/dL    RDW 17.0 (H) 12.3 - 15.4 %    RDW-SD 62.4 (H) 37.0 - 54.0 fl    MPV 9.5 6.0 - 12.0 fL    Platelets 192 140 - 450 10*3/mm3    Neutrophil % 36.9 (L) 42.7 - 76.0 %    Lymphocyte % 48.8 (H) 19.6 - 45.3 %    Monocyte % 12.4 (H) 5.0 - 12.0 %    Eosinophil % 1.1 0.3 - 6.2 %    Basophil % 0.8 0.0 - 1.5 %    Immature Grans % 0.0 0.0 - 0.5 %    Neutrophils, Absolute 1.40 (L) 1.70 - 7.00 10*3/mm3    Lymphocytes, Absolute 1.85 0.70 - 3.10 10*3/mm3    Monocytes, Absolute 0.47 0.10 - 0.90 10*3/mm3    Eosinophils, Absolute 0.04 0.00 - 0.40 10*3/mm3    Basophils, Absolute 0.03 0.00 - 0.20 10*3/mm3    Immature Grans, Absolute 0.00 0.00 - 0.05 10*3/mm3    nRBC 0.5 (H) 0.0 - 0.2 /100 WBC   Urinalysis With Microscopic If Indicated (No Culture) - Urine, Clean Catch    Collection Time: 08/22/24  1:45 PM    Specimen: Urine, Clean Catch   Result Value Ref Range    Color, UA Dark Yellow (A) Yellow, Straw    Appearance, UA Clear Clear    pH, UA 6.0 5.0 - 8.0    Specific Gravity, UA 1.027 1.001 - 1.030    Glucose, UA Negative Negative    Ketones, UA Trace (A) Negative    Bilirubin, UA Small (1+) (A) Negative    Blood, UA Negative Negative    Protein, UA Trace (A) Negative    Leuk Esterase, UA Trace (A) Negative    Nitrite, UA Negative Negative    Urobilinogen, UA 2.0 E.U./dL (A) 0.2 - 1.0 E.U./dL   Urinalysis, Microscopic Only - Urine, Clean Catch    Collection Time: 08/22/24  1:45 PM    Specimen: Urine, Clean Catch   Result Value Ref Range    RBC, UA 0-2 None Seen, 0-2 /HPF    WBC, UA 0-2 None Seen, 0-2 /HPF    Bacteria, UA None Seen None Seen, Trace /HPF    Squamous Epithelial Cells, UA  "0-2 None Seen, 0-2 /HPF    Hyaline Casts, UA 13-20 0 - 6 /LPF    Methodology Automated Microscopy      Note: In addition to lab results from this visit, the labs listed above may include labs taken at another facility or during a different encounter within the last 24 hours. Please correlate lab times with ED admission and discharge times for further clarification of the services performed during this visit.    CT Abdomen Pelvis With Contrast   Final Result   1. There is isolated haustral thickening within the cecum. This may be inflammatory, normal variant, however an underlying mass cannot be excluded given the patient's age. Correlation with colonoscopic findings is recommended.   2. Small fat-containing right inguinal hernia.               Electronically Signed: Mario Richardson MD     8/22/2024 5:01 PM EDT     Workstation ID: [id number]        Vitals:    08/22/24 1330 08/22/24 1508 08/22/24 1510 08/22/24 1628   BP: 162/93 (!) 191/110  (!) 188/104   BP Location: Right arm      Patient Position: Sitting      Pulse: 71  58 53   Resp: 18      Temp: 97.6 °F (36.4 °C)      TempSrc: Oral      SpO2: 98%  100% 96%   Weight: 83 kg (183 lb)      Height: 185.4 cm (73\")        Medications   iopamidol (ISOVUE-300) 61 % injection 80 mL (80 mL Intravenous Given 8/22/24 1652)   potassium chloride (MICRO-K/KLOR-CON) CR capsule (40 mEq Oral Given 8/22/24 1746)     ECG/EMG Results (last 24 hours)       ** No results found for the last 24 hours. **          No orders to display                                                Medical Decision Making  Pt is a 70 yo male presenting to ED with complaints of abdominal pain. Labs in ED notable for WBC 3.7, Lactic 0.8, Cr 0.72, Glucose 115, K 3, Na 141 and UA with trace ketones, bilirubin, protein and leukocytes. CT abd/pelvis notable for thickened cecum and right inguinal hernia. Pt given Potassium replacement in ED. Discussed results and tx plan. Will dc home on potassium. He will f/u with PCP " and surgeon regarding potassium and hernia. He will f/u with Dr. Allen with GI regarding thickened cecum. He has hypertension in ED but has not taken his meds today. He is eager to be discharged and follow up outpatient. He will return to ED if new / /worse sx.     DDx  Appendicitis, Hernia, Diverticulitis, Mass, Cholecystitis, UTI, Kidney stone      Problems Addressed:  Disorder of cecum: complicated acute illness or injury  Hypertension, unspecified type: complicated acute illness or injury  Hypokalemia: complicated acute illness or injury  Inguinal hernia, right: complicated acute illness or injury  Right lower quadrant abdominal pain: complicated acute illness or injury    Amount and/or Complexity of Data Reviewed  External Data Reviewed: notes.     Details: Reviewed previous non ED visits including prior labs, imaging, available notes, medications, allergies and surgical hx.     Labs: ordered. Decision-making details documented in ED Course.  Radiology: ordered. Decision-making details documented in ED Course.    Risk  Prescription drug management.        Final diagnoses:   Right lower quadrant abdominal pain   Disorder of cecum   Hypokalemia   Hypertension, unspecified type   Inguinal hernia, right       ED Disposition  ED Disposition       ED Disposition   Discharge    Condition   Stable    Comment   --               Selin Espinal DO  [address] [address]  [phone]    Schedule an appointment as soon as possible for a visit       Mario Allen MD  [address] [facility] [address]  [place] [address]  [phone]    Schedule an appointment as soon as possible for a visit       Master De Guzman MD  [address] [facility] [address]  [place] [address]  [phone]    Schedule an appointment as soon as possible for a visit   If symptoms worsen. Regarding right inguinal hernia.         Medication List        New Prescriptions      potassium chloride 20 MEQ CR tablet  Commonly known  as: KLOR-CON M20  Take 1 tablet by mouth Daily for 5 days.               Where to Get Your Medications        These medications were sent to [facility]/pharmacy #[id number] - [place], [place] - [address] - [phone]  - [phone]   [address], [facility] [address]      Hours: 24-hours Phone: [phone]   potassium chloride 20 MEQ CR tablet            Symone Posada PA  08/22/24 1518

## 2024-08-29 ENCOUNTER — TELEPHONE (OUTPATIENT)
Dept: INTERNAL MEDICINE | Facility: CLINIC | Age: 69
End: 2024-08-29

## 2024-08-29 ENCOUNTER — OFFICE VISIT (OUTPATIENT)
Dept: INTERNAL MEDICINE | Facility: CLINIC | Age: 69
End: 2024-08-29
Payer: COMMERCIAL

## 2024-08-29 ENCOUNTER — LAB (OUTPATIENT)
Dept: LAB | Facility: HOSPITAL | Age: 69
End: 2024-08-29
Payer: COMMERCIAL

## 2024-08-29 VITALS
BODY MASS INDEX: 21.29 KG/M2 | HEIGHT: 73 IN | SYSTOLIC BLOOD PRESSURE: 140 MMHG | OXYGEN SATURATION: 97 % | WEIGHT: 160.6 LBS | DIASTOLIC BLOOD PRESSURE: 82 MMHG | TEMPERATURE: 97.2 F

## 2024-08-29 DIAGNOSIS — K40.90 RIGHT INGUINAL HERNIA: Primary | ICD-10-CM

## 2024-08-29 DIAGNOSIS — E87.6 HYPOKALEMIA: ICD-10-CM

## 2024-08-29 DIAGNOSIS — R93.5 ABNORMAL CT OF THE ABDOMEN: ICD-10-CM

## 2024-08-29 LAB
ALBUMIN SERPL-MCNC: 4.3 G/DL (ref 3.5–5.2)
ALBUMIN/GLOB SERPL: 1.4 G/DL
ALP SERPL-CCNC: 61 U/L (ref 39–117)
ALT SERPL W P-5'-P-CCNC: 21 U/L (ref 1–41)
ANION GAP SERPL CALCULATED.3IONS-SCNC: 10 MMOL/L (ref 5–15)
AST SERPL-CCNC: 42 U/L (ref 1–40)
BILIRUB SERPL-MCNC: 1 MG/DL (ref 0–1.2)
BUN SERPL-MCNC: 6 MG/DL (ref 8–23)
BUN/CREAT SERPL: 8.5 (ref 7–25)
CALCIUM SPEC-SCNC: 8.8 MG/DL (ref 8.6–10.5)
CHLORIDE SERPL-SCNC: 104 MMOL/L (ref 98–107)
CO2 SERPL-SCNC: 30 MMOL/L (ref 22–29)
CREAT SERPL-MCNC: 0.71 MG/DL (ref 0.76–1.27)
EGFRCR SERPLBLD CKD-EPI 2021: 100.6 ML/MIN/1.73
GLOBULIN UR ELPH-MCNC: 3.1 GM/DL
GLUCOSE SERPL-MCNC: 93 MG/DL (ref 65–99)
POTASSIUM SERPL-SCNC: 3.8 MMOL/L (ref 3.5–5.2)
PROT SERPL-MCNC: 7.4 G/DL (ref 6–8.5)
SODIUM SERPL-SCNC: 144 MMOL/L (ref 136–145)

## 2024-08-29 PROCEDURE — 99214 OFFICE O/P EST MOD 30 MIN: CPT | Performed by: INTERNAL MEDICINE

## 2024-08-29 PROCEDURE — 80053 COMPREHEN METABOLIC PANEL: CPT

## 2024-08-29 NOTE — PROGRESS NOTES
"Subjective   Sandro Junior is a 69 y.o. male.   Chief Complaint   Patient presents with    ED f/u       History of Present Illness   Seen in ED for abdominal pain. Potassium was low and replaced. CT abdomen showed right inguinal hernia and thickening of the cecum. Last colonoscopy was 2022 with 2 polyps ( serrated and tubular adenoma). Continue with right lower abdominal pain. No nausea or vomiting.       CT Abdomen Pelvis With Contrast    Result Date: 8/22/2024  1. There is isolated haustral thickening within the cecum. This may be inflammatory, normal variant, however an underlying mass cannot be excluded given the patient's age. Correlation with colonoscopic findings is recommended. 2. Small fat-containing right inguinal hernia. Electronically Signed: Mario Richardson MD  8/22/2024 5:01 PM EDT  Workstation ID: [id number]      The following portions of the patient's history were reviewed and updated as appropriate: allergies, current medications, past family history, past medical history, past social history, past surgical history, and problem list.    Review of Systems   Constitutional:  Negative for fever.   Respiratory:  Negative for shortness of breath.    Cardiovascular:  Negative for chest pain.   Gastrointestinal:  Positive for abdominal pain. Negative for blood in stool and vomiting.     /82 (BP Location: Left arm, Patient Position: Sitting, Cuff Size: Adult)   Temp 97.2 °F (36.2 °C) (Temporal)   Ht 185.4 cm (72.99\")   Wt 72.8 kg (160 lb 9.6 oz)   SpO2 97%   BMI 21.19 kg/m²     Objective   Physical Exam  Vitals reviewed.   Cardiovascular:      Rate and Rhythm: Normal rate and regular rhythm.   Pulmonary:      Effort: No respiratory distress.      Breath sounds: No wheezing or rales.   Abdominal:      Palpations: Abdomen is soft.      Tenderness: There is no abdominal tenderness.      Hernia: A hernia (right inguinal hernia) is present.   Neurological:      Mental Status: He is alert and " oriented to person, place, and time.         Assessment & Plan   Diagnoses and all orders for this visit:    1. Right inguinal hernia (Primary)  -     Ambulatory Referral to General Surgery  If symptoms worsen prior to appointment with surgeon to ED  2. Hypokalemia  -     Comprehensive Metabolic Panel; Future  Completed replacement. Recheck level today  3. Abnormal CT of the abdomen  -     Ambulatory Referral to Gastroenterology  Thickening cecum, further evaluation by Dr. Allen  BP up a little today, will take his diovan today. Continue to monitor BP at home.

## 2024-08-29 NOTE — TELEPHONE ENCOUNTER
Hub to relay    Spoke with [facility] - patient scheduled with Dr. Jerome on Tuesday, 9/3, at 2:00pm. Patient needs to arrive at appointment by 1:45pm to register. If patient needs to cancel/ reschedule appt for any reason he can call [facility] to do so. Called patient to advise of appointment information but he was driving. Will call back to get appointment information.  [address] [facility] [address]  [phone]

## 2024-08-30 ENCOUNTER — TELEPHONE (OUTPATIENT)
Dept: INTERNAL MEDICINE | Facility: CLINIC | Age: 69
End: 2024-08-30
Payer: COMMERCIAL

## 2024-08-30 DIAGNOSIS — R74.8 ELEVATED LIVER ENZYMES: Primary | ICD-10-CM

## 2024-08-30 NOTE — TELEPHONE ENCOUNTER
Called and spoke to pt. Gave message from provider. Pt voiced understanding and appreciation.       ----- Message from Selin Espinal sent at 8/30/2024  7:07 AM EDT -----  Potassium normal. Mild increases in liver lab. Stop by lab and repeat level in 4 weeks. Order in chart.

## 2024-09-10 ENCOUNTER — PRE-ADMISSION TESTING (OUTPATIENT)
Dept: PREADMISSION TESTING | Facility: HOSPITAL | Age: 69
End: 2024-09-10
Payer: COMMERCIAL

## 2024-09-10 VITALS — BODY MASS INDEX: 21.71 KG/M2 | HEIGHT: 73 IN | WEIGHT: 163.8 LBS

## 2024-09-10 LAB
ANION GAP SERPL CALCULATED.3IONS-SCNC: 12 MMOL/L (ref 5–15)
BUN SERPL-MCNC: 6 MG/DL (ref 8–23)
BUN/CREAT SERPL: 8.5 (ref 7–25)
CALCIUM SPEC-SCNC: 9 MG/DL (ref 8.6–10.5)
CHLORIDE SERPL-SCNC: 101 MMOL/L (ref 98–107)
CO2 SERPL-SCNC: 28 MMOL/L (ref 22–29)
CREAT SERPL-MCNC: 0.71 MG/DL (ref 0.76–1.27)
DEPRECATED RDW RBC AUTO: 64.8 FL (ref 37–54)
EGFRCR SERPLBLD CKD-EPI 2021: 100.6 ML/MIN/1.73
ERYTHROCYTE [DISTWIDTH] IN BLOOD BY AUTOMATED COUNT: 16.7 % (ref 12.3–15.4)
GLUCOSE SERPL-MCNC: 100 MG/DL (ref 65–99)
HCT VFR BLD AUTO: 38.1 % (ref 37.5–51)
HGB BLD-MCNC: 12.6 G/DL (ref 13–17.7)
MCH RBC QN AUTO: 34.6 PG (ref 26.6–33)
MCHC RBC AUTO-ENTMCNC: 33.1 G/DL (ref 31.5–35.7)
MCV RBC AUTO: 104.7 FL (ref 79–97)
PLATELET # BLD AUTO: 164 10*3/MM3 (ref 140–450)
PMV BLD AUTO: 9.7 FL (ref 6–12)
POTASSIUM SERPL-SCNC: 3.6 MMOL/L (ref 3.5–5.2)
QT INTERVAL: 436 MS
QTC INTERVAL: 436 MS
RBC # BLD AUTO: 3.64 10*6/MM3 (ref 4.14–5.8)
SODIUM SERPL-SCNC: 141 MMOL/L (ref 136–145)
WBC NRBC COR # BLD AUTO: 2.31 10*3/MM3 (ref 3.4–10.8)

## 2024-09-10 PROCEDURE — 85027 COMPLETE CBC AUTOMATED: CPT

## 2024-09-10 PROCEDURE — 36415 COLL VENOUS BLD VENIPUNCTURE: CPT

## 2024-09-10 PROCEDURE — 80048 BASIC METABOLIC PNL TOTAL CA: CPT

## 2024-09-10 PROCEDURE — 93005 ELECTROCARDIOGRAM TRACING: CPT

## 2024-09-10 PROCEDURE — 93010 ELECTROCARDIOGRAM REPORT: CPT | Performed by: INTERNAL MEDICINE

## 2024-09-13 ENCOUNTER — HOSPITAL ENCOUNTER (OUTPATIENT)
Facility: HOSPITAL | Age: 69
Setting detail: HOSPITAL OUTPATIENT SURGERY
Discharge: HOME | End: 2024-09-13
Attending: SURGERY | Admitting: SURGERY
Payer: COMMERCIAL

## 2024-09-13 ENCOUNTER — ANESTHESIA EVENT (OUTPATIENT)
Dept: PERIOP | Facility: HOSPITAL | Age: 69
End: 2024-09-13
Payer: COMMERCIAL

## 2024-09-13 ENCOUNTER — ANESTHESIA EVENT CONVERTED (OUTPATIENT)
Dept: ANESTHESIOLOGY | Facility: HOSPITAL | Age: 69
End: 2024-09-13
Payer: COMMERCIAL

## 2024-09-13 ENCOUNTER — ANESTHESIA (OUTPATIENT)
Dept: PERIOP | Facility: HOSPITAL | Age: 69
End: 2024-09-13
Payer: COMMERCIAL

## 2024-09-13 VITALS
HEIGHT: 73 IN | RESPIRATION RATE: 10 BRPM | WEIGHT: 164 LBS | DIASTOLIC BLOOD PRESSURE: 97 MMHG | HEART RATE: 71 BPM | TEMPERATURE: 98.1 F | BODY MASS INDEX: 21.74 KG/M2 | OXYGEN SATURATION: 91 % | SYSTOLIC BLOOD PRESSURE: 177 MMHG

## 2024-09-13 DIAGNOSIS — K40.90 INGUINAL HERNIA, RIGHT: ICD-10-CM

## 2024-09-13 PROCEDURE — 25010000002 DEXAMETHASONE SODIUM PHOSPHATE 10 MG/ML SOLUTION: Performed by: NURSE ANESTHETIST, CERTIFIED REGISTERED

## 2024-09-13 PROCEDURE — C1781 MESH (IMPLANTABLE): HCPCS | Performed by: SURGERY

## 2024-09-13 PROCEDURE — 25010000002 HYDROMORPHONE 1 MG/ML SOLUTION

## 2024-09-13 PROCEDURE — 25810000003 LACTATED RINGERS PER 1000 ML: Performed by: ANESTHESIOLOGY

## 2024-09-13 PROCEDURE — 25010000002 BUPIVACAINE (PF) 0.25 % SOLUTION: Performed by: NURSE ANESTHETIST, CERTIFIED REGISTERED

## 2024-09-13 PROCEDURE — 25010000002 PROPOFOL 10 MG/ML EMULSION: Performed by: NURSE ANESTHETIST, CERTIFIED REGISTERED

## 2024-09-13 PROCEDURE — 88302 TISSUE EXAM BY PATHOLOGIST: CPT | Performed by: SURGERY

## 2024-09-13 PROCEDURE — 25010000002 ONDANSETRON PER 1 MG: Performed by: NURSE ANESTHETIST, CERTIFIED REGISTERED

## 2024-09-13 PROCEDURE — 25010000002 FENTANYL CITRATE (PF) 100 MCG/2ML SOLUTION: Performed by: NURSE ANESTHETIST, CERTIFIED REGISTERED

## 2024-09-13 PROCEDURE — 25010000002 CEFAZOLIN PER 500 MG: Performed by: SURGERY

## 2024-09-13 PROCEDURE — 25010000002 FENTANYL CITRATE (PF) 50 MCG/ML SOLUTION

## 2024-09-13 DEVICE — BARD MESH
Type: IMPLANTABLE DEVICE | Site: INGUINAL | Status: FUNCTIONAL
Brand: BARD MESH

## 2024-09-13 RX ORDER — FAMOTIDINE 10 MG/ML
20 INJECTION, SOLUTION INTRAVENOUS ONCE
Status: CANCELLED | OUTPATIENT
Start: 2024-09-13 | End: 2024-09-13

## 2024-09-13 RX ORDER — FENTANYL CITRATE 50 UG/ML
INJECTION, SOLUTION INTRAMUSCULAR; INTRAVENOUS AS NEEDED
Status: DISCONTINUED | OUTPATIENT
Start: 2024-09-13 | End: 2024-09-13 | Stop reason: SURG

## 2024-09-13 RX ORDER — IPRATROPIUM BROMIDE AND ALBUTEROL SULFATE 2.5; .5 MG/3ML; MG/3ML
3 SOLUTION RESPIRATORY (INHALATION) ONCE AS NEEDED
Status: DISCONTINUED | OUTPATIENT
Start: 2024-09-13 | End: 2024-09-13 | Stop reason: HOSPADM

## 2024-09-13 RX ORDER — EPHEDRINE SULFATE 50 MG/ML
INJECTION INTRAVENOUS AS NEEDED
Status: DISCONTINUED | OUTPATIENT
Start: 2024-09-13 | End: 2024-09-13 | Stop reason: SURG

## 2024-09-13 RX ORDER — PROPOFOL 10 MG/ML
VIAL (ML) INTRAVENOUS AS NEEDED
Status: DISCONTINUED | OUTPATIENT
Start: 2024-09-13 | End: 2024-09-13 | Stop reason: SURG

## 2024-09-13 RX ORDER — LIDOCAINE HYDROCHLORIDE 10 MG/ML
INJECTION, SOLUTION EPIDURAL; INFILTRATION; INTRACAUDAL; PERINEURAL AS NEEDED
Status: DISCONTINUED | OUTPATIENT
Start: 2024-09-13 | End: 2024-09-13 | Stop reason: SURG

## 2024-09-13 RX ORDER — ONDANSETRON 4 MG/1
4 TABLET, FILM COATED ORAL EVERY 8 HOURS PRN
Qty: 12 TABLET | Refills: 0 | Status: SHIPPED | OUTPATIENT
Start: 2024-09-13 | End: 2025-09-13

## 2024-09-13 RX ORDER — ONDANSETRON 2 MG/ML
INJECTION INTRAMUSCULAR; INTRAVENOUS AS NEEDED
Status: DISCONTINUED | OUTPATIENT
Start: 2024-09-13 | End: 2024-09-13 | Stop reason: SURG

## 2024-09-13 RX ORDER — OXYCODONE AND ACETAMINOPHEN 5; 325 MG/1; MG/1
TABLET ORAL
Status: COMPLETED
Start: 2024-09-13 | End: 2024-09-13

## 2024-09-13 RX ORDER — FAMOTIDINE 20 MG/1
20 TABLET, FILM COATED ORAL ONCE
Status: COMPLETED | OUTPATIENT
Start: 2024-09-13 | End: 2024-09-13

## 2024-09-13 RX ORDER — MIDAZOLAM HYDROCHLORIDE 1 MG/ML
0.5 INJECTION INTRAMUSCULAR; INTRAVENOUS
Status: DISCONTINUED | OUTPATIENT
Start: 2024-09-13 | End: 2024-09-13 | Stop reason: HOSPADM

## 2024-09-13 RX ORDER — DROPERIDOL 2.5 MG/ML
0.62 INJECTION, SOLUTION INTRAMUSCULAR; INTRAVENOUS ONCE AS NEEDED
Status: DISCONTINUED | OUTPATIENT
Start: 2024-09-13 | End: 2024-09-13 | Stop reason: HOSPADM

## 2024-09-13 RX ORDER — FENTANYL CITRATE 50 UG/ML
50 INJECTION, SOLUTION INTRAMUSCULAR; INTRAVENOUS
Status: DISCONTINUED | OUTPATIENT
Start: 2024-09-13 | End: 2024-09-13 | Stop reason: HOSPADM

## 2024-09-13 RX ORDER — SODIUM CHLORIDE, SODIUM LACTATE, POTASSIUM CHLORIDE, CALCIUM CHLORIDE 600; 310; 30; 20 MG/100ML; MG/100ML; MG/100ML; MG/100ML
9 INJECTION, SOLUTION INTRAVENOUS CONTINUOUS
Status: DISCONTINUED | OUTPATIENT
Start: 2024-09-13 | End: 2024-09-13 | Stop reason: HOSPADM

## 2024-09-13 RX ORDER — LIDOCAINE HYDROCHLORIDE 10 MG/ML
0.5 INJECTION, SOLUTION EPIDURAL; INFILTRATION; INTRACAUDAL; PERINEURAL ONCE AS NEEDED
Status: COMPLETED | OUTPATIENT
Start: 2024-09-13 | End: 2024-09-13

## 2024-09-13 RX ORDER — SODIUM CHLORIDE 0.9 % (FLUSH) 0.9 %
10 SYRINGE (ML) INJECTION AS NEEDED
Status: CANCELLED | OUTPATIENT
Start: 2024-09-13

## 2024-09-13 RX ORDER — DEXAMETHASONE SODIUM PHOSPHATE 10 MG/ML
INJECTION, SOLUTION INTRAMUSCULAR; INTRAVENOUS
Status: COMPLETED | OUTPATIENT
Start: 2024-09-13 | End: 2024-09-13

## 2024-09-13 RX ORDER — OXYCODONE AND ACETAMINOPHEN 5; 325 MG/1; MG/1
1 TABLET ORAL EVERY 4 HOURS PRN
Qty: 12 TABLET | Refills: 0 | Status: SHIPPED | OUTPATIENT
Start: 2024-09-13

## 2024-09-13 RX ORDER — SODIUM CHLORIDE 0.9 % (FLUSH) 0.9 %
10 SYRINGE (ML) INJECTION EVERY 12 HOURS SCHEDULED
Status: CANCELLED | OUTPATIENT
Start: 2024-09-13

## 2024-09-13 RX ORDER — DOCUSATE SODIUM 100 MG/1
100 CAPSULE, LIQUID FILLED ORAL 2 TIMES DAILY
Qty: 30 CAPSULE | Refills: 1 | Status: SHIPPED | OUTPATIENT
Start: 2024-09-13 | End: 2025-09-13

## 2024-09-13 RX ORDER — FENTANYL CITRATE 50 UG/ML
INJECTION, SOLUTION INTRAMUSCULAR; INTRAVENOUS
Status: COMPLETED
Start: 2024-09-13 | End: 2024-09-13

## 2024-09-13 RX ORDER — HYDROMORPHONE HYDROCHLORIDE 1 MG/ML
0.5 INJECTION, SOLUTION INTRAMUSCULAR; INTRAVENOUS; SUBCUTANEOUS
Status: DISCONTINUED | OUTPATIENT
Start: 2024-09-13 | End: 2024-09-13 | Stop reason: HOSPADM

## 2024-09-13 RX ORDER — BUPIVACAINE HYDROCHLORIDE 2.5 MG/ML
INJECTION, SOLUTION EPIDURAL; INFILTRATION; INTRACAUDAL
Status: COMPLETED | OUTPATIENT
Start: 2024-09-13 | End: 2024-09-13

## 2024-09-13 RX ORDER — SODIUM CHLORIDE 9 MG/ML
40 INJECTION, SOLUTION INTRAVENOUS AS NEEDED
Status: CANCELLED | OUTPATIENT
Start: 2024-09-13

## 2024-09-13 RX ADMIN — BUPIVACAINE HYDROCHLORIDE 30 ML: 2.5 INJECTION, SOLUTION EPIDURAL; INFILTRATION; INTRACAUDAL; PERINEURAL at 10:45

## 2024-09-13 RX ADMIN — SODIUM CHLORIDE 2000 MG: 900 INJECTION INTRAVENOUS at 10:46

## 2024-09-13 RX ADMIN — MUPIROCIN 1 APPLICATION: 20 OINTMENT TOPICAL at 09:51

## 2024-09-13 RX ADMIN — PROPOFOL 170 MG: 10 INJECTION, EMULSION INTRAVENOUS at 10:41

## 2024-09-13 RX ADMIN — EPHEDRINE SULFATE 10 MG: 50 INJECTION INTRAVENOUS at 10:49

## 2024-09-13 RX ADMIN — FENTANYL CITRATE 50 MCG: 50 INJECTION, SOLUTION INTRAMUSCULAR; INTRAVENOUS at 12:00

## 2024-09-13 RX ADMIN — HYDROMORPHONE HYDROCHLORIDE 0.5 MG: 1 INJECTION, SOLUTION INTRAMUSCULAR; INTRAVENOUS; SUBCUTANEOUS at 12:35

## 2024-09-13 RX ADMIN — SODIUM CHLORIDE, POTASSIUM CHLORIDE, SODIUM LACTATE AND CALCIUM CHLORIDE 9 ML/HR: 600; 310; 30; 20 INJECTION, SOLUTION INTRAVENOUS at 09:51

## 2024-09-13 RX ADMIN — LIDOCAINE HYDROCHLORIDE 50 MG: 10 INJECTION, SOLUTION EPIDURAL; INFILTRATION; INTRACAUDAL; PERINEURAL at 10:41

## 2024-09-13 RX ADMIN — DEXAMETHASONE SODIUM PHOSPHATE 2 MG: 10 INJECTION, SOLUTION INTRAMUSCULAR; INTRAVENOUS at 10:45

## 2024-09-13 RX ADMIN — ONDANSETRON 4 MG: 2 INJECTION INTRAMUSCULAR; INTRAVENOUS at 11:38

## 2024-09-13 RX ADMIN — LIDOCAINE HYDROCHLORIDE 0.5 ML: 10 INJECTION, SOLUTION EPIDURAL; INFILTRATION; INTRACAUDAL; PERINEURAL at 09:51

## 2024-09-13 RX ADMIN — SODIUM CHLORIDE, POTASSIUM CHLORIDE, SODIUM LACTATE AND CALCIUM CHLORIDE: 600; 310; 30; 20 INJECTION, SOLUTION INTRAVENOUS at 11:37

## 2024-09-13 RX ADMIN — OXYCODONE HYDROCHLORIDE AND ACETAMINOPHEN 1 TABLET: 5; 325 TABLET ORAL at 12:26

## 2024-09-13 RX ADMIN — FAMOTIDINE 20 MG: 20 TABLET, FILM COATED ORAL at 09:51

## 2024-09-13 RX ADMIN — FENTANYL CITRATE 100 MCG: 50 INJECTION, SOLUTION INTRAMUSCULAR; INTRAVENOUS at 10:41

## 2024-09-13 NOTE — ANESTHESIA PROCEDURE NOTES
Airway  Urgency: elective    Date/Time: 9/13/2024 10:42 AM  Airway not difficult    General Information and Staff    Patient location during procedure: OR  CRNA/CAA: Wang Winters CRNA    Indications and Patient Condition  Indications for airway management: airway protection    Preoxygenated: yes  Mask difficulty assessment: 0 - not attempted    Final Airway Details  Final airway type: supraglottic airway      Successful airway: I-gel  Size 4     Number of attempts at approach: 1  Assessment: lips, teeth, and gum same as pre-op    Additional Comments  LMA placed without difficulty, ventilation with assist, equal breath sounds and symmetric chest rise and fall

## 2024-09-13 NOTE — ANESTHESIA POSTPROCEDURE EVALUATION
Patient: Sandro [employer]    Procedure Summary       Date: 09/13/24 Room / Location:  [facility] /  [facility]    Anesthesia Start: 1036 Anesthesia Stop: 1150    Procedure: OPEN RIGHT INGUINAL HERNAI REPAIR WITH MESH (Right: Groin) Diagnosis:     Surgeons: Néstor Jerome MD Provider: Osmel Colon MD    Anesthesia Type: general with block ASA Status: 3            Anesthesia Type: general with block    Vitals  Vitals Value Taken Time   BP     Temp     Pulse 92 09/13/24 1150   Resp     SpO2 98 % 09/13/24 1150   Vitals shown include unfiled device data.        Post Anesthesia Care and Evaluation    Patient location during evaluation: PACU  Patient participation: complete - patient participated  Level of consciousness: awake  Pain score: 0  Pain management: adequate    Airway patency: patent  Anesthetic complications: No anesthetic complications  PONV Status: none  Cardiovascular status: acceptable and stable  Respiratory status: nasal cannula, unassisted, acceptable and spontaneous ventilation  Hydration status: acceptable

## 2024-09-13 NOTE — INTERVAL H&P NOTE
"[facility] Pre-op    Full history and physical note from office is attached.    BP (!) 180/102 (BP Location: Right arm, Patient Position: Lying)   Pulse 61   Temp 98.1 °F (36.7 °C) (Temporal)   Resp 16   Ht 185.4 cm (73\")   Wt 74.4 kg (164 lb)   SpO2 98%   BMI 21.64 kg/m²     Immunizations:  Influenza:  UTD  Pneumococcal:  No  Tetanus:  UTD    Review of Systems:  Constitutional-- No fever, chills or sweats. No fatigue.  CV-- No chest pain, palpitation or syncope  Resp-- No SOB, cough, hemoptysis  Skin--No rashes or lesions    Physical Exam:  Heart:   Regular rate and rhythm, S1 and S2 normal  Lungs: Clear to auscultation bilaterally, respirations unlabored    LAB Results:  Lab Results   Component Value Date    WBC 2.31 (L) 09/10/2024    HGB 12.6 (L) 09/10/2024    HCT 38.1 09/10/2024    .7 (H) 09/10/2024     09/10/2024    NEUTROABS 1.40 (L) 08/22/2024    GLUCOSE 100 (H) 09/10/2024    BUN 6 (L) 09/10/2024    CREATININE 0.71 (L) 09/10/2024    EGFRIFAFRI 109 01/10/2022     09/10/2024    K 3.6 09/10/2024     09/10/2024    CO2 28.0 09/10/2024    MG 2.1 04/04/2023    PHOS 2.9 04/04/2023    CALCIUM 9.0 09/10/2024    ALBUMIN 4.3 08/29/2024    AST 42 (H) 08/29/2024    ALT 21 08/29/2024    BILITOT 1.0 08/29/2024     Study Result    Narrative & Impression   CT ABDOMEN PELVIS W CONTRAST     Date of Exam: 8/22/2024 4:43 PM EDT     Indication: RLQ pain.     Comparison: None available.     Technique: Axial CT images were obtained of the abdomen and pelvis following the uneventful intravenous administration of 80 cc Isovue-300 IV contrast . Reconstructed coronal and sagittal images were also obtained. Automated exposure control and   iterative construction methods were used.     FINDINGS:     Lung bases: No masses. No consolidation. Calcified granulomata.     Liver:No masses. No intrahepatic biliary ductal dilatation.     Spleen: Calcified granulomata     Pancreas:No pancreatic masses. " No evidence of pancreatitis.     Gallbladder and common bile duct:No evidence of cholelithiasis. No evidence of cholecystitis.     Adrenal glands:No adrenal masses     Kidneys and ureters:No kidney stones. No renal masses.No calculi present within the ureters. Normal caliber ureters.     Urinary bladder:No urinary bladder wall thickening. No bladder masses.     Small bowel:Normal caliber small bowel.     Large bowel: Isolated haustral thickening within the cecum measuring 3 cm x 1.4 cm. Postsurgical changes of the rectosigmoid.     Appendix: Normal     GENITOURINARY: Normal prostate     Ascites or pneumoperitoneum:None.     Adenopathy:None present     Osseous structures: Degenerative changes of the spine. No lytic or blastic disease.     Other findings: Small fat-containing right inguinal hernia.     IMPRESSION:  1. There is isolated haustral thickening within the cecum. This may be inflammatory, normal variant, however an underlying mass cannot be excluded given the patient's age. Correlation with colonoscopic findings is recommended.  2. Small fat-containing right inguinal hernia.     Cancer Staging (if applicable)  Cancer Patient: __ yes __no __unknown__N/A; If yes, clinical stage T:__ N:__M:__, stage group or __N/A      Impression: inguinal hernia without obstruction or gangrene       Plan: OPEN RIGHT INGUINAL HERNAI REPAIR WITH MESH       DENIS Doherty   9/13/2024   09:33 EDT

## 2024-09-13 NOTE — ANESTHESIA PREPROCEDURE EVALUATION
Anesthesia Evaluation     Patient summary reviewed and Nursing notes reviewed                Airway   Mallampati: II  TM distance: >3 FB  Neck ROM: full  No difficulty expected  Dental - normal exam   (+) edentulous    Pulmonary - normal exam   (+) a smoker (2019) Former,    ROS comment: S/p tracheostomy; removed 1 year ago  Cardiovascular - normal exam    (+) hypertension    ROS comment:   Echo 3/23: normal EF, mild MR/TR    Neuro/Psych- negative ROS  GI/Hepatic/Renal/Endo    (+) GERD    Musculoskeletal (-) negative ROS    Abdominal  - normal exam    Bowel sounds: normal.   Substance History - negative use     OB/GYN negative ob/gyn ROS         Other                      Anesthesia Plan    ASA 3     general with block     intravenous induction     Anesthetic plan, risks, benefits, and alternatives have been provided, discussed and informed consent has been obtained with: patient.    Plan discussed with CRNA.    CODE STATUS:

## 2024-09-13 NOTE — OP NOTE
Operative Report    Patient Name:  Sandro Junior  YOB: 1955  [id number]    9/13/2024      PREOPERATIVE DIAGNOSIS: Reducible Right Inguinal Hernia       POSTOPERATIVE DIAGNOSIS: Same        PROCEDURE PERFORMED: Open Right Inguinal Hernia Repair with Mesh        SURGEON: Néstor Jerome MD      ASSISTANT: Assistant: Bryson Olivas PA-C      Assistant Responsibilities:  Bryson Olivas PA-C  assisted with dissection, retraction, repair of the hernia, and closure. Their assistance was necessary and required for successful completion of the case.        SPECIMENS: Hernia sac and cord lipoma      ESTIMATED BLOOD LOSS: 20 mL       ANESTHESIA: General with TAP block.        FINDINGS:  1.  An indirect hernia was encountered with a thickened sac.  There was also a small cord lipoma.  This was repaired with Bard mesh in a Kristie fashion.       INDICATIONS:      This patient is a 69 y.o. male who presented to my clinic with complaints of inguinal bulge and pain. A history and physical exam were consistent with evidence of a reducible right inguinal hernia.  Preoperative imaging including CT scan confirmed the diagnosis. The risks, benefits, and alternatives of the procedure were discussed with the patient and their family preoperatively and they agreed to proceed.          DESCRIPTION OF PROCEDURE:      After obtaining informed consent, the patient was taken to the operating room and placed in the supine position on the operating room table. General anesthesia was initiated. The abdomen and inguinal region were prepped and draped in the usual sterile fashion. A time-out was properly performed. Antibiotics were given preoperatively. SCDs were properly placed on the patient and turned on. A block was performed by the Anesthesia service prior to the start of the case.     A skin incision was made with a 10 blade scalpel 2 cm superior and parallel to the right inguinal ligament. Bovie  electrocautery was used to dissect down through the skin and subcutaneous tissues down to the level of the external oblique aponeurosis. Once identified, the aponeurosis was opened sharply running parallel with the fibers into the external inguinal ring.  The ilioinguinal nerve was identified and carefully protected throughout the course of the case.The cord including vas deferens was bluntly dissected from the canal and retracted superiorly with a Penrose drain. A lipoma was identified within the cord, dissected free, and ligated with silk suture. This was passed off the field for pathology.  At the base of the cord structures we identified a thickened hernia sac.  This was carefully  from the surrounding structures and vas deferens.  The hernia sac was opened, and then ligated at the level of the internal ring using a 2-0 Vicryl suture.  The specimen was passed off the field as hernia sac and cord lipoma.. There was a great deal of inguinal floor weakness, but no tissue defect in the direct space.    A piece of Bard mesh was cut to a keyhole configuration. Using simple interrupted 0 Nurolon suture, the mesh was secured medially to the iliopubic tract, inferiorly to the inguinal ligament shelving edge, and superiorly to the conjoint tendon. The cord was then returned from the Penrose drain anterior to the mesh. The tails of the mesh were reapproximated using the 0 Nurolon suture lateral to the cord in order to recreate the internal ring. This reconstruction permitted the passage of an instrument through the recreated ring but would not permit passage of the surgeon's finger. Hemostasis was confirmed and the site was irrigated with warmed saline solution. The external oblique aponeurosis was then reapproximated using a running 2-0 Vicryl suture. Simple interrupted 3-0 Vicryl suture was then used to reapproximate Maryjo's fascia. The overlying skin was then closed using running 4-0 Monocryl in the  subcuticular layer. Mastisol and Steri-Strips were applied to the wound site with a clean and dry dressing overlying this.       After the procedure, the patient was awakened, extubated, and taken to the postoperative anesthesia care unit for recovery. All needle, instrument, and lap counts were correct. I was personally present and performed all portions of the procedure. There were no immediate complications         Néstor Jerome MD  9/13/2024  11:34 EDT

## 2024-09-13 NOTE — ANESTHESIA PROCEDURE NOTES
"Midaxillary TAP      Patient reassessed immediately prior to procedure    Patient location during procedure: OR  Reason for block: at surgeon's request and post-op pain management  Performed by  CRNA/CAA: Wang Winters CRNA  Preanesthetic Checklist  Completed: patient identified, IV checked, site marked, risks and benefits discussed, surgical consent, monitors and equipment checked, pre-op evaluation and timeout performed  Prep:  Pt Position: supine  Sterile barriers:cap, gloves, mask and washed/disinfected hands  Prep: ChloraPrep  Patient monitoring: blood pressure monitoring, continuous pulse oximetry and EKG  Procedure    Sedation: yes  Performed under: general  Guidance:ultrasound guided  Images:still images obtained, printed/placed on chart    Laterality:right  Block Type:TAP  Injection Technique:single-shot  Needle Type:short-bevel and echogenic  Needle Gauge:20 G  Resistance on Injection: none    Medications Used: bupivacaine PF (MARCAINE) 0.25 % injection - Injection   30 mL - 9/13/2024 10:45:00 AM  dexamethasone sodium phosphate injection - Injection   2 mg - 9/13/2024 10:45:00 AM      Post Assessment  Injection Assessment: negative aspiration for heme, incremental injection and no paresthesia on injection  Patient Tolerance:comfortable throughout block  Complications:no  Additional Notes    Mid-Axillary/Lateral TAPs    A high-frequency linear transducer, with sterile cover, was placed in the midaxillary line between the ASIS and costal margin. The External Oblique Muscle (EOM), Internal Oblique Muscle (IOM), Transverse Abdominus Muscle (PACE), and Peritoneum were identified. The insertion site was prepped in sterile fashion and then localized with 2-5 ml of 1% Lidocaine. Using ultrasound-guidance, a 20-gauge B-Do 4\" Ultraplex 360 non-stimulating echogenic needle was advanced in plane, from medial to lateral, until the tip of the needle was in the fascial plane between the IOM and PACE. 1-3ml of " preservative free normal saline was used to hydro-dissect the fascial planes. After the fascial plane was verified, the local anesthetic (LA) was injected. The procedure was repeated on the opposite side for bilateral coverage. Aspiration every 5 ml to prevent intravascular injection. Injection was completed with negative aspiration of blood and negative intravascular injection. Injection pressures were normal with minimal resistance. Midaxillary TAPs should reach intercostal nerves T10- T11 and the subcostal nerve T12.    Performed by: Gideon Ruiz CRNA

## 2024-09-30 ENCOUNTER — HOSPITAL ENCOUNTER (EMERGENCY)
Facility: HOSPITAL | Age: 69
Discharge: HOME | End: 2024-10-01
Attending: EMERGENCY MEDICINE
Payer: COMMERCIAL

## 2024-09-30 VITALS
RESPIRATION RATE: 16 BRPM | DIASTOLIC BLOOD PRESSURE: 116 MMHG | WEIGHT: 182 LBS | HEART RATE: 105 BPM | TEMPERATURE: 98.4 F | BODY MASS INDEX: 24.12 KG/M2 | HEIGHT: 73 IN | SYSTOLIC BLOOD PRESSURE: 180 MMHG | OXYGEN SATURATION: 95 %

## 2024-09-30 DIAGNOSIS — Z78.9 ALCOHOL USE: ICD-10-CM

## 2024-09-30 DIAGNOSIS — E87.6 HYPOKALEMIA: ICD-10-CM

## 2024-09-30 DIAGNOSIS — L76.34 POSTOPERATIVE SEROMA OF SUBCUTANEOUS TISSUE AFTER NON-DERMATOLOGIC PROCEDURE: Primary | ICD-10-CM

## 2024-09-30 LAB
ALBUMIN SERPL-MCNC: 4.4 G/DL (ref 3.5–5.2)
ALBUMIN/GLOB SERPL: 1.4 G/DL
ALP SERPL-CCNC: 78 U/L (ref 39–117)
ALT SERPL W P-5'-P-CCNC: 20 U/L (ref 1–41)
ANION GAP SERPL CALCULATED.3IONS-SCNC: 14 MMOL/L (ref 5–15)
AST SERPL-CCNC: 48 U/L (ref 1–40)
BASOPHILS # BLD AUTO: 0.03 10*3/MM3 (ref 0–0.2)
BASOPHILS NFR BLD AUTO: 0.6 % (ref 0–1.5)
BILIRUB SERPL-MCNC: 1.3 MG/DL (ref 0–1.2)
BUN SERPL-MCNC: 5 MG/DL (ref 8–23)
BUN/CREAT SERPL: 8.1 (ref 7–25)
CALCIUM SPEC-SCNC: 8.8 MG/DL (ref 8.6–10.5)
CHLORIDE SERPL-SCNC: 104 MMOL/L (ref 98–107)
CO2 SERPL-SCNC: 26 MMOL/L (ref 22–29)
CREAT SERPL-MCNC: 0.62 MG/DL (ref 0.76–1.27)
DEPRECATED RDW RBC AUTO: 51.4 FL (ref 37–54)
EGFRCR SERPLBLD CKD-EPI 2021: 104.8 ML/MIN/1.73
EOSINOPHIL # BLD AUTO: 0.09 10*3/MM3 (ref 0–0.4)
EOSINOPHIL NFR BLD AUTO: 1.8 % (ref 0.3–6.2)
ERYTHROCYTE [DISTWIDTH] IN BLOOD BY AUTOMATED COUNT: 14 % (ref 12.3–15.4)
ETHANOL BLD-MCNC: 372 MG/DL (ref 0–10)
GLOBULIN UR ELPH-MCNC: 3.1 GM/DL
GLUCOSE SERPL-MCNC: 94 MG/DL (ref 65–99)
HCT VFR BLD AUTO: 37.3 % (ref 37.5–51)
HGB BLD-MCNC: 13 G/DL (ref 13–17.7)
HOLD SPECIMEN: NORMAL
IMM GRANULOCYTES # BLD AUTO: 0.01 10*3/MM3 (ref 0–0.05)
IMM GRANULOCYTES NFR BLD AUTO: 0.2 % (ref 0–0.5)
LIPASE SERPL-CCNC: 159 U/L (ref 13–60)
LYMPHOCYTES # BLD AUTO: 3.41 10*3/MM3 (ref 0.7–3.1)
LYMPHOCYTES NFR BLD AUTO: 66.5 % (ref 19.6–45.3)
MCH RBC QN AUTO: 34.7 PG (ref 26.6–33)
MCHC RBC AUTO-ENTMCNC: 34.9 G/DL (ref 31.5–35.7)
MCV RBC AUTO: 99.5 FL (ref 79–97)
MONOCYTES # BLD AUTO: 0.52 10*3/MM3 (ref 0.1–0.9)
MONOCYTES NFR BLD AUTO: 10.1 % (ref 5–12)
NEUTROPHILS NFR BLD AUTO: 1.07 10*3/MM3 (ref 1.7–7)
NEUTROPHILS NFR BLD AUTO: 20.8 % (ref 42.7–76)
NRBC BLD AUTO-RTO: 0.4 /100 WBC (ref 0–0.2)
PLAT MORPH BLD: NORMAL
PLATELET # BLD AUTO: 142 10*3/MM3 (ref 140–450)
PMV BLD AUTO: 10 FL (ref 6–12)
POTASSIUM SERPL-SCNC: 3.3 MMOL/L (ref 3.5–5.2)
PROT SERPL-MCNC: 7.5 G/DL (ref 6–8.5)
RBC # BLD AUTO: 3.75 10*6/MM3 (ref 4.14–5.8)
RBC MORPH BLD: NORMAL
SODIUM SERPL-SCNC: 144 MMOL/L (ref 136–145)
WBC MORPH BLD: NORMAL
WBC NRBC COR # BLD AUTO: 5.13 10*3/MM3 (ref 3.4–10.8)
WHOLE BLOOD HOLD COAG: NORMAL
WHOLE BLOOD HOLD SPECIMEN: NORMAL

## 2024-09-30 PROCEDURE — 0 DIATRIZOATE MEGLUMINE & SODIUM PER 1 ML: Performed by: EMERGENCY MEDICINE

## 2024-09-30 PROCEDURE — 85007 BL SMEAR W/DIFF WBC COUNT: CPT | Performed by: EMERGENCY MEDICINE

## 2024-09-30 PROCEDURE — 83690 ASSAY OF LIPASE: CPT | Performed by: EMERGENCY MEDICINE

## 2024-09-30 PROCEDURE — 80053 COMPREHEN METABOLIC PANEL: CPT | Performed by: EMERGENCY MEDICINE

## 2024-09-30 PROCEDURE — 82077 ASSAY SPEC XCP UR&BREATH IA: CPT | Performed by: EMERGENCY MEDICINE

## 2024-09-30 PROCEDURE — 85025 COMPLETE CBC W/AUTO DIFF WBC: CPT | Performed by: EMERGENCY MEDICINE

## 2024-09-30 PROCEDURE — 99284 EMERGENCY DEPT VISIT MOD MDM: CPT

## 2024-09-30 PROCEDURE — 36415 COLL VENOUS BLD VENIPUNCTURE: CPT

## 2024-09-30 RX ORDER — SODIUM CHLORIDE 0.9 % (FLUSH) 0.9 %
10 SYRINGE (ML) INJECTION AS NEEDED
Status: DISCONTINUED | OUTPATIENT
Start: 2024-09-30 | End: 2024-10-01 | Stop reason: HOSPADM

## 2024-09-30 RX ORDER — DIATRIZOATE MEGLUMINE AND DIATRIZOATE SODIUM 660; 100 MG/ML; MG/ML
30 SOLUTION ORAL; RECTAL ONCE
Status: COMPLETED | OUTPATIENT
Start: 2024-09-30 | End: 2024-09-30

## 2024-09-30 RX ADMIN — DIATRIZOATE MEGLUMINE AND DIATRIZOATE SODIUM 30 ML: 660; 100 LIQUID ORAL; RECTAL at 23:38

## 2024-10-01 ENCOUNTER — APPOINTMENT (OUTPATIENT)
Dept: CT IMAGING | Facility: HOSPITAL | Age: 69
End: 2024-10-01
Payer: COMMERCIAL

## 2024-10-01 PROCEDURE — 74176 CT ABD & PELVIS W/O CONTRAST: CPT

## 2024-10-01 RX ORDER — POTASSIUM CHLORIDE 750 MG/1
20 CAPSULE, EXTENDED RELEASE ORAL ONCE
Status: COMPLETED | OUTPATIENT
Start: 2024-10-01 | End: 2024-10-01

## 2024-10-01 RX ADMIN — POTASSIUM CHLORIDE 20 MEQ: 750 CAPSULE, EXTENDED RELEASE ORAL at 01:48

## 2024-10-01 NOTE — ED PROVIDER NOTES
Subjective   History of Present Illness  This is a 69-year-old male presenting to the emergency department with some pain in his surgical site.  The patient had a right inguinal hernia repair on 919.  He was in without complications.  Patient states that he noticed a little swelling in the area this evening.  He has not had any discharge.  Denies any strenuous activity.  Surgical site has been intact.  He is not having any associated abdominal pain.  No nausea or vomiting.  He is having normal bowel movements.  He does not have any fevers or chills.  No headache or change in vision.  No focal weakness.  No chest pain or shortness of breath    History provided by:  Patient   used: No        Review of Systems   Constitutional:  Negative for chills and fever.   HENT:  Negative for congestion, ear pain and sore throat.    Eyes:  Negative for visual disturbance.   Respiratory:  Negative for shortness of breath.    Cardiovascular:  Negative for chest pain.   Gastrointestinal:         Right inguinal pain   Genitourinary:  Negative for difficulty urinating.   Musculoskeletal:  Negative for arthralgias.   Skin:  Negative for rash.   Neurological:  Negative for dizziness, weakness and numbness.   Psychiatric/Behavioral:  Negative for agitation.        Past Medical History:   Diagnosis Date    Epidermal inclusion cyst     Esophageal candidiasis     Hypertension     Keloid scar     Rectal cancer     Seborrheic dermatitis        No Known Allergies    Past Surgical History:   Procedure Laterality Date    COLECTOMY PARTIAL / TOTAL      COLONOSCOPY      seven procedures    INGUINAL HERNIA REPAIR Right 9/13/2024    Procedure: OPEN RIGHT INGUINAL HERNAI REPAIR WITH MESH;  Surgeon: Néstor Jerome MD;  Location: [facility];  Service: General;  Laterality: Right;    KNEE SURGERY      Left knee meniscal tear repair    TOOTH EXTRACTION  2016    complete extraction upper & lower, wears upper dentures    TOTAL KNEE  ARTHROPLASTY Right 2016    TRACHEOSTOMY AND PEG TUBE INSERTION N/A 2023    Procedure: TRACHEOSTOMY AND PERCUTANEOUS ENDOSCOPIC GASTROSTOMY TUBE INSERTION;  Surgeon: Néstor Jerome MD;  Location: [facility];  Service: General;  Laterality: N/A;       No family history on file.    Social History     Socioeconomic History    Marital status:    Tobacco Use    Smoking status: Former     Types: Cigars     Quit date: 2019     Years since quittin.7    Smokeless tobacco: Never    Tobacco comments:     2 cigars a week   Vaping Use    Vaping status: Never Used   Substance and Sexual Activity    Alcohol use: Yes     Alcohol/week: 4.0 - 5.0 standard drinks of alcohol     Types: 4 - 5 Glasses of wine per week     Comment: 4- 5 beers every other day    Drug use: Yes     Types: Marijuana     Comment: smoke pot; occassionally    Sexual activity: Not Currently           Objective   Physical Exam  Vitals and nursing note reviewed.   Constitutional:       General: He is not in acute distress.     Appearance: He is not ill-appearing or toxic-appearing.   HENT:      Mouth/Throat:      Pharynx: No posterior oropharyngeal erythema.   Eyes:      Extraocular Movements: Extraocular movements intact.      Pupils: Pupils are equal, round, and reactive to light.   Cardiovascular:      Rate and Rhythm: Normal rate and regular rhythm.   Pulmonary:      Effort: Pulmonary effort is normal. No respiratory distress.   Abdominal:      General: Abdomen is flat. There is no distension.      Palpations: There is no mass.      Tenderness: There is no abdominal tenderness. There is no guarding or rebound.      Comments: Surgical site in the right inguinal area appears normal.  There is a little bit of swelling near it.  There is no erythema, fluctuance or crepitus   Musculoskeletal:         General: No deformity. Normal range of motion.   Neurological:      General: No focal deficit present.      Mental Status: He is alert.       Sensory: No sensory deficit.      Motor: No weakness.         Procedures           ED Course  ED Course as of 10/01/24 0143   Tue Oct 01, 2024   0140 BP(!): 180/116 [JK]   0140 Temp: 98.4 °F (36.9 °C) [JK]   0140 Temp src: Oral [JK]   0140 Heart Rate: 105 [JK]   0140 Resp: 16 [JK]   0140 SpO2: 95 %  Interpretation:  Patient's repeat vitals, telemetry tracing, and pulse oximetry tracing were directly viewed and interpreted by myself.   O2 sat 95% on room air, interpreted as normal  Telemetry rhythm strip revealed a rate of 105 bpm, interpreted as sinus tachycardia [JK]   0140 Comprehensive Metabolic Panel(!) [JK]   0140 Lipase(!) [JK]   0140 Ethanol(!) [JK]   0140 CBC & Differential(!)  Interpretation:  Laboratory studies were reviewed and interpreted directly by myself.  CMP showed some minor hypokalemia with a potassium of 3.3, CBC is unremarkable, ethanol is elevated at 372, lipase is elevated at 159 [JK]   0140 CT Abdomen Pelvis Without Contrast  Interpretation:  Imaging was directly visualized by myself, per my interpretations, CT abdomen and pelvis showed a small seroma at the right surgical site.  There is some change in the position of the jejunum questionable obstruction. [JK]   0141 I did have discussion with general surgery regarding the patient's findings.  It seems atypical for an acute obstruction.  The patient is tolerating oral intake.  He is not having any abdominal pain.  He is not having any nausea or vomiting.  He actually just had a bowel movement in the emergency department without any complications.  General surgery recommended maintaining his appointment in the morning for follow-up. [JK]   0141 I did instruct the patient to maintain his general surgery follow-up in the morning.  Repeat exam is benign.  Again he is tolerating oral intake.  If his symptoms are to change acutely he is to return the emergency department immediately.  The patient verbalizes understanding. [JK]   0141 I had a  discussion with the patient/family regarding diagnosis, diagnostic results, treatment plan, and medications. The patient/family indicated understanding of these instructions. I spent adequate time at the bedside prior to discharge necessary to discuss the aftercare instructions, giving patient education, providing explanations of the results of our evaluations/findings, and my decision making to assure that the patient/family understand the plan of care. Time was allotted to answer questions at that time and throughout the ED course. Patient is required to maintain timely follow up, as discussed. I also discussed the potential for the development of an acute emergent condition requiring further evaluation, return to the ER, admission, or even surgical intervention.  I encouraged the patient to return to the emergency department immediately for any concerns, worsening symptoms, new complaints, or if symptoms persist and they are unable to seek follow-up in a timely fashion. The patient/family expressed understanding and agreement with this plan    Shared decision making:   After full review of the patient's clinical presentation, review of any work-up including but not limited to laboratory studies and radiology obtained, I had a discussion with the patient.  Treatment options were discussed as well as the risks, benefits and consequences.  I discussed all findings with the patient and family members if available.  During the discussion, treatment goals were understood by all as well as any misconceptions which were addressed with the patient.  Ample time was given for any questions they may have had.  They are in agreement with the treatment plan as well as final disposition. [JK]      ED Course User Index  [JK] Shubham Morel MD                                             Medical Decision Making  This is a 69-year-old male with recent right inguinal hernia repair presenting to the emergency department some  swelling in his right surgical site.  Findings are consistent with a seroma.  Patient also admits to some alcohol use.  On exam he is answering questions appropriately, however he can smell the alcohol.  He does not have any abdominal tenderness.  There is no evidence of acute abdomen or peritonitis.  He is actually drinking something on my initial exam.  Overall, the patient is nontoxic.  Afebrile.  IV access was established and the patient.  Placed on continuous telemetry monitoring.  Given the patient's presentation, differential is broad and will require further evaluation.  Workup initiated.      Differential diagnosis: Peptic ulcer disease, dyspepsia, GERD, pancreatitis, biliary colic, electrolyte abnormality, acute kidney injury, seroma      Amount and/or Complexity of Data Reviewed  Independent Historian: EMS  External Data Reviewed: labs, radiology, ECG and notes.     Details: External laboratories, imaging as well as notes were reviewed personally by myself.  All relevant studies were used to guide decision making.     Date of previous record: 9/19/2024    Source of note: Admission record    Summary: Patient was admitted for right inguinal hernia repair.  I did review basic laboratory studies on file as well as previous CT abdomen pelvis and EKG.  Records reviewed    Labs: ordered. Decision-making details documented in ED Course.  Radiology: ordered and independent interpretation performed. Decision-making details documented in ED Course.    Risk  Prescription drug management.        Final diagnoses:   Postoperative seroma of subcutaneous tissue after non-dermatologic procedure   Alcohol use   Hypokalemia       ED Disposition  ED Disposition       ED Disposition   Discharge    Condition   Stable    Comment   --               Néstor Jerome MD  [address] [address]  [phone]    Go today           Medication List      No changes were made to your prescriptions during  this visit.            Shubham Morel MD  10/01/24 0143

## 2024-11-21 ENCOUNTER — OFFICE VISIT (OUTPATIENT)
Dept: GASTROENTEROLOGY | Facility: CLINIC | Age: 69
End: 2024-11-21
Payer: COMMERCIAL

## 2024-11-21 VITALS — BODY MASS INDEX: 21.57 KG/M2 | HEIGHT: 73 IN | WEIGHT: 162.8 LBS | TEMPERATURE: 97.8 F | HEART RATE: 69 BPM

## 2024-11-21 DIAGNOSIS — K21.00 GASTROESOPHAGEAL REFLUX DISEASE WITH ESOPHAGITIS WITHOUT HEMORRHAGE: Primary | ICD-10-CM

## 2024-11-21 DIAGNOSIS — R13.19 ESOPHAGEAL DYSPHAGIA: ICD-10-CM

## 2024-11-21 RX ORDER — OMEPRAZOLE 40 MG/1
40 CAPSULE, DELAYED RELEASE ORAL 2 TIMES DAILY
Qty: 180 CAPSULE | Refills: 3 | Status: SHIPPED | OUTPATIENT
Start: 2024-11-21

## 2024-11-21 RX ORDER — OMEPRAZOLE 40 MG/1
40 CAPSULE, DELAYED RELEASE ORAL 2 TIMES DAILY
Qty: 60 CAPSULE | Refills: 11 | Status: SHIPPED | OUTPATIENT
Start: 2024-11-21 | End: 2024-11-21

## 2024-11-21 NOTE — ASSESSMENT & PLAN NOTE
No current GERD symptoms, but worsening dysphagia.  Continue omeprazole 40 mg.  If reflux related changes noted on upcoming EGD, would increase to twice daily dosing again.    Orders:    omeprazole (priLOSEC) 40 MG capsule; Take 1 capsule by mouth 2 (Two) Times a Day.

## 2024-11-21 NOTE — PROGRESS NOTES
Office Note     Name: Sandro Junior    : 1955     MRN: [id number]     Chief Complaint  Difficulty Swallowing    Subjective     History of Present Illness:  Sandro Junior is a 69 y.o. male who presents today for evaluation of dysphagia.  He has a history of epiglottitis and respiratory failure with PEG tube and tracheostomy placement.  He also has a history of esophageal stricture with last EGD on 2023 showing a benign, esophageal stricture that was dilated to 15 mm with a balloon.  EGD was also significant for reflux esophagitis, gastritis, small hiatal hernia.  He is on Prilosec 40 mg daily, rather than twice daily now and feels his symptoms are well-controlled.  He denies current GERD symptoms.  He had a recent hernia surgery, but denies use of NSAIDs.  He is again having issues with dysphagia, and having to regurgitate frequently. He absolutely can't eat things like steak.  He presents today to request to repeat esophageal dilation.    Past Medical History:   Past Medical History:   Diagnosis Date    Epidermal inclusion cyst     Esophageal candidiasis     Hypertension     Keloid scar     Rectal cancer     Seborrheic dermatitis        Past Surgical History:   Past Surgical History:   Procedure Laterality Date    COLECTOMY PARTIAL / TOTAL      COLONOSCOPY      seven procedures    INGUINAL HERNIA REPAIR Right 2024    Procedure: OPEN RIGHT INGUINAL HERNAI REPAIR WITH MESH;  Surgeon: Néstor Jerome MD;  Location:  [facility];  Service: General;  Laterality: Right;    KNEE SURGERY      Left knee meniscal tear repair    TOOTH EXTRACTION  2016    complete extraction upper & lower, wears upper dentures    TOTAL KNEE ARTHROPLASTY Right 2016    TRACHEOSTOMY AND PEG TUBE INSERTION N/A 2023    Procedure: TRACHEOSTOMY AND PERCUTANEOUS ENDOSCOPIC GASTROSTOMY TUBE INSERTION;  Surgeon: Néstor Jerome MD;  Location:  [facility];  Service: General;  Laterality: N/A;        Immunizations:   Immunization History   Administered Date(s) Administered    COVID-19 (PFIZER) 12YRS+ (COMIRNATY) 12/09/2023    COVID-19 (PFIZER) BIVALENT 12+YRS 09/22/2022    COVID-19 (PFIZER) Purple Cap Monovalent 03/04/2021, 03/31/2021, 11/05/2021    Flu Vaccine Intradermal Quad 18-64YR 11/03/2014    Fluad Quad 65+ 12/09/2023    Fluzone (or Fluarix & Flulaval for VFC) >6mos 09/10/2015, 10/04/2016, 08/24/2017, 09/21/2020, 10/25/2021    Fluzone High-Dose 65+yrs 11/03/2022    Hepatitis A 12/03/2018, 07/16/2019    Influenza Seasonal Injectable 11/03/2014    Pneumococcal Polysaccharide (PPSV23) 04/05/2022    Shingrix 12/09/2023    Tdap 04/26/2018    flucelvax quad pfs =>4 YRS 11/26/2018, 12/27/2019    influenza Split 10/04/2016, 08/24/2017        Medications:     Current Outpatient Medications:     ketoconazole (NIZORAL) 2 % cream, Apply  topically to the appropriate area as directed Daily., Disp: 60 g, Rfl: 1    latanoprost (XALATAN) 0.005 % ophthalmic solution, Administer 1 drop to both eyes Daily., Disp: , Rfl: 0    omeprazole (priLOSEC) 40 MG capsule, Take 1 capsule by mouth 2 (Two) Times a Day., Disp: , Rfl:     ondansetron (Zofran) 4 MG tablet, Take 1 tablet by mouth Every 8 (Eight) Hours As Needed for Nausea or Vomiting., Disp: 12 tablet, Rfl: 0    oxyCODONE-acetaminophen (Percocet) 5-325 MG per tablet, Take 1 tablet by mouth Every 4 (Four) Hours As Needed for Moderate Pain., Disp: 12 tablet, Rfl: 0    valsartan (DIOVAN) 80 MG tablet, Take 1 tablet by mouth Daily., Disp: , Rfl:     docusate sodium (Colace) 100 MG capsule, Take 1 capsule by mouth 2 (Two) Times a Day. (Patient not taking: Reported on 11/21/2024), Disp: 30 capsule, Rfl: 1    sildenafil (VIAGRA) 50 MG tablet, Administer 1 tablet per G tube Daily As Needed for Erectile Dysfunction. (Patient not taking: Reported on 9/10/2024), Disp: , Rfl:     Allergies:   No Known Allergies    Family History: History reviewed. No pertinent family  "history.    Social History:   Social History     Socioeconomic History    Marital status:    Tobacco Use    Smoking status: Former     Types: Cigars     Quit date: 2019     Years since quittin.8    Smokeless tobacco: Never    Tobacco comments:     2 cigars a week   Vaping Use    Vaping status: Never Used   Substance and Sexual Activity    Alcohol use: Yes     Alcohol/week: 4.0 - 5.0 standard drinks of alcohol     Types: 4 - 5 Glasses of wine per week     Comment: 4- 5 beers every other day    Drug use: Yes     Types: Marijuana     Comment: smoke pot; occassionally    Sexual activity: Not Currently         Objective     Vital Signs  Pulse 69   Temp 97.8 °F (36.6 °C) (Temporal)   Ht 185.4 cm (73\")   Wt 73.8 kg (162 lb 12.8 oz)   BMI 21.48 kg/m²   Estimated body mass index is 21.48 kg/m² as calculated from the following:    Height as of this encounter: 185.4 cm (73\").    Weight as of this encounter: 73.8 kg (162 lb 12.8 oz).    BMI is within normal parameters. No other follow-up for BMI required.      Physical Exam  Vitals reviewed.   Constitutional:       General: He is awake.   Cardiovascular:      Rate and Rhythm: Normal rate.   Pulmonary:      Effort: Pulmonary effort is normal.   Abdominal:      General: Bowel sounds are normal.      Palpations: Abdomen is soft.      Tenderness: There is no abdominal tenderness.   Skin:     General: Skin is warm and dry.      Capillary Refill: Capillary refill takes less than 2 seconds.   Neurological:      Mental Status: He is alert.          Assessment and Plan     Assessment & Plan  Gastroesophageal reflux disease with esophagitis without hemorrhage  No current GERD symptoms, but worsening dysphagia.  Continue omeprazole 40 mg.  If reflux related changes noted on upcoming EGD, would increase to twice daily dosing again.    Orders:    omeprazole (priLOSEC) 40 MG capsule; Take 1 capsule by mouth 2 (Two) Times a Day.    Esophageal dysphagia  Schedule EGD with " dilation.              Follow Up  Return for follow-up with Dr. Allen in March 2025    DENIS Todd  [facility] [id number]  [facility]  [address] [id number]

## 2024-11-25 ENCOUNTER — OUTSIDE FACILITY SERVICE (OUTPATIENT)
Dept: GASTROENTEROLOGY | Facility: CLINIC | Age: 69
End: 2024-11-25
Payer: COMMERCIAL

## 2024-11-25 PROCEDURE — 43239 EGD BIOPSY SINGLE/MULTIPLE: CPT | Performed by: INTERNAL MEDICINE

## 2024-11-25 PROCEDURE — 43249 ESOPH EGD DILATION <30 MM: CPT | Performed by: INTERNAL MEDICINE

## 2024-11-25 PROCEDURE — 88305 TISSUE EXAM BY PATHOLOGIST: CPT | Performed by: INTERNAL MEDICINE

## 2024-11-26 ENCOUNTER — LAB REQUISITION (OUTPATIENT)
Dept: LAB | Facility: HOSPITAL | Age: 69
End: 2024-11-26
Payer: COMMERCIAL

## 2024-11-26 DIAGNOSIS — R13.10 DYSPHAGIA, UNSPECIFIED: ICD-10-CM

## 2024-11-26 DIAGNOSIS — K21.00 GASTRO-ESOPHAGEAL REFLUX DISEASE WITH ESOPHAGITIS, WITHOUT BLEEDING: ICD-10-CM

## 2024-11-26 DIAGNOSIS — K29.70 GASTRITIS, UNSPECIFIED, WITHOUT BLEEDING: ICD-10-CM

## 2024-11-26 DIAGNOSIS — K22.2 ESOPHAGEAL OBSTRUCTION: ICD-10-CM

## 2025-02-21 ENCOUNTER — PRIOR AUTHORIZATION (OUTPATIENT)
Dept: GASTROENTEROLOGY | Facility: CLINIC | Age: 70
End: 2025-02-21
Payer: COMMERCIAL

## 2025-03-31 ENCOUNTER — OFFICE VISIT (OUTPATIENT)
Dept: GASTROENTEROLOGY | Facility: CLINIC | Age: 70
End: 2025-03-31
Payer: COMMERCIAL

## 2025-03-31 VITALS — WEIGHT: 168 LBS | BODY MASS INDEX: 22.26 KG/M2 | HEIGHT: 73 IN

## 2025-03-31 DIAGNOSIS — K22.2 ESOPHAGEAL STRICTURE: ICD-10-CM

## 2025-03-31 DIAGNOSIS — R13.19 ESOPHAGEAL DYSPHAGIA: Primary | ICD-10-CM

## 2025-03-31 DIAGNOSIS — K21.00 GASTROESOPHAGEAL REFLUX DISEASE WITH ESOPHAGITIS WITHOUT HEMORRHAGE: ICD-10-CM

## 2025-03-31 PROCEDURE — 99214 OFFICE O/P EST MOD 30 MIN: CPT | Performed by: INTERNAL MEDICINE

## 2025-03-31 NOTE — PROGRESS NOTES
PCP:  Selin Espinal DO     No referring provider defined for this encounter.    Chief Complaint   Patient presents with    Follow-up     Follow up EGD        HPI   Patient is a 70-year-old gentleman known to me with a history of esophageal stricturing.  He was dilated back on 11/ 25/24 to 15 mm.  He had esophagitis at that time.  He is stopped his omeprazole  as it gave him stomach aches.  He had a second narrowing at the mid esophagus level.  He has some dysphagia and he points to his mid to upper chest.    No Known Allergies       Current Outpatient Medications:     docusate sodium (Colace) 100 MG capsule, Take 1 capsule by mouth 2 (Two) Times a Day. (Patient not taking: Reported on 11/21/2024), Disp: 30 capsule, Rfl: 1    ketoconazole (NIZORAL) 2 % cream, Apply  topically to the appropriate area as directed Daily., Disp: 60 g, Rfl: 1    latanoprost (XALATAN) 0.005 % ophthalmic solution, Administer 1 drop to both eyes Daily., Disp: , Rfl: 0    omeprazole (priLOSEC) 40 MG capsule, Take 1 capsule by mouth 2 (Two) Times a Day., Disp: 180 capsule, Rfl: 3    ondansetron (Zofran) 4 MG tablet, Take 1 tablet by mouth Every 8 (Eight) Hours As Needed for Nausea or Vomiting., Disp: 12 tablet, Rfl: 0    oxyCODONE-acetaminophen (Percocet) 5-325 MG per tablet, Take 1 tablet by mouth Every 4 (Four) Hours As Needed for Moderate Pain., Disp: 12 tablet, Rfl: 0    sildenafil (VIAGRA) 50 MG tablet, Administer 1 tablet per G tube Daily As Needed for Erectile Dysfunction. (Patient not taking: Reported on 9/10/2024), Disp: , Rfl:     valsartan (DIOVAN) 80 MG tablet, Take 1 tablet by mouth Daily., Disp: , Rfl:      Past Medical History:   Diagnosis Date    Epidermal inclusion cyst     Esophageal candidiasis     Hypertension     Keloid scar     Rectal cancer     Seborrheic dermatitis        Past Surgical History:   Procedure Laterality Date    COLECTOMY PARTIAL / TOTAL      COLONOSCOPY      seven procedures    INGUINAL HERNIA REPAIR  Right 2024    Procedure: OPEN RIGHT INGUINAL HERNAI REPAIR WITH MESH;  Surgeon: Néstor Jerome MD;  Location:  [facility];  Service: General;  Laterality: Right;    KNEE SURGERY      Left knee meniscal tear repair    TOOTH EXTRACTION  2016    complete extraction upper & lower, wears upper dentures    TOTAL KNEE ARTHROPLASTY Right 2016    TRACHEOSTOMY AND PEG TUBE INSERTION N/A 2023    Procedure: TRACHEOSTOMY AND PERCUTANEOUS ENDOSCOPIC GASTROSTOMY TUBE INSERTION;  Surgeon: Néstor Jerome MD;  Location:  [facility];  Service: General;  Laterality: N/A;        Social History     Socioeconomic History    Marital status:    Tobacco Use    Smoking status: Former     Types: Cigars     Quit date: 2019     Years since quittin.2    Smokeless tobacco: Never    Tobacco comments:     2 cigars a week   Vaping Use    Vaping status: Never Used   Substance and Sexual Activity    Alcohol use: Yes     Alcohol/week: 4.0 - 5.0 standard drinks of alcohol     Types: 4 - 5 Glasses of wine per week     Comment: 4- 5 beers every other day    Drug use: Yes     Types: Marijuana     Comment: smoke pot; occassionally    Sexual activity: Not Currently        History reviewed. No pertinent family history.     Review of Systems     There were no vitals filed for this visit.     Physical Exam  Constitutional:       General: He is not in acute distress.     Appearance: Normal appearance. He is not ill-appearing.   Neurological:      Mental Status: He is alert.          Diagnoses and all orders for this visit:    1. Esophageal dysphagia (Primary)    2. Gastroesophageal reflux disease with esophagitis without hemorrhage    3. Esophageal stricture  Impressions plan of 1 gastroesophageal reflux disease: I like to get him back on an agent.  I will try Protonix.  Hopefully will have the side effects from that standpoint.  We are to repeat EGD and I will try and late both the distal esophagus and the midesophagus if  possible.  Hopefully we can get some improvement.       Mario Allen MD

## 2025-03-31 NOTE — LETTER
March 31, 2025     Selin Espinal DO  [address] [facility] [address]    Patient: Sandro Junior   YOB: 1955   Date of Visit: 3/31/2025     Dear Selin Espinal DO:       Thank you for referring Sandro Junior to me for evaluation. Below are the relevant portions of my assessment and plan of care.    If you have questions, please do not hesitate to call me. I look forward to following Sandro along with you.         Sincerely,        Mario Allen MD        CC: No Recipients    Mario Allen MD  03/31/25 1609  Sign when Signing Visit  PCP:  Selin Espinal DO     No referring provider defined for this encounter.    Chief Complaint   Patient presents with   • Follow-up     Follow up EGD        HPI   Patient is a 70-year-old gentleman known to me with a history of esophageal stricturing.  He was dilated back on 11/ 25/24 to 15 mm.  He had esophagitis at that time.  He is stopped his omeprazole  as it gave him stomach aches.  He had a second narrowing at the mid esophagus level.  He has some dysphagia and he points to his mid to upper chest.    No Known Allergies       Current Outpatient Medications:   •  docusate sodium (Colace) 100 MG capsule, Take 1 capsule by mouth 2 (Two) Times a Day. (Patient not taking: Reported on 11/21/2024), Disp: 30 capsule, Rfl: 1  •  ketoconazole (NIZORAL) 2 % cream, Apply  topically to the appropriate area as directed Daily., Disp: 60 g, Rfl: 1  •  latanoprost (XALATAN) 0.005 % ophthalmic solution, Administer 1 drop to both eyes Daily., Disp: , Rfl: 0  •  omeprazole (priLOSEC) 40 MG capsule, Take 1 capsule by mouth 2 (Two) Times a Day., Disp: 180 capsule, Rfl: 3  •  ondansetron (Zofran) 4 MG tablet, Take 1 tablet by mouth Every 8 (Eight) Hours As Needed for Nausea or Vomiting., Disp: 12 tablet, Rfl: 0  •  oxyCODONE-acetaminophen (Percocet) 5-325 MG per tablet, Take 1 tablet by mouth Every 4 (Four) Hours As Needed for Moderate Pain., Disp:  12 tablet, Rfl: 0  •  sildenafil (VIAGRA) 50 MG tablet, Administer 1 tablet per G tube Daily As Needed for Erectile Dysfunction. (Patient not taking: Reported on 9/10/2024), Disp: , Rfl:   •  valsartan (DIOVAN) 80 MG tablet, Take 1 tablet by mouth Daily., Disp: , Rfl:      Past Medical History:   Diagnosis Date   • Epidermal inclusion cyst    • Esophageal candidiasis    • Hypertension    • Keloid scar    • Rectal cancer    • Seborrheic dermatitis        Past Surgical History:   Procedure Laterality Date   • COLECTOMY PARTIAL / TOTAL     • COLONOSCOPY      seven procedures   • INGUINAL HERNIA REPAIR Right 2024    Procedure: OPEN RIGHT INGUINAL HERNAI REPAIR WITH MESH;  Surgeon: Néstor Jerome MD;  Location: [facility];  Service: General;  Laterality: Right;   • KNEE SURGERY      Left knee meniscal tear repair   • TOOTH EXTRACTION  2016    complete extraction upper & lower, wears upper dentures   • TOTAL KNEE ARTHROPLASTY Right 2016   • TRACHEOSTOMY AND PEG TUBE INSERTION N/A 2023    Procedure: TRACHEOSTOMY AND PERCUTANEOUS ENDOSCOPIC GASTROSTOMY TUBE INSERTION;  Surgeon: Néstor Jerome MD;  Location:  [facility];  Service: General;  Laterality: N/A;        Social History     Socioeconomic History   • Marital status:    Tobacco Use   • Smoking status: Former     Types: Cigars     Quit date: 2019     Years since quittin.2   • Smokeless tobacco: Never   • Tobacco comments:     2 cigars a week   Vaping Use   • Vaping status: Never Used   Substance and Sexual Activity   • Alcohol use: Yes     Alcohol/week: 4.0 - 5.0 standard drinks of alcohol     Types: 4 - 5 Glasses of wine per week     Comment: 4- 5 beers every other day   • Drug use: Yes     Types: Marijuana     Comment: smoke pot; occassionally   • Sexual activity: Not Currently        History reviewed. No pertinent family history.     Review of Systems     There were no vitals filed for this visit.     Physical  Exam  Constitutional:       General: He is not in acute distress.     Appearance: Normal appearance. He is not ill-appearing.   Neurological:      Mental Status: He is alert.          Diagnoses and all orders for this visit:    1. Esophageal dysphagia (Primary)    2. Gastroesophageal reflux disease with esophagitis without hemorrhage    3. Esophageal stricture  Impressions plan of 1 gastroesophageal reflux disease: I like to get him back on an agent.  I will try Protonix.  Hopefully will have the side effects from that standpoint.  We are to repeat EGD and I will try and late both the distal esophagus and the midesophagus if possible.  Hopefully we can get some improvement.       Mario Allen MD

## 2025-04-02 ENCOUNTER — TELEPHONE (OUTPATIENT)
Dept: GASTROENTEROLOGY | Facility: CLINIC | Age: 70
End: 2025-04-02
Payer: COMMERCIAL

## 2025-04-02 RX ORDER — PANTOPRAZOLE SODIUM 40 MG/1
TABLET, DELAYED RELEASE ORAL
Qty: 90 TABLET | Refills: 3 | Status: SHIPPED | OUTPATIENT
Start: 2025-04-02

## 2025-04-02 NOTE — TELEPHONE ENCOUNTER
Hub staff attempted to follow warm transfer process and was unsuccessful     Caller: Sandro Junior    Relationship to patient: Self    Best call back number: [phone]     Patient is needing: SCRIPT CHANGED FROM [facility]ME[facility] AND NEEDS THE NEW MED CALLED IN.

## 2025-04-09 ENCOUNTER — OUTSIDE FACILITY SERVICE (OUTPATIENT)
Dept: GASTROENTEROLOGY | Facility: CLINIC | Age: 70
End: 2025-04-09
Payer: COMMERCIAL

## 2025-04-09 PROCEDURE — 43239 EGD BIOPSY SINGLE/MULTIPLE: CPT | Performed by: INTERNAL MEDICINE

## 2025-04-09 PROCEDURE — 88305 TISSUE EXAM BY PATHOLOGIST: CPT | Performed by: INTERNAL MEDICINE

## 2025-04-09 PROCEDURE — 43249 ESOPH EGD DILATION <30 MM: CPT | Performed by: INTERNAL MEDICINE

## 2025-04-10 ENCOUNTER — LAB REQUISITION (OUTPATIENT)
Dept: LAB | Facility: HOSPITAL | Age: 70
End: 2025-04-10
Payer: COMMERCIAL

## 2025-04-10 DIAGNOSIS — K29.70 GASTRITIS, UNSPECIFIED, WITHOUT BLEEDING: ICD-10-CM

## 2025-04-10 DIAGNOSIS — K22.2 ESOPHAGEAL OBSTRUCTION: ICD-10-CM

## 2025-04-10 DIAGNOSIS — R13.10 DYSPHAGIA, UNSPECIFIED: ICD-10-CM

## 2025-04-10 DIAGNOSIS — I10 ESSENTIAL HYPERTENSION: ICD-10-CM

## 2025-04-10 DIAGNOSIS — K22.89 OTHER SPECIFIED DISEASE OF ESOPHAGUS: ICD-10-CM

## 2025-04-10 DIAGNOSIS — K21.00 GASTRO-ESOPHAGEAL REFLUX DISEASE WITH ESOPHAGITIS, WITHOUT BLEEDING: ICD-10-CM

## 2025-04-10 RX ORDER — VALSARTAN 80 MG/1
80 TABLET ORAL DAILY
Qty: 30 TABLET | Refills: 0
Start: 2025-04-10 | End: 2025-04-10 | Stop reason: SDUPTHER

## 2025-04-10 RX ORDER — VALSARTAN 80 MG/1
80 TABLET ORAL DAILY
Qty: 30 TABLET | Refills: 0 | Status: SHIPPED | OUTPATIENT
Start: 2025-04-10

## 2025-04-10 NOTE — TELEPHONE ENCOUNTER
PATIENT IS COMPLETELY OUT OF HIS MEDICATION.  HE MISSED HIS LAST APPOINTMENT BUT HAS RESCHEDULED FOR 04/25 FOR HIS WELLNESS.  CAN ENOUGH VALSARTAN 80 MG BE CALLED IN TO [facility] ON [address] UNTIL THAT TIME.

## 2025-04-24 RX ORDER — VALSARTAN 80 MG/1
80 TABLET ORAL DAILY
Qty: 90 TABLET | Refills: 0 | Status: CANCELLED | OUTPATIENT
Start: 2025-04-24

## 2025-04-25 ENCOUNTER — OFFICE VISIT (OUTPATIENT)
Dept: INTERNAL MEDICINE | Facility: CLINIC | Age: 70
End: 2025-04-25
Payer: COMMERCIAL

## 2025-04-25 ENCOUNTER — LAB (OUTPATIENT)
Dept: LAB | Facility: HOSPITAL | Age: 70
End: 2025-04-25
Payer: COMMERCIAL

## 2025-04-25 VITALS
TEMPERATURE: 97.5 F | HEART RATE: 92 BPM | WEIGHT: 167.2 LBS | OXYGEN SATURATION: 99 % | BODY MASS INDEX: 22.16 KG/M2 | HEIGHT: 73 IN | SYSTOLIC BLOOD PRESSURE: 126 MMHG | DIASTOLIC BLOOD PRESSURE: 82 MMHG

## 2025-04-25 DIAGNOSIS — Z13.29 THYROID DISORDER SCREEN: ICD-10-CM

## 2025-04-25 DIAGNOSIS — Z12.11 COLON CANCER SCREENING: ICD-10-CM

## 2025-04-25 DIAGNOSIS — Z12.5 PROSTATE CANCER SCREENING: ICD-10-CM

## 2025-04-25 DIAGNOSIS — I10 ESSENTIAL HYPERTENSION: ICD-10-CM

## 2025-04-25 DIAGNOSIS — Z00.00 MEDICARE ANNUAL WELLNESS VISIT, SUBSEQUENT: Primary | ICD-10-CM

## 2025-04-25 DIAGNOSIS — Z13.220 SCREENING CHOLESTEROL LEVEL: ICD-10-CM

## 2025-04-25 PROBLEM — Z93.0 TRACHEOSTOMY PRESENT: Status: RESOLVED | Noted: 2023-05-19 | Resolved: 2025-04-25

## 2025-04-25 LAB
ALBUMIN SERPL-MCNC: 4.2 G/DL (ref 3.5–5.2)
ALBUMIN/GLOB SERPL: 1.4 G/DL
ALP SERPL-CCNC: 70 U/L (ref 39–117)
ALT SERPL W P-5'-P-CCNC: 13 U/L (ref 1–41)
ANION GAP SERPL CALCULATED.3IONS-SCNC: 12.1 MMOL/L (ref 5–15)
AST SERPL-CCNC: 34 U/L (ref 1–40)
BASOPHILS # BLD AUTO: 0.02 10*3/MM3 (ref 0–0.2)
BASOPHILS NFR BLD AUTO: 0.6 % (ref 0–1.5)
BILIRUB SERPL-MCNC: 1.1 MG/DL (ref 0–1.2)
BUN SERPL-MCNC: 10 MG/DL (ref 8–23)
BUN/CREAT SERPL: 11.8 (ref 7–25)
CALCIUM SPEC-SCNC: 8.6 MG/DL (ref 8.6–10.5)
CHLORIDE SERPL-SCNC: 99 MMOL/L (ref 98–107)
CHOLEST SERPL-MCNC: 156 MG/DL (ref 0–200)
CO2 SERPL-SCNC: 28.9 MMOL/L (ref 22–29)
CREAT SERPL-MCNC: 0.85 MG/DL (ref 0.76–1.27)
DEPRECATED RDW RBC AUTO: 57.6 FL (ref 37–54)
EGFRCR SERPLBLD CKD-EPI 2021: 94.6 ML/MIN/1.73
EOSINOPHIL # BLD AUTO: 0.1 10*3/MM3 (ref 0–0.4)
EOSINOPHIL NFR BLD AUTO: 3 % (ref 0.3–6.2)
ERYTHROCYTE [DISTWIDTH] IN BLOOD BY AUTOMATED COUNT: 14.9 % (ref 12.3–15.4)
GLOBULIN UR ELPH-MCNC: 3 GM/DL
GLUCOSE SERPL-MCNC: 83 MG/DL (ref 65–99)
HCT VFR BLD AUTO: 33.9 % (ref 37.5–51)
HDLC SERPL-MCNC: 75 MG/DL (ref 40–60)
HGB BLD-MCNC: 11 G/DL (ref 13–17.7)
IMM GRANULOCYTES # BLD AUTO: 0.01 10*3/MM3 (ref 0–0.05)
IMM GRANULOCYTES NFR BLD AUTO: 0.3 % (ref 0–0.5)
LDLC SERPL CALC-MCNC: 52 MG/DL (ref 0–100)
LDLC/HDLC SERPL: 0.6 {RATIO}
LYMPHOCYTES # BLD AUTO: 1.32 10*3/MM3 (ref 0.7–3.1)
LYMPHOCYTES NFR BLD AUTO: 39.3 % (ref 19.6–45.3)
MCH RBC QN AUTO: 34.1 PG (ref 26.6–33)
MCHC RBC AUTO-ENTMCNC: 32.4 G/DL (ref 31.5–35.7)
MCV RBC AUTO: 105 FL (ref 79–97)
MONOCYTES # BLD AUTO: 0.48 10*3/MM3 (ref 0.1–0.9)
MONOCYTES NFR BLD AUTO: 14.3 % (ref 5–12)
NEUTROPHILS NFR BLD AUTO: 1.43 10*3/MM3 (ref 1.7–7)
NEUTROPHILS NFR BLD AUTO: 42.5 % (ref 42.7–76)
NRBC BLD AUTO-RTO: 0 /100 WBC (ref 0–0.2)
PLATELET # BLD AUTO: 157 10*3/MM3 (ref 140–450)
PMV BLD AUTO: 10.7 FL (ref 6–12)
POTASSIUM SERPL-SCNC: 2.9 MMOL/L (ref 3.5–5.2)
PROT SERPL-MCNC: 7.2 G/DL (ref 6–8.5)
PSA SERPL-MCNC: 0.54 NG/ML (ref 0–4)
RBC # BLD AUTO: 3.23 10*6/MM3 (ref 4.14–5.8)
SODIUM SERPL-SCNC: 140 MMOL/L (ref 136–145)
TRIGL SERPL-MCNC: 181 MG/DL (ref 0–150)
TSH SERPL DL<=0.05 MIU/L-ACNC: 0.77 UIU/ML (ref 0.27–4.2)
VLDLC SERPL-MCNC: 29 MG/DL (ref 5–40)
WBC NRBC COR # BLD AUTO: 3.36 10*3/MM3 (ref 3.4–10.8)

## 2025-04-25 PROCEDURE — 80061 LIPID PANEL: CPT

## 2025-04-25 PROCEDURE — 80050 GENERAL HEALTH PANEL: CPT

## 2025-04-25 PROCEDURE — G0103 PSA SCREENING: HCPCS

## 2025-04-25 RX ORDER — OMEPRAZOLE 40 MG/1
40 CAPSULE, DELAYED RELEASE ORAL DAILY
COMMUNITY

## 2025-04-25 RX ORDER — VALSARTAN 80 MG/1
80 TABLET ORAL DAILY
Qty: 90 TABLET | Refills: 1 | Status: SHIPPED | OUTPATIENT
Start: 2025-04-25

## 2025-04-25 NOTE — PROGRESS NOTES
Subjective   The ABCs of the Annual Wellness Visit  Medicare Wellness Visit      Sandro Junior is a 70 y.o. patient who presents for a Medicare Wellness Visit.    The following portions of the patient's history were reviewed and   updated as appropriate: allergies, current medications, past family history, past medical history, past social history, past surgical history, and problem list.    Compared to one year ago, the patient's physical   health is the same.  Compared to one year ago, the patient's mental   health is the same.    Recent Hospitalizations:  He was not admitted to the hospital during the last year.     Current Medical Providers:  Patient Care Team:  Selin Espinal DO as PCP - General (Internal Medicine)  Claudette Pace APRN as Nurse Practitioner (Pulmonary Disease)    Outpatient Medications Prior to Visit   Medication Sig Dispense Refill    ketoconazole (NIZORAL) 2 % cream Apply  topically to the appropriate area as directed Daily. 60 g 1    latanoprost (XALATAN) 0.005 % ophthalmic solution Administer 1 drop to both eyes Daily.  0    omeprazole (priLOSEC) 40 MG capsule Take 1 capsule by mouth Daily.      ondansetron (Zofran) 4 MG tablet Take 1 tablet by mouth Every 8 (Eight) Hours As Needed for Nausea or Vomiting. 12 tablet 0    oxyCODONE-acetaminophen (Percocet) 5-325 MG per tablet Take 1 tablet by mouth Every 4 (Four) Hours As Needed for Moderate Pain. 12 tablet 0    pantoprazole (Protonix) 40 MG EC tablet Take 1 tablet by mouth 30 minutes before breakfast daily. 90 tablet 3    sildenafil (VIAGRA) 50 MG tablet Administer 1 tablet per G tube Daily As Needed for Erectile Dysfunction.      valsartan (DIOVAN) 80 MG tablet Take 1 tablet by mouth Daily. 30 tablet 0    docusate sodium (Colace) 100 MG capsule Take 1 capsule by mouth 2 (Two) Times a Day. (Patient not taking: Reported on 11/21/2024) 30 capsule 1     No facility-administered medications prior to visit.     Opioid medication/s  "are on active medication list.  and I have evaluated his active treatment plan and pain score trends (see table).  Vitals:    04/25/25 0814   PainSc: 9    PainLoc: Generalized  Comment: both knees, back     I have reviewed the chart for potential of high risk medication and harmful drug interactions in the elderly.        Aspirin is not on active medication list.  Aspirin use is not indicated based on review of current medical condition/s. Risk of harm outweighs potential benefits.  .    Patient Active Problem List   Diagnosis    Essential hypertension    Chronic pain    Osteoarthritis of knee    Prolonged depressive adjustment reaction    Acute prostatitis    Atopic rhinitis    Erectile dysfunction of nonorganic origin    Glaucoma suspect    Onychomycosis of toenail    Malignant neoplasm of rectum    Seborrheic eczema    Keloid scar    Pain of right lower extremity    Esophageal reflux    Dysphagia     Advance Care Planning Advance Directive is not on file.  ACP discussion was held with the patient during this visit. Patient does not have an advance directive, declines further assistance.            Objective   Vitals:    04/25/25 0814   BP: 126/82   BP Location: Left arm   Patient Position: Sitting   Cuff Size: Adult   Pulse: 92   Temp: 97.5 °F (36.4 °C)   TempSrc: Temporal   SpO2: 99%   Weight: 75.8 kg (167 lb 3.2 oz)   Height: 185 cm (72.84\")   PainSc: 9    PainLoc: Generalized  Comment: both knees, back       Estimated body mass index is 22.16 kg/m² as calculated from the following:    Height as of this encounter: 185 cm (72.84\").    Weight as of this encounter: 75.8 kg (167 lb 3.2 oz).    BMI is within normal parameters. No other follow-up for BMI required.           Does the patient have evidence of cognitive impairment? No                                                                                                Health  Risk Assessment    Smoking Status:  Social History     Tobacco Use   Smoking Status " Former    Types: Cigars    Quit date: 2019    Years since quittin.3   Smokeless Tobacco Never   Tobacco Comments    2 cigars a week     Alcohol Consumption:  Social History     Substance and Sexual Activity   Alcohol Use Yes    Alcohol/week: 4.0 - 5.0 standard drinks of alcohol    Types: 4 - 5 Glasses of wine per week    Comment: 4- 5 beers every other day       Fall Risk Screen  HÉCTOR Fall Risk Assessment was completed, and patient is at LOW risk for falls.Assessment completed on:2025    Depression Screening   Little interest or pleasure in doing things? Not at all   Feeling down, depressed, or hopeless? Not at all   PHQ-2 Total Score 0      Health Habits and Functional and Cognitive Screenin/25/2025     8:18 AM   Functional & Cognitive Status   Do you have difficulty preparing food and eating? No   Do you have difficulty bathing yourself, getting dressed or grooming yourself? No   Do you have difficulty using the toilet? No   Do you have difficulty moving around from place to place? No   Do you have trouble with steps or getting out of a bed or a chair? No   Current Diet Low Fat Diet   Dental Exam Up to date   Eye Exam Up to date   Exercise (times per week) 0 times per week   Current Exercises Include No Regular Exercise   Do you need help using the phone?  No   Are you deaf or do you have serious difficulty hearing?  No   Do you need help to go to places out of walking distance? No   Do you need help shopping? No   Do you need help preparing meals?  No   Do you need help with housework?  No   Do you need help with laundry? No   Do you need help taking your medications? No   Do you need help managing money? No   Do you ever drive or ride in a car without wearing a seat belt? No   Have you felt unusual stress, anger or loneliness in the last month? No   Who do you live with? Spouse   If you need help, do you have trouble finding someone available to you? No   Have you been bothered in the  last four weeks by sexual problems? Yes   Do you have difficulty concentrating, remembering or making decisions? No           Age-appropriate Screening Schedule:  Refer to the list below for future screening recommendations based on patient's age, sex and/or medical conditions. Orders for these recommended tests are listed in the plan section. The patient has been provided with a written plan.    Health Maintenance List  Health Maintenance   Topic Date Due    Pneumococcal Vaccine 50+ (2 of 2 - PCV) 10/22/2025 (Originally 4/5/2023)    ZOSTER VACCINE (2 of 2) 10/22/2025 (Originally 2/3/2024)    COVID-19 Vaccine (6 - 2024-25 season) 04/20/2026 (Originally 9/1/2024)    INFLUENZA VACCINE  07/01/2025    ANNUAL WELLNESS VISIT  04/25/2026    TDAP/TD VACCINES (2 - Td or Tdap) 04/26/2028    HEPATITIS C SCREENING  Completed    AAA SCREEN ONCE  Completed    COLONOSCOPY  Discontinued                                                                                                                                                CMS Preventative Services Quick Reference  Risk Factors Identified During Encounter  None Identified    The above risks/problems have been discussed with the patient.  Pertinent information has been shared with the patient in the After Visit Summary.  An After Visit Summary and PPPS were made available to the patient.    Follow Up:   Next Medicare Wellness visit to be scheduled in 1 year.         Additional E&M Note during same encounter follows:  Patient has additional, significant, and separately identifiable condition(s)/problem(s) that require work above and beyond the Medicare Wellness Visit     Chief Complaint  Medicare Wellness-subsequent and Hypertension    Subjective   HPI  HTN is controlled with Diovan. No CP or SOA.     Review of Systems   Constitutional:  Negative for fatigue and fever.   Respiratory:  Negative for cough and shortness of breath.    Cardiovascular:  Negative for chest pain and leg  "swelling.   Gastrointestinal:  Negative for abdominal pain, diarrhea and nausea.   Neurological:  Negative for weakness and confusion.              Objective   Vital Signs:  /82 (BP Location: Left arm, Patient Position: Sitting, Cuff Size: Adult)   Pulse 92   Temp 97.5 °F (36.4 °C) (Temporal)   Ht 185 cm (72.84\")   Wt 75.8 kg (167 lb 3.2 oz)   SpO2 99%   BMI 22.16 kg/m²   Physical Exam  Vitals reviewed.   Cardiovascular:      Rate and Rhythm: Normal rate and regular rhythm.   Pulmonary:      Effort: No respiratory distress.      Breath sounds: No wheezing.   Musculoskeletal:      Right lower leg: No edema.      Left lower leg: No edema.   Neurological:      Mental Status: He is alert.                    Assessment and Plan      Medicare annual wellness visit, subsequent  Done today       Essential hypertension  Continue Diovan 80 mg po qd    Orders:    CBC & Differential; Future    Comprehensive Metabolic Panel; Future    valsartan (DIOVAN) 80 MG tablet; Take 1 tablet by mouth Daily.    Screening cholesterol level    Orders:    Lipid Panel; Future    Thyroid disorder screen    Orders:    TSH; Future    Prostate cancer screening    Orders:    PSA Screen; Future    Colon cancer screening    Orders:    Ambulatory Referral For Screening Colonoscopy            Follow Up   No follow-ups on file.  Patient was given instructions and counseling regarding his condition or for health maintenance advice. Please see specific information pulled into the AVS if appropriate.    "

## 2025-04-25 NOTE — ASSESSMENT & PLAN NOTE
Continue Diovan 80 mg po qd    Orders:    CBC & Differential; Future    Comprehensive Metabolic Panel; Future    valsartan (DIOVAN) 80 MG tablet; Take 1 tablet by mouth Daily.

## 2025-04-28 ENCOUNTER — RESULTS FOLLOW-UP (OUTPATIENT)
Dept: LAB | Facility: HOSPITAL | Age: 70
End: 2025-04-28
Payer: COMMERCIAL

## 2025-04-28 RX ORDER — POTASSIUM CHLORIDE 750 MG/1
TABLET, EXTENDED RELEASE ORAL
Qty: 14 TABLET | Refills: 0 | Status: SHIPPED | OUTPATIENT
Start: 2025-04-28 | End: 2025-05-05 | Stop reason: SDUPTHER

## 2025-04-28 NOTE — TELEPHONE ENCOUNTER
If this child is vomiting and unable to keep anything down, should be seen in ER  Patient returned phone call, and wants to know if there is something of concern that Dr Espinal is wanting to see him so soon for.     Please call patient back at [phone]

## 2025-04-28 NOTE — TELEPHONE ENCOUNTER
Returned call and relayed result note  Scheduled patient to be seen Friday   Patient appreciative and stated will  prescription when back in town

## 2025-05-01 RX ORDER — SILDENAFIL 50 MG/1
50 TABLET, FILM COATED ORAL DAILY PRN
Status: CANCELLED
Start: 2025-05-01

## 2025-05-02 ENCOUNTER — OFFICE VISIT (OUTPATIENT)
Dept: INTERNAL MEDICINE | Facility: CLINIC | Age: 70
End: 2025-05-02
Payer: COMMERCIAL

## 2025-05-02 ENCOUNTER — LAB (OUTPATIENT)
Dept: LAB | Facility: HOSPITAL | Age: 70
End: 2025-05-02
Payer: COMMERCIAL

## 2025-05-02 VITALS
HEART RATE: 98 BPM | SYSTOLIC BLOOD PRESSURE: 150 MMHG | DIASTOLIC BLOOD PRESSURE: 90 MMHG | HEIGHT: 73 IN | BODY MASS INDEX: 22.03 KG/M2 | WEIGHT: 166.2 LBS | TEMPERATURE: 98 F | OXYGEN SATURATION: 98 %

## 2025-05-02 DIAGNOSIS — E87.6 HYPOKALEMIA: Primary | ICD-10-CM

## 2025-05-02 DIAGNOSIS — R79.89 ABNORMAL CBC: ICD-10-CM

## 2025-05-02 DIAGNOSIS — E87.6 HYPOKALEMIA: ICD-10-CM

## 2025-05-02 DIAGNOSIS — I10 ESSENTIAL HYPERTENSION: ICD-10-CM

## 2025-05-02 LAB
ALBUMIN SERPL-MCNC: 4.3 G/DL (ref 3.5–5.2)
ALBUMIN/GLOB SERPL: 1.4 G/DL
ALP SERPL-CCNC: 79 U/L (ref 39–117)
ALT SERPL W P-5'-P-CCNC: 14 U/L (ref 1–41)
ANION GAP SERPL CALCULATED.3IONS-SCNC: 12 MMOL/L (ref 5–15)
AST SERPL-CCNC: 27 U/L (ref 1–40)
BASOPHILS # BLD AUTO: 0.02 10*3/MM3 (ref 0–0.2)
BASOPHILS NFR BLD AUTO: 0.5 % (ref 0–1.5)
BILIRUB SERPL-MCNC: 1 MG/DL (ref 0–1.2)
BUN SERPL-MCNC: 6 MG/DL (ref 8–23)
BUN/CREAT SERPL: 8.5 (ref 7–25)
CALCIUM SPEC-SCNC: 8.7 MG/DL (ref 8.6–10.5)
CHLORIDE SERPL-SCNC: 100 MMOL/L (ref 98–107)
CO2 SERPL-SCNC: 30 MMOL/L (ref 22–29)
CREAT SERPL-MCNC: 0.71 MG/DL (ref 0.76–1.27)
DEPRECATED RDW RBC AUTO: 54.8 FL (ref 37–54)
EGFRCR SERPLBLD CKD-EPI 2021: 99.9 ML/MIN/1.73
EOSINOPHIL # BLD AUTO: 0.09 10*3/MM3 (ref 0–0.4)
EOSINOPHIL NFR BLD AUTO: 2.1 % (ref 0.3–6.2)
ERYTHROCYTE [DISTWIDTH] IN BLOOD BY AUTOMATED COUNT: 14.8 % (ref 12.3–15.4)
GLOBULIN UR ELPH-MCNC: 3.1 GM/DL
GLUCOSE SERPL-MCNC: 109 MG/DL (ref 65–99)
HCT VFR BLD AUTO: 33.3 % (ref 37.5–51)
HGB BLD-MCNC: 11.3 G/DL (ref 13–17.7)
IMM GRANULOCYTES # BLD AUTO: 0.01 10*3/MM3 (ref 0–0.05)
IMM GRANULOCYTES NFR BLD AUTO: 0.2 % (ref 0–0.5)
LYMPHOCYTES # BLD AUTO: 1.12 10*3/MM3 (ref 0.7–3.1)
LYMPHOCYTES NFR BLD AUTO: 25.9 % (ref 19.6–45.3)
MAGNESIUM SERPL-MCNC: 1.3 MG/DL (ref 1.6–2.4)
MCH RBC QN AUTO: 34.8 PG (ref 26.6–33)
MCHC RBC AUTO-ENTMCNC: 33.9 G/DL (ref 31.5–35.7)
MCV RBC AUTO: 102.5 FL (ref 79–97)
MONOCYTES # BLD AUTO: 0.6 10*3/MM3 (ref 0.1–0.9)
MONOCYTES NFR BLD AUTO: 13.9 % (ref 5–12)
NEUTROPHILS NFR BLD AUTO: 2.48 10*3/MM3 (ref 1.7–7)
NEUTROPHILS NFR BLD AUTO: 57.4 % (ref 42.7–76)
NRBC BLD AUTO-RTO: 0 /100 WBC (ref 0–0.2)
PATHOLOGY REVIEW: YES
PLATELET # BLD AUTO: 166 10*3/MM3 (ref 140–450)
PMV BLD AUTO: 10.5 FL (ref 6–12)
POTASSIUM SERPL-SCNC: 3 MMOL/L (ref 3.5–5.2)
PROT SERPL-MCNC: 7.4 G/DL (ref 6–8.5)
RBC # BLD AUTO: 3.25 10*6/MM3 (ref 4.14–5.8)
SODIUM SERPL-SCNC: 142 MMOL/L (ref 136–145)
WBC NRBC COR # BLD AUTO: 4.32 10*3/MM3 (ref 3.4–10.8)

## 2025-05-02 PROCEDURE — 85025 COMPLETE CBC W/AUTO DIFF WBC: CPT

## 2025-05-02 PROCEDURE — 99214 OFFICE O/P EST MOD 30 MIN: CPT | Performed by: INTERNAL MEDICINE

## 2025-05-02 PROCEDURE — 83735 ASSAY OF MAGNESIUM: CPT

## 2025-05-02 PROCEDURE — 80053 COMPREHEN METABOLIC PANEL: CPT

## 2025-05-02 RX ORDER — VALSARTAN 160 MG/1
160 TABLET ORAL DAILY
Qty: 90 TABLET | Refills: 1 | Status: SHIPPED | OUTPATIENT
Start: 2025-05-02

## 2025-05-02 NOTE — PROGRESS NOTES
"Subjective   Sandro Junior is a 70 y.o. male.   Chief Complaint   Patient presents with    Hypertension    Abnormal Lab       History of Present Illness   Here for f/u on low potassium and low white count. No night sweats. No fever. No vomiting.  BP elevated today. On Diovan. No CP or SOA. No HA. No weakness.   The following portions of the patient's history were reviewed and updated as appropriate: allergies, current medications, past family history, past medical history, past social history, past surgical history, and problem list.    Review of Systems   Constitutional:  Negative for fatigue and fever.   Respiratory:  Negative for cough and shortness of breath.    Cardiovascular:  Negative for chest pain and leg swelling.   Psychiatric/Behavioral:  Negative for confusion.      /90   Pulse 98   Temp 98 °F (36.7 °C) (Temporal)   Ht 185 cm (72.84\")   Wt 75.4 kg (166 lb 3.2 oz)   SpO2 98%   BMI 22.03 kg/m²     Objective   Physical Exam  Vitals reviewed.   Cardiovascular:      Rate and Rhythm: Normal rate and regular rhythm.   Pulmonary:      Effort: No respiratory distress.      Breath sounds: No wheezing.   Neurological:      Mental Status: He is alert and oriented to person, place, and time.         Assessment & Plan   Diagnoses and all orders for this visit:    1. Hypokalemia (Primary)  -     Comprehensive Metabolic Panel; Future  -     Magnesium; Future  Had 5 days of potassium replacement  2. Essential hypertension  -     valsartan (DIOVAN) 160 MG tablet; Take 1 tablet by mouth Daily.  Dispense: 90 tablet; Refill: 1  Increased diovan from 80 mg to 160 mg po qd. F/u in 3 weeks. He has BP cuff and will monitor BP daily.Call if BP > 150 or DBP > 90  3. Abnormal CBC  -     CBC & Differential; Future  -     Peripheral Blood Smear; Future                 "

## 2025-05-05 ENCOUNTER — RESULTS FOLLOW-UP (OUTPATIENT)
Dept: LAB | Facility: HOSPITAL | Age: 70
End: 2025-05-05
Payer: COMMERCIAL

## 2025-05-05 LAB
LAB AP CASE REPORT: NORMAL
PATH REPORT.FINAL DX SPEC: NORMAL

## 2025-05-05 RX ORDER — MAGNESIUM OXIDE 400 MG/1
400 TABLET ORAL DAILY
Qty: 28 TABLET | Refills: 0 | Status: SHIPPED | OUTPATIENT
Start: 2025-05-05

## 2025-05-05 RX ORDER — POTASSIUM CHLORIDE 750 MG/1
TABLET, EXTENDED RELEASE ORAL
Qty: 28 TABLET | Refills: 0 | Status: SHIPPED | OUTPATIENT
Start: 2025-05-05

## 2025-05-20 RX ORDER — KETOCONAZOLE 20 MG/G
CREAM TOPICAL DAILY
Qty: 60 G | Refills: 1 | Status: CANCELLED | OUTPATIENT
Start: 2025-05-20

## 2025-05-23 ENCOUNTER — LAB (OUTPATIENT)
Dept: LAB | Facility: HOSPITAL | Age: 70
End: 2025-05-23
Payer: COMMERCIAL

## 2025-05-23 ENCOUNTER — OFFICE VISIT (OUTPATIENT)
Dept: INTERNAL MEDICINE | Facility: CLINIC | Age: 70
End: 2025-05-23
Payer: COMMERCIAL

## 2025-05-23 ENCOUNTER — TELEPHONE (OUTPATIENT)
Dept: INTERNAL MEDICINE | Facility: CLINIC | Age: 70
End: 2025-05-23

## 2025-05-23 VITALS
OXYGEN SATURATION: 99 % | HEART RATE: 90 BPM | TEMPERATURE: 98.3 F | DIASTOLIC BLOOD PRESSURE: 85 MMHG | WEIGHT: 168.2 LBS | SYSTOLIC BLOOD PRESSURE: 148 MMHG | BODY MASS INDEX: 22.29 KG/M2 | HEIGHT: 73 IN

## 2025-05-23 DIAGNOSIS — E87.6 HYPOKALEMIA: ICD-10-CM

## 2025-05-23 DIAGNOSIS — I10 ESSENTIAL HYPERTENSION: Primary | ICD-10-CM

## 2025-05-23 DIAGNOSIS — N52.9 ERECTILE DYSFUNCTION, UNSPECIFIED ERECTILE DYSFUNCTION TYPE: ICD-10-CM

## 2025-05-23 LAB
ALBUMIN SERPL-MCNC: 4.5 G/DL (ref 3.5–5.2)
ALBUMIN/GLOB SERPL: 1.3 G/DL
ALP SERPL-CCNC: 85 U/L (ref 39–117)
ALT SERPL W P-5'-P-CCNC: 17 U/L (ref 1–41)
ANION GAP SERPL CALCULATED.3IONS-SCNC: 11.1 MMOL/L (ref 5–15)
AST SERPL-CCNC: 28 U/L (ref 1–40)
BILIRUB SERPL-MCNC: 1.3 MG/DL (ref 0–1.2)
BUN SERPL-MCNC: 6 MG/DL (ref 8–23)
BUN/CREAT SERPL: 7.8 (ref 7–25)
CALCIUM SPEC-SCNC: 9.2 MG/DL (ref 8.6–10.5)
CHLORIDE SERPL-SCNC: 98 MMOL/L (ref 98–107)
CO2 SERPL-SCNC: 28.9 MMOL/L (ref 22–29)
CREAT SERPL-MCNC: 0.77 MG/DL (ref 0.76–1.27)
EGFRCR SERPLBLD CKD-EPI 2021: 97.5 ML/MIN/1.73
GLOBULIN UR ELPH-MCNC: 3.5 GM/DL
GLUCOSE SERPL-MCNC: 112 MG/DL (ref 65–99)
MAGNESIUM SERPL-MCNC: 1.3 MG/DL (ref 1.6–2.4)
POTASSIUM SERPL-SCNC: 3.7 MMOL/L (ref 3.5–5.2)
PROT SERPL-MCNC: 8 G/DL (ref 6–8.5)
SODIUM SERPL-SCNC: 138 MMOL/L (ref 136–145)

## 2025-05-23 PROCEDURE — 80053 COMPREHEN METABOLIC PANEL: CPT

## 2025-05-23 PROCEDURE — 83735 ASSAY OF MAGNESIUM: CPT

## 2025-05-23 PROCEDURE — 99214 OFFICE O/P EST MOD 30 MIN: CPT | Performed by: INTERNAL MEDICINE

## 2025-05-23 RX ORDER — VALSARTAN 320 MG/1
320 TABLET ORAL DAILY
Qty: 30 TABLET | Refills: 2 | Status: SHIPPED | OUTPATIENT
Start: 2025-05-23

## 2025-05-23 RX ORDER — POTASSIUM CHLORIDE 750 MG/1
10 TABLET, EXTENDED RELEASE ORAL 2 TIMES DAILY
Qty: 14 TABLET | Refills: 0 | Status: SHIPPED | OUTPATIENT
Start: 2025-05-23

## 2025-05-23 RX ORDER — VALSARTAN 160 MG/1
160 TABLET ORAL DAILY
Qty: 90 TABLET | Refills: 0 | Status: SHIPPED | OUTPATIENT
Start: 2025-05-23 | End: 2025-05-23

## 2025-05-23 RX ORDER — SILDENAFIL 50 MG/1
50 TABLET, FILM COATED ORAL DAILY PRN
Start: 2025-05-23

## 2025-05-23 RX ORDER — MAGNESIUM OXIDE 400 MG/1
400 TABLET ORAL DAILY
Qty: 90 TABLET | Refills: 0 | Status: SHIPPED | OUTPATIENT
Start: 2025-05-23

## 2025-05-23 RX ORDER — POTASSIUM CHLORIDE 750 MG/1
10 TABLET, EXTENDED RELEASE ORAL 2 TIMES DAILY
Qty: 180 TABLET | Refills: 0 | Status: SHIPPED | OUTPATIENT
Start: 2025-05-23 | End: 2025-05-23 | Stop reason: SDUPTHER

## 2025-05-23 NOTE — TELEPHONE ENCOUNTER
Received fax from pharmacy needing clarification on Potassium script.    Pharmacy comment: is this a maintenance med or acute? Signature says 7 days, written quantity has enough for 90 days    Please advise

## 2025-05-23 NOTE — PROGRESS NOTES
" Subjective   The ABCs of the Annual Wellness Visit  Medicare Wellness Visit      Sandro Junior is a 70 y.o. patient who presents for a Medicare Wellness Visit.    The following portions of the patient's history were reviewed and   updated as appropriate: {history reviewed:20406::\"allergies\",\"current medications\",\"past family history\",\"past medical history\",\"past social history\",\"past surgical history\",\"problem list\"}.    Compared to one year ago, the patient's physical   health is {better worse same:33481}.  Compared to one year ago, the patient's mental   health is {better worse same:62638}.    Recent Hospitalizations:  {Hospital Admission Status in the last 365 days:48285}    Current Medical Providers:  Patient Care Team:  Selin Espinal DO as PCP - General (Internal Medicine)  Claudette Pace APRN as Nurse Practitioner (Pulmonary Disease)    Outpatient Medications Prior to Visit   Medication Sig Dispense Refill    ketoconazole (NIZORAL) 2 % cream Apply  topically to the appropriate area as directed Daily. 60 g 1    latanoprost (XALATAN) 0.005 % ophthalmic solution Administer 1 drop to both eyes Daily.  0    ondansetron (Zofran) 4 MG tablet Take 1 tablet by mouth Every 8 (Eight) Hours As Needed for Nausea or Vomiting. 12 tablet 0    oxyCODONE-acetaminophen (Percocet) 5-325 MG per tablet Take 1 tablet by mouth Every 4 (Four) Hours As Needed for Moderate Pain. 12 tablet 0    pantoprazole (Protonix) 40 MG EC tablet Take 1 tablet by mouth 30 minutes before breakfast daily. 90 tablet 3    potassium chloride 10 MEQ CR tablet Po bid  x 7 days 28 tablet 0    sildenafil (VIAGRA) 50 MG tablet Administer 1 tablet per G tube Daily As Needed for Erectile Dysfunction.      valsartan (DIOVAN) 160 MG tablet Take 1 tablet by mouth Daily. 90 tablet 1    Vonoprazan Fumarate (VOQUEZNA PO) Take  by mouth.      magnesium oxide (MAG-OX) 400 MG tablet Take 1 tablet by mouth Daily. (Patient not taking: Reported on 5/23/2025) " "28 tablet 0     No facility-administered medications prior to visit.     Opioid medication/s are on active medication list.  and I have evaluated his active treatment plan and pain score trends (see table).  Vitals:    05/23/25 0720   PainSc: 2    PainLoc: Knee     I have reviewed the chart for potential of high risk medication and harmful drug interactions in the elderly.        Aspirin is not on active medication list.  {ASPIRIN NOT ON MEDICATION LIST INDICATED/NOT INDICATED:18013}.    Patient Active Problem List   Diagnosis    Essential hypertension    Chronic pain    Osteoarthritis of knee    Prolonged depressive adjustment reaction    Acute prostatitis    Atopic rhinitis    Erectile dysfunction of nonorganic origin    Glaucoma suspect    Onychomycosis of toenail    Malignant neoplasm of rectum    Seborrheic eczema    Keloid scar    Pain of right lower extremity    Esophageal reflux    Dysphagia     Advance Care Planning {Advance Care Planning Hyperlink:23}Advance Directive is not on file.  {ACP Discussion, Advance Directive not in EMR:25445}            Objective   Vitals:    05/23/25 0720   Pulse: 90   Temp: 98.3 °F (36.8 °C)   TempSrc: Temporal   SpO2: 99%   Weight: 76.3 kg (168 lb 3.2 oz)   Height: 185 cm (72.84\")   PainSc: 2    PainLoc: Knee       Estimated body mass index is 22.29 kg/m² as calculated from the following:    Height as of this encounter: 185 cm (72.84\").    Weight as of this encounter: 76.3 kg (168 lb 3.2 oz).    BMI is within normal parameters. No other follow-up for BMI required.    {Help Text;  If you change Medicare Wellness reason for visit to a Welcome to Medicare reason for visit after the encounter has started, please add SmartPhrase .GAITBALANCE to your note for proper gait and balance documentation. This text will disappear after the note is signed:40894}       Does the patient have evidence of cognitive impairment? {Yes/No:04745}  Lab Results   Component Value Date    TRIG 181 (H) " 2025    HDL 75 (H) 2025    LDL 52 2025    VLDL 29 2025                                                                                                Health  Risk Assessment    Smoking Status:  Social History     Tobacco Use   Smoking Status Former    Types: Cigars    Quit date: 2019    Years since quittin.3   Smokeless Tobacco Never   Tobacco Comments    2 cigars a week     Alcohol Consumption:  Social History     Substance and Sexual Activity   Alcohol Use Yes    Alcohol/week: 4.0 - 5.0 standard drinks of alcohol    Types: 4 - 5 Glasses of wine per week    Comment: 4- 5 beers every other day       Fall Risk Screen{Jump to Steadi Fall Risk Flowsheet:23}  STEADI Fall Risk Assessment was completed, and patient is at LOW risk for falls.Assessment completed on:2025    Depression Screening{Jump to PHQ SmartForm:23}   The PHQ has not been completed during this encounter.     Health Habits and Functional and Cognitive Screenin/25/2025     8:18 AM   Functional & Cognitive Status   Do you have difficulty preparing food and eating? No   Do you have difficulty bathing yourself, getting dressed or grooming yourself? No   Do you have difficulty using the toilet? No   Do you have difficulty moving around from place to place? No   Do you have trouble with steps or getting out of a bed or a chair? No   Current Diet Low Fat Diet   Dental Exam Up to date   Eye Exam Up to date   Exercise (times per week) 0 times per week   Current Exercises Include No Regular Exercise   Do you need help using the phone?  No   Are you deaf or do you have serious difficulty hearing?  No   Do you need help to go to places out of walking distance? No   Do you need help shopping? No   Do you need help preparing meals?  No   Do you need help with housework?  No   Do you need help with laundry? No   Do you need help taking your medications? No   Do you need help managing money? No   Do you ever drive or ride in  a car without wearing a seat belt? No   Have you felt unusual stress, anger or loneliness in the last month? No   Who do you live with? Spouse   If you need help, do you have trouble finding someone available to you? No   Have you been bothered in the last four weeks by sexual problems? Yes   Do you have difficulty concentrating, remembering or making decisions? No           Age-appropriate Screening Schedule:  Refer to the list below for future screening recommendations based on patient's age, sex and/or medical conditions. Orders for these recommended tests are listed in the plan section. The patient has been provided with a written plan.    Health Maintenance List  Health Maintenance   Topic Date Due    Pneumococcal Vaccine 50+ (2 of 2 - PCV) 10/22/2025 (Originally 4/5/2023)    ZOSTER VACCINE (2 of 2) 10/22/2025 (Originally 2/3/2024)    COVID-19 Vaccine (6 - 2024-25 season) 04/20/2026 (Originally 9/1/2024)    INFLUENZA VACCINE  07/01/2025    ANNUAL WELLNESS VISIT  04/25/2026    TDAP/TD VACCINES (2 - Td or Tdap) 04/26/2028    HEPATITIS C SCREENING  Completed    AAA SCREEN ONCE  Completed    COLONOSCOPY  Discontinued                                                                                                                                                CMS Preventative Services Quick Reference  Risk Factors Identified During Encounter  {Medicare Wellness Risk Factors:95374}    The above risks/problems have been discussed with the patient.  Pertinent information has been shared with the patient in the After Visit Summary.  An After Visit Summary and PPPS were made available to the patient.    Follow Up:{Wrapup  Review (Popup)  Advance Care Planning  Labs  CC  Problem List  Visit Diagnosis  Medications  Result Review  Imaging  [facility] Maintenance  Quality  BestPractice  SmartSets  SnapShot  Encounters  Notes  Media  Procedures :23}   Next Medicare Wellness visit to be scheduled in 1 year.    "      Additional E&M Note during same encounter follows:  Patient has additional, significant, and separately identifiable condition(s)/problem(s) that require work above and beyond the Medicare Wellness Visit     Chief Complaint  Medicare Wellness-subsequent, Hypertension, and Heartburn    Subjective {Problem List  Visit Diagnosis   Encounters  Notes  Medications  Labs  Result Review Imaging  Media :23}{Help Text;  If performing a Preventative Medicine Visit (e.g. 41327) in addition to AWV, choose the 1st SmartList option below and document any ROS/PE performed.  If performing a separately identifiable E/M service (e.g. 70924), choose 2nd SmartLink option below. This information in red will not appear in the final note after note is signed:83965}  HPI  HTN and GERD. HTN controlled with diovan. GERD controlled with Protonix.     Review of Systems   Constitutional:  Negative for diaphoresis and fatigue.   HENT:  Negative for sinus pressure and sneezing.    Respiratory:  Negative for cough and shortness of breath.    Gastrointestinal:  Negative for abdominal pain.   Musculoskeletal:  Negative for back pain.   Neurological:  Negative for weakness and confusion.              Objective   Vital Signs:  Pulse 90   Temp 98.3 °F (36.8 °C) (Temporal)   Ht 185 cm (72.84\")   Wt 76.3 kg (168 lb 3.2 oz)   SpO2 99%   BMI 22.29 kg/m²   Physical Exam  Vitals reviewed.   Cardiovascular:      Rate and Rhythm: Normal rate and regular rhythm.   Pulmonary:      Effort: No respiratory distress.      Breath sounds: No wheezing.   Neurological:      General: No focal deficit present.      Mental Status: He is alert and oriented to person, place, and time.   Psychiatric:         Behavior: Behavior normal.         {The following data was reviewed by (Optional):56163}  {Data reviewed (Optional):04800:::1}  {Ambulatory Labs (Optional):14891}       Assessment and Plan {CC Problem List  Visit Diagnosis   ROS  Review (Popup)  " Health Maintenance  Quality  BestPractice  Medications  SmartSets  SnapShot Encounters  Media :23}{Help Text; When performing an adult Preventative Medicine Visit (e.g. 78765) using the optional SmartList below can help when documenting any age-appropriate advice given.  When using the SmartList review and update the information based on the unique discussion or advice given to the patient.  As a reminder, diagnosis code Z00.00 (nl exam) or Z00.01 (abnl exam), should be added when this service is performed.  Text will disappear once the note is signed:96087}{Preventative Physical Additional Health Advice List (Optional):6615780510}     Medicare annual wellness visit, subsequent         Essential hypertension  {Hypertension is (optional):1679860753}         Erectile dysfunction, unspecified erectile dysfunction type               {Time Spent (Optional):34436}  Follow Up {Instructions Charge Capture  Follow-up Communications :23}  No follow-ups on file.  Patient was given instructions and counseling regarding his condition or for health maintenance advice. Please see specific information pulled into the AVS if appropriate.

## 2025-05-23 NOTE — PROGRESS NOTES
"Subjective   Sandro Junior is a 70 y.o. male.   Chief Complaint   Patient presents with    Hypertension    Heartburn       History of Present Illness   Here for f/u on HTN and GERd. HTN not controlled with current dose of diovan. No CP or SOA. No HA. No weakness. Potassium and mg were low. Took replacement and labs need rechecked.   The following portions of the patient's history were reviewed and updated as appropriate: allergies, current medications, past family history, past medical history, past social history, past surgical history, and problem list.    Review of Systems   Constitutional:  Negative for fatigue and fever.   Respiratory:  Negative for cough and shortness of breath.    Cardiovascular:  Negative for chest pain and leg swelling.   Gastrointestinal:  Negative for abdominal pain.   Neurological:  Negative for weakness, numbness and headaches.     /85 (BP Location: Left arm, Patient Position: Sitting, Cuff Size: Adult)   Pulse 90   Temp 98.3 °F (36.8 °C) (Temporal)   Ht 185 cm (72.84\")   Wt 76.3 kg (168 lb 3.2 oz)   SpO2 99%   BMI 22.29 kg/m²     Objective   Physical Exam  Vitals reviewed.   Cardiovascular:      Rate and Rhythm: Normal rate and regular rhythm.   Pulmonary:      Effort: No respiratory distress.      Breath sounds: No wheezing.   Neurological:      Mental Status: He is alert and oriented to person, place, and time.         Assessment & Plan   Diagnoses and all orders for this visit:    1. Essential hypertension (Primary)  -     Discontinue: valsartan (DIOVAN) 160 MG tablet; Take 1 tablet by mouth Daily.  Dispense: 90 tablet; Refill: 0  -     valsartan (DIOVAN) 320 MG tablet; Take 1 tablet by mouth Daily.  Dispense: 30 tablet; Refill: 2  Increase Diovan from 160 to 320 mg po qd. 3-4 week f/u  2. Erectile dysfunction, unspecified erectile dysfunction type  Comments:  Check testosterone level. Trial of viagra as ordered.  Orders:  -     sildenafil (VIAGRA) 50 MG tablet; " Administer 1 tablet per G tube Daily As Needed for Erectile Dysfunction (Administer 1 tablet per G tube Daily As Needed for Erectile Dysfunction. Strength: 50 MG).    3. Hypokalemia  -     Comprehensive Metabolic Panel; Future  -     Magnesium; Future  -     Potassium With Creatinine, Random Urine - Urine, Clean Catch; Future

## 2025-05-27 ENCOUNTER — RESULTS FOLLOW-UP (OUTPATIENT)
Dept: INTERNAL MEDICINE | Facility: CLINIC | Age: 70
End: 2025-05-27
Payer: COMMERCIAL

## 2025-05-27 NOTE — TELEPHONE ENCOUNTER
Name: Sandro Junior      Relationship: Self      Best Callback Number: [phone]       HUB PROVIDED THE RELAY MESSAGE FROM THE OFFICE      PATIENT: VOICED UNDERSTANDING AND HAS NO FURTHER QUESTIONS AT THIS TIME    ADDITIONAL INFORMATION:

## 2025-06-19 RX ORDER — SODIUM, POTASSIUM,MAG SULFATES 17.5-3.13G
SOLUTION, RECONSTITUTED, ORAL ORAL
Qty: 354 ML | Refills: 0 | Status: SHIPPED | OUTPATIENT
Start: 2025-06-19

## 2025-06-23 DIAGNOSIS — D53.9 MACROCYTIC ANEMIA: Primary | ICD-10-CM

## 2025-06-23 DIAGNOSIS — E83.42 HYPOMAGNESEMIA: ICD-10-CM

## 2025-06-26 ENCOUNTER — OUTSIDE FACILITY SERVICE (OUTPATIENT)
Dept: GASTROENTEROLOGY | Facility: CLINIC | Age: 70
End: 2025-06-26
Payer: COMMERCIAL

## 2025-08-26 RX ORDER — KETOCONAZOLE 20 MG/G
CREAM TOPICAL DAILY
Qty: 60 G | Refills: 1 | Status: CANCELLED | OUTPATIENT
Start: 2025-08-26

## 2025-08-26 RX ORDER — POTASSIUM CHLORIDE 750 MG/1
10 TABLET, EXTENDED RELEASE ORAL 2 TIMES DAILY
Qty: 180 TABLET | Refills: 0 | Status: CANCELLED | OUTPATIENT
Start: 2025-08-26

## 2025-08-26 RX ORDER — SILDENAFIL 50 MG/1
50 TABLET, FILM COATED ORAL DAILY PRN
Qty: 30 TABLET | Refills: 2 | Status: CANCELLED | OUTPATIENT
Start: 2025-08-26

## 2025-08-26 RX ORDER — MAGNESIUM OXIDE 400 MG/1
400 TABLET ORAL DAILY
Qty: 90 TABLET | Refills: 0 | Status: CANCELLED | OUTPATIENT
Start: 2025-08-26

## 2025-08-27 ENCOUNTER — TELEPHONE (OUTPATIENT)
Dept: INTERNAL MEDICINE | Facility: CLINIC | Age: 70
End: 2025-08-27

## 2025-08-27 ENCOUNTER — HOSPITAL ENCOUNTER (OUTPATIENT)
Facility: HOSPITAL | Age: 70
Discharge: HOME | End: 2025-08-27
Payer: COMMERCIAL

## 2025-08-27 ENCOUNTER — RESULTS FOLLOW-UP (OUTPATIENT)
Dept: INTERNAL MEDICINE | Facility: CLINIC | Age: 70
End: 2025-08-27

## 2025-08-27 ENCOUNTER — OFFICE VISIT (OUTPATIENT)
Dept: INTERNAL MEDICINE | Facility: CLINIC | Age: 70
End: 2025-08-27
Payer: COMMERCIAL

## 2025-08-27 ENCOUNTER — LAB (OUTPATIENT)
Dept: LAB | Facility: HOSPITAL | Age: 70
End: 2025-08-27
Payer: COMMERCIAL

## 2025-08-27 VITALS
DIASTOLIC BLOOD PRESSURE: 80 MMHG | SYSTOLIC BLOOD PRESSURE: 130 MMHG | BODY MASS INDEX: 20.78 KG/M2 | HEIGHT: 73 IN | TEMPERATURE: 97.5 F | WEIGHT: 156.8 LBS | OXYGEN SATURATION: 97 % | HEART RATE: 74 BPM

## 2025-08-27 DIAGNOSIS — R19.7 DIARRHEA, UNSPECIFIED TYPE: ICD-10-CM

## 2025-08-27 DIAGNOSIS — R29.6 FALLS: ICD-10-CM

## 2025-08-27 DIAGNOSIS — I10 ESSENTIAL HYPERTENSION: ICD-10-CM

## 2025-08-27 DIAGNOSIS — R29.6 FALLS: Primary | ICD-10-CM

## 2025-08-27 DIAGNOSIS — D53.9 MACROCYTIC ANEMIA: ICD-10-CM

## 2025-08-27 DIAGNOSIS — C20 MALIGNANT NEOPLASM OF RECTUM: ICD-10-CM

## 2025-08-27 DIAGNOSIS — R10.30 LOWER ABDOMINAL PAIN: ICD-10-CM

## 2025-08-27 DIAGNOSIS — R93.5 ABNORMAL CT OF THE ABDOMEN: ICD-10-CM

## 2025-08-27 DIAGNOSIS — K76.9 LIVER LESION: Primary | ICD-10-CM

## 2025-08-27 DIAGNOSIS — E83.42 HYPOMAGNESEMIA: ICD-10-CM

## 2025-08-27 LAB
ALBUMIN SERPL-MCNC: 3.9 G/DL (ref 3.5–5.2)
ALBUMIN/GLOB SERPL: 1.3 G/DL
ALP SERPL-CCNC: 61 U/L (ref 39–117)
ALT SERPL W P-5'-P-CCNC: 19 U/L (ref 1–41)
ANION GAP SERPL CALCULATED.3IONS-SCNC: 15.4 MMOL/L (ref 5–15)
ANISOCYTOSIS BLD QL: ABNORMAL
AST SERPL-CCNC: 56 U/L (ref 1–40)
BASOPHILS # BLD MANUAL: 0.02 10*3/MM3 (ref 0–0.2)
BASOPHILS NFR BLD MANUAL: 1 % (ref 0–1.5)
BILIRUB SERPL-MCNC: 1.9 MG/DL (ref 0–1.2)
BUN SERPL-MCNC: 7 MG/DL (ref 8–23)
BUN/CREAT SERPL: 8.5 (ref 7–25)
CALCIUM SPEC-SCNC: 8.2 MG/DL (ref 8.6–10.5)
CHLORIDE SERPL-SCNC: 97 MMOL/L (ref 98–107)
CO2 SERPL-SCNC: 33.6 MMOL/L (ref 22–29)
CREAT SERPL-MCNC: 0.82 MG/DL (ref 0.76–1.27)
DEPRECATED RDW RBC AUTO: 83.8 FL (ref 37–54)
EGFRCR SERPLBLD CKD-EPI 2021: 95.7 ML/MIN/1.73
EOSINOPHIL # BLD MANUAL: 0.09 10*3/MM3 (ref 0–0.4)
EOSINOPHIL NFR BLD MANUAL: 4.1 % (ref 0.3–6.2)
ERYTHROCYTE [DISTWIDTH] IN BLOOD BY AUTOMATED COUNT: 21 % (ref 12.3–15.4)
FOLATE SERPL-MCNC: 2.32 NG/ML (ref 4.78–24.2)
GLOBULIN UR ELPH-MCNC: 3 GM/DL
GLUCOSE SERPL-MCNC: 92 MG/DL (ref 65–99)
HCT VFR BLD AUTO: 27 % (ref 37.5–51)
HGB BLD-MCNC: 9.3 G/DL (ref 13–17.7)
HYPOCHROMIA BLD QL: ABNORMAL
LYMPHOCYTES # BLD MANUAL: 1.22 10*3/MM3 (ref 0.7–3.1)
LYMPHOCYTES NFR BLD MANUAL: 6.1 % (ref 5–12)
MACROCYTES BLD QL SMEAR: ABNORMAL
MAGNESIUM SERPL-MCNC: 1.1 MG/DL (ref 1.6–2.4)
MCH RBC QN AUTO: 38.1 PG (ref 26.6–33)
MCHC RBC AUTO-ENTMCNC: 34.4 G/DL (ref 31.5–35.7)
MCV RBC AUTO: 110.7 FL (ref 79–97)
MONOCYTES # BLD: 0.14 10*3/MM3 (ref 0.1–0.9)
NEUTROPHILS # BLD AUTO: 0.75 10*3/MM3 (ref 1.7–7)
NEUTROPHILS NFR BLD MANUAL: 33.7 % (ref 42.7–76)
NRBC BLD AUTO-RTO: 1.4 /100 WBC (ref 0–0.2)
PLAT MORPH BLD: NORMAL
PLATELET # BLD AUTO: 161 10*3/MM3 (ref 140–450)
PMV BLD AUTO: 9.6 FL (ref 6–12)
POIKILOCYTOSIS BLD QL SMEAR: ABNORMAL
POTASSIUM SERPL-SCNC: 2.6 MMOL/L (ref 3.5–5.2)
PROT SERPL-MCNC: 6.9 G/DL (ref 6–8.5)
RBC # BLD AUTO: 2.44 10*6/MM3 (ref 4.14–5.8)
SODIUM SERPL-SCNC: 146 MMOL/L (ref 136–145)
VARIANT LYMPHS NFR BLD MANUAL: 55.1 % (ref 19.6–45.3)
VIT B12 BLD-MCNC: 286 PG/ML (ref 211–946)
WBC MORPH BLD: NORMAL
WBC NRBC COR # BLD AUTO: 2.22 10*3/MM3 (ref 3.4–10.8)

## 2025-08-27 PROCEDURE — 86258 DGP ANTIBODY EACH IG CLASS: CPT

## 2025-08-27 PROCEDURE — 85025 COMPLETE CBC W/AUTO DIFF WBC: CPT

## 2025-08-27 PROCEDURE — 82746 ASSAY OF FOLIC ACID SERUM: CPT

## 2025-08-27 PROCEDURE — 82784 ASSAY IGA/IGD/IGG/IGM EACH: CPT

## 2025-08-27 PROCEDURE — 82607 VITAMIN B-12: CPT

## 2025-08-27 PROCEDURE — 86231 EMA EACH IG CLASS: CPT

## 2025-08-27 PROCEDURE — 74176 CT ABD & PELVIS W/O CONTRAST: CPT

## 2025-08-27 PROCEDURE — 80053 COMPREHEN METABOLIC PANEL: CPT

## 2025-08-27 PROCEDURE — 85007 BL SMEAR W/DIFF WBC COUNT: CPT

## 2025-08-27 PROCEDURE — 83735 ASSAY OF MAGNESIUM: CPT

## 2025-08-27 PROCEDURE — 86364 TISS TRNSGLTMNASE EA IG CLAS: CPT

## 2025-08-27 PROCEDURE — 70450 CT HEAD/BRAIN W/O DYE: CPT

## 2025-08-27 PROCEDURE — 99214 OFFICE O/P EST MOD 30 MIN: CPT | Performed by: INTERNAL MEDICINE

## 2025-08-27 RX ORDER — VALSARTAN 320 MG/1
320 TABLET ORAL DAILY
Qty: 90 TABLET | Refills: 0 | Status: ON HOLD | OUTPATIENT
Start: 2025-08-27

## 2025-08-27 RX ORDER — ACETAMINOPHEN 80 MG/1
80 TABLET, CHEWABLE ORAL EVERY 4 HOURS PRN
Status: ON HOLD | COMMUNITY

## 2025-08-28 PROBLEM — E83.42 HYPOMAGNESEMIA: Status: ACTIVE | Noted: 2025-08-28

## 2025-08-28 PROBLEM — D64.9 ANEMIA, UNSPECIFIED: Status: ACTIVE | Noted: 2025-08-28

## 2025-08-28 PROBLEM — F10.90 ALCOHOL USE: Status: ACTIVE | Noted: 2025-08-28

## 2025-08-28 PROBLEM — I21.4 NSTEMI (NON-ST ELEVATED MYOCARDIAL INFARCTION): Status: ACTIVE | Noted: 2025-08-28
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
## 2025-08-28 PROBLEM — E87.6 HYPOKALEMIA: Status: ACTIVE | Noted: 2025-08-28

## 2025-08-28 PROBLEM — F14.10 COCAINE ABUSE: Status: ACTIVE | Noted: 2025-08-28

## 2025-08-28 PROBLEM — E87.8 ELECTROLYTE ABNORMALITY: Status: ACTIVE | Noted: 2025-08-28

## 2025-08-29 PROBLEM — R07.9 CHEST PAIN: Status: ACTIVE | Noted: 2025-08-29

## 2025-08-29 LAB
ENDOMYSIUM IGA SER QL: NEGATIVE
GLIADIN PEPTIDE IGA SER-ACNC: 6 UNITS (ref 0–19)
GLIADIN PEPTIDE IGG SER-ACNC: 2 UNITS (ref 0–19)
IGA SERPL-MCNC: 448 MG/DL (ref 61–437)
TTG IGA SER-ACNC: <2 U/ML (ref 0–3)
TTG IGG SER-ACNC: 11 U/ML (ref 0–5)

## (undated) DEVICE — DRESSING,OPTIFOAM,NON-ADHESIVE,4X4: Brand: MEDLINE

## (undated) DEVICE — SUT ETHLN 2/0 PS 18IN 585H

## (undated) DEVICE — ADHS SKIN MASTISOL CAP 2OZ DISP

## (undated) DEVICE — 3M™ IOBAN™ 2 ANTIMICROBIAL INCISE DRAPE 6650EZ: Brand: IOBAN™ 2

## (undated) DEVICE — SUT SILK 2/0 TIES 18IN A185H

## (undated) DEVICE — PK MINOR SPLT 10

## (undated) DEVICE — GLV SURG SENSICARE PI MIC PF SZ8 LF STRL

## (undated) DEVICE — Device

## (undated) DEVICE — SUT NUROLON 0 MO7 CR8 18IN C541D

## (undated) DEVICE — 3M™ STERI-STRIP™ REINFORCED ADHESIVE SKIN CLOSURES, R1547, 1/2 IN X 4 IN (12 MM X 100 MM), 6 STRIPS/ENVELOPE: Brand: 3M™ STERI-STRIP™

## (undated) DEVICE — TRAP FLD MINIVAC MEGADYNE 100ML

## (undated) DEVICE — PATIENT RETURN ELECTRODE, SINGLE-USE, CONTACT QUALITY MONITORING, ADULT, WITH 9FT CORD, FOR PATIENTS WEIGING OVER 33LBS. (15KG): Brand: MEGADYNE

## (undated) DEVICE — GOWN SURG ORBIS LVL3 2XL STRL

## (undated) DEVICE — APPL CHLORAPREP TINTED 26ML TEAL

## (undated) DEVICE — PENCL SMOKE/EVAC MEGADYNE TELESCP 10FT

## (undated) DEVICE — SUT SILK 3/0 SH CR8 18IN C013D

## (undated) DEVICE — KT PEG ENDOVIVE ENFIT SFTY PULL 20F 1P/U

## (undated) DEVICE — TBG PENCL TELESCP MEGADYNE SMOKE EVAC 10FT

## (undated) DEVICE — DRSNG SURESITE WNDW 4X4.5

## (undated) DEVICE — SYR LL TP 10ML STRL

## (undated) DEVICE — ANTIBACTERIAL UNDYED BRAIDED (POLYGLACTIN 910), SYNTHETIC ABSORBABLE SUTURE: Brand: COATED VICRYL

## (undated) DEVICE — GLV SURG SENSICARE PI MIC PF SZ8.5 LF STRL

## (undated) DEVICE — GOWN,PREVENTION PLUS,XXLARGE,STERILE: Brand: MEDLINE

## (undated) DEVICE — STRIP,CLOSURE,WOUND,MEDI-STRIP,1/2X4: Brand: MEDLINE

## (undated) DEVICE — SUT PROLN 2/0 PC3 8833H

## (undated) DEVICE — SYS DRAIN ENTRL FARRELL ENFIT 250ML

## (undated) DEVICE — SUT MNCRYL PLS ANTIB UD 4/0 PS2 18IN

## (undated) DEVICE — ALLEVYN TRACHEOSTOMY 3.5X3.5" CTN 10: Brand: ALLEVYN TRACHEOSTOMY

## (undated) DEVICE — BNDR ABD PREMIUM/UNIV 10IN 27TO48IN

## (undated) DEVICE — PENROSE DRAIN 18 X .5" SILICONE: Brand: MEDLINE

## (undated) DEVICE — HDRST POSTIN FM CRDL TRACH SLOT 9X8X4IN

## (undated) DEVICE — THE BITE BLOCK MAXI, LATEX FREE STRAP IS USED TO PROTECT THE ENDOSCOPE INSERTION TUBE FROM BEING BITTEN BY THE PATIENT.